# Patient Record
Sex: MALE | Race: WHITE | NOT HISPANIC OR LATINO | Employment: OTHER | URBAN - METROPOLITAN AREA
[De-identification: names, ages, dates, MRNs, and addresses within clinical notes are randomized per-mention and may not be internally consistent; named-entity substitution may affect disease eponyms.]

---

## 2017-01-02 DIAGNOSIS — Z79.01 LONG TERM CURRENT USE OF ANTICOAGULANT THERAPY: Primary | ICD-10-CM

## 2017-01-02 RX ORDER — WARFARIN SODIUM 4 MG/1
TABLET ORAL
Qty: 34 TABLET | Refills: 5 | Status: SHIPPED | OUTPATIENT
Start: 2017-01-02 | End: 2017-01-19

## 2017-01-02 NOTE — TELEPHONE ENCOUNTER
Last INR: 12/29/16=3.1  Next INR: 02/09/17    Prescription approved per [facility] Refill Protocol.    Madeline Wayne RN

## 2017-01-04 DIAGNOSIS — I70.90 ASVD (ARTERIOSCLEROTIC VASCULAR DISEASE): Primary | ICD-10-CM

## 2017-01-04 RX ORDER — LISINOPRIL 20 MG/1
20 TABLET ORAL 2 TIMES DAILY
Qty: 180 TABLET | Refills: 0 | Status: SHIPPED | OUTPATIENT
Start: 2017-01-04 | End: 2017-02-20

## 2017-01-16 ENCOUNTER — HOSPITAL ENCOUNTER (OUTPATIENT)
Dept: CARDIOLOGY | Facility: CLINIC | Age: 62
Discharge: HOME | End: 2017-01-16
Attending: INTERNAL MEDICINE | Admitting: INTERNAL MEDICINE
Payer: COMMERCIAL

## 2017-01-16 DIAGNOSIS — I42.9 CARDIOMYOPATHY, UNSPECIFIED (H): Primary | ICD-10-CM

## 2017-01-16 DIAGNOSIS — I42.9 IDIOPATHIC CARDIOMYOPATHY (H): ICD-10-CM

## 2017-01-16 PROCEDURE — 93306 TTE W/DOPPLER COMPLETE: CPT | Mod: 26 | Performed by: INTERNAL MEDICINE

## 2017-01-16 PROCEDURE — 93306 TTE W/DOPPLER COMPLETE: CPT

## 2017-01-16 RX ORDER — CARVEDILOL 25 MG/1
25 TABLET ORAL 2 TIMES DAILY WITH MEALS
Qty: 180 TABLET | Refills: 0 | Status: SHIPPED | OUTPATIENT
Start: 2017-01-16 | End: 2017-02-20

## 2017-01-19 DIAGNOSIS — J20.0 ACUTE BRONCHITIS DUE TO MYCOPLASMA PNEUMONIAE: ICD-10-CM

## 2017-01-19 DIAGNOSIS — Z79.01 LONG TERM CURRENT USE OF ANTICOAGULANT THERAPY: Primary | ICD-10-CM

## 2017-01-19 DIAGNOSIS — E78.5 HYPERLIPIDEMIA LDL GOAL <70: Primary | ICD-10-CM

## 2017-01-19 DIAGNOSIS — E03.9 ACQUIRED HYPOTHYROIDISM: ICD-10-CM

## 2017-01-19 DIAGNOSIS — F41.9 ANXIETY: ICD-10-CM

## 2017-01-19 RX ORDER — WARFARIN SODIUM 4 MG/1
TABLET ORAL
Qty: 102 TABLET | Refills: 1 | Status: SHIPPED | OUTPATIENT
Start: 2017-01-19 | End: 2017-06-25

## 2017-01-19 NOTE — TELEPHONE ENCOUNTER
Last INR: 3.1 on 12/29/16  Next INR: 02/09/17    Prescription approved per [facility] Refill Protocol, patient has switched from [facility] to [facility]    Madeline Wayne RN

## 2017-01-19 NOTE — TELEPHONE ENCOUNTER
Simvastatin 20mg     Last Written Prescription Date: 03/21/2016  Last Fill Quantity: 90, # refills: 3  Last Office Visit with [facility] primary care provider:  12/19/2016-Dr Lorenzana   Next 5 appointments (look out 90 days)     Jan 27, 2017  1:30 PM   Return Visit with Lloyd Lewis MD   [facility] [place] ([facility])    [address]  [facility] [id number] [phone]                   Last PHQ-9 score on record=   PHQ-9 SCORE 5/9/2016   Total Score 4           L-Thyroxine 175mcg   New pharmacy-[facility]  Last Written Prescription Date: 11/9/2016  Last Quantity: 30, # refills: 11  Last Office Visit with [facility], [facility] or [facility] prescribing provider: 12/19/2016-Dr Lorenzana   Next 5 appointments (look out 90 days)     Jan 27, 2017  1:30 PM   Return Visit with Lloyd Lewis MD   [facility] [place] ([facility])    [address]  [facility] [id number] [phone]                   TSH   Date Value Ref Range Status   10/06/2016 5.86* 0.40 - 4.00 mU/L Final

## 2017-01-21 RX ORDER — SIMVASTATIN 20 MG
20 TABLET ORAL AT BEDTIME
Qty: 90 TABLET | Refills: 0 | Status: SHIPPED | OUTPATIENT
Start: 2017-01-21 | End: 2017-04-25

## 2017-01-21 RX ORDER — LEVOTHYROXINE SODIUM 175 UG/1
175 TABLET ORAL DAILY
Qty: 30 TABLET | Refills: 11 | Status: SHIPPED | OUTPATIENT
Start: 2017-01-21 | End: 2018-01-11

## 2017-01-21 NOTE — TELEPHONE ENCOUNTER
Routing refill request to provider for review/approval because:  Labs out of range:  TSH

## 2017-01-24 ENCOUNTER — PRE VISIT (OUTPATIENT)
Dept: CARDIOLOGY | Facility: CLINIC | Age: 62
End: 2017-01-24

## 2017-01-27 ENCOUNTER — OFFICE VISIT (OUTPATIENT)
Dept: CARDIOLOGY | Facility: CLINIC | Age: 62
End: 2017-01-27
Payer: COMMERCIAL

## 2017-01-27 VITALS
BODY MASS INDEX: 31.74 KG/M2 | SYSTOLIC BLOOD PRESSURE: 84 MMHG | HEIGHT: 69 IN | WEIGHT: 214.3 LBS | DIASTOLIC BLOOD PRESSURE: 60 MMHG | HEART RATE: 92 BPM

## 2017-01-27 DIAGNOSIS — Z95.2 S/P AORTIC VALVE REPLACEMENT: ICD-10-CM

## 2017-01-27 DIAGNOSIS — I42.9 IDIOPATHIC CARDIOMYOPATHY (H): Primary | ICD-10-CM

## 2017-01-27 DIAGNOSIS — I10 HYPERTENSION GOAL BP (BLOOD PRESSURE) < 140/90: ICD-10-CM

## 2017-01-27 DIAGNOSIS — I35.0 NONRHEUMATIC AORTIC VALVE STENOSIS: ICD-10-CM

## 2017-01-27 DIAGNOSIS — I25.10 CORONARY ARTERY DISEASE INVOLVING NATIVE CORONARY ARTERY OF NATIVE HEART WITHOUT ANGINA PECTORIS: ICD-10-CM

## 2017-01-27 DIAGNOSIS — I95.9 HYPOTENSION, UNSPECIFIED HYPOTENSION TYPE: ICD-10-CM

## 2017-01-27 DIAGNOSIS — I48.21 PERMANENT ATRIAL FIBRILLATION (H): ICD-10-CM

## 2017-01-27 PROCEDURE — 99214 OFFICE O/P EST MOD 30 MIN: CPT | Performed by: INTERNAL MEDICINE

## 2017-01-27 NOTE — PROGRESS NOTES
2017      Rehan Lorenzana MD    [facility]    [address] [place], [place] [id number]       RE: Jovon Mantilla   MRN: [id number]   : 1955      Dear Dr. Lorenzana:      I again had the pleasure of seeing your patient, Jovon Mantilla, at [facility] for evaluation of a dilated cardiomyopathy, coronary artery disease, permanent atrial fibrillation, aortic valve replacement and hypertension.  The patient had a history of permanent atrial fibrillation with controlled ventricular response.  His ejection fraction was generally held around 40%-50%.  He had mild to moderate mitral regurgitation and mild tricuspid insufficiency.  The patient developed worsening aortic stenosis, and his aortic valve was replaced using an ATS 24 mm  supra-annular valve on 2011.  A maze procedure was performed but we were unable to maintain normal sinus rhythm postoperatively.  He returned to the operating room for mediastinal exploration for bleeding and then developed a GI bleed thereafter.  He has remained stable since that time.  He has not had any significant bradyarrhythmias requiring a pacemaker.  Digoxin was added by the electrophysiologist for rate control and his last level 1 year ago on 2016 was 0.9.  The patient denies syncope, presyncope or palpitations.  He is exercising by walking for 1 hour every day.  He denies angina pectoris.  He has previously known 70% LAD stenosis which could not be bypassed because it was intramuscular.  A Cardiolite stress test 2013 did not demonstrate any areas of ischemia or infarction.  He is using SBE prophylaxis appropriately.  He has chronic right greater than left extremity peripheral edema due to previous vein stripping and varicose veins.  He has undergone bariatric surgery in the past and remained stable.  The patient's blood pressure has been elevated in the past.  He did not feel well when we increased his  lisinopril to 20 mg b.i.d.  He is now taking 20 mg by mouth in the morning and 10 mg in the afternoon.      PHYSICAL EXAMINATION:   VITAL SIGNS:  Current blood pressure is 84/60, taken twice on the right arm and once on the left arm.  Pulse is 92 and irregular, weight is 214 pounds, an increase of 4 pounds from last year.   CHEST:  Clear to auscultation.   CARDIAC:  Shows a healed sternotomy scar with an irregularly irregular rhythm.  Normal S1 with crisp prosthetic S2.  I do not hear an S3 gallop.  He has a 2/6 systolic ejection murmur left lower sternal border the right upper sternal border and a 2/6 mid possibly holosystolic murmur of mitral regurgitation left lower sternal border towards the apex.   NECK:  There is no JVD or HJR present.   ABDOMEN:  Soft, nontender without organomegaly.   EXTREMITIES:  Show 1+ right and trace left peripheral edema with venous varicosities.      ASSESSMENT:   1.  Jovon Mantilla is a delightful 61-year-old male with an idiopathic cardiomyopathy out of proportion to the aortic valve disease or coronary artery disease.  An echocardiogram was performed on 01/16 and demonstrated ejection fraction of 45%-50% and mild concentric left ventricular hypertrophy.  There continues to be severe biatrial enlargement, mild to moderate mitral regurgitation and mild tricuspid insufficiency.  The inferior vena cava appeared dilated.  Right ventricular systolic pressure was elevated consistent with mild to moderate pulmonary hypertension.  The mechanical aortic valve was functioning normally.  The rhythm was atrial fibrillation.  One wonders whether his pulmonary hypertension and right heart dysfunction is due to systemic hypertension.  On the other hand, his blood pressure is currently low today despite his being asymptomatic.  We are going to recheck a blood pressure in 1 month.  The patient states he is drinking plenty of fluids.  He has been on Zoloft for 6 months and blood pressure in December  was 140/100.   2.  Permanent atrial fibrillation.  He currently is rate controlled and doing well.   3.  Coronary artery disease.  Periodic stress testing will continue every 5 years if he is angina-free.      It is my pleasure to assist in the care of Mr. Jovon Mantilla.  We will see what his blood pressure is in a month.  He will continue with SBE prophylaxis.  I will see him again in 1 year should he remain stable.  A digoxin level will be performed next year.  All the patient's questions were answered to his satisfaction.      Sincerely,            Dylan Lewis MD, FACC         DYLAN LEWIS MD, FACC             D: 2017 14:25   T: 2017 17:09   MT: RAYSHAWN      Name:     JOVON MANTILLA   MRN:      [id number]        Account:      [id number]   :      1955           Service Date: 2017      Document: [id number]

## 2017-01-27 NOTE — MR AVS SNAPSHOT
After Visit Summary   1/27/2017    Jovon Mantilla    MRN: [id number]           Patient Information     Date Of Birth          1955        Visit Information        Provider Department      1/27/2017 1:30 PM Lloyd Lewis MD [facility]        Today's Diagnoses     Idiopathic cardiomyopathy (H)    -  1     Permanent atrial fibrillation (H)         Nonrheumatic aortic valve stenosis         S/P aortic valve replacement         Hypertension goal BP (blood pressure) < 140/90         Coronary artery disease involving native coronary artery of native heart without angina pectoris         Hypotension, unspecified hypotension type            Follow-ups after your visit        Additional Services     Follow-Up with Cardiologist                 Your next 10 appointments already scheduled     Feb 09, 2017  1:45 PM   Anticoagulation Visit with [facility]   [facility]    [address] [place] [id number] [phone]            Feb 24, 2017  3:10 PM   Return Visit with LILLI Rhodes CNP   [facility] ([facility])    [address] [place] [id number] [phone]              Future tests that were ordered for you today     Open Future Orders        Priority Expected Expires Ordered    Follow-Up with Cardiologist Routine 1/27/2018 6/11/2018 1/27/2017            Who to contact     If you have questions or need follow up information about today's clinic visit or your schedule please contact [facility] directly at [phone].  Normal or non-critical lab and imaging results will be communicated to you by MyChart, letter or phone within 4 business days after the clinic has received the results. If you do not hear from us within 7 days, please contact the  "clinic through [employer] or phone. If you have a critical or abnormal lab result, we will notify you by phone as soon as possible.  Submit refill requests through [employer] or call your pharmacy and they will forward the refill request to us. Please allow 3 business days for your refill to be completed.          Additional Information About Your Visit        [employer] Information     [employer] gives you secure access to your electronic health record. If you see a primary care provider, you can also send messages to your care team and make appointments. If you have questions, please call your primary care clinic.  If you do not have a primary care provider, please call [phone] and they will assist you.        Care EveryWhere ID     This is your Care EveryWhere ID. This could be used by other organizations to access your [facility] medical records  [id number]        Your Vitals Were     Pulse Height BMI (Body Mass Index)             92 1.753 m (5' 9\") 31.63 kg/m2          Blood Pressure from Last 3 Encounters:   01/27/17 84/60   12/19/16 140/100   11/04/16 104/76    Weight from Last 3 Encounters:   01/27/17 97.206 kg (214 lb 4.8 oz)   12/19/16 98.431 kg (217 lb)   11/04/16 98.431 kg (217 lb)                 Today's Medication Changes          These changes are accurate as of: 1/27/17  2:16 PM.  If you have any questions, ask your nurse or doctor.               These medicines have changed or have updated prescriptions.        Dose/Directions    amoxicillin 500 MG capsule   Commonly known as:  AMOXIL   This may have changed:    - when to take this  - reasons to take this   Used for:  Acute bronchitis due to Mycoplasma pneumoniae        Dose:  500 mg   Take 1 capsule (500 mg) by mouth 3 times daily   Quantity:  30 capsule   Refills:  0       lisinopril 20 MG tablet   Commonly known as:  PRINIVIL/ZESTRIL   This may have changed:  additional instructions   Used for:  ASVD (arteriosclerotic vascular disease)        Dose:  20 " mg   Take 1 tablet (20 mg) by mouth 2 times daily   Quantity:  180 tablet   Refills:  0                Primary Care Provider Office Phone # Fax #    Rehan Lorenzana [phone]       [address] [address]        Thank you!     Thank you for choosing [facility] [place]  for your care. Our goal is always to provide you with excellent care. Hearing back from our patients is one way we can continue to improve our services. Please take a few minutes to complete the written survey that you may receive in the mail after your visit with us. Thank you!             Your Updated Medication List - Protect others around you: Learn how to safely use, store and throw away your medicines at www.disposemymeds.org.          This list is accurate as of: 1/27/17  2:16 PM.  Always use your most recent med list.                   Brand Name Dispense Instructions for use    albuterol 108 (90 BASE) MCG/ACT Inhaler    PROAIR HFA/PROVENTIL HFA/VENTOLIN HFA    1 Inhaler    Inhale 2 puffs into the lungs every 6 hours as needed for shortness of breath / dyspnea or wheezing       ALLERGY 24-HR PO      Take by mouth daily       amoxicillin 500 MG capsule    AMOXIL    30 capsule    Take 1 capsule (500 mg) by mouth 3 times daily       aspirin 81 MG tablet      Take 81 mg by mouth every other day       CALCIUM 600+D 600-200 MG-UNIT Tabs   Generic drug:  calcium carbonate-vitamin D      Take 1 tablet by mouth 2 times daily       carvedilol 25 MG tablet    COREG    180 tablet    Take 1 tablet (25 mg) by mouth 2 times daily (with meals)       CENTRUM Chew      Take 1 tablet by mouth At Bedtime       digoxin 250 MCG tablet    LANOXIN    90 tablet    Take 1 tablet (250 mcg) by mouth daily       Fiber 0.52 G Caps      Take 2 tablets by mouth At Bedtime       IRON SUPPLEMENT 325 (65 FE) MG tablet   Generic drug:  ferrous sulfate     100 tablet    Take 1  tablet (325 mg) by mouth 2 times daily       levothyroxine 175 MCG tablet    SYNTHROID    30 tablet    Take 1 tablet (175 mcg) by mouth daily       lisinopril 20 MG tablet    PRINIVIL/ZESTRIL    180 tablet    Take 1 tablet (20 mg) by mouth 2 times daily       sertraline 50 MG tablet    ZOLOFT    90 tablet    Take 1 tablet (50 mg) by mouth daily       simvastatin 20 MG tablet    ZOCOR    90 tablet    Take 1 tablet (20 mg) by mouth At Bedtime       Vitamin B-12 500 MCG Subl      Place 500 mcg under the tongue daily       VITAMIN C PO      Take 500 mg by mouth At Bedtime       vitamin D 2000 UNITS Caps      Take 1 tablet by mouth daily       warfarin 4 MG tablet    COUMADIN    102 tablet    Take 6mg on Tuesday, Thursday, Saturday / Take 4mg on all other days OR As directed by INR Clinic.

## 2017-01-27 NOTE — PROGRESS NOTES
HPI and Plan:   See dictation:[id number]    Orders Placed This Encounter   Procedures     Digoxin level     Follow-Up with Cardiologist     Follow-Up with Cardiac Advanced Practice Provider       Orders Placed This Encounter   Medications     aspirin 81 MG tablet     Sig: Take 81 mg by mouth every other day     Fexofenadine HCl (ALLERGY 24-HR PO)     Sig: Take by mouth daily       Medications Discontinued During This Encounter   Medication Reason     ASPIRIN NOT PRESCRIBED (INTENTIONAL) Medication Reconciliation Clean Up         Encounter Diagnoses   Name Primary?     Idiopathic cardiomyopathy (H) Yes     Permanent atrial fibrillation (H)      Nonrheumatic aortic valve stenosis      S/P aortic valve replacement      Hypertension goal BP (blood pressure) < 140/90      Coronary artery disease involving native coronary artery of native heart without angina pectoris      Hypotension, unspecified hypotension type        CURRENT MEDICATIONS:  Current Outpatient Prescriptions   Medication Sig Dispense Refill     aspirin 81 MG tablet Take 81 mg by mouth every other day       Fexofenadine HCl (ALLERGY 24-HR PO) Take by mouth daily       simvastatin (ZOCOR) 20 MG tablet Take 1 tablet (20 mg) by mouth At Bedtime 90 tablet 0     levothyroxine (SYNTHROID) 175 MCG tablet Take 1 tablet (175 mcg) by mouth daily 30 tablet 11     sertraline (ZOLOFT) 50 MG tablet Take 1 tablet (50 mg) by mouth daily 90 tablet 0     warfarin (COUMADIN) 4 MG tablet Take 6mg on Tuesday, Thursday, Saturday / Take 4mg on all other days OR As directed by INR Clinic. 102 tablet 1     carvedilol (COREG) 25 MG tablet Take 1 tablet (25 mg) by mouth 2 times daily (with meals) 180 tablet 0     lisinopril (PRINIVIL/ZESTRIL) 20 MG tablet Take 1 tablet (20 mg) by mouth 2 times daily (Patient taking differently: Take 20 mg by mouth 2 times daily Pt takes a 20 mg tablet in am and a half of 20 (10mg) mg tablet at pm) 180 tablet 0     albuterol (PROAIR HFA/PROVENTIL  HFA/VENTOLIN HFA) 108 (90 BASE) MCG/ACT Inhaler Inhale 2 puffs into the lungs every 6 hours as needed for shortness of breath / dyspnea or wheezing 1 Inhaler 0     amoxicillin (AMOXIL) 500 MG capsule Take 1 capsule (500 mg) by mouth 3 times daily (Patient taking differently: Take 500 mg by mouth as needed ) 30 capsule 0     digoxin (LANOXIN) 250 MCG tablet Take 1 tablet (250 mcg) by mouth daily 90 tablet 2     ferrous sulfate (IRON SUPPLEMENT) 325 (65 FE) MG tablet Take 1 tablet (325 mg) by mouth 2 times daily 100 tablet      Multiple Vitamins-Minerals (CENTRUM) CHEW Take 1 tablet by mouth At Bedtime       Ascorbic Acid (VITAMIN C PO) Take 500 mg by mouth At Bedtime       Cyanocobalamin (VITAMIN B-12) 500 MCG SUBL Place 500 mcg under the tongue daily        Cholecalciferol (VITAMIN D) 2000 UNIT CAPS Take 1 tablet by mouth daily        Calcium Carbonate-Vitamin D (CALCIUM 600+D) 600-200 MG-UNIT TABS Take 1 tablet by mouth 2 times daily        Psyllium (FIBER) 0.52 GM CAPS Take 2 tablets by mouth At Bedtime          ALLERGIES   No Known Allergies    PAST MEDICAL HISTORY:  Past Medical History   Diagnosis Date     Palpitations      Acute prostatitis      Acute sinusitis, unspecified      Unspecified essential hypertension      Obesity, unspecified      HTN (hypertension)      CAD (coronary artery disease)      known 70% LAD stenosis     Atrial fibrillation (H)      Mitral regurgitation      Aortic valve disease      AVR 2011, MAZE     Cardiomyopathy (H)        PAST SURGICAL HISTORY:  Past Surgical History   Procedure Laterality Date     C nonspecific procedure  ~1985     vein stripping     Laparoscopic bypass gastric  10/8/2011     Procedure:LAPAROSCOPIC BYPASS GASTRIC; Diagnostic laparoscopy, Lysis of Adhesions, Laparoscopic Revision of Jejunojejunostomy; Surgeon:RADHA MURO; Location:[facility] OR     Laparoscopy diagnostic (general)  10/8/2011     Procedure:LAPAROSCOPY DIAGNOSTIC (GENERAL); Surgeon:JINA  RADHA RICHARDSON; Location: OR     Replace valve aortic  1/7/2011     Laparoscopic bypass gastric  6/26/2006     Dr Jin     Cardioversion  2/02, 4/02, 4/11     Angiogram  2/02     Normal coronary,dilated LV,Sev.decr.global LVF,+1 MR     Angiogram  1/08     Mild AS,Mod PHTN,mod prox.LAD disease,Triv.CFX disease     Angiogram  1/09     Mod AS,Mod PHTN,mod prox.LAD disease     Angiogram  10/10     Sev.AS,CM,global hypokinesis,Mild-mod LV dilated,Mild MR,70% prox.LAD lesion,PHTN     Cholecystectomy       Esophagoscopy, gastroscopy, duodenoscopy (egd), combined N/A 1/23/2016     Procedure: COMBINED ESOPHAGOSCOPY, GASTROSCOPY, DUODENOSCOPY (EGD);  Surgeon: Robyn Collins MD;  Location:  GI     Colonoscopy N/A 1/23/2016     Procedure: COLONOSCOPY;  Surgeon: Robyn Collins MD;  Location:  GI       FAMILY HISTORY:  Family History   Problem Relation Age of Onset     Cancer - colorectal Father      Colon Cancer Father      DIABETES Brother        SOCIAL HISTORY:  Social History     Social History     Marital Status:      Spouse Name: Tracey     Number of Children: 0     Years of Education: N/A     Occupational History           [employer]     Social History Main Topics     Smoking status: Never Smoker      Smokeless tobacco: Never Used     Alcohol Use: No     Drug Use: No     Sexual Activity:     Partners: Female     Other Topics Concern     Parent/Sibling W/ Cabg, Mi Or Angioplasty Before 65f 55m? Yes     Blood Transfusions No     Caffeine Concern Yes     1 can daily     Occupational Exposure Yes     Hobby Hazards No     Sleep Concern No     Stress Concern No     Weight Concern No     Special Diet Yes     low sodium, low calories     Back Care No     Exercise Yes     walks daily     Bike Helmet Not Asked     NA     Seat Belt Yes     Self-Exams Yes     Social History Narrative       Review of Systems:  Skin:  Negative       Eyes:  Positive for glasses    ENT:  Negative      Respiratory:   "Negative       Cardiovascular:    Positive for;palpitations;edema occas. palpitations with over exertion ,  edema of right ankle ,   Gastroenterology: Negative      Genitourinary:  Negative      Musculoskeletal:  Negative      Neurologic:  Negative      Psychiatric:  Negative      Heme/Lymph/Imm:  Positive for night sweats;allergies takes an allergy pill daily   Endocrine:  Positive for thyroid disorder      Physical Exam:  Vitals: BP 84/60 mmHg  Pulse 92  Ht 1.753 m (5' 9\")  Wt 97.206 kg (214 lb 4.8 oz)  BMI 31.63 kg/m2    Constitutional:  cooperative, alert and oriented, well developed, well nourished, in no acute distress        Skin:  warm and dry to the touch, no apparent skin lesions or masses noted        Head:  normocephalic, no masses or lesions        Eyes:  pupils equal and round, conjunctivae and lids unremarkable, sclera white, no xanthalasma, EOMS intact, no nystagmus        ENT:  no pallor or cyanosis, dentition good        Neck:  carotid pulses are full and equal bilaterally, JVP normal, no carotid bruit, no thyromegaly        Chest:  normal breath sounds, clear to auscultation, normal A-P diameter, normal symmetry, normal respiratory excursion, no use of accessory muscles;healed median sternotomy scar          Cardiac: normal S1 and S2 irregularly irregular rhythm   crisp prosthetic valve sounds systolic ejection murmur;LLSB;grade 2;radiation to the RUSB systolic murmur;grade 2;LLSB;radiation to the base   blowing    Abdomen:  abdomen soft, non-tender, BS normoactive, no mass, no HSM, no bruits        Vascular: pulses full and equal, no bruits auscultated                                        Extremities and Back:  no deformities, clubbing, cyanosis, erythema observed     1+;pitting;RLE edema LLE edema;trace;pitting right lower extremity venous vericosities    Neurological:  affect appropriate, oriented to time, person and place;no gross motor deficits              CC  No referring provider " defined for this encounter.

## 2017-01-27 NOTE — Clinical Note
2017      Rehan Lorenzana MD    [facility]    [address] [place], [place] [id number]       RE: Jovon Mantilla   MRN: [id number]   : 1955      Dear Dr. Lorenzana:      I again had the pleasure of seeing your patient, Jovon Mantilla, at [facility] for evaluation of a dilated cardiomyopathy, coronary artery disease, permanent atrial fibrillation, aortic valve replacement and hypertension.  The patient had a history of permanent atrial fibrillation with controlled ventricular response.  His ejection fraction was generally held around 40%-50%.  He had mild to moderate mitral regurgitation and mild tricuspid insufficiency.  The patient developed worsening aortic stenosis, and his aortic valve was replaced using an ATS 24 mm  supra-annular valve on 2011.  A maze procedure was performed but we were unable to maintain normal sinus rhythm postoperatively.  He returned to the operating room for mediastinal exploration for bleeding and then developed a GI bleed thereafter.  He has remained stable since that time.  He has not had any significant bradyarrhythmias requiring a pacemaker.  Digoxin was added by the electrophysiologist for rate control and his last level 1 year ago on 2016 was 0.9.  The patient denies syncope, presyncope or palpitations.  He is exercising by walking for 1 hour every day.  He denies angina pectoris.  He has previously known 70% LAD stenosis which could not be bypassed because it was intramuscular.  A Cardiolite stress test 2013 did not demonstrate any areas of ischemia or infarction.  He is using SBE prophylaxis appropriately.  He has chronic right greater than left extremity peripheral edema due to previous vein stripping and varicose veins.  He has undergone bariatric surgery in the past and remained stable.  The patient's blood pressure has been elevated in the past.  He did not feel well when we increased his  lisinopril to 20 mg b.i.d.  He is now taking 20 mg by mouth in the morning and 10 mg in the afternoon.      PHYSICAL EXAMINATION:   VITAL SIGNS:  Current blood pressure is 84/60, taken twice on the right arm and once on the left arm.  Pulse is 92 and irregular, weight is 214 pounds, an increase of 4 pounds from last year.   CHEST:  Clear to auscultation.   CARDIAC:  Shows a healed sternotomy scar with an irregularly irregular rhythm.  Normal S1 with crisp prosthetic S2.  I do not hear an S3 gallop.  He has a 2/6 systolic ejection murmur left lower sternal border the right upper sternal border and a 2/6 mid possibly holosystolic murmur of mitral regurgitation left lower sternal border towards the apex.   NECK:  There is no JVD or HJR present.   ABDOMEN:  Soft, nontender without organomegaly.   EXTREMITIES:  Show 1+ right and trace left peripheral edema with venous varicosities.      ASSESSMENT:   1.  Jovon Mantilla is a delightful 61-year-old male with an idiopathic cardiomyopathy out of proportion to the aortic valve disease or coronary artery disease.  An echocardiogram was performed on 01/16 and demonstrated ejection fraction of 45%-50% and mild concentric left ventricular hypertrophy.  There continues to be severe biatrial enlargement, mild to moderate mitral regurgitation and mild tricuspid insufficiency.  The inferior vena cava appeared dilated.  Right ventricular systolic pressure was elevated consistent with mild to moderate pulmonary hypertension.  The mechanical aortic valve was functioning normally.  The rhythm was atrial fibrillation.  One wonders whether his pulmonary hypertension and right heart dysfunction is due to systemic hypertension.  On the other hand, his blood pressure is currently low today despite his being asymptomatic.  We are going to recheck a blood pressure in 1 month.  The patient states he is drinking plenty of fluids.  He has been on Zoloft for 6 months and blood pressure in December  was 140/100.   2.  Permanent atrial fibrillation.  He currently is rate controlled and doing well.   3.  Coronary artery disease.  Periodic stress testing will continue every 5 years if he is angina-free.      It is my pleasure to assist in the care of Mr. Jovon Mantilla.  We will see what his blood pressure is in a month.  He will continue with SBE prophylaxis.  I will see him again in 1 year should he remain stable.  A digoxin level will be performed next year.  All the patient's questions were answered to his satisfaction.      Sincerely,            Lloyd Lewis MD, FACC

## 2017-02-09 ENCOUNTER — ANTICOAGULATION THERAPY VISIT (OUTPATIENT)
Dept: NURSING | Facility: CLINIC | Age: 62
End: 2017-02-09
Payer: COMMERCIAL

## 2017-02-09 DIAGNOSIS — Z95.2 S/P AVR (AORTIC VALVE REPLACEMENT): ICD-10-CM

## 2017-02-09 DIAGNOSIS — Z79.01 LONG-TERM (CURRENT) USE OF ANTICOAGULANTS: Primary | ICD-10-CM

## 2017-02-09 LAB — INR POINT OF CARE: 4.5 (ref 0.86–1.14)

## 2017-02-09 PROCEDURE — 99207 ZZC NO CHARGE NURSE ONLY: CPT

## 2017-02-09 PROCEDURE — 85610 PROTHROMBIN TIME: CPT | Mod: QW

## 2017-02-09 PROCEDURE — 36416 COLLJ CAPILLARY BLOOD SPEC: CPT

## 2017-02-09 NOTE — PROGRESS NOTES
ANTICOAGULATION FOLLOW-UP CLINIC VISIT    Patient Name:  Jovon Mantilla  Date:  2/9/2017  Contact Type:  Face to Face    SUBJECTIVE:     Patient Findings     Positives Antibiotic use or infection (finished 2nd round of Amoxicillin about 1 week ago, pt stated he had influenza like symptoms but is feeling better now.)           OBJECTIVE    INR PROTIME   Date Value Ref Range Status   02/09/2017 4.5* 0.86 - 1.14 Final     CHROMOGENIC FACTOR 10   Date Value Ref Range Status   01/21/2011 35* 60 - 140 % Final     Comment:     Therapeutic Range: A Chromogenic Factor 10 level of 20-40% inversely   correlates   with an INR of 2-3 for patients receiving Warfarin. Chromogenic Factor 10   levels below 20% indicate an INR greater than 3, and levels above 40%   indicate   an INR lessthan 2.       ASSESSMENT / PLAN  INR assessment SUPRA    Recheck INR In: 3 WEEKS    INR Location Clinic      Anticoagulation Summary as of 2/9/2017     INR goal 2.5-3.5   Selected INR 4.5! (2/9/2017)   Maintenance plan 6 mg (4 mg x 1.5) on Tue, Thu, Sat; 4 mg (4 mg x 1) all other days   Full instructions 2/9: Hold; Otherwise 6 mg on Tue, Thu, Sat; 4 mg all other days   Weekly total 34 mg   Plan last modified Madeline Wayne RN (5/20/2016)   Next INR check 3/3/2017   Target end date     Indications   Long-term (current) use of anticoagulants [Z79.01] [Z79.01]  S/P AVR (aortic valve replacement) [Z95.2]         Anticoagulation Episode Summary     INR check location     Preferred lab     Send INR reminders to [facility]    Comments       Anticoagulation Care Providers     Provider Role Specialty Phone number    Rehan Lorenzana MD [facility] [phone]            See the Encounter Report to view Anticoagulation Flowsheet and Dosing Calendar (Go to Encounters tab in chart review, and find the Anticoagulation Therapy Visit)        Madeline Wayne RN

## 2017-02-20 DIAGNOSIS — I42.9 CARDIOMYOPATHY, UNSPECIFIED (H): ICD-10-CM

## 2017-02-20 DIAGNOSIS — I70.90 ASVD (ARTERIOSCLEROTIC VASCULAR DISEASE): ICD-10-CM

## 2017-02-20 RX ORDER — LISINOPRIL 20 MG/1
TABLET ORAL
Qty: 45 TABLET | Refills: 0 | Status: SHIPPED | OUTPATIENT
Start: 2017-02-20 | End: 2017-02-20

## 2017-02-20 RX ORDER — CARVEDILOL 25 MG/1
25 TABLET ORAL 2 TIMES DAILY WITH MEALS
Qty: 180 TABLET | Refills: 3 | Status: SHIPPED | OUTPATIENT
Start: 2017-02-20 | End: 2018-03-16

## 2017-02-20 RX ORDER — CARVEDILOL 25 MG/1
25 TABLET ORAL 2 TIMES DAILY WITH MEALS
Qty: 60 TABLET | Refills: 0 | Status: SHIPPED | OUTPATIENT
Start: 2017-02-20 | End: 2017-02-20

## 2017-02-20 RX ORDER — LISINOPRIL 20 MG/1
TABLET ORAL
Qty: 135 TABLET | Refills: 3 | Status: SHIPPED | OUTPATIENT
Start: 2017-02-20 | End: 2018-04-03

## 2017-02-23 ENCOUNTER — TELEPHONE (OUTPATIENT)
Dept: CARDIOLOGY | Facility: CLINIC | Age: 62
End: 2017-02-23

## 2017-02-23 ENCOUNTER — OFFICE VISIT (OUTPATIENT)
Dept: CARDIOLOGY | Facility: CLINIC | Age: 62
End: 2017-02-23
Attending: INTERNAL MEDICINE
Payer: COMMERCIAL

## 2017-02-23 VITALS
BODY MASS INDEX: 31.44 KG/M2 | HEIGHT: 69 IN | WEIGHT: 212.3 LBS | SYSTOLIC BLOOD PRESSURE: 104 MMHG | HEART RATE: 74 BPM | DIASTOLIC BLOOD PRESSURE: 72 MMHG

## 2017-02-23 DIAGNOSIS — I10 BENIGN ESSENTIAL HYPERTENSION: Primary | ICD-10-CM

## 2017-02-23 DIAGNOSIS — I48.21 PERMANENT ATRIAL FIBRILLATION (H): ICD-10-CM

## 2017-02-23 DIAGNOSIS — I48.21 PERMANENT ATRIAL FIBRILLATION (H): Primary | ICD-10-CM

## 2017-02-23 DIAGNOSIS — I25.10 CORONARY ARTERY DISEASE INVOLVING NATIVE CORONARY ARTERY OF NATIVE HEART WITHOUT ANGINA PECTORIS: ICD-10-CM

## 2017-02-23 DIAGNOSIS — I42.9 IDIOPATHIC CARDIOMYOPATHY (H): ICD-10-CM

## 2017-02-23 LAB — DIGOXIN SERPL-MCNC: 0.7 UG/L (ref 0.5–2)

## 2017-02-23 PROCEDURE — 80162 ASSAY OF DIGOXIN TOTAL: CPT | Performed by: NURSE PRACTITIONER

## 2017-02-23 PROCEDURE — 99214 OFFICE O/P EST MOD 30 MIN: CPT | Performed by: NURSE PRACTITIONER

## 2017-02-23 PROCEDURE — 36415 COLL VENOUS BLD VENIPUNCTURE: CPT | Performed by: NURSE PRACTITIONER

## 2017-02-23 NOTE — LETTER
2/23/2017    Rehan Lorenzana MD  [facility]   [address] [facility] [address]    RE: Jovon Mantilla       Dear Colleague,    I had the pleasure of seeing Jovon Mantilla in the [facility].    HPI and Plan:   See dictation    Orders Placed This Encounter   Procedures     NM Exercise stress test (nuc card)       No orders of the defined types were placed in this encounter.      There are no discontinued medications.      Encounter Diagnoses   Name Primary?     Idiopathic cardiomyopathy (H)      Coronary artery disease involving native coronary artery of native heart without angina pectoris      Benign essential hypertension Yes     Permanent atrial fibrillation (H)        CURRENT MEDICATIONS:  Current Outpatient Prescriptions   Medication Sig Dispense Refill     carvedilol (COREG) 25 MG tablet Take 1 tablet (25 mg) by mouth 2 times daily (with meals) 180 tablet 3     lisinopril (PRINIVIL/ZESTRIL) 20 MG tablet Take  20 mg (1 tablet in am) and 10 mg  (one half tablet) in  pm 135 tablet 3     aspirin 81 MG tablet Take 81 mg by mouth every other day       Fexofenadine HCl (ALLERGY 24-HR PO) Take by mouth daily       simvastatin (ZOCOR) 20 MG tablet Take 1 tablet (20 mg) by mouth At Bedtime 90 tablet 0     levothyroxine (SYNTHROID) 175 MCG tablet Take 1 tablet (175 mcg) by mouth daily 30 tablet 11     sertraline (ZOLOFT) 50 MG tablet Take 1 tablet (50 mg) by mouth daily 90 tablet 0     warfarin (COUMADIN) 4 MG tablet Take 6mg on Tuesday, Thursday, Saturday / Take 4mg on all other days OR As directed by INR Clinic. 102 tablet 1     albuterol (PROAIR HFA/PROVENTIL HFA/VENTOLIN HFA) 108 (90 BASE) MCG/ACT Inhaler Inhale 2 puffs into the lungs every 6 hours as needed for shortness of breath / dyspnea or wheezing 1 Inhaler 0     digoxin (LANOXIN) 250 MCG tablet Take 1 tablet (250 mcg) by mouth daily 90 tablet 2     ferrous sulfate (IRON SUPPLEMENT) 325 (65 FE) MG  tablet Take 1 tablet (325 mg) by mouth 2 times daily 100 tablet      Multiple Vitamins-Minerals (CENTRUM) CHEW Take 1 tablet by mouth At Bedtime       Ascorbic Acid (VITAMIN C PO) Take 500 mg by mouth At Bedtime       Cyanocobalamin (VITAMIN B-12) 500 MCG SUBL Place 500 mcg under the tongue daily        Cholecalciferol (VITAMIN D) 2000 UNIT CAPS Take 1 tablet by mouth daily        Calcium Carbonate-Vitamin D (CALCIUM 600+D) 600-200 MG-UNIT TABS Take 1 tablet by mouth 2 times daily        Psyllium (FIBER) 0.52 GM CAPS Take 2 tablets by mouth At Bedtime          ALLERGIES   No Known Allergies    PAST MEDICAL HISTORY:  Past Medical History   Diagnosis Date     Acute prostatitis      Acute sinusitis, unspecified      Aortic valve disease      AVR 2011, MAZE     Atrial fibrillation (H)      CAD (coronary artery disease)      known 70% LAD stenosis     Cardiomyopathy (H)      HTN (hypertension)      Mitral regurgitation      Obesity, unspecified      Palpitations      Unspecified essential hypertension        PAST SURGICAL HISTORY:  Past Surgical History   Procedure Laterality Date     C nonspecific procedure  ~1985     vein stripping     Laparoscopic bypass gastric  10/8/2011     Procedure:LAPAROSCOPIC BYPASS GASTRIC; Diagnostic laparoscopy, Lysis of Adhesions, Laparoscopic Revision of Jejunojejunostomy; Surgeon:RADHA MURO; Location: OR     Laparoscopy diagnostic (general)  10/8/2011     Procedure:LAPAROSCOPY DIAGNOSTIC (GENERAL); Surgeon:RADHA MURO; Location: OR     Replace valve aortic  1/7/2011     Laparoscopic bypass gastric  6/26/2006     Dr Jin     Cardioversion  2/02, 4/02, 4/11     Angiogram  2/02     Normal coronary,dilated LV,Sev.decr.global LVF,+1 MR     Angiogram  1/08     Mild AS,Mod PHTN,mod prox.LAD disease,Triv.CFX disease     Angiogram  1/09     Mod AS,Mod PHTN,mod prox.LAD disease     Angiogram  10/10     Sev.AS,CM,global hypokinesis,Mild-mod LV dilated,Mild MR,70% prox.LAD  "lesion,PHTN     Cholecystectomy       Esophagoscopy, gastroscopy, duodenoscopy (egd), combined N/A 1/23/2016     Procedure: COMBINED ESOPHAGOSCOPY, GASTROSCOPY, DUODENOSCOPY (EGD);  Surgeon: Robyn Collins MD;  Location:  GI     Colonoscopy N/A 1/23/2016     Procedure: COLONOSCOPY;  Surgeon: Robyn Collins MD;  Location:  GI       FAMILY HISTORY:  Family History   Problem Relation Age of Onset     Cancer - colorectal Father      Colon Cancer Father      DIABETES Brother        SOCIAL HISTORY:  Social History     Social History     Marital status:      Spouse name: Tracey     Number of children: 0     Years of education: N/A     Occupational History           [employer]     Social History Main Topics     Smoking status: Never Smoker     Smokeless tobacco: Never Used     Alcohol use No     Drug use: No     Sexual activity: Yes     Partners: Female     Other Topics Concern     Parent/Sibling W/ Cabg, Mi Or Angioplasty Before 65f 55m? Yes     Blood Transfusions No     Caffeine Concern Yes     1 can daily     Occupational Exposure Yes     Hobby Hazards No     Sleep Concern No     Stress Concern No     Weight Concern No     Special Diet Yes     low sodium, low calories     Back Care No     Exercise Yes     walks daily     Bike Helmet Not Asked     NA     Seat Belt Yes     Self-Exams Yes     Social History Narrative       Review of Systems:  Skin:  Negative       Eyes:  Positive for glasses    ENT:  Negative      Respiratory:  Negative       Cardiovascular:  Negative      Gastroenterology: Negative      Genitourinary:  not assessed      Musculoskeletal:  Negative      Neurologic:  Negative      Psychiatric:  Negative      Heme/Lymph/Imm:  Negative      Endocrine:    night sweats;hot flashes      Physical Exam:  Vitals: /72  Pulse 74  Ht 1.753 m (5' 9\")  Wt 96.3 kg (212 lb 4.8 oz)  BMI 31.35 kg/m2    Constitutional:  cooperative, alert and oriented, well developed, well nourished, " in no acute distress        Skin:  warm and dry to the touch        Head:  normocephalic        Eyes:  sclera white        ENT:  no pallor or cyanosis        Neck:  carotid pulses are full and equal bilaterally        Chest:  normal breath sounds, clear to auscultation, normal A-P diameter, normal symmetry, normal respiratory excursion, no use of accessory muscles;healed median sternotomy scar          Cardiac:   irregularly irregular rhythm   crisp prosthetic valve sounds systolic ejection murmur;LLSB;grade 2;radiation to the RUSB systolic murmur;grade 2;LLSB;radiation to the base        Abdomen:  abdomen soft        Vascular: pulses full and equal                                        Extremities and Back:  no deformities, clubbing, cyanosis, erythema observed   bilateral LE edema;trace          Neurological:  affect appropriate, oriented to time, person and place;no gross motor deficits              CC  Lloyd Lewis MD   [facility]  [address]  [place] [address]                Cardiology Clinic Progress Note  Jovon Mantilla MRN# [id number]   YOB: 1955 Age: 61 year old     Reason For Visit: Blood pressure follow-up   Primary Cardiologist:   Dr. Lewis          History of Presenting Illness:    Jovon Mantilla is a pleasant 61 year old patient with a past cardiac history significant for idiopathic dilated cardiomyopathy, CAD with known 70% LAD stenosis unable to be bypassed as it was intramuscular, permanent atrial fibrillation, AS status post AVR 2011 with maze procedure which was unsuccessful, chronic peripheral edema status post vein stripping with varicose veins.  His a history of not tolerating lisinopril 20 mg b.i.d. but  is able to tolerate lisinopril 20 mg in the a.m. and 10 mg in the p.m. Previous echocardiogram 1/16/2017 showing LVEF 45-50%, mild concentric LVH, severe biatrial enlargement, mild to moderate MR, mild TR, dilated inferior vena cava, elevated RVSP  consistent with mild to moderate pulmonary hypertension, and normally functioning mechanical aortic valve.    Pt was last seen by Dr. Lewis on 1/27/2017.  At that time, his blood pressure was 84/60 and follow-up to recheck blood pressure was recommended.    Pt presents today for blood pressure follow-up.  His blood pressure today in the office is 104/72.  He does not check home blood pressure readings but states at a recent office visit it was 120s systolically.  He was asymptomatic with his previous hypotension.  He has been feeling well from a cardiac standpoint.  He had his annual digoxin level drawn today and we will call him if these results are abnormal. Patient reports no chest pain, shortness of breath, PND, orthopnea, presyncope, syncope, heart racing, or palpitations.      Current Cardiac Medications   Carvedilol 25 mg b.i.d.  Lisinopril 20 mg in the a.m. 10 mg in the p.m.  Aspirin 81 mg daily  Simvastatin 20 mg daily  Coumadin  Digoxin 250 mcg daily                   Assessment and Plan:     Plan  1.  Digoxin level today (call if abnormal)  2.  Follow-up with Dr. Lewis in one year with digoxin level and stress test prior (previous stress test 2012 with notes to continue stress testing every five years)      1. CAD    known 70% LAD stenosis unable to be bypassed as it was intramuscular    Last stress test 2012 showing no ischemia or infarct    No angina    Continue statin, aspirin, Ace, beta blocker      2. Idiopathic cardiomyopathy    LVEF 45-50%     possibly due to hypertension    No signs of heart failure    Continue BB, ace, digoxin      3. Hypertension    More recently with hypotension 84/60 at last office visit 1/27/2017    104/72 today and does not check home blood pressure readings    Continue carvedilol, lisinopril      4. Permanent atrial fibrillation    Previous unsuccessful Maze procedure 2011    Rate control    Continue carvedilol, Coumadin, digoxin      5. AS    status post AVR 2011          Thank you for allowing me to participate in this delightful patient's care.      This note was completed in part using Dragon voice recognition software. Although reviewed after completion, some word and grammatical errors may occur.    LILLI Keller, CNP           Data:   All laboratory data reviewed:    LAST CHOLESTEROL:  Lab Results   Component Value Date    CHOL 107 02/22/2016     Lab Results   Component Value Date    HDL 46 02/22/2016     Lab Results   Component Value Date    LDL 51 02/22/2016     Lab Results   Component Value Date    TRIG 50 02/22/2016     Lab Results   Component Value Date    CHOLHDLRATIO 2.5 02/16/2015       LAST BMP:  Lab Results   Component Value Date     07/07/2016      Lab Results   Component Value Date    POTASSIUM 4.7 07/07/2016     Lab Results   Component Value Date    CHLORIDE 103 07/07/2016     Lab Results   Component Value Date    REECE 8.8 07/07/2016     Lab Results   Component Value Date    CO2 34 07/07/2016     Lab Results   Component Value Date    BUN 12 07/07/2016    BUN 15.3 03/05/2009     Lab Results   Component Value Date    CR 0.95 07/07/2016     Lab Results   Component Value Date    GLC 76 07/07/2016       LAST CBC:  Lab Results   Component Value Date    WBC 4.3 02/22/2016     Lab Results   Component Value Date    RBC 3.55 02/22/2016     Lab Results   Component Value Date    HGB 10.6 02/22/2016     Lab Results   Component Value Date    HCT 34.7 02/22/2016     Lab Results   Component Value Date    MCV 98 02/22/2016     Lab Results   Component Value Date    MCH 29.9 02/22/2016     Lab Results   Component Value Date    MCHC 30.5 02/22/2016     Lab Results   Component Value Date    RDW 15.2 02/22/2016     Lab Results   Component Value Date     02/22/2016     Thank you for allowing me to participate in the care of your patient.    Sincerely,     LILLI Keller CNP     [facility]

## 2017-02-23 NOTE — MR AVS SNAPSHOT
After Visit Summary   2/23/2017    Jovon Mantilla    MRN: [id number]           Patient Information     Date Of Birth          1955        Visit Information        Provider Department      2/23/2017 2:30 PM Portia Dixon APRN CNP [facility] [place]        Today's Diagnoses     Hyperlipidemia LDL goal <70    -  1    Hypotension, unspecified hypotension type        ASVD (arteriosclerotic vascular disease)        Idiopathic cardiomyopathy (H)        Coronary artery disease involving native coronary artery of native heart without angina pectoris        Dilated cardiomyopathy (H)          Care Instructions    Thanks for coming into [facility] Heart clinic today.    We discussed:   Call if you have low blood pressure or symptoms of lightheadedness or dizziness and we can see you sooner.     Medication changes:   No josue    Follow up:   See Dr. Lewis for cardiology follow up in 1 year with stress test and digoxin level prior.    Please call my nurse Katerina at [phone] with any questions or concerns.    Scheduling phone number: [phone]  Reminder: Please bring in all current medications, over the counter supplements and vitamin bottles to your next appointment.                Follow-ups after your visit        Your next 10 appointments already scheduled     Mar 03, 2017  3:45 PM CST   Anticoagulation Visit with [facility]ATION CLINIC   [facility] ([facility])    [address] [place] [id number] [phone]              Future tests that were ordered for you today     Open Future Orders        Priority Expected Expires Ordered    NM Exercise stress test (nuc card) Routine 2/23/2018 2/23/2019 2/23/2017            Who to contact     If you have questions or need follow up information about today's clinic visit or your schedule please contact [facility] [place] directly at [phone].  Normal or non-critical lab and imaging results will be communicated to you by MyChart, letter or phone within 4 business days after the clinic has received the results. If you do not hear from us within 7 days, please contact the clinic through [employer] or phone. If you have a critical or abnormal lab result, we will notify you by phone as soon as possible.  Submit refill requests through [employer] or call your pharmacy and they will forward the refill request to us. Please allow 3 business days for your refill to be completed.          Additional Information About Your Visit        [employer] Information     [employer] gives you secure access to your electronic health record. If you see a primary care provider, you can also send messages to your care team and make appointments. If you have questions, please call your primary care clinic.  If you do not have a primary care provider, please call [phone] and they will assist you.        Care EveryWhere ID     This is your Care EveryWhere ID. This could be used by other organizations to access your [place] medical records  [id number]        Your Vitals Were     Pulse Height BMI (Body Mass Index)             74 1.753 m (5' 9\") 31.35 kg/m2          Blood Pressure from Last 3 Encounters:   02/23/17 104/72   01/27/17 (!) 84/60   12/19/16 (!) 140/100    Weight from Last 3 Encounters:   02/23/17 96.3 kg (212 lb 4.8 oz)   01/27/17 97.2 kg (214 lb 4.8 oz)   12/19/16 98.4 kg (217 lb)              We Performed the Following     Follow-Up with Cardiac Advanced Practice Provider        Primary Care Provider Office Phone # Fax #    Rehan Lorenzana [phone]       [address] [address]        Thank you!     Thank you for choosing [facility] [place]  for your care. Our goal is always to provide you with excellent care. Hearing back " from our patients is one way we can continue to improve our services. Please take a few minutes to complete the written survey that you may receive in the mail after your visit with us. Thank you!             Your Updated Medication List - Protect others around you: Learn how to safely use, store and throw away your medicines at www.disposemymeds.org.          This list is accurate as of: 2/23/17  3:12 PM.  Always use your most recent med list.                   Brand Name Dispense Instructions for use    albuterol 108 (90 BASE) MCG/ACT Inhaler    PROAIR HFA/PROVENTIL HFA/VENTOLIN HFA    1 Inhaler    Inhale 2 puffs into the lungs every 6 hours as needed for shortness of breath / dyspnea or wheezing       ALLERGY 24-HR PO      Take by mouth daily       aspirin 81 MG tablet      Take 81 mg by mouth every other day       CALCIUM 600+D 600-200 MG-UNIT Tabs   Generic drug:  calcium carbonate-vitamin D      Take 1 tablet by mouth 2 times daily       carvedilol 25 MG tablet    COREG    180 tablet    Take 1 tablet (25 mg) by mouth 2 times daily (with meals)       CENTRUM Chew      Take 1 tablet by mouth At Bedtime       digoxin 250 MCG tablet    LANOXIN    90 tablet    Take 1 tablet (250 mcg) by mouth daily       Fiber 0.52 G Caps      Take 2 tablets by mouth At Bedtime       IRON SUPPLEMENT 325 (65 FE) MG tablet   Generic drug:  ferrous sulfate     100 tablet    Take 1 tablet (325 mg) by mouth 2 times daily       levothyroxine 175 MCG tablet    SYNTHROID    30 tablet    Take 1 tablet (175 mcg) by mouth daily       lisinopril 20 MG tablet    PRINIVIL/ZESTRIL    135 tablet    Take  20 mg (1 tablet in am) and 10 mg  (one half tablet) in  pm       sertraline 50 MG tablet    ZOLOFT    90 tablet    Take 1 tablet (50 mg) by mouth daily       simvastatin 20 MG tablet    ZOCOR    90 tablet    Take 1 tablet (20 mg) by mouth At Bedtime       Vitamin B-12 500 MCG Subl      Place 500 mcg under the tongue daily       VITAMIN C PO       Take 500 mg by mouth At Bedtime       vitamin D 2000 UNITS Caps      Take 1 tablet by mouth daily       warfarin 4 MG tablet    COUMADIN    102 tablet    Take 6mg on Tuesday, Thursday, Saturday / Take 4mg on all other days OR As directed by INR Clinic.

## 2017-02-23 NOTE — TELEPHONE ENCOUNTER
Received a call from scheduling wondering why pt is scheduled for lab today and the order is for 2018 for digoxin. Writer spoke with co worker    Writer spoke with portia to see if she would like pt to get digoxin drawn today. Portia ordered lab for today.    Writer called pt back after cancelling original lab today d/t the order being from 2018. to reschedule digoxin lab draw. Pt states he will arrive around 2:00pm to get levels drawn. Writer scheduled. Pt verbalized understanding and no further questions at this time.

## 2017-02-23 NOTE — PROGRESS NOTES
HPI and Plan:   See dictation    Orders Placed This Encounter   Procedures     NM Exercise stress test (nuc card)       No orders of the defined types were placed in this encounter.      There are no discontinued medications.      Encounter Diagnoses   Name Primary?     Idiopathic cardiomyopathy (H)      Coronary artery disease involving native coronary artery of native heart without angina pectoris      Benign essential hypertension Yes     Permanent atrial fibrillation (H)        CURRENT MEDICATIONS:  Current Outpatient Prescriptions   Medication Sig Dispense Refill     carvedilol (COREG) 25 MG tablet Take 1 tablet (25 mg) by mouth 2 times daily (with meals) 180 tablet 3     lisinopril (PRINIVIL/ZESTRIL) 20 MG tablet Take  20 mg (1 tablet in am) and 10 mg  (one half tablet) in  pm 135 tablet 3     aspirin 81 MG tablet Take 81 mg by mouth every other day       Fexofenadine HCl (ALLERGY 24-HR PO) Take by mouth daily       simvastatin (ZOCOR) 20 MG tablet Take 1 tablet (20 mg) by mouth At Bedtime 90 tablet 0     levothyroxine (SYNTHROID) 175 MCG tablet Take 1 tablet (175 mcg) by mouth daily 30 tablet 11     sertraline (ZOLOFT) 50 MG tablet Take 1 tablet (50 mg) by mouth daily 90 tablet 0     warfarin (COUMADIN) 4 MG tablet Take 6mg on Tuesday, Thursday, Saturday / Take 4mg on all other days OR As directed by INR Clinic. 102 tablet 1     albuterol (PROAIR HFA/PROVENTIL HFA/VENTOLIN HFA) 108 (90 BASE) MCG/ACT Inhaler Inhale 2 puffs into the lungs every 6 hours as needed for shortness of breath / dyspnea or wheezing 1 Inhaler 0     digoxin (LANOXIN) 250 MCG tablet Take 1 tablet (250 mcg) by mouth daily 90 tablet 2     ferrous sulfate (IRON SUPPLEMENT) 325 (65 FE) MG tablet Take 1 tablet (325 mg) by mouth 2 times daily 100 tablet      Multiple Vitamins-Minerals (CENTRUM) CHEW Take 1 tablet by mouth At Bedtime       Ascorbic Acid (VITAMIN C PO) Take 500 mg by mouth At Bedtime       Cyanocobalamin (VITAMIN B-12) 500 MCG  SUBL Place 500 mcg under the tongue daily        Cholecalciferol (VITAMIN D) 2000 UNIT CAPS Take 1 tablet by mouth daily        Calcium Carbonate-Vitamin D (CALCIUM 600+D) 600-200 MG-UNIT TABS Take 1 tablet by mouth 2 times daily        Psyllium (FIBER) 0.52 GM CAPS Take 2 tablets by mouth At Bedtime          ALLERGIES   No Known Allergies    PAST MEDICAL HISTORY:  Past Medical History   Diagnosis Date     Acute prostatitis      Acute sinusitis, unspecified      Aortic valve disease      AVR 2011, MAZE     Atrial fibrillation (H)      CAD (coronary artery disease)      known 70% LAD stenosis     Cardiomyopathy (H)      HTN (hypertension)      Mitral regurgitation      Obesity, unspecified      Palpitations      Unspecified essential hypertension        PAST SURGICAL HISTORY:  Past Surgical History   Procedure Laterality Date     C nonspecific procedure  ~1985     vein stripping     Laparoscopic bypass gastric  10/8/2011     Procedure:LAPAROSCOPIC BYPASS GASTRIC; Diagnostic laparoscopy, Lysis of Adhesions, Laparoscopic Revision of Jejunojejunostomy; Surgeon:RADHA MURO; Location: OR     Laparoscopy diagnostic (general)  10/8/2011     Procedure:LAPAROSCOPY DIAGNOSTIC (GENERAL); Surgeon:RADHA MURO; Location: OR     Replace valve aortic  1/7/2011     Laparoscopic bypass gastric  6/26/2006     Dr Jin     Cardioversion  2/02, 4/02, 4/11     Angiogram  2/02     Normal coronary,dilated LV,Sev.decr.global LVF,+1 MR     Angiogram  1/08     Mild AS,Mod PHTN,mod prox.LAD disease,Triv.CFX disease     Angiogram  1/09     Mod AS,Mod PHTN,mod prox.LAD disease     Angiogram  10/10     Sev.AS,CM,global hypokinesis,Mild-mod LV dilated,Mild MR,70% prox.LAD lesion,PHTN     Cholecystectomy       Esophagoscopy, gastroscopy, duodenoscopy (egd), combined N/A 1/23/2016     Procedure: COMBINED ESOPHAGOSCOPY, GASTROSCOPY, DUODENOSCOPY (EGD);  Surgeon: Robyn Collins MD;  Location:  GI     Colonoscopy N/A  "1/23/2016     Procedure: COLONOSCOPY;  Surgeon: Robyn Collins MD;  Location:  GI       FAMILY HISTORY:  Family History   Problem Relation Age of Onset     Cancer - colorectal Father      Colon Cancer Father      DIABETES Brother        SOCIAL HISTORY:  Social History     Social History     Marital status:      Spouse name: Tracey     Number of children: 0     Years of education: N/A     Occupational History           [employer]     Social History Main Topics     Smoking status: Never Smoker     Smokeless tobacco: Never Used     Alcohol use No     Drug use: No     Sexual activity: Yes     Partners: Female     Other Topics Concern     Parent/Sibling W/ Cabg, Mi Or Angioplasty Before 65f 55m? Yes     Blood Transfusions No     Caffeine Concern Yes     1 can daily     Occupational Exposure Yes     Hobby Hazards No     Sleep Concern No     Stress Concern No     Weight Concern No     Special Diet Yes     low sodium, low calories     Back Care No     Exercise Yes     walks daily     Bike Helmet Not Asked     NA     Seat Belt Yes     Self-Exams Yes     Social History Narrative       Review of Systems:  Skin:  Negative       Eyes:  Positive for glasses    ENT:  Negative      Respiratory:  Negative       Cardiovascular:  Negative      Gastroenterology: Negative      Genitourinary:  not assessed      Musculoskeletal:  Negative      Neurologic:  Negative      Psychiatric:  Negative      Heme/Lymph/Imm:  Negative      Endocrine:    night sweats;hot flashes      Physical Exam:  Vitals: /72  Pulse 74  Ht 1.753 m (5' 9\")  Wt 96.3 kg (212 lb 4.8 oz)  BMI 31.35 kg/m2    Constitutional:  cooperative, alert and oriented, well developed, well nourished, in no acute distress        Skin:  warm and dry to the touch        Head:  normocephalic        Eyes:  sclera white        ENT:  no pallor or cyanosis        Neck:  carotid pulses are full and equal bilaterally        Chest:  normal breath sounds, clear to " auscultation, normal A-P diameter, normal symmetry, normal respiratory excursion, no use of accessory muscles;healed median sternotomy scar          Cardiac:   irregularly irregular rhythm   crisp prosthetic valve sounds systolic ejection murmur;LLSB;grade 2;radiation to the RUSB systolic murmur;grade 2;LLSB;radiation to the base        Abdomen:  abdomen soft        Vascular: pulses full and equal                                        Extremities and Back:  no deformities, clubbing, cyanosis, erythema observed   bilateral LE edema;trace          Neurological:  affect appropriate, oriented to time, person and place;no gross motor deficits              CC  Lloyd Lewis MD   [facility]  [address]  [place] [id number]

## 2017-02-23 NOTE — PATIENT INSTRUCTIONS
Thanks for coming into [facility] Heart clinic today.    We discussed:   Call if you have low blood pressure or symptoms of lightheadedness or dizziness and we can see you sooner.     Medication changes:   Jodi salas    Follow up:   See Dr. Lewis for cardiology follow up in 1 year with stress test and digoxin level prior.    Please call my nurse Katerina at [phone] with any questions or concerns.    Scheduling phone number: [phone]  Reminder: Please bring in all current medications, over the counter supplements and vitamin bottles to your next appointment.

## 2017-02-23 NOTE — PROGRESS NOTES
Cardiology Clinic Progress Note  Jovon Mantilla MRN# [id number]   YOB: 1955 Age: 61 year old     Reason For Visit: Blood pressure follow-up   Primary Cardiologist:   Dr. Lewis          History of Presenting Illness:    Jovon Mantilla is a pleasant 61 year old patient with a past cardiac history significant for idiopathic dilated cardiomyopathy, CAD with known 70% LAD stenosis unable to be bypassed as it was intramuscular, permanent atrial fibrillation, AS status post AVR 2011 with maze procedure which was unsuccessful, chronic peripheral edema status post vein stripping with varicose veins.  His a history of not tolerating lisinopril 20 mg b.i.d. but  is able to tolerate lisinopril 20 mg in the a.m. and 10 mg in the p.m. Previous echocardiogram 1/16/2017 showing LVEF 45-50%, mild concentric LVH, severe biatrial enlargement, mild to moderate MR, mild TR, dilated inferior vena cava, elevated RVSP consistent with mild to moderate pulmonary hypertension, and normally functioning mechanical aortic valve.    Pt was last seen by Dr. Lewis on 1/27/2017.  At that time, his blood pressure was 84/60 and follow-up to recheck blood pressure was recommended.    Pt presents today for blood pressure follow-up.  His blood pressure today in the office is 104/72.  He does not check home blood pressure readings but states at a recent office visit it was 120s systolically.  He was asymptomatic with his previous hypotension.  He has been feeling well from a cardiac standpoint.  He had his annual digoxin level drawn today and we will call him if these results are abnormal. Patient reports no chest pain, shortness of breath, PND, orthopnea, presyncope, syncope, heart racing, or palpitations.      Current Cardiac Medications   Carvedilol 25 mg b.i.d.  Lisinopril 20 mg in the a.m. 10 mg in the p.m.  Aspirin 81 mg daily  Simvastatin 20 mg daily  Coumadin  Digoxin 250 mcg daily                   Assessment and Plan:      Plan  1.  Digoxin level today (call if abnormal)  2.  Follow-up with Dr. Lewis in one year with digoxin level and stress test prior (previous stress test 2012 with notes to continue stress testing every five years)      1. CAD    known 70% LAD stenosis unable to be bypassed as it was intramuscular    Last stress test 2012 showing no ischemia or infarct    No angina    Continue statin, aspirin, Ace, beta blocker      2. Idiopathic cardiomyopathy    LVEF 45-50%     possibly due to hypertension    No signs of heart failure    Continue BB, ace, digoxin      3. Hypertension    More recently with hypotension 84/60 at last office visit 1/27/2017    104/72 today and does not check home blood pressure readings    Continue carvedilol, lisinopril      4. Permanent atrial fibrillation    Previous unsuccessful Maze procedure 2011    Rate control    Continue carvedilol, Coumadin, digoxin      5. AS    status post AVR 2011         Thank you for allowing me to participate in this delightful patient's care.      This note was completed in part using Dragon voice recognition software. Although reviewed after completion, some word and grammatical errors may occur.    Portia Dixon, APRN, CNP           Data:   All laboratory data reviewed:    LAST CHOLESTEROL:  Lab Results   Component Value Date    CHOL 107 02/22/2016     Lab Results   Component Value Date    HDL 46 02/22/2016     Lab Results   Component Value Date    LDL 51 02/22/2016     Lab Results   Component Value Date    TRIG 50 02/22/2016     Lab Results   Component Value Date    CHOLHDLRATIO 2.5 02/16/2015       LAST BMP:  Lab Results   Component Value Date     07/07/2016      Lab Results   Component Value Date    POTASSIUM 4.7 07/07/2016     Lab Results   Component Value Date    CHLORIDE 103 07/07/2016     Lab Results   Component Value Date    REECE 8.8 07/07/2016     Lab Results   Component Value Date    CO2 34 07/07/2016     Lab Results   Component Value  Date    BUN 12 07/07/2016    BUN 15.3 03/05/2009     Lab Results   Component Value Date    CR 0.95 07/07/2016     Lab Results   Component Value Date    GLC 76 07/07/2016       LAST CBC:  Lab Results   Component Value Date    WBC 4.3 02/22/2016     Lab Results   Component Value Date    RBC 3.55 02/22/2016     Lab Results   Component Value Date    HGB 10.6 02/22/2016     Lab Results   Component Value Date    HCT 34.7 02/22/2016     Lab Results   Component Value Date    MCV 98 02/22/2016     Lab Results   Component Value Date    MCH 29.9 02/22/2016     Lab Results   Component Value Date    MCHC 30.5 02/22/2016     Lab Results   Component Value Date    RDW 15.2 02/22/2016     Lab Results   Component Value Date     02/22/2016

## 2017-02-23 NOTE — TELEPHONE ENCOUNTER
Lab questioning the lab order for Digoxin for today. The order in Epic per Dr. Lewis is for one year from now. Dr. Lewis not available.   Due for f/u with VALDO for BP check.   No [facility] notes as to why Digoxin lab draw was ordered. Is on Digoxin and has been.   Will cancel lab draw for now, Review with VALDO during appointment and if wanting Lab, can be done after visit.     Patient called. Stated per my chart he was notified that he needed a digoxin level. Again no order for now found and no viewable my chart messages with this information to view. Patient aware that we will cancel the lab draw for now, and review with [facility] provider. If she wants, it can still be done today. Patient has not taken his digoxin today. Also stated that he was fasting. No lipid orders. Instructed that he can eat, just do not take his digoxin until after the appointment.  Lab updated with the cancellation.

## 2017-03-07 ENCOUNTER — TELEPHONE (OUTPATIENT)
Dept: CARDIOLOGY | Facility: CLINIC | Age: 62
End: 2017-03-07

## 2017-03-07 DIAGNOSIS — I48.91 ATRIAL FIBRILLATION (H): ICD-10-CM

## 2017-03-07 RX ORDER — DIGOXIN 250 MCG
250 TABLET ORAL DAILY
Qty: 90 TABLET | Refills: 3 | Status: SHIPPED | OUTPATIENT
Start: 2017-03-07 | End: 2017-03-13

## 2017-03-13 DIAGNOSIS — I48.91 ATRIAL FIBRILLATION (H): ICD-10-CM

## 2017-03-13 RX ORDER — DIGOXIN 250 MCG
250 TABLET ORAL DAILY
Qty: 90 TABLET | Refills: 3 | Status: SHIPPED | OUTPATIENT
Start: 2017-03-13 | End: 2017-06-12

## 2017-03-13 NOTE — TELEPHONE ENCOUNTER
Patient called to report script for digoxin sent to wrong pharmacy.  He is using mail order with [facility].  Sent script for digoxin to requested pharmacy.

## 2017-03-17 ENCOUNTER — OFFICE VISIT (OUTPATIENT)
Dept: INTERNAL MEDICINE | Facility: CLINIC | Age: 62
End: 2017-03-17
Payer: COMMERCIAL

## 2017-03-17 VITALS
WEIGHT: 220 LBS | BODY MASS INDEX: 32.49 KG/M2 | OXYGEN SATURATION: 99 % | DIASTOLIC BLOOD PRESSURE: 82 MMHG | HEART RATE: 78 BPM | TEMPERATURE: 97.5 F | SYSTOLIC BLOOD PRESSURE: 130 MMHG

## 2017-03-17 DIAGNOSIS — L20.9 ATOPIC DERMATITIS, UNSPECIFIED TYPE: Primary | ICD-10-CM

## 2017-03-17 DIAGNOSIS — R07.0 THROAT PAIN: ICD-10-CM

## 2017-03-17 PROCEDURE — 99214 OFFICE O/P EST MOD 30 MIN: CPT | Performed by: NURSE PRACTITIONER

## 2017-03-17 RX ORDER — TRIAMCINOLONE ACETONIDE 1 MG/G
CREAM TOPICAL
Qty: 30 G | Refills: 0 | Status: SHIPPED | OUTPATIENT
Start: 2017-03-17 | End: 2019-10-18

## 2017-03-17 ASSESSMENT — PAIN SCALES - GENERAL: PAINLEVEL: MILD PAIN (2)

## 2017-03-17 NOTE — PATIENT INSTRUCTIONS
Atopic Dermatitis (Adult)  Atopic dermatitis is a dry, itchy red rash. It s also known as eczema. The rash is chronic, or ongoing. It can come and go over time. The disease is often genetic and passed down in families. It causes a problem with the skin barrier that makes the skin more sensitive to the environment and other factors. The increased skin sensitivity causes an itch, which causes scratching. Scratching can worsen the itching or also break the skin. This can put the skin at risk of infection.  The condition is most common in people with asthma, hay fever, hives, or dry or sensitive skin. The rash may be caused by extreme heat or heavy sweating. Skin irritants can cause the rash to flare up. These can include wool or silk clothing, grease, oils, some medicines, and harsh soaps and detergents. Emotional stress can also be a trigger.  Treatment is done to relieve the itching and inflammation of the skin.  Home care  Follow these tips to care for your condition:    Keep the areas of rash clean by bathing at least every other day. Use lukewarm water to bathe. Don t use hot water, which can dry out the skin.    Don t use soaps with strong detergents. Use mild soaps made for sensitive skin.    Apply a cream or ointment to damp skin right after bathing.    Avoid things that irritate your skin. Wear absorbent, soft fabrics next to the skin rather than rough or scratchy materials.    Use mild laundry soap free of scents and perfumes. Make sure to rinse all the soap out of your clothes.    Treat any skin infection as directed.    Use oral diphenhydramine to help reduce itching. This is an antihistamine you can buy at drug and grocery stores. It can make you sleepy, so use lower doses during the daytime. Or you can use loratadine. This is an antihistamine that will not make you sleepy. Do not use diphenhydramine if you have glaucoma or have trouble urinating due to an enlarged prostate.  Follow-up care  If your  symptoms don t get better or if they get worse in the next 7 days, make an appointment with your healthcare provider.  When to seek medical advice  Call your healthcare provider right away  if any of these occur:    Increasing area of redness or pain in the skin    Yellow crusts or wet drainage from the rash    Fever of 100.4 F (38 C) or higher, or as directed by your health care provider    4679-6107 [employer]. [address] [place] [address], [place] [id number]. All rights reserved. This information is not intended as a substitute for professional medical care. Always follow your healthcare professional's instructions.

## 2017-03-17 NOTE — PROGRESS NOTES
SUBJECTIVE:                                                    Jovon Mantilla is a 61 year old male who presents to clinic today for the following health issues:      Acute Illness   Acute illness concerns: Sore throat  Onset: last night    Fever: no    Chills/Sweats: no    Headache (location?): no    Sinus Pressure:no    Conjunctivitis:  no    Ear Pain: no    Rhinorrhea: a little runny nose    Congestion: no    Sore Throat: YES- real mild now but night night it was very painful     Cough: no    Wheeze: no    Decreased Appetite: no    Nausea: no    Vomiting: no    Diarrhea:  no    Dysuria/Freq.: no    Fatigue/Achiness: no    Sick/Strep Exposure: no     Therapies Tried and outcome:  nothing    Has not tried anything for the pain  No difficulty swallowing  Pain has improved since yesterday  Developed suddenly after eating a [place]. Wonders if he burned his throat      Rash  Duration of complaint: rash's on his ankle since the first of the year, his ankles itch   Some relief in itching with using over the counter hydrocortisone  Denies new topical products, washes, detergent, new shoes, socks    Problem list and histories reviewed & adjusted, as indicated.  Additional history: none    Patient Active Problem List   Diagnosis     Atrial fibrillation (H)     Acquired hypothyroidism     Neck pain     Chronic rhinitis     Aortic stenosis     Hyperlipidemia LDL goal <70     Hypertension goal BP (blood pressure) < 140/90     Dilated cardiomyopathy (H)     CAD (coronary artery disease)     Mitral regurgitation     ASVD (arteriosclerotic vascular disease)     S/P AVR (aortic valve replacement)     GI bleeding     Gastrointestinal hemorrhage associated with anorectal source     Long-term (current) use of anticoagulants [Z79.01]     Adjustment disorder with mixed anxiety and depressed mood     Anxiety     Idiopathic cardiomyopathy (H)     S/P aortic valve replacement     Coronary artery disease involving native coronary  artery of native heart without angina pectoris     Hypotension, unspecified hypotension type     Past Surgical History   Procedure Laterality Date     C nonspecific procedure  ~1985     vein stripping     Laparoscopic bypass gastric  10/8/2011     Procedure:LAPAROSCOPIC BYPASS GASTRIC; Diagnostic laparoscopy, Lysis of Adhesions, Laparoscopic Revision of Jejunojejunostomy; Surgeon:RADHA MURO; Location: OR     Laparoscopy diagnostic (general)  10/8/2011     Procedure:LAPAROSCOPY DIAGNOSTIC (GENERAL); Surgeon:RADHA MURO; Location: OR     Replace valve aortic  1/7/2011     Laparoscopic bypass gastric  6/26/2006     Dr Jin     Cardioversion  2/02, 4/02, 4/11     Angiogram  2/02     Normal coronary,dilated LV,Sev.decr.global LVF,+1 MR     Angiogram  1/08     Mild AS,Mod PHTN,mod prox.LAD disease,Triv.CFX disease     Angiogram  1/09     Mod AS,Mod PHTN,mod prox.LAD disease     Angiogram  10/10     Sev.AS,CM,global hypokinesis,Mild-mod LV dilated,Mild MR,70% prox.LAD lesion,PHTN     Cholecystectomy       Esophagoscopy, gastroscopy, duodenoscopy (egd), combined N/A 1/23/2016     Procedure: COMBINED ESOPHAGOSCOPY, GASTROSCOPY, DUODENOSCOPY (EGD);  Surgeon: Robyn Collins MD;  Location:  GI     Colonoscopy N/A 1/23/2016     Procedure: COLONOSCOPY;  Surgeon: Robyn Collins MD;  Location:  GI       Social History   Substance Use Topics     Smoking status: Never Smoker     Smokeless tobacco: Never Used     Alcohol use No     Family History   Problem Relation Age of Onset     Cancer - colorectal Father      Colon Cancer Father      DIABETES Brother          Current Outpatient Prescriptions   Medication Sig Dispense Refill     triamcinolone (KENALOG) 0.1 % cream Apply sparingly to affected area three times daily for 14 days. 30 g 0     digoxin (LANOXIN) 250 MCG tablet Take 1 tablet (250 mcg) by mouth daily 90 tablet 3     carvedilol (COREG) 25 MG tablet Take 1 tablet (25 mg) by  mouth 2 times daily (with meals) 180 tablet 3     lisinopril (PRINIVIL/ZESTRIL) 20 MG tablet Take  20 mg (1 tablet in am) and 10 mg  (one half tablet) in  pm 135 tablet 3     aspirin 81 MG tablet Take 81 mg by mouth every other day       Fexofenadine HCl (ALLERGY 24-HR PO) Take by mouth daily       levothyroxine (SYNTHROID) 175 MCG tablet Take 1 tablet (175 mcg) by mouth daily 30 tablet 11     sertraline (ZOLOFT) 50 MG tablet Take 1 tablet (50 mg) by mouth daily 90 tablet 0     warfarin (COUMADIN) 4 MG tablet Take 6mg on Tuesday, Thursday, Saturday / Take 4mg on all other days OR As directed by INR Clinic. 102 tablet 1     albuterol (PROAIR HFA/PROVENTIL HFA/VENTOLIN HFA) 108 (90 BASE) MCG/ACT Inhaler Inhale 2 puffs into the lungs every 6 hours as needed for shortness of breath / dyspnea or wheezing 1 Inhaler 0     ferrous sulfate (IRON SUPPLEMENT) 325 (65 FE) MG tablet Take 1 tablet (325 mg) by mouth 2 times daily 100 tablet      Multiple Vitamins-Minerals (CENTRUM) CHEW Take 1 tablet by mouth At Bedtime       Cyanocobalamin (VITAMIN B-12) 500 MCG SUBL Place 500 mcg under the tongue daily        Cholecalciferol (VITAMIN D) 2000 UNIT CAPS Take 1 tablet by mouth daily        Calcium Carbonate-Vitamin D (CALCIUM 600+D) 600-200 MG-UNIT TABS Take 1 tablet by mouth 2 times daily        Psyllium (FIBER) 0.52 GM CAPS Take 2 tablets by mouth At Bedtime        simvastatin (ZOCOR) 20 MG tablet Take 1 tablet (20 mg) by mouth At Bedtime 90 tablet 0     Ascorbic Acid (VITAMIN C PO) Take 500 mg by mouth At Bedtime         Reviewed and updated as needed this visit by clinical staff  Tobacco  Allergies  Meds  Med Hx  Surg Hx  Fam Hx  Soc Hx      Reviewed and updated as needed this visit by Provider         ROS:  Constitutional, HEENT, cardiovascular, pulmonary, gi and gu systems are negative, except as otherwise noted.    OBJECTIVE:                                                    /82  Pulse 78  Temp 97.5  F (36.4  C)  (Oral)  Wt 220 lb (99.8 kg)  SpO2 99%  BMI 32.49 kg/m2  Body mass index is 32.49 kg/(m^2).  GENERAL: healthy, alert and no distress  EYES: Eyes grossly normal to inspection, PERRL and conjunctivae and sclerae normal  HENT: ear canals and TM's normal, nose and mouth without ulcers or lesions  NECK: no adenopathy  RESP: lungs clear to auscultation - no rales, rhonchi or wheezes  CV: irregularly irregular, systolic murmur, slight non pitting edema  SKIN: Erythematous macules coalescing into patch on bilateral posteriolateral ankles, posterior to lateral malleolus. Scaling present.     Diagnostic Test Results:  none      ASSESSMENT/PLAN:                                                        ICD-10-CM    1. Atopic dermatitis, unspecified type L20.9 triamcinolone (KENALOG) 0.1 % cream   2. Throat pain R07.0        Throat pain is mild and rated 1/10. Did not think strep testing indicating. Exam unremarkable. Can't rule out pharyngeal burn from eating hot french [place]. Can take tylenol as needed for pain. Recommend warm salt water gargle  Can try stronger steroid cream  Recommend wearing different shoes that don't create friction over the rash    LILLI Mei CNP  [facility]

## 2017-03-17 NOTE — MR AVS SNAPSHOT
After Visit Summary   3/17/2017    Jovon Mantilla    MRN: [id number]           Patient Information     Date Of Birth          1955        Visit Information        Provider Department      3/17/2017 1:00 PM Dahiana Brown APRN [facility]        Today's Diagnoses     Atopic dermatitis, unspecified type    -  1    Throat pain          Care Instructions      Atopic Dermatitis (Adult)  Atopic dermatitis is a dry, itchy red rash. It s also known as eczema. The rash is chronic, or ongoing. It can come and go over time. The disease is often genetic and passed down in families. It causes a problem with the skin barrier that makes the skin more sensitive to the environment and other factors. The increased skin sensitivity causes an itch, which causes scratching. Scratching can worsen the itching or also break the skin. This can put the skin at risk of infection.  The condition is most common in people with asthma, hay fever, hives, or dry or sensitive skin. The rash may be caused by extreme heat or heavy sweating. Skin irritants can cause the rash to flare up. These can include wool or silk clothing, grease, oils, some medicines, and harsh soaps and detergents. Emotional stress can also be a trigger.  Treatment is done to relieve the itching and inflammation of the skin.  Home care  Follow these tips to care for your condition:    Keep the areas of rash clean by bathing at least every other day. Use lukewarm water to bathe. Don t use hot water, which can dry out the skin.    Don t use soaps with strong detergents. Use mild soaps made for sensitive skin.    Apply a cream or ointment to damp skin right after bathing.    Avoid things that irritate your skin. Wear absorbent, soft fabrics next to the skin rather than rough or scratchy materials.    Use mild laundry soap free of scents and perfumes. Make sure to rinse all the soap out of your clothes.    Treat any skin infection  as directed.    Use oral diphenhydramine to help reduce itching. This is an antihistamine you can buy at drug and grocery stores. It can make you sleepy, so use lower doses during the daytime. Or you can use loratadine. This is an antihistamine that will not make you sleepy. Do not use diphenhydramine if you have glaucoma or have trouble urinating due to an enlarged prostate.  Follow-up care  If your symptoms don t get better or if they get worse in the next 7 days, make an appointment with your healthcare provider.  When to seek medical advice  Call your healthcare provider right away  if any of these occur:    Increasing area of redness or pain in the skin    Yellow crusts or wet drainage from the rash    Fever of 100.4 F (38 C) or higher, or as directed by your health care provider    [id number] [employer]. [address] [place] [address]. All rights reserved. This information is not intended as a substitute for professional medical care. Always follow your healthcare professional's instructions.              Follow-ups after your visit        Your next 10 appointments already scheduled     Mar 23, 2017  5:30 PM CDT   [facility]    [address] [place] [id number] [phone]              Who to contact     If you have questions or need follow up information about today's clinic visit or your schedule please contact Critical access hospital directly at [phone].  Normal or non-critical lab and imaging results will be communicated to you by MyChart, letter or phone within 4 business days after the clinic has received the results. If you do not hear from us within 7 days, please contact the clinic through MyChart or phone. If you have a critical or abnormal lab result, we will notify you by phone as soon as possible.  Submit refill requests through  [email or web address] or call your pharmacy and they will forward the refill request to us. Please allow 3 business days for your refill to be completed.          Additional Information About Your Visit        [facility] Inchart Information     [email or web address] gives you secure access to your electronic health record. If you see a primary care provider, you can also send messages to your care team and make appointments. If you have questions, please call your primary care clinic.  If you do not have a primary care provider, please call [phone] and they will assist you.        Care EveryWhere ID     This is your Care EveryWhere ID. This could be used by other organizations to access your [facility] medical records  [id number]        Your Vitals Were     Pulse Temperature Pulse Oximetry BMI (Body Mass Index)          78 97.5  F (36.4  C) (Oral) 99% 32.49 kg/m2         Blood Pressure from Last 3 Encounters:   03/17/17 130/82   02/23/17 104/72   01/27/17 (!) 84/60    Weight from Last 3 Encounters:   03/17/17 220 lb (99.8 kg)   02/23/17 212 lb 4.8 oz (96.3 kg)   01/27/17 214 lb 4.8 oz (97.2 kg)              Today, you had the following     No orders found for display         Today's Medication Changes          These changes are accurate as of: 3/17/17  1:35 PM.  If you have any questions, ask your nurse or doctor.               Start taking these medicines.        Dose/Directions    triamcinolone 0.1 % cream   Commonly known as:  KENALOG   Used for:  Atopic dermatitis, unspecified type   Started by:  Dahiana Brown APRN CNP        Apply sparingly to affected area three times daily for 14 days.   Quantity:  30 g   Refills:  0            Where to get your medicines      These medications were sent to [facility] - [place], [place] - [address]  [address], [facility] [address]     Phone:  [phone]     triamcinolone 0.1 % cream                Primary Care Provider Office Phone # Fax #     Rehan Lorenzana [phone]       [facility] [address] [address]        Thank you!     Thank you for choosing [facility]  for your care. Our goal is always to provide you with excellent care. Hearing back from our patients is one way we can continue to improve our services. Please take a few minutes to complete the written survey that you may receive in the mail after your visit with us. Thank you!             Your Updated Medication List - Protect others around you: Learn how to safely use, store and throw away your medicines at www.disposemymeds.org.          This list is accurate as of: 3/17/17  1:35 PM.  Always use your most recent med list.                   Brand Name Dispense Instructions for use    albuterol 108 (90 BASE) MCG/ACT Inhaler    PROAIR HFA/PROVENTIL HFA/VENTOLIN HFA    1 Inhaler    Inhale 2 puffs into the lungs every 6 hours as needed for shortness of breath / dyspnea or wheezing       ALLERGY 24-HR PO      Take by mouth daily       aspirin 81 MG tablet      Take 81 mg by mouth every other day       CALCIUM 600+D 600-200 MG-UNIT Tabs   Generic drug:  calcium carbonate-vitamin D      Take 1 tablet by mouth 2 times daily       carvedilol 25 MG tablet    COREG    180 tablet    Take 1 tablet (25 mg) by mouth 2 times daily (with meals)       CENTRUM Chew      Take 1 tablet by mouth At Bedtime       digoxin 250 MCG tablet    LANOXIN    90 tablet    Take 1 tablet (250 mcg) by mouth daily       Fiber 0.52 G Caps      Take 2 tablets by mouth At Bedtime       IRON SUPPLEMENT 325 (65 FE) MG tablet   Generic drug:  ferrous sulfate     100 tablet    Take 1 tablet (325 mg) by mouth 2 times daily       levothyroxine 175 MCG tablet    SYNTHROID    30 tablet    Take 1 tablet (175 mcg) by mouth daily       lisinopril 20 MG tablet    PRINIVIL/ZESTRIL    135 tablet    Take  20 mg (1 tablet in am) and 10 mg  (one half tablet) in  pm        sertraline 50 MG tablet    ZOLOFT    90 tablet    Take 1 tablet (50 mg) by mouth daily       simvastatin 20 MG tablet    ZOCOR    90 tablet    Take 1 tablet (20 mg) by mouth At Bedtime       triamcinolone 0.1 % cream    KENALOG    30 g    Apply sparingly to affected area three times daily for 14 days.       Vitamin B-12 500 MCG Subl      Place 500 mcg under the tongue daily       VITAMIN C PO      Take 500 mg by mouth At Bedtime       vitamin D 2000 UNITS Caps      Take 1 tablet by mouth daily       warfarin 4 MG tablet    COUMADIN    102 tablet    Take 6mg on Tuesday, Thursday, Saturday / Take 4mg on all other days OR As directed by INR Clinic.

## 2017-03-17 NOTE — NURSING NOTE
"Chief Complaint   Patient presents with     Pharyngitis       Initial BP (!) 131/91 (BP Location: Right arm, Patient Position: Chair, Cuff Size: Adult Regular)  Pulse 78  Temp 97.5  F (36.4  C) (Oral)  Wt 220 lb (99.8 kg)  SpO2 99%  BMI 32.49 kg/m2 Estimated body mass index is 32.49 kg/(m^2) as calculated from the following:    Height as of 2/23/17: 5' 9\" (1.753 m).    Weight as of this encounter: 220 lb (99.8 kg).  Medication Reconciliation: complete  PRETTY Vargas MA    "

## 2017-03-24 ENCOUNTER — ANTICOAGULATION THERAPY VISIT (OUTPATIENT)
Dept: NURSING | Facility: CLINIC | Age: 62
End: 2017-03-24
Payer: COMMERCIAL

## 2017-03-24 DIAGNOSIS — Z95.2 S/P AVR (AORTIC VALVE REPLACEMENT): ICD-10-CM

## 2017-03-24 DIAGNOSIS — Z79.01 LONG-TERM (CURRENT) USE OF ANTICOAGULANTS: ICD-10-CM

## 2017-03-24 LAB — INR POINT OF CARE: 2.4 (ref 0.86–1.14)

## 2017-03-24 PROCEDURE — 36416 COLLJ CAPILLARY BLOOD SPEC: CPT

## 2017-03-24 PROCEDURE — 99207 ZZC NO CHARGE NURSE ONLY: CPT

## 2017-03-24 PROCEDURE — 85610 PROTHROMBIN TIME: CPT | Mod: QW

## 2017-03-24 NOTE — PROGRESS NOTES
ANTICOAGULATION FOLLOW-UP CLINIC VISIT    Patient Name:  Jovon Mantilla  Date:  3/24/2017  Contact Type:  Face to Face    SUBJECTIVE:     Patient Findings     Positives No Problem Findings           OBJECTIVE    INR Protime   Date Value Ref Range Status   03/24/2017 2.4 (A) 0.86 - 1.14 Final     Chromogenic Factor 10   Date Value Ref Range Status   01/21/2011 35 (L) 60 - 140 % Final     Comment:     Therapeutic Range: A Chromogenic Factor 10 level of 20-40% inversely   correlates   with an INR of 2-3 for patients receiving Warfarin. Chromogenic Factor 10   levels below 20% indicate an INR greater than 3, and levels above 40%   indicate   an INR lessthan 2.       ASSESSMENT / PLAN  INR assessment THER    Recheck INR In: 3 WEEKS    INR Location Clinic      Anticoagulation Summary as of 3/24/2017     INR goal 2.5-3.5   Today's INR 2.4!   Maintenance plan 6 mg (4 mg x 1.5) on Tue, Thu, Sat; 4 mg (4 mg x 1) all other days   Full instructions 6 mg on Tue, Thu, Sat; 4 mg all other days   Weekly total 34 mg   No change documented Graciela Hood RN   Plan last modified Madeline Wayne RN (5/20/2016)   Next INR check 4/14/2017   Target end date     Indications   Long-term (current) use of anticoagulants [Z79.01] [Z79.01]  S/P AVR (aortic valve replacement) [Z95.2]         Anticoagulation Episode Summary     INR check location     Preferred lab     Send INR reminders to [facility]    Comments       Anticoagulation Care Providers     Provider Role Specialty Phone number    Rehan Lorenzana MD [facility] [phone]            See the Encounter Report to view Anticoagulation Flowsheet and Dosing Calendar (Go to Encounters tab in chart review, and find the Anticoagulation Therapy Visit)    Dosage adjustment made based on physician directed care plan.    Graciela Hood RN

## 2017-03-24 NOTE — MR AVS SNAPSHOT
Jovon Mantilla   3/24/2017 11:00 AM   Anticoagulation Therapy Visit    Description:  61 year old male   Provider:  JIMMY ANTI COAG   Department:  [facility] Nurse           INR as of 3/24/2017     Today's INR 2.4!      Anticoagulation Summary as of 3/24/2017     INR goal 2.5-3.5   Today's INR 2.4!   Full instructions 6 mg on Tue, Thu, Sat; 4 mg all other days   Next INR check 4/14/2017    Indications   Long-term (current) use of anticoagulants [Z79.01] [Z79.01]  S/P AVR (aortic valve replacement) [Z95.2]         Your next Anticoagulation Clinic appointment(s)     Apr 14, 2017 11:20 AM CDT   Anticoagulation Visit with JIMMY ANTI COAG   [facility]    [address] [place] [address] [phone]              Contact Numbers     [facility]  Please call [phone] or [phone]  to cancel and/or reschedule your appointment.   Please call [phone] with any problems or questions regarding your therapy          March 2017 Details    Sun Mon Tue Wed Thu Fri Sat        1               2               3               4                 5               6               7               8               9               10               11                 12               13               14               15               16               17               18                 19               20               21               22               23               24      4 mg   See details      25      6 mg           26      4 mg         27      4 mg         28      6 mg         29      4 mg         30      6 mg         31      4 mg           Date Details   03/24 This INR check               How to take your warfarin dose     To take:  4 mg Take 1 of the 4 mg tablets.    To take:  6 mg Take 1.5 of the 4 mg tablets.           April 2017 Details    Sun Mon Tue Wed Thu Fri Sat           1      6 mg           2      4 mg         3      4 mg          4      6 mg         5      4 mg         6      6 mg         7      4 mg         8      6 mg           9      4 mg         10      4 mg         11      6 mg         12      4 mg         13      6 mg         14            15                 16               17               18               19               20               21               22                 23               24               25               26               27               28               29                 30                      Date Details   No additional details    Date of next INR:  4/14/2017         How to take your warfarin dose     To take:  4 mg Take 1 of the 4 mg tablets.    To take:  6 mg Take 1.5 of the 4 mg tablets.

## 2017-04-10 DIAGNOSIS — F41.9 ANXIETY: ICD-10-CM

## 2017-04-11 NOTE — TELEPHONE ENCOUNTER
Sertraline 50 mg      Last Written Prescription Date: 01/21/17  Last Fill Quantity: 90, # refills: 0  Last Office Visit with [facility] primary care provider:  12/19/16 Dr. Lorenzana        Last PHQ-9 score on record=   PHQ-9 SCORE 5/9/2016   Total Score 4

## 2017-04-11 NOTE — TELEPHONE ENCOUNTER
"Call made to pt to schedule appt  Medication discussed at Nov appt, states he isn't taking medication for depression or anxiety just \"something else\"  Will make appt to be seen by May    Prescription approved per [facility] Refill Protocol  Barbara Clemons RN BS    "

## 2017-04-20 ENCOUNTER — TELEPHONE (OUTPATIENT)
Dept: NURSING | Facility: CLINIC | Age: 62
End: 2017-04-20

## 2017-04-20 NOTE — LETTER
[address] [id number] [phone]      April 20, 2017      Jovon Mantilla  [address] [facility] [address]        Dear Jovon,  This letter is being sent to you because you missed your INR Blood test appointment that was arranged at the [facility].    Please contact either the INR Clinic directly to reschedule at [phone] or the main scheduling department at your earliest convenience [phone].  Thank you.        Sincerely,    Monica GODINEZ  [facility]

## 2017-04-20 NOTE — TELEPHONE ENCOUNTER
Patient a new transfer from [facility] to [facility] and was a No Show for INR blood test appointment.    Letter sent.    Monica Medrano RN

## 2017-04-25 DIAGNOSIS — E78.5 HYPERLIPIDEMIA LDL GOAL <70: ICD-10-CM

## 2017-04-25 NOTE — LETTER
[facility]  [id number] [place] [id number] [phone]          April 27, 2017    Jovon Mantilla                                                                                                                     [address]  [facility] [address]            Dear Jovon,    We recently received a call from your pharmacy requesting a refill of your medication (simvastatin).    A review of your chart indicates that an appointment is required.  Please contact our office at [phone] to schedule your FASTING doctor's appointment. You are overdue for your annual cholesterol.     We have authorized one refill of your medication to allow time for you to schedule your appointment.    Taking care of your health is important to us and ongoing visits with your provider are vital to your care.  We look forward to seeing you in the near future.    Sincerely,    Rehan Lorenzana MD/Tennille SPARROW

## 2017-04-26 NOTE — TELEPHONE ENCOUNTER
simvastatin (ZOCOR) 20 MG tablet  Last Written Prescription Date: 01/26/2017  Last Fill Quantity: 90,01/21/2017 # refills: 0    Last Office Visit with [facility] prescribing provider:  12/19/2016-Dr Lorenzana   Future Office Visit:      Cholesterol   Date Value Ref Range Status   02/22/2016 107 <200 mg/dL Final     HDL Cholesterol   Date Value Ref Range Status   02/22/2016 46 >39 mg/dL Final     LDL Cholesterol Calculated   Date Value Ref Range Status   02/22/2016 51 <100 mg/dL Final     Comment:     Desirable:       <100 mg/dl     Triglycerides   Date Value Ref Range Status   02/22/2016 50 <150 mg/dL Final     Comment:     Fasting specimen     Cholesterol/HDL Ratio   Date Value Ref Range Status   02/16/2015 2.5 0.0 - 5.0 Final     ALT   Date Value Ref Range Status   07/07/2016 23 0 - 70 U/L Final

## 2017-04-27 RX ORDER — SIMVASTATIN 20 MG
TABLET ORAL
Qty: 30 TABLET | Refills: 0 | Status: SHIPPED | OUTPATIENT
Start: 2017-04-27 | End: 2017-06-02

## 2017-04-27 NOTE — TELEPHONE ENCOUNTER
Appointment letter sent, overdue for annual fasting labs  Prescription approved per [facility] Refill [facility].  Tennille Mares RN, BSN

## 2017-05-04 ENCOUNTER — ANTICOAGULATION THERAPY VISIT (OUTPATIENT)
Dept: NURSING | Facility: CLINIC | Age: 62
End: 2017-05-04
Payer: COMMERCIAL

## 2017-05-04 DIAGNOSIS — Z95.2 S/P AVR (AORTIC VALVE REPLACEMENT): ICD-10-CM

## 2017-05-04 DIAGNOSIS — Z79.01 LONG-TERM (CURRENT) USE OF ANTICOAGULANTS: ICD-10-CM

## 2017-05-04 LAB — INR POINT OF CARE: 2.1 (ref 0.86–1.14)

## 2017-05-04 PROCEDURE — 36416 COLLJ CAPILLARY BLOOD SPEC: CPT

## 2017-05-04 PROCEDURE — 99207 ZZC NO CHARGE NURSE ONLY: CPT

## 2017-05-04 PROCEDURE — 85610 PROTHROMBIN TIME: CPT | Mod: QW

## 2017-05-04 NOTE — MR AVS SNAPSHOT
Jovon Mantilla   5/4/2017 9:40 AM   Anticoagulation Therapy Visit    Description:  61 year old male   Provider:  JIMMY ANTI COAG   Department:  [facility]           INR as of 5/4/2017     Today's INR 2.1!      Anticoagulation Summary as of 5/4/2017     INR goal 2.5-3.5   Today's INR 2.1!   Full instructions 4 mg on Mon, Wed, Fri; 6 mg all other days   Next INR check 6/15/2017    Indications   Long-term (current) use of anticoagulants [Z79.01] [Z79.01]  S/P AVR (aortic valve replacement) [Z95.2]         Your next Anticoagulation Clinic appointment(s)     Willy 15, 2017  5:00 PM CDT   Anticoagulation Visit with JIMMY ANTI ALEJAG   [facility]    [address] [place] [address] [phone]              Contact Numbers     [facility]  Please call [phone] or [phone]  to cancel and/or reschedule your appointment.   Please call [phone] with any problems or questions regarding your therapy          May 2017 Details    Sun Mon Tue Wed Thu Fri Sat      1               2               3               4      6 mg   See details      5      4 mg         6      6 mg           7      6 mg         8      4 mg         9      6 mg         10      4 mg         11      6 mg         12      4 mg         13      6 mg           14      6 mg         15      4 mg         16      6 mg         17      4 mg         18      6 mg         19      4 mg         20      6 mg           21      6 mg         22      4 mg         23      6 mg         24      4 mg         25      6 mg         26      4 mg         27      6 mg           28      6 mg         29      4 mg         30      6 mg         31      4 mg             Date Details   05/04 This INR check               How to take your warfarin dose     To take:  4 mg Take 1 of the 4 mg tablets.    To take:  6 mg Take 1.5 of the 4 mg tablets.           June 2017 Details    Sun Mon Tue Wed Thu  Fri Sat         1      6 mg         2      4 mg         3      6 mg           4      6 mg         5      4 mg         6      6 mg         7      4 mg         8      6 mg         9      4 mg         10      6 mg           11      6 mg         12      4 mg         13      6 mg         14      4 mg         15            16               17                 18               19               20               21               22               23               24                 25               26               27               28               29               30                 Date Details   No additional details    Date of next INR:  6/15/2017         How to take your warfarin dose     To take:  4 mg Take 1 of the 4 mg tablets.    To take:  6 mg Take 1.5 of the 4 mg tablets.

## 2017-05-04 NOTE — PROGRESS NOTES
ANTICOAGULATION FOLLOW-UP CLINIC VISIT    Patient Name:  Jovon Mantilla  Date:  5/4/2017  Contact Type:  Face to Face    SUBJECTIVE: patient here for 1st time visit.  He is [place] [facility].     Patient Findings     Positives Change in diet/appetite (trying to lose weight.  is down 8 pounds.  wants to go down 20 more pounds)           OBJECTIVE    INR Protime   Date Value Ref Range Status   05/04/2017 2.1 (A) 0.86 - 1.14 Final     Chromogenic Factor 10   Date Value Ref Range Status   01/21/2011 35 (L) 60 - 140 % Final     Comment:     Therapeutic Range: A Chromogenic Factor 10 level of 20-40% inversely   correlates   with an INR of 2-3 for patients receiving Warfarin. Chromogenic Factor 10   levels below 20% indicate an INR greater than 3, and levels above 40%   indicate   an INR lessthan 2.       ASSESSMENT / PLAN  INR assessment SUB    Recheck INR In: 6 WEEKS    INR Location Clinic      Anticoagulation Summary as of 5/4/2017     INR goal 2.5-3.5   Today's INR 2.1!   Maintenance plan 4 mg (4 mg x 1) on Mon, Wed, Fri; 6 mg (4 mg x 1.5) all other days   Full instructions 4 mg on Mon, Wed, Fri; 6 mg all other days   Weekly total 36 mg   Plan last modified Monica Leslie, RN (5/4/2017)   Next INR check 6/15/2017   Target end date     Indications   Long-term (current) use of anticoagulants [Z79.01] [Z79.01]  S/P AVR (aortic valve replacement) [Z95.2]         Anticoagulation Episode Summary     INR check location     Preferred lab     Send INR reminders to [facility]    Comments       Anticoagulation Care Providers     Provider Role Specialty Phone number    Rehan Lorenzana MD [facility] [phone]            See the Encounter Report to view Anticoagulation Flowsheet and Dosing Calendar (Go to Encounters tab in chart review, and find the Anticoagulation Therapy Visit)    Dosage adjustment made based on physician directed care plan.    Monica Leslie, RN

## 2017-06-02 ENCOUNTER — OFFICE VISIT (OUTPATIENT)
Dept: FAMILY MEDICINE | Facility: CLINIC | Age: 62
End: 2017-06-02
Payer: COMMERCIAL

## 2017-06-02 VITALS
WEIGHT: 217.7 LBS | SYSTOLIC BLOOD PRESSURE: 120 MMHG | BODY MASS INDEX: 32.24 KG/M2 | TEMPERATURE: 98.1 F | DIASTOLIC BLOOD PRESSURE: 63 MMHG | RESPIRATION RATE: 16 BRPM | HEART RATE: 75 BPM | HEIGHT: 69 IN

## 2017-06-02 DIAGNOSIS — E78.5 HYPERLIPIDEMIA LDL GOAL <70: ICD-10-CM

## 2017-06-02 DIAGNOSIS — Z00.00 ENCOUNTER FOR PREVENTIVE HEALTH EXAMINATION: Primary | ICD-10-CM

## 2017-06-02 DIAGNOSIS — I42.9 IDIOPATHIC CARDIOMYOPATHY (H): ICD-10-CM

## 2017-06-02 DIAGNOSIS — E03.9 ACQUIRED HYPOTHYROIDISM: ICD-10-CM

## 2017-06-02 DIAGNOSIS — I10 HYPERTENSION GOAL BP (BLOOD PRESSURE) < 140/90: ICD-10-CM

## 2017-06-02 DIAGNOSIS — I48.21 PERMANENT ATRIAL FIBRILLATION (H): ICD-10-CM

## 2017-06-02 DIAGNOSIS — Z91.89 PNEUMOCOCCAL VACCINATION INDICATED: ICD-10-CM

## 2017-06-02 DIAGNOSIS — Z11.59 NEED FOR HEPATITIS C SCREENING TEST: ICD-10-CM

## 2017-06-02 LAB
ALBUMIN SERPL-MCNC: 3.7 G/DL (ref 3.4–5)
ALP SERPL-CCNC: 100 U/L (ref 40–150)
ALT SERPL W P-5'-P-CCNC: 23 U/L (ref 0–70)
ANION GAP SERPL CALCULATED.3IONS-SCNC: 6 MMOL/L (ref 3–14)
AST SERPL W P-5'-P-CCNC: 29 U/L (ref 0–45)
BASOPHILS # BLD AUTO: 0 10E9/L (ref 0–0.2)
BASOPHILS NFR BLD AUTO: 0.6 %
BILIRUB SERPL-MCNC: 0.8 MG/DL (ref 0.2–1.3)
BUN SERPL-MCNC: 11 MG/DL (ref 7–30)
CALCIUM SERPL-MCNC: 8.9 MG/DL (ref 8.5–10.1)
CHLORIDE SERPL-SCNC: 104 MMOL/L (ref 94–109)
CHOLEST SERPL-MCNC: 123 MG/DL
CO2 SERPL-SCNC: 31 MMOL/L (ref 20–32)
CREAT SERPL-MCNC: 0.91 MG/DL (ref 0.66–1.25)
DIFFERENTIAL METHOD BLD: NORMAL
DIGOXIN SERPL-MCNC: 2.4 UG/L (ref 0.5–2)
EOSINOPHIL # BLD AUTO: 0.2 10E9/L (ref 0–0.7)
EOSINOPHIL NFR BLD AUTO: 3.2 %
ERYTHROCYTE [DISTWIDTH] IN BLOOD BY AUTOMATED COUNT: 13.5 % (ref 10–15)
GFR SERPL CREATININE-BSD FRML MDRD: 85 ML/MIN/1.7M2
GLUCOSE SERPL-MCNC: 86 MG/DL (ref 70–99)
HCT VFR BLD AUTO: 43.7 % (ref 40–53)
HDLC SERPL-MCNC: 47 MG/DL
HGB BLD-MCNC: 14.1 G/DL (ref 13.3–17.7)
LDLC SERPL CALC-MCNC: 63 MG/DL
LYMPHOCYTES # BLD AUTO: 0.9 10E9/L (ref 0.8–5.3)
LYMPHOCYTES NFR BLD AUTO: 17.5 %
MCH RBC QN AUTO: 31.8 PG (ref 26.5–33)
MCHC RBC AUTO-ENTMCNC: 32.3 G/DL (ref 31.5–36.5)
MCV RBC AUTO: 99 FL (ref 78–100)
MONOCYTES # BLD AUTO: 0.6 10E9/L (ref 0–1.3)
MONOCYTES NFR BLD AUTO: 11.9 %
NEUTROPHILS # BLD AUTO: 3.6 10E9/L (ref 1.6–8.3)
NEUTROPHILS NFR BLD AUTO: 66.8 %
NONHDLC SERPL-MCNC: 76 MG/DL
PLATELET # BLD AUTO: 187 10E9/L (ref 150–450)
POTASSIUM SERPL-SCNC: 4.9 MMOL/L (ref 3.4–5.3)
PROT SERPL-MCNC: 6.9 G/DL (ref 6.8–8.8)
RBC # BLD AUTO: 4.43 10E12/L (ref 4.4–5.9)
SODIUM SERPL-SCNC: 141 MMOL/L (ref 133–144)
TRIGL SERPL-MCNC: 66 MG/DL
TSH SERPL DL<=0.005 MIU/L-ACNC: 3.03 MU/L (ref 0.4–4)
WBC # BLD AUTO: 5.4 10E9/L (ref 4–11)

## 2017-06-02 PROCEDURE — 80162 ASSAY OF DIGOXIN TOTAL: CPT | Performed by: FAMILY MEDICINE

## 2017-06-02 PROCEDURE — 36415 COLL VENOUS BLD VENIPUNCTURE: CPT | Performed by: FAMILY MEDICINE

## 2017-06-02 PROCEDURE — 99396 PREV VISIT EST AGE 40-64: CPT | Mod: 25 | Performed by: FAMILY MEDICINE

## 2017-06-02 PROCEDURE — 80061 LIPID PANEL: CPT | Performed by: FAMILY MEDICINE

## 2017-06-02 PROCEDURE — 90732 PPSV23 VACC 2 YRS+ SUBQ/IM: CPT | Performed by: FAMILY MEDICINE

## 2017-06-02 PROCEDURE — 90471 IMMUNIZATION ADMIN: CPT | Performed by: FAMILY MEDICINE

## 2017-06-02 PROCEDURE — 86803 HEPATITIS C AB TEST: CPT | Performed by: FAMILY MEDICINE

## 2017-06-02 PROCEDURE — 80050 GENERAL HEALTH PANEL: CPT | Performed by: FAMILY MEDICINE

## 2017-06-02 RX ORDER — SIMVASTATIN 20 MG
20 TABLET ORAL AT BEDTIME
Qty: 90 TABLET | Refills: 3 | Status: SHIPPED | OUTPATIENT
Start: 2017-06-02 | End: 2018-05-30

## 2017-06-02 NOTE — PROGRESS NOTES
SUBJECTIVE:                                                    Jovon Mantilla is a 61 year old male who presents to clinic today for the following health issues:      Medication Followup of  Simvastain    Taking Medication as prescribed: yes    Side Effects:  None    Medication Helping Symptoms:  yes     Encounter for preventive health examination  (primary encounter diagnosis)    Acquired hypothyroidism - On levothyroxine.  TSH   Date Value Ref Range Status   10/06/2016 5.86 (H) 0.40 - 4.00 mU/L Final   ]    Permanent atrial fibrillation (H) - Followed by cardiology. Anticoagulated.    Idiopathic cardiomyopathy (H) - Followed by cardiology.    Hypertension goal BP (blood pressure) < 140/90  BP Readings from Last 6 Encounters:   06/02/17 123/80   03/17/17 130/82   02/23/17 104/72   01/27/17 (!) 84/60   12/19/16 (!) 140/100   11/04/16 104/76       Pneumococcal vaccination indicated- discussed, patient agreed to receive    Need for hepatitis C screening test-Discussed, patient agreed to receive    Hyperlipidemia LDL goal <70- On statin, fasting today        Problem list and histories reviewed & adjusted, as indicated.  Additional history: as documented    Reviewed and updated as needed this visit by clinical staff  Tobacco  Allergies  Meds  Med Hx  Surg Hx  Fam Hx  Soc Hx      Reviewed and updated as needed this visit by Provider        Past Medical History:   Diagnosis Date     Acute prostatitis      Acute sinusitis, unspecified      Aortic valve disease     AVR 2011, MAZE     Atrial fibrillation (H)      CAD (coronary artery disease)     known 70% LAD stenosis     Cardiomyopathy (H)      HTN (hypertension)      Mitral regurgitation      Obesity, unspecified      Palpitations      Permanent atrial fibrillation (H) 6/2/2017     Unspecified essential hypertension        Past Surgical History:   Procedure Laterality Date     ANGIOGRAM  2/02    Normal coronary,dilated LV,Sev.decr.global LVF,+1 MR     ANGIOGRAM   1/08    Mild AS,Mod PHTN,mod prox.LAD disease,Triv.CFX disease     ANGIOGRAM  1/09    Mod AS,Mod PHTN,mod prox.LAD disease     ANGIOGRAM  10/10    Sev.AS,CM,global hypokinesis,Mild-mod LV dilated,Mild MR,70% prox.LAD lesion,PHTN     C NONSPECIFIC PROCEDURE  ~1985    vein stripping     CARDIOVERSION  2/02, 4/02, 4/11     CHOLECYSTECTOMY       COLONOSCOPY N/A 1/23/2016    Procedure: COLONOSCOPY;  Surgeon: Robyn Collins MD;  Location:  GI     ESOPHAGOSCOPY, GASTROSCOPY, DUODENOSCOPY (EGD), COMBINED N/A 1/23/2016    Procedure: COMBINED ESOPHAGOSCOPY, GASTROSCOPY, DUODENOSCOPY (EGD);  Surgeon: Robyn Collins MD;  Location:  GI     LAPAROSCOPIC BYPASS GASTRIC  10/8/2011    Procedure:LAPAROSCOPIC BYPASS GASTRIC; Diagnostic laparoscopy, Lysis of Adhesions, Laparoscopic Revision of Jejunojejunostomy; Surgeon:RADHA MURO; Location: OR     LAPAROSCOPIC BYPASS GASTRIC  6/26/2006    Dr Jin     LAPAROSCOPY DIAGNOSTIC (GENERAL)  10/8/2011    Procedure:LAPAROSCOPY DIAGNOSTIC (GENERAL); Surgeon:RADHA MURO; Location: OR     REPLACE VALVE AORTIC  1/7/2011       Family History   Problem Relation Age of Onset     Cancer - colorectal Father      Colon Cancer Father      DIABETES Brother        Social History   Substance Use Topics     Smoking status: Never Smoker     Smokeless tobacco: Never Used     Alcohol use No         ROS: Venous stasis of bilat leg. NO chest pain, palpitations, nocturia, or changes in bowels.A complete review of systems is otherwise negative        This document serves as a record of the services and decisions personally performed and made by Mu Douglas MD. It was created on his behalf by Betzy Donald, a trained medical scribe. The creation of this document is based the provider's statements to the medical scribe. 12:12 PM June 2, 2017      OBJECTIVE:                                                    /80 (BP Location: Right arm, Patient Position: Chair,  "Cuff Size: Adult Regular)  Pulse 76  Temp 98.1  F (36.7  C) (Oral)  Resp 16  Ht 5' 9\" (1.753 m)  Wt 217 lb 11.2 oz (98.7 kg)  BMI 32.15 kg/m2  Body mass index is 32.15 kg/(m^2).    EXAM:  GEN: healthy, alert, and no distress  ENT: ears without cerumen, TMs clear  EYES: pupils are symmetric   NECK: supple without nodes or thyromegaly   HEART: Irregular rhythm with valve click  CHEST: Clear to auscultation, respirations unlabored    ABDOMEN without organomegaly nor tenderness to palpation  MS: Trace symmetric LE edema w/brawny changes  COR irreg, valve click         ASSESSMENT/PLAN:                                                      (Z00.00) Encounter for preventive health examination  (primary encounter diagnosis)  Comment: Prostate cancer screening discussed with patient - declined.  Plan: Hepatitis C Screen Reflex to HCV RNA Quant and         Genotype    (E03.9) Acquired hypothyroidism  Comment: subacute at last measure  Plan: check yearly    (I48.2) Permanent atrial fibrillation (H)  Comment: Followed by cardiology  Plan: INR CLINIC REFERRAL    (I42.8) Idiopathic cardiomyopathy (H)  Comment: Followed by cardiology  Plan: Lipid panel reflex to direct LDL, HEART FAILURE        ACTION PLAN, CBC with platelets and         differential, INR CLINIC REFERRAL          (I10) Hypertension goal BP (blood pressure) < 140/90  Comment: Controlled  Plan: Comprehensive metabolic panel          (Z91.89) Pneumococcal vaccination indicated  Comment: Discussed, will complete.  Plan: pneumococcal vaccine (PNEUMOVAX 23-MARTA) 25         MCG/0.5ML injection    (Z11.59) Need for hepatitis C screening test  Comment: Discussed, will complete.  Plan: Hepatitis C Screen Reflex to HCV RNA Quant and         Genotype    (E78.5) Hyperlipidemia LDL goal <70  Comment: On statin. Fasting today.  Plan: Lipid panel reflex to direct LDL, simvastatin         (ZOCOR) 20 MG tablet    Digoxin released at pt request  RTC yearly PCP, continue to " follow up with cardiology     The information in this document, created by a scribe for me, accurately reflects the services I personally performed and the decisions made by me. I have reviewed and approved this document for accuracy.  12:22 PM June 2, 2017      Mu Douglas MD  [facility]

## 2017-06-02 NOTE — LETTER
My Heart Failure Action Plan   Name: Jovon Mantilla    YOB: 1955   Date: 6/2/2017    My doctor: Rehan Lorenzana     [address] [address] [phone]  My Diagnosis: Systolic Heart Failure   My Ejection Fraction: 45% - 49%    My Exercise Goal: 30 minutes daily  .     My Weight Goal: as per cardiology  Wt Readings from Last 2 Encounters:   03/17/17 220 lb (99.8 kg)   02/23/17 212 lb 4.8 oz (96.3 kg)     Weigh yourself daily using the same scale. If you gain more than 2 pounds in 24 hours or 5 pounds in a week call the clinic    My Diet Goal: as per cardiology    Emergency Room Visits:    Our goal is to improve your quality of life and help you avoid a visit to the emergency room or hospital.  If we work together, we can achieve this goal. But, if you feel you need to call 911 or go to the emergency room, please do so.  If you go to the emergency room, please bring your list of medicines and your daily weight chart with you.       GREEN ZONE     Doing well today    Weight gained is no more than 2 pounds a day or 5 pounds a week.    No swelling in feet, ankles, legs or stomach.    No more swelling than usual.    No more trouble breathing than usual.    No change in my sleep.    No other problems. Actions:    I am doing fine.  I will take my medicine, follow my diet, see my doctor, exercise, and watch for symptoms.           YELLOW ZONE         Having a bad day or flare up    Weight gain of more than 2 pounds in one day or 5 pounds in one week.    New swelling in ankle, leg, knee or thigh.    Bloating in belly, pants feel tighter.    Swelling in hands or face.    Coughing or trouble breathing while walking or talking.    Harder to breathe last night.    Have trouble sleeping, wake up short of breath.    Much more tired than usual.    Not eating.    Pain in my chest or bad leg cramps.    Feel weak or dizzy. Actions:    I need to take  action and call my doctor or nurse today.                 RED ZONE         Need medical care now    Weight gain of 5 pounds overnight.    Chest pain or pressure that does not go away.    Feel less alert.    Wheezing or have trouble breathing when at rest.    Cannot sleep lying down.    Cannot take my water pill.    Pass out or faint. Actions:    I need to call my doctor or nurse now!    Call 911 if I have chest pain or cannot breathe.        Electronically signed by: Mu Douglas, June 2, 2017

## 2017-06-02 NOTE — MR AVS SNAPSHOT
After Visit Summary   6/2/2017    Jovon Mantilla    MRN: [id number]           Patient Information     Date Of Birth          1955        Visit Information        Provider Department      6/2/2017 11:30 AM Mu Douglas MD [facility]        Today's Diagnoses     Encounter for preventive health examination    -  1    Acquired hypothyroidism        Permanent atrial fibrillation (H)        Idiopathic cardiomyopathy (H)        Hypertension goal BP (blood pressure) < 140/90        Pneumococcal vaccination indicated        Need for hepatitis C screening test        Hyperlipidemia LDL goal <70           Follow-ups after your visit        Additional Services     INR CLINIC REFERRAL       Your provider has referred you to INR Services.    Please be aware that coverage of these services is subject to the terms and limitations of your health insurance plan.  Call member services at your health plan with any benefit or coverage questions.    Indication for Anticoagulation: Atrial Fibrillation  Cardiomyopathy   2.5-3.5  Expected Duration of Therapy: Lifetime                  Your next 10 appointments already scheduled     Willy 15, 2017  5:00 PM CDT   Anticoagulation Visit with CP ANTI COAG   [facility] ([facility])    [address] [place] [address] [phone]              Who to contact     If you have questions or need follow up information about today's clinic visit or your schedule please contact [facility] directly at [phone].  Normal or non-critical lab and imaging results will be communicated to you by MyChart, letter or phone within 4 business days after the clinic has received the results. If you do not hear from us within 7 days, please contact the clinic through MyChart or phone. If you have a critical or abnormal lab result, we will notify you by phone as soon as  "possible.  Submit refill requests through [employer] or call your pharmacy and they will forward the refill request to us. Please allow 3 business days for your refill to be completed.          Additional Information About Your Visit        [employer] Information     [employer] gives you secure access to your electronic health record. If you see a primary care provider, you can also send messages to your care team and make appointments. If you have questions, please call your primary care clinic.  If you do not have a primary care provider, please call [phone] and they will assist you.        Care EveryWhere ID     This is your Care EveryWhere ID. This could be used by other organizations to access your [place] medical records  [id number]        Your Vitals Were     Pulse Temperature Respirations Height BMI (Body Mass Index)       76 98.1  F (36.7  C) (Oral) 16 5' 9\" (1.753 m) 32.15 kg/m2        Blood Pressure from Last 3 Encounters:   06/02/17 123/80   03/17/17 130/82   02/23/17 104/72    Weight from Last 3 Encounters:   06/02/17 217 lb 11.2 oz (98.7 kg)   03/17/17 220 lb (99.8 kg)   02/23/17 212 lb 4.8 oz (96.3 kg)              We Performed the Following     CBC with platelets and differential     Comprehensive metabolic panel     Digoxin level     HEART FAILURE ACTION PLAN     Hepatitis C Screen Reflex to HCV RNA Quant and Genotype     INR CLINIC REFERRAL     Lipid panel reflex to direct LDL     PNEUMOCOCCAL VACCINE,ADULT,SQ OR IM     TSH with free T4 reflex          Today's Medication Changes          These changes are accurate as of: 6/2/17  1:20 PM.  If you have any questions, ask your nurse or doctor.               Start taking these medicines.        Dose/Directions    pneumococcal vaccine 25 MCG/0.5ML injection   Commonly known as:  PNEUMOVAX 23-dada   Used for:  Pneumococcal vaccination indicated   Started by:  Mu Douglas MD        Dose:  0.5 mL   Inject 0.5 mLs into the muscle once for 1 dose "   Quantity:  0.5 mL   Refills:  0         These medicines have changed or have updated prescriptions.        Dose/Directions    simvastatin 20 MG tablet   Commonly known as:  ZOCOR   This may have changed:  See the new instructions.   Used for:  Hyperlipidemia LDL goal <70   Changed by:  Mu Dogulas MD        Dose:  20 mg   Take 1 tablet (20 mg) by mouth At Bedtime   Quantity:  90 tablet   Refills:  3            Where to get your medicines      These medications were sent to [facility] - [address] [address]     Phone:  [phone]     pneumococcal vaccine 25 MCG/0.5ML injection    simvastatin 20 MG tablet                Primary Care Provider Office Phone # Fax #    Rehan Lorenzana [phone]       [address] [address]        Thank you!     Thank you for choosing [facility]  for your care. Our goal is always to provide you with excellent care. Hearing back from our patients is one way we can continue to improve our services. Please take a few minutes to complete the written survey that you may receive in the mail after your visit with us. Thank you!             Your Updated Medication List - Protect others around you: Learn how to safely use, store and throw away your medicines at www.disposemymeds.org.          This list is accurate as of: 6/2/17  1:20 PM.  Always use your most recent med list.                   Brand Name Dispense Instructions for use    albuterol 108 (90 BASE) MCG/ACT Inhaler    PROAIR HFA/PROVENTIL HFA/VENTOLIN HFA    1 Inhaler    Inhale 2 puffs into the lungs every 6 hours as needed for shortness of breath / dyspnea or wheezing       ALLERGY 24-HR PO      Take by mouth daily       aspirin 81 MG tablet      Take 81 mg by mouth every other day       CALCIUM 600+D 600-200 MG-UNIT Tabs   Generic drug:  calcium  carbonate-vitamin D      Take 1 tablet by mouth 2 times daily       carvedilol 25 MG tablet    COREG    180 tablet    Take 1 tablet (25 mg) by mouth 2 times daily (with meals)       CENTRUM Chew      Take 1 tablet by mouth At Bedtime       digoxin 250 MCG tablet    LANOXIN    90 tablet    Take 1 tablet (250 mcg) by mouth daily       Fiber 0.52 G Caps      Take 2 tablets by mouth At Bedtime       IRON SUPPLEMENT 325 (65 FE) MG tablet   Generic drug:  ferrous sulfate     100 tablet    Take 1 tablet (325 mg) by mouth 2 times daily       levothyroxine 175 MCG tablet    SYNTHROID    30 tablet    Take 1 tablet (175 mcg) by mouth daily       lisinopril 20 MG tablet    PRINIVIL/ZESTRIL    135 tablet    Take  20 mg (1 tablet in am) and 10 mg  (one half tablet) in  pm       pneumococcal vaccine 25 MCG/0.5ML injection    PNEUMOVAX 23-dada    0.5 mL    Inject 0.5 mLs into the muscle once for 1 dose       sertraline 50 MG tablet    ZOLOFT    90 tablet    Take 1 tablet (50 mg) by mouth daily       simvastatin 20 MG tablet    ZOCOR    90 tablet    Take 1 tablet (20 mg) by mouth At Bedtime       triamcinolone 0.1 % cream    KENALOG    30 g    Apply sparingly to affected area three times daily for 14 days.       Vitamin B-12 500 MCG Subl      Place 500 mcg under the tongue daily       VITAMIN C PO      Take 500 mg by mouth At Bedtime       vitamin D 2000 UNITS Caps      Take 1 tablet by mouth daily       warfarin 4 MG tablet    COUMADIN    102 tablet    Take 6mg on Tuesday, Thursday, Saturday / Take 4mg on all other days OR As directed by INR Clinic.

## 2017-06-05 ENCOUNTER — TELEPHONE (OUTPATIENT)
Dept: CARDIOLOGY | Facility: CLINIC | Age: 62
End: 2017-06-05

## 2017-06-05 DIAGNOSIS — I48.91 ATRIAL FIBRILLATION (H): ICD-10-CM

## 2017-06-05 LAB — HCV AB SERPL QL IA: NORMAL

## 2017-06-05 NOTE — TELEPHONE ENCOUNTER
"Called patient to inform him Dr. Lewis has reviewed the digoxin lab.  Also, to see if patient had taken the digoxin the morning of the lab draw.     \"Was the digoxin level performed before the daily dose of digoxin was taken.  If so, we will reduce the digoxin to 0.125 mg daily.  Thanks.  Lloyd Lewis MD, [facility]   June 4, 2017 10:23 PM\"    Patient did not answer. Left a message to call team 4 with the direct number.   "

## 2017-06-12 RX ORDER — DIGOXIN 125 MCG
125 TABLET ORAL DAILY
Qty: 90 TABLET | Refills: 3 | Status: SHIPPED | OUTPATIENT
Start: 2017-06-12 | End: 2018-05-07

## 2017-06-12 NOTE — TELEPHONE ENCOUNTER
Spoke to patient about result below. Pt stated that he did not take his daily dose of digoxin before the lab was drawn. Pt currently on digoxin 250 mcg. Writer informed patient that Dr. Lewis would like to reduce the digoxin to 125 mcg daily. New prescription sent to pharmacy of choice. Pt verbalized understanding. Pt wondering when Dr. Lewis would like a recheck of the digoxin level.     Will route to Dr. Lewis to see when he would like a recheck.

## 2017-06-13 NOTE — TELEPHONE ENCOUNTER
Spoke to patient. Order placed and he will call to schedule today following work. No further questions.

## 2017-06-15 ENCOUNTER — ANTICOAGULATION THERAPY VISIT (OUTPATIENT)
Dept: NURSING | Facility: CLINIC | Age: 62
End: 2017-06-15
Payer: COMMERCIAL

## 2017-06-15 DIAGNOSIS — Z79.01 LONG-TERM (CURRENT) USE OF ANTICOAGULANTS: ICD-10-CM

## 2017-06-15 DIAGNOSIS — Z95.2 S/P AVR (AORTIC VALVE REPLACEMENT): ICD-10-CM

## 2017-06-15 LAB — INR POINT OF CARE: 3.4 (ref 0.86–1.14)

## 2017-06-15 PROCEDURE — 85610 PROTHROMBIN TIME: CPT | Mod: QW

## 2017-06-15 PROCEDURE — 36416 COLLJ CAPILLARY BLOOD SPEC: CPT

## 2017-06-15 PROCEDURE — 99207 ZZC NO CHARGE NURSE ONLY: CPT

## 2017-06-15 NOTE — MR AVS SNAPSHOT
Jovon Mantilla   6/15/2017 5:00 PM   Anticoagulation Therapy Visit    Description:  61 year old male   Provider:  JIMMY ANTI COAG   Department:  Cp Nurse           INR as of 6/15/2017     Today's INR 3.4      Anticoagulation Summary as of 6/15/2017     INR goal 2.5-3.5   Today's INR 3.4   Full instructions 4 mg on Mon, Wed, Fri; 6 mg all other days   Next INR check 7/27/2017    Indications   Long-term (current) use of anticoagulants [Z79.01] [Z79.01]  S/P AVR (aortic valve replacement) [Z95.2]         Your next Anticoagulation Clinic appointment(s)     Jul 27, 2017  4:40 PM CDT   Anticoagulation Visit with JIMMY ANTI COAG   [facility] ([facility]    [address] [place] [id number] [phone]              Contact Numbers     [facility]  Please call [phone] or [phone]  to cancel and/or reschedule your appointment.   Please call [phone] with any problems or questions regarding your therapy          June 2017 Details    Sun Mon Tue Wed Thu Fri Sat         1               2               3                 4               5               6               7               8               9               10                 11               12               13               14               15      6 mg   See details      16      4 mg         17      6 mg           18      6 mg         19      4 mg         20      6 mg         21      4 mg         22      6 mg         23      4 mg         24      6 mg           25      6 mg         26      4 mg         27      6 mg         28      4 mg         29      6 mg         30      4 mg           Date Details   06/15 This INR check               How to take your warfarin dose     To take:  4 mg Take 1 of the 4 mg tablets.    To take:  6 mg Take 1.5 of the 4 mg tablets.           July 2017 Details    Sun Mon Tue Wed Thu Fri Sat           1      6 mg           2      6 mg         3       4 mg         4      6 mg         5      4 mg         6      6 mg         7      4 mg         8      6 mg           9      6 mg         10      4 mg         11      6 mg         12      4 mg         13      6 mg         14      4 mg         15      6 mg           16      6 mg         17      4 mg         18      6 mg         19      4 mg         20      6 mg         21      4 mg         22      6 mg           23      6 mg         24      4 mg         25      6 mg         26      4 mg         27            28               29                 30               31                     Date Details   No additional details    Date of next INR:  7/27/2017         How to take your warfarin dose     To take:  4 mg Take 1 of the 4 mg tablets.    To take:  6 mg Take 1.5 of the 4 mg tablets.

## 2017-06-15 NOTE — PROGRESS NOTES
ANTICOAGULATION FOLLOW-UP CLINIC VISIT    Patient Name:  Jovon Mantilla  Date:  6/15/2017  Contact Type:  Face to Face    SUBJECTIVE: last visit we raised warfarin dose slightly to keep patient in the 3.0-3.5 range per MD request.     Patient Findings     Positives No Problem Findings           OBJECTIVE    INR Protime   Date Value Ref Range Status   06/15/2017 3.4 (A) 0.86 - 1.14 Final     Chromogenic Factor 10   Date Value Ref Range Status   01/21/2011 35 (L) 60 - 140 % Final     Comment:     Therapeutic Range: A Chromogenic Factor 10 level of 20-40% inversely   correlates   with an INR of 2-3 for patients receiving Warfarin. Chromogenic Factor 10   levels below 20% indicate an INR greater than 3, and levels above 40%   indicate   an INR lessthan 2.       ASSESSMENT / PLAN  INR assessment THER    Recheck INR In: 6 WEEKS    INR Location Clinic      Anticoagulation Summary as of 6/15/2017     INR goal 2.5-3.5   Today's INR 3.4   Maintenance plan 4 mg (4 mg x 1) on Mon, Wed, Fri; 6 mg (4 mg x 1.5) all other days   Full instructions 4 mg on Mon, Wed, Fri; 6 mg all other days   Weekly total 36 mg   No change documented Monica Leslie RN   Plan last modified Monica Leslie, RN (5/4/2017)   Next INR check 7/27/2017   Target end date     Indications   Long-term (current) use of anticoagulants [Z79.01] [Z79.01]  S/P AVR (aortic valve replacement) [Z95.2]         Anticoagulation Episode Summary     INR check location     Preferred lab     Send INR reminders to [facility] CLINIC    Comments       Anticoagulation Care Providers     Provider Role Specialty Phone number    Rehan Lorenzana MD [facility] [phone]            See the Encounter Report to view Anticoagulation Flowsheet and Dosing Calendar (Go to Encounters tab in chart review, and find the Anticoagulation Therapy Visit)    Dosage adjustment made based on physician directed care plan.    Monica Leslie, GRETCHEN

## 2017-06-25 DIAGNOSIS — Z79.01 LONG TERM CURRENT USE OF ANTICOAGULANT THERAPY: ICD-10-CM

## 2017-06-25 NOTE — TELEPHONE ENCOUNTER
Pending Prescriptions:                       Disp   Refills    warfarin (COUMADIN) 4 MG tablet [Pharmacy*102 ta*0            Sig: TAKE 6 MG ON TUESDAY, THURSDAY, SATURDAY. TAKE 4           MG ON ALL OTHER DAYS OR AS DIRECTED BY INR           CLINIC.            Last Written Prescription Date: 1/19/2017  Last Fill Qty: 102, # refills: 1  Last Office Visit with G, P or [facility] prescribing provider: 6/2/2017, Misael       Date and Result of Last PT/INR:   Lab Results   Component Value Date    INR 3.4 06/15/2017    INR 2.1 05/04/2017    INR 2.58 01/28/2016    INR 1.88 01/27/2016

## 2017-06-27 RX ORDER — WARFARIN SODIUM 4 MG/1
TABLET ORAL
Qty: 130 TABLET | Refills: 1 | Status: SHIPPED | OUTPATIENT
Start: 2017-06-27 | End: 2017-12-24

## 2017-06-27 NOTE — TELEPHONE ENCOUNTER
Anticoagulation Monitoring INR Value INR Value INR Goal Range   Latest Ref Rng & Units 0.86 - 1.14 0.86 - 1.14    6/15/2017 3.4 (A)  2.5-3.5     Anticoagulation Monitoring Therapeutic? Last Week Total Next Week Total   Latest Ref Rng & Units      6/15/2017 THER 36 mg 36 mg     Anticoagulation Monitoring Weekly Dose Return Date Recheck   Latest Ref Rng & Units      6/15/2017  7/27/2017 6 WEEKS     Anticoagulation Monitoring Instructions Instructions   Latest Ref Rng & Units     6/15/2017 4 mg on Mon, Wed, Fri; 6 mg all other days      Anticoagulation Monitoring Reason out of range  INR location Visit Report   Latest Ref Rng & Units       6/15/2017   Clinic    This patient has transferred to [facility].  I have seen him for INR, he is compliant with blood tests.  Chart review shows he is taking warfarin (COUMADIN) 4 MG tablet per above schedule.    Monica MedranoRN

## 2017-06-27 NOTE — TELEPHONE ENCOUNTER
Patient has been going to another [facility] in [facility] since 03/2017, routing to them.  I routed to [facility] as unsure what their anticoag pool is.      Madeline Wayne RN

## 2017-07-09 DIAGNOSIS — F41.9 ANXIETY: ICD-10-CM

## 2017-07-10 NOTE — TELEPHONE ENCOUNTER
sertraline (ZOLOFT) 50 MG tablet     Last Written Prescription Date: 4/11/17  Last Fill Quantity: 90, # refills: 0  Last Office Visit with G primary care provider: 6/2/2017       Last PHQ-9 score on record=   PHQ-9 SCORE 5/9/2016   Total Score 4         JENN Holliday  July 10, 2017  12:02 PM

## 2017-07-11 NOTE — TELEPHONE ENCOUNTER
Prescription approved per [facility]  Barbara Clemons RN BS

## 2017-07-28 ENCOUNTER — TELEPHONE (OUTPATIENT)
Dept: NURSING | Facility: CLINIC | Age: 62
End: 2017-07-28

## 2017-08-03 ENCOUNTER — ANTICOAGULATION THERAPY VISIT (OUTPATIENT)
Dept: NURSING | Facility: CLINIC | Age: 62
End: 2017-08-03
Payer: COMMERCIAL

## 2017-08-03 DIAGNOSIS — I48.91 ATRIAL FIBRILLATION (H): ICD-10-CM

## 2017-08-03 DIAGNOSIS — Z79.01 LONG-TERM (CURRENT) USE OF ANTICOAGULANTS: ICD-10-CM

## 2017-08-03 DIAGNOSIS — Z95.2 S/P AVR (AORTIC VALVE REPLACEMENT): ICD-10-CM

## 2017-08-03 LAB — INR POINT OF CARE: 3 (ref 0.86–1.14)

## 2017-08-03 PROCEDURE — 36416 COLLJ CAPILLARY BLOOD SPEC: CPT

## 2017-08-03 PROCEDURE — 85610 PROTHROMBIN TIME: CPT | Mod: QW

## 2017-08-03 PROCEDURE — 80162 ASSAY OF DIGOXIN TOTAL: CPT | Performed by: FAMILY MEDICINE

## 2017-08-03 PROCEDURE — 99207 ZZC NO CHARGE NURSE ONLY: CPT

## 2017-08-03 NOTE — MR AVS SNAPSHOT
Jovon Mantilla   8/3/2017 4:00 PM   Anticoagulation Therapy Visit    Description:  61 year old male   Provider:  [facility]   Department:  [facility] Nurse           INR as of 8/3/2017     Today's INR 3.0      Anticoagulation Summary as of 8/3/2017     INR goal 2.5-3.5   Today's INR 3.0   Full instructions 4 mg on Mon, Wed, Fri; 6 mg all other days   Next INR check 9/14/2017    Indications   Long-term (current) use of anticoagulants [Z79.01] [Z79.01]  S/P AVR (aortic valve replacement) [Z95.2]         Your next Anticoagulation Clinic appointment(s)     Sep 14, 2017  5:00 PM CDT   Anticoagulation Visit with JIMMY ANTI COAG   [facility] ([facility])    [address] [place] [address] [phone]              Contact Numbers     Clinic Number:         August 2017 Details    Sun Mon Tue Wed Thu Fri Sat       1               2               3      6 mg   See details      4      4 mg         5      6 mg           6      6 mg         7      4 mg         8      6 mg         9      4 mg         10      6 mg         11      4 mg         12      6 mg           13      6 mg         14      4 mg         15      6 mg         16      4 mg         17      6 mg         18      4 mg         19      6 mg           20      6 mg         21      4 mg         22      6 mg         23      4 mg         24      6 mg         25      4 mg         26      6 mg           27      6 mg         28      4 mg         29      6 mg         30      4 mg         31      6 mg            Date Details   08/03 This INR check               How to take your warfarin dose     To take:  4 mg Take 1 of the 4 mg tablets.    To take:  6 mg Take 1.5 of the 4 mg tablets.           September 2017 Details    Sun Mon Tue Wed Thu Fri Sat          1      4 mg         2      6 mg           3      6 mg         4      4 mg         5      6 mg         6      4 mg         7      6 mg          8      4 mg         9      6 mg           10      6 mg         11      4 mg         12      6 mg         13      4 mg         14            15               16                 17               18               19               20               21               22               23                 24               25               26               27               28               29               30                Date Details   No additional details    Date of next INR:  9/14/2017         How to take your warfarin dose     To take:  4 mg Take 1 of the 4 mg tablets.    To take:  6 mg Take 1.5 of the 4 mg tablets.

## 2017-08-03 NOTE — PROGRESS NOTES
ANTICOAGULATION FOLLOW-UP CLINIC VISIT    Patient Name:  Jovon Mantilla  Date:  8/3/2017  Contact Type:  Face to Face    SUBJECTIVE:     Patient Findings     Positives No Problem Findings           OBJECTIVE    INR Protime   Date Value Ref Range Status   08/03/2017 3.0 (A) 0.86 - 1.14 Final     Chromogenic Factor 10   Date Value Ref Range Status   01/21/2011 35 (L) 60 - 140 % Final     Comment:     Therapeutic Range: A Chromogenic Factor 10 level of 20-40% inversely   correlates   with an INR of 2-3 for patients receiving Warfarin. Chromogenic Factor 10   levels below 20% indicate an INR greater than 3, and levels above 40%   indicate   an INR lessthan 2.       ASSESSMENT / PLAN  INR assessment THER    Recheck INR In: 6 WEEKS    INR Location Clinic      Anticoagulation Summary as of 8/3/2017     INR goal 2.5-3.5   Today's INR 3.0   Maintenance plan 4 mg (4 mg x 1) on Mon, Wed, Fri; 6 mg (4 mg x 1.5) all other days   Full instructions 4 mg on Mon, Wed, Fri; 6 mg all other days   Weekly total 36 mg   No change documented Madeline Wayne RN   Plan last modified Monica Leslie RN (5/4/2017)   Next INR check 9/14/2017   Target end date     Indications   Long-term (current) use of anticoagulants [Z79.01] [Z79.01]  S/P AVR (aortic valve replacement) [Z95.2]         Anticoagulation Episode Summary     INR check location     Preferred lab     Send INR reminders to [facility] CLINIC    Comments       Anticoagulation Care Providers     Provider Role Specialty Phone number    Rehan Lorenzana MD [facility] [phone]            See the Encounter Report to view Anticoagulation Flowsheet and Dosing Calendar (Go to Encounters tab in chart review, and find the Anticoagulation Therapy Visit)        Madeline Wayne, GRETCHEN

## 2017-08-04 LAB — DIGOXIN SERPL-MCNC: 0.5 UG/L (ref 0.5–2)

## 2017-08-07 DIAGNOSIS — I48.91 ATRIAL FIBRILLATION (H): Primary | ICD-10-CM

## 2017-08-07 NOTE — NURSING NOTE
Notes Recorded by Lloyd Lewis MD on 8/6/2017 at 11:06 PM  Currently adequate atrial fibrillation rate control with low dose digoxin.  Will continue with his current low dose.  Recheck a digoxin level in one year.  Lloyd Lewis MD, [facility]  August 6, 2017 11:06 PM    Digoxin ordered for 1 year.

## 2017-09-14 ENCOUNTER — ANTICOAGULATION THERAPY VISIT (OUTPATIENT)
Dept: NURSING | Facility: CLINIC | Age: 62
End: 2017-09-14
Payer: COMMERCIAL

## 2017-09-14 DIAGNOSIS — Z95.2 S/P AVR (AORTIC VALVE REPLACEMENT): ICD-10-CM

## 2017-09-14 DIAGNOSIS — Z79.01 LONG-TERM (CURRENT) USE OF ANTICOAGULANTS: ICD-10-CM

## 2017-09-14 LAB — INR POINT OF CARE: 3.1 (ref 0.86–1.14)

## 2017-09-14 PROCEDURE — 36416 COLLJ CAPILLARY BLOOD SPEC: CPT

## 2017-09-14 PROCEDURE — 99207 ZZC NO CHARGE NURSE ONLY: CPT

## 2017-09-14 PROCEDURE — 85610 PROTHROMBIN TIME: CPT | Mod: QW

## 2017-09-14 NOTE — MR AVS SNAPSHOT
Jovon Mantilla   9/14/2017 5:00 PM   Anticoagulation Therapy Visit    Description:  62 year old male   Provider:  CP ANTI COAG   Department:  Cp Nurse           INR as of 9/14/2017     Today's INR 3.1      Anticoagulation Summary as of 9/14/2017     INR goal 2.5-3.5   Today's INR 3.1   Full instructions 4 mg on Mon, Wed, Fri; 6 mg all other days   Next INR check 10/26/2017    Indications   Long-term (current) use of anticoagulants [Z79.01] [Z79.01]  S/P AVR (aortic valve replacement) [Z95.2]         Your next Anticoagulation Clinic appointment(s)     Sep 14, 2017  5:00 PM CDT   Anticoagulation Visit with CP ANTI COAG   [facility] ([facility]    [address] [place] [id number] [phone]            Oct 26, 2017  5:00 PM CDT   Anticoagulation Visit with CP ANTI COAG   [facility] ([facility])    [address] [place] [id number] [phone]              Contact Numbers     [facility]  Please call [phone] or [phone]  to cancel and/or reschedule your appointment.   Please call [phone] with any problems or questions regarding your therapy          September 2017 Details    Sun Mon Tue Wed Thu Fri Sat          1               2                 3               4               5               6               7               8               9                 10               11               12               13               14      6 mg   See details      15      4 mg         16      6 mg           17      6 mg         18      4 mg         19      6 mg         20      4 mg         21      6 mg         22      4 mg         23      6 mg           24      6 mg         25      4 mg         26      6 mg         27      4 mg         28      6 mg         29      4 mg         30      6 mg          Date Details   09/14 This INR check                How to take your warfarin dose     To take:  4 mg Take 1 of the 4 mg tablets.    To take:  6 mg Take 1.5 of the 4 mg tablets.           October 2017 Details    Sun Mon Tue Wed Thu Fri Sat     1      6 mg         2      4 mg         3      6 mg         4      4 mg         5      6 mg         6      4 mg         7      6 mg           8      6 mg         9      4 mg         10      6 mg         11      4 mg         12      6 mg         13      4 mg         14      6 mg           15      6 mg         16      4 mg         17      6 mg         18      4 mg         19      6 mg         20      4 mg         21      6 mg           22      6 mg         23      4 mg         24      6 mg         25      4 mg         26            27               28                 29               30               31                    Date Details   No additional details    Date of next INR:  10/26/2017         How to take your warfarin dose     To take:  4 mg Take 1 of the 4 mg tablets.    To take:  6 mg Take 1.5 of the 4 mg tablets.

## 2017-09-14 NOTE — PROGRESS NOTES
ANTICOAGULATION FOLLOW-UP CLINIC VISIT    Patient Name:  Jovon Mantilla  Date:  9/14/2017  Contact Type:  Face to Face    SUBJECTIVE: patient denies changes in medicine or diet.  No symptoms of bleeding or clotting.  No other concerns today.     Patient Findings     Positives No Problem Findings           OBJECTIVE    INR Protime   Date Value Ref Range Status   09/14/2017 3.1 (A) 0.86 - 1.14 Final     Chromogenic Factor 10   Date Value Ref Range Status   01/21/2011 35 (L) 60 - 140 % Final     Comment:     Therapeutic Range: A Chromogenic Factor 10 level of 20-40% inversely   correlates   with an INR of 2-3 for patients receiving Warfarin. Chromogenic Factor 10   levels below 20% indicate an INR greater than 3, and levels above 40%   indicate   an INR lessthan 2.       ASSESSMENT / PLAN  INR assessment THER    Recheck INR In: 6 WEEKS    INR Location Clinic      Anticoagulation Summary as of 9/14/2017     INR goal 2.5-3.5   Today's INR 3.1   Maintenance plan 4 mg (4 mg x 1) on Mon, Wed, Fri; 6 mg (4 mg x 1.5) all other days   Full instructions 4 mg on Mon, Wed, Fri; 6 mg all other days   Weekly total 36 mg   No change documented Monica Leslie, RN   Plan last modified Monica Leslie, RN (5/4/2017)   Next INR check 10/26/2017   Target end date Indefinite    Indications   Long-term (current) use of anticoagulants [Z79.01] [Z79.01]  S/P AVR (aortic valve replacement) [Z95.2]         Anticoagulation Episode Summary     INR check location     Preferred lab     Send INR reminders to [facility] CLINIC    Comments       Anticoagulation Care Providers     Provider Role Specialty Phone number    Rehan Lorenzana MD [facility] [phone]            See the Encounter Report to view Anticoagulation Flowsheet and Dosing Calendar (Go to Encounters tab in chart review, and find the Anticoagulation Therapy Visit)    Dosage adjustment made based on physician directed care plan.    Monica Leslie, GRETCHEN

## 2017-10-27 ENCOUNTER — TELEPHONE (OUTPATIENT)
Dept: NURSING | Facility: CLINIC | Age: 62
End: 2017-10-27

## 2017-10-27 NOTE — LETTER
[address] [id number] [phone]          October 27, 2017    Jovon Mantilla                                                                                                                     [id number] [facility] [address]            Dear Jovon,    This letter is being sent to you because you missed your INR blood test appointment yesterday, 10-26-17.    Please contact either the INR Clinic directly to reschedule at [phone] or the main clinic scheduling department at your earliest convenience at [phone].  Thank you.      Sincerely,       Monica GODINEZ / INR CLINIC      Rehan Lorenzana MD

## 2017-11-13 ENCOUNTER — ANTICOAGULATION THERAPY VISIT (OUTPATIENT)
Dept: NURSING | Facility: CLINIC | Age: 62
End: 2017-11-13
Payer: COMMERCIAL

## 2017-11-13 DIAGNOSIS — Z95.2 S/P AVR (AORTIC VALVE REPLACEMENT): ICD-10-CM

## 2017-11-13 DIAGNOSIS — Z79.01 LONG-TERM (CURRENT) USE OF ANTICOAGULANTS: ICD-10-CM

## 2017-11-13 LAB — INR POINT OF CARE: 4.5 (ref 0.86–1.14)

## 2017-11-13 PROCEDURE — 85610 PROTHROMBIN TIME: CPT | Mod: QW

## 2017-11-13 PROCEDURE — 36416 COLLJ CAPILLARY BLOOD SPEC: CPT

## 2017-11-13 PROCEDURE — 99207 ZZC NO CHARGE NURSE ONLY: CPT

## 2017-11-13 NOTE — MR AVS SNAPSHOT
Jovon Mantilla   11/13/2017 2:40 PM   Anticoagulation Therapy Visit    Description:  62 year old male   Provider:  JIMMY ANTI COACE   Department:  Cp Nurse           INR as of 11/13/2017     Today's INR 4.5!      Anticoagulation Summary as of 11/13/2017     INR goal 2.5-3.5   Today's INR 4.5!   Full instructions 11/13: Hold; Otherwise 4 mg on Mon, Wed, Fri; 6 mg all other days   Next INR check 11/30/2017    Indications   Long-term (current) use of anticoagulants [Z79.01] [Z79.01]  S/P AVR (aortic valve replacement) [Z95.2]         Your next Anticoagulation Clinic appointment(s)     Nov 30, 2017  5:00 PM CST   Anticoagulation Visit with CP ANTI COAG   [facility] ([facility])    [address] [place] [id number] [phone]              Contact Numbers     [facility]  Please call [phone] or [phone]  to cancel and/or reschedule your appointment.   Please call [phone] with any problems or questions regarding your therapy          November 2017 Details    Sun Mon Tue Wed Thu Fri Sat        1               2               3               4                 5               6               7               8               9               10               11                 12               13      Hold   See details      14      6 mg         15      4 mg         16      6 mg         17      4 mg         18      6 mg           19      6 mg         20      4 mg         21      6 mg         22      4 mg         23      6 mg         24      4 mg         25      6 mg           26      6 mg         27      4 mg         28      6 mg         29      4 mg         30               Date Details   11/13 This INR check       Date of next INR:  11/30/2017         How to take your warfarin dose     To take:  4 mg Take 1 of the 4 mg tablets.    To take:  6 mg Take 1.5 of the 4 mg tablets.    Hold Do not take your warfarin dose. See the  Details table to the right for additional instructions.

## 2017-11-13 NOTE — PROGRESS NOTES
ANTICOAGULATION FOLLOW-UP CLINIC VISIT    Patient Name:  Jovon Mantilla  Date:  11/13/2017  Contact Type:  Face to Face    SUBJECTIVE:    Patient 1 hour late to appointment today.  Denies bruising, bleeding, clotting abnormalities.  Denies med regimen changes.  States he has been abroad so eaten less greens.  Will have him hold today.  He asks to be seen at 445pm or 5pm as he cannot get off work before then.  Soonest appointment is 11/30 - advised patient to call us if any s/sx of bleeding.  Declines flu shot.     Patient Findings     Positives Change in diet/appetite    Comments Has been abroad for business and eaten not very many greens           OBJECTIVE    INR Protime   Date Value Ref Range Status   11/13/2017 4.5 (A) 0.86 - 1.14 Final     Chromogenic Factor 10   Date Value Ref Range Status   01/21/2011 35 (L) 60 - 140 % Final     Comment:     Therapeutic Range: A Chromogenic Factor 10 level of 20-40% inversely   correlates   with an INR of 2-3 for patients receiving Warfarin. Chromogenic Factor 10   levels below 20% indicate an INR greater than 3, and levels above 40%   indicate   an INR lessthan 2.       ASSESSMENT / PLAN  INR assessment SUPRA    Recheck INR In: 3 WEEKS    INR Location Clinic      Anticoagulation Summary as of 11/13/2017     INR goal 2.5-3.5   Today's INR 4.5!   Maintenance plan 4 mg (4 mg x 1) on Mon, Wed, Fri; 6 mg (4 mg x 1.5) all other days   Full instructions 11/13: Hold; Otherwise 4 mg on Mon, Wed, Fri; 6 mg all other days   Weekly total 36 mg   Plan last modified Monica Leslie, RN (5/4/2017)   Next INR check 11/30/2017   Target end date Indefinite    Indications   Long-term (current) use of anticoagulants [Z79.01] [Z79.01]  S/P AVR (aortic valve replacement) [Z95.2]         Anticoagulation Episode Summary     INR check location     Preferred lab     Send INR reminders to [facility] CLINIC    Comments       Anticoagulation Care Providers     Provider Role Specialty Phone number     Rehan Lorenzana MD [facility] [phone]            See the Encounter Report to view Anticoagulation Flowsheet and Dosing Calendar (Go to Encounters tab in chart review, and find the Anticoagulation Therapy Visit)    Dosage adjustment made based on physician directed care plan.    Graciela Hood RN

## 2017-12-24 DIAGNOSIS — Z79.01 LONG TERM CURRENT USE OF ANTICOAGULANT THERAPY: ICD-10-CM

## 2017-12-26 ENCOUNTER — ANTICOAGULATION THERAPY VISIT (OUTPATIENT)
Dept: NURSING | Facility: CLINIC | Age: 62
End: 2017-12-26
Payer: COMMERCIAL

## 2017-12-26 ENCOUNTER — OFFICE VISIT (OUTPATIENT)
Dept: FAMILY MEDICINE | Facility: CLINIC | Age: 62
End: 2017-12-26
Payer: COMMERCIAL

## 2017-12-26 VITALS
WEIGHT: 208 LBS | HEART RATE: 111 BPM | DIASTOLIC BLOOD PRESSURE: 89 MMHG | TEMPERATURE: 97.4 F | SYSTOLIC BLOOD PRESSURE: 132 MMHG | OXYGEN SATURATION: 97 % | BODY MASS INDEX: 30.72 KG/M2

## 2017-12-26 DIAGNOSIS — L03.113 CELLULITIS OF RIGHT UPPER EXTREMITY: Primary | ICD-10-CM

## 2017-12-26 DIAGNOSIS — Z79.01 LONG-TERM (CURRENT) USE OF ANTICOAGULANTS: ICD-10-CM

## 2017-12-26 DIAGNOSIS — Z95.2 S/P AVR (AORTIC VALVE REPLACEMENT): ICD-10-CM

## 2017-12-26 LAB — INR POINT OF CARE: 3.3 (ref 0.86–1.14)

## 2017-12-26 PROCEDURE — 85610 PROTHROMBIN TIME: CPT | Mod: QW

## 2017-12-26 PROCEDURE — 36416 COLLJ CAPILLARY BLOOD SPEC: CPT

## 2017-12-26 PROCEDURE — 99213 OFFICE O/P EST LOW 20 MIN: CPT | Performed by: FAMILY MEDICINE

## 2017-12-26 PROCEDURE — 99207 ZZC NO CHARGE NURSE ONLY: CPT

## 2017-12-26 RX ORDER — CEPHALEXIN 500 MG/1
500 CAPSULE ORAL 4 TIMES DAILY
Qty: 40 CAPSULE | Refills: 0 | Status: SHIPPED | OUTPATIENT
Start: 2017-12-26 | End: 2018-01-03

## 2017-12-26 ASSESSMENT — PAIN SCALES - GENERAL: PAINLEVEL: MILD PAIN (3)

## 2017-12-26 NOTE — NURSING NOTE
"Chief Complaint   Patient presents with     Musculoskeletal Problem     right arm pain     Health Maintenance     Microalbumin, BMP       Initial /89 (BP Location: Right arm, Patient Position: Chair, Cuff Size: Adult Regular)  Pulse 111  Temp 97.4  F (36.3  C) (Oral)  Wt 208 lb (94.3 kg)  SpO2 97%  BMI 30.72 kg/m2 Estimated body mass index is 30.72 kg/(m^2) as calculated from the following:    Height as of 6/2/17: 5' 9\" (1.753 m).    Weight as of this encounter: 208 lb (94.3 kg).  Medication Reconciliation: complete   Shabnam See MOLLY Simons      "

## 2017-12-26 NOTE — MR AVS SNAPSHOT
Jovon Mantilla   12/26/2017 3:00 PM   Anticoagulation Therapy Visit    Description:  62 year old male   Provider:  CP ANTI COAG   Department:  C[facility] Nurse           INR as of 12/26/2017     Today's INR 3.3      Anticoagulation Summary as of 12/26/2017     INR goal 2.5-3.5   Today's INR 3.3   Full instructions 4 mg on Mon, Wed, Fri; 6 mg all other days   Next INR check 2/8/2018    Indications   Long-term (current) use of anticoagulants [Z79.01] [Z79.01]  S/P AVR (aortic valve replacement) [Z95.2]         Your next Anticoagulation Clinic appointment(s)     Dec 26, 2017  3:00 PM CST   [facility]   [facility]    [address] [place] [id number] [phone]            Feb 08, 2018  5:00 PM CST   Anticoagulation Visit with CP ANTI COAG   [facility]    [address] [place] [id number] [phone]              Contact Numbers     [facility]  Please call [phone] or [phone]  to cancel and/or reschedule your appointment.   Please call [phone] with any problems or questions regarding your therapy          December 2017 Details    Sun Mon Tue Wed Thu Fri Sat          1               2                 3               4               5               6               7               8               9                 10               11               12               13               14               15               16                 17               18               19               20               21               22               23                 24               25               26      6 mg   See details      27      4 mg         28      6 mg         29      4 mg         30      6 mg           31      6 mg                Date Details   12/26 This INR check               How to take your  warfarin dose     To take:  4 mg Take 1 of the 4 mg tablets.    To take:  6 mg Take 1.5 of the 4 mg tablets.           January 2018 Details    Sun Mon Tue Wed Thu Fri Sat      1      4 mg         2      6 mg         3      4 mg         4      6 mg         5      4 mg         6      6 mg           7      6 mg         8      4 mg         9      6 mg         10      4 mg         11      6 mg         12      4 mg         13      6 mg           14      6 mg         15      4 mg         16      6 mg         17      4 mg         18      6 mg         19      4 mg         20      6 mg           21      6 mg         22      4 mg         23      6 mg         24      4 mg         25      6 mg         26      4 mg         27      6 mg           28      6 mg         29      4 mg         30      6 mg         31      4 mg             Date Details   No additional details            How to take your warfarin dose     To take:  4 mg Take 1 of the 4 mg tablets.    To take:  6 mg Take 1.5 of the 4 mg tablets.           February 2018 Details    Sun Mon Tue Wed Thu Fri Sat         1      6 mg         2      4 mg         3      6 mg           4      6 mg         5      4 mg         6      6 mg         7      4 mg         8            9               10                 11               12               13               14               15               16               17                 18               19               20               21               22               23               24                 25               26               27               28                   Date Details   No additional details    Date of next INR:  2/8/2018         How to take your warfarin dose     To take:  4 mg Take 1 of the 4 mg tablets.    To take:  6 mg Take 1.5 of the 4 mg tablets.

## 2017-12-26 NOTE — MR AVS SNAPSHOT
After Visit Summary   12/26/2017    Jovon Mantilla    MRN: [id number]           Patient Information     Date Of Birth          1955        Visit Information        Provider Department      12/26/2017 3:20 PM Pancho Roberto MD [facility]        Today's Diagnoses     Cellulitis of right upper extremity    -  1       Follow-ups after your visit        Your next 10 appointments already scheduled     Feb 08, 2018  5:00 PM CST   Anticoagulation Visit with CP ANTI COAG   [facility] ([facility])    [address] [facility] [id number] [phone]              Who to contact     If you have questions or need follow up information about today's clinic visit or your schedule please contact [facility] directly at [phone].  Normal or non-critical lab and imaging results will be communicated to you by MyChart, letter or phone within 4 business days after the clinic has received the results. If you do not hear from us within 7 days, please contact the clinic through MyChart or phone. If you have a critical or abnormal lab result, we will notify you by phone as soon as possible.  Submit refill requests through [employer] or call your pharmacy and they will forward the refill request to us. Please allow 3 business days for your refill to be completed.          Additional Information About Your Visit        MyChart Information     [employer] gives you secure access to your electronic health record. If you see a primary care provider, you can also send messages to your care team and make appointments. If you have questions, please call your primary care clinic.  If you do not have a primary care provider, please call [phone] and they will assist you.        Care EveryWhere ID     This is your Care EveryWhere ID. This could be used by other organizations to access your [facility]  records  [id number]        Your Vitals Were     Pulse Temperature Pulse Oximetry BMI (Body Mass Index)          111 97.4  F (36.3  C) (Oral) 97% 30.72 kg/m2         Blood Pressure from Last 3 Encounters:   12/26/17 132/89   06/02/17 123/80   03/17/17 130/82    Weight from Last 3 Encounters:   12/26/17 208 lb (94.3 kg)   06/02/17 217 lb 11.2 oz (98.7 kg)   03/17/17 220 lb (99.8 kg)              Today, you had the following     No orders found for display         Today's Medication Changes          These changes are accurate as of: 12/26/17  4:12 PM.  If you have any questions, ask your nurse or doctor.               Start taking these medicines.        Dose/Directions    cephALEXin 500 MG capsule   Commonly known as:  KEFLEX   Used for:  Cellulitis of right upper extremity   Started by:  Pancho Roberto MD        Dose:  500 mg   Take 1 capsule (500 mg) by mouth 4 times daily   Quantity:  40 capsule   Refills:  0            Where to get your medicines      These medications were sent to [facility] #[id number] [address] [address]     Phone:  [phone]     cephALEXin 500 MG capsule                Primary Care Provider Office Phone # Fax #    Rehan Lorenzana [phone] [address] [facility] [address]        Equal Access to Services     [facility]: Hadii kirstie mezao Sooctavio, waaxda luqadaha, qaybta kaalmada adeegyada, waxluisa radha marshall ademeliton reina. So [facility] [phone].    ATENCIÓN: Si habla español, tiene a pfeiffer disposición servicios gratuitos de asistencia lingüística. Shun al [phone].    We comply with applicable federal civil rights laws and [place] laws. We do not discriminate on the basis of race, color, national origin, age, disability, sex, sexual orientation, or gender identity.            Thank you!     Thank you for choosing [facility]  for  your care. Our goal is always to provide you with excellent care. Hearing back from our patients is one way we can continue to improve our services. Please take a few minutes to complete the written survey that you may receive in the mail after your visit with us. Thank you!             Your Updated Medication List - Protect others around you: Learn how to safely use, store and throw away your medicines at www.disposemymeds.org.          This list is accurate as of: 12/26/17  4:12 PM.  Always use your most recent med list.                   Brand Name Dispense Instructions for use Diagnosis    albuterol 108 (90 BASE) MCG/ACT Inhaler    PROAIR HFA/PROVENTIL HFA/VENTOLIN HFA    1 Inhaler    Inhale 2 puffs into the lungs every 6 hours as needed for shortness of breath / dyspnea or wheezing    Acute bronchitis due to Mycoplasma pneumoniae       ALLERGY 24-HR PO      Take by mouth daily        aspirin 81 MG tablet      Take 81 mg by mouth every other day        CALCIUM 600+D 600-200 MG-UNIT Tabs   Generic drug:  calcium carbonate-vitamin D      Take 1 tablet by mouth 2 times daily        carvedilol 25 MG tablet    COREG    180 tablet    Take 1 tablet (25 mg) by mouth 2 times daily (with meals)    Cardiomyopathy, unspecified       CENTRUM Chew      Take 1 tablet by mouth At Bedtime        cephALEXin 500 MG capsule    KEFLEX    40 capsule    Take 1 capsule (500 mg) by mouth 4 times daily    Cellulitis of right upper extremity       digoxin 125 MCG tablet    LANOXIN    90 tablet    Take 1 tablet (125 mcg) by mouth daily    Atrial fibrillation (H)       Fiber 0.52 G Caps      Take 2 tablets by mouth At Bedtime        IRON SUPPLEMENT 325 (65 FE) MG tablet   Generic drug:  ferrous sulfate     100 tablet    Take 1 tablet (325 mg) by mouth 2 times daily        levothyroxine 175 MCG tablet    SYNTHROID    30 tablet    Take 1 tablet (175 mcg) by mouth daily    Acquired hypothyroidism       lisinopril 20 MG tablet     PRINIVIL/ZESTRIL    135 tablet    Take  20 mg (1 tablet in am) and 10 mg  (one half tablet) in  pm    ASVD (arteriosclerotic vascular disease)       sertraline 50 MG tablet    ZOLOFT    90 tablet    Take 1 tablet (50 mg) by mouth daily    Anxiety       simvastatin 20 MG tablet    ZOCOR    90 tablet    Take 1 tablet (20 mg) by mouth At Bedtime    Hyperlipidemia LDL goal <70       triamcinolone 0.1 % cream    KENALOG    30 g    Apply sparingly to affected area three times daily for 14 days.    Atopic dermatitis, unspecified type       Vitamin B-12 500 MCG Subl      Place 500 mcg under the tongue daily        VITAMIN C PO      Take 500 mg by mouth At Bedtime        vitamin D 2000 UNITS Caps      Take 1 tablet by mouth daily        warfarin 4 MG tablet    COUMADIN    130 tablet    Take 1 tablet (4mg) by mouth MWF & 1 1/2 tablets (6mg) all other days    Long term current use of anticoagulant therapy

## 2017-12-26 NOTE — PROGRESS NOTES
ANTICOAGULATION FOLLOW-UP CLINIC VISIT    Patient Name:  Jovon Mantilla  Date:  12/26/2017  Contact Type:  Face to Face    SUBJECTIVE: Patient states he has been taking some tylenol PRN for below problem.  Otherwise denies symptoms of bleeding or clotting.  Plan to see doctor today and if put on abx he might need to be checked sooner.  Today we will continue his usual warfarin dose and recheck with patient to contact INR if need to be seen earlier than usual.     Patient Findings     Positives Other complaints (to see doctor today after this visit for his arm pain thought to be bite from hotel bug), No Problem Findings           OBJECTIVE    INR Protime   Date Value Ref Range Status   12/26/2017 3.3 (A) 0.86 - 1.14 Final     Chromogenic Factor 10   Date Value Ref Range Status   01/21/2011 35 (L) 60 - 140 % Final     Comment:     Therapeutic Range: A Chromogenic Factor 10 level of 20-40% inversely   correlates   with an INR of 2-3 for patients receiving Warfarin. Chromogenic Factor 10   levels below 20% indicate an INR greater than 3, and levels above 40%   indicate   an INR lessthan 2.       ASSESSMENT / PLAN  INR assessment THER    Recheck INR In: 6 WEEKS    INR Location Clinic      Anticoagulation Summary as of 12/26/2017     INR goal 2.5-3.5   Today's INR 3.3   Maintenance plan 4 mg (4 mg x 1) on Mon, Wed, Fri; 6 mg (4 mg x 1.5) all other days   Full instructions 4 mg on Mon, Wed, Fri; 6 mg all other days   Weekly total 36 mg   No change documented Monica Leslie, RN   Plan last modified Monica Leslie RN (5/4/2017)   Next INR check 2/8/2018   Target end date Indefinite    Indications   Long-term (current) use of anticoagulants [Z79.01] [Z79.01]  S/P AVR (aortic valve replacement) [Z95.2]         Anticoagulation Episode Summary     INR check location     Preferred lab     Send INR reminders to [facility]    Comments       Anticoagulation Care Providers     Provider Role Specialty Phone number     Rehan Lorenzana MD [facility] [phone]            See the Encounter Report to view Anticoagulation Flowsheet and Dosing Calendar (Go to Encounters tab in chart review, and find the Anticoagulation Therapy Visit)    Dosage adjustment made based on physician directed care plan.    Monica Leslie RN

## 2017-12-26 NOTE — PROGRESS NOTES
SUBJECTIVE:   Jovon Mantilla is a 62 year old male who presents to clinic today for the following health issues:      Patient is here for bite on his right elbow. He noticed it Thursday morning when he woke up. Feeling pain up and down right arm especially the wrist.  Benadryl was out on it to keep it from itching.    The rash is limited to the right elbow   It is in a cluster with vesicles   He does not have any pain distal or proximal to this location    Past Medical History:   Diagnosis Date     Acute prostatitis      Acute sinusitis, unspecified      Aortic valve disease     AVR 2011, MAZE     Atrial fibrillation (H)      CAD (coronary artery disease)     known 70% LAD stenosis     Cardiomyopathy (H)      HTN (hypertension)      Mitral regurgitation      Obesity, unspecified      Palpitations      Permanent atrial fibrillation (H) 6/2/2017     Unspecified essential hypertension        Past Surgical History:   Procedure Laterality Date     ANGIOGRAM  2/02    Normal coronary,dilated LV,Sev.decr.global LVF,+1 MR     ANGIOGRAM  1/08    Mild AS,Mod PHTN,mod prox.LAD disease,Triv.CFX disease     ANGIOGRAM  1/09    Mod AS,Mod PHTN,mod prox.LAD disease     ANGIOGRAM  10/10    Sev.AS,CM,global hypokinesis,Mild-mod LV dilated,Mild MR,70% prox.LAD lesion,PHTN     C NONSPECIFIC PROCEDURE  ~1985    vein stripping     CARDIOVERSION  2/02, 4/02, 4/11     CHOLECYSTECTOMY       COLONOSCOPY N/A 1/23/2016    Procedure: COLONOSCOPY;  Surgeon: Robyn Collins MD;  Location:  GI     ESOPHAGOSCOPY, GASTROSCOPY, DUODENOSCOPY (EGD), COMBINED N/A 1/23/2016    Procedure: COMBINED ESOPHAGOSCOPY, GASTROSCOPY, DUODENOSCOPY (EGD);  Surgeon: Robyn Collins MD;  Location:  GI     LAPAROSCOPIC BYPASS GASTRIC  10/8/2011    Procedure:LAPAROSCOPIC BYPASS GASTRIC; Diagnostic laparoscopy, Lysis of Adhesions, Laparoscopic Revision of Jejunojejunostomy; Surgeon:RADHA MURO; Location: OR     LAPAROSCOPIC BYPASS  GASTRIC  6/26/2006    Dr Jin     LAPAROSCOPY DIAGNOSTIC (GENERAL)  10/8/2011    Procedure:LAPAROSCOPY DIAGNOSTIC (GENERAL); Surgeon:RADHA MURO; Location:[facility] OR     REPLACE VALVE AORTIC  1/7/2011       Family History   Problem Relation Age of Onset     Cancer - colorectal Father      Colon Cancer Father      DIABETES Brother        Social History   Substance Use Topics     Smoking status: Never Smoker     Smokeless tobacco: Never Used     Alcohol use No       Current Outpatient Prescriptions   Medication Sig Dispense Refill     cephALEXin (KEFLEX) 500 MG capsule Take 1 capsule (500 mg) by mouth 4 times daily 40 capsule 0     sertraline (ZOLOFT) 50 MG tablet Take 1 tablet (50 mg) by mouth daily 90 tablet 1     warfarin (COUMADIN) 4 MG tablet Take 1 tablet (4mg) by mouth MWF & 1 1/2 tablets (6mg) all other days 130 tablet 1     digoxin (LANOXIN) 125 MCG tablet Take 1 tablet (125 mcg) by mouth daily 90 tablet 3     simvastatin (ZOCOR) 20 MG tablet Take 1 tablet (20 mg) by mouth At Bedtime 90 tablet 3     triamcinolone (KENALOG) 0.1 % cream Apply sparingly to affected area three times daily for 14 days. 30 g 0     carvedilol (COREG) 25 MG tablet Take 1 tablet (25 mg) by mouth 2 times daily (with meals) 180 tablet 3     lisinopril (PRINIVIL/ZESTRIL) 20 MG tablet Take  20 mg (1 tablet in am) and 10 mg  (one half tablet) in  pm 135 tablet 3     aspirin 81 MG tablet Take 81 mg by mouth every other day       Fexofenadine HCl (ALLERGY 24-HR PO) Take by mouth daily       levothyroxine (SYNTHROID) 175 MCG tablet Take 1 tablet (175 mcg) by mouth daily 30 tablet 11     albuterol (PROAIR HFA/PROVENTIL HFA/VENTOLIN HFA) 108 (90 BASE) MCG/ACT Inhaler Inhale 2 puffs into the lungs every 6 hours as needed for shortness of breath / dyspnea or wheezing 1 Inhaler 0     ferrous sulfate (IRON SUPPLEMENT) 325 (65 FE) MG tablet Take 1 tablet (325 mg) by mouth 2 times daily 100 tablet      Multiple Vitamins-Minerals (CENTRUM) CHEW  Take 1 tablet by mouth At Bedtime       Ascorbic Acid (VITAMIN C PO) Take 500 mg by mouth At Bedtime       Cyanocobalamin (VITAMIN B-12) 500 MCG SUBL Place 500 mcg under the tongue daily        Cholecalciferol (VITAMIN D) 2000 UNIT CAPS Take 1 tablet by mouth daily        Calcium Carbonate-Vitamin D (CALCIUM 600+D) 600-200 MG-UNIT TABS Take 1 tablet by mouth 2 times daily        Psyllium (FIBER) 0.52 GM CAPS Take 2 tablets by mouth At Bedtime          Ros: no chest pain, chest tightness   No sob   No leg edema   No abdominal pain     Denies fever or chills   Weight stable     ROS: 10 point ROS neg other than the symptoms noted above in the HPI.      O: /89 (BP Location: Right arm, Patient Position: Chair, Cuff Size: Adult Regular)  Pulse 111  Temp 97.4  F (36.3  C) (Oral)  Wt 208 lb (94.3 kg)  SpO2 97%  BMI 30.72 kg/m2    2 cm cluster of papules   No drainage   Reddened   I examined him with his shirt off and there are no other areas of concern along the dermatoma       ICD-10-CM    1. Cellulitis of right upper extremity L03.113 cephALEXin (KEFLEX) 500 MG capsule     If additional spots pop up he should contact us   Warned about the possibility this could be shingles     Told to talk to INR nurse about antibiotic   This is not listed as reacting with warfarin

## 2017-12-27 NOTE — TELEPHONE ENCOUNTER
Requested Prescriptions   Pending Prescriptions Disp Refills     warfarin (COUMADIN) 4 MG tablet [Pharmacy Med Name: WARFARIN TABS 4MG] 130 tablet 1    Last Written Prescription Date:  6/27/17  Last Fill Quantity: 130,  # refills: 1   Last Office Visit with [facility], [facility]I or [facility] prescribing provider:  6/2/2017   Future Office Visit:    Sig: TAKE 1 TABLET MONDAY, WEDNESDAY, AND FRIDAY AND ONE AND ONE-HALF TABLETS (6 MG) ALL OTHER DAYS    Vitamin K Antagonists Failed    12/24/2017 11:27 PM       Failed - INR is within goal in the past 6 weeks    Confirm INR is within goal in the past 6 weeks.     Recent Labs   Lab Test 12/26/17   INR  3.3*     Date and Result of Last PT/INR:   Lab Results   Component Value Date    INR 3.3 12/26/2017    INR 4.5 11/13/2017    INR 2.58 01/28/2016    INR 1.88 01/27/2016           Passed - Recent or future visit with authorizing provider's specialty    Patient had office visit in the last year or has a visit in the next 30 days with authorizing provider.  See chart review.          Passed - Patient is 18 years of age or older

## 2017-12-28 RX ORDER — WARFARIN SODIUM 4 MG/1
TABLET ORAL
Qty: 130 TABLET | Refills: 1 | Status: SHIPPED | OUTPATIENT
Start: 2017-12-28 | End: 2018-06-26

## 2017-12-28 NOTE — TELEPHONE ENCOUNTER
Maintenance plan 4 mg (4 mg x 1) on Mon, Wed, Fri; 6 mg (4 mg x 1.5) all other days     warfarin (COUMADIN) 4 MG tablet

## 2018-01-02 ENCOUNTER — ANTICOAGULATION THERAPY VISIT (OUTPATIENT)
Dept: NURSING | Facility: CLINIC | Age: 63
End: 2018-01-02
Payer: COMMERCIAL

## 2018-01-02 DIAGNOSIS — Z95.2 S/P AVR (AORTIC VALVE REPLACEMENT): ICD-10-CM

## 2018-01-02 DIAGNOSIS — Z79.01 LONG-TERM (CURRENT) USE OF ANTICOAGULANTS: ICD-10-CM

## 2018-01-02 LAB — INR POINT OF CARE: 1.6 (ref 0.86–1.14)

## 2018-01-02 PROCEDURE — 85610 PROTHROMBIN TIME: CPT | Mod: QW

## 2018-01-02 PROCEDURE — 99207 ZZC NO CHARGE NURSE ONLY: CPT

## 2018-01-02 PROCEDURE — 36416 COLLJ CAPILLARY BLOOD SPEC: CPT

## 2018-01-02 NOTE — MR AVS SNAPSHOT
Jovon Mantilla   1/2/2018 2:20 PM   Anticoagulation Therapy Visit    Description:  62 year old male   Provider:  CP ANTI COAG   Department:  Cp Nurse           INR as of 1/2/2018     Today's INR 1.6!      Anticoagulation Summary as of 1/2/2018     INR goal 2.5-3.5   Today's INR 1.6!   Full instructions 1/2: 8 mg; 1/3: 8 mg; Otherwise 4 mg on Mon, Wed, Fri; 6 mg all other days   Next INR check 1/8/2018    Indications   Long-term (current) use of anticoagulants [Z79.01] [Z79.01]  S/P AVR (aortic valve replacement) [Z95.2]         Your next Anticoagulation Clinic appointment(s)     Jan 08, 2018  4:20 PM CST   Anticoagulation Visit with CP ANTI COAG   [facility]    [address] [facility] [id number] [phone]            Feb 08, 2018  5:00 PM CST   Anticoagulation Visit with CP ANTI COAG   [facility]    [address] [facility] [id number] [phone]              Contact Numbers     [facility]  Please call [phone] or [phone]  to cancel and/or reschedule your appointment.   Please call [phone] with any problems or questions regarding your therapy          January 2018 Details    Sun Mon Tue Wed Thu Fri Sat      1               2      8 mg   See details      3      8 mg         4      6 mg         5      4 mg         6      6 mg           7      6 mg         8            9               10               11               12               13                 14               15               16               17               18               19               20                 21               22               23               24               25               26               27                 28               29               30               31                   Date Details   01/02 This INR check       Date of  next INR:  1/8/2018         How to take your warfarin dose     To take:  4 mg Take 1 of the 4 mg tablets.    To take:  6 mg Take 1.5 of the 4 mg tablets.    To take:  8 mg Take 2 of the 4 mg tablets.

## 2018-01-02 NOTE — PROGRESS NOTES
ANTICOAGULATION FOLLOW-UP CLINIC VISIT    Patient Name:  Jovon Mantilla  Date:  1/2/2018  Contact Type:  Face to Face    SUBJECTIVE: Patient called INR RN this a.m. To be seen for INR today.  He reports below regarding missed warfarin doses and discontinuation of keflex due to side effects of passing out while watching Football game which he attributed to keflex.  Patient will see his usual primary doctor tomorrow.  Plan today to do extra warfarin Tues and Wed and then resume his usual warfarin dose with a recheck Monday of next week before he goes out of town. Patient reports his arm cellulitis looks better.  Denies fever or chills or other concerning symptoms such as clotting / stroke symptoms.  No bleeding symptoms.     Patient Findings     Positives Antibiotic use or infection (keflex for arm cellulitis), Intentional hold of therapy (Pt said Dr HESS told him to do 1/2 warfarin dose while on abx, but to check with INR.  However, INR was gone for the day Friday; therefore patient took 1/2 dose Fri and Sat and then skipped Sun Mon because he was worried INR too high with abx.), Missed doses (Missed Sunday and Monday Warfarin doses (12-31 and 1-1))           OBJECTIVE    INR Protime   Date Value Ref Range Status   01/02/2018 1.6 (A) 0.86 - 1.14 Final     Chromogenic Factor 10   Date Value Ref Range Status   01/21/2011 35 (L) 60 - 140 % Final     Comment:     Therapeutic Range: A Chromogenic Factor 10 level of 20-40% inversely   correlates   with an INR of 2-3 for patients receiving Warfarin. Chromogenic Factor 10   levels below 20% indicate an INR greater than 3, and levels above 40%   indicate   an INR lessthan 2.       ASSESSMENT / PLAN  INR assessment SUB    Recheck INR In: 6 DAYS    INR Location Clinic      Anticoagulation Summary as of 1/2/2018     INR goal 2.5-3.5   Today's INR 1.6!   Maintenance plan 4 mg (4 mg x 1) on Mon, Wed, Fri; 6 mg (4 mg x 1.5) all other days   Full instructions 1/2: 8 mg; 1/3: 8 mg;  Otherwise 4 mg on Mon, Wed, Fri; 6 mg all other days   Weekly total 36 mg   Plan last modified Monica Leslie RN (5/4/2017)   Next INR check 1/8/2018   Target end date Indefinite    Indications   Long-term (current) use of anticoagulants [Z79.01] [Z79.01]  S/P AVR (aortic valve replacement) [Z95.2]         Anticoagulation Episode Summary     INR check location     Preferred lab     Send INR reminders to  ANTICOAG CLINIC    Comments       Anticoagulation Care Providers     Provider Role Specialty Phone number    Rehan Lorenzana MD [facility] [phone]            See the Encounter Report to view Anticoagulation Flowsheet and Dosing Calendar (Go to Encounters tab in chart review, and find the Anticoagulation Therapy Visit)    Dosage adjustment made based on physician directed care plan.    Monica Leslie RN

## 2018-01-03 ENCOUNTER — OFFICE VISIT (OUTPATIENT)
Dept: FAMILY MEDICINE | Facility: CLINIC | Age: 63
End: 2018-01-03
Payer: COMMERCIAL

## 2018-01-03 VITALS
HEIGHT: 71 IN | OXYGEN SATURATION: 96 % | BODY MASS INDEX: 28.94 KG/M2 | RESPIRATION RATE: 12 BRPM | HEART RATE: 72 BPM | DIASTOLIC BLOOD PRESSURE: 71 MMHG | SYSTOLIC BLOOD PRESSURE: 110 MMHG | TEMPERATURE: 97.8 F | WEIGHT: 206.7 LBS

## 2018-01-03 DIAGNOSIS — H90.5 SENSORINEURAL HEARING LOSS, UNILATERAL: ICD-10-CM

## 2018-01-03 DIAGNOSIS — I25.10 CORONARY ARTERY DISEASE INVOLVING NATIVE CORONARY ARTERY OF NATIVE HEART WITHOUT ANGINA PECTORIS: ICD-10-CM

## 2018-01-03 DIAGNOSIS — I10 HYPERTENSION GOAL BP (BLOOD PRESSURE) < 140/90: ICD-10-CM

## 2018-01-03 DIAGNOSIS — H93.11 TINNITUS, RIGHT: Primary | ICD-10-CM

## 2018-01-03 DIAGNOSIS — E03.9 ACQUIRED HYPOTHYROIDISM: ICD-10-CM

## 2018-01-03 LAB
ERYTHROCYTE [DISTWIDTH] IN BLOOD BY AUTOMATED COUNT: 15.4 % (ref 10–15)
HCT VFR BLD AUTO: 43.7 % (ref 40–53)
HGB BLD-MCNC: 14 G/DL (ref 13.3–17.7)
MCH RBC QN AUTO: 30.6 PG (ref 26.5–33)
MCHC RBC AUTO-ENTMCNC: 32 G/DL (ref 31.5–36.5)
MCV RBC AUTO: 95 FL (ref 78–100)
PLATELET # BLD AUTO: 201 10E9/L (ref 150–450)
RBC # BLD AUTO: 4.58 10E12/L (ref 4.4–5.9)
WBC # BLD AUTO: 6 10E9/L (ref 4–11)

## 2018-01-03 PROCEDURE — 80048 BASIC METABOLIC PNL TOTAL CA: CPT | Performed by: FAMILY MEDICINE

## 2018-01-03 PROCEDURE — 99213 OFFICE O/P EST LOW 20 MIN: CPT | Performed by: FAMILY MEDICINE

## 2018-01-03 PROCEDURE — 36415 COLL VENOUS BLD VENIPUNCTURE: CPT | Performed by: FAMILY MEDICINE

## 2018-01-03 PROCEDURE — 84439 ASSAY OF FREE THYROXINE: CPT | Performed by: FAMILY MEDICINE

## 2018-01-03 PROCEDURE — 85027 COMPLETE CBC AUTOMATED: CPT | Performed by: FAMILY MEDICINE

## 2018-01-03 PROCEDURE — 84443 ASSAY THYROID STIM HORMONE: CPT | Performed by: FAMILY MEDICINE

## 2018-01-03 NOTE — MR AVS SNAPSHOT
After Visit Summary   1/3/2018    Jovon Mantilla    MRN: [id number]           Patient Information     Date Of Birth          1955        Visit Information        Provider Department      1/3/2018 2:30 PM Rehan Lorenzana MD [facility]        Today's Diagnoses     Tinnitus, right    -  1    Sensorineural hearing loss, unilateral        Hypertension goal BP (blood pressure) < 140/90        Coronary artery disease involving native coronary artery of native heart without angina pectoris        Acquired hypothyroidism           Follow-ups after your visit        Additional Services     OTOLARYNGOLOGY REFERRAL       Your provider has referred you to: [facility]: [facility] [phone]   [email or web address]    Please be aware that coverage of these services is subject to the terms and limitations of your health insurance plan.  Call member services at your health plan with any benefit or coverage questions.      Please bring the following with you to your appointment:    (1) Any X-Rays, CTs or MRIs which have been performed.  Contact the facility where they were done to arrange for  prior to your scheduled appointment.   (2) List of current medications  (3) This referral request   (4) Any documents/labs given to you for this referral                  Your next 10 appointments already scheduled     Jan 08, 2018  4:20 PM CST   [facility]    [address] [address]   [phone]            Feb 08, 2018  5:00 PM CST   [facility]    [address] [address]   [phone]              Who to contact     If you have questions or need follow up  "information about today's clinic visit or your schedule please contact [facility] directly at [phone].  Normal or non-critical lab and imaging results will be communicated to you by [employer], letter or phone within 4 business days after the clinic has received the results. If you do not hear from us within 7 days, please contact the clinic through Assembly[employer] or phone. If you have a critical or abnormal lab result, we will notify you by phone as soon as possible.  Submit refill requests through iTOK or call your pharmacy and they will forward the refill request to us. Please allow 3 business days for your refill to be completed.          Additional Information About Your Visit        [employer] Information     iTOK gives you secure access to your electronic health record. If you see a primary care provider, you can also send messages to your care team and make appointments. If you have questions, please call your primary care clinic.  If you do not have a primary care provider, please call [phone] and they will assist you.        Care EveryWhere ID     This is your Care EveryWhere ID. This could be used by other organizations to access your [place] medical records  [id number]        Your Vitals Were     Pulse Temperature Respirations Height Pulse Oximetry BMI (Body Mass Index)    72 97.8  F (36.6  C) (Oral) 12 5' 10.75\" (1.797 m) 96% 29.03 kg/m2       Blood Pressure from Last 3 Encounters:   01/03/18 110/71   12/26/17 132/89   06/02/17 123/80    Weight from Last 3 Encounters:   01/03/18 206 lb 11.2 oz (93.8 kg)   12/26/17 208 lb (94.3 kg)   06/02/17 217 lb 11.2 oz (98.7 kg)              We Performed the Following     Basic metabolic panel  (Ca, Cl, CO2, Creat, Gluc, K, Na, BUN)     OTOLARYNGOLOGY REFERRAL     T4, free     TSH          Today's Medication Changes          These changes are accurate as of: 1/3/18  3:25 PM.  If you have any questions, ask your nurse or doctor.             "   Stop taking these medicines if you haven't already. Please contact your care team if you have questions.     cephALEXin 500 MG capsule   Commonly known as:  KEFLEX   Stopped by:  Rehan Lorenzana MD                    Primary Care Provider Office Phone # Fax #    Rehan Lorenzana [phone] [address]  [facility] [address]        Equal Access to Services     [facility]: Hadii aad ku hadasho Soomaali, waaxda luqadaha, qaybta kaalmada adeegyada, waxay idiin hayaan adeeg kharash la'aan . So [facility] [phone].    ATENCIÓN: Si habla español, tiene a pfeiffer disposición servicios gratuitos de asistencia lingüística. Shun al [phone].    We comply with applicable federal civil rights laws and [place] laws. We do not discriminate on the basis of race, color, national origin, age, disability, sex, sexual orientation, or gender identity.            Thank you!     Thank you for choosing [facility]  for your care. Our goal is always to provide you with excellent care. Hearing back from our patients is one way we can continue to improve our services. Please take a few minutes to complete the written survey that you may receive in the mail after your visit with us. Thank you!             Your Updated Medication List - Protect others around you: Learn how to safely use, store and throw away your medicines at www.disposemymeds.org.          This list is accurate as of: 1/3/18  3:25 PM.  Always use your most recent med list.                   Brand Name Dispense Instructions for use Diagnosis    albuterol 108 (90 BASE) MCG/ACT Inhaler    PROAIR HFA/PROVENTIL HFA/VENTOLIN HFA    1 Inhaler    Inhale 2 puffs into the lungs every 6 hours as needed for shortness of breath / dyspnea or wheezing    Acute bronchitis due to Mycoplasma pneumoniae       ALLERGY 24-HR PO      Take by mouth daily        aspirin 81 MG tablet      Take 81 mg by mouth every other day         CALCIUM 600+D 600-200 MG-UNIT Tabs   Generic drug:  calcium carbonate-vitamin D      Take 1 tablet by mouth 2 times daily        carvedilol 25 MG tablet    COREG    180 tablet    Take 1 tablet (25 mg) by mouth 2 times daily (with meals)    Cardiomyopathy, unspecified       CENTRUM Chew      Take 1 tablet by mouth At Bedtime        digoxin 125 MCG tablet    LANOXIN    90 tablet    Take 1 tablet (125 mcg) by mouth daily    Atrial fibrillation (H)       Fiber 0.52 G Caps      Take 2 tablets by mouth At Bedtime        IRON SUPPLEMENT 325 (65 FE) MG tablet   Generic drug:  ferrous sulfate     100 tablet    Take 1 tablet (325 mg) by mouth 2 times daily        levothyroxine 175 MCG tablet    SYNTHROID    30 tablet    Take 1 tablet (175 mcg) by mouth daily    Acquired hypothyroidism       lisinopril 20 MG tablet    PRINIVIL/ZESTRIL    135 tablet    Take  20 mg (1 tablet in am) and 10 mg  (one half tablet) in  pm    ASVD (arteriosclerotic vascular disease)       sertraline 50 MG tablet    ZOLOFT    90 tablet    Take 1 tablet (50 mg) by mouth daily    Anxiety       simvastatin 20 MG tablet    ZOCOR    90 tablet    Take 1 tablet (20 mg) by mouth At Bedtime    Hyperlipidemia LDL goal <70       triamcinolone 0.1 % cream    KENALOG    30 g    Apply sparingly to affected area three times daily for 14 days.    Atopic dermatitis, unspecified type       Vitamin B-12 500 MCG Subl      Place 500 mcg under the tongue daily        VITAMIN C PO      Take 500 mg by mouth At Bedtime        vitamin D 2000 UNITS Caps      Take 1 tablet by mouth daily        warfarin 4 MG tablet    COUMADIN    130 tablet    TAKE 1 TABLET MONDAY, WEDNESDAY, AND FRIDAY AND ONE AND ONE-HALF TABLETS (6 MG) ALL OTHER DAYS    Long term current use of anticoagulant therapy

## 2018-01-03 NOTE — PROGRESS NOTES
SUBJECTIVE:   Jovon Mantilla is a 62 year old male who presents to clinic today for the following health issues:      Ear problem      Duration: 3 weeks    Description (location/character/radiation): buzzing and humming sound in background in the right ear.  No pain    Intensity:  none    Accompanying signs and symptoms: none    History (similar episodes/previous evaluation): None    Precipitating or alleviating factors: None    Therapies tried and outcome: None     Past Medical History:   Diagnosis Date     Acute prostatitis      Acute sinusitis, unspecified      Aortic valve disease     AVR 2011, MAZE     Atrial fibrillation (H)      CAD (coronary artery disease)     known 70% LAD stenosis     Cardiomyopathy (H)      HTN (hypertension)      Mitral regurgitation      Obesity, unspecified      Palpitations      Permanent atrial fibrillation (H) 6/2/2017     Unspecified essential hypertension        Past Surgical History:   Procedure Laterality Date     ANGIOGRAM  2/02    Normal coronary,dilated LV,Sev.decr.global LVF,+1 MR     ANGIOGRAM  1/08    Mild AS,Mod PHTN,mod prox.LAD disease,Triv.CFX disease     ANGIOGRAM  1/09    Mod AS,Mod PHTN,mod prox.LAD disease     ANGIOGRAM  10/10    Sev.AS,CM,global hypokinesis,Mild-mod LV dilated,Mild MR,70% prox.LAD lesion,PHTN     C NONSPECIFIC PROCEDURE  ~1985    vein stripping     CARDIOVERSION  2/02, 4/02, 4/11     CHOLECYSTECTOMY       COLONOSCOPY N/A 1/23/2016    Procedure: COLONOSCOPY;  Surgeon: Robyn Collins MD;  Location:  GI     ESOPHAGOSCOPY, GASTROSCOPY, DUODENOSCOPY (EGD), COMBINED N/A 1/23/2016    Procedure: COMBINED ESOPHAGOSCOPY, GASTROSCOPY, DUODENOSCOPY (EGD);  Surgeon: Robyn Collins MD;  Location:  GI     LAPAROSCOPIC BYPASS GASTRIC  10/8/2011    Procedure:LAPAROSCOPIC BYPASS GASTRIC; Diagnostic laparoscopy, Lysis of Adhesions, Laparoscopic Revision of Jejunojejunostomy; Surgeon:RADHA MURO; Location: OR     LAPAROSCOPIC  BYPASS GASTRIC  6/26/2006    Dr Jin     LAPAROSCOPY DIAGNOSTIC (GENERAL)  10/8/2011    Procedure:LAPAROSCOPY DIAGNOSTIC (GENERAL); Surgeon:RADHA MURO; Location:[facility] OR     REPLACE VALVE AORTIC  1/7/2011       Family History   Problem Relation Age of Onset     Cancer - colorectal Father      Colon Cancer Father      DIABETES Brother        Social History   Substance Use Topics     Smoking status: Never Smoker     Smokeless tobacco: Never Used     Alcohol use No     Follow up his INR treatment, med and Cardiac Review Atrial Fib    Patient also complains of a possible bug bite on his right elbow area which occurred about a week ago.  He states that his entire right arm hurt at one point.  [facility] and was put on Cephalexin.  Looks like a zoster rash past time for therapy, he's convinced it's a a critter: follow healing     (H93.11) Tinnitus, right  (primary encounter diagnosis)  Comment:   Plan: OTOLARYNGOLOGY REFERRAL        Reduced hearing     (H90.5) Sensorineural hearing loss, unilateral  Comment:   Plan: OTOLARYNGOLOGY REFERRAL            (I10) Hypertension goal BP (blood pressure) < 140/90  Comment:   Plan: at goal    (I25.10) Coronary artery disease involving native coronary artery of native heart without angina pectoris  Comment:   Plan: Basic metabolic panel  (Ca, Cl, CO2, Creat,         Gluc, K, Na, BUN), CBC with platelets        No chest pain, soboe with his atrial fib    (E03.9) Acquired hypothyroidism  Comment:   Plan: TSH, T4, free

## 2018-01-03 NOTE — LETTER
January 8, 2018      Jovon Mantilla  [address]  [facility] [address]        Dear ,    We are writing to inform you of your test results.    Your thyroid measures sluggish. If you didn't miss some pills then your dose is too low. Let me know what you think.      Resulted Orders   TSH   Result Value Ref Range    TSH 22.56 (H) 0.40 - 4.00 mU/L   T4, free   Result Value Ref Range    T4 Free 0.85 0.76 - 1.46 ng/dL   Basic metabolic panel  (Ca, Cl, CO2, Creat, Gluc, K, Na, BUN)   Result Value Ref Range    Sodium 140 133 - 144 mmol/L    Potassium 4.9 3.4 - 5.3 mmol/L    Chloride 103 94 - 109 mmol/L    Carbon Dioxide 33 (H) 20 - 32 mmol/L    Anion Gap 4 3 - 14 mmol/L    Glucose 63 (L) 70 - 99 mg/dL      Comment:      Non Fasting    Urea Nitrogen 16 7 - 30 mg/dL    Creatinine 0.84 0.66 - 1.25 mg/dL    GFR Estimate >90 >60 mL/min/1.7m2      Comment:      Non  GFR Calc    GFR Estimate If Black >90 >60 mL/min/1.7m2      Comment:       GFR Calc    Calcium 9.3 8.5 - 10.1 mg/dL   CBC with platelets   Result Value Ref Range    WBC 6.0 4.0 - 11.0 10e9/L    RBC Count 4.58 4.4 - 5.9 10e12/L    Hemoglobin 14.0 13.3 - 17.7 g/dL    Hematocrit 43.7 40.0 - 53.0 %    MCV 95 78 - 100 fl    MCH 30.6 26.5 - 33.0 pg    MCHC 32.0 31.5 - 36.5 g/dL    RDW 15.4 (H) 10.0 - 15.0 %    Platelet Count 201 150 - 450 10e9/L       If you have any questions or concerns, please call the clinic at the number listed above.       Sincerely,        Rehan Lorenzana MD

## 2018-01-04 LAB
ANION GAP SERPL CALCULATED.3IONS-SCNC: 4 MMOL/L (ref 3–14)
BUN SERPL-MCNC: 16 MG/DL (ref 7–30)
CALCIUM SERPL-MCNC: 9.3 MG/DL (ref 8.5–10.1)
CHLORIDE SERPL-SCNC: 103 MMOL/L (ref 94–109)
CO2 SERPL-SCNC: 33 MMOL/L (ref 20–32)
CREAT SERPL-MCNC: 0.84 MG/DL (ref 0.66–1.25)
GFR SERPL CREATININE-BSD FRML MDRD: >90 ML/MIN/1.7M2
GLUCOSE SERPL-MCNC: 63 MG/DL (ref 70–99)
POTASSIUM SERPL-SCNC: 4.9 MMOL/L (ref 3.4–5.3)
SODIUM SERPL-SCNC: 140 MMOL/L (ref 133–144)
T4 FREE SERPL-MCNC: 0.85 NG/DL (ref 0.76–1.46)
TSH SERPL DL<=0.005 MIU/L-ACNC: 22.56 MU/L (ref 0.4–4)

## 2018-01-08 ENCOUNTER — MYC MEDICAL ADVICE (OUTPATIENT)
Dept: FAMILY MEDICINE | Facility: CLINIC | Age: 63
End: 2018-01-08

## 2018-01-08 ENCOUNTER — ANTICOAGULATION THERAPY VISIT (OUTPATIENT)
Dept: NURSING | Facility: CLINIC | Age: 63
End: 2018-01-08
Payer: COMMERCIAL

## 2018-01-08 DIAGNOSIS — Z79.01 LONG-TERM (CURRENT) USE OF ANTICOAGULANTS: ICD-10-CM

## 2018-01-08 DIAGNOSIS — Z95.2 S/P AVR (AORTIC VALVE REPLACEMENT): ICD-10-CM

## 2018-01-08 DIAGNOSIS — F41.9 ANXIETY: ICD-10-CM

## 2018-01-08 DIAGNOSIS — E03.9 ACQUIRED HYPOTHYROIDISM: Primary | ICD-10-CM

## 2018-01-08 LAB — INR POINT OF CARE: 3 (ref 0.86–1.14)

## 2018-01-08 PROCEDURE — 99207 ZZC NO CHARGE NURSE ONLY: CPT

## 2018-01-08 PROCEDURE — 36416 COLLJ CAPILLARY BLOOD SPEC: CPT

## 2018-01-08 PROCEDURE — 85610 PROTHROMBIN TIME: CPT | Mod: QW

## 2018-01-08 NOTE — TELEPHONE ENCOUNTER
Routed to ZION HOLLOWAY MD, see [place] response, has not missed any thyroid pills, INFORM pt of plan via [employer] and if /u TSH lab needed    PT USES MAIL ORDER, ? SEND RX LOCAL, INFORM PT OF PLAN    Your thyroid measures sluggish. If you didn't miss some pills then your dose is too low. Let me know what you think.    TSH   Date Value Ref Range Status   01/03/2018 22.56 (H) 0.40 - 4.00 mU/L Final   ]  Tennille Mares RN, BSN  Message handled by Nurse Triage.

## 2018-01-08 NOTE — PROGRESS NOTES
ANTICOAGULATION FOLLOW-UP CLINIC VISIT    Patient Name:  Jovon Mantilla  Date:  1/8/2018  Contact Type:  Face to Face    SUBJECTIVE: Patient reports medicine and diet are the same.  His TSH is out of range and he is waiting to hear from primary doctor if his thyroid medicine dose is going to be changed.  He has not experienced bleeding or clotting symptoms.  He reports his arm cellulitis is improved.  He has no other concerns today.  Plan same dose and recheck.     Patient Findings     Positives No Problem Findings           OBJECTIVE    INR Protime   Date Value Ref Range Status   01/08/2018 3.0 (A) 0.86 - 1.14 Final     Chromogenic Factor 10   Date Value Ref Range Status   01/21/2011 35 (L) 60 - 140 % Final     Comment:     Therapeutic Range: A Chromogenic Factor 10 level of 20-40% inversely   correlates   with an INR of 2-3 for patients receiving Warfarin. Chromogenic Factor 10   levels below 20% indicate an INR greater than 3, and levels above 40%   indicate   an INR lessthan 2.       ASSESSMENT / PLAN  INR assessment THER    Recheck INR In: 6 WEEKS    INR Location Clinic      Anticoagulation Summary as of 1/8/2018     INR goal 2.5-3.5   Today's INR 3.0   Maintenance plan 4 mg (4 mg x 1) on Mon, Wed, Fri; 6 mg (4 mg x 1.5) all other days   Full instructions 4 mg on Mon, Wed, Fri; 6 mg all other days   Weekly total 36 mg   No change documented Monica Leslie, RN   Plan last modified Monica Leslie, RN (5/4/2017)   Next INR check 2/19/2018   Target end date Indefinite    Indications   Long-term (current) use of anticoagulants [Z79.01] [Z79.01]  S/P AVR (aortic valve replacement) [Z95.2]         Anticoagulation Episode Summary     INR check location     Preferred lab     Send INR reminders to [facility]    Comments       Anticoagulation Care Providers     Provider Role Specialty Phone number    Rehan Lorenzana MD [facility] [phone]            See the Encounter Report to view  Anticoagulation Flowsheet and Dosing Calendar (Go to Encounters tab in chart review, and find the Anticoagulation Therapy Visit)    Dosage adjustment made based on physician directed care plan.    Monica Leslie RN

## 2018-01-08 NOTE — MR AVS SNAPSHOT
Jovon Mantilla   1/8/2018 4:20 PM   Anticoagulation Therapy Visit    Description:  62 year old male   Provider:  JIMMY ANTI COAG   Department:  [facility] Nurse           INR as of 1/8/2018     Today's INR 3.0      Anticoagulation Summary as of 1/8/2018     INR goal 2.5-3.5   Today's INR 3.0   Full instructions 4 mg on Mon, Wed, Fri; 6 mg all other days   Next INR check 2/19/2018    Indications   Long-term (current) use of anticoagulants [Z79.01] [Z79.01]  S/P AVR (aortic valve replacement) [Z95.2]         Your next Anticoagulation Clinic appointment(s)     Feb 19, 2018  4:20 PM CST   [facility]    [address] [place] [address] [phone]              Contact Numbers     [facility]  Please call [phone] or [phone]  to cancel and/or reschedule your appointment.   Please call [phone] with any problems or questions regarding your therapy          January 2018 Details    Sun Mon Tue Wed Thu Fri Sat      1               2               3               4               5               6                 7               8      4 mg   See details      9      6 mg         10      4 mg         11      6 mg         12      4 mg         13      6 mg           14      6 mg         15      4 mg         16      6 mg         17      4 mg         18      6 mg         19      4 mg         20      6 mg           21      6 mg         22      4 mg         23      6 mg         24      4 mg         25      6 mg         26      4 mg         27      6 mg           28      6 mg         29      4 mg         30      6 mg         31      4 mg             Date Details   01/08 This INR check               How to take your warfarin dose     To take:  4 mg Take 1 of the 4 mg tablets.    To take:  6 mg Take 1.5 of the 4 mg tablets.           February 2018 Details    Sun Mon Tue Wed Thu Fri Sat          1      6 mg         2      4 mg         3      6 mg           4      6 mg         5      4 mg         6      6 mg         7      4 mg         8      6 mg         9      4 mg         10      6 mg           11      6 mg         12      4 mg         13      6 mg         14      4 mg         15      6 mg         16      4 mg         17      6 mg           18      6 mg         19            20               21               22               23               24                 25               26               27               28                   Date Details   No additional details    Date of next INR:  2/19/2018         How to take your warfarin dose     To take:  4 mg Take 1 of the 4 mg tablets.    To take:  6 mg Take 1.5 of the 4 mg tablets.

## 2018-01-10 DIAGNOSIS — F41.9 ANXIETY: ICD-10-CM

## 2018-01-10 DIAGNOSIS — E03.9 ACQUIRED HYPOTHYROIDISM: ICD-10-CM

## 2018-01-10 RX ORDER — LEVOTHYROXINE SODIUM 175 UG/1
TABLET ORAL
Qty: 30 TABLET | Refills: 11 | OUTPATIENT
Start: 2018-01-10

## 2018-01-10 NOTE — TELEPHONE ENCOUNTER
"Requested Prescriptions   Pending Prescriptions Disp Refills     levothyroxine (SYNTHROID/LEVOTHROID) 175 MCG tablet [Pharmacy Med Name: L-THYROXINE (SYNTHROID) TABS 175MCG] 30 tablet 11    Last Written Prescription Date:  1/21/17  Last Fill Quantity: 30,  # refills: 11   Last Office Visit with [facility] or [facility] prescribing provider:  1/3/2018   Future Office Visit:    Next 5 appointments (look out 90 days)     Jan 11, 2018  7:45 AM CST   [facility] Visit with Hector Lau   [facility]    [address] [place] [address]   [phone]            Feb 13, 2018  4:15 PM CST   Return Visit with Lloyd Lewis MD   [facility] ([facility]    [address] [place] [address]   [phone]               Sig: TAKE 1 TABLET DAILY    Thyroid Protocol Failed    1/10/2018 12:11 AM       Failed - Normal TSH on file in past 12 months    Recent Labs   Lab Test  01/03/18   1528   TSH  22.56*          Passed - Patient is 12 years or older       Passed - Recent or future visit with authorizing provider's specialty    Patient had office visit in the last year or has a visit in the next 30 days with authorizing provider.  See \"Patient Info\" tab in inbasket, or \"Choose Columns\" in Meds & Orders section of the refill encounter.    ________________________________________________________________________________       sertraline (ZOLOFT) 50 MG tablet [Pharmacy Med Name: SERTRALINE HCL TABS 50MG] 90 tablet 1    Last Written Prescription Date:  7/11/17  Last Fill Quantity: 90,  # refills: 1   Last Office Visit with [facility] or  Health prescribing provider:  1/3/2018   Future Office Visit:    Next 5 appointments (look out 90 days)     Jan 11, 2018  7:45 AM CST   Return Visit with Hector Lau   [facility]    [address] [place] [address]  Mary TAMAYO " "[address]   [phone]            Feb 13, 2018  4:15 PM CST   Return Visit with Lloyd Lewis MD   [facility] ([facility])    [address] [place] [address]   [phone]                Sig: TAKE 1 TABLET DAILY    SSRIs Protocol Passed    PHQ-9 SCORE 5/9/2016   Total Score 4     DARNELL-7 SCORE 5/9/2016   Total Score 8          Passed - Recent or future visit with authorizing provider    Patient had office visit in the last year or has a visit in the next 30 days with authorizing provider.  See \"Patient Info\" tab in inbasket, or \"Choose Columns\" in Meds & Orders section of the refill encounter.          Passed - Patient is age 18 or older          "

## 2018-01-11 ENCOUNTER — OFFICE VISIT (OUTPATIENT)
Dept: OTOLARYNGOLOGY | Facility: CLINIC | Age: 63
End: 2018-01-11
Payer: COMMERCIAL

## 2018-01-11 ENCOUNTER — OFFICE VISIT (OUTPATIENT)
Dept: AUDIOLOGY | Facility: CLINIC | Age: 63
End: 2018-01-11
Payer: COMMERCIAL

## 2018-01-11 VITALS — BODY MASS INDEX: 29.31 KG/M2 | WEIGHT: 209.4 LBS | HEIGHT: 71 IN | TEMPERATURE: 97 F

## 2018-01-11 DIAGNOSIS — H04.203 BILATERAL EPIPHORA: ICD-10-CM

## 2018-01-11 DIAGNOSIS — H93.13 TINNITUS, BILATERAL: Primary | ICD-10-CM

## 2018-01-11 DIAGNOSIS — H93.11 RIGHT-SIDED TINNITUS: ICD-10-CM

## 2018-01-11 DIAGNOSIS — H90.3 SENSORINEURAL HEARING LOSS, BILATERAL: Primary | ICD-10-CM

## 2018-01-11 PROCEDURE — 99207 ZZC NO CHARGE LOS: CPT | Performed by: AUDIOLOGIST

## 2018-01-11 PROCEDURE — 92567 TYMPANOMETRY: CPT | Performed by: AUDIOLOGIST

## 2018-01-11 PROCEDURE — 99203 OFFICE O/P NEW LOW 30 MIN: CPT | Performed by: OTOLARYNGOLOGY

## 2018-01-11 PROCEDURE — 92557 COMPREHENSIVE HEARING TEST: CPT | Performed by: AUDIOLOGIST

## 2018-01-11 RX ORDER — LEVOTHYROXINE SODIUM 175 UG/1
175 TABLET ORAL DAILY
Qty: 30 TABLET | Refills: 11 | Status: SHIPPED | OUTPATIENT
Start: 2018-01-11 | End: 2018-02-19 | Stop reason: DRUGHIGH

## 2018-01-11 NOTE — PROGRESS NOTES
Chief Complaint - hearing loss    History of Present Illness - Jovon Mantilla is a 62 year old male who presents to me today with hearing loss. Also notes right-sided ringing in the ear.  Comes and goes. Hasn't had upper respiratory infection. It has been present and noticeable for approximately a couple months.  The ringing was sudden in onset. Wife has noted he has hearing loss. There is no history of chronic ear disease or ear surgery. He did have tubes when he was a child. With regards to recreational, , and work-related noise exposure he has none. No family history of hearing loss at a young age. No regular use of aspirin or NSAIDS. He is a . He notes a recent insect bite on elbow. He took keflex. Patient takes warfarin for an artificial heart valve. Has had dizziness, and lightheadedness when taking an antibiotic. He is feeling better now though.     Past Medical History -   Patient Active Problem List   Diagnosis     Acquired hypothyroidism     Neck pain     Chronic rhinitis     Aortic stenosis     Hyperlipidemia LDL goal <70     Hypertension goal BP (blood pressure) < 140/90     Dilated cardiomyopathy (H)     CAD (coronary artery disease)     Mitral regurgitation     ASVD (arteriosclerotic vascular disease)     S/P AVR (aortic valve replacement)     GI bleeding     Gastrointestinal hemorrhage associated with anorectal source     Long-term (current) use of anticoagulants [Z79.01]     Adjustment disorder with mixed anxiety and depressed mood     Anxiety     Idiopathic cardiomyopathy (H)     S/P aortic valve replacement     Coronary artery disease involving native coronary artery of native heart without angina pectoris     Hypotension, unspecified hypotension type     Permanent atrial fibrillation (H)       Current Medications -   Current Outpatient Prescriptions:      warfarin (COUMADIN) 4 MG tablet, TAKE 1 TABLET MONDAY, WEDNESDAY, AND FRIDAY AND ONE AND ONE-HALF TABLETS (6 MG) ALL OTHER DAYS,  Disp: 130 tablet, Rfl: 1     sertraline (ZOLOFT) 50 MG tablet, Take 1 tablet (50 mg) by mouth daily, Disp: 90 tablet, Rfl: 1     digoxin (LANOXIN) 125 MCG tablet, Take 1 tablet (125 mcg) by mouth daily, Disp: 90 tablet, Rfl: 3     simvastatin (ZOCOR) 20 MG tablet, Take 1 tablet (20 mg) by mouth At Bedtime, Disp: 90 tablet, Rfl: 3     triamcinolone (KENALOG) 0.1 % cream, Apply sparingly to affected area three times daily for 14 days., Disp: 30 g, Rfl: 0     carvedilol (COREG) 25 MG tablet, Take 1 tablet (25 mg) by mouth 2 times daily (with meals), Disp: 180 tablet, Rfl: 3     lisinopril (PRINIVIL/ZESTRIL) 20 MG tablet, Take  20 mg (1 tablet in am) and 10 mg  (one half tablet) in  pm, Disp: 135 tablet, Rfl: 3     aspirin 81 MG tablet, Take 81 mg by mouth every other day, Disp: , Rfl:      Fexofenadine HCl (ALLERGY 24-HR PO), Take by mouth daily, Disp: , Rfl:      levothyroxine (SYNTHROID) 175 MCG tablet, Take 1 tablet (175 mcg) by mouth daily, Disp: 30 tablet, Rfl: 11     albuterol (PROAIR HFA/PROVENTIL HFA/VENTOLIN HFA) 108 (90 BASE) MCG/ACT Inhaler, Inhale 2 puffs into the lungs every 6 hours as needed for shortness of breath / dyspnea or wheezing, Disp: 1 Inhaler, Rfl: 0     ferrous sulfate (IRON SUPPLEMENT) 325 (65 FE) MG tablet, Take 1 tablet (325 mg) by mouth 2 times daily, Disp: 100 tablet, Rfl:      Multiple Vitamins-Minerals (CENTRUM) CHEW, Take 1 tablet by mouth At Bedtime, Disp: , Rfl:      Ascorbic Acid (VITAMIN C PO), Take 500 mg by mouth At Bedtime, Disp: , Rfl:      Cyanocobalamin (VITAMIN B-12) 500 MCG SUBL, Place 500 mcg under the tongue daily , Disp: , Rfl:      Cholecalciferol (VITAMIN D) 2000 UNIT CAPS, Take 1 tablet by mouth daily , Disp: , Rfl:      Calcium Carbonate-Vitamin D (CALCIUM 600+D) 600-200 MG-UNIT TABS, Take 1 tablet by mouth 2 times daily , Disp: , Rfl:      Psyllium (FIBER) 0.52 GM CAPS, Take 2 tablets by mouth At Bedtime , Disp: , Rfl:     Allergies - No Known Allergies    Social  "History -   Social History     Social History     Marital status:      Spouse name: Tracey     Number of children: 0     Years of education: N/A     Occupational History           [employer]     Social History Main Topics     Smoking status: Never Smoker     Smokeless tobacco: Never Used     Alcohol use No     Drug use: No     Sexual activity: Yes     Partners: Female     Other Topics Concern     Parent/Sibling W/ Cabg, Mi Or Angioplasty Before 65f 55m? Yes     Blood Transfusions No     Caffeine Concern Yes     1 can daily     Occupational Exposure Yes     Hobby Hazards No     Sleep Concern No     Stress Concern No     Weight Concern No     Special Diet Yes     low sodium, low calories     Back Care No     Exercise Yes     walks daily     Bike Helmet Not Asked     NA     Seat Belt Yes     Self-Exams Yes     Social History Narrative       Family History -   Family History   Problem Relation Age of Onset     Cancer - colorectal Father      Colon Cancer Father      DIABETES Brother        Review of Systems - As per HPI and PMHx, otherwise 7 system review of the head and neck negative.    Physical Exam  Temp 97  F (36.1  C) (Oral)  Ht 1.797 m (5' 10.75\")  Wt 95 kg (209 lb 6.4 oz)  BMI 29.41 kg/m2  General - The patient is in no distress. Alert and oriented to person and place, answers questions and cooperates with examination appropriately.   Neurologic - CN II-XII are grossly intact. No focal neurologic deficits.   Voice and Breathing - The patient was breathing comfortably without the use of accessory muscles. There was no wheezing, stridor, or stertor.  The patients voice was clear and strong.  Ears - The tympanic membranes are normal in appearance, bony landmarks are intact.  No retraction, perforation, or masses. No fluid or purulence was seen in the external canal or the middle ear. No evidence of infection of the middle ear or external canal, cerumen was normal in appearance.  Eyes - Extraocular " movements intact.  Sclera were not icteric or injected, conjunctiva were pink and moist.  Mouth - Examination of the oral cavity showed pink, healthy oral mucosa. No lesions or ulcerations noted.  The tongue was mobile and midline.  Throat - The walls of the oropharynx were smooth, symmetric, and had no lesions or ulcerations.  The uvula was midline on elevation.    Neck - Palpation of the occipital, submental, submandibular, internal jugular chain, and supraclavicular nodes did not demonstrate any abnormal lymph nodes or masses. No parotid masses. Palpation of the thyroid was soft and smooth, with no nodules or goiter appreciated.  The trachea was mobile and midline.      Audiologic Studies - An audiogram and tympanogram were performed today as part of the evaluation and personally reviewed. The tympanogram shows a normal Type C curve, with normal canal volume and middle ear pressure.  There is no sign of eustachian tube dysfunction or middle ear effusion.  The audiogram showed a significant symmetric high frequency sensorineural hearing loss. Has mild right asymmetry.       Assessment and Plan - Jovon Mantilla is a 62 year old male who presents to me today with subjective tinnitus, likely due to hearing loss. He has some asymmetry and we discussed MRI, but he elected for audiogram in 6 months. We spent the remainder of today's visit on education. Discussed were steps that can be taken to mask the noise, such as a low volume de-tuned radio, a fan in the background, and/or hearing aids.  Correlation with stress, anxiety, and depression were also discussed.     The patient will follow up as necessary for worsening symptoms or changes in symptoms. I have also recommended consideration of a hearing aid evaluation.    For his epiphora I will have him see ophthalmology.      Sedrick Fox MD  Otolaryngology  [facility]

## 2018-01-11 NOTE — PATIENT INSTRUCTIONS
General Scheduling Information  To schedule your CT/MRI scan, please contact [place] [facility] at [phone]   [address] [place] [address]    To schedule your Surgery, please contact our Specialty Schedulers at [phone]    ENT Clinic Locations Clinic Hours Telephone Number     [facility]  [address] [place] [address]   Tuesday:       8:00am -- 4:00pm    Wednesday:  8:00am - 4:00pm   To schedule an appointment with   Dr. Fox,   please contact our   Specialty Scheduling Department at:     [phone]       [facility]  [address] [place] [address]   Friday:          8:00am - 4:00pm         Urgent Care Locations Clinic Hours Telephone Numbers     [facility] Villegas  [address] [place] [address]     Monday-Friday:     11:00pm - 9:00pm    Saturday-Sunday:  9:00am - 5:00pm   [phone]     [facility] Layton  [address] [place] [address]     Monday-Friday:      5:00pm - 9:00pm     Saturday-Sunday:  9:00am - 5:00pm   [phone]

## 2018-01-11 NOTE — TELEPHONE ENCOUNTER
Noted levothyroxine refill not addressed yet  States has been taking the 175 mcg    Routing refill request to provider for review/approval because:  Labs out of range:  TSH

## 2018-01-11 NOTE — PROGRESS NOTES
AUDIOLOGY REPORT:    Patient was referred to Audiology from ENT by Dr. Fox for a hearing examination. Patient reports right ear tinnitus for the past month.     Testing:    Otoscopy:   Otoscopic exam indicates ears are clear of cerumen bilaterally     Tympanograms:    RIGHT: negative pressure      LEFT:   negative pressure     Thresholds:   Pure Tone Thresholds assessed using conventional audiometry with good  reliability from 250-8000 Hz bilaterally using insert earphones     RIGHT:  mild and moderate sensorineural hearing loss    LEFT:    mild and moderate sensorineural hearing loss    Speech Reception Threshold:    RIGHT: 35 dB HL    LEFT:   30 dB HL    Word Recognition Score:     RIGHT: 100% at 70 dB HL using NU-6 recorded word list.    LEFT:   100% at 70 dB HL using NU-6 recorded word list.    Discussed results with the patient.     Patient was returned to ENT for follow up.     Nasim Fine CCC-A  Licensed Audiologist  1/11/2018

## 2018-01-11 NOTE — LETTER
1/11/2018         RE: Jovon Mantilla  [address]  [facility] [address]        Dear Colleague,    Thank you for referring your patient, Jovon Mantilla, to the [facility]. Please see a copy of my visit note below.    Chief Complaint - hearing loss    History of Present Illness - Jovon Mantilla is a 62 year old male who presents to me today with hearing loss. Also notes right-sided ringing in the ear.  Comes and goes. Hasn't had upper respiratory infection. It has been present and noticeable for approximately a couple months.  The ringing was sudden in onset. Wife has noted he has hearing loss. There is no history of chronic ear disease or ear surgery. He did have tubes when he was a child. With regards to recreational, , and work-related noise exposure he has none. No family history of hearing loss at a young age. No regular use of aspirin or NSAIDS. He is a . He notes a recent insect bite on elbow. He took keflex. Patient takes warfarin for an artificial heart valve. Has had dizziness, and lightheadedness when taking an antibiotic. He is feeling better now though.     Past Medical History -   Patient Active Problem List   Diagnosis     Acquired hypothyroidism     Neck pain     Chronic rhinitis     Aortic stenosis     Hyperlipidemia LDL goal <70     Hypertension goal BP (blood pressure) < 140/90     Dilated cardiomyopathy (H)     CAD (coronary artery disease)     Mitral regurgitation     ASVD (arteriosclerotic vascular disease)     S/P AVR (aortic valve replacement)     GI bleeding     Gastrointestinal hemorrhage associated with anorectal source     Long-term (current) use of anticoagulants [Z79.01]     Adjustment disorder with mixed anxiety and depressed mood     Anxiety     Idiopathic cardiomyopathy (H)     S/P aortic valve replacement     Coronary artery disease involving native coronary artery of native heart without angina pectoris     Hypotension, unspecified  hypotension type     Permanent atrial fibrillation (H)       Current Medications -   Current Outpatient Prescriptions:      warfarin (COUMADIN) 4 MG tablet, TAKE 1 TABLET MONDAY, WEDNESDAY, AND FRIDAY AND ONE AND ONE-HALF TABLETS (6 MG) ALL OTHER DAYS, Disp: 130 tablet, Rfl: 1     sertraline (ZOLOFT) 50 MG tablet, Take 1 tablet (50 mg) by mouth daily, Disp: 90 tablet, Rfl: 1     digoxin (LANOXIN) 125 MCG tablet, Take 1 tablet (125 mcg) by mouth daily, Disp: 90 tablet, Rfl: 3     simvastatin (ZOCOR) 20 MG tablet, Take 1 tablet (20 mg) by mouth At Bedtime, Disp: 90 tablet, Rfl: 3     triamcinolone (KENALOG) 0.1 % cream, Apply sparingly to affected area three times daily for 14 days., Disp: 30 g, Rfl: 0     carvedilol (COREG) 25 MG tablet, Take 1 tablet (25 mg) by mouth 2 times daily (with meals), Disp: 180 tablet, Rfl: 3     lisinopril (PRINIVIL/ZESTRIL) 20 MG tablet, Take  20 mg (1 tablet in am) and 10 mg  (one half tablet) in  pm, Disp: 135 tablet, Rfl: 3     aspirin 81 MG tablet, Take 81 mg by mouth every other day, Disp: , Rfl:      Fexofenadine HCl (ALLERGY 24-HR PO), Take by mouth daily, Disp: , Rfl:      levothyroxine (SYNTHROID) 175 MCG tablet, Take 1 tablet (175 mcg) by mouth daily, Disp: 30 tablet, Rfl: 11     albuterol (PROAIR HFA/PROVENTIL HFA/VENTOLIN HFA) 108 (90 BASE) MCG/ACT Inhaler, Inhale 2 puffs into the lungs every 6 hours as needed for shortness of breath / dyspnea or wheezing, Disp: 1 Inhaler, Rfl: 0     ferrous sulfate (IRON SUPPLEMENT) 325 (65 FE) MG tablet, Take 1 tablet (325 mg) by mouth 2 times daily, Disp: 100 tablet, Rfl:      Multiple Vitamins-Minerals (CENTRUM) CHEW, Take 1 tablet by mouth At Bedtime, Disp: , Rfl:      Ascorbic Acid (VITAMIN C PO), Take 500 mg by mouth At Bedtime, Disp: , Rfl:      Cyanocobalamin (VITAMIN B-12) 500 MCG SUBL, Place 500 mcg under the tongue daily , Disp: , Rfl:      Cholecalciferol (VITAMIN D) 2000 UNIT CAPS, Take 1 tablet by mouth daily , Disp: , Rfl:      " Calcium Carbonate-Vitamin D (CALCIUM 600+D) 600-200 MG-UNIT TABS, Take 1 tablet by mouth 2 times daily , Disp: , Rfl:      Psyllium (FIBER) 0.52 GM CAPS, Take 2 tablets by mouth At Bedtime , Disp: , Rfl:     Allergies - No Known Allergies    Social History -   Social History     Social History     Marital status:      Spouse name: Tracey     Number of children: 0     Years of education: N/A     Occupational History           [employer]     Social History Main Topics     Smoking status: Never Smoker     Smokeless tobacco: Never Used     Alcohol use No     Drug use: No     Sexual activity: Yes     Partners: Female     Other Topics Concern     Parent/Sibling W/ Cabg, Mi Or Angioplasty Before 65f 55m? Yes     Blood Transfusions No     Caffeine Concern Yes     1 can daily     Occupational Exposure Yes     Hobby Hazards No     Sleep Concern No     Stress Concern No     Weight Concern No     Special Diet Yes     low sodium, low calories     Back Care No     Exercise Yes     walks daily     Bike Helmet Not Asked     NA     Seat Belt Yes     Self-Exams Yes     Social History Narrative       Family History -   Family History   Problem Relation Age of Onset     Cancer - colorectal Father      Colon Cancer Father      DIABETES Brother        Review of Systems - As per HPI and PMHx, otherwise 7 system review of the head and neck negative.    Physical Exam  Temp 97  F (36.1  C) (Oral)  Ht 1.797 m (5' 10.75\")  Wt 95 kg (209 lb 6.4 oz)  BMI 29.41 kg/m2  General - The patient is in no distress. Alert and oriented to person and place, answers questions and cooperates with examination appropriately.   Neurologic - CN II-XII are grossly intact. No focal neurologic deficits.   Voice and Breathing - The patient was breathing comfortably without the use of accessory muscles. There was no wheezing, stridor, or stertor.  The patients voice was clear and strong.  Ears - The tympanic membranes are normal in appearance, bony " landmarks are intact.  No retraction, perforation, or masses. No fluid or purulence was seen in the external canal or the middle ear. No evidence of infection of the middle ear or external canal, cerumen was normal in appearance.  Eyes - Extraocular movements intact.  Sclera were not icteric or injected, conjunctiva were pink and moist.  Mouth - Examination of the oral cavity showed pink, healthy oral mucosa. No lesions or ulcerations noted.  The tongue was mobile and midline.  Throat - The walls of the oropharynx were smooth, symmetric, and had no lesions or ulcerations.  The uvula was midline on elevation.    Neck - Palpation of the occipital, submental, submandibular, internal jugular chain, and supraclavicular nodes did not demonstrate any abnormal lymph nodes or masses. No parotid masses. Palpation of the thyroid was soft and smooth, with no nodules or goiter appreciated.  The trachea was mobile and midline.      Audiologic Studies - An audiogram and tympanogram were performed today as part of the evaluation and personally reviewed. The tympanogram shows a normal Type C curve, with normal canal volume and middle ear pressure.  There is no sign of eustachian tube dysfunction or middle ear effusion.  The audiogram showed a significant symmetric high frequency sensorineural hearing loss. Has mild right asymmetry.       Assessment and Plan - Jovon Mantilla is a 62 year old male who presents to me today with subjective tinnitus, likely due to hearing loss. He has some asymmetry and we discussed MRI, but he elected for audiogram in 6 months. We spent the remainder of today's visit on education. Discussed were steps that can be taken to mask the noise, such as a low volume de-tuned radio, a fan in the background, and/or hearing aids.  Correlation with stress, anxiety, and depression were also discussed.     The patient will follow up as necessary for worsening symptoms or changes in symptoms. I have also recommended  consideration of a hearing aid evaluation.    For his epiphora I will have him see ophthalmology.      Sedrick Fox MD  Otolaryngology  [facility]        Again, thank you for allowing me to participate in the care of your patient.        Sincerely,        Sedrick Fox MD

## 2018-01-11 NOTE — MR AVS SNAPSHOT
After Visit Summary   1/11/2018    Jovon Mantilla    MRN: [id number]           Patient Information     Date Of Birth          1955        Visit Information        Provider Department      1/11/2018 7:45 AM Nasim Feldman AuD [facility]        Today's Diagnoses     Sensorineural hearing loss, bilateral    -  1    Right-sided tinnitus           Follow-ups after your visit        Your next 10 appointments already scheduled     Feb 06, 2018  8:00 AM CST   NM SH CV MPI MULT RST ST 1 DAY with SCINM1   [facility] CV Nuclear Medicine (Cardiovascular Imaging at [facility])    [address]  [address]  [facility] [id number] [phone]           For a ONE day exam: Allow 3-4 hours for test. For a TWO day exam: Allow 2 hours PER day for test.  You may need to stop some medicines before the test. Follow your doctor s orders. - If you take a beta blocker: Follow your doctor s specific instructions on taking it prior to and on the day of your exam. - If you take Aggrenox or dipyridamole (Persantine, Permole), stop taking it 48 hours before your test. - If you take Viagra, Cialis or Levitra, stop taking it 48 hours before your test. - If you take theophylline or aminophylline, stop taking it 12 hours before your test.  For patients with diabetes: - If you take insulin, call your diabetes care team. Ask if you should take a 1/2 dose the morning of your test. - If you take diabetes medicine by mouth, don t take it on the morning of your test. Bring it with you to take after the test. (If you have questions, call your diabetes care team.)  Do not take nitrates on the day of your test. Do not wear your Nitro-Patch.  Stop all caffeine 12 hours before the test. This includes coffee, tea, soda pop, chocolate and certain medicines (such as Anacin, Excedrin and NoDoz). Also avoid decaf coffee and tea, as these contain small amounts of caffeine.  No alcohol, smoking or  other tobacco for 12 hours before the test.  Stop eating 3 hours before the test. You may drink water.  Please wear a loose two-piece outfit. If you will have an exercise test, bring rubber-soled walking shoes.  When you arrive, please tell us if you: - Have diabetes - Are breastfeeding - May be pregnant - Have a pacemaker of ICD (implantable defibrillator).  Please call your Imaging Department at your exam site with any questions.            Feb 13, 2018  4:15 PM CST   Return Visit with Lloyd Lewis MD   [facility] ([facility])    [address]  [facility] [id number] [phone]            Feb 19, 2018  4:20 PM CST   Anticoagulation Visit with JIMMY ANTI COAG   [facility] ([facility]    [address] [facility] [address] [address] [phone]              Who to contact     If you have questions or need follow up information about today's clinic visit or your schedule please contact [facility] directly at [phone].  Normal or non-critical lab and imaging results will be communicated to you by Hole 19hart, letter or phone within 4 business days after the clinic has received the results. If you do not hear from us within 7 days, please contact the clinic through FORVMt or phone. If you have a critical or abnormal lab result, we will notify you by phone as soon as possible.  Submit refill requests through Availink or call your pharmacy and they will forward the refill request to us. Please allow 3 business days for your refill to be completed.          Additional Information About Your Visit        Hole 19harOphis Vape Information     Availink gives you secure access to your electronic health record. If you see a primary care provider, you can also send messages to your care team and make appointments. If you have questions, please call your primary care clinic.  If you do not have a  primary care provider, please call [phone] and they will assist you.        Care EveryWhere ID     This is your Care EveryWhere ID. This could be used by other organizations to access your [employer] medical records  [id number]         Blood Pressure from Last 3 Encounters:   01/03/18 110/71   12/26/17 132/89   06/02/17 123/80    Weight from Last 3 Encounters:   01/11/18 209 lb 6.4 oz (95 kg)   01/03/18 206 lb 11.2 oz (93.8 kg)   12/26/17 208 lb (94.3 kg)              We Performed the Following     AUDIOGRAM/TYMPANOGRAM - INTERFACE     COMPREHENSIVE HEARING TEST     TYMPANOMETRY        Primary Care Provider Office Phone # Fax #    Rehan Lorenzana [phone] [address] [facility] [facility] [address]        Equal Access to Services     GISELL [facility] CenterSANDEEP : Hadii aad ku hadasho Soomaali, waaxda luqadaha, qaybta kaalmada adeegyada, waxluisa givensin hayaan roseline kaufman . So [facility] [phone].    ATENCIÓN: Si habla español, tiene a pfeiffer disposición servicios gratuitos de asistencia lingüística. Llame al [phone].    We comply with applicable federal civil rights laws and [place] laws. We do not discriminate on the basis of race, color, national origin, age, disability, sex, sexual orientation, or gender identity.            Thank you!     Thank you for choosing [facility]  for your care. Our goal is always to provide you with excellent care. Hearing back from our patients is one way we can continue to improve our services. Please take a few minutes to complete the written survey that you may receive in the mail after your visit with us. Thank you!             Your Updated Medication List - Protect others around you: Learn how to safely use, store and throw away your medicines at www.disposemymeds.org.          This list is accurate as of: 1/11/18  8:36 AM.  Always use your most recent med list.                   Brand Name Dispense Instructions for use Diagnosis     albuterol 108 (90 BASE) MCG/ACT Inhaler    PROAIR HFA/PROVENTIL HFA/VENTOLIN HFA    1 Inhaler    Inhale 2 puffs into the lungs every 6 hours as needed for shortness of breath / dyspnea or wheezing    Acute bronchitis due to Mycoplasma pneumoniae       ALLERGY 24-HR PO      Take by mouth daily        aspirin 81 MG tablet      Take 81 mg by mouth every other day        CALCIUM 600+D 600-200 MG-UNIT Tabs   Generic drug:  calcium carbonate-vitamin D      Take 1 tablet by mouth 2 times daily        carvedilol 25 MG tablet    COREG    180 tablet    Take 1 tablet (25 mg) by mouth 2 times daily (with meals)    Cardiomyopathy, unspecified       CENTRUM Chew      Take 1 tablet by mouth At Bedtime        digoxin 125 MCG tablet    LANOXIN    90 tablet    Take 1 tablet (125 mcg) by mouth daily    Atrial fibrillation (H)       Fiber 0.52 G Caps      Take 2 tablets by mouth At Bedtime        IRON SUPPLEMENT 325 (65 FE) MG tablet   Generic drug:  ferrous sulfate     100 tablet    Take 1 tablet (325 mg) by mouth 2 times daily        levothyroxine 175 MCG tablet    SYNTHROID    30 tablet    Take 1 tablet (175 mcg) by mouth daily    Acquired hypothyroidism       lisinopril 20 MG tablet    PRINIVIL/ZESTRIL    135 tablet    Take  20 mg (1 tablet in am) and 10 mg  (one half tablet) in  pm    ASVD (arteriosclerotic vascular disease)       sertraline 50 MG tablet    ZOLOFT    90 tablet    Take 1 tablet (50 mg) by mouth daily    Anxiety       simvastatin 20 MG tablet    ZOCOR    90 tablet    Take 1 tablet (20 mg) by mouth At Bedtime    Hyperlipidemia LDL goal <70       triamcinolone 0.1 % cream    KENALOG    30 g    Apply sparingly to affected area three times daily for 14 days.    Atopic dermatitis, unspecified type       Vitamin B-12 500 MCG Subl      Place 500 mcg under the tongue daily        VITAMIN C PO      Take 500 mg by mouth At Bedtime        vitamin D 2000 UNITS Caps      Take 1 tablet by mouth daily        warfarin 4 MG tablet     COUMADIN    130 tablet    TAKE 1 TABLET MONDAY, WEDNESDAY, AND FRIDAY AND ONE AND ONE-HALF TABLETS (6 MG) ALL OTHER DAYS    Long term current use of anticoagulant therapy

## 2018-01-11 NOTE — MR AVS SNAPSHOT
After Visit Summary   1/11/2018    Jovon Mantilla    MRN: [id number]           Patient Information     Date Of Birth          1955        Visit Information        Provider Department      1/11/2018 8:15 AM Sedrick Fox MD [facility]        Today's Diagnoses     Tinnitus, bilateral    -  1    Bilateral epiphora          Care Instructions    General Scheduling Information  To schedule your CT/MRI scan, please contact [place] at [phone] [address] [place] [address]    To schedule your Surgery, please contact our Specialty Schedulers at [phone]    ENT Clinic Locations Clinic Hours Telephone Number     [facility]  [address]  [place] [address]   Tuesday:       8:00am -- 4:00pm    Wednesday:  8:00am - 4:00pm   To schedule an appointment with   Dr. Fox,   please contact our   Specialty Scheduling Department at:     [phone]       [facility]  [address] [place] [address]   Friday:          8:00am - 4:00pm         Urgent Care Locations Clinic Hours Telephone Numbers     [facility]  [address]  [place] [address]     Monday-Friday:     11:00pm - 9:00pm    Saturday-Sunday:  9:00am - 5:00pm   [phone]     [facility]  [address] [place] [address]     Monday-Friday:      5:00pm - 9:00pm     Saturday-Sunday:  9:00am - 5:00pm   [phone]                 Follow-ups after your visit        Additional Services     AUDIOLOGY ADULT REFERRAL       Your provider has referred you to: [facility]: [facility] [phone]   [email or web address]    Treatment:  Evaluation & Treatment  Specialty Testing:  Audiogram w/Tymps and Reflexes    Please be aware that coverage of these services is subject to the terms and limitations of your health insurance plan.  Call member services at your health plan with any benefit or coverage questions.      Please  bring the following to your appointment:  >>   Any x-rays, CTs or MRIs which have been performed.  Contact the facility where they were done to arrange for  prior to your scheduled appointment.   >>   List of current medications  >>   This referral request   >>   Any documents/labs given to you for this referral            OPHTHALMOLOGY ADULT REFERRAL       Your provider has referred you to: [facility]: [facility] [phone]   [email or web address]    Please be aware that coverage of these services is subject to the terms and limitations of your health insurance plan.  Call member services at your health plan with any benefit or coverage questions.      Please bring the following with you to your appointment:    (1) Any X-Rays, CTs or MRIs which have been performed.  Contact the facility where they were done to arrange for  prior to your scheduled appointment.    (2) List of current medications  (3) This referral request   (4) Any documents/labs given to you for this referral                  Your next 10 appointments already scheduled     Feb 06, 2018  8:00 AM CST   NM SH CV MPI MULT RST ST 1 DAY with SCINM1   [facility] CV Nuclear Medicine (Cardiovascular Imaging at [facility])    [address] [place] [address]  [address] [address] [phone]           For a ONE day exam: Allow 3-4 hours for test. For a TWO day exam: Allow 2 hours PER day for test.  You may need to stop some medicines before the test. Follow your doctor s orders. - If you take a beta blocker: Follow your doctor s specific instructions on taking it prior to and on the day of your exam. - If you take Aggrenox or dipyridamole (Persantine, Permole), stop taking it 48 hours before your test. - If you take Viagra, Cialis or Levitra, stop taking it 48 hours before your test. - If you take theophylline or aminophylline, stop taking it 12 hours before your test.  For  patients with diabetes: - If you take insulin, call your diabetes care team. Ask if you should take a 1/2 dose the morning of your test. - If you take diabetes medicine by mouth, don t take it on the morning of your test. Bring it with you to take after the test. (If you have questions, call your diabetes care team.)  Do not take nitrates on the day of your test. Do not wear your Nitro-Patch.  Stop all caffeine 12 hours before the test. This includes coffee, tea, soda pop, chocolate and certain medicines (such as Anacin, Excedrin and NoDoz). Also avoid decaf coffee and tea, as these contain small amounts of caffeine.  No alcohol, smoking or other tobacco for 12 hours before the test.  Stop eating 3 hours before the test. You may drink water.  Please wear a loose two-piece outfit. If you will have an exercise test, bring rubber-soled walking shoes.  When you arrive, please tell us if you: - Have diabetes - Are breastfeeding - May be pregnant - Have a pacemaker of ICD (implantable defibrillator).  Please call your Imaging Department at your exam site with any questions.            Feb 13, 2018  4:15 PM CST   Return Visit with Lloyd Lewis MD   [facility] ([facility])    [address] [place] [address] [phone]            Feb 19, 2018  4:20 PM CST   Anticoagulation Visit with JIMMY ANTI COAG   [facility]    [address] [facility] [address] [phone]              Who to contact     If you have questions or need follow up information about today's clinic visit or your schedule please contact [facility] directly at [phone].  Normal or non-critical lab and imaging results will be communicated to you by MyChart, letter or phone within 4 business days after the clinic has received the results. If you do not hear from us within 7 days,  "please contact the clinic through Takes or phone. If you have a critical or abnormal lab result, we will notify you by phone as soon as possible.  Submit refill requests through Takes or call your pharmacy and they will forward the refill request to us. Please allow 3 business days for your refill to be completed.          Additional Information About Your Visit        Fiixhart Information     Takes gives you secure access to your electronic health record. If you see a primary care provider, you can also send messages to your care team and make appointments. If you have questions, please call your primary care clinic.  If you do not have a primary care provider, please call [phone] and they will assist you.        Care EveryWhere ID     This is your Care EveryWhere ID. This could be used by other organizations to access your [place] medical records  [id number]        Your Vitals Were     Temperature Height BMI (Body Mass Index)             97  F (36.1  C) (Oral) 1.797 m (5' 10.75\") 29.41 kg/m2          Blood Pressure from Last 3 Encounters:   01/03/18 110/71   12/26/17 132/89   06/02/17 123/80    Weight from Last 3 Encounters:   01/11/18 95 kg (209 lb 6.4 oz)   01/03/18 93.8 kg (206 lb 11.2 oz)   12/26/17 94.3 kg (208 lb)              We Performed the Following     AUDIOLOGY ADULT REFERRAL     OPHTHALMOLOGY ADULT REFERRAL        Primary Care Provider Office Phone # Fax #    Rehan Matheus Lorenzana [phone] [address] [facility] [address]        Equal Access to Services     FRANCISCO QUINONES : Hadii aad ku hadasho Soomaali, waaxda luqadaha, qaybta kaalmada roselineyaricky, richar reina. So [facility] [phone].    ATENCIÓN: Si habla español, tiene a pfeiffer disposición servicios gratuitos de asistencia lingüística. Llame al [phone].    We comply with applicable federal civil rights laws and [place] laws. We do not discriminate on the basis of race, color, " national origin, age, disability, sex, sexual orientation, or gender identity.            Thank you!     Thank you for choosing [facility]  for your care. Our goal is always to provide you with excellent care. Hearing back from our patients is one way we can continue to improve our services. Please take a few minutes to complete the written survey that you may receive in the mail after your visit with us. Thank you!             Your Updated Medication List - Protect others around you: Learn how to safely use, store and throw away your medicines at www.disposemymeds.org.          This list is accurate as of: 1/11/18  8:45 AM.  Always use your most recent med list.                   Brand Name Dispense Instructions for use Diagnosis    albuterol 108 (90 BASE) MCG/ACT Inhaler    PROAIR HFA/PROVENTIL HFA/VENTOLIN HFA    1 Inhaler    Inhale 2 puffs into the lungs every 6 hours as needed for shortness of breath / dyspnea or wheezing    Acute bronchitis due to Mycoplasma pneumoniae       ALLERGY 24-HR PO      Take by mouth daily        aspirin 81 MG tablet      Take 81 mg by mouth every other day        CALCIUM 600+D 600-200 MG-UNIT Tabs   Generic drug:  calcium carbonate-vitamin D      Take 1 tablet by mouth 2 times daily        carvedilol 25 MG tablet    COREG    180 tablet    Take 1 tablet (25 mg) by mouth 2 times daily (with meals)    Cardiomyopathy, unspecified       CENTRUM Chew      Take 1 tablet by mouth At Bedtime        digoxin 125 MCG tablet    LANOXIN    90 tablet    Take 1 tablet (125 mcg) by mouth daily    Atrial fibrillation (H)       Fiber 0.52 G Caps      Take 2 tablets by mouth At Bedtime        IRON SUPPLEMENT 325 (65 FE) MG tablet   Generic drug:  ferrous sulfate     100 tablet    Take 1 tablet (325 mg) by mouth 2 times daily        levothyroxine 175 MCG tablet    SYNTHROID    30 tablet    Take 1 tablet (175 mcg) by mouth daily    Acquired hypothyroidism       lisinopril 20 MG tablet     PRINIVIL/ZESTRIL    135 tablet    Take  20 mg (1 tablet in am) and 10 mg  (one half tablet) in  pm    ASVD (arteriosclerotic vascular disease)       sertraline 50 MG tablet    ZOLOFT    90 tablet    Take 1 tablet (50 mg) by mouth daily    Anxiety       simvastatin 20 MG tablet    ZOCOR    90 tablet    Take 1 tablet (20 mg) by mouth At Bedtime    Hyperlipidemia LDL goal <70       triamcinolone 0.1 % cream    KENALOG    30 g    Apply sparingly to affected area three times daily for 14 days.    Atopic dermatitis, unspecified type       Vitamin B-12 500 MCG Subl      Place 500 mcg under the tongue daily        VITAMIN C PO      Take 500 mg by mouth At Bedtime        vitamin D 2000 UNITS Caps      Take 1 tablet by mouth daily        warfarin 4 MG tablet    COUMADIN    130 tablet    TAKE 1 TABLET MONDAY, WEDNESDAY, AND FRIDAY AND ONE AND ONE-HALF TABLETS (6 MG) ALL OTHER DAYS    Long term current use of anticoagulant therapy

## 2018-02-06 ENCOUNTER — HOSPITAL ENCOUNTER (OUTPATIENT)
Dept: CARDIOLOGY | Facility: CLINIC | Age: 63
Discharge: HOME | End: 2018-02-06
Attending: NURSE PRACTITIONER | Admitting: NURSE PRACTITIONER
Payer: COMMERCIAL

## 2018-02-06 DIAGNOSIS — I25.10 CORONARY ARTERY DISEASE INVOLVING NATIVE CORONARY ARTERY OF NATIVE HEART WITHOUT ANGINA PECTORIS: ICD-10-CM

## 2018-02-06 PROCEDURE — A9502 TC99M TETROFOSMIN: HCPCS | Performed by: INTERNAL MEDICINE

## 2018-02-06 PROCEDURE — 93017 CV STRESS TEST TRACING ONLY: CPT

## 2018-02-06 PROCEDURE — 34300033 ZZH RX 343: Performed by: INTERNAL MEDICINE

## 2018-02-06 PROCEDURE — 93018 CV STRESS TEST I&R ONLY: CPT | Performed by: INTERNAL MEDICINE

## 2018-02-06 PROCEDURE — 93016 CV STRESS TEST SUPVJ ONLY: CPT | Performed by: INTERNAL MEDICINE

## 2018-02-06 PROCEDURE — 78452 HT MUSCLE IMAGE SPECT MULT: CPT | Mod: 26 | Performed by: INTERNAL MEDICINE

## 2018-02-06 RX ADMIN — TETROFOSMIN 13.1 MCI.: 1.38 INJECTION, POWDER, LYOPHILIZED, FOR SOLUTION INTRAVENOUS at 09:28

## 2018-02-06 RX ADMIN — TETROFOSMIN 3.8 MCI.: 1.38 INJECTION, POWDER, LYOPHILIZED, FOR SOLUTION INTRAVENOUS at 08:32

## 2018-02-07 ENCOUNTER — TELEPHONE (OUTPATIENT)
Dept: CARDIOLOGY | Facility: CLINIC | Age: 63
End: 2018-02-07

## 2018-02-07 NOTE — TELEPHONE ENCOUNTER
Notes Recorded by Lloyd Lewis MD on 2/6/2018 at 11:21 PM  The nuclear stress test shows a possible inferior and inferolateral/inferoseptal infarct with mild ischemia.  This could also represent artifact.  Given the normal circumflex and RCA in 10/01/2010 and no ischemic symptoms, I would continue to treat medically.  There is not enough area of ischemia to consider coronary angiography.  If he develops angina, I would consider coronary angiography.  Lloyd Lewis MD, [facility]  February 6, 2018 11:20 PM    Called patient with the results. He did not answer. Left a message to call team 4 with the direct number.

## 2018-02-07 NOTE — TELEPHONE ENCOUNTER
Patient called back. Results given. Patient stated his understanding and will call if he has any shortness of breath or angina symptoms. He will follow-up in April.

## 2018-02-08 ENCOUNTER — ANTICOAGULATION THERAPY VISIT (OUTPATIENT)
Dept: FAMILY MEDICINE | Facility: CLINIC | Age: 63
End: 2018-02-08

## 2018-02-08 DIAGNOSIS — Z95.2 S/P AVR (AORTIC VALVE REPLACEMENT): ICD-10-CM

## 2018-02-08 DIAGNOSIS — I48.91 ATRIAL FIBRILLATION (H): ICD-10-CM

## 2018-02-08 DIAGNOSIS — E03.9 ACQUIRED HYPOTHYROIDISM: ICD-10-CM

## 2018-02-08 DIAGNOSIS — Z79.01 LONG-TERM (CURRENT) USE OF ANTICOAGULANTS: ICD-10-CM

## 2018-02-08 LAB
DIGOXIN SERPL-MCNC: 1 UG/L (ref 0.5–2)
INR BLD: 3 (ref 0.86–1.14)
T4 FREE SERPL-MCNC: 0.88 NG/DL (ref 0.76–1.46)
TSH SERPL DL<=0.005 MIU/L-ACNC: 15.84 MU/L (ref 0.4–4)

## 2018-02-08 PROCEDURE — 84439 ASSAY OF FREE THYROXINE: CPT | Performed by: INTERNAL MEDICINE

## 2018-02-08 PROCEDURE — 80162 ASSAY OF DIGOXIN TOTAL: CPT | Performed by: INTERNAL MEDICINE

## 2018-02-08 PROCEDURE — 36415 COLL VENOUS BLD VENIPUNCTURE: CPT | Performed by: INTERNAL MEDICINE

## 2018-02-08 PROCEDURE — 85610 PROTHROMBIN TIME: CPT | Mod: QW | Performed by: INTERNAL MEDICINE

## 2018-02-08 PROCEDURE — 84443 ASSAY THYROID STIM HORMONE: CPT | Performed by: INTERNAL MEDICINE

## 2018-02-08 NOTE — MR AVS SNAPSHOT
Jovon Mantilla   2/8/2018   Anticoagulation Therapy Visit    Description:  62 year old male   Provider:  Rehan Lorenzana MD   Department:  [facility]Peds           INR as of 2/8/2018     Today's INR 3.0      Anticoagulation Summary as of 2/8/2018     INR goal 2.5-3.5   Today's INR 3.0   Full instructions 4 mg on Mon, Wed, Fri; 6 mg all other days   Next INR check 3/29/2018    Indications   Long-term (current) use of anticoagulants [Z79.01] [Z79.01]  S/P AVR (aortic valve replacement) [Z95.2]         Your next Anticoagulation Clinic appointment(s)     Mar 29, 2018  4:20 PM CDT   [facility] with CP ANTI COAG   [facility]    [address] [place] [address] [phone]              Contact Numbers     [facility]  Please call [phone] or [phone]  to cancel and/or reschedule your appointment.   Please call [phone] with any problems or questions regarding your therapy          February 2018 Details    Sun Mon Tue Wed Thu Fri Sat         1               2               3                 4               5               6               7               8      6 mg   See details      9      4 mg         10      6 mg           11      6 mg         12      4 mg         13      6 mg         14      4 mg         15      6 mg         16      4 mg         17      6 mg           18      6 mg         19      4 mg         20      6 mg         21      4 mg         22      6 mg         23      4 mg         24      6 mg           25      6 mg         26      4 mg         27      6 mg         28      4 mg             Date Details   02/08 This INR check               How to take your warfarin dose     To take:  4 mg Take 1 of the 4 mg tablets.    To take:  6 mg Take 1.5 of the 4 mg tablets.           March 2018 Details    Sun Mon Tue Wed Thu Fri Sat         1      6 mg         2      4 mg         3       6 mg           4      6 mg         5      4 mg         6      6 mg         7      4 mg         8      6 mg         9      4 mg         10      6 mg           11      6 mg         12      4 mg         13      6 mg         14      4 mg         15      6 mg         16      4 mg         17      6 mg           18      6 mg         19      4 mg         20      6 mg         21      4 mg         22      6 mg         23      4 mg         24      6 mg           25      6 mg         26      4 mg         27      6 mg         28      4 mg         29            30               31                Date Details   No additional details    Date of next INR:  3/29/2018         How to take your warfarin dose     To take:  4 mg Take 1 of the 4 mg tablets.    To take:  6 mg Take 1.5 of the 4 mg tablets.

## 2018-02-08 NOTE — PROGRESS NOTES
ANTICOAGULATION FOLLOW-UP CLINIC VISIT    Patient Name:  Jovon Mantilla  Date:  2/8/2018  Contact Type:  Telephone/ called patient in follow up to INR done at outside lab today.  Routine assessment questions negative for problem findings or changes.  He is going on vacation and asks his recheck be done after he returns.    SUBJECTIVE:  See note.     Patient Findings     Positives No Problem Findings           OBJECTIVE    INR Point of Care   Date Value Ref Range Status   02/08/2018 3.0 (H) 0.86 - 1.14 Final     Comment:     This test is intended for monitoring Coumadin therapy.  Results are not   accurate in patients with prolonged INR due to factor deficiency.  This test is intended for monitoring Coumadin therapy.  Results are not   accurate in patients with prolonged INR due to factor deficiency.       Chromogenic Factor 10   Date Value Ref Range Status   01/21/2011 35 (L) 60 - 140 % Final     Comment:     Therapeutic Range: A Chromogenic Factor 10 level of 20-40% inversely   correlates   with an INR of 2-3 for patients receiving Warfarin. Chromogenic Factor 10   levels below 20% indicate an INR greater than 3, and levels above 40%   indicate   an INR lessthan 2.       ASSESSMENT / PLAN  INR assessment THER    Recheck INR In: 6 WEEKS    INR Location Clinic      Anticoagulation Summary as of 2/8/2018     INR goal 2.5-3.5   Today's INR 3.0   Maintenance plan 4 mg (4 mg x 1) on Mon, Wed, Fri; 6 mg (4 mg x 1.5) all other days   Full instructions 4 mg on Mon, Wed, Fri; 6 mg all other days   Weekly total 36 mg   No change documented Monica Leslie, RN   Plan last modified Monica Leslie, RN (5/4/2017)   Next INR check 3/29/2018   Target end date Indefinite    Indications   Long-term (current) use of anticoagulants [Z79.01] [Z79.01]  S/P AVR (aortic valve replacement) [Z95.2]         Anticoagulation Episode Summary     INR check location     Preferred lab     Send INR reminders to [facility]    Comments        Anticoagulation Care Providers     Provider Role Specialty Phone number    Rehan Lorenzana MD [facility] [phone]            See the Encounter Report to view Anticoagulation Flowsheet and Dosing Calendar (Go to Encounters tab in chart review, and find the Anticoagulation Therapy Visit)    Dosage adjustment made based on physician directed care plan.    Monica Leslie RN

## 2018-02-18 ENCOUNTER — MYC MEDICAL ADVICE (OUTPATIENT)
Dept: FAMILY MEDICINE | Facility: CLINIC | Age: 63
End: 2018-02-18

## 2018-02-19 ENCOUNTER — MYC MEDICAL ADVICE (OUTPATIENT)
Dept: FAMILY MEDICINE | Facility: CLINIC | Age: 63
End: 2018-02-19

## 2018-02-19 DIAGNOSIS — E03.9 ACQUIRED HYPOTHYROIDISM: Primary | ICD-10-CM

## 2018-02-19 RX ORDER — LEVOTHYROXINE SODIUM 200 UG/1
200 TABLET ORAL DAILY
Qty: 30 TABLET | Refills: 1 | Status: SHIPPED | OUTPATIENT
Start: 2018-02-19 | End: 2018-04-22

## 2018-02-19 NOTE — TELEPHONE ENCOUNTER
I would go back to the 200 mcg thyroid pills, if you don't have some, then I will order them. I don't think that the sertraline is influencing it but you could go to half pill dose if you think it is. I'd change the thyroid dose first

## 2018-02-19 NOTE — TELEPHONE ENCOUNTER
Med sent for #30 and 1 refill.  To RTC in 6 weeks.  Arabella Gonzales RN      February 19, 2018   Tennille Mares, RN   to Jovon Mantilla           4:47 PM   Esteban Sigala,     Here is a response from ZION LORENZANA MD:     I would go back to the 200 mcg thyroid pills, if you don't have some, then I will order them. I don't think that the sertraline is influencing it but you could go to half pill dose if you think it is. I'd change the thyroid dose first.     Let us know about this. We will wait for your response. Also, confirm which pharmacy you want to use.     Thanks,   Zion Lorenzana MD/Tennille RN          Entered by Zion Lorenzana MD at 2/11/2018  9:12 PM   Read by Jovon Mantilla at 2/18/2018  7:46 PM   tsh is still higher than your usual , How have you been feeling and how are your palpitations.?     See me in the next six weeks to check on this and your  Progress.

## 2018-02-19 NOTE — TELEPHONE ENCOUNTER
ZION HOLLOWAY MD, see [employer] response, assume f/u appointment before pt leaves on 3/8/18, confirm plan with pt via Mobile [employer]    Last OV: 1/3/18  Reason for visit: thyroid    See me in the next six weeks to check on this and your  Progress.    TSH   Date Value Ref Range Status   02/08/2018 15.84 (H) 0.40 - 4.00 mU/L Final   ]  Tennille Mares RN, BSN  Message handled by Nurse Triage.

## 2018-02-20 NOTE — TELEPHONE ENCOUNTER
Pt reviewed ZION HOLLOWAY MD note, will close and wait for pt to contact us  Tennille Mares RN, BSN  Message handled by Nurse Triage.

## 2018-03-16 DIAGNOSIS — I10 HTN (HYPERTENSION): Primary | ICD-10-CM

## 2018-03-16 RX ORDER — CARVEDILOL 25 MG/1
25 TABLET ORAL 2 TIMES DAILY WITH MEALS
Qty: 180 TABLET | Refills: 3 | Status: SHIPPED | OUTPATIENT
Start: 2018-03-16 | End: 2018-05-07

## 2018-03-27 ENCOUNTER — OFFICE VISIT (OUTPATIENT)
Dept: FAMILY MEDICINE | Facility: CLINIC | Age: 63
End: 2018-03-27
Payer: COMMERCIAL

## 2018-03-27 VITALS
RESPIRATION RATE: 14 BRPM | BODY MASS INDEX: 29.95 KG/M2 | SYSTOLIC BLOOD PRESSURE: 137 MMHG | WEIGHT: 213.2 LBS | TEMPERATURE: 97.6 F | HEART RATE: 76 BPM | DIASTOLIC BLOOD PRESSURE: 85 MMHG | OXYGEN SATURATION: 96 %

## 2018-03-27 DIAGNOSIS — E03.9 ACQUIRED HYPOTHYROIDISM: Primary | ICD-10-CM

## 2018-03-27 LAB
T4 FREE SERPL-MCNC: 1.12 NG/DL (ref 0.76–1.46)
TSH SERPL DL<=0.005 MIU/L-ACNC: 2.76 MU/L (ref 0.4–4)

## 2018-03-27 PROCEDURE — 84443 ASSAY THYROID STIM HORMONE: CPT | Performed by: FAMILY MEDICINE

## 2018-03-27 PROCEDURE — 99213 OFFICE O/P EST LOW 20 MIN: CPT | Performed by: FAMILY MEDICINE

## 2018-03-27 PROCEDURE — 84439 ASSAY OF FREE THYROXINE: CPT | Performed by: FAMILY MEDICINE

## 2018-03-27 PROCEDURE — 36415 COLL VENOUS BLD VENIPUNCTURE: CPT | Performed by: FAMILY MEDICINE

## 2018-03-27 NOTE — MR AVS SNAPSHOT
After Visit Summary   3/27/2018    Jovon Mantilla    MRN: [id number]           Patient Information     Date Of Birth          1955        Visit Information        Provider Department      3/27/2018 2:00 PM Rehan Lorenzana MD [facility]        Today's Diagnoses     Acquired hypothyroidism    -  1       Follow-ups after your visit        Your next 10 appointments already scheduled     Mar 29, 2018  4:20 PM CDT   Anticoagulation Visit with CP ANTI COAG   [facility]    [address] [id number] [phone]            Apr 24, 2018  4:15 PM CDT   Return Visit with Lloyd Lewis MD   [facility] ([facility])    [address]  [facility] [id number] [phone] OPT 2              Who to contact     If you have questions or need follow up information about today's clinic visit or your schedule please contact [facility] directly at [phone].  Normal or non-critical lab and imaging results will be communicated to you by [employer], letter or phone within 4 business days after the clinic has received the results. If you do not hear from us within 7 days, please contact the clinic through [employer] or phone. If you have a critical or abnormal lab result, we will notify you by phone as soon as possible.  Submit refill requests through [employer] or call your pharmacy and they will forward the refill request to us. Please allow 3 business days for your refill to be completed.          Additional Information About Your Visit        [employer] Information     [employer] gives you secure access to your electronic health record. If you see a primary care provider, you can also send messages to your care team and make appointments. If you have questions, please call your primary care clinic.  If you do not have a  primary care provider, please call [phone] and they will assist you.        Care EveryWhere ID     This is your Care EveryWhere ID. This could be used by other organizations to access your [facility] medical records  [id number]        Your Vitals Were     Pulse Temperature Respirations Pulse Oximetry BMI (Body Mass Index)       76 97.6  F (36.4  C) (Oral) 14 96% 29.95 kg/m2        Blood Pressure from Last 3 Encounters:   03/27/18 137/85   01/03/18 110/71   12/26/17 132/89    Weight from Last 3 Encounters:   03/27/18 213 lb 3.2 oz (96.7 kg)   01/11/18 209 lb 6.4 oz (95 kg)   01/03/18 206 lb 11.2 oz (93.8 kg)              We Performed the Following     T4, free     TSH        Primary Care Provider Office Phone # Fax #    Rehan Matheus Lorenzana [phone] [address] [address] [address]        Equal Access to Services     [facility]: Hadii aad ku hadasho Soomaali, waaxda luqadaha, qaybta kaalmada adeegyada, waxay idiin haykenyn roseline kaufman . So [facility] [phone].    ATENCIÓN: Si habla español, tiene a pfeiffer disposición servicios gratuitos de asistencia lingüística. Ll[facility] [phone].    We comply with applicable federal civil rights laws and [place] laws. We do not discriminate on the basis of race, color, national origin, age, disability, sex, sexual orientation, or gender identity.            Thank you!     Thank you for choosing [facility]  for your care. Our goal is always to provide you with excellent care. Hearing back from our patients is one way we can continue to improve our services. Please take a few minutes to complete the written survey that you may receive in the mail after your visit with us. Thank you!             Your Updated Medication List - Protect others around you: Learn how to safely use, store and throw away your medicines at www.disposemymeds.org.          This list is accurate as of 3/27/18  2:48 PM.  Always use your most  recent med list.                   Brand Name Dispense Instructions for use Diagnosis    albuterol 108 (90 BASE) MCG/ACT Inhaler    PROAIR HFA/PROVENTIL HFA/VENTOLIN HFA    1 Inhaler    Inhale 2 puffs into the lungs every 6 hours as needed for shortness of breath / dyspnea or wheezing    Acute bronchitis due to Mycoplasma pneumoniae       ALLERGY 24-HR PO      Take by mouth daily        aspirin 81 MG tablet      Take 81 mg by mouth every other day        CALCIUM 600+D 600-200 MG-UNIT Tabs   Generic drug:  calcium carbonate-vitamin D      Take 1 tablet by mouth 2 times daily        carvedilol 25 MG tablet    COREG    180 tablet    Take 1 tablet (25 mg) by mouth 2 times daily (with meals)    HTN (hypertension)       CENTRUM Chew      Take 1 tablet by mouth At Bedtime        digoxin 125 MCG tablet    LANOXIN    90 tablet    Take 1 tablet (125 mcg) by mouth daily    Atrial fibrillation (H)       Fiber 0.52 G Caps      Take 2 tablets by mouth At Bedtime        IRON SUPPLEMENT 325 (65 FE) MG tablet   Generic drug:  ferrous sulfate     100 tablet    Take 1 tablet (325 mg) by mouth 2 times daily        levothyroxine 200 MCG tablet    SYNTHROID/LEVOTHROID    30 tablet    Take 1 tablet (200 mcg) by mouth daily    Acquired hypothyroidism       lisinopril 20 MG tablet    PRINIVIL/ZESTRIL    135 tablet    Take  20 mg (1 tablet in am) and 10 mg  (one half tablet) in  pm    ASVD (arteriosclerotic vascular disease)       sertraline 50 MG tablet    ZOLOFT    90 tablet    Take 1 tablet (50 mg) by mouth daily    Anxiety       simvastatin 20 MG tablet    ZOCOR    90 tablet    Take 1 tablet (20 mg) by mouth At Bedtime    Hyperlipidemia LDL goal <70       triamcinolone 0.1 % cream    KENALOG    30 g    Apply sparingly to affected area three times daily for 14 days.    Atopic dermatitis, unspecified type       Vitamin B-12 500 MCG Subl      Place 500 mcg under the tongue daily        VITAMIN C PO      Take 500 mg by mouth At Bedtime         vitamin D 2000 UNITS Caps      Take 1 tablet by mouth daily        warfarin 4 MG tablet    COUMADIN    130 tablet    TAKE 1 TABLET MONDAY, WEDNESDAY, AND FRIDAY AND ONE AND ONE-HALF TABLETS (6 MG) ALL OTHER DAYS    Long term current use of anticoagulant therapy

## 2018-03-30 ENCOUNTER — ANTICOAGULATION THERAPY VISIT (OUTPATIENT)
Dept: NURSING | Facility: CLINIC | Age: 63
End: 2018-03-30
Payer: COMMERCIAL

## 2018-03-30 DIAGNOSIS — Z79.01 LONG-TERM (CURRENT) USE OF ANTICOAGULANTS: ICD-10-CM

## 2018-03-30 DIAGNOSIS — Z95.2 S/P AVR (AORTIC VALVE REPLACEMENT): ICD-10-CM

## 2018-03-30 LAB — INR POINT OF CARE: 2.9 (ref 0.86–1.14)

## 2018-03-30 PROCEDURE — 36416 COLLJ CAPILLARY BLOOD SPEC: CPT

## 2018-03-30 PROCEDURE — 85610 PROTHROMBIN TIME: CPT | Mod: QW

## 2018-03-30 NOTE — PROGRESS NOTES
ANTICOAGULATION FOLLOW-UP CLINIC VISIT    Patient Name:  Jovon aMntilla  Date:  3/30/2018  Contact Type:  Face to Face    SUBJECTIVE: Patient here for routine INR.  [place] was on [place] and assessment questions negative for changes or symptoms of concern.  He is feeling well, everything is the same.  Continue same dose of warfarin and recheck.  Patient agreeable to plan of care.     Patient Findings     Positives No Problem Findings           OBJECTIVE    INR Protime   Date Value Ref Range Status   03/30/2018 2.9 (A) 0.86 - 1.14 Final     Chromogenic Factor 10   Date Value Ref Range Status   01/21/2011 35 (L) 60 - 140 % Final     Comment:     Therapeutic Range: A Chromogenic Factor 10 level of 20-40% inversely   correlates   with an INR of 2-3 for patients receiving Warfarin. Chromogenic Factor 10   levels below 20% indicate an INR greater than 3, and levels above 40%   indicate   an INR lessthan 2.       ASSESSMENT / PLAN  INR assessment THER    Recheck INR In: 6 WEEKS    INR Location Clinic      Anticoagulation Summary as of 3/30/2018     INR goal 2.5-3.5   Today's INR 2.9   Maintenance plan 4 mg (4 mg x 1) on Mon, Wed, Fri; 6 mg (4 mg x 1.5) all other days   Full instructions 4 mg on Mon, Wed, Fri; 6 mg all other days   Weekly total 36 mg   No change documented Monica Leslie, RN   Plan last modified Monica Leslie, RN (5/4/2017)   Next INR check 5/17/2018   Target end date Indefinite    Indications   Long-term (current) use of anticoagulants [Z79.01] [Z79.01]  S/P AVR (aortic valve replacement) [Z95.2]         Anticoagulation Episode Summary     INR check location     Preferred lab     Send INR reminders to [facility] CL[facility]    Comments       Anticoagulation Care Providers     Provider Role Specialty Phone number    Rehan Lorenzana MD [facility] [phone]            See the Encounter Report to view Anticoagulation Flowsheet and Dosing Calendar (Go to Encounters tab in  chart review, and find the Anticoagulation Therapy Visit)    Dosage adjustment made based on physician directed care plan.    Monica Leslie RN

## 2018-03-30 NOTE — MR AVS SNAPSHOT
Jovon Mantilla   3/30/2018 1:20 PM   Anticoagulation Therapy Visit    Description:  62 year old male   Provider:  JIMMY ANTI COAG   Department:  Cp Nurse           INR as of 3/30/2018     Today's INR 2.9      Anticoagulation Summary as of 3/30/2018     INR goal 2.5-3.5   Today's INR 2.9   Full instructions 4 mg on Mon, Wed, Fri; 6 mg all other days   Next INR check 5/17/2018    Indications   Long-term (current) use of anticoagulants [Z79.01] [Z79.01]  S/P AVR (aortic valve replacement) [Z95.2]         Your next Anticoagulation Clinic appointment(s)     May 17, 2018  4:20 PM CDT   Anticoagulation Visit with JIMMY ANTI ALEJAG   [facility]    [address] [place] [id number] [phone]              Contact Numbers     [facility]  Please call [phone] or [phone]  to cancel and/or reschedule your appointment.   Please call [phone] with any problems or questions regarding your therapy          March 2018 Details    Sun Mon Tue Wed Thu Fri Sat         1               2               3                 4               5               6               7               8               9               10                 11               12               13               14               15               16               17                 18               19               20               21               22               23               24                 25               26               27               28               29               30      4 mg   See details      31      6 mg          Date Details   03/30 This INR check               How to take your warfarin dose     To take:  4 mg Take 1 of the 4 mg tablets.    To take:  6 mg Take 1.5 of the 4 mg tablets.           April 2018 Details    Sun Mon Tue Wed Thu Fri Sat     1      6 mg         2      4 mg         3      6 mg         4      4 mg         5      6  mg         6      4 mg         7      6 mg           8      6 mg         9      4 mg         10      6 mg         11      4 mg         12      6 mg         13      4 mg         14      6 mg           15      6 mg         16      4 mg         17      6 mg         18      4 mg         19      6 mg         20      4 mg         21      6 mg           22      6 mg         23      4 mg         24      6 mg         25      4 mg         26      6 mg         27      4 mg         28      6 mg           29      6 mg         30      4 mg               Date Details   No additional details            How to take your warfarin dose     To take:  4 mg Take 1 of the 4 mg tablets.    To take:  6 mg Take 1.5 of the 4 mg tablets.           May 2018 Details    Sun Mon Tue Wed Thu Fri Sat       1      6 mg         2      4 mg         3      6 mg         4      4 mg         5      6 mg           6      6 mg         7      4 mg         8      6 mg         9      4 mg         10      6 mg         11      4 mg         12      6 mg           13      6 mg         14      4 mg         15      6 mg         16      4 mg         17            18               19                 20               21               22               23               24               25               26                 27               28               29               30               31                  Date Details   No additional details    Date of next INR:  5/17/2018         How to take your warfarin dose     To take:  4 mg Take 1 of the 4 mg tablets.    To take:  6 mg Take 1.5 of the 4 mg tablets.

## 2018-04-03 DIAGNOSIS — I70.90 ASVD (ARTERIOSCLEROTIC VASCULAR DISEASE): ICD-10-CM

## 2018-04-03 RX ORDER — LISINOPRIL 20 MG/1
TABLET ORAL
Qty: 45 TABLET | Refills: 0 | Status: SHIPPED | OUTPATIENT
Start: 2018-04-03 | End: 2018-05-07

## 2018-04-22 DIAGNOSIS — E03.9 ACQUIRED HYPOTHYROIDISM: ICD-10-CM

## 2018-04-23 RX ORDER — LEVOTHYROXINE SODIUM 200 UG/1
TABLET ORAL
Qty: 30 TABLET | Refills: 0 | Status: SHIPPED | OUTPATIENT
Start: 2018-04-23 | End: 2018-04-23

## 2018-04-23 RX ORDER — LEVOTHYROXINE SODIUM 200 UG/1
200 TABLET ORAL DAILY
Qty: 90 TABLET | Refills: 1 | Status: SHIPPED | OUTPATIENT
Start: 2018-04-23 | End: 2018-10-18

## 2018-04-23 NOTE — TELEPHONE ENCOUNTER
Pt calls, out of thyroid pills before mail order Rx arrives.    3/27/18 thyroid lab results note per PCP: Your thyroid is perfect on paper. That vacation did you a world of good with the right pills. Come see me in four months and we'll recheck it  #30 to local [facility]  Pt requests refill to mail order. 90 + 1. Pt agrees to see PCP in 4 mos for recheck. One extra refill because we know mail pharmacy will request as soon as 90 days sent.  Prescription approved per [facility] Refill Protocol.  Casey Lieberman, RN

## 2018-04-24 ENCOUNTER — OFFICE VISIT (OUTPATIENT)
Dept: CARDIOLOGY | Facility: CLINIC | Age: 63
End: 2018-04-24
Attending: INTERNAL MEDICINE
Payer: COMMERCIAL

## 2018-04-24 VITALS
BODY MASS INDEX: 31.01 KG/M2 | HEART RATE: 94 BPM | HEIGHT: 71 IN | WEIGHT: 221.5 LBS | DIASTOLIC BLOOD PRESSURE: 86 MMHG | SYSTOLIC BLOOD PRESSURE: 126 MMHG

## 2018-04-24 DIAGNOSIS — I10 HYPERTENSION GOAL BP (BLOOD PRESSURE) < 140/90: ICD-10-CM

## 2018-04-24 DIAGNOSIS — I48.21 PERMANENT ATRIAL FIBRILLATION (H): ICD-10-CM

## 2018-04-24 DIAGNOSIS — I35.0 NONRHEUMATIC AORTIC VALVE STENOSIS: ICD-10-CM

## 2018-04-24 DIAGNOSIS — I25.10 CORONARY ARTERY DISEASE INVOLVING NATIVE CORONARY ARTERY OF NATIVE HEART WITHOUT ANGINA PECTORIS: ICD-10-CM

## 2018-04-24 DIAGNOSIS — I42.9 IDIOPATHIC CARDIOMYOPATHY (H): ICD-10-CM

## 2018-04-24 DIAGNOSIS — Z95.2 S/P AORTIC VALVE REPLACEMENT: ICD-10-CM

## 2018-04-24 PROCEDURE — 99214 OFFICE O/P EST MOD 30 MIN: CPT | Performed by: INTERNAL MEDICINE

## 2018-04-24 NOTE — LETTER
4/24/2018      Rehan Lorenzana MD  [address] [address] [address]      RE: Jovon Mantilla       Dear Colleague,    I had the pleasure of seeing Jovon Mantilla in the [facility].    Service Date: 04/24/2018      PRIMARY CARE PHYSICIAN:  Dr. Rehan Lorenzana.      HISTORY OF PRESENT ILLNESS:  I again had the pleasure of seeing your patient, Jovon Mantilla, at [facility] for evaluation of a dilated cardiomyopathy, coronary artery disease, permanent atrial fibrillation, aortic valve replacement and hypertension.  The patient had a history of permanent atrial fibrillation with controlled ventricular response.  His ejection fraction generally held around 40%-50%.  He has had mild to moderate mitral regurgitation and mild tricuspid insufficiency.  He developed worsening aortic stenosis and his aortic valve was replaced 01/07/2011 with an ATS 24 mm  supra-annular valve.  A maze procedure was performed but we were unable to maintain normal sinus rhythm postoperatively.  He returned to the operating room for mediastinal exploration for bleeding and then developed a GI bleed thereafter.  He has remained stable since that time.  He has not had any significant bradyarrhythmias requiring a pacemaker.  Digoxin was added by the electrophysiologist for rate control and his last level performed in February was 1.0.  The patient denies syncope, presyncope or palpitations.  He continues to walk at night for exercise free of chest pain or shortness of breath.  He has a previously known 70% LAD stenosis which could not be bypassed because it was intramuscular.  This was noted in 2010.  A Lexiscan nuclear stress test performed in 02/2018 demonstrated a fixed inferior, inferolateral, inferoseptal basal defect possibly consistent with prior infarct versus diaphragmatic attenuation artifact.  There was no real wall motion abnormalities noted on echocardiogram in  01/2017.  There was no evidence of anterior wall ischemia on this test.  It was decided to continue treating him medically.  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  He has chronic right greater than left extremity peripheral edema due to previous vein stripping and varicose veins.  He has undergone bariatric surgery in the past and has remained reasonably stable.  He did not feel well when we increased his lisinopril to 20 mg b.i.d.  He is now taking 20 mg by mouth in the morning and 10 mg in the evening.      PHYSICAL EXAMINATION:  As listed below.      ASSESSMENT:   1.  Jovon Mantilla is a delightful 62-year-old male with an idiopathic cardiomyopathy out of proportion to the aortic valve disease or coronary artery disease.  Echocardiography from 01/2017 demonstrated ejection fraction of 45%-50% which is stable and mild concentric left ventricular hypertrophy.  There continues to be severe biatrial enlargement, mild to moderate mitral regurgitation, mild tricuspid insufficiency.  The inferior vena cava appeared dilated which may explain some of his peripheral edema as well.  Right ventricular systolic pressure was elevated consistent with mild to moderate pulmonary hypertension.  The mechanical aortic valve was functioning normally.  His rhythm remains atrial fibrillation with controlled ventricular response.  His blood pressure is currently well controlled and he shows no signs of left or right heart failure.  We will continue to monitor on his current medications.   2.  Permanent atrial fibrillation.  He is currently rate controlled and doing well.  I have made no medication changes.   3.  Coronary artery disease.  His nuclear stress test was probably spurious and I doubt he has had a previous inferior wall MI.  We will continue to monitor.  At the time of his previous aortic valve surgery his right and circumflex coronary arteries were normal.      It is my pleasure to assist in the  care of Mr. Chas Mantilla.  He will continue with SBE prophylaxis.  I will see him again in 1 year should he remain stable.  A digoxin level can be performed annually through his primary care's office.  All the patient's questions were answered to his satisfaction.      Dylan Márquez MD       cc:   [place] [address]         DYLAN MÁRQUEZ MD, [facility]             D: 2018   T: 2018   MT: RAYSHAWN      Name:     CHAS MANTILLA   MRN:      [id number]        Account:      [id number]   :      1955           Service Date: 2018      Document: [id number]         Outpatient Encounter Prescriptions as of 2018   Medication Sig Dispense Refill     albuterol (PROAIR HFA/PROVENTIL HFA/VENTOLIN HFA) 108 (90 BASE) MCG/ACT Inhaler Inhale 2 puffs into the lungs every 6 hours as needed for shortness of breath / dyspnea or wheezing 1 Inhaler 0     Ascorbic Acid (VITAMIN C PO) Take 500 mg by mouth At Bedtime       aspirin 81 MG tablet Take 81 mg by mouth every other day       Calcium Carbonate-Vitamin D (CALCIUM 600+D) 600-200 MG-UNIT TABS Take 1 tablet by mouth 2 times daily        carvedilol (COREG) 25 MG tablet Take 1 tablet (25 mg) by mouth 2 times daily (with meals) 180 tablet 3     Cholecalciferol (VITAMIN D) 2000 UNIT CAPS Take 1 tablet by mouth daily        Cyanocobalamin (VITAMIN B-12) 500 MCG SUBL Place 500 mcg under the tongue daily        digoxin (LANOXIN) 125 MCG tablet Take 1 tablet (125 mcg) by mouth daily 90 tablet 3     ferrous sulfate (IRON SUPPLEMENT) 325 (65 FE) MG tablet Take 1 tablet (325 mg) by mouth 2 times daily 100 tablet      Fexofenadine HCl (ALLERGY 24-HR PO) Take by mouth daily       levothyroxine (SYNTHROID/LEVOTHROID) 200 MCG tablet Take 1 tablet (200 mcg) by mouth daily 90 tablet 1     lisinopril (PRINIVIL/ZESTRIL) 20 MG tablet Take  20 mg (1 tablet in am) and 10 mg  (one half tablet) in  pm 45  tablet 0     Multiple Vitamins-Minerals (CENTRUM) CHEW Take 1 tablet by mouth At Bedtime       Psyllium (FIBER) 0.52 GM CAPS Take 2 tablets by mouth At Bedtime        sertraline (ZOLOFT) 50 MG tablet Take 1 tablet (50 mg) by mouth daily 90 tablet 1     simvastatin (ZOCOR) 20 MG tablet Take 1 tablet (20 mg) by mouth At Bedtime 90 tablet 3     triamcinolone (KENALOG) 0.1 % cream Apply sparingly to affected area three times daily for 14 days. 30 g 0     warfarin (COUMADIN) 4 MG tablet TAKE 1 TABLET MONDAY, WEDNESDAY, AND FRIDAY AND ONE AND ONE-HALF TABLETS (6 MG) ALL OTHER DAYS 130 tablet 1     No facility-administered encounter medications on file as of 4/24/2018.        Again, thank you for allowing me to participate in the care of your patient.      Sincerely,    Lloyd Lewis MD     [facility]

## 2018-04-24 NOTE — LETTER
4/24/2018    Rehan Lorenzana MD  [address] [address] [facility] [address]    RE: Jovon Mantilla       Dear Colleague,    I had the pleasure of seeing Jovon Mantilla in the [facility].    HPI and Plan:   See dictation:[id number]    Orders Placed This Encounter   Procedures     Follow-Up with Cardiologist       No orders of the defined types were placed in this encounter.      There are no discontinued medications.      Encounter Diagnoses   Name Primary?     Idiopathic cardiomyopathy (H)      Permanent atrial fibrillation (H)      Nonrheumatic aortic valve stenosis      S/P aortic valve replacement      Hypertension goal BP (blood pressure) < 140/90      Coronary artery disease involving native coronary artery of native heart without angina pectoris        CURRENT MEDICATIONS:  Current Outpatient Prescriptions   Medication Sig Dispense Refill     albuterol (PROAIR HFA/PROVENTIL HFA/VENTOLIN HFA) 108 (90 BASE) MCG/ACT Inhaler Inhale 2 puffs into the lungs every 6 hours as needed for shortness of breath / dyspnea or wheezing 1 Inhaler 0     Ascorbic Acid (VITAMIN C PO) Take 500 mg by mouth At Bedtime       aspirin 81 MG tablet Take 81 mg by mouth every other day       Calcium Carbonate-Vitamin D (CALCIUM 600+D) 600-200 MG-UNIT TABS Take 1 tablet by mouth 2 times daily        carvedilol (COREG) 25 MG tablet Take 1 tablet (25 mg) by mouth 2 times daily (with meals) 180 tablet 3     Cholecalciferol (VITAMIN D) 2000 UNIT CAPS Take 1 tablet by mouth daily        Cyanocobalamin (VITAMIN B-12) 500 MCG SUBL Place 500 mcg under the tongue daily        digoxin (LANOXIN) 125 MCG tablet Take 1 tablet (125 mcg) by mouth daily 90 tablet 3     ferrous sulfate (IRON SUPPLEMENT) 325 (65 FE) MG tablet Take 1 tablet (325 mg) by mouth 2 times daily 100 tablet      Fexofenadine HCl (ALLERGY 24-HR PO) Take by mouth daily       levothyroxine (SYNTHROID/LEVOTHROID) 200 MCG tablet Take 1 tablet (200 mcg) by  mouth daily 90 tablet 1     lisinopril (PRINIVIL/ZESTRIL) 20 MG tablet Take  20 mg (1 tablet in am) and 10 mg  (one half tablet) in  pm 45 tablet 0     Multiple Vitamins-Minerals (CENTRUM) CHEW Take 1 tablet by mouth At Bedtime       Psyllium (FIBER) 0.52 GM CAPS Take 2 tablets by mouth At Bedtime        sertraline (ZOLOFT) 50 MG tablet Take 1 tablet (50 mg) by mouth daily 90 tablet 1     simvastatin (ZOCOR) 20 MG tablet Take 1 tablet (20 mg) by mouth At Bedtime 90 tablet 3     triamcinolone (KENALOG) 0.1 % cream Apply sparingly to affected area three times daily for 14 days. 30 g 0     warfarin (COUMADIN) 4 MG tablet TAKE 1 TABLET MONDAY, WEDNESDAY, AND FRIDAY AND ONE AND ONE-HALF TABLETS (6 MG) ALL OTHER DAYS 130 tablet 1       ALLERGIES   No Known Allergies    PAST MEDICAL HISTORY:  Past Medical History:   Diagnosis Date     Acute prostatitis      Acute sinusitis, unspecified      Aortic valve disease     AVR 2011, MAZE     Atrial fibrillation (H)      CAD (coronary artery disease)     known 70% LAD stenosis     Cardiomyopathy (H)      HTN (hypertension)      Mitral regurgitation      Obesity, unspecified      Palpitations      Permanent atrial fibrillation (H) 6/2/2017     Unspecified essential hypertension        PAST SURGICAL HISTORY:  Past Surgical History:   Procedure Laterality Date     ANGIOGRAM  2/02    Normal coronary,dilated LV,Sev.decr.global LVF,+1 MR     ANGIOGRAM  1/08    Mild AS,Mod PHTN,mod prox.LAD disease,Triv.CFX disease     ANGIOGRAM  1/09    Mod AS,Mod PHTN,mod prox.LAD disease     ANGIOGRAM  10/10    Sev.AS,CM,global hypokinesis,Mild-mod LV dilated,Mild MR,70% prox.LAD lesion,PHTN     C NONSPECIFIC PROCEDURE  ~1985    vein stripping     CARDIOVERSION  2/02, 4/02, 4/11     CHOLECYSTECTOMY       COLONOSCOPY N/A 1/23/2016    Procedure: COLONOSCOPY;  Surgeon: Robyn Collins MD;  Location:  GI     ESOPHAGOSCOPY, GASTROSCOPY, DUODENOSCOPY (EGD), COMBINED N/A 1/23/2016    Procedure:  COMBINED ESOPHAGOSCOPY, GASTROSCOPY, DUODENOSCOPY (EGD);  Surgeon: Robyn Collins MD;  Location:  GI     LAPAROSCOPIC BYPASS GASTRIC  10/8/2011    Procedure:LAPAROSCOPIC BYPASS GASTRIC; Diagnostic laparoscopy, Lysis of Adhesions, Laparoscopic Revision of Jejunojejunostomy; Surgeon:RADHA MURO; Location: OR     LAPAROSCOPIC BYPASS GASTRIC  6/26/2006    Dr Jin     LAPAROSCOPY DIAGNOSTIC (GENERAL)  10/8/2011    Procedure:LAPAROSCOPY DIAGNOSTIC (GENERAL); Surgeon:RADHA MURO; Location: OR     REPLACE VALVE AORTIC  1/7/2011       FAMILY HISTORY:  Family History   Problem Relation Age of Onset     Cancer - colorectal Father      Colon Cancer Father      DIABETES Brother        SOCIAL HISTORY:  Social History     Social History     Marital status:      Spouse name: Tracey     Number of children: 0     Years of education: N/A     Occupational History           [employer]     Social History Main Topics     Smoking status: Never Smoker     Smokeless tobacco: Never Used     Alcohol use No     Drug use: No     Sexual activity: Yes     Partners: Female     Other Topics Concern     Parent/Sibling W/ Cabg, Mi Or Angioplasty Before 65f 55m? Yes     Blood Transfusions No     Caffeine Concern Yes     1 can daily     Occupational Exposure Yes     Hobby Hazards No     Sleep Concern No     Stress Concern No     Weight Concern No     Special Diet Yes     low sodium, low calories     Back Care No     Exercise Yes     walks daily     Bike Helmet Not Asked     NA     Seat Belt Yes     Self-Exams Yes     Social History Narrative       Review of Systems:  Skin:  Negative       Eyes:  Positive for glasses    ENT:  Negative      Respiratory:  Negative       Cardiovascular:  Negative      Gastroenterology: Negative      Genitourinary:  Negative      Musculoskeletal:  Negative      Neurologic:  Negative      Psychiatric:  Negative      Heme/Lymph/Imm:  Negative      Endocrine:  Positive for  "thyroid disorder      Physical Exam:  Vitals: /86  Pulse 94  Ht 1.797 m (5' 10.75\")  Wt 100.5 kg (221 lb 8 oz)  BMI 31.11 kg/m2    Constitutional:  cooperative, alert and oriented, well developed, well nourished, in no acute distress        Skin:  warm and dry to the touch, no apparent skin lesions or masses noted          Head:  normocephalic, no masses or lesions        Eyes:  sclera white;pupils equal and round;conjunctivae and lids unremarkable;no xanthalasma;EOMS intact        Lymph:      ENT:  no pallor or cyanosis, dentition good        Neck:  carotid pulses are full and equal bilaterally, JVP normal, no carotid bruit        Respiratory:  normal breath sounds, clear to auscultation, normal A-P diameter, normal symmetry, normal respiratory excursion, no use of accessory muscles;healed median sternotomy scar         Cardiac: normal S1 and S2 irregularly irregular rhythm   crisp prosthetic valve sounds systolic ejection murmur;LLSB;grade 2;radiation to the RUSB systolic murmur;grade 2;LLSB;radiation to the base   blowing  pulses full and equal                                        GI:  abdomen soft, non-tender, BS normoactive, no mass, no HSM, no bruits        Extremities and Muscular Skeletal:  no deformities, clubbing, cyanosis, erythema observed     1+;pitting;RLE edema LLE edema;trace;pitting right lower extremity venous vericosities    Neurological:  no gross motor deficits;affect appropriate        Psych:  Alert and Oriented x 3        CC  Lloyd Lewis MD  [address]  [place] [id number]                Thank you for allowing me to participate in the care of your patient.      Sincerely,     Lloyd Lewis MD     [facility]    cc:   Lloyd Lewis MD  [address]  [place] [id number]        "

## 2018-04-24 NOTE — PROGRESS NOTES
HPI and Plan:   See dictation:[id number]    Orders Placed This Encounter   Procedures     Follow-Up with Cardiologist       No orders of the defined types were placed in this encounter.      There are no discontinued medications.      Encounter Diagnoses   Name Primary?     Idiopathic cardiomyopathy (H)      Permanent atrial fibrillation (H)      Nonrheumatic aortic valve stenosis      S/P aortic valve replacement      Hypertension goal BP (blood pressure) < 140/90      Coronary artery disease involving native coronary artery of native heart without angina pectoris        CURRENT MEDICATIONS:  Current Outpatient Prescriptions   Medication Sig Dispense Refill     albuterol (PROAIR HFA/PROVENTIL HFA/VENTOLIN HFA) 108 (90 BASE) MCG/ACT Inhaler Inhale 2 puffs into the lungs every 6 hours as needed for shortness of breath / dyspnea or wheezing 1 Inhaler 0     Ascorbic Acid (VITAMIN C PO) Take 500 mg by mouth At Bedtime       aspirin 81 MG tablet Take 81 mg by mouth every other day       Calcium Carbonate-Vitamin D (CALCIUM 600+D) 600-200 MG-UNIT TABS Take 1 tablet by mouth 2 times daily        carvedilol (COREG) 25 MG tablet Take 1 tablet (25 mg) by mouth 2 times daily (with meals) 180 tablet 3     Cholecalciferol (VITAMIN D) 2000 UNIT CAPS Take 1 tablet by mouth daily        Cyanocobalamin (VITAMIN B-12) 500 MCG SUBL Place 500 mcg under the tongue daily        digoxin (LANOXIN) 125 MCG tablet Take 1 tablet (125 mcg) by mouth daily 90 tablet 3     ferrous sulfate (IRON SUPPLEMENT) 325 (65 FE) MG tablet Take 1 tablet (325 mg) by mouth 2 times daily 100 tablet      Fexofenadine HCl (ALLERGY 24-HR PO) Take by mouth daily       levothyroxine (SYNTHROID/LEVOTHROID) 200 MCG tablet Take 1 tablet (200 mcg) by mouth daily 90 tablet 1     lisinopril (PRINIVIL/ZESTRIL) 20 MG tablet Take  20 mg (1 tablet in am) and 10 mg  (one half tablet) in  pm 45 tablet 0     Multiple Vitamins-Minerals (CENTRUM) CHEW Take 1 tablet by mouth At Bedtime        Psyllium (FIBER) 0.52 GM CAPS Take 2 tablets by mouth At Bedtime        sertraline (ZOLOFT) 50 MG tablet Take 1 tablet (50 mg) by mouth daily 90 tablet 1     simvastatin (ZOCOR) 20 MG tablet Take 1 tablet (20 mg) by mouth At Bedtime 90 tablet 3     triamcinolone (KENALOG) 0.1 % cream Apply sparingly to affected area three times daily for 14 days. 30 g 0     warfarin (COUMADIN) 4 MG tablet TAKE 1 TABLET MONDAY, WEDNESDAY, AND FRIDAY AND ONE AND ONE-HALF TABLETS (6 MG) ALL OTHER DAYS 130 tablet 1       ALLERGIES   No Known Allergies    PAST MEDICAL HISTORY:  Past Medical History:   Diagnosis Date     Acute prostatitis      Acute sinusitis, unspecified      Aortic valve disease     AVR 2011, MAZE     Atrial fibrillation (H)      CAD (coronary artery disease)     known 70% LAD stenosis     Cardiomyopathy (H)      HTN (hypertension)      Mitral regurgitation      Obesity, unspecified      Palpitations      Permanent atrial fibrillation (H) 6/2/2017     Unspecified essential hypertension        PAST SURGICAL HISTORY:  Past Surgical History:   Procedure Laterality Date     ANGIOGRAM  2/02    Normal coronary,dilated LV,Sev.decr.global LVF,+1 MR     ANGIOGRAM  1/08    Mild AS,Mod PHTN,mod prox.LAD disease,Triv.CFX disease     ANGIOGRAM  1/09    Mod AS,Mod PHTN,mod prox.LAD disease     ANGIOGRAM  10/10    Sev.AS,CM,global hypokinesis,Mild-mod LV dilated,Mild MR,70% prox.LAD lesion,PHTN     C NONSPECIFIC PROCEDURE  ~1985    vein stripping     CARDIOVERSION  2/02, 4/02, 4/11     CHOLECYSTECTOMY       COLONOSCOPY N/A 1/23/2016    Procedure: COLONOSCOPY;  Surgeon: Robyn Collins MD;  Location:  GI     ESOPHAGOSCOPY, GASTROSCOPY, DUODENOSCOPY (EGD), COMBINED N/A 1/23/2016    Procedure: COMBINED ESOPHAGOSCOPY, GASTROSCOPY, DUODENOSCOPY (EGD);  Surgeon: Robyn Collins MD;  Location:  GI     LAPAROSCOPIC BYPASS GASTRIC  10/8/2011    Procedure:LAPAROSCOPIC BYPASS GASTRIC; Diagnostic laparoscopy, Lysis of  "Adhesions, Laparoscopic Revision of Jejunojejunostomy; Surgeon:RADHA MURO; Location:[facility] OR     LAPAROSCOPIC BYPASS GASTRIC  6/26/2006    Dr Jin     LAPAROSCOPY DIAGNOSTIC (GENERAL)  10/8/2011    Procedure:LAPAROSCOPY DIAGNOSTIC (GENERAL); Surgeon:RADHA MURO; Location:[facility] OR     REPLACE VALVE AORTIC  1/7/2011       FAMILY HISTORY:  Family History   Problem Relation Age of Onset     Cancer - colorectal Father      Colon Cancer Father      DIABETES Brother        SOCIAL HISTORY:  Social History     Social History     Marital status:      Spouse name: Tracey     Number of children: 0     Years of education: N/A     Occupational History           [employer]     Social History Main Topics     Smoking status: Never Smoker     Smokeless tobacco: Never Used     Alcohol use No     Drug use: No     Sexual activity: Yes     Partners: Female     Other Topics Concern     Parent/Sibling W/ Cabg, Mi Or Angioplasty Before 65f 55m? Yes     Blood Transfusions No     Caffeine Concern Yes     1 can daily     Occupational Exposure Yes     Hobby Hazards No     Sleep Concern No     Stress Concern No     Weight Concern No     Special Diet Yes     low sodium, low calories     Back Care No     Exercise Yes     walks daily     Bike Helmet Not Asked     NA     Seat Belt Yes     Self-Exams Yes     Social History Narrative       Review of Systems:  Skin:  Negative       Eyes:  Positive for glasses    ENT:  Negative      Respiratory:  Negative       Cardiovascular:  Negative      Gastroenterology: Negative      Genitourinary:  Negative      Musculoskeletal:  Negative      Neurologic:  Negative      Psychiatric:  Negative      Heme/Lymph/Imm:  Negative      Endocrine:  Positive for thyroid disorder      Physical Exam:  Vitals: /86  Pulse 94  Ht 1.797 m (5' 10.75\")  Wt 100.5 kg (221 lb 8 oz)  BMI 31.11 kg/m2    Constitutional:  cooperative, alert and oriented, well developed, well nourished, in no acute " distress        Skin:  warm and dry to the touch, no apparent skin lesions or masses noted          Head:  normocephalic, no masses or lesions        Eyes:  sclera white;pupils equal and round;conjunctivae and lids unremarkable;no xanthalasma;EOMS intact        Lymph:      ENT:  no pallor or cyanosis, dentition good        Neck:  carotid pulses are full and equal bilaterally, JVP normal, no carotid bruit        Respiratory:  normal breath sounds, clear to auscultation, normal A-P diameter, normal symmetry, normal respiratory excursion, no use of accessory muscles;healed median sternotomy scar         Cardiac: normal S1 and S2 irregularly irregular rhythm   crisp prosthetic valve sounds systolic ejection murmur;LLSB;grade 2;radiation to the RUSB systolic murmur;grade 2;LLSB;radiation to the base   blowing  pulses full and equal                                        GI:  abdomen soft, non-tender, BS normoactive, no mass, no HSM, no bruits        Extremities and Muscular Skeletal:  no deformities, clubbing, cyanosis, erythema observed     1+;pitting;RLE edema LLE edema;trace;pitting right lower extremity venous vericosities    Neurological:  no gross motor deficits;affect appropriate        Psych:  Alert and Oriented x 3        CC  Lloyd Lewis MD  [address]  [place] [id number]

## 2018-04-24 NOTE — MR AVS SNAPSHOT
After Visit Summary   4/24/2018    Jovon Mantilla    MRN: [id number]           Patient Information     Date Of Birth          1955        Visit Information        Provider Department      4/24/2018 4:15 PM Lloyd Lewis MD [facility]        Today's Diagnoses     Idiopathic cardiomyopathy (H)        Permanent atrial fibrillation (H)        Nonrheumatic aortic valve stenosis        S/P aortic valve replacement        Hypertension goal BP (blood pressure) < 140/90        Coronary artery disease involving native coronary artery of native heart without angina pectoris           Follow-ups after your visit        Additional Services     Follow-Up with Cardiologist                 Your next 10 appointments already scheduled     May 17, 2018  4:20 PM CDT   Anticoagulation Visit with CP ANTI COAG   [facility]    [address] [place] [id number] [phone]              Future tests that were ordered for you today     Open Future Orders        Priority Expected Expires Ordered    Follow-Up with Cardiologist Routine 4/24/2019 4/25/2019 4/24/2018            Who to contact     If you have questions or need follow up information about today's clinic visit or your schedule please contact [facility] directly at [phone].  Normal or non-critical lab and imaging results will be communicated to you by MyChart, letter or phone within 4 business days after the clinic has received the results. If you do not hear from us within 7 days, please contact the clinic through MyChart or phone. If you have a critical or abnormal lab result, we will notify you by phone as soon as possible.  Submit refill requests through [employer] or call your pharmacy and they will forward the refill request to us. Please allow 3 business days for your refill to be  "completed.          Additional Information About Your Visit        [employer] Information     [employer] gives you secure access to your electronic health record. If you see a primary care provider, you can also send messages to your care team and make appointments. If you have questions, please call your primary care clinic.  If you do not have a primary care provider, please call [phone] and they will assist you.        Care EveryWhere ID     This is your Care EveryWhere ID. This could be used by other organizations to access your [place] medical records  [id number]        Your Vitals Were     Pulse Height BMI (Body Mass Index)             94 1.797 m (5' 10.75\") 31.11 kg/m2          Blood Pressure from Last 3 Encounters:   04/24/18 126/86   03/27/18 137/85   01/03/18 110/71    Weight from Last 3 Encounters:   04/24/18 100.5 kg (221 lb 8 oz)   03/27/18 96.7 kg (213 lb 3.2 oz)   01/11/18 95 kg (209 lb 6.4 oz)              We Performed the Following     Follow-Up with Cardiologist        Primary Care Provider Office Phone # Fax #    Rehan Lorenzana [phone] [address] [address]        Equal Access to Services     FRANCISCO QUINONES : Hadii aad ku hadasho Soomaali, waaxda luqadaha, qaybta kaalmada adeegyada, waxay idiin haykenyn roseline khdolores la'rebecca . So [facility] [phone].    ATENCIÓN: Si habla español, tiene a pfeiffer disposición servicios gratuitos de asistencia lingüística. Lljemma al [phone].    We comply with applicable federal civil rights laws and [place] laws. We do not discriminate on the basis of race, color, national origin, age, disability, sex, sexual orientation, or gender identity.            Thank you!     Thank you for choosing [facility]  for your care. Our goal is always to provide you with excellent care. Hearing back from our patients is one way we can continue to improve our services. Please take a few minutes to " complete the written survey that you may receive in the mail after your visit with us. Thank you!             Your Updated Medication List - Protect others around you: Learn how to safely use, store and throw away your medicines at www.disposemymeds.org.          This list is accurate as of 4/24/18  4:52 PM.  Always use your most recent med list.                   Brand Name Dispense Instructions for use Diagnosis    albuterol 108 (90 Base) MCG/ACT Inhaler    PROAIR HFA/PROVENTIL HFA/VENTOLIN HFA    1 Inhaler    Inhale 2 puffs into the lungs every 6 hours as needed for shortness of breath / dyspnea or wheezing    Acute bronchitis due to Mycoplasma pneumoniae       ALLERGY 24-HR PO      Take by mouth daily        aspirin 81 MG tablet      Take 81 mg by mouth every other day        CALCIUM 600+D 600-200 MG-UNIT Tabs   Generic drug:  calcium carbonate-vitamin D      Take 1 tablet by mouth 2 times daily        carvedilol 25 MG tablet    COREG    180 tablet    Take 1 tablet (25 mg) by mouth 2 times daily (with meals)    HTN (hypertension)       CENTRUM Chew      Take 1 tablet by mouth At Bedtime        digoxin 125 MCG tablet    LANOXIN    90 tablet    Take 1 tablet (125 mcg) by mouth daily    Atrial fibrillation (H)       Fiber 0.52 g Caps      Take 2 tablets by mouth At Bedtime        IRON SUPPLEMENT 325 (65 Fe) MG tablet   Generic drug:  ferrous sulfate     100 tablet    Take 1 tablet (325 mg) by mouth 2 times daily        levothyroxine 200 MCG tablet    SYNTHROID/LEVOTHROID    90 tablet    Take 1 tablet (200 mcg) by mouth daily    Acquired hypothyroidism       lisinopril 20 MG tablet    PRINIVIL/ZESTRIL    45 tablet    Take  20 mg (1 tablet in am) and 10 mg  (one half tablet) in  pm    ASVD (arteriosclerotic vascular disease)       sertraline 50 MG tablet    ZOLOFT    90 tablet    Take 1 tablet (50 mg) by mouth daily    Anxiety       simvastatin 20 MG tablet    ZOCOR    90 tablet    Take 1 tablet (20 mg) by mouth At  Bedtime    Hyperlipidemia LDL goal <70       triamcinolone 0.1 % cream    KENALOG    30 g    Apply sparingly to affected area three times daily for 14 days.    Atopic dermatitis, unspecified type       Vitamin B-12 500 MCG Subl      Place 500 mcg under the tongue daily        VITAMIN C PO      Take 500 mg by mouth At Bedtime        vitamin D 2000 units Caps      Take 1 tablet by mouth daily        warfarin 4 MG tablet    COUMADIN    130 tablet    TAKE 1 TABLET MONDAY, WEDNESDAY, AND FRIDAY AND ONE AND ONE-HALF TABLETS (6 MG) ALL OTHER DAYS    Long term current use of anticoagulant therapy

## 2018-04-25 NOTE — PROGRESS NOTES
Service Date: 04/24/2018      PRIMARY CARE PHYSICIAN:  Dr. Rehan Lorenzana.      HISTORY OF PRESENT ILLNESS:  I again had the pleasure of seeing your patient, Jovon Mantilla, at [facility] for evaluation of a dilated cardiomyopathy, coronary artery disease, permanent atrial fibrillation, aortic valve replacement and hypertension.  The patient had a history of permanent atrial fibrillation with controlled ventricular response.  His ejection fraction generally held around 40%-50%.  He has had mild to moderate mitral regurgitation and mild tricuspid insufficiency.  He developed worsening aortic stenosis and his aortic valve was replaced 01/07/2011 with an ATS 24 mm  supra-annular valve.  A maze procedure was performed but we were unable to maintain normal sinus rhythm postoperatively.  He returned to the operating room for mediastinal exploration for bleeding and then developed a GI bleed thereafter.  He has remained stable since that time.  He has not had any significant bradyarrhythmias requiring a pacemaker.  Digoxin was added by the electrophysiologist for rate control and his last level performed in February was 1.0.  The patient denies syncope, presyncope or palpitations.  He continues to walk at night for exercise free of chest pain or shortness of breath.  He has a previously known 70% LAD stenosis which could not be bypassed because it was intramuscular.  This was noted in 2010.  A Lexiscan nuclear stress test performed in 02/2018 demonstrated a fixed inferior, inferolateral, inferoseptal basal defect possibly consistent with prior infarct versus diaphragmatic attenuation artifact.  There was no real wall motion abnormalities noted on echocardiogram in 01/2017.  There was no evidence of anterior wall ischemia on this test.  It was decided to continue treating him medically.  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  He has chronic right greater than  left extremity peripheral edema due to previous vein stripping and varicose veins.  He has undergone bariatric surgery in the past and has remained reasonably stable.  He did not feel well when we increased his lisinopril to 20 mg b.i.d.  He is now taking 20 mg by mouth in the morning and 10 mg in the evening.      PHYSICAL EXAMINATION:  As listed below.      ASSESSMENT:   1.  Jovon Mantilla is a delightful 62-year-old male with an idiopathic cardiomyopathy out of proportion to the aortic valve disease or coronary artery disease.  Echocardiography from 01/2017 demonstrated ejection fraction of 45%-50% which is stable and mild concentric left ventricular hypertrophy.  There continues to be severe biatrial enlargement, mild to moderate mitral regurgitation, mild tricuspid insufficiency.  The inferior vena cava appeared dilated which may explain some of his peripheral edema as well.  Right ventricular systolic pressure was elevated consistent with mild to moderate pulmonary hypertension.  The mechanical aortic valve was functioning normally.  His rhythm remains atrial fibrillation with controlled ventricular response.  His blood pressure is currently well controlled and he shows no signs of left or right heart failure.  We will continue to monitor on his current medications.   2.  Permanent atrial fibrillation.  He is currently rate controlled and doing well.  I have made no medication changes.   3.  Coronary artery disease.  His nuclear stress test was probably spurious and I doubt he has had a previous inferior wall MI.  We will continue to monitor.  At the time of his previous aortic valve surgery his right and circumflex coronary arteries were normal.      It is my pleasure to assist in the care of Mr. Jovon Mantilla.  He will continue with SBE prophylaxis.  I will see him again in 1 year should he remain stable.  A digoxin level can be performed annually through his primary care's office.  All the patient's questions  were answered to his satisfaction.      Dylan Lewis MD       cc:   [place] [address]         DYLAN LEWIS MD, [facility]             D: 2018   T: 2018   MT: RAYSHAWN      Name:     CHAS STAPLES   MRN:      [id number]        Account:      [id number]   :      1955           Service Date: 2018      Document: [id number]

## 2018-05-01 ENCOUNTER — TELEPHONE (OUTPATIENT)
Dept: NURSING | Facility: CLINIC | Age: 63
End: 2018-05-01

## 2018-05-01 ENCOUNTER — OFFICE VISIT (OUTPATIENT)
Dept: FAMILY MEDICINE | Facility: CLINIC | Age: 63
End: 2018-05-01
Payer: COMMERCIAL

## 2018-05-01 VITALS
HEART RATE: 85 BPM | TEMPERATURE: 97.8 F | OXYGEN SATURATION: 97 % | WEIGHT: 217 LBS | SYSTOLIC BLOOD PRESSURE: 123 MMHG | DIASTOLIC BLOOD PRESSURE: 81 MMHG | BODY MASS INDEX: 30.48 KG/M2

## 2018-05-01 DIAGNOSIS — Z79.01 LONG-TERM (CURRENT) USE OF ANTICOAGULANTS: Primary | ICD-10-CM

## 2018-05-01 DIAGNOSIS — R79.1 SUPRATHERAPEUTIC INR: ICD-10-CM

## 2018-05-01 DIAGNOSIS — M79.89 SOFT TISSUE MASS: ICD-10-CM

## 2018-05-01 LAB — INR PPP: 4.6 (ref 0.86–1.14)

## 2018-05-01 PROCEDURE — 99213 OFFICE O/P EST LOW 20 MIN: CPT | Performed by: NURSE PRACTITIONER

## 2018-05-01 PROCEDURE — 36415 COLL VENOUS BLD VENIPUNCTURE: CPT | Performed by: NURSE PRACTITIONER

## 2018-05-01 PROCEDURE — 85610 PROTHROMBIN TIME: CPT | Performed by: NURSE PRACTITIONER

## 2018-05-01 ASSESSMENT — PAIN SCALES - GENERAL: PAINLEVEL: NO PAIN (0)

## 2018-05-01 NOTE — TELEPHONE ENCOUNTER
I called patient in follow up to INR done today, no answer.  I left a detailed voicemail: to hold warfarin x 1 and then resume usual dose.  He should be seen for INR recheck within 1 week and was asked to call INR nurse on direct line to arrange this.    Await return call.    Monica Medrano RN

## 2018-05-01 NOTE — PROGRESS NOTES
SUBJECTIVE:   Jovon Mantilla is a 62 year old male who presents to clinic today for the following health issues:      Patient has a right rib area is bruised and a bulging spot, no pain and noticed it last Sunday night. It has not gotten worse. He also noticed another bruised spot on the back of right thigh, and no pain there either.     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 Noticed bruising Sunday night. Denies pain. Denies fever. Denies SOB. Denies pain redness warmth in calves. No palpitations. Denies cough, SOB  Right lateral/upper quadrant and right lateral upper thigh. No none trauma. Denies falling. Bruising is not increasing.  Problem list and histories reviewed & adjusted, as indicated.  Additional history:    Patient Active Problem List   Diagnosis     Acquired hypothyroidism     Neck pain     Chronic rhinitis     Aortic stenosis     Hyperlipidemia LDL goal <70     Hypertension goal BP (blood pressure) < 140/90     Dilated cardiomyopathy (H)     CAD (coronary artery disease)     Mitral regurgitation     ASVD (arteriosclerotic vascular disease)     S/P AVR (aortic valve replacement)     GI bleeding     Gastrointestinal hemorrhage associated with anorectal source     Long-term (current) use of anticoagulants [Z79.01]     Adjustment disorder with mixed anxiety and depressed mood     Anxiety     Idiopathic cardiomyopathy (H)     S/P aortic valve replacement     Coronary artery disease involving native coronary artery of native heart without angina pectoris     Hypotension, unspecified hypotension type     Permanent atrial fibrillation (H)     Past Surgical History:   Procedure Laterality Date     ANGIOGRAM  2/02    Normal coronary,dilated LV,Sev.decr.global LVF,+1 MR     ANGIOGRAM  1/08    Mild AS,Mod PHTN,mod prox.LAD disease,Triv.CFX disease     ANGIOGRAM  1/09    Mod AS,Mod PHTN,mod prox.LAD disease     ANGIOGRAM  10/10    Sev.AS,CM,global  hypokinesis,Mild-mod LV dilated,Mild MR,70% prox.LAD lesion,PHTN     C NONSPECIFIC PROCEDURE  ~    vein stripping     CARDIOVERSION  , ,      CHOLECYSTECTOMY       COLONOSCOPY N/A 2016    Procedure: COLONOSCOPY;  Surgeon: Robyn Collins MD;  Location:  GI     ESOPHAGOSCOPY, GASTROSCOPY, DUODENOSCOPY (EGD), COMBINED N/A 2016    Procedure: COMBINED ESOPHAGOSCOPY, GASTROSCOPY, DUODENOSCOPY (EGD);  Surgeon: Robyn Collins MD;  Location:  GI     LAPAROSCOPIC BYPASS GASTRIC  10/8/2011    Procedure:LAPAROSCOPIC BYPASS GASTRIC; Diagnostic laparoscopy, Lysis of Adhesions, Laparoscopic Revision of Jejunojejunostomy; Surgeon:RADHA MURO; Location: OR     LAPAROSCOPIC BYPASS GASTRIC  2006    Dr Jin     LAPAROSCOPY DIAGNOSTIC (GENERAL)  10/8/2011    Procedure:LAPAROSCOPY DIAGNOSTIC (GENERAL); Surgeon:RADHA MURO; Location: OR     REPLACE VALVE AORTIC  2011       Social History   Substance Use Topics     Smoking status: Never Smoker     Smokeless tobacco: Never Used     Alcohol use No     Family History   Problem Relation Age of Onset     Cancer - colorectal Father      Colon Cancer Father      DIABETES Brother            Reviewed and updated as needed this visit by clinical staff  Tobacco  Meds  Med Hx  Surg Hx  Fam Hx  Soc Hx      Reviewed and updated as needed this visit by Provider         ROS:      OBJECTIVE:     /81 (BP Location: Right arm, Patient Position: Chair, Cuff Size: Adult Regular)  Pulse 85  Temp 97.8  F (36.6  C) (Oral)  Wt 217 lb (98.4 kg)  SpO2 97%  BMI 30.48 kg/m2  Body mass index is 30.48 kg/(m^2).  CV: Bilateral lower extremity edema Venous stasis ulcer strangulated varicose veins. Negative Marifer's sign. Irregularly irregular.  RESP: Clear to ascultation.  INTEcm soft non tender mobile mass in right lateral/upper quadrant of abdomen with proximal area of ecchymosis  Also small area of ecchymosis right  posterior thigh    INR/Prothrombin Time 4.6    ASSESSMENT/PLAN:         1. Long-term (current) use of anticoagulants [Z79.01]    - INR 4.6  Patient advised to not take warfarin on 5/1/18. INR nurse from [facility] will contact regarding future dosing.  2. Soft tissue mass  - Not convinced mass is related to ecchymosis, but given proximity to ecchymosis, will get US  - US Abdomen Limited; Future    Patient referral for abdominal ultrasound of mass.    3. Supratherapeutic INR  Patient advised to not take warfarin on 5/1/18. INR nurse from [facility] will contact regarding future dosing.     Patient advised to contact primary care provider with increased bruising or bleeding.    The student, Patricia Alicea, [employer]-student, acted as a scribe and the encounter documented above was completely performed by myself and the documentation reflects the work I have performed today.       LILLI Mei [facility]

## 2018-05-01 NOTE — MR AVS SNAPSHOT
After Visit Summary   5/1/2018    Jovon Mantilla    MRN: [id number]           Patient Information     Date Of Birth          1955        Visit Information        Provider Department      5/1/2018 10:40 AM Dahiana Brown APRN CNP [facility]        Today's Diagnoses     Long-term (current) use of anticoagulants [Z79.01]    -  1    Soft tissue mass        Supratherapeutic INR          Care Instructions    Do NOT take your warfarin today  Monica will call you with further instructions on warfarin dosing and when to see her again in clinic          Follow-ups after your visit        Your next 10 appointments already scheduled     May 02, 2018  9:40 AM CDT   US ABDOMEN LIMITED with FKUS[facility]   [facility]    [address] [place] [address] [phone]           Please bring a list of your medicines (including vitamins, minerals and over-the-counter drugs). Also, tell your doctor about any allergies you may have. Wear comfortable clothes and leave your valuables at home.  Adults: No eating or drinking for 8 hours before the exam. You may take medicine with a small sip of water.  Children: - Children 6+ years: No food or drink for 6 hours before exam. - Children 1-5 years: No food or drink for 4 hours before exam. - Infants, breast-fed: may have breast milk up to 2 hours before exam. - Infants, formula: may have bottle until 4 hours before exam.  Please call the Imaging Department at your exam site with any questions.            May 17, 2018  4:20 PM CDT   Anticoagulation Visit with CP ANTI COAG   [facility]    [address] [facility] [address] [phone]              Future tests that were ordered for you today     Open Future Orders        Priority Expected Expires Ordered    US Abdomen Limited Routine  5/1/2019 5/1/2018             Who to contact     If you have questions or need follow up information about today's clinic visit or your schedule please contact [facility] directly at [phone].  Normal or non-critical lab and imaging results will be communicated to you by MyChart, letter or phone within 4 business days after the clinic has received the results. If you do not hear from us within 7 days, please contact the clinic through MyChart or phone. If you have a critical or abnormal lab result, we will notify you by phone as soon as possible.  Submit refill requests through [employer] or call your pharmacy and they will forward the refill request to us. Please allow 3 business days for your refill to be completed.          Additional Information About Your Visit        [employer] Information     [employer] gives you secure access to your electronic health record. If you see a primary care provider, you can also send messages to your care team and make appointments. If you have questions, please call your primary care clinic.  If you do not have a primary care provider, please call [phone] and they will assist you.        Care EveryWhere ID     This is your Care EveryWhere ID. This could be used by other organizations to access your [facility] medical records  [id number]        Your Vitals Were     Pulse Temperature Pulse Oximetry BMI (Body Mass Index)          85 97.8  F (36.6  C) (Oral) 97% 30.48 kg/m2         Blood Pressure from Last 3 Encounters:   05/01/18 123/81   04/24/18 126/86   03/27/18 137/85    Weight from Last 3 Encounters:   05/01/18 217 lb (98.4 kg)   04/24/18 221 lb 8 oz (100.5 kg)   03/27/18 213 lb 3.2 oz (96.7 kg)              We Performed the Following     INR        Primary Care Provider Office Phone # Fax #    Rehan Lorenzana [phone] [address] [facility] [address]        Equal Access to Services     FRANCISCO QUINONES AH: reyes Gonzales  evelia jaevijaygrover rachealrichar lainez. So [facility] [phone].    ATENCIÓN: Si madan valladares, tiene a pfeiffer disposición servicios gratuitos de asistencia lingüística. Shun al [phone].    We comply with applicable federal civil rights laws and [place] laws. We do not discriminate on the basis of race, color, national origin, age, disability, sex, sexual orientation, or gender identity.            Thank you!     Thank you for choosing [facility]  for your care. Our goal is always to provide you with excellent care. Hearing back from our patients is one way we can continue to improve our services. Please take a few minutes to complete the written survey that you may receive in the mail after your visit with us. Thank you!             Your Updated Medication List - Protect others around you: Learn how to safely use, store and throw away your medicines at www.disposemymeds.org.          This list is accurate as of 5/1/18 11:50 AM.  Always use your most recent med list.                   Brand Name Dispense Instructions for use Diagnosis    albuterol 108 (90 Base) MCG/ACT Inhaler    PROAIR HFA/PROVENTIL HFA/VENTOLIN HFA    1 Inhaler    Inhale 2 puffs into the lungs every 6 hours as needed for shortness of breath / dyspnea or wheezing    Acute bronchitis due to Mycoplasma pneumoniae       ALLERGY 24-HR PO      Take by mouth daily        aspirin 81 MG tablet      Take 81 mg by mouth every other day        CALCIUM 600+D 600-200 MG-UNIT Tabs   Generic drug:  calcium carbonate-vitamin D      Take 1 tablet by mouth 2 times daily        carvedilol 25 MG tablet    COREG    180 tablet    Take 1 tablet (25 mg) by mouth 2 times daily (with meals)    HTN (hypertension)       CENTRUM Chew      Take 1 tablet by mouth At Bedtime        digoxin 125 MCG tablet    LANOXIN    90 tablet    Take 1 tablet (125 mcg) by mouth daily    Atrial fibrillation (H)       Fiber 0.52 g  Caps      Take 2 tablets by mouth At Bedtime        IRON SUPPLEMENT 325 (65 Fe) MG tablet   Generic drug:  ferrous sulfate     100 tablet    Take 1 tablet (325 mg) by mouth 2 times daily        levothyroxine 200 MCG tablet    SYNTHROID/LEVOTHROID    90 tablet    Take 1 tablet (200 mcg) by mouth daily    Acquired hypothyroidism       lisinopril 20 MG tablet    PRINIVIL/ZESTRIL    45 tablet    Take  20 mg (1 tablet in am) and 10 mg  (one half tablet) in  pm    ASVD (arteriosclerotic vascular disease)       sertraline 50 MG tablet    ZOLOFT    90 tablet    Take 1 tablet (50 mg) by mouth daily    Anxiety       simvastatin 20 MG tablet    ZOCOR    90 tablet    Take 1 tablet (20 mg) by mouth At Bedtime    Hyperlipidemia LDL goal <70       triamcinolone 0.1 % cream    KENALOG    30 g    Apply sparingly to affected area three times daily for 14 days.    Atopic dermatitis, unspecified type       Vitamin B-12 500 MCG Subl      Place 500 mcg under the tongue daily        VITAMIN C PO      Take 500 mg by mouth At Bedtime        vitamin D 2000 units Caps      Take 1 tablet by mouth daily        warfarin 4 MG tablet    COUMADIN    130 tablet    TAKE 1 TABLET MONDAY, WEDNESDAY, AND FRIDAY AND ONE AND ONE-HALF TABLETS (6 MG) ALL OTHER DAYS    Long term current use of anticoagulant therapy

## 2018-05-01 NOTE — PATIENT INSTRUCTIONS
Do NOT take your warfarin today  Monica will call you with further instructions on warfarin dosing and when to see her again in clinic

## 2018-05-02 ENCOUNTER — RADIANT APPOINTMENT (OUTPATIENT)
Dept: ULTRASOUND IMAGING | Facility: CLINIC | Age: 63
End: 2018-05-02
Attending: NURSE PRACTITIONER
Payer: COMMERCIAL

## 2018-05-02 DIAGNOSIS — M79.89 SOFT TISSUE MASS: ICD-10-CM

## 2018-05-02 PROCEDURE — 76705 ECHO EXAM OF ABDOMEN: CPT

## 2018-05-02 NOTE — PROGRESS NOTES
Jovon Mantilla,    Your ultra sound showed a cystic lesion in the fatty tissue which could be a hematoma related to your bruise. In which case, it should gradually improve over the next few weeks as your bruise fades.    Please call the clinic if you have any concerns [phone].    LILLI Mei [facility]

## 2018-05-04 ENCOUNTER — ANTICOAGULATION THERAPY VISIT (OUTPATIENT)
Dept: NURSING | Facility: CLINIC | Age: 63
End: 2018-05-04
Payer: COMMERCIAL

## 2018-05-04 DIAGNOSIS — Z95.2 S/P AVR (AORTIC VALVE REPLACEMENT): ICD-10-CM

## 2018-05-04 DIAGNOSIS — Z79.01 LONG-TERM (CURRENT) USE OF ANTICOAGULANTS: ICD-10-CM

## 2018-05-04 LAB — INR POINT OF CARE: 2.1 (ref 0.86–1.14)

## 2018-05-04 PROCEDURE — 85610 PROTHROMBIN TIME: CPT | Mod: QW

## 2018-05-04 PROCEDURE — 36416 COLLJ CAPILLARY BLOOD SPEC: CPT

## 2018-05-04 PROCEDURE — 99207 ZZC NO CHARGE NURSE ONLY: CPT

## 2018-05-04 NOTE — PROGRESS NOTES
ANTICOAGULATION FOLLOW-UP CLINIC VISIT    Patient Name:  Jovon Mantilla  Date:  5/4/2018  Contact Type:  Face to Face    SUBJECTIVE: Patient is here for INR reading following doctor visit earlier this week, see notes.  He held full warfarin dose x 1 and he is slightly under range today (over corrected).  He says he attributes his high read from being sick with a cold and using OTC med below, he has since stopped taking it.  Today he will do 6 MG warfarin and then resume his usual dose and schedule.  Patient agreeable to plan of care.     Patient Findings     Positives Change in medications (nyquil and dayquil for cold), Bruising (abdomen bruise, saw doctor earlier this week)           OBJECTIVE    INR Protime   Date Value Ref Range Status   05/04/2018 2.1 (A) 0.86 - 1.14 Final     Chromogenic Factor 10   Date Value Ref Range Status   01/21/2011 35 (L) 60 - 140 % Final     Comment:     Therapeutic Range: A Chromogenic Factor 10 level of 20-40% inversely   correlates   with an INR of 2-3 for patients receiving Warfarin. Chromogenic Factor 10   levels below 20% indicate an INR greater than 3, and levels above 40%   indicate   an INR lessthan 2.       ASSESSMENT / PLAN  INR assessment SUB    Recheck INR In: 6 WEEKS    INR Location Clinic      Anticoagulation Summary as of 5/4/2018     INR goal 2.5-3.5   Today's INR 2.1!   Maintenance plan 4 mg (4 mg x 1) on Mon, Wed, Fri; 6 mg (4 mg x 1.5) all other days   Full instructions 5/4: 6 mg; Otherwise 4 mg on Mon, Wed, Fri; 6 mg all other days   Weekly total 36 mg   Plan last modified Monica Leslie, RN (5/4/2017)   Next INR check 6/14/2018   Target end date Indefinite    Indications   Long-term (current) use of anticoagulants [Z79.01] [Z79.01]  S/P AVR (aortic valve replacement) [Z95.2]         Anticoagulation Episode Summary     INR check location     Preferred lab     Send INR reminders to [facility]    Comments       Anticoagulation Care Providers     Provider  Role Specialty Phone number    Rehan Lorenzana MD Responsible Family Practice [phone]            See the Encounter Report to view Anticoagulation Flowsheet and Dosing Calendar (Go to Encounters tab in chart review, and find the Anticoagulation Therapy Visit)    Dosage adjustment made based on physician directed care plan.    Monica Leslie RN

## 2018-05-04 NOTE — MR AVS SNAPSHOT
Jovon Mantilla   5/4/2018 1:20 PM   Anticoagulation Therapy Visit    Description:  62 year old male   Provider:  JIMMY ANTI COAG   Department:  Cp Nurse           INR as of 5/4/2018     Today's INR 2.1!      Anticoagulation Summary as of 5/4/2018     INR goal 2.5-3.5   Today's INR 2.1!   Full instructions 5/4: 6 mg; Otherwise 4 mg on Mon, Wed, Fri; 6 mg all other days   Next INR check 6/14/2018    Indications   Long-term (current) use of anticoagulants [Z79.01] [Z79.01]  S/P AVR (aortic valve replacement) [Z95.2]         Your next Anticoagulation Clinic appointment(s)     Jun 14, 2018  4:20 PM CDT   Anticoagulation Visit with CP ANTI COAG   [facility]    [address] [facility] [address] [phone]              Contact Numbers     [facility]  Please call [phone] or [phone]  to cancel and/or reschedule your appointment.   Please call [phone] with any problems or questions regarding your therapy          May 2018 Details    Sun Mon Tue Wed Thu Fri Sat       1               2               3               4      6 mg   See details      5      6 mg           6      6 mg         7      4 mg         8      6 mg         9      4 mg         10      6 mg         11      4 mg         12      6 mg           13      6 mg         14      4 mg         15      6 mg         16      4 mg         17      6 mg         18      4 mg         19      6 mg           20      6 mg         21      4 mg         22      6 mg         23      4 mg         24      6 mg         25      4 mg         26      6 mg           27      6 mg         28      4 mg         29      6 mg         30      4 mg         31      6 mg            Date Details   05/04 This INR check               How to take your warfarin dose     To take:  4 mg Take 1 of the 4 mg tablets.    To take:  6 mg Take 1.5 of the 4 mg tablets.           June 2018 Details     Sun Mon Tue Wed Thu Fri Sat          1      4 mg         2      6 mg           3      6 mg         4      4 mg         5      6 mg         6      4 mg         7      6 mg         8      4 mg         9      6 mg           10      6 mg         11      4 mg         12      6 mg         13      4 mg         14            15               16                 17               18               19               20               21               22               23                 24               25               26               27               28               29               30                Date Details   No additional details    Date of next INR:  6/14/2018         How to take your warfarin dose     To take:  4 mg Take 1 of the 4 mg tablets.    To take:  6 mg Take 1.5 of the 4 mg tablets.

## 2018-05-07 DIAGNOSIS — I48.20 CHRONIC ATRIAL FIBRILLATION (H): ICD-10-CM

## 2018-05-07 DIAGNOSIS — I70.90 ASVD (ARTERIOSCLEROTIC VASCULAR DISEASE): ICD-10-CM

## 2018-05-07 RX ORDER — CARVEDILOL 25 MG/1
25 TABLET ORAL 2 TIMES DAILY WITH MEALS
Qty: 180 TABLET | Refills: 3 | Status: SHIPPED | OUTPATIENT
Start: 2018-05-07 | End: 2019-07-03

## 2018-05-07 RX ORDER — DIGOXIN 125 MCG
125 TABLET ORAL DAILY
Qty: 90 TABLET | Refills: 3 | Status: SHIPPED | OUTPATIENT
Start: 2018-05-07 | End: 2019-07-03

## 2018-05-07 RX ORDER — LISINOPRIL 20 MG/1
TABLET ORAL
Qty: 135 TABLET | Refills: 3 | Status: SHIPPED | OUTPATIENT
Start: 2018-05-07 | End: 2019-06-07

## 2018-05-07 RX ORDER — LISINOPRIL 20 MG/1
TABLET ORAL
Qty: 45 TABLET | Refills: 1 | Status: SHIPPED | OUTPATIENT
Start: 2018-05-07 | End: 2018-05-07

## 2018-05-13 ENCOUNTER — OFFICE VISIT (OUTPATIENT)
Dept: URGENT CARE | Facility: URGENT CARE | Age: 63
End: 2018-05-13
Payer: COMMERCIAL

## 2018-05-13 ENCOUNTER — HOSPITAL ENCOUNTER (EMERGENCY)
Facility: CLINIC | Age: 63
Discharge: HOME | End: 2018-05-13
Attending: NURSE PRACTITIONER | Admitting: NURSE PRACTITIONER
Payer: COMMERCIAL

## 2018-05-13 VITALS
HEIGHT: 71 IN | TEMPERATURE: 97.3 F | DIASTOLIC BLOOD PRESSURE: 79 MMHG | WEIGHT: 206.25 LBS | HEART RATE: 77 BPM | OXYGEN SATURATION: 97 % | SYSTOLIC BLOOD PRESSURE: 122 MMHG | BODY MASS INDEX: 28.87 KG/M2

## 2018-05-13 VITALS
HEIGHT: 73 IN | WEIGHT: 207 LBS | TEMPERATURE: 98.6 F | HEART RATE: 111 BPM | OXYGEN SATURATION: 99 % | RESPIRATION RATE: 18 BRPM | DIASTOLIC BLOOD PRESSURE: 82 MMHG | SYSTOLIC BLOOD PRESSURE: 138 MMHG | BODY MASS INDEX: 27.43 KG/M2

## 2018-05-13 DIAGNOSIS — B02.9 HERPES ZOSTER WITHOUT COMPLICATION: ICD-10-CM

## 2018-05-13 DIAGNOSIS — B02.30 HERPES ZOSTER WITH OPHTHALMIC COMPLICATION, UNSPECIFIED HERPES ZOSTER EYE DISEASE: Primary | ICD-10-CM

## 2018-05-13 PROCEDURE — 99214 OFFICE O/P EST MOD 30 MIN: CPT | Performed by: PHYSICIAN ASSISTANT

## 2018-05-13 PROCEDURE — 99282 EMERGENCY DEPT VISIT SF MDM: CPT

## 2018-05-13 RX ORDER — VALACYCLOVIR HYDROCHLORIDE 1 G/1
1000 TABLET, FILM COATED ORAL 3 TIMES DAILY
Qty: 21 TABLET | Refills: 0 | Status: SHIPPED | OUTPATIENT
Start: 2018-05-13 | End: 2019-10-18

## 2018-05-13 NOTE — ED PROVIDER NOTES
Emergency Department Attending Supervision Note  5/13/2018  3:29 PM      I evaluated this patient in conjunction with  Humera CARTER      Briefly, the patient presented on advice from urgent care to rule out eye involvement from shingles.  He noted a rash on his face this morning.  He does not have discomfort with this.  He has not had fevers.  He has no visual changes.  He has used no new personal products.  He has no other concerns or complaints at this time.      On my exam:  General: Alert, No obvious discomfort, well kept   HENT:  Normal voice, No lymphadenopathy  Eyes:  The pupils are equal, round, and reactive to light, Conjunctiva normal, No scleral icterus, slit-lamp exam shows no dendrites or uptake.  There is no ulceration or foreign body.  Neck:  Normal range of motion  CV:  Normal Pulses  Resp:  Non-labored, No cough  MS:  Normal muscular tone, moves all extremities  Skin:  Appears to be early vesicular rash along dermatomal pattern right face just below eye.  Neuro: Speech is normal and fluent  Psych:  Awake. Alert.  Normal affect.  Appropriate interactions. Good eye contact      Exam consistent with shingles type rash.  There does not appear to be ocular involvement.  Doubt infectious source at this point.  Patient is started on antivirals.      Diagnosis    ICD-10-CM    1. Herpes zoster without complication B02.9          Casimiro Duncan 5/13/2018 3:29 PM     Casimiro Hutchinson, APRN CNP  05/13/18 1539

## 2018-05-13 NOTE — MR AVS SNAPSHOT
After Visit Summary   5/13/2018    Jovon Mantilla    MRN: [id number]           Patient Information     Date Of Birth          1955        Visit Information        Provider Department      5/13/2018 1:45 PM [place] PA-C [facility]        Today's Diagnoses     Herpes zoster with ophthalmic complication, unspecified herpes zoster eye disease    -  1       Follow-ups after your visit        Your next 10 appointments already scheduled     Jun 14, 2018  4:20 PM CDT   Anticoagulation Visit with CP ANTI COAG   [facility]    [address] [facility] [address] [phone]              Who to contact     If you have questions or need follow up information about today's clinic visit or your schedule please contact [facility] directly at [phone].  Normal or non-critical lab and imaging results will be communicated to you by MyChart, letter or phone within 4 business days after the clinic has received the results. If you do not hear from us within 7 days, please contact the clinic through [employer] or phone. If you have a critical or abnormal lab result, we will notify you by phone as soon as possible.  Submit refill requests through zoidu or call your pharmacy and they will forward the refill request to us. Please allow 3 business days for your refill to be completed.          Additional Information About Your Visit        MyChart Information     zoidu gives you secure access to your electronic health record. If you see a primary care provider, you can also send messages to your care team and make appointments. If you have questions, please call your primary care clinic.  If you do not have a primary care provider, please call [phone] and they will assist you.        Care EveryWhere ID     This is your Care EveryWhere ID. This could be used by  "other organizations to access your [place] medical records  [id number]        Your Vitals Were     Pulse Temperature Height Pulse Oximetry BMI (Body Mass Index)       77 97.3  F (36.3  C) (Oral) 5' 10.75\" (1.797 m) 97% 28.97 kg/m2        Blood Pressure from Last 3 Encounters:   05/13/18 122/79   05/01/18 123/81   04/24/18 126/86    Weight from Last 3 Encounters:   05/13/18 206 lb 4 oz (93.6 kg)   05/01/18 217 lb (98.4 kg)   04/24/18 221 lb 8 oz (100.5 kg)              Today, you had the following     No orders found for display       Primary Care Provider Office Phone # Fax #    Rehan Lorenzana [phone] [address] [facility] [address]        Equal Access to Services     [facility]: Hadii aad ku hadasho Soomaali, waaxda luqadaha, qaybta kaalmada adeegyada, waxay radha hayaaelisabeth kaufman . So [facility] [phone].    ATENCIÓN: Si habla español, tiene a pfeiffer disposición servicios gratuitos de asistencia lingüística. Llame al [phone].    We comply with applicable federal civil rights laws and [place] laws. We do not discriminate on the basis of race, color, national origin, age, disability, sex, sexual orientation, or gender identity.            Thank you!     Thank you for choosing [facility]  for your care. Our goal is always to provide you with excellent care. Hearing back from our patients is one way we can continue to improve our services. Please take a few minutes to complete the written survey that you may receive in the mail after your visit with us. Thank you!             Your Updated Medication List - Protect others around you: Learn how to safely use, store and throw away your medicines at www.disposemymeds.org.          This list is accurate as of 5/13/18  2:07 PM.  Always use your most recent med list.                   Brand Name Dispense Instructions for use Diagnosis    albuterol 108 (90 Base) MCG/ACT Inhaler    PROAIR " HFA/PROVENTIL HFA/VENTOLIN HFA    1 Inhaler    Inhale 2 puffs into the lungs every 6 hours as needed for shortness of breath / dyspnea or wheezing    Acute bronchitis due to Mycoplasma pneumoniae       ALLERGY 24-HR PO      Take by mouth daily        aspirin 81 MG tablet      Take 81 mg by mouth every other day        CALCIUM 600+D 600-200 MG-UNIT Tabs   Generic drug:  calcium carbonate-vitamin D      Take 1 tablet by mouth 2 times daily        carvedilol 25 MG tablet    COREG    180 tablet    Take 1 tablet (25 mg) by mouth 2 times daily (with meals)    ASVD (arteriosclerotic vascular disease)       CENTRUM Chew      Take 1 tablet by mouth At Bedtime        digoxin 125 MCG tablet    LANOXIN    90 tablet    Take 1 tablet (125 mcg) by mouth daily    Chronic atrial fibrillation (H)       Fiber 0.52 g Caps      Take 2 tablets by mouth At Bedtime        IRON SUPPLEMENT 325 (65 Fe) MG tablet   Generic drug:  ferrous sulfate     100 tablet    Take 1 tablet (325 mg) by mouth 2 times daily        levothyroxine 200 MCG tablet    SYNTHROID/LEVOTHROID    90 tablet    Take 1 tablet (200 mcg) by mouth daily    Acquired hypothyroidism       lisinopril 20 MG tablet    PRINIVIL/ZESTRIL    135 tablet    Take  20 mg (1 tablet in am) and 10 mg  (one half tablet) in  pm    ASVD (arteriosclerotic vascular disease)       sertraline 50 MG tablet    ZOLOFT    90 tablet    Take 1 tablet (50 mg) by mouth daily    Anxiety       simvastatin 20 MG tablet    ZOCOR    90 tablet    Take 1 tablet (20 mg) by mouth At Bedtime    Hyperlipidemia LDL goal <70       triamcinolone 0.1 % cream    KENALOG    30 g    Apply sparingly to affected area three times daily for 14 days.    Atopic dermatitis, unspecified type       Vitamin B-12 500 MCG Subl      Place 500 mcg under the tongue daily        VITAMIN C PO      Take 500 mg by mouth At Bedtime        vitamin D 2000 units Caps      Take 1 tablet by mouth daily        warfarin 4 MG tablet    COUMADIN    130  tablet    TAKE 1 TABLET MONDAY, WEDNESDAY, AND FRIDAY AND ONE AND ONE-HALF TABLETS (6 MG) ALL OTHER DAYS    Long term current use of anticoagulant therapy

## 2018-05-13 NOTE — ED AVS SNAPSHOT
Emergency Department    [address] [facility] [address]    Phone:  [phone]    Fax:  [phone]                                       Jovon Mantilla   MRN: [id number]    Department:   Emergency Department   Date of Visit:  5/13/2018           Patient Information     Date Of Birth          1955        Your diagnoses for this visit were:     Herpes zoster without complication        You were seen by Casimiro Hutchinson APRN CNP.      Follow-up Information     Follow up with Rehan Lorenzana MD In 2 days.    Specialty:  Family Practice    Contact information:    [address] [facility] [address]  [phone]          Follow up with  Emergency Department.    Specialty:  EMERGENCY MEDICINE    Why:  As needed, If symptoms worsen    Contact information:    [address] [facility] [address] [phone]        Discharge Instructions         Shingles (Herpes Zoster)     Talk to your healthcare provider about the shingles vaccine.     Shingles is also called herpes zoster. It is a painful skin rash caused by the herpes zoster virus. This is the same virus that causes chickenpox. After a person has chickenpox, the virus remains inactive in the nerve cells. Years later, the virus can become active again and travel to the skin. Most people have shingles only once, but it is possible to have it more than once.  What are the risk factors for shingles?  Anyone who has ever had chickenpox can develop shingles. But your risk is greater if you:    Are 50 years of age or older    Have an illness that weakens your immune system, such as HIV/AIDS    Have cancer, especially Hodgkin disease or lymphoma    Take medicines that weaken your immune system  What are the symptoms of shingles?    The first sign of shingles is usually pain, burning, tingling, or itching on one part of your face or body. You may also feel as if you have the flu, with fever and chills.    A red rash  with small blisters appears within a few days. The rash may appear as follows:   ? The blisters can occur anywhere, but they re most common on the back, chest, or abdomen.  ? They usually appear on only one side of the body, spreading along the nerve pathway where the virus was inactive.   ? The rash can also form around an eye, along one side of the face or neck, or in the mouth.  ? In a few people, usually those with weakened immune systems, shingles appear on more than one part of the body at once.    After a few days, the blisters become dry and form a crust. The crust falls off in days to weeks. The blisters generally do not leave scars.  How is shingles treated?  For most people, shingles heals on its own in a few weeks. But treatment is recommended to help relieve pain, speed healing, and reduce the risk of complications. Antiviral medicines are prescribed within the first 72 hours of the appearance of the rash. To lessen symptoms:    Apply ice packs (wrapped in a thin towel) or cool compresses, or soak in a cool bath.    Use calamine lotion to calm itchy skin.    Ask your healthcare provider about over-the-counter pain relievers. If your pain is severe, your healthcare provider may prescribe stronger pain medicines.  What are the complications of shingles?  Shingles often goes away with no lasting effects. But some people have serious problems long after the blisters have healed:    Postherpetic neuralgia. This is the most common complication. It is severe nerve pain at the place where the rash used to be. It can last for months, or even years after you have had shingles. Medicines can be prescribed to help relieve the pain and improve quality of life.    Bacterial infection. Shingles blisters may become infected with bacteria. Antibiotic medicine is used to treat the infection.    Eye problems. A person with shingles on the face should see his or her healthcare provider right away. Shingles can cause  serious problems with vision, and even blindness.  Very rarely shingles can also lead to pneumonia, hearing problems, brain inflammation, or even death.   When to seek medical care  Contact your healthcare provider if you experience any of the following:    Symptoms that don t go away with treatment    A rash or blisters near your eye    Increased drainage, fever, or rash after treatment, or severe pain that doesn t go away   How can shingles be prevented?  You can only get shingles if you have had chickenpox in the past. Those who have never had chickenpox can get the virus from you. Although instead of developing shingles, the person may get chickenpox. Until your blisters form scabs, avoid contact with others, especially the following:    Pregnant women who have never had chickenpox or the vaccine    Infants who were born early (prematurely) or who had low weight at birth    People with weak immune system (for example, people receiving chemotherapy for cancer, people who have had organ transplants, or people with HIV infections)     The shingles vaccine  Shingles vaccines are available to help prevent shingles or make it less painful. Vaccination is recommended for adults 50 and older, even if you've had shingles in the past. Talk with your healthcare provider about the most appropriate time for you to get vaccinated, and which vaccine is best for you.   Date Last Reviewed: 10/1/2016    5394-1285 [employer]. [address] [place] [address], [place] [address]. All rights reserved. This information is not intended as a substitute for professional medical care. Always follow your healthcare professional's instructions.          Your next 10 appointments already scheduled     Jun 14, 2018  4:20 PM CDT   Anticoagulation Visit with CP ANTI COAG   [facility] ([facility])    [address] [place] [id number] [phone]              24  Hour Appointment Hotline       To make an appointment at any [facility], call [phone] ([phone]). If you don't have a family doctor or clinic, we will help you find one. [place] clinics are conveniently located to serve the needs of you and your family.             Review of your medicines      START taking        Dose / Directions Last dose taken    valACYclovir 1000 mg tablet   Commonly known as:  VALTREX   Dose:  1000 mg   Quantity:  21 tablet        Take 1 tablet (1,000 mg) by mouth 3 times daily   Refills:  0          Our records show that you are taking the medicines listed below. If these are incorrect, please call your family doctor or clinic.        Dose / Directions Last dose taken    albuterol 108 (90 Base) MCG/ACT Inhaler   Commonly known as:  PROAIR HFA/PROVENTIL HFA/VENTOLIN HFA   Dose:  2 puff   Quantity:  1 Inhaler        Inhale 2 puffs into the lungs every 6 hours as needed for shortness of breath / dyspnea or wheezing   Refills:  0        ALLERGY 24-HR PO        Take by mouth daily   Refills:  0        aspirin 81 MG tablet   Dose:  81 mg        Take 81 mg by mouth every other day   Refills:  0        CALCIUM 600+D 600-200 MG-UNIT Tabs   Dose:  1 tablet   Generic drug:  calcium carbonate-vitamin D        Take 1 tablet by mouth 2 times daily   Refills:  0        carvedilol 25 MG tablet   Commonly known as:  COREG   Dose:  25 mg   Quantity:  180 tablet        Take 1 tablet (25 mg) by mouth 2 times daily (with meals)   Refills:  3        CENTRUM Chew   Dose:  1 tablet        Take 1 tablet by mouth At Bedtime   Refills:  0        digoxin 125 MCG tablet   Commonly known as:  LANOXIN   Dose:  125 mcg   Quantity:  90 tablet        Take 1 tablet (125 mcg) by mouth daily   Refills:  3        Fiber 0.52 g Caps   Dose:  2 tablet        Take 2 tablets by mouth At Bedtime   Refills:  0        IRON SUPPLEMENT 325 (65 Fe) MG tablet   Dose:  325 mg   Quantity:  100 tablet   Generic drug:  ferrous  sulfate        Take 1 tablet (325 mg) by mouth 2 times daily   Refills:  0        levothyroxine 200 MCG tablet   Commonly known as:  SYNTHROID/LEVOTHROID   Dose:  200 mcg   Quantity:  90 tablet        Take 1 tablet (200 mcg) by mouth daily   Refills:  1        lisinopril 20 MG tablet   Commonly known as:  PRINIVIL/ZESTRIL   Quantity:  135 tablet        Take  20 mg (1 tablet in am) and 10 mg  (one half tablet) in  pm   Refills:  3        sertraline 50 MG tablet   Commonly known as:  ZOLOFT   Dose:  50 mg   Quantity:  90 tablet        Take 1 tablet (50 mg) by mouth daily   Refills:  1        simvastatin 20 MG tablet   Commonly known as:  ZOCOR   Dose:  20 mg   Quantity:  90 tablet        Take 1 tablet (20 mg) by mouth At Bedtime   Refills:  3        triamcinolone 0.1 % cream   Commonly known as:  KENALOG   Quantity:  30 g        Apply sparingly to affected area three times daily for 14 days.   Refills:  0        Vitamin B-12 500 MCG Subl   Dose:  500 mcg        Place 500 mcg under the tongue daily   Refills:  0        VITAMIN C PO   Dose:  500 mg        Take 500 mg by mouth At Bedtime   Refills:  0        vitamin D 2000 units Caps   Dose:  1 tablet        Take 1 tablet by mouth daily   Refills:  0        warfarin 4 MG tablet   Commonly known as:  COUMADIN   Quantity:  130 tablet        TAKE 1 TABLET MONDAY, WEDNESDAY, AND FRIDAY AND ONE AND ONE-HALF TABLETS (6 MG) ALL OTHER DAYS   Refills:  1                Prescriptions were sent or printed at these locations (1 Prescription)                   [id number] [place] [facility] [address] [address]    Telephone:  [phone]   Fax:  [phone]   Hours:                  E-Prescribed (1 of 1)         valACYclovir (VALTREX) 1000 mg tablet                Orders Needing Specimen Collection     None      Pending Results     No orders found from 5/11/2018 to 5/14/2018.            Pending Culture Results     No  orders found from 5/11/2018 to 5/14/2018.            Pending Results Instructions     If you had any lab results that were not finalized at the time of your Discharge, you can call the ED Lab Result RN at [phone]. You will be contacted by this team for any positive Lab results or changes in treatment. The nurses are available 7 days a week from 10A to 6:30P.  You can leave a message 24 hours per day and they will return your call.        Test Results From Your Hospital Stay               Clinical Quality Measure: Blood Pressure Screening     Your blood pressure was checked while you were in the emergency department today. The last reading we obtained was  BP: 138/82 . Please read the guidelines below about what these numbers mean and what you should do about them.  If your systolic blood pressure (the top number) is less than 120 and your diastolic blood pressure (the bottom number) is less than 80, then your blood pressure is normal. There is nothing more that you need to do about it.  If your systolic blood pressure (the top number) is 120-139 or your diastolic blood pressure (the bottom number) is 80-89, your blood pressure may be higher than it should be. You should have your blood pressure rechecked within a year by a primary care provider.  If your systolic blood pressure (the top number) is 140 or greater or your diastolic blood pressure (the bottom number) is 90 or greater, you may have high blood pressure. High blood pressure is treatable, but if left untreated over time it can put you at risk for heart attack, stroke, or kidney failure. You should have your blood pressure rechecked by a primary care provider within the next 4 weeks.  If your provider in the emergency department today gave you specific instructions to follow-up with your doctor or provider even sooner than that, you should follow that instruction and not wait for up to 4 weeks for your follow-up visit.        Thank you for choosing  [place]       Thank you for choosing [place] for your care. Our goal is always to provide you with excellent care. Hearing back from our patients is one way we can continue to improve our services. Please take a few minutes to complete the written survey that you may receive in the mail after you visit with us. Thank you!        [employer] Information     [employer] gives you secure access to your electronic health record. If you see a primary care provider, you can also send messages to your care team and make appointments. If you have questions, please call your primary care clinic.  If you do not have a primary care provider, please call [phone] and they will assist you.        Care EveryWhere ID     This is your Care EveryWhere ID. This could be used by other organizations to access your [place] medical records  [id number]        Equal Access to Services     FRANCISCO QUINONES : Pedro Reyes, reyes altamirano, phillip cunningham, richar reina. So [facility] [phone].    ATENCIÓN: Si habla español, tiene a pfeiffer disposición servicios gratuitos de asistencia lingüística. Llame al [phone].    We comply with applicable federal civil rights laws and [place] laws. We do not discriminate on the basis of race, color, national origin, age, disability, sex, sexual orientation, or gender identity.            After Visit Summary       This is your record. Keep this with you and show to your community pharmacist(s) and doctor(s) at your next visit.

## 2018-05-13 NOTE — PROGRESS NOTES
SUBJECTIVE:   Jovon Mantilla is a 62 year old male presenting with a chief complaint of a pruritic painful rash on the right face going to the eyelid in the right side of the nose..  Onset of symptoms was 1 day(s) ago.  Course of illness is worsening.    Severity moderate  Current and Associated symptoms: No other associated symptoms include fever chills or night sweats.  He did mention a slight prodrome of irritation or burning prior to onset of the rash yesterday.  Treatment measures tried include None tried.  Predisposing factors include None.    Past Medical History:   Diagnosis Date     Acute prostatitis      Acute sinusitis, unspecified      Aortic valve disease     AVR 2011, MAZE     Atrial fibrillation (H)      CAD (coronary artery disease)     known 70% LAD stenosis     Cardiomyopathy (H)      HTN (hypertension)      Mitral regurgitation      Obesity, unspecified      Palpitations      Permanent atrial fibrillation (H) 6/2/2017     Unspecified essential hypertension      Current Outpatient Prescriptions   Medication Sig Dispense Refill     Ascorbic Acid (VITAMIN C PO) Take 500 mg by mouth At Bedtime       aspirin 81 MG tablet Take 81 mg by mouth every other day       Calcium Carbonate-Vitamin D (CALCIUM 600+D) 600-200 MG-UNIT TABS Take 1 tablet by mouth 2 times daily        carvedilol (COREG) 25 MG tablet Take 1 tablet (25 mg) by mouth 2 times daily (with meals) 180 tablet 3     Cholecalciferol (VITAMIN D) 2000 UNIT CAPS Take 1 tablet by mouth daily        Cyanocobalamin (VITAMIN B-12) 500 MCG SUBL Place 500 mcg under the tongue daily        digoxin (LANOXIN) 125 MCG tablet Take 1 tablet (125 mcg) by mouth daily 90 tablet 3     ferrous sulfate (IRON SUPPLEMENT) 325 (65 FE) MG tablet Take 1 tablet (325 mg) by mouth 2 times daily 100 tablet      levothyroxine (SYNTHROID/LEVOTHROID) 200 MCG tablet Take 1 tablet (200 mcg) by mouth daily 90 tablet 1     lisinopril (PRINIVIL/ZESTRIL) 20 MG tablet Take  20 mg (1  "tablet in am) and 10 mg  (one half tablet) in  pm 135 tablet 3     Multiple Vitamins-Minerals (CENTRUM) CHEW Take 1 tablet by mouth At Bedtime       Psyllium (FIBER) 0.52 GM CAPS Take 2 tablets by mouth At Bedtime        sertraline (ZOLOFT) 50 MG tablet Take 1 tablet (50 mg) by mouth daily 90 tablet 1     simvastatin (ZOCOR) 20 MG tablet Take 1 tablet (20 mg) by mouth At Bedtime 90 tablet 3     triamcinolone (KENALOG) 0.1 % cream Apply sparingly to affected area three times daily for 14 days. 30 g 0     warfarin (COUMADIN) 4 MG tablet TAKE 1 TABLET MONDAY, WEDNESDAY, AND FRIDAY AND ONE AND ONE-HALF TABLETS (6 MG) ALL OTHER DAYS 130 tablet 1     albuterol (PROAIR HFA/PROVENTIL HFA/VENTOLIN HFA) 108 (90 BASE) MCG/ACT Inhaler Inhale 2 puffs into the lungs every 6 hours as needed for shortness of breath / dyspnea or wheezing (Patient not taking: Reported on 5/13/2018) 1 Inhaler 0     Fexofenadine HCl (ALLERGY 24-HR PO) Take by mouth daily       Social History   Substance Use Topics     Smoking status: Never Smoker     Smokeless tobacco: Never Used     Alcohol use No       ROS:  Review of systems negative except as stated above.  No other involvement on the face or body at this time.  Patient is denying any recent fatigue injuries cancer or chemo treatment or other immunocompromise status.    OBJECTIVE:  /79  Pulse 77  Temp 97.3  F (36.3  C) (Oral)  Ht 5' 10.75\" (1.797 m)  Wt 206 lb 4 oz (93.6 kg)  SpO2 97%  BMI 28.97 kg/m2  GENERAL APPEARANCE: healthy, alert and no distress  EYES: EOMI,  PERRL, conjunctiva clear  On the right side.  The right eyelid does have a erythema and some lesions.  The sclerae also injected there is clear watery discharge.  Fluorescein stain was not obtained of the cornea.  The rashes contained to the right side of the face it does involve the tip of the nose the eyelid and the cheek does go back to the ear.  I do not did not appreciate any lesions into the ear canal.  HENT: ear " canals and TM's normal.  Nose and mouth without ulcers, erythema or lesions  NECK: supple, nontender, no lymphadenopathy  RESP: lungs clear to auscultation - no rales, rhonchi or wheezes  CV: regular rates and rhythm, normal S1 S2, no murmur noted  NEURO: Normal strength and tone, sensory exam grossly normal,  normal speech and mentation  SKIN: no suspicious lesions or rashes    ASSESSMENT:  Suspect herpes zoster on the face with possible herpes ophthalmicus involvement.    PLAN:  I am concerned for herpes zoster on the face with involvement with herpes ophthalmicus.  Therefore I sent him to the [facility] ER for definitive evaluation treatment of possible consult with ophthalmology.  Since he has involvement of the eyelid and around the face and the tip of the nose I assumed it was involving the eye.  Patient voiced understanding of the concern and will present to the emergency room for definitive evaluation and treatment.  I called and gave report to Rachel the staff nurse.

## 2018-05-13 NOTE — ED PROVIDER NOTES
"  History     Chief Complaint:  Rash    HPI   Jovon Mantilla is a 62 year old male with a medical history of CAD, hypertension, atrial fibrillation, hypertension, and hypothyroidism who presents with a rash. The patient reports he took a shower this morning and when he got out he noticed a rash on the right side of his face underneath the eye, with some on the nose. Patient notes he is producing tears from the eye, but is not experiencing any pain. Patient was seen at Urgent Care today, with concerns for shingles, referred the patient to the emergency department for further evaluation. No burning to the rash, vision changes. Patient does not have a history of shingles. No new lotions, face creams, soaps, or other products.     Allergies:  No known drug allergies     Medications:    Albuterol  Vitamin C  Aspirin  Calcium 600+D  Coreg  Vitamin D  Vitamin B12  Lanoxin  Iron supplement  Synthroid/Levothroid  Prinivil/Zestril  Centrum  Fiber  Zoloft  Zocor  Coumadin    Past Medical History:    Acute prostatitis  Acute sinusitis  Aortic valve   Atrial fibrillation  CAD  Cardiomyopathy  Hypertension  Mitral regurgitation  Obesity  Permanent atrial fibrillation  Hypertension  Hypothyroidism    Past Surgical History:    Vein stripping  Cardioversion  Cholecystectomy  Laparoscopic bypass gastric x2  Replace valve aortic    Family History:    Cancer  Diabetes    Social History:  Smoking status: Never smoker  Alcohol use: No   Marital Status:   [2]     Review of Systems   Eyes: Negative for visual disturbance.   Skin: Positive for rash.   All other systems reviewed and are negative.    Physical Exam   Patient Vitals for the past 24 hrs:   BP Temp Temp src Pulse Resp SpO2 Height Weight   05/13/18 1426 138/82 98.6  F (37  C) Oral 111 16 99 % 1.854 m (6' 1\") 93.9 kg (207 lb)      Physical Exam  General: Alert, interactive. GCS 15  Head:  Scalp is atraumatic. See skin exam.   Eyes:  EOM intact. The pupils are equal, round, " and reactive to light. No scleral icterus. conjunctiva normal. No foreign body. Fluorescein eye stain without dendritic lesion or area of uptake.   ENT:                                      Ears:  The external ears are normal. TM's normal bilaterally.   Nose:  The external nose is normal. No dacosta's sign.   Throat:  The oropharynx is normal. Mucus membranes are moist.                 Neck:  Normal range of motion. There is no rigidity.   CV:  Irregular rhythm. No murmur. 2+ radial pulses  Resp:  Breath sounds are clear bilaterally. Non-labored, no retractions or accessory muscle use.  GI:  Abdomen is soft, no distension, no tenderness.   MS:  Normal range of motion.   Skin:  Warm and dry. Vesicular lesions over the right cheek appears to follow the V2 distribution.   Neuro:  Strength and sensation grossly intact. CN 2-12 grossly intact.   Psych:  Awake. Alert.  Appropriate interactions.     Emergency Department Course   Emergency Department Course:  Past medical records, nursing notes, and vitals reviewed.  1505: I performed an exam of the patient and obtained history, as documented above.      1519: I performed an eye exam, findings as noted above.    Patient discharged home with instructions regarding supportive care, medications, and reasons to return. The importance of close follow-up was reviewed.      Impression & Plan    Medical Decision Making:  Jovon Mantilla is a 62 year old male who presents for evaluation of painful rash to the right cheek.  This is consistent clinically with herpes zoster to the V2 dermatome.  Will start valacyclovir for this. He has no pain at this time and does not require pain medication.  No signs at this point of disseminated zoster nor V1/eye involvement. Slit lamp exam negative for dendritic lesion. See primary care doctor in follow up for recheck. Advised if vision changes or eye pain occur he should be seen by his eye doctor emergently or return to the emergency  department. He voiced understanding all questions/cocerns addressed prior to discharge home.       Diagnosis:    ICD-10-CM   1. Herpes zoster without complication B02.9     Disposition:  discharged to home    Discharge Medications:  New Prescriptions    VALACYCLOVIR (VALTREX) 1000 MG TABLET    Take 1 tablet (1,000 mg) by mouth 3 times daily     Gina   5/13/2018    EMERGENCY DEPARTMENT  I, Gina Pacheco, am serving as a scribe at 3:05 PM on 5/13/2018 to document services personally performed by Humera Blackwell PA-C based on my observations and the provider's statements to me.       Humera Blackwell PA-C  05/13/18 1829

## 2018-05-13 NOTE — DISCHARGE INSTRUCTIONS
Shingles (Herpes Zoster)     Talk to your healthcare provider about the shingles vaccine.     Shingles is also called herpes zoster. It is a painful skin rash caused by the herpes zoster virus. This is the same virus that causes chickenpox. After a person has chickenpox, the virus remains inactive in the nerve cells. Years later, the virus can become active again and travel to the skin. Most people have shingles only once, but it is possible to have it more than once.  What are the risk factors for shingles?  Anyone who has ever had chickenpox can develop shingles. But your risk is greater if you:    Are 50 years of age or older    Have an illness that weakens your immune system, such as HIV/AIDS    Have cancer, especially Hodgkin disease or lymphoma    Take medicines that weaken your immune system  What are the symptoms of shingles?    The first sign of shingles is usually pain, burning, tingling, or itching on one part of your face or body. You may also feel as if you have the flu, with fever and chills.    A red rash with small blisters appears within a few days. The rash may appear as follows:   ? The blisters can occur anywhere, but they re most common on the back, chest, or abdomen.  ? They usually appear on only one side of the body, spreading along the nerve pathway where the virus was inactive.   ? The rash can also form around an eye, along one side of the face or neck, or in the mouth.  ? In a few people, usually those with weakened immune systems, shingles appear on more than one part of the body at once.    After a few days, the blisters become dry and form a crust. The crust falls off in days to weeks. The blisters generally do not leave scars.  How is shingles treated?  For most people, shingles heals on its own in a few weeks. But treatment is recommended to help relieve pain, speed healing, and reduce the risk of complications. Antiviral medicines are prescribed within the first 72 hours of the  appearance of the rash. To lessen symptoms:    Apply ice packs (wrapped in a thin towel) or cool compresses, or soak in a cool bath.    Use calamine lotion to calm itchy skin.    Ask your healthcare provider about over-the-counter pain relievers. If your pain is severe, your healthcare provider may prescribe stronger pain medicines.  What are the complications of shingles?  Shingles often goes away with no lasting effects. But some people have serious problems long after the blisters have healed:    Postherpetic neuralgia. This is the most common complication. It is severe nerve pain at the place where the rash used to be. It can last for months, or even years after you have had shingles. Medicines can be prescribed to help relieve the pain and improve quality of life.    Bacterial infection. Shingles blisters may become infected with bacteria. Antibiotic medicine is used to treat the infection.    Eye problems. A person with shingles on the face should see his or her healthcare provider right away. Shingles can cause serious problems with vision, and even blindness.  Very rarely shingles can also lead to pneumonia, hearing problems, brain inflammation, or even death.   When to seek medical care  Contact your healthcare provider if you experience any of the following:    Symptoms that don t go away with treatment    A rash or blisters near your eye    Increased drainage, fever, or rash after treatment, or severe pain that doesn t go away   How can shingles be prevented?  You can only get shingles if you have had chickenpox in the past. Those who have never had chickenpox can get the virus from you. Although instead of developing shingles, the person may get chickenpox. Until your blisters form scabs, avoid contact with others, especially the following:    Pregnant women who have never had chickenpox or the vaccine    Infants who were born early (prematurely) or who had low weight at birth    People with weak immune  system (for example, people receiving chemotherapy for cancer, people who have had organ transplants, or people with HIV infections)     The shingles vaccine  Shingles vaccines are available to help prevent shingles or make it less painful. Vaccination is recommended for adults 50 and older, even if you've had shingles in the past. Talk with your healthcare provider about the most appropriate time for you to get vaccinated, and which vaccine is best for you.   Date Last Reviewed: 10/1/2016    1567-0268 The "[employer]. [address] [place] [address], [place] [address]. All rights reserved. This information is not intended as a substitute for professional medical care. Always follow your healthcare professional's instructions.

## 2018-05-13 NOTE — ED AVS SNAPSHOT
Emergency Department    [address] [place] [address]    Phone:  [phone]    Fax:  [phone]                                       Jovon Mantilla   MRN: [id number]    Department:   Emergency Department   Date of Visit:  5/13/2018           After Visit Summary Signature Page     I have received my discharge instructions, and my questions have been answered. I have discussed any challenges I see with this plan with the nurse or doctor.    ..........................................................................................................................................  Patient/Patient Representative Signature      ..........................................................................................................................................  Patient Representative Print Name and Relationship to Patient    ..................................................               ................................................  Date                                            Time    ..........................................................................................................................................  Reviewed by Signature/Title    ...................................................              ..............................................  Date                                                            Time

## 2018-05-18 ENCOUNTER — OFFICE VISIT (OUTPATIENT)
Dept: FAMILY MEDICINE | Facility: CLINIC | Age: 63
End: 2018-05-18
Payer: COMMERCIAL

## 2018-05-18 VITALS
BODY MASS INDEX: 27.28 KG/M2 | DIASTOLIC BLOOD PRESSURE: 61 MMHG | RESPIRATION RATE: 14 BRPM | SYSTOLIC BLOOD PRESSURE: 95 MMHG | WEIGHT: 206.8 LBS | HEART RATE: 94 BPM | TEMPERATURE: 98.4 F | OXYGEN SATURATION: 96 %

## 2018-05-18 DIAGNOSIS — E03.9 ACQUIRED HYPOTHYROIDISM: ICD-10-CM

## 2018-05-18 DIAGNOSIS — Z95.2 S/P AVR (AORTIC VALVE REPLACEMENT): ICD-10-CM

## 2018-05-18 DIAGNOSIS — I35.0 NONRHEUMATIC AORTIC VALVE STENOSIS: ICD-10-CM

## 2018-05-18 DIAGNOSIS — B02.9 HERPES ZOSTER WITHOUT COMPLICATION: Primary | ICD-10-CM

## 2018-05-18 DIAGNOSIS — I25.10 CORONARY ARTERY DISEASE INVOLVING NATIVE CORONARY ARTERY OF NATIVE HEART WITHOUT ANGINA PECTORIS: ICD-10-CM

## 2018-05-18 PROCEDURE — 99214 OFFICE O/P EST MOD 30 MIN: CPT | Performed by: FAMILY MEDICINE

## 2018-05-18 NOTE — MR AVS SNAPSHOT
After Visit Summary   5/18/2018    Jovon Mantilla    MRN: [id number]           Patient Information     Date Of Birth          1955        Visit Information        Provider Department      5/18/2018 3:00 PM Rehan Lorenzana MD [facility]         Follow-ups after your visit        Your next 10 appointments already scheduled     Jun 14, 2018  4:20 PM CDT   Anticoagulation Visit with CP ANTI COAG   [facility] ([facility]    [address] [address] [phone]              Who to contact     If you have questions or need follow up information about today's clinic visit or your schedule please contact [facility] directly at [phone].  Normal or non-critical lab and imaging results will be communicated to you by MyChart, letter or phone within 4 business days after the clinic has received the results. If you do not hear from us within 7 days, please contact the clinic through MyChart or phone. If you have a critical or abnormal lab result, we will notify you by phone as soon as possible.  Submit refill requests through Mobile Card or call your pharmacy and they will forward the refill request to us. Please allow 3 business days for your refill to be completed.          Additional Information About Your Visit        MyChart Information     Mobile Card gives you secure access to your electronic health record. If you see a primary care provider, you can also send messages to your care team and make appointments. If you have questions, please call your primary care clinic.  If you do not have a primary care provider, please call [phone] and they will assist you.        Care EveryWhere ID     This is your Care EveryWhere ID. This could be used by other organizations to access your [facility] medical records  [id number]        Your Vitals Were     Pulse Temperature Respirations  Pulse Oximetry BMI (Body Mass Index)       94 98.4  F (36.9  C) (Oral) 14 96% 27.28 kg/m2        Blood Pressure from Last 3 Encounters:   05/18/18 95/61   05/13/18 138/82   05/13/18 122/79    Weight from Last 3 Encounters:   05/18/18 206 lb 12.8 oz (93.8 kg)   05/13/18 207 lb (93.9 kg)   05/13/18 206 lb 4 oz (93.6 kg)              Today, you had the following     No orders found for display       Primary Care Provider Office Phone # Fax #    Rehan Matheus Lorenzana [phone] [address] [facility] [address]        Equal Access to Services     FRANCISCO QUINONES : Hadii aad ku hadasho Sooctavio, waaxda luqadaha, qaybta kaalmada adeegyada, waxluisa reina. So [facility] [phone].    ATENCIÓN: Si habla español, tiene a pfeiffer disposición servicios gratuitos de asistencia lingüística. [facility] [phone].    We comply with applicable federal civil rights laws and [place] laws. We do not discriminate on the basis of race, color, national origin, age, disability, sex, sexual orientation, or gender identity.            Thank you!     Thank you for choosing [facility]  for your care. Our goal is always to provide you with excellent care. Hearing back from our patients is one way we can continue to improve our services. Please take a few minutes to complete the written survey that you may receive in the mail after your visit with us. Thank you!             Your Updated Medication List - Protect others around you: Learn how to safely use, store and throw away your medicines at www.disposemymeds.org.          This list is accurate as of 5/18/18  3:40 PM.  Always use your most recent med list.                   Brand Name Dispense Instructions for use Diagnosis    albuterol 108 (90 Base) MCG/ACT Inhaler    PROAIR HFA/PROVENTIL HFA/VENTOLIN HFA    1 Inhaler    Inhale 2 puffs into the lungs every 6 hours as needed for shortness of breath / dyspnea or wheezing     Acute bronchitis due to Mycoplasma pneumoniae       ALLERGY 24-HR PO      Take by mouth daily        aspirin 81 MG tablet      Take 81 mg by mouth every other day        CALCIUM 600+D 600-200 MG-UNIT Tabs   Generic drug:  calcium carbonate-vitamin D      Take 1 tablet by mouth 2 times daily        carvedilol 25 MG tablet    COREG    180 tablet    Take 1 tablet (25 mg) by mouth 2 times daily (with meals)    ASVD (arteriosclerotic vascular disease)       CENTRUM Chew      Take 1 tablet by mouth At Bedtime        digoxin 125 MCG tablet    LANOXIN    90 tablet    Take 1 tablet (125 mcg) by mouth daily    Chronic atrial fibrillation (H)       Fiber 0.52 g Caps      Take 2 tablets by mouth At Bedtime        IRON SUPPLEMENT 325 (65 Fe) MG tablet   Generic drug:  ferrous sulfate     100 tablet    Take 1 tablet (325 mg) by mouth 2 times daily        levothyroxine 200 MCG tablet    SYNTHROID/LEVOTHROID    90 tablet    Take 1 tablet (200 mcg) by mouth daily    Acquired hypothyroidism       lisinopril 20 MG tablet    PRINIVIL/ZESTRIL    135 tablet    Take  20 mg (1 tablet in am) and 10 mg  (one half tablet) in  pm    ASVD (arteriosclerotic vascular disease)       sertraline 50 MG tablet    ZOLOFT    90 tablet    Take 1 tablet (50 mg) by mouth daily    Anxiety       simvastatin 20 MG tablet    ZOCOR    90 tablet    Take 1 tablet (20 mg) by mouth At Bedtime    Hyperlipidemia LDL goal <70       triamcinolone 0.1 % cream    KENALOG    30 g    Apply sparingly to affected area three times daily for 14 days.    Atopic dermatitis, unspecified type       valACYclovir 1000 mg tablet    VALTREX    21 tablet    Take 1 tablet (1,000 mg) by mouth 3 times daily        Vitamin B-12 500 MCG Subl      Place 500 mcg under the tongue daily        VITAMIN C PO      Take 500 mg by mouth At Bedtime        vitamin D 2000 units Caps      Take 1 tablet by mouth daily        warfarin 4 MG tablet    COUMADIN    130 tablet    TAKE 1 TABLET MONDAY,  WEDNESDAY, AND FRIDAY AND ONE AND ONE-HALF TABLETS (6 MG) ALL OTHER DAYS    Long term current use of anticoagulant therapy

## 2018-05-18 NOTE — PROGRESS NOTES
SUBJECTIVE:   Jovon Mantilla is a 62 year old male who presents to clinic today for the following health issues:      ED/UC Followup:    Facility:   Emergency Department  Date of visit: 5/13/18  Reason for visit: rash  Current Status: doing fine     Past Medical History:   Diagnosis Date     Acute prostatitis      Acute sinusitis, unspecified      Aortic valve disease     AVR 2011, MAZE     Atrial fibrillation (H)      CAD (coronary artery disease)     known 70% LAD stenosis     Cardiomyopathy (H)      HTN (hypertension)      Mitral regurgitation      Obesity, unspecified      Palpitations      Permanent atrial fibrillation (H) 6/2/2017     Unspecified essential hypertension        Past Surgical History:   Procedure Laterality Date     ANGIOGRAM  2/02    Normal coronary,dilated LV,Sev.decr.global LVF,+1 MR     ANGIOGRAM  1/08    Mild AS,Mod PHTN,mod prox.LAD disease,Triv.CFX disease     ANGIOGRAM  1/09    Mod AS,Mod PHTN,mod prox.LAD disease     ANGIOGRAM  10/10    Sev.AS,CM,global hypokinesis,Mild-mod LV dilated,Mild MR,70% prox.LAD lesion,PHTN     C NONSPECIFIC PROCEDURE  ~1985    vein stripping     CARDIOVERSION  2/02, 4/02, 4/11     CHOLECYSTECTOMY       COLONOSCOPY N/A 1/23/2016    Procedure: COLONOSCOPY;  Surgeon: Robyn Collins MD;  Location:  GI     ESOPHAGOSCOPY, GASTROSCOPY, DUODENOSCOPY (EGD), COMBINED N/A 1/23/2016    Procedure: COMBINED ESOPHAGOSCOPY, GASTROSCOPY, DUODENOSCOPY (EGD);  Surgeon: Robyn Collins MD;  Location:  GI     LAPAROSCOPIC BYPASS GASTRIC  10/8/2011    Procedure:LAPAROSCOPIC BYPASS GASTRIC; Diagnostic laparoscopy, Lysis of Adhesions, Laparoscopic Revision of Jejunojejunostomy; Surgeon:RADHA MURO; Location: OR     LAPAROSCOPIC BYPASS GASTRIC  6/26/2006    Dr Jin     LAPAROSCOPY DIAGNOSTIC (GENERAL)  10/8/2011    Procedure:LAPAROSCOPY DIAGNOSTIC (GENERAL); Surgeon:RADHA MURO; Location: OR     REPLACE VALVE AORTIC  1/7/2011        Family History   Problem Relation Age of Onset     Cancer - colorectal Father      Colon Cancer Father      DIABETES Brother        Social History   Substance Use Topics     Smoking status: Never Smoker     Smokeless tobacco: Never Used     Alcohol use No   Rash has redness, morphology, location right eye  and duration five days   It is not painful  pruritic or infected   Associated symptoms include   History of past varicella  exposure or contact    No chest pain, no extra palpitations, no soboe or increased edema   On exam the vital signs are stable  Weight is Body mass index is 27.28 kg/(m^2).   Eyes show wilmer  No neck masses or thyromegaly.Ear nose and throat shows normal   No bruits, murmers, rubs or extrasounds. No cardiomegaly or chest wall tenderness. Lungs clear, no abdominal masses or organomegaly. No CVA tenderness.  Skin eval rash right face dermatomal  No hernias, good range of motion neck, back and extremities. No abnormal skin lesions. Normal genitalia. Good peripheral pulses. No adenopathy.  Normal gait and stance. Neck is supple.  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

## 2018-05-30 DIAGNOSIS — E78.5 HYPERLIPIDEMIA LDL GOAL <70: ICD-10-CM

## 2018-05-31 NOTE — TELEPHONE ENCOUNTER
"Requested Prescriptions   Pending Prescriptions Disp Refills     simvastatin (ZOCOR) 20 MG tablet [Pharmacy Med Name: SIMVASTATIN TABS 20MG]    Last Written Prescription Date:  6/2/17  Last Fill Quantity: 90 tablets,  # refills: 3   Last office visit: 5/18/2018 with prescribing provider:  Harriett   Future Office Visit:     90 tablet 3     Sig: TAKE 1 TABLET AT BEDTIME    Statins Protocol Passed    5/30/2018 11:34 PM       Passed - LDL on file in past 12 months    Recent Labs   Lab Test  06/02/17   1215   LDL  63            Passed - No abnormal creatine kinase in past 12 months    No lab results found.            Passed - Recent (12 mo) or future (30 days) visit within the authorizing provider's specialty    Patient had office visit in the last 12 months or has a visit in the next 30 days with authorizing provider or within the authorizing provider's specialty.  See \"Patient Info\" tab in inbasket, or \"Choose Columns\" in Meds & Orders section of the refill encounter.           Passed - Patient is age 18 or older          "

## 2018-06-04 RX ORDER — SIMVASTATIN 20 MG
TABLET ORAL
Qty: 90 TABLET | Refills: 0 | Status: SHIPPED | OUTPATIENT
Start: 2018-06-04 | End: 2018-09-02

## 2018-06-04 NOTE — TELEPHONE ENCOUNTER
Medication is being filled for 1 time refill only due to:  due for annual physical     Prescription approved per [facility] Refill Protocol.    Corinne MOLINA RN, BSN, PHN  [employer] RN

## 2018-06-26 DIAGNOSIS — Z79.01 LONG TERM CURRENT USE OF ANTICOAGULANT THERAPY: ICD-10-CM

## 2018-06-26 NOTE — TELEPHONE ENCOUNTER
"Last Written Prescription Date:  12/28/17  Last Fill Quantity: 130 tablet,  # refills: 1   Last office visit: 5/18/2018 with prescribing provider:  Harriett   Future Office Visit:      Requested Prescriptions   Pending Prescriptions Disp Refills     warfarin (COUMADIN) 4 MG tablet [Pharmacy Med Name: WARFARIN TABS 4MG] 130 tablet 1     Sig: TAKE 1 TABLET ON MONDAY,WEDNESDAY,AND FRIDAY AND TAKE ONE AND ONE-HALF TABLETS (6 MG) ALL OTHER DAYS    Vitamin K Antagonists Failed    6/26/2018 12:14 AM       Failed - INR is within goal in the past 6 weeks    Confirm INR is within goal in the past 6 weeks.     Recent Labs   Lab Test 05/04/18   INR  2.1*                      Passed - Recent (12 mo) or future (30 days) visit within the authorizing provider's specialty    Patient had office visit in the last 12 months or has a visit in the next 30 days with authorizing provider or within the authorizing provider's specialty.  See \"Patient Info\" tab in inbasket, or \"Choose Columns\" in Meds & Orders section of the refill encounter.           Passed - Patient is 18 years of age or older          "

## 2018-06-29 NOTE — PROGRESS NOTES
SUBJECTIVE:   Jovon Mantilla is a 62 year old male who presents to clinic today for the following health issues:      Hypothyroidism Follow-up      Since last visit, patient describes the following symptoms: Weight stable, no hair loss, no skin changes, no constipation, no loose stools  With associated cardiomyopathy and atrial fibrillation with steel aortic valve    Amount of exercise or physical activity: walking     Problems taking medications regularly: No    Medication side effects: none    Diet: regular (no restrictions)    Past Medical History:   Diagnosis Date     Acute prostatitis      Acute sinusitis, unspecified      Aortic valve disease     AVR 2011, MAZE     Atrial fibrillation (H)      CAD (coronary artery disease)     known 70% LAD stenosis     Cardiomyopathy (H)      HTN (hypertension)      Mitral regurgitation      Obesity, unspecified      Palpitations      Permanent atrial fibrillation (H) 6/2/2017     Unspecified essential hypertension        Past Surgical History:   Procedure Laterality Date     ANGIOGRAM  2/02    Normal coronary,dilated LV,Sev.decr.global LVF,+1 MR     ANGIOGRAM  1/08    Mild AS,Mod PHTN,mod prox.LAD disease,Triv.CFX disease     ANGIOGRAM  1/09    Mod AS,Mod PHTN,mod prox.LAD disease     ANGIOGRAM  10/10    Sev.AS,CM,global hypokinesis,Mild-mod LV dilated,Mild MR,70% prox.LAD lesion,PHTN     C NONSPECIFIC PROCEDURE  ~1985    vein stripping     CARDIOVERSION  2/02, 4/02, 4/11     CHOLECYSTECTOMY       COLONOSCOPY N/A 1/23/2016    Procedure: COLONOSCOPY;  Surgeon: Robyn Collins MD;  Location:  GI     ESOPHAGOSCOPY, GASTROSCOPY, DUODENOSCOPY (EGD), COMBINED N/A 1/23/2016    Procedure: COMBINED ESOPHAGOSCOPY, GASTROSCOPY, DUODENOSCOPY (EGD);  Surgeon: Robyn Collins MD;  Location: [facility]     LAPAROSCOPIC BYPASS GASTRIC  10/8/2011    Procedure:LAPAROSCOPIC BYPASS GASTRIC; Diagnostic laparoscopy, Lysis of Adhesions, Laparoscopic Revision of Jejunojejunostomy;  Surgeon:RADHA MURO; Location: OR     LAPAROSCOPIC BYPASS GASTRIC  6/26/2006    Dr Jin     LAPAROSCOPY DIAGNOSTIC (GENERAL)  10/8/2011    Procedure:LAPAROSCOPY DIAGNOSTIC (GENERAL); Surgeon:RADHA MURO; Location: OR     REPLACE VALVE AORTIC  1/7/2011       Family History   Problem Relation Age of Onset     Cancer - colorectal Father      Colon Cancer Father      DIABETES Brother        Social History   Substance Use Topics     Smoking status: Never Smoker     Smokeless tobacco: Never Used     Alcohol use No        REVIEW OF SYSTEMS    Generally has been  feeling well until this episode. No problems with vision, hearing, dental or neck pain.Has hph airborne or ingestion allergy  No chest pain, palpitations, dyspnea, change in bowel habits, blood  in stool or dyspepsia.  No rashes, changing moles, weakness, lassitude or back problems.  No chronic issues . No dysuria  Patient not  a smoker. No problems with significant headaches.  On exam the vital signs are stable  Weight is bmi   Eyes show wilmer  No neck masses or thyromegaly.Ear nose and throat shows normal   No bruits, murmers, rubs or extrasounds. No cardiomegaly or chest wall tenderness. Lungs clear, no abdominal masses or organomegaly. No CVA tenderness.  Skin eval no rash   Good peripheral pulses. No adenopathy.  Normal gait and stance. Neck is supple.  Back exam shows limted lateral flexion    (E03.9) Acquired hypothyroidism  (primary encounter diagnosis)  Comment:   Plan: TSH, T4, free        Check for control, cardiac exam is improved after his three week vacation and new thyroid dose  Four month follow up              C/w Ativan 0.5 mg IV q 4 ATC and 1 mg q 1 PRN - Did not need PRNs in the last 24 hours.  C/w Zyprexa 5 mg q 12 PRN agitation due to delirium

## 2018-07-02 RX ORDER — WARFARIN SODIUM 4 MG/1
TABLET ORAL
Qty: 130 TABLET | Refills: 0 | Status: SHIPPED | OUTPATIENT
Start: 2018-07-02 | End: 2018-09-12

## 2018-07-02 NOTE — TELEPHONE ENCOUNTER
Maintenance plan 4 mg (4 mg x 1) on Mon, Wed, Fri; 6 mg (4 mg x 1.5) all other days     warfarin (COUMADIN) 4 MG tablet    Medication is being filled for 1 time refill only due to:  Patient needs labs INR.

## 2018-07-10 DIAGNOSIS — F41.9 ANXIETY: ICD-10-CM

## 2018-07-10 NOTE — TELEPHONE ENCOUNTER
"Last Written Prescription Date:  1/11/18  Last Fill Quantity: 90 tablet,  # refills: 1   Last office visit: 5/18/2018 with prescribing provider:  Harriett   Future Office Visit:      Requested Prescriptions   Pending Prescriptions Disp Refills     sertraline (ZOLOFT) 50 MG tablet [Pharmacy Med Name: SERTRALINE HCL TABS 50MG] 90 tablet 1     Sig: TAKE 1 TABLET DAILY    SSRIs Protocol Passed    7/10/2018 12:58 AM       Passed - Recent (12 mo) or future (30 days) visit within the authorizing provider's specialty    Patient had office visit in the last 12 months or has a visit in the next 30 days with authorizing provider or within the authorizing provider's specialty.  See \"Patient Info\" tab in inbasket, or \"Choose Columns\" in Meds & Orders section of the refill encounter.           Passed - Patient is age 18 or older          PHQ-9 SCORE 5/9/2016   Total Score 4     DARNELL-7 SCORE 5/9/2016   Total Score 8         "

## 2018-07-11 NOTE — TELEPHONE ENCOUNTER
Prescription approved per [facility] Refill Protocol.  Tennille Mares RN, BSN

## 2018-08-20 ENCOUNTER — TELEPHONE (OUTPATIENT)
Dept: NURSING | Facility: CLINIC | Age: 63
End: 2018-08-20

## 2018-08-20 NOTE — LETTER
[address] [id number] [phone]          August 20, 2018    Jovon Mantilla                                                                                                                     [address] [facility] [address]            Dear Jovon,  This letter is being sent to you because you are overdue for an INR blood test.  Our records indicate your last INR was May 4, 2018.  It is recommended to have your INR tested at least once a month while on warfarin / coumadin therapy.     Please contact either the INR office directly at [phone] or the main scheduling department at your earliest convenience at [phone] to arrange an appointment.     Thank you.      Sincerely,       Monica GODINEZ / INR OFFICE for /Rehan Lorenzana MD

## 2018-08-28 ENCOUNTER — ANTICOAGULATION THERAPY VISIT (OUTPATIENT)
Dept: NURSING | Facility: CLINIC | Age: 63
End: 2018-08-28
Payer: COMMERCIAL

## 2018-08-28 ENCOUNTER — TELEPHONE (OUTPATIENT)
Dept: NURSING | Facility: CLINIC | Age: 63
End: 2018-08-28

## 2018-08-28 DIAGNOSIS — Z95.2 S/P AVR (AORTIC VALVE REPLACEMENT): Primary | ICD-10-CM

## 2018-08-28 DIAGNOSIS — Z95.2 S/P AVR (AORTIC VALVE REPLACEMENT): ICD-10-CM

## 2018-08-28 DIAGNOSIS — Z79.01 LONG-TERM (CURRENT) USE OF ANTICOAGULANTS: ICD-10-CM

## 2018-08-28 DIAGNOSIS — I42.9 CARDIOMYOPATHY, UNSPECIFIED TYPE (H): ICD-10-CM

## 2018-08-28 LAB — INR POINT OF CARE: 3.6 (ref 0.86–1.14)

## 2018-08-28 PROCEDURE — 99207 ZZC NO CHARGE NURSE ONLY: CPT

## 2018-08-28 PROCEDURE — 36416 COLLJ CAPILLARY BLOOD SPEC: CPT

## 2018-08-28 PROCEDURE — 85610 PROTHROMBIN TIME: CPT | Mod: QW

## 2018-08-28 NOTE — PROGRESS NOTES
ANTICOAGULATION FOLLOW-UP CLINIC VISIT    Patient Name:  Jovon Mantilla  Date:  8/28/2018  Contact Type:  Face to Face    SUBJECTIVE: Patient here 3 months overdue for INR.     Patient Findings     Positives No Problem Findings           OBJECTIVE    INR Protime   Date Value Ref Range Status   08/28/2018 3.6 (A) 0.86 - 1.14 Final     Chromogenic Factor 10   Date Value Ref Range Status   01/21/2011 35 (L) 60 - 140 % Final     Comment:     Therapeutic Range: A Chromogenic Factor 10 level of 20-40% inversely   correlates   with an INR of 2-3 for patients receiving Warfarin. Chromogenic Factor 10   levels below 20% indicate an INR greater than 3, and levels above 40%   indicate   an INR lessthan 2.       ASSESSMENT / PLAN  INR assessment THER    Recheck INR In: 6 WEEKS    INR Location Clinic      Anticoagulation Summary as of 8/28/2018     INR goal 2.5-3.5   Today's INR 3.6!   Warfarin maintenance plan 4 mg (4 mg x 1) on Mon, Wed, Fri; 6 mg (4 mg x 1.5) all other days   Full warfarin instructions 4 mg on Mon, Wed, Fri; 6 mg all other days   Weekly warfarin total 36 mg   No change documented Monica Leslie, RN   Plan last modified Monica Leslie, RN (5/4/2017)   Next INR check 10/12/2018   Target end date Indefinite    Indications   Long-term (current) use of anticoagulants [Z79.01] [Z79.01]  S/P AVR (aortic valve replacement) [Z95.2]         Anticoagulation Episode Summary     INR check location     Preferred lab     Send INR reminders to [facility]    Comments       Anticoagulation Care Providers     Provider Role Specialty Phone number    Rehan Lorenzana MD [facility] [phone]            See the Encounter Report to view Anticoagulation Flowsheet and Dosing Calendar (Go to Encounters tab in chart review, and find the Anticoagulation Therapy Visit)    Dosage adjustment made based on physician directed care plan.    Monica Leslie, RN

## 2018-08-28 NOTE — MR AVS SNAPSHOT
Jovon Mantilla   8/28/2018 9:20 AM   Anticoagulation Therapy Visit    Description:  63 year old male   Provider:  JIMMY ANTI COAG   Department:  [facility] Nurse           INR as of 8/28/2018     Today's INR 3.6!      Anticoagulation Summary as of 8/28/2018     INR goal 2.5-3.5   Today's INR 3.6!   Full warfarin instructions 4 mg on Mon, Wed, Fri; 6 mg all other days   Next INR check 10/12/2018    Indications   Long-term (current) use of anticoagulants [Z79.01] [Z79.01]  S/P AVR (aortic valve replacement) [Z95.2]         Your next Anticoagulation Clinic appointment(s)     Oct 12, 2018  9:20 AM CDT   Anticoagulation Visit with JIMMY ANTI PATRICIA   [facility] ([facility])    [address] [place] [id number] [phone]              Contact Numbers     [facility]  Please call [phone] or [phone]  to cancel and/or reschedule your appointment.   Please call [phone] with any problems or questions regarding your therapy          August 2018 Details    Sun Mon Tue Wed Thu Fri Sat        1               2               3               4                 5               6               7               8               9               10               11                 12               13               14               15               16               17               18                 19               20               21               22               23               24               25                 26               27               28      6 mg   See details      29      4 mg         30      6 mg         31      4 mg           Date Details   08/28 This INR check               How to take your warfarin dose     To take:  4 mg Take 1 of the 4 mg tablets.    To take:  6 mg Take 1.5 of the 4 mg tablets.           September 2018 Details    Sun Mon Tue Wed Thu Fri Sat           1      6 mg           2      6 mg         3      4 mg          4      6 mg         5      4 mg         6      6 mg         7      4 mg         8      6 mg           9      6 mg         10      4 mg         11      6 mg         12      4 mg         13      6 mg         14      4 mg         15      6 mg           16      6 mg         17      4 mg         18      6 mg         19      4 mg         20      6 mg         21      4 mg         22      6 mg           23      6 mg         24      4 mg         25      6 mg         26      4 mg         27      6 mg         28      4 mg         29      6 mg           30      6 mg                Date Details   No additional details            How to take your warfarin dose     To take:  4 mg Take 1 of the 4 mg tablets.    To take:  6 mg Take 1.5 of the 4 mg tablets.           October 2018 Details    Sun Mon Tue Wed Thu Fri Sat      1      4 mg         2      6 mg         3      4 mg         4      6 mg         5      4 mg         6      6 mg           7      6 mg         8      4 mg         9      6 mg         10      4 mg         11      6 mg         12            13                 14               15               16               17               18               19               20                 21               22               23               24               25               26               27                 28               29               30               31                   Date Details   No additional details    Date of next INR:  10/12/2018         How to take your warfarin dose     To take:  4 mg Take 1 of the 4 mg tablets.    To take:  6 mg Take 1.5 of the 4 mg tablets.

## 2018-08-28 NOTE — TELEPHONE ENCOUNTER
Please review the new screening questions that I have completed for your review below for INR Clinic Referral Orders and Renewal Order process. If correct, please sign / pended orders and close encounter. Thanks    Has the patient previously taken warfarin? yes  If yes, for what indication? AVR, AFIB, Cardiomyopathy    Does the patient have any of the following indications for a higher range of 2.5-3.5:    Mitral position mechanical valve? See copy below    Brannon-Shiley, Ball and Cage or Monoleaflet valve (regardless of position) see copy below    Other (if yes, please explain) yes - see copy below    aortic valve was replaced 01/07/2011 with an ATS 24 mm  supra-annular valve    Route to PCP    Monica Medrano RN

## 2018-09-02 DIAGNOSIS — E78.5 HYPERLIPIDEMIA LDL GOAL <70: ICD-10-CM

## 2018-09-04 NOTE — TELEPHONE ENCOUNTER
"Requested Prescriptions   Pending Prescriptions Disp Refills     simvastatin (ZOCOR) 20 MG tablet [Pharmacy Med Name: SIMVASTATIN TABS 20MG] 90 tablet 0    Last Written Prescription Date:  6/4/18  Last Fill Quantity: 90,  # refills: 0   Last Office Visit: 5/18/2018   Future Office Visit:      Sig: TAKE 1 TABLET AT BEDTIME (DUE FOR ANNUAL PHYSICAL WITH FASTING LABS IN JUNE FOR FURTHER REFILLS)    Statins Protocol Failed    9/2/2018  4:44 AM       Failed - LDL on file in past 12 months    Recent Labs   Lab Test  06/02/17   1215   LDL  63            Passed - No abnormal creatine kinase in past 12 months    No lab results found.            Passed - Recent (12 mo) or future (30 days) visit within the authorizing provider's specialty    Patient had office visit in the last 12 months or has a visit in the next 30 days with authorizing provider or within the authorizing provider's specialty.  See \"Patient Info\" tab in inbasket, or \"Choose Columns\" in Meds & Orders section of the refill encounter.           Passed - Patient is age 18 or older          "

## 2018-09-05 RX ORDER — SIMVASTATIN 20 MG
TABLET ORAL
Status: SHIPPED
Start: 2018-09-05 | End: 2018-10-22

## 2018-09-05 NOTE — TELEPHONE ENCOUNTER
Due for yearly and labs.  Called patient.  Scheduled him for PE and fasting labs 9/14/18 11:15 am.  Does not need refill at this time.  Arabella Gonzales RN

## 2018-09-12 DIAGNOSIS — Z79.01 LONG TERM CURRENT USE OF ANTICOAGULANT THERAPY: ICD-10-CM

## 2018-09-12 NOTE — TELEPHONE ENCOUNTER
"Requested Prescriptions   Pending Prescriptions Disp Refills     warfarin (COUMADIN) 4 MG tablet [Pharmacy Med Name: WARFARIN TABS 4MG] 117 tablet 0    Last Written Prescription Date:  7/2/18  Last Fill Quantity: 130,  # refills: 0   Last Office Visit: 5/18/2018   Future Office Visit:    Next 5 appointments (look out 90 days)     Sep 14, 2018 11:15 AM CDT   PHYSICAL with Rehan Lorenzana MD   [facility]    [address] [place] [id number] [phone]                  Sig: TAKE ONE TABLET ON MONDAY, WEDNESDAY, AND FRIDAY AND ONE AND ONE-HALF (6 MG) TABLETS ALL OTHER DAYS. OVERDUE FOR INR. NEED INR FOR REFILLS    Vitamin K Antagonists Failed    9/12/2018 12:02 AM       Failed - INR is within goal in the past 6 weeks    Confirm INR is within goal in the past 6 weeks.     Recent Labs   Lab Test 08/28/18   INR  3.6*   Date and Result of Last PT/INR:   Lab Results   Component Value Date    INR 3.6 08/28/2018    INR 2.1 05/04/2018    INR 4.60 05/01/2018    INR 2.58 01/28/2016             Passed - Recent (12 mo) or future (30 days) visit within the authorizing provider's specialty    Patient had office visit in the last 12 months or has a visit in the next 30 days with authorizing provider or within the authorizing provider's specialty.  See \"Patient Info\" tab in inbasket, or \"Choose Columns\" in Meds & Orders section of the refill encounter.           Passed - Patient is 18 years of age or older          "

## 2018-09-14 ENCOUNTER — OFFICE VISIT (OUTPATIENT)
Dept: FAMILY MEDICINE | Facility: CLINIC | Age: 63
End: 2018-09-14
Payer: COMMERCIAL

## 2018-09-14 VITALS
HEART RATE: 125 BPM | HEIGHT: 71 IN | RESPIRATION RATE: 12 BRPM | DIASTOLIC BLOOD PRESSURE: 88 MMHG | OXYGEN SATURATION: 97 % | WEIGHT: 206.7 LBS | TEMPERATURE: 98.6 F | SYSTOLIC BLOOD PRESSURE: 138 MMHG | BODY MASS INDEX: 28.94 KG/M2

## 2018-09-14 DIAGNOSIS — I42.0 DILATED CARDIOMYOPATHY (H): ICD-10-CM

## 2018-09-14 DIAGNOSIS — Z79.01 LONG TERM CURRENT USE OF ANTICOAGULANT THERAPY: ICD-10-CM

## 2018-09-14 DIAGNOSIS — E03.9 ACQUIRED HYPOTHYROIDISM: Primary | ICD-10-CM

## 2018-09-14 DIAGNOSIS — Z95.2 S/P AVR (AORTIC VALVE REPLACEMENT): ICD-10-CM

## 2018-09-14 DIAGNOSIS — Z23 NEED FOR PROPHYLACTIC VACCINATION AND INOCULATION AGAINST INFLUENZA: ICD-10-CM

## 2018-09-14 DIAGNOSIS — E78.5 HYPERLIPIDEMIA LDL GOAL <70: ICD-10-CM

## 2018-09-14 DIAGNOSIS — I25.10 CORONARY ARTERY DISEASE INVOLVING NATIVE CORONARY ARTERY OF NATIVE HEART WITHOUT ANGINA PECTORIS: ICD-10-CM

## 2018-09-14 DIAGNOSIS — Z00.00 ROUTINE GENERAL MEDICAL EXAMINATION AT A HEALTH CARE FACILITY: ICD-10-CM

## 2018-09-14 LAB
DIGOXIN SERPL-MCNC: 0.5 UG/L (ref 0.5–2)
ERYTHROCYTE [DISTWIDTH] IN BLOOD BY AUTOMATED COUNT: 15 % (ref 10–15)
HCT VFR BLD AUTO: 40.1 % (ref 40–53)
HGB BLD-MCNC: 12.6 G/DL (ref 13.3–17.7)
MCH RBC QN AUTO: 30.2 PG (ref 26.5–33)
MCHC RBC AUTO-ENTMCNC: 31.4 G/DL (ref 31.5–36.5)
MCV RBC AUTO: 96 FL (ref 78–100)
PLATELET # BLD AUTO: 180 10E9/L (ref 150–450)
RBC # BLD AUTO: 4.17 10E12/L (ref 4.4–5.9)
WBC # BLD AUTO: 5.3 10E9/L (ref 4–11)

## 2018-09-14 PROCEDURE — 99396 PREV VISIT EST AGE 40-64: CPT | Mod: 25 | Performed by: FAMILY MEDICINE

## 2018-09-14 PROCEDURE — 82043 UR ALBUMIN QUANTITATIVE: CPT | Performed by: FAMILY MEDICINE

## 2018-09-14 PROCEDURE — 85027 COMPLETE CBC AUTOMATED: CPT | Performed by: FAMILY MEDICINE

## 2018-09-14 PROCEDURE — 84443 ASSAY THYROID STIM HORMONE: CPT | Performed by: FAMILY MEDICINE

## 2018-09-14 PROCEDURE — 80162 ASSAY OF DIGOXIN TOTAL: CPT | Performed by: FAMILY MEDICINE

## 2018-09-14 PROCEDURE — 36415 COLL VENOUS BLD VENIPUNCTURE: CPT | Performed by: FAMILY MEDICINE

## 2018-09-14 PROCEDURE — 84439 ASSAY OF FREE THYROXINE: CPT | Performed by: FAMILY MEDICINE

## 2018-09-14 PROCEDURE — 90471 IMMUNIZATION ADMIN: CPT | Performed by: FAMILY MEDICINE

## 2018-09-14 PROCEDURE — 90682 RIV4 VACC RECOMBINANT DNA IM: CPT | Performed by: FAMILY MEDICINE

## 2018-09-14 PROCEDURE — 80061 LIPID PANEL: CPT | Performed by: FAMILY MEDICINE

## 2018-09-14 PROCEDURE — 80053 COMPREHEN METABOLIC PANEL: CPT | Performed by: FAMILY MEDICINE

## 2018-09-14 RX ORDER — WARFARIN SODIUM 4 MG/1
TABLET ORAL
Qty: 117 TABLET | Refills: 3 | Status: SHIPPED | OUTPATIENT
Start: 2018-09-14 | End: 2019-01-22

## 2018-09-14 NOTE — PROGRESS NOTES

## 2018-09-14 NOTE — PROGRESS NOTES
"SUBJECTIVE:   CC: Jovon Mantilla is an 63 year old male who presents for preventative health visit.     Physical   Annual:     Getting at least 3 servings of Calcium per day:  Yes    Bi-annual eye exam:  Yes    Dental care twice a year:  Yes    Sleep apnea or symptoms of sleep apnea:  None    Diet:  Low fat/cholesterol and Other    Frequency of exercise:  4-5 days/week    Duration of exercise:  15-30 minutes    Taking medications regularly:  Yes    Medication side effects:  None    Additional concerns today:  No            Today's PHQ-2 Score:   PHQ-2 ( 1999 [employer]) 9/14/2018   Q1: Little interest or pleasure in doing things 0   Q2: Feeling down, depressed or hopeless 0   PHQ-2 Score 0   Q1: Little interest or pleasure in doing things Not at all   Q2: Feeling down, depressed or hopeless Not at all   PHQ-2 Score 0       Abuse: Current or Past(Physical, Sexual or Emotional)- No  Do you feel safe in your environment - Yes    Social History   Substance Use Topics     Smoking status: Never Smoker     Smokeless tobacco: Never Used     Alcohol use No     Alcohol Use 9/14/2018   If you drink alcohol do you typically have greater than 3 drinks per day OR greater than 7 drinks per week? Not Applicable       Last PSA:   PSA   Date Value Ref Range Status   02/16/2015 1.44 0 - 4 ug/L Final       /88 (BP Location: Left arm, Patient Position: Chair, Cuff Size: Adult Large)  Pulse 125  Temp 98.6  F (37  C) (Oral)  Resp 12  Ht 5' 10.75\" (1.797 m)  Wt 206 lb 11.2 oz (93.8 kg)  SpO2 97%  BMI 29.03 kg/m2         Reviewed and updated as needed this visit by clinical staff         Reviewed and updated as needed this visit by Provider            Review of Systems  CONSTITUTIONAL: NEGATIVE for fever, chills, change in weight  INTEGUMENTARY/SKIN: NEGATIVE for worrisome rashes, moles or lesions  EYES: NEGATIVE for vision changes or irritation  ENT: NEGATIVE for ear, mouth and throat problems  RESP: NEGATIVE for significant cough " or SOB  CV: NEGATIVE for chest pain, palpitations or peripheral edema  GI: NEGATIVE for nausea, abdominal pain, heartburn, or change in bowel habits   male: negative for dysuria, hematuria, decreased urinary stream, erectile dysfunction, urethral discharge  MUSCULOSKELETAL: NEGATIVE for significant arthralgias or myalgia  NEURO: NEGATIVE for weakness, dizziness or paresthesias  ENDOCRINE: NEGATIVE for temperature intolerance, skin/hair changes  PSYCHIATRIC: NEGATIVE for changes in mood or affect    OBJECTIVE:   There were no vitals taken for this visit.    Physical Exam  GENERAL: healthy, alert and no distress  EYES: Eyes grossly normal to inspection, PERRL and conjunctivae and sclerae normal  HENT: ear canals and TM's normal, nose and mouth without ulcers or lesions  NECK: no adenopathy, no asymmetry, masses, or scars and thyroid normal to palpation  RESP: lungs clear to auscultation - no rales, rhonchi or wheezes  CV: regular rate and rhythm, normal S1 S2, no S3 or S4, no murmur, click or rub, no peripheral edema and peripheral pulses strong  ABDOMEN: soft, nontender, no hepatosplenomegaly, no masses and bowel sounds normal   (male): normal male genitalia without lesions or urethral discharge, no hernia  MS: no gross musculoskeletal defects noted, no edema  SKIN: no suspicious lesions or rashes  NEURO: Normal strength and tone, mentation intact and speech normal  PSYCH: mentation appears normal, affect normal/bright    Diagnostic Test Results:  none     ASSESSMENT/PLAN:   (E03.9) Acquired hypothyroidism  (primary encounter diagnosis)  Comment: check level  Plan: order med when determined     (Z00.00) Routine general medical examination at a health care facility  Comment:   Plan: discuss weight loss     (E78.5) Hyperlipidemia LDL goal <70  Comment:   Plan: Lipid panel reflex to direct LDL Fasting,         Comprehensive metabolic panel            (I25.10) Coronary artery disease involving native coronary artery  "of native heart without angina pectoris  Comment:   Plan: Albumin Random Urine Quantitative with Creat         Ratio        No chest pain, has metal valve and INR clinic     (I42.0) Dilated cardiomyopathy (H)  Comment:   Plan: Digoxin level, TSH with free T4 reflex, CBC         with platelets        Check level , hasn't taken today     (Z95.2) S/P AVR (aortic valve replacement)  Comment:   Plan:     (Z79.01) Long term current use of anticoagulant therapy  Comment:   Plan:     (Z23) Need for prophylactic vaccination and inoculation against influenza  Comment:   Plan: FLU VACCINE, (RIV4) RECOMBINANT HA  , IM         (FluBlok, egg free) [85625]- >18 YRS ([facility] [place]         recommended  50-64 YRS)                COUNSELING:   Reviewed preventive health counseling, as reflected in patient instructions       Regular exercise       Healthy diet/nutrition       Vision screening       Hearing screening    BP Readings from Last 1 Encounters:   05/18/18 95/61     Estimated body mass index is 27.28 kg/(m^2) as calculated from the following:    Height as of 5/13/18: 6' 1\" (1.854 m).    Weight as of 5/18/18: 206 lb 12.8 oz (93.8 kg).      Weight management plan: Discussed healthy diet and exercise guidelines and patient will follow up in 6 months in clinic to re-evaluate.     reports that he has never smoked. He has never used smokeless tobacco.      Counseling Resources:  ATP IV Guidelines  Pooled Cohorts Equation Calculator  FRAX Risk Assessment  ICSI Preventive Guidelines  Dietary Guidelines for Americans, 2010  USDA's MyPlate  ASA Prophylaxis  Lung CA Screening    Rehan Lorenzana MD  [facility]  Answers for HPI/ROS submitted by the patient on 9/14/2018   PHQ-2 Score: 0    "

## 2018-09-14 NOTE — MR AVS SNAPSHOT
After Visit Summary   9/14/2018    Jovon Mantilla    MRN: [id number]           Patient Information     Date Of Birth          1955        Visit Information        Provider Department      9/14/2018 11:15 AM Rehan Lorenzana MD [facility]        Today's Diagnoses     Acquired hypothyroidism    -  1    Routine general medical examination at a health care facility        Hyperlipidemia LDL goal <70        Coronary artery disease involving native coronary artery of native heart without angina pectoris        Dilated cardiomyopathy (H)        S/P AVR (aortic valve replacement)        Long term current use of anticoagulant therapy          Care Instructions      Preventive Health Recommendations  Male Ages 50 - 64    Yearly exam:             See your health care provider every year in order to  o   Review health changes.   o   Discuss preventive care.    o   Review your medicines if your doctor has prescribed any.     Have a cholesterol test every 5 years, or more frequently if you are at risk for high cholesterol/heart disease.     Have a diabetes test (fasting glucose) every three years. If you are at risk for diabetes, you should have this test more often.     Have a colonoscopy at age 50, or have a yearly FIT test (stool test). These exams will check for colon cancer.      Talk with your health care provider about whether or not a prostate cancer screening test (PSA) is right for you.    You should be tested each year for STDs (sexually transmitted diseases), if you re at risk.     Shots: Get a flu shot each year. Get a tetanus shot every 10 years.     Nutrition:    Eat at least 5 servings of fruits and vegetables daily.     Eat whole-grain bread, whole-wheat pasta and brown rice instead of white grains and rice.     Get adequate Calcium and Vitamin D.     Lifestyle    Exercise for at least 150 minutes a week (30 minutes a day, 5 days a week). This will help you control your  weight and prevent disease.     Limit alcohol to one drink per day.     No smoking.     Wear sunscreen to prevent skin cancer.     See your dentist every six months for an exam and cleaning.     See your eye doctor every 1 to 2 years.            Follow-ups after your visit        Follow-up notes from your care team     Return in about 6 months (around 3/14/2019) for BP Recheck, Lab Work, Routine Visit.      Your next 10 appointments already scheduled     Oct 12, 2018  9:20 AM CDT   [facility]    [address] [place] [id number] [phone]              Who to contact     If you have questions or need follow up information about today's clinic visit or your schedule please contact [facility] directly at [phone].  Normal or non-critical lab and imaging results will be communicated to you by MyChart, letter or phone within 4 business days after the clinic has received the results. If you do not hear from us within 7 days, please contact the clinic through [employer] or phone. If you have a critical or abnormal lab result, we will notify you by phone as soon as possible.  Submit refill requests through [employer] or call your pharmacy and they will forward the refill request to us. Please allow 3 business days for your refill to be completed.          Additional Information About Your Visit        [employer]tim[employer] Information     [employer] gives you secure access to your electronic health record. If you see a primary care provider, you can also send messages to your care team and make appointments. If you have questions, please call your primary care clinic.  If you do not have a primary care provider, please call [phone] and they will assist you.        Care EveryWhere ID     This is your Care EveryWhere ID. This could be used by other organizations to access your [place]  "medical records  [id number]        Your Vitals Were     Pulse Temperature Respirations Height Pulse Oximetry BMI (Body Mass Index)    125 98.6  F (37  C) (Oral) 12 5' 10.75\" (1.797 m) 97% 29.03 kg/m2       Blood Pressure from Last 3 Encounters:   09/14/18 138/88   05/18/18 95/61   05/13/18 138/82    Weight from Last 3 Encounters:   09/14/18 206 lb 11.2 oz (93.8 kg)   05/18/18 206 lb 12.8 oz (93.8 kg)   05/13/18 207 lb (93.9 kg)              We Performed the Following     CBC with platelets     Comprehensive metabolic panel     Digoxin level     Lipid panel reflex to direct LDL Fasting     TSH with free T4 reflex          Today's Medication Changes          These changes are accurate as of 9/14/18 11:26 AM.  If you have any questions, ask your nurse or doctor.               Stop taking these medicines if you haven't already. Please contact your care team if you have questions.     albuterol 108 (90 Base) MCG/ACT inhaler   Commonly known as:  PROAIR HFA/PROVENTIL HFA/VENTOLIN HFA   Stopped by:  Rehan Lorenzana MD                    Primary Care Provider Office Phone # Fax #    Rehan Lorenzana [phone] [address] [facility] [address]        Equal Access to Services     [facility] STACIE : Hadii kirstie ku hadasho Soweiali, waaxda luqadaha, qaybta kaalmada adeegyada, richar reina. So [facility] [phone].    ATENCIÓN: Si habla español, tiene a pfeiffer disposición servicios gratuitos de asistencia lingüística. Shun al [phone].    We comply with applicable federal civil rights laws and [place] laws. We do not discriminate on the basis of race, color, national origin, age, disability, sex, sexual orientation, or gender identity.            Thank you!     Thank you for choosing [facility]  for your care. Our goal is always to provide you with excellent care. Hearing back from our patients is one way we can continue to improve our " services. Please take a few minutes to complete the written survey that you may receive in the mail after your visit with us. Thank you!             Your Updated Medication List - Protect others around you: Learn how to safely use, store and throw away your medicines at www.disposemymeds.org.          This list is accurate as of 9/14/18 11:26 AM.  Always use your most recent med list.                   Brand Name Dispense Instructions for use Diagnosis    ALLERGY 24-HR PO      Take by mouth daily        aspirin 81 MG tablet      Take 81 mg by mouth every other day        CALCIUM 600+D 600-200 MG-UNIT Tabs   Generic drug:  calcium carbonate-vitamin D      Take 1 tablet by mouth 2 times daily        carvedilol 25 MG tablet    COREG    180 tablet    Take 1 tablet (25 mg) by mouth 2 times daily (with meals)    ASVD (arteriosclerotic vascular disease)       CENTRUM Chew      Take 1 tablet by mouth At Bedtime        digoxin 125 MCG tablet    LANOXIN    90 tablet    Take 1 tablet (125 mcg) by mouth daily    Chronic atrial fibrillation (H)       Fiber 0.52 g Caps      Take 2 tablets by mouth At Bedtime        IRON SUPPLEMENT 325 (65 Fe) MG tablet   Generic drug:  ferrous sulfate     100 tablet    Take 1 tablet (325 mg) by mouth 2 times daily        levothyroxine 200 MCG tablet    SYNTHROID/LEVOTHROID    90 tablet    Take 1 tablet (200 mcg) by mouth daily    Acquired hypothyroidism       lisinopril 20 MG tablet    PRINIVIL/ZESTRIL    135 tablet    Take  20 mg (1 tablet in am) and 10 mg  (one half tablet) in  pm    ASVD (arteriosclerotic vascular disease)       sertraline 50 MG tablet    ZOLOFT    90 tablet    TAKE 1 TABLET DAILY    Anxiety       simvastatin 20 MG tablet    ZOCOR     TAKE 1 TABLET AT BEDTIME (DUE FOR ANNUAL PHYSICAL WITH FASTING LABS IN JUNE FOR FURTHER REFILLS)    Hyperlipidemia LDL goal <70       triamcinolone 0.1 % cream    KENALOG    30 g    Apply sparingly to affected area three times daily for 14 days.     Atopic dermatitis, unspecified type       valACYclovir 1000 mg tablet    VALTREX    21 tablet    Take 1 tablet (1,000 mg) by mouth 3 times daily        Vitamin B-12 500 MCG Subl      Place 500 mcg under the tongue daily        VITAMIN C PO      Take 500 mg by mouth At Bedtime        vitamin D 2000 units Caps      Take 1 tablet by mouth daily        warfarin 4 MG tablet    COUMADIN    130 tablet    TAKE 1 TABLET ON MONDAY,WEDNESDAY,AND FRIDAY AND TAKE ONE AND ONE-HALF TABLETS (6 MG) ALL OTHER DAYS    Long term current use of anticoagulant therapy

## 2018-09-15 LAB
ALBUMIN SERPL-MCNC: 3.7 G/DL (ref 3.4–5)
ALP SERPL-CCNC: 98 U/L (ref 40–150)
ALT SERPL W P-5'-P-CCNC: 21 U/L (ref 0–70)
ANION GAP SERPL CALCULATED.3IONS-SCNC: 8 MMOL/L (ref 3–14)
AST SERPL W P-5'-P-CCNC: 30 U/L (ref 0–45)
BILIRUB SERPL-MCNC: 1.2 MG/DL (ref 0.2–1.3)
BUN SERPL-MCNC: 17 MG/DL (ref 7–30)
CALCIUM SERPL-MCNC: 8.9 MG/DL (ref 8.5–10.1)
CHLORIDE SERPL-SCNC: 103 MMOL/L (ref 94–109)
CHOLEST SERPL-MCNC: 99 MG/DL
CO2 SERPL-SCNC: 29 MMOL/L (ref 20–32)
CREAT SERPL-MCNC: 0.87 MG/DL (ref 0.66–1.25)
CREAT UR-MCNC: 77 MG/DL
GFR SERPL CREATININE-BSD FRML MDRD: 89 ML/MIN/1.7M2
GLUCOSE SERPL-MCNC: 79 MG/DL (ref 70–99)
HDLC SERPL-MCNC: 48 MG/DL
LDLC SERPL CALC-MCNC: 42 MG/DL
MICROALBUMIN UR-MCNC: 14 MG/L
MICROALBUMIN/CREAT UR: 18.47 MG/G CR (ref 0–17)
NONHDLC SERPL-MCNC: 51 MG/DL
POTASSIUM SERPL-SCNC: 4.6 MMOL/L (ref 3.4–5.3)
PROT SERPL-MCNC: 7.2 G/DL (ref 6.8–8.8)
SODIUM SERPL-SCNC: 140 MMOL/L (ref 133–144)
T4 FREE SERPL-MCNC: 1.1 NG/DL (ref 0.76–1.46)
TRIGL SERPL-MCNC: 47 MG/DL
TSH SERPL DL<=0.005 MIU/L-ACNC: 4.74 MU/L (ref 0.4–4)

## 2018-10-12 ENCOUNTER — ANTICOAGULATION THERAPY VISIT (OUTPATIENT)
Dept: NURSING | Facility: CLINIC | Age: 63
End: 2018-10-12
Payer: COMMERCIAL

## 2018-10-12 DIAGNOSIS — Z95.2 S/P AVR (AORTIC VALVE REPLACEMENT): ICD-10-CM

## 2018-10-12 LAB — INR POINT OF CARE: 2.7 (ref 0.86–1.14)

## 2018-10-12 PROCEDURE — 99207 ZZC NO CHARGE NURSE ONLY: CPT

## 2018-10-12 PROCEDURE — 85610 PROTHROMBIN TIME: CPT | Mod: QW

## 2018-10-12 PROCEDURE — 36416 COLLJ CAPILLARY BLOOD SPEC: CPT

## 2018-10-12 NOTE — MR AVS SNAPSHOT
Jovon Mantilla   10/12/2018 9:20 AM   Anticoagulation Therapy Visit    Description:  63 year old male   Provider:  JIMMY ANTI COAG   Department:  [facility]           INR as of 10/12/2018     Today's INR 2.7      Anticoagulation Summary as of 10/12/2018     INR goal 2.5-3.5   Today's INR 2.7   Full warfarin instructions 4 mg on Mon, Wed, Fri; 6 mg all other days   Next INR check 11/26/2018    Indications   Long-term (current) use of anticoagulants [Z79.01] [Z79.01]  S/P AVR (aortic valve replacement) [Z95.2]         Your next Anticoagulation Clinic appointment(s)     Nov 26, 2018  4:20 PM CST   Anticoagulation Visit with JIMMY ANTI PATRICIA   [facility] ([facility])    [address] [place] [id number] [phone]              Contact Numbers     [facility]  Please call [phone] or [phone]  to cancel and/or reschedule your appointment.   Please call [phone] with any problems or questions regarding your therapy          October 2018 Details    Sun Mon Tue Wed Thu Fri Sat      1               2               3               4               5               6                 7               8               9               10               11               12      4 mg   See details      13      6 mg           14      6 mg         15      4 mg         16      6 mg         17      4 mg         18      6 mg         19      4 mg         20      6 mg           21      6 mg         22      4 mg         23      6 mg         24      4 mg         25      6 mg         26      4 mg         27      6 mg           28      6 mg         29      4 mg         30      6 mg         31      4 mg             Date Details   10/12 This INR check               How to take your warfarin dose     To take:  4 mg Take 1 of the 4 mg tablets.    To take:  6 mg Take 1.5 of the 4 mg tablets.           November 2018 Details    Sun Mon Tue Wed Thu Fri Sat          1      6 mg         2      4 mg         3      6 mg           4      6 mg         5      4 mg         6      6 mg         7      4 mg         8      6 mg         9      4 mg         10      6 mg           11      6 mg         12      4 mg         13      6 mg         14      4 mg         15      6 mg         16      4 mg         17      6 mg           18      6 mg         19      4 mg         20      6 mg         21      4 mg         22      6 mg         23      4 mg         24      6 mg           25      6 mg         26            27               28               29               30                 Date Details   No additional details    Date of next INR:  11/26/2018         How to take your warfarin dose     To take:  4 mg Take 1 of the 4 mg tablets.    To take:  6 mg Take 1.5 of the 4 mg tablets.            HEALTH ISSUES - PROBLEM Dx:  Neuroblastoma with low risk: Neuroblastoma with low risk  Nutrition, metabolism, and development symptoms: Nutrition, metabolism, and development symptoms  Febrile neutropenia: Febrile neutropenia    Protocol: [id number], cycle 2    Interval History: Afebrile overnight. Tolerating PO. No acute events. S/p platelet transfusion of 15cc/kg on 5/7.     Change from previous past medical, family or social history:	[X] No	[] Yes:    REVIEW OF SYSTEMS  All review of systems negative, except for those marked:  General:		[] Abnormal:  Pulmonary:		[] Abnormal:  Cardiac:		[] Abnormal:  Gastrointestinal:  	[] Abnormal:  ENT:			[] Abnormal:  Renal/Urologic:		[] Abnormal:  Musculoskeletal		[] Abnormal:  Endocrine:		[] Abnormal:  Hematologic:		[] Abnormal:  Neurologic:		[X] Abnormal: neutropenic, thrombocytopenic   Skin:			[] Abnormal:  Allergy/Immune		[] Abnormal:  Psychiatric:		[] Abnormal:    Allergies: NKDA     Hematologic/Oncologic Medications: filgrastim-sndz  SubCutaneous Injection - Peds 60MICROGram(s) SubCutaneous daily    OTHER MEDICATIONS  (STANDING):  cefepime  IV Intermittent - Peds 610milliGRAM(s) IV Intermittent every 8 hours  ranitidine  Oral Liquid - Peds 15milliGRAM(s) Oral two times a day  trimethoprim  /sulfamethoxazole Oral Liquid - Peds 32milliGRAM(s) Oral <User Schedule>  nystatin Oral Liquid - Peds 252790Iqso(s) Oral two times a day  dextrose 5% + sodium chloride 0.9% with potassium chloride 20 mEq/L. - Pediatric 1000milliLiter(s) IV Continuous <Continuous>    MEDICATIONS  (PRN):  polyethylene glycol 3350 Oral Powder - Peds 8.5Gram(s) Oral daily PRN Constipation    DIET: regular, pediatric     Vital Signs Last 24 Hrs  T(C): 36.6, Max: 37 (05-07 @ 21:38)  T(F): 97.8, Max: 98.6 (05-07 @ 21:38)  HR: 99 (97 - 127)  BP: 86/44 (86/44 - 115/58)  BP(mean): 52 (52 - 65)  RR: 24 (24 - 32)  SpO2: 100% (97% - 100%)  I&O's Summary    I & Os for current day (as of 08 May 2017 09:49)  =============================================  IN: 1521.5 ml / OUT: 1476 ml / NET: 45.5 ml    Pain Score (0-10):		Lansky/Karnofsky Score:     PATIENT CARE ACCESS  [X] Mediport, Date Placed: right chest, 4/3/2017		  [X] Necessity of urinary, arterial, and venous catheters discussed    PHYSICAL EXAM  All physical exam findings normal, except those marked:  Constitutional:	Normal: well appearing, in no apparent distress  .		[] Abnormal:  Eyes		Normal: no conjunctival injection, symmetric gaze  .		[] Abnormal:  ENT:		Normal: mucus membranes moist, no mouth sores or mucosal bleeding, normal  .		dentition, symmetric facies.  .		[] Abnormal:  Neck		Normal: no thyromegaly or masses appreciated  .		[] Abnormal:  Cardiovascular	Normal: regular rate, normal S1, S2, no murmurs, rubs or gallops  .		[] Abnormal:  Respiratory	Normal: clear to auscultation bilaterally, no wheezing  .		[] Abnormal:  Abdominal	Normal: normoactive bowel sounds, soft, NT, no hepatosplenomegaly, no   .		masses  .		[] Abnormal:  		Normal normal genitalia, testes descended  .		[] Abnormal:  Lymphatic	Normal: no adenopathy appreciated  .		[] Abnormal:  Extremities	Normal: FROM x4, no cyanosis or edema, symmetric pulses  .		[] Abnormal:  Skin		Normal: normal appearance, no rash, nodules, vesicles, ulcers or erythema, CVL  .		site well healed with no erythema or pain  .		[] Abnormal:  Neurologic	Normal: no focal deficits, gait normal and normal motor exam.  .		[] Abnormal:  Psychiatric	Normal: affect appropriate  		[] Abnormal:  Musculoskeletal		Normal: full range of motion and no deformities appreciated, no masses   .			and normal strength in all extremities.  .			[] Abnormal:    Lab Results:                                            9.3                   Neurophils% (auto):   1.6    (05-08 @ 01:30):    0.61 )-----------(72           Lymphocytes% (auto):  95.1                                          27.8                   Eosinphils% (auto):   0.0      Manual%: Neutrophils x    ; Lymphocytes x    ; Eosinophils x    ; Bands%: x    ; Blasts x         Differential:	[] Automated		[] Manual    05-08    138  |  104  |  5<L>  ----------------------------<  89  4.9   |  23  |  0.21    Ca    9.7      08 May 2017 04:00  Phos  4.7     05-08  Mg     1.9     05-08    TPro  6.2  /  Alb  3.7  /  TBili  < 0.2<L>  /  DBili  x   /  AST  19  /  ALT  14  /  AlkPhos  112<L>  05-08    LIVER FUNCTIONS - ( 08 May 2017 04:00 )  Alb: 3.7 g/dL / Pro: 6.2 g/dL / ALK PHOS: 112 u/L / ALT: 14 u/L / AST: 19 u/L / GGT: x             MICROBIOLOGY/CULTURES:    RADIOLOGY RESULTS:    Toxicities (with grade)  1.  2.  3.  4.      [] Counseling/discharge planning start time:		End time:		Total Time:  [] Total critical care time spent by the attending physician: __ minutes, excluding procedure time. HEALTH ISSUES - PROBLEM Dx:  Neuroblastoma with low risk: Neuroblastoma with low risk  Nutrition, metabolism, and development symptoms: Nutrition, metabolism, and development symptoms  Febrile neutropenia: Febrile neutropenia    Protocol: [id number], cycle 2    Interval History: Afebrile overnight. Tolerating PO. No acute events. S/p platelet transfusion of 15cc/kg on 5/7.     Change from previous past medical, family or social history:	[X] No	[] Yes:    REVIEW OF SYSTEMS  All review of systems negative, except for those marked:  General:		[] Abnormal:  Pulmonary:		[] Abnormal:  Cardiac:		[] Abnormal:  Gastrointestinal:  	[] Abnormal:  ENT:			[] Abnormal:  Renal/Urologic:		[] Abnormal:  Musculoskeletal		[] Abnormal:  Endocrine:		[] Abnormal:  Hematologic:		[] Abnormal:  Neurologic:		[X] Abnormal: neutropenic, thrombocytopenic   Skin:			[] Abnormal:  Allergy/Immune		[] Abnormal:  Psychiatric:		[] Abnormal:    Allergies: NKDA     Hematologic/Oncologic Medications: filgrastim-sndz  SubCutaneous Injection - Peds 60MICROGram(s) SubCutaneous daily    OTHER MEDICATIONS  (STANDING):  cefepime  IV Intermittent - Peds 610milliGRAM(s) IV Intermittent every 8 hours  ranitidine  Oral Liquid - Peds 15milliGRAM(s) Oral two times a day  trimethoprim  /sulfamethoxazole Oral Liquid - Peds 32milliGRAM(s) Oral <User Schedule>  nystatin Oral Liquid - Peds 136127Stks(s) Oral two times a day  dextrose 5% + sodium chloride 0.9% with potassium chloride 20 mEq/L. - Pediatric 1000milliLiter(s) IV Continuous <Continuous>    MEDICATIONS  (PRN):  polyethylene glycol 3350 Oral Powder - Peds 8.5Gram(s) Oral daily PRN Constipation    DIET: regular, pediatric     Vital Signs Last 24 Hrs  T(C): 36.6, Max: 37 (05-07 @ 21:38)  T(F): 97.8, Max: 98.6 (05-07 @ 21:38)  HR: 99 (97 - 127)  BP: 86/44 (86/44 - 115/58)  BP(mean): 52 (52 - 65)  RR: 24 (24 - 32)  SpO2: 100% (97% - 100%)  I&O's Summary    I & Os for current day (as of 08 May 2017 09:49)  =============================================  IN: 1521.5 ml / OUT: 1476 ml / NET: 45.5 ml    Pain Score (0-10):		Lansky/Karnofsky Score:     PATIENT CARE ACCESS  [X] Mediport, Date Placed: right chest, 4/3/2017		  [X] Necessity of urinary, arterial, and venous catheters discussed    PHYSICAL EXAM  All physical exam findings normal, except those marked:  Constitutional:	Normal: well appearing, in no apparent distress  .		[] Abnormal:  Eyes		Normal: no conjunctival injection, symmetric gaze  .		[] Abnormal:  ENT:		Normal: mucus membranes moist, no mouth sores or mucosal bleeding, normal  .		dentition, symmetric facies.  .		[] Abnormal:  Neck		Normal: no thyromegaly or masses appreciated  .		[] Abnormal:  Cardiovascular	Normal: regular rate, normal S1, S2, no murmurs, rubs or gallops  .		[] Abnormal:  Respiratory	Normal: clear to auscultation bilaterally, no wheezing  .		[] Abnormal:  Abdominal	Normal: normoactive bowel sounds, soft, NT, no hepatosplenomegaly, no   .		masses  .		[] Abnormal:  		Normal normal genitalia, testes descended  .		[] Abnormal:  Lymphatic	Normal: no adenopathy appreciated  .		[] Abnormal:  Extremities	Normal: FROM x4, no cyanosis or edema, symmetric pulses  .		[] Abnormal:  Skin		Normal: normal appearance, no rash, nodules, vesicles, ulcers or erythema, CVL  .		site well healed with no erythema or pain  .		[] Abnormal:  Neurologic	Normal: no focal deficits, gait normal and normal motor exam.  .		[] Abnormal:  Psychiatric	Normal: affect appropriate  		[] Abnormal:  Musculoskeletal		Normal: full range of motion and no deformities appreciated, no masses   .			and normal strength in all extremities.  .			[] Abnormal:    Lab Results:                                            9.3                   Neurophils% (auto):   1.6    (05-08 @ 01:30):    0.61 )-----------(72           Lymphocytes% (auto):  95.1                                          27.8                   Eosinphils% (auto):   0.0      Manual%: Neutrophils x    ; Lymphocytes x    ; Eosinophils x    ; Bands%: x    ; Blasts x         Differential:	[] Automated		[] Manual    05-08    138  |  104  |  5<L>  ----------------------------<  89  4.9   |  23  |  0.21    Ca    9.7      08 May 2017 04:00  Phos  4.7     05-08  Mg     1.9     05-08    TPro  6.2  /  Alb  3.7  /  TBili  < 0.2<L>  /  DBili  x   /  AST  19  /  ALT  14  /  AlkPhos  112<L>  05-08    LIVER FUNCTIONS - ( 08 May 2017 04:00 )  Alb: 3.7 g/dL / Pro: 6.2 g/dL / ALK PHOS: 112 u/L / ALT: 14 u/L / AST: 19 u/L / GGT: x             MICROBIOLOGY/CULTURES:  Culture - Blood (05.07.17 @ 05:50)    Culture - Blood:   NO ORGANISMS ISOLATED  NO ORGANISMS ISOLATED AT 24 HOURS    Specimen Source: PORT DEVICE    Culture - Blood (05.06.17 @ 07:44)    Culture - Blood:   NO ORGANISMS ISOLATED  NO ORGANISMS ISOLATED AT 48 HRS.    Specimen Source: BLOOD    Culture - Blood (05.05.17 @ 11:57)    Culture - Blood:   NO ORGANISMS ISOLATED  NO ORGANISMS ISOLATED AT 48 HRS.    Specimen Source: BLOOD    RADIOLOGY RESULTS: No new imaging    [] Counseling/discharge planning start time:		End time:		Total Time:  [] Total critical care time spent by the attending physician: __ minutes, excluding procedure time.

## 2018-10-12 NOTE — PROGRESS NOTES
ANTICOAGULATION FOLLOW-UP CLINIC VISIT    Patient Name:  Jovon Mantilla  Date:  10/12/2018  Contact Type:  Face to Face    SUBJECTIVE: Continue same plan of care.     Patient Findings     Positives No Problem Findings           OBJECTIVE    INR Protime   Date Value Ref Range Status   10/12/2018 2.7 (A) 0.86 - 1.14 Final     Chromogenic Factor 10   Date Value Ref Range Status   01/21/2011 35 (L) 60 - 140 % Final     Comment:     Therapeutic Range: A Chromogenic Factor 10 level of 20-40% inversely   correlates   with an INR of 2-3 for patients receiving Warfarin. Chromogenic Factor 10   levels below 20% indicate an INR greater than 3, and levels above 40%   indicate   an INR lessthan 2.       ASSESSMENT / PLAN  INR assessment THER    Recheck INR In: 6 WEEKS    INR Location Clinic      Anticoagulation Summary as of 10/12/2018     INR goal 2.5-3.5   Today's INR 2.7   Warfarin maintenance plan 4 mg (4 mg x 1) on Mon, Wed, Fri; 6 mg (4 mg x 1.5) all other days   Full warfarin instructions 4 mg on Mon, Wed, Fri; 6 mg all other days   Weekly warfarin total 36 mg   No change documented Monica Leslie RN   Plan last modified Monica Leslie, RN (5/4/2017)   Next INR check 11/26/2018   Target end date Indefinite    Indications   Long-term (current) use of anticoagulants [Z79.01] [Z79.01]  S/P AVR (aortic valve replacement) [Z95.2]         Anticoagulation Episode Summary     INR check location     Preferred lab     Send INR reminders to [facility]    Comments       Anticoagulation Care Providers     Provider Role Specialty Phone number    Rehan Lorenzana MD [facility] [phone]            See the Encounter Report to view Anticoagulation Flowsheet and Dosing Calendar (Go to Encounters tab in chart review, and find the Anticoagulation Therapy Visit)    Dosage adjustment made based on physician directed care plan.    Monica Leslie RN

## 2018-10-18 ENCOUNTER — TELEPHONE (OUTPATIENT)
Dept: FAMILY MEDICINE | Facility: CLINIC | Age: 63
End: 2018-10-18

## 2018-10-18 DIAGNOSIS — E03.9 ACQUIRED HYPOTHYROIDISM: ICD-10-CM

## 2018-10-18 RX ORDER — LEVOTHYROXINE SODIUM 200 UG/1
200 TABLET ORAL DAILY
Qty: 90 TABLET | Refills: 2 | Status: SHIPPED | OUTPATIENT
Start: 2018-10-18 | End: 2019-07-07

## 2018-10-18 RX ORDER — LEVOTHYROXINE SODIUM 200 UG/1
200 TABLET ORAL DAILY
Qty: 30 TABLET | Refills: 0 | Status: SHIPPED | OUTPATIENT
Start: 2018-10-18 | End: 2019-07-07

## 2018-10-18 NOTE — TELEPHONE ENCOUNTER
Pt calls out of levothyroxine.  Nearly out of pills.  Mail Pharmacy and 30 days to local pharmacy.   TSH   Date Value Ref Range Status   09/14/2018 4.74 (H) 0.40 - 4.00 mU/L Final     Entered by Rehan Lorenzana MD at 9/18/2018  2:54 PM   Blood test piper good, thyroid looks where you want it      Informed prescriptions approved per [facility] Refill Protocol.  Casey Lieberman RN

## 2018-10-22 ENCOUNTER — TELEPHONE (OUTPATIENT)
Dept: FAMILY MEDICINE | Facility: CLINIC | Age: 63
End: 2018-10-22

## 2018-10-22 DIAGNOSIS — E78.5 HYPERLIPIDEMIA LDL GOAL <70: ICD-10-CM

## 2018-10-22 RX ORDER — SIMVASTATIN 20 MG
20 TABLET ORAL AT BEDTIME
Qty: 90 TABLET | Refills: 3 | Status: SHIPPED | OUTPATIENT
Start: 2018-10-22 | End: 2019-10-23

## 2018-10-22 RX ORDER — SIMVASTATIN 20 MG
20 TABLET ORAL AT BEDTIME
Qty: 30 TABLET | Refills: 0 | Status: SHIPPED | OUTPATIENT
Start: 2018-10-22 | End: 2018-11-19

## 2018-10-22 NOTE — TELEPHONE ENCOUNTER
Pt calls needs new Rx for simvastatin.   30 days [facility]  90 day to [facility]  Prescription approved per [facility] Refill Protocol.  Casey Lieberman RN

## 2018-11-19 DIAGNOSIS — E78.5 HYPERLIPIDEMIA LDL GOAL <70: ICD-10-CM

## 2018-11-20 RX ORDER — SIMVASTATIN 20 MG
TABLET ORAL
Status: SHIPPED
Start: 2018-11-20 | End: 2019-10-18

## 2018-11-20 NOTE — TELEPHONE ENCOUNTER
Not needed, was short term while waiting for mail order, remove from auto refill  Sent pharmacy message  Tennille Mares RN, BSN  Message handled by Nurse Triage.

## 2019-01-07 ENCOUNTER — TELEPHONE (OUTPATIENT)
Dept: FAMILY MEDICINE | Facility: CLINIC | Age: 64
End: 2019-01-07

## 2019-01-07 DIAGNOSIS — F41.9 ANXIETY: ICD-10-CM

## 2019-01-07 NOTE — LETTER
OVERDUE REMINDER LETTER                                       January 7, 2019         Jovon Mantilla  [address]  [place] [address]           Dear Jovon,      This is a reminder letter that you are due to have your INR checked.     Our records indicate that your last INR was done on October 12, 2018.       Please call us at [phone] or [phone] to schedule an appointment.      Thank You very much.        Sincerely,    ADRIANA THOMPSON RN / INR OFFICE / DR ZION NEWMAN

## 2019-01-07 NOTE — TELEPHONE ENCOUNTER
"Requested Prescriptions   Pending Prescriptions Disp Refills     sertraline (ZOLOFT) 50 MG tablet [Pharmacy Med Name: SERTRALINE HCL TABS 50MG]  Last Written Prescription Date:  7/11/2018  Last Fill Quantity: 90 tablet,  # refills: 1   Last office visit: 9/14/2018 with prescribing provider:  Harriett   Future Office Visit:     90 tablet 1     Sig: TAKE 1 TABLET DAILY    SSRIs Protocol Passed - 1/7/2019 12:04 AM       Passed - Recent (12 mo) or future (30 days) visit within the authorizing provider's specialty    Patient had office visit in the last 12 months or has a visit in the next 30 days with authorizing provider or within the authorizing provider's specialty.  See \"Patient Info\" tab in inbasket, or \"Choose Columns\" in Meds & Orders section of the refill encounter.             Passed - Medication is active on med list       Passed - Patient is age 18 or older          "

## 2019-01-07 NOTE — TELEPHONE ENCOUNTER
Prescription approved per [facility] Refill Protocol.  Tennille Mares RN, BSN

## 2019-01-22 ENCOUNTER — TELEPHONE (OUTPATIENT)
Dept: FAMILY MEDICINE | Facility: CLINIC | Age: 64
End: 2019-01-22

## 2019-01-22 DIAGNOSIS — Z79.01 LONG TERM CURRENT USE OF ANTICOAGULANT THERAPY: ICD-10-CM

## 2019-01-22 RX ORDER — WARFARIN SODIUM 4 MG/1
TABLET ORAL
Qty: 120 TABLET | Refills: 0 | Status: SHIPPED | OUTPATIENT
Start: 2019-01-22 | End: 2020-06-25

## 2019-01-22 NOTE — TELEPHONE ENCOUNTER
Medication is being filled for 1 time refill only due to:  Patient needs to be seen because per below.     Overdue for INR    Patient not responding to overdue reminder letter    Monica Medrano RN

## 2019-01-29 ENCOUNTER — ANTICOAGULATION THERAPY VISIT (OUTPATIENT)
Dept: NURSING | Facility: CLINIC | Age: 64
End: 2019-01-29
Payer: COMMERCIAL

## 2019-01-29 DIAGNOSIS — Z95.2 S/P AVR (AORTIC VALVE REPLACEMENT): ICD-10-CM

## 2019-01-29 LAB — INR POINT OF CARE: 4.6 (ref 0.86–1.14)

## 2019-01-29 PROCEDURE — 36416 COLLJ CAPILLARY BLOOD SPEC: CPT

## 2019-01-29 PROCEDURE — 85610 PROTHROMBIN TIME: CPT | Mod: QW

## 2019-01-29 PROCEDURE — 99207 ZZC NO CHARGE NURSE ONLY: CPT

## 2019-01-29 NOTE — PROGRESS NOTES
ANTICOAGULATION FOLLOW-UP CLINIC VISIT    Patient Name:  Jovon Mantilla  Date:  2019  Contact Type:  Face to Face    SUBJECTIVE: non-compliance - overdue for testing today - see below     Patient Findings     Positives:   Other complaints (last INR October.  States his Mother  in September and he is dealing with 2 estates, [place], [place]), Inflammation (c/o cracked nails / split fingertips)           OBJECTIVE    INR Protime   Date Value Ref Range Status   2019 4.6 (A) 0.86 - 1.14 Final     Chromogenic Factor 10   Date Value Ref Range Status   2011 35 (L) 60 - 140 % Final     Comment:     Therapeutic Range: A Chromogenic Factor 10 level of 20-40% inversely   correlates   with an INR of 2-3 for patients receiving Warfarin. Chromogenic Factor 10   levels below 20% indicate an INR greater than 3, and levels above 40%   indicate   an INR lessthan 2.       ASSESSMENT / PLAN  INR assessment SUPRA    Recheck INR In: 2 WEEKS    INR Location Clinic      Anticoagulation Summary  As of 2019    INR goal:   2.5-3.5   TTR:   65.0 % (2.8 y)   INR used for dosin.6! (2019)   Warfarin maintenance plan:   4 mg (4 mg x 1) every Mon, Wed, Fri; 6 mg (4 mg x 1.5) all other days   Full warfarin instructions:   : Hold; Otherwise 4 mg every Mon, Wed, Fri; 6 mg all other days   Weekly warfarin total:   36 mg   Plan last modified:   Monica Leslie RN (2017)   Next INR check:   2019   Target end date:   Indefinite    Indications    S/P AVR (aortic valve replacement) [Z95.2]             Anticoagulation Episode Summary     INR check location:       Preferred lab:       Send INR reminders to:   [facility]    Comments:         Anticoagulation Care Providers     Provider Role Specialty Phone number    Rehan Lorenzana MD [facility] [phone]            See the Encounter Report to view Anticoagulation Flowsheet and Dosing Calendar (Go to Encounters tab in chart review, and  find the Anticoagulation Therapy Visit)    Dosage adjustment made based on physician directed care plan.    Monica Leslie RN

## 2019-01-31 ENCOUNTER — TELEPHONE (OUTPATIENT)
Dept: FAMILY MEDICINE | Facility: CLINIC | Age: 64
End: 2019-01-31

## 2019-01-31 NOTE — TELEPHONE ENCOUNTER
Pt calls, several concerns, finger nail issue, both thumbs split nail, loss weight, wants to make appointment, agree, Rehan Lorenzana MD can review concerns and evalutate  Tennille Mares RN, BSN  Message handled by Nurse Triage.

## 2019-02-04 ENCOUNTER — OFFICE VISIT (OUTPATIENT)
Dept: FAMILY MEDICINE | Facility: CLINIC | Age: 64
End: 2019-02-04
Payer: COMMERCIAL

## 2019-02-04 ENCOUNTER — ANCILLARY PROCEDURE (OUTPATIENT)
Dept: GENERAL RADIOLOGY | Facility: CLINIC | Age: 64
End: 2019-02-04
Payer: COMMERCIAL

## 2019-02-04 VITALS
DIASTOLIC BLOOD PRESSURE: 81 MMHG | SYSTOLIC BLOOD PRESSURE: 120 MMHG | HEART RATE: 86 BPM | BODY MASS INDEX: 28.44 KG/M2 | TEMPERATURE: 97.4 F | OXYGEN SATURATION: 98 % | WEIGHT: 202.5 LBS

## 2019-02-04 DIAGNOSIS — F41.9 ANXIETY: ICD-10-CM

## 2019-02-04 DIAGNOSIS — J01.01 ACUTE RECURRENT MAXILLARY SINUSITIS: ICD-10-CM

## 2019-02-04 DIAGNOSIS — L57.0 ACTINIC KERATOSIS: ICD-10-CM

## 2019-02-04 DIAGNOSIS — R91.8 PULMONARY NODULES: ICD-10-CM

## 2019-02-04 DIAGNOSIS — R63.4 LOSS OF WEIGHT: ICD-10-CM

## 2019-02-04 DIAGNOSIS — E89.0 POSTABLATIVE HYPOTHYROIDISM: Primary | ICD-10-CM

## 2019-02-04 LAB
ERYTHROCYTE [DISTWIDTH] IN BLOOD BY AUTOMATED COUNT: 15.9 % (ref 10–15)
HBA1C MFR BLD: 5.8 % (ref 0–5.6)
HCT VFR BLD AUTO: 42 % (ref 40–53)
HGB BLD-MCNC: 13.3 G/DL (ref 13.3–17.7)
MCH RBC QN AUTO: 29.9 PG (ref 26.5–33)
MCHC RBC AUTO-ENTMCNC: 31.7 G/DL (ref 31.5–36.5)
MCV RBC AUTO: 94 FL (ref 78–100)
PLATELET # BLD AUTO: 232 10E9/L (ref 150–450)
RBC # BLD AUTO: 4.45 10E12/L (ref 4.4–5.9)
WBC # BLD AUTO: 6.2 10E9/L (ref 4–11)

## 2019-02-04 PROCEDURE — 84439 ASSAY OF FREE THYROXINE: CPT | Performed by: FAMILY MEDICINE

## 2019-02-04 PROCEDURE — 71046 X-RAY EXAM CHEST 2 VIEWS: CPT

## 2019-02-04 PROCEDURE — 85027 COMPLETE CBC AUTOMATED: CPT | Performed by: FAMILY MEDICINE

## 2019-02-04 PROCEDURE — 84443 ASSAY THYROID STIM HORMONE: CPT | Performed by: FAMILY MEDICINE

## 2019-02-04 PROCEDURE — 36415 COLL VENOUS BLD VENIPUNCTURE: CPT | Performed by: FAMILY MEDICINE

## 2019-02-04 PROCEDURE — 99214 OFFICE O/P EST MOD 30 MIN: CPT | Performed by: FAMILY MEDICINE

## 2019-02-04 PROCEDURE — 80053 COMPREHEN METABOLIC PANEL: CPT | Performed by: FAMILY MEDICINE

## 2019-02-04 PROCEDURE — 83036 HEMOGLOBIN GLYCOSYLATED A1C: CPT | Performed by: FAMILY MEDICINE

## 2019-02-04 RX ORDER — CEFDINIR 300 MG/1
300 CAPSULE ORAL 2 TIMES DAILY
Qty: 20 CAPSULE | Refills: 0 | Status: SHIPPED | OUTPATIENT
Start: 2019-02-04 | End: 2019-09-13

## 2019-02-04 NOTE — PROGRESS NOTES
SUBJECTIVE:   Jovon Mantilla is a 63 year old male who presents to clinic today for the following health issues:      Patient is here for split fingernails., sweats at night, known thyroid disorder, INR has been running high     Weight loss and difficulty healing sores, grieving his mother who  and he found her in that state   Past Medical History:   Diagnosis Date     Acute prostatitis      Acute sinusitis, unspecified      Aortic valve disease     AVR , MAZE     Atrial fibrillation (H)      CAD (coronary artery disease)     known 70% LAD stenosis     Cardiomyopathy (H)      HTN (hypertension)      Mitral regurgitation      Obesity, unspecified      Palpitations      Permanent atrial fibrillation (H) 2017     Unspecified essential hypertension        Past Surgical History:   Procedure Laterality Date     ANGIOGRAM      Normal coronary,dilated LV,Sev.decr.global LVF,+1 MR     ANGIOGRAM      Mild AS,Mod PHTN,mod prox.LAD disease,Triv.CFX disease     ANGIOGRAM      Mod AS,Mod PHTN,mod prox.LAD disease     ANGIOGRAM  10/10    Sev.AS,CM,global hypokinesis,Mild-mod LV dilated,Mild MR,70% prox.LAD lesion,PHTN     C NONSPECIFIC PROCEDURE  ~    vein stripping     CARDIOVERSION  , ,      CHOLECYSTECTOMY       COLONOSCOPY N/A 2016    Procedure: COLONOSCOPY;  Surgeon: Robyn Collins MD;  Location:  GI     ESOPHAGOSCOPY, GASTROSCOPY, DUODENOSCOPY (EGD), COMBINED N/A 2016    Procedure: COMBINED ESOPHAGOSCOPY, GASTROSCOPY, DUODENOSCOPY (EGD);  Surgeon: Robyn Collins MD;  Location:  GI     LAPAROSCOPIC BYPASS GASTRIC  10/8/2011    Procedure:LAPAROSCOPIC BYPASS GASTRIC; Diagnostic laparoscopy, Lysis of Adhesions, Laparoscopic Revision of Jejunojejunostomy; Surgeon:RADHA MURO; Location: OR     LAPAROSCOPIC BYPASS GASTRIC  2006    Dr Jin     LAPAROSCOPY DIAGNOSTIC (GENERAL)  10/8/2011    Procedure:LAPAROSCOPY DIAGNOSTIC (GENERAL);  Surgeon:RADHA MURO; Location:[facility] OR     REPLACE VALVE AORTIC  1/7/2011       Family History   Problem Relation Age of Onset     Cancer - colorectal Father      Colon Cancer Father      Diabetes Brother        Social History     Tobacco Use     Smoking status: Never Smoker     Smokeless tobacco: Never Used   Substance Use Topics     Alcohol use: No     Alcohol/week: 0.0 oz     /81 (BP Location: Right arm, Patient Position: Chair, Cuff Size: Adult Regular)   Pulse 86   Temp 97.4  F (36.3  C) (Oral)   Wt 91.9 kg (202 lb 8 oz)   SpO2 98%   BMI 28.44 kg/m      Weight is Body mass index is 28.44 kg/m .   Eyes show wilmer  No neck masses or thyromegaly.Ear nose and throat shows normal   No bruits, murmers, rubs or extrasounds. No cardiomegaly or chest wall tenderness. Lungs clear, no abdominal masses or organomegaly. No CVA tenderness.  Skin eval no rash  No hernias, good range of motion neck, back and extremities. No abnormal skin lesions. Normal genitalia. Good peripheral pulses. No adenopathy.  Normal gait and stance. Neck is supple.  Back exam shows good rom     He's had prior benign pulmonary nodule and went all the way to PET scan. 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  He's three pounds down   Plan: Hemoglobin A1c, Comprehensive metabolic panel,         CBC with platelets, XR Chest 2 Views            (R91.8) Pulmonary nodules  Comment:   Plan: same one as before, may be larger will need CT    (L57.0) Actinic keratosis  Comment:   Plan: DERMATOLOGY REFERRAL        On his face    (J01.01) Acute recurrent maxillary sinusitis  Comment:   Plan: cefdinir (OMNICEF) 300 MG capsule        Trial of antibiotic treatment     (F41.9) Anxiety  Comment:    Plan: sertraline (ZOLOFT) 50 MG tablet        Med renew ,         .

## 2019-02-05 ENCOUNTER — TELEPHONE (OUTPATIENT)
Dept: FAMILY MEDICINE | Facility: CLINIC | Age: 64
End: 2019-02-05

## 2019-02-05 LAB
ALBUMIN SERPL-MCNC: 4.1 G/DL (ref 3.4–5)
ALP SERPL-CCNC: 93 U/L (ref 40–150)
ALT SERPL W P-5'-P-CCNC: 25 U/L (ref 0–70)
ANION GAP SERPL CALCULATED.3IONS-SCNC: 4 MMOL/L (ref 3–14)
AST SERPL W P-5'-P-CCNC: 34 U/L (ref 0–45)
BILIRUB SERPL-MCNC: 0.7 MG/DL (ref 0.2–1.3)
BUN SERPL-MCNC: 24 MG/DL (ref 7–30)
CALCIUM SERPL-MCNC: 9.4 MG/DL (ref 8.5–10.1)
CHLORIDE SERPL-SCNC: 105 MMOL/L (ref 94–109)
CO2 SERPL-SCNC: 30 MMOL/L (ref 20–32)
CREAT SERPL-MCNC: 0.92 MG/DL (ref 0.66–1.25)
GFR SERPL CREATININE-BSD FRML MDRD: 90 ML/MIN/{1.73_M2}
GLUCOSE SERPL-MCNC: 84 MG/DL (ref 70–99)
POTASSIUM SERPL-SCNC: 4.8 MMOL/L (ref 3.4–5.3)
PROT SERPL-MCNC: 7.4 G/DL (ref 6.8–8.8)
RADIOLOGIST FLAGS: ABNORMAL
SODIUM SERPL-SCNC: 139 MMOL/L (ref 133–144)
T4 FREE SERPL-MCNC: 0.92 NG/DL (ref 0.76–1.46)
TSH SERPL DL<=0.005 MIU/L-ACNC: 10.17 MU/L (ref 0.4–4)

## 2019-02-05 NOTE — TELEPHONE ENCOUNTER
Discussed and scheduled with Jovon  
Telma from  [facility] for for urgent radiology result.  See below.  Printed and walked down for Dr. Lorenzana to review and advise.  Arabella Gonzales, RN    IMPRESSION:   1. Rounded nodule in the posterolateral aspect of the right lower  lobe, 1.8 x 1.7 x 2.0 cm diameter. CT scan is recommended for further  evaluation.  2. Cardiomegaly with aortic valve implant and sternal wires,  unchanged.  3. Interval resolution of left lower lobe infiltrate and left pleural  effusion.     [[facility]: Pulmonary nodule]     This report will be copied to the [facility] to ensure a  provider acknowledges the finding. [facility] is available Monday  through Friday 8am-3:30 pm.    Component   Radiologist flags (Rad-Urgent Abnormal)   Pulmonary nodule (Urgent)        
prescription called to pharmacy

## 2019-02-12 ENCOUNTER — ANTICOAGULATION THERAPY VISIT (OUTPATIENT)
Dept: NURSING | Facility: CLINIC | Age: 64
End: 2019-02-12
Payer: COMMERCIAL

## 2019-02-12 DIAGNOSIS — Z95.2 S/P AVR (AORTIC VALVE REPLACEMENT): ICD-10-CM

## 2019-02-12 LAB — INR POINT OF CARE: 3.1 (ref 0.86–1.14)

## 2019-02-12 PROCEDURE — 85610 PROTHROMBIN TIME: CPT | Mod: QW

## 2019-02-12 PROCEDURE — 99207 ZZC NO CHARGE NURSE ONLY: CPT

## 2019-02-12 PROCEDURE — 36416 COLLJ CAPILLARY BLOOD SPEC: CPT

## 2019-02-12 NOTE — PROGRESS NOTES
ANTICOAGULATION FOLLOW-UP CLINIC VISIT    Patient Name:  Jovon Mantilla  Date:  2/12/2019  Contact Type:  Face to Face    SUBJECTIVE: continue same plan of care.     Patient Findings     Positives:   No Problem Findings           OBJECTIVE    INR Protime   Date Value Ref Range Status   02/12/2019 3.1 (A) 0.86 - 1.14 Final     Chromogenic Factor 10   Date Value Ref Range Status   01/21/2011 35 (L) 60 - 140 % Final     Comment:     Therapeutic Range: A Chromogenic Factor 10 level of 20-40% inversely   correlates   with an INR of 2-3 for patients receiving Warfarin. Chromogenic Factor 10   levels below 20% indicate an INR greater than 3, and levels above 40%   indicate   an INR lessthan 2.       ASSESSMENT / PLAN  INR assessment THER    Recheck INR In: 6 WEEKS    INR Location Clinic      Anticoagulation Summary  As of 2/12/2019    INR goal:   2.5-3.5   TTR:   64.5 % (2.9 y)   INR used for dosing:   3.1 (2/12/2019)   Warfarin maintenance plan:   4 mg (4 mg x 1) every Mon, Wed, Fri; 6 mg (4 mg x 1.5) all other days   Full warfarin instructions:   4 mg every Mon, Wed, Fri; 6 mg all other days   Weekly warfarin total:   36 mg   No change documented:   Monica Leslie RN   Plan last modified:   Monica Leslie RN (5/4/2017)   Next INR check:   3/26/2019   Target end date:   Indefinite    Indications    S/P AVR (aortic valve replacement) [Z95.2]             Anticoagulation Episode Summary     INR check location:       Preferred lab:       Send INR reminders to:    [facility]    Comments:         Anticoagulation Care Providers     Provider Role Specialty Phone number    Rehan Lorenzana MD [facility] [phone]            See the Encounter Report to view Anticoagulation Flowsheet and Dosing Calendar (Go to Encounters tab in chart review, and find the Anticoagulation Therapy Visit)    Dosage adjustment made based on physician directed care plan.    Monica Leslie RN

## 2019-02-15 ENCOUNTER — HOSPITAL ENCOUNTER (OUTPATIENT)
Dept: CT IMAGING | Facility: CLINIC | Age: 64
Discharge: HOME | End: 2019-02-15
Attending: FAMILY MEDICINE | Admitting: FAMILY MEDICINE
Payer: COMMERCIAL

## 2019-02-15 DIAGNOSIS — R91.8 PULMONARY NODULES: ICD-10-CM

## 2019-02-15 PROCEDURE — 71250 CT THORAX DX C-: CPT

## 2019-02-22 ENCOUNTER — MYC MEDICAL ADVICE (OUTPATIENT)
Dept: FAMILY MEDICINE | Facility: CLINIC | Age: 64
End: 2019-02-22

## 2019-02-22 DIAGNOSIS — R91.8 PULMONARY NODULES: Primary | ICD-10-CM

## 2019-03-26 ENCOUNTER — ANTICOAGULATION THERAPY VISIT (OUTPATIENT)
Dept: NURSING | Facility: CLINIC | Age: 64
End: 2019-03-26
Payer: COMMERCIAL

## 2019-03-26 DIAGNOSIS — Z95.2 S/P AVR (AORTIC VALVE REPLACEMENT): ICD-10-CM

## 2019-03-26 LAB — INR POINT OF CARE: 2.4 (ref 0.86–1.14)

## 2019-03-26 PROCEDURE — 85610 PROTHROMBIN TIME: CPT | Mod: QW

## 2019-03-26 PROCEDURE — 99207 ZZC NO CHARGE NURSE ONLY: CPT

## 2019-03-26 PROCEDURE — 36416 COLLJ CAPILLARY BLOOD SPEC: CPT

## 2019-03-26 NOTE — PROGRESS NOTES
ANTICOAGULATION FOLLOW-UP CLINIC VISIT    Patient Name:  Jovon Mantilla  Date:  3/26/2019  Contact Type:  Face to Face    SUBJECTIVE: same plan of care.     Patient Findings            OBJECTIVE    INR Protime   Date Value Ref Range Status   2019 2.4 (A) 0.86 - 1.14 Final     Chromogenic Factor 10   Date Value Ref Range Status   2011 35 (L) 60 - 140 % Final     Comment:     Therapeutic Range: A Chromogenic Factor 10 level of 20-40% inversely   correlates   with an INR of 2-3 for patients receiving Warfarin. Chromogenic Factor 10   levels below 20% indicate an INR greater than 3, and levels above 40%   indicate   an INR lessthan 2.       ASSESSMENT / PLAN  INR assessment THER    Recheck INR In: 6 WEEKS    INR Location Clinic      Anticoagulation Summary  As of 3/26/2019    INR goal:   2.5-3.5   TTR:   65.3 % (3 y)   INR used for dosin.4! (3/26/2019)   Warfarin maintenance plan:   4 mg (4 mg x 1) every Mon, Wed, Fri; 6 mg (4 mg x 1.5) all other days   Full warfarin instructions:   4 mg every Mon, Wed, Fri; 6 mg all other days   Weekly warfarin total:   36 mg   No change documented:   Monica Leslie RN   Plan last modified:   Monica Leslie RN (2017)   Next INR check:   2019   Target end date:   Indefinite    Indications    S/P AVR (aortic valve replacement) [Z95.2]             Anticoagulation Episode Summary     INR check location:       Preferred lab:       Send INR reminders to:    [facility]    Comments:         Anticoagulation Care Providers     Provider Role Specialty Phone number    Rehan Lorenzana MD [facility] [phone]            See the Encounter Report to view Anticoagulation Flowsheet and Dosing Calendar (Go to Encounters tab in chart review, and find the Anticoagulation Therapy Visit)    Dosage adjustment made based on physician directed care plan.    Monica Leslie RN

## 2019-04-02 ENCOUNTER — ANCILLARY PROCEDURE (OUTPATIENT)
Dept: GENERAL RADIOLOGY | Facility: CLINIC | Age: 64
End: 2019-04-02
Attending: PHYSICIAN ASSISTANT
Payer: COMMERCIAL

## 2019-04-02 ENCOUNTER — OFFICE VISIT (OUTPATIENT)
Dept: FAMILY MEDICINE | Facility: CLINIC | Age: 64
End: 2019-04-02
Payer: COMMERCIAL

## 2019-04-02 VITALS
WEIGHT: 213 LBS | DIASTOLIC BLOOD PRESSURE: 80 MMHG | HEART RATE: 91 BPM | BODY MASS INDEX: 29.92 KG/M2 | OXYGEN SATURATION: 97 % | TEMPERATURE: 98.2 F | SYSTOLIC BLOOD PRESSURE: 128 MMHG

## 2019-04-02 DIAGNOSIS — M25.561 ACUTE PAIN OF RIGHT KNEE: ICD-10-CM

## 2019-04-02 DIAGNOSIS — M25.561 ACUTE PAIN OF RIGHT KNEE: Primary | ICD-10-CM

## 2019-04-02 PROCEDURE — 73560 X-RAY EXAM OF KNEE 1 OR 2: CPT | Mod: RT

## 2019-04-02 PROCEDURE — 73565 X-RAY EXAM OF KNEES: CPT

## 2019-04-02 PROCEDURE — 99213 OFFICE O/P EST LOW 20 MIN: CPT | Performed by: PHYSICIAN ASSISTANT

## 2019-04-02 NOTE — PATIENT INSTRUCTIONS
Likely pain due to arthritic changes.     Apply ice 3 times a day for 20 minutes at a time.    Continue to take Tylenol 2-3 times a day for pain.     Rest the knee and avoid kneeling on it or squatting.    If pain persists more than 2-3 week, follow-up with primary care provider.        Patient Education     Knee Pain  Knee pain is very common. It s especially common in active people who put a lot of pressure on their knees, like runners. It affects women more often than men.  Your kneecap (patella) is a thick, round bone. It covers and protects the front portion of your knee joint. It moves along a groove in your thighbone (femur) as part of the patellofemoral joint. A layer of cartilage surrounds the underside of your kneecap. This layer protects it from grinding against your femur.  When this cartilage softens and breaks down, it can cause knee pain. This is partly because of repetitive stress. The stress irritates the lining of the joint. This causes pain in the underlying bone.  What causes knee pain?  Many things can cause knee pain. You may have more than one cause. Some of these include:    Overuse of the knee joint    The kneecap doesn t line up with the tissue around it    Damage to small nerves in the area    Damage to the ligament-like structure that holds the kneecap in place (retinaculum)    Breakdown of the bone under the cartilage    Swelling in the soft tissues around the kneecap    Injury  You might be more likely to have knee pain if you:    Exercise a lot    Recently increased the intensity of your workouts    Have a body mass index (BMI) greater than 25    Have poor alignment of your kneecap    Walk with your feet turned overly outward or inward    Have weakness in surrounding muscle groups (inner quad or hip adductor muscles)    Have too much tightness in surrounding muscle groups (hamstrings or iliotibial band)    Have a recent history of injury to the area    Are female  Symptoms of knee  pain  This type of knee pain is a dull, aching pain in the front of the knee in the area under and around the kneecap. This pain may start quickly or slowly. Your pain might be worse when you squat, run, or sit for a long time. You might also sometimes feel like your knee is giving out. You may have symptoms in one or both of your knees.  Diagnosing knee pain  Your healthcare provider will ask about your medical history and your symptoms. Be sure to describe any activities that make your knee pain worse. He or she will look at your knee. This will include tests of your range of motion, strength, and areas of pain of your knee. Your knee alignment will be checked.  Your healthcare provider will need to rule out other causes of your knee pain, such as arthritis. You may need an imaging test, such as an X-ray or MRI.  Treatment for knee pain  Treatments that can help ease your symptoms may include:    Avoiding activities for a while that make your pain worse, returning to activity over time    Icing the outside of your knee when it causes you pain    Taking over-the-counter pain medicine    Wearing a knee brace or taping your knee to support it    Wearing special shoe inserts to help keep your feet in the proper alignment    Doing special exercises to stretch and strengthen the muscles around your hip and your knee  These steps help most people manage knee pain. But some cases of knee pain need to be treated with surgery. You may need surgery right away. Or you may need it later if other treatments don t work. Your healthcare provider may refer you to an orthopedic surgeon. He or she will talk with you about your choices.  Preventing knee pain  Losing weight and correcting excess muscle tightness or muscle weakness may help lower your risk.  In some cases, you can prevent knee pain. To help prevent a flare-up of knee pain, you do these things:    Regularly do all the exercises your doctor or physical therapist  advises    Support your knee as advised by your doctor or physical therapist    Increase training gradually, and ease up on training when needed    Have an expert check your gait for running or other sporting activities    Stretch properly before and after exercise    Replace your running shoes regularly    Lose excess weight     When to call your healthcare provider  Call your healthcare provider right away if:    Your symptoms don t get better after a few weeks of treatment    You have any new symptoms   Date Last Reviewed: 4/1/2017 2000-2018 [employer]. [address] [place] [id number]. All rights reserved. This information is not intended as a substitute for professional medical care. Always follow your healthcare professional's instructions.

## 2019-04-02 NOTE — PROGRESS NOTES
SUBJECTIVE:   Jovon Mantilla is a 63 year old male who presents to clinic today for the following health issues:      Joint Pain    Onset 5 days     Description:   Location: right knee   Character: Sharp and bone rubbing     Intensity: mild    Progression of Symptoms: better    Accompanying Signs & Symptoms:  Other symptoms: none    History:   Previous similar pain: no       Precipitating factors:   Trauma or overuse: YES- over use- kneeling and squatting a lot     Alleviating factors:  Improved by: icy hot and Tylenol    Therapies Tried and outcome: icy hot, and Tylenol which have helped        Problem list and histories reviewed & adjusted, as indicated.  Additional history: as documented    Patient Active Problem List   Diagnosis     Acquired hypothyroidism     Neck pain     Chronic rhinitis     Aortic stenosis     Hyperlipidemia LDL goal <70     Hypertension goal BP (blood pressure) < 140/90     Dilated cardiomyopathy (H)     CAD (coronary artery disease)     Mitral regurgitation     ASVD (arteriosclerotic vascular disease)     S/P AVR (aortic valve replacement)     GI bleeding     Gastrointestinal hemorrhage associated with anorectal source     Long-term (current) use of anticoagulants [Z79.01]     Adjustment disorder with mixed anxiety and depressed mood     Anxiety     Idiopathic cardiomyopathy (H)     S/P aortic valve replacement     Coronary artery disease involving native coronary artery of native heart without angina pectoris     Hypotension, unspecified hypotension type     Permanent atrial fibrillation (H)     Past Surgical History:   Procedure Laterality Date     ANGIOGRAM  2/02    Normal coronary,dilated LV,Sev.decr.global LVF,+1 MR     ANGIOGRAM  1/08    Mild AS,Mod PHTN,mod prox.LAD disease,Triv.CFX disease     ANGIOGRAM  1/09    Mod AS,Mod PHTN,mod prox.LAD disease     ANGIOGRAM  10/10    Sev.AS,CM,global hypokinesis,Mild-mod LV dilated,Mild MR,70% prox.LAD lesion,PHTN     C NONSPECIFIC PROCEDURE   ~1985    vein stripping     CARDIOVERSION  2/02, 4/02, 4/11     CHOLECYSTECTOMY       COLONOSCOPY N/A 1/23/2016    Procedure: COLONOSCOPY;  Surgeon: Robyn Collins MD;  Location:  GI     ESOPHAGOSCOPY, GASTROSCOPY, DUODENOSCOPY (EGD), COMBINED N/A 1/23/2016    Procedure: COMBINED ESOPHAGOSCOPY, GASTROSCOPY, DUODENOSCOPY (EGD);  Surgeon: Robyn Collins MD;  Location:  GI     LAPAROSCOPIC BYPASS GASTRIC  10/8/2011    Procedure:LAPAROSCOPIC BYPASS GASTRIC; Diagnostic laparoscopy, Lysis of Adhesions, Laparoscopic Revision of Jejunojejunostomy; Surgeon:RADHA MURO; Location: OR     LAPAROSCOPIC BYPASS GASTRIC  6/26/2006    Dr Jin     LAPAROSCOPY DIAGNOSTIC (GENERAL)  10/8/2011    Procedure:LAPAROSCOPY DIAGNOSTIC (GENERAL); Surgeon:RADHA MURO; Location: OR     REPLACE VALVE AORTIC  1/7/2011       Social History     Tobacco Use     Smoking status: Never Smoker     Smokeless tobacco: Never Used   Substance Use Topics     Alcohol use: No     Alcohol/week: 0.0 oz     Family History   Problem Relation Age of Onset     Cancer - colorectal Father      Colon Cancer Father      Diabetes Brother          Current Outpatient Medications   Medication Sig Dispense Refill     Ascorbic Acid (VITAMIN C PO) Take 500 mg by mouth At Bedtime       aspirin 81 MG tablet Take 81 mg by mouth every other day       Calcium Carbonate-Vitamin D (CALCIUM 600+D) 600-200 MG-UNIT TABS Take 1 tablet by mouth 2 times daily        carvedilol (COREG) 25 MG tablet Take 1 tablet (25 mg) by mouth 2 times daily (with meals) 180 tablet 3     Cholecalciferol (VITAMIN D) 2000 UNIT CAPS Take 1 tablet by mouth daily        Cyanocobalamin (VITAMIN B-12) 500 MCG SUBL Place 500 mcg under the tongue daily        digoxin (LANOXIN) 125 MCG tablet Take 1 tablet (125 mcg) by mouth daily 90 tablet 3     ferrous sulfate (IRON SUPPLEMENT) 325 (65 FE) MG tablet Take 1 tablet (325 mg) by mouth 2 times daily 100 tablet       Fexofenadine HCl (ALLERGY 24-HR PO) Take by mouth daily       levothyroxine (SYNTHROID/LEVOTHROID) 200 MCG tablet Take 1 tablet (200 mcg) by mouth daily 90 tablet 2     levothyroxine (SYNTHROID/LEVOTHROID) 200 MCG tablet Take 1 tablet (200 mcg) by mouth daily 30 tablet 0     lisinopril (PRINIVIL/ZESTRIL) 20 MG tablet Take  20 mg (1 tablet in am) and 10 mg  (one half tablet) in  pm 135 tablet 3     Multiple Vitamins-Minerals (CENTRUM) CHEW Take 1 tablet by mouth At Bedtime       Psyllium (FIBER) 0.52 GM CAPS Take 2 tablets by mouth At Bedtime        sertraline (ZOLOFT) 50 MG tablet Take 1 tablet (50 mg) by mouth daily 90 tablet 3     simvastatin (ZOCOR) 20 MG tablet TAKE 1 TABLET (20 MG) BY MOUTH AT BEDTIME       simvastatin (ZOCOR) 20 MG tablet Take 1 tablet (20 mg) by mouth At Bedtime 90 tablet 3     triamcinolone (KENALOG) 0.1 % cream Apply sparingly to affected area three times daily for 14 days. 30 g 0     valACYclovir (VALTREX) 1000 mg tablet Take 1 tablet (1,000 mg) by mouth 3 times daily 21 tablet 0     warfarin (COUMADIN) 4 MG tablet Take 1 tablet (4MG) by mouth Mon,Wed,Fri & 1 1/2 tablets (6MG) all other days of the week 120 tablet 0     No Known Allergies    Reviewed and updated as needed this visit by clinical staff       Reviewed and updated as needed this visit by Provider         ROS:  Constitutional, HEENT, cardiovascular, pulmonary, gi and gu systems are negative, except as otherwise noted.    OBJECTIVE:     /80 (BP Location: Right arm, Patient Position: Chair, Cuff Size: Adult Regular)   Pulse 91   Temp 98.2  F (36.8  C) (Oral)   Wt 96.6 kg (213 lb)   SpO2 97%   BMI 29.92 kg/m    Body mass index is 29.92 kg/m .  GENERAL: healthy, alert and no distress  EYES: Eyes grossly normal to inspection, PERRL and conjunctivae and sclerae normal  MS: no gross musculoskeletal defects noted, no edema  SKIN: no suspicious lesions or rashes  NEURO: Normal strength and tone, mentation intact and speech  normal  PSYCH: mentation appears normal, affect normal/bright  Right knee: There is swelling but no erythema or ecchymosis. Tender to palpation along medial joint line and mildly over patella. Extension is intact. Flexion limited some due to swelling. Anterior and posterior drawer tests are negative. Varus and valgus stress test are negative. Modified [place] is negative.    Diagnostic Test Results:  Xray - Arthritic changes noted in patello-femoral joint space and medial joint.       ASSESSMENT/PLAN:       (M25.485) Acute pain of right knee  (primary encounter diagnosis)    Comment: Likely due to osteoarthritis. Conservative treatment reviewed. Follow-up if not improving as expected to consider cortisone injection.    Plan: XR Knee Right 1/2 Views, XR Knee AP Standing         Bilateral              Patient Instructions     Likely pain due to arthritic changes.     Apply ice 3 times a day for 20 minutes at a time.    Continue to take Tylenol 2-3 times a day for pain.     Rest the knee and avoid kneeling on it or squatting.    If pain persists more than 2-3 week, follow-up with primary care provider.        Patient Education     Knee Pain  Knee pain is very common. It s especially common in active people who put a lot of pressure on their knees, like runners. It affects women more often than men.  Your kneecap (patella) is a thick, round bone. It covers and protects the front portion of your knee joint. It moves along a groove in your thighbone (femur) as part of the patellofemoral joint. A layer of cartilage surrounds the underside of your kneecap. This layer protects it from grinding against your femur.  When this cartilage softens and breaks down, it can cause knee pain. This is partly because of repetitive stress. The stress irritates the lining of the joint. This causes pain in the underlying bone.  What causes knee pain?  Many things can cause knee pain. You may have more than one cause. Some of these  include:    Overuse of the knee joint    The kneecap doesn t line up with the tissue around it    Damage to small nerves in the area    Damage to the ligament-like structure that holds the kneecap in place (retinaculum)    Breakdown of the bone under the cartilage    Swelling in the soft tissues around the kneecap    Injury  You might be more likely to have knee pain if you:    Exercise a lot    Recently increased the intensity of your workouts    Have a body mass index (BMI) greater than 25    Have poor alignment of your kneecap    Walk with your feet turned overly outward or inward    Have weakness in surrounding muscle groups (inner quad or hip adductor muscles)    Have too much tightness in surrounding muscle groups (hamstrings or iliotibial band)    Have a recent history of injury to the area    Are female  Symptoms of knee pain  This type of knee pain is a dull, aching pain in the front of the knee in the area under and around the kneecap. This pain may start quickly or slowly. Your pain might be worse when you squat, run, or sit for a long time. You might also sometimes feel like your knee is giving out. You may have symptoms in one or both of your knees.  Diagnosing knee pain  Your healthcare provider will ask about your medical history and your symptoms. Be sure to describe any activities that make your knee pain worse. He or she will look at your knee. This will include tests of your range of motion, strength, and areas of pain of your knee. Your knee alignment will be checked.  Your healthcare provider will need to rule out other causes of your knee pain, such as arthritis. You may need an imaging test, such as an X-ray or MRI.  Treatment for knee pain  Treatments that can help ease your symptoms may include:    Avoiding activities for a while that make your pain worse, returning to activity over time    Icing the outside of your knee when it causes you pain    Taking over-the-counter pain  medicine    Wearing a knee brace or taping your knee to support it    Wearing special shoe inserts to help keep your feet in the proper alignment    Doing special exercises to stretch and strengthen the muscles around your hip and your knee  These steps help most people manage knee pain. But some cases of knee pain need to be treated with surgery. You may need surgery right away. Or you may need it later if other treatments don t work. Your healthcare provider may refer you to an orthopedic surgeon. He or she will talk with you about your choices.  Preventing knee pain  Losing weight and correcting excess muscle tightness or muscle weakness may help lower your risk.  In some cases, you can prevent knee pain. To help prevent a flare-up of knee pain, you do these things:    Regularly do all the exercises your doctor or physical therapist advises    Support your knee as advised by your doctor or physical therapist    Increase training gradually, and ease up on training when needed    Have an expert check your gait for running or other sporting activities    Stretch properly before and after exercise    Replace your running shoes regularly    Lose excess weight     When to call your healthcare provider  Call your healthcare provider right away if:    Your symptoms don t get better after a few weeks of treatment    You have any new symptoms   Date Last Reviewed: 4/1/2017 2000-2018 [employer]. [address] [place] [address], [place] [address]. All rights reserved. This information is not intended as a substitute for professional medical care. Always follow your healthcare professional's instructions.               Noe Ramirez PA-C  [facility]

## 2019-04-07 DIAGNOSIS — F41.9 ANXIETY: ICD-10-CM

## 2019-04-08 NOTE — TELEPHONE ENCOUNTER
"Requested Prescriptions   Pending Prescriptions Disp Refills     sertraline (ZOLOFT) 50 MG tablet [Pharmacy Med Name: SERTRALINE HCL TABS 50MG] 90 tablet 0     Sig: TAKE 1 TABLET DAILY   Last Written Prescription Date:  2/4/19  Last Fill Quantity: 90,  # refills: 3   Last Office Visit: 4/2/2019 [place]      Return in about 2 weeks (around 4/16/2019) for If not improving or sooner if worsening.    Future Office Visit:         SSRIs Protocol Passed - 4/7/2019  4:47 AM   PHQ-9 SCORE 5/9/2016   PHQ-9 Total Score 4     DARNELL-7 SCORE 5/9/2016   Total Score 8          Passed - Recent (12 mo) or future (30 days) visit within the authorizing provider's specialty     Patient had office visit in the last 12 months or has a visit in the next 30 days with authorizing provider or within the authorizing provider's specialty.  See \"Patient Info\" tab in inbasket, or \"Choose Columns\" in Meds & Orders section of the refill encounter.              Passed - Medication is active on med list        Passed - Patient is age 18 or older        "

## 2019-04-09 NOTE — TELEPHONE ENCOUNTER
Per medication list, PCP issued years supply on 2/4/19 (profile)  Will send in refill.     Ling Greenberg RN -- [facility]

## 2019-04-22 ENCOUNTER — OFFICE VISIT (OUTPATIENT)
Dept: FAMILY MEDICINE | Facility: CLINIC | Age: 64
End: 2019-04-22
Payer: COMMERCIAL

## 2019-04-22 VITALS
BODY MASS INDEX: 29.92 KG/M2 | SYSTOLIC BLOOD PRESSURE: 122 MMHG | DIASTOLIC BLOOD PRESSURE: 78 MMHG | WEIGHT: 209 LBS | HEART RATE: 65 BPM | HEIGHT: 70 IN | TEMPERATURE: 98.7 F | OXYGEN SATURATION: 96 %

## 2019-04-22 DIAGNOSIS — L71.9 ACNE ROSACEA: Primary | ICD-10-CM

## 2019-04-22 PROCEDURE — 99213 OFFICE O/P EST LOW 20 MIN: CPT | Performed by: FAMILY MEDICINE

## 2019-04-22 RX ORDER — METRONIDAZOLE 7.5 MG/G
GEL TOPICAL 2 TIMES DAILY
Qty: 45 G | Refills: 0 | Status: SHIPPED | OUTPATIENT
Start: 2019-04-22 | End: 2019-10-18

## 2019-04-22 ASSESSMENT — MIFFLIN-ST. JEOR: SCORE: 1766.76

## 2019-04-22 ASSESSMENT — PAIN SCALES - GENERAL: PAINLEVEL: NO PAIN (0)

## 2019-04-22 NOTE — PROGRESS NOTES
SUBJECTIVE:   Jovon Mantilla is a 63 year old male who presents to clinic today for the following   health issues:  Patient comes in today with a rash started on his face area after he spent the day in the sun.  He reports started yesterday.  Woke up with it.  He reports it does itch sometimes but no no pain no burning sensation.  No drainage no discharges.  He does not feel warm.  The rash mostly on his upper face area at the tip of his nose is a little    Rash  Onset: Yesterday    Description:   Location: right side of face  Character: round, red  Itching (Pruritis): no     Progression of Symptoms:  improving    Accompanying Signs & Symptoms:  Fever: no   Body aches or joint pain: no   Sore throat symptoms: no   Recent cold symptoms: no     History:   Previous similar rash: YES    Precipitating factors:   Exposure to similar rash: YES  New exposures: None   Recent travel: no     Alleviating factors:  Unsure    Therapies Tried and outcome: None    Additional history: as documented    Reviewed  and updated as needed this visit by clinical staff  Tobacco  Allergies  Meds  Med Hx  Surg Hx  Fam Hx  Soc Hx        Reviewed and updated as needed this visit by Provider         Current Outpatient Medications   Medication Sig Dispense Refill     Ascorbic Acid (VITAMIN C PO) Take 500 mg by mouth At Bedtime       aspirin 81 MG tablet Take 81 mg by mouth every other day       Calcium Carbonate-Vitamin D (CALCIUM 600+D) 600-200 MG-UNIT TABS Take 1 tablet by mouth 2 times daily        carvedilol (COREG) 25 MG tablet Take 1 tablet (25 mg) by mouth 2 times daily (with meals) 180 tablet 3     Cholecalciferol (VITAMIN D) 2000 UNIT CAPS Take 1 tablet by mouth daily        Cyanocobalamin (VITAMIN B-12) 500 MCG SUBL Place 500 mcg under the tongue daily        ferrous sulfate (IRON SUPPLEMENT) 325 (65 FE) MG tablet Take 1 tablet (325 mg) by mouth 2 times daily 100 tablet      Fexofenadine HCl (ALLERGY 24-HR PO) Take by mouth  daily       levothyroxine (SYNTHROID/LEVOTHROID) 200 MCG tablet Take 1 tablet (200 mcg) by mouth daily 90 tablet 2     lisinopril (PRINIVIL/ZESTRIL) 20 MG tablet Take  20 mg (1 tablet in am) and 10 mg  (one half tablet) in  pm 135 tablet 3     metroNIDAZOLE (METROGEL) 0.75 % external gel Apply topically 2 times daily Apply to area bid for 2 wks, then bid as needed 45 g 0     Multiple Vitamins-Minerals (CENTRUM) CHEW Take 1 tablet by mouth At Bedtime       Psyllium (FIBER) 0.52 GM CAPS Take 2 tablets by mouth At Bedtime        sertraline (ZOLOFT) 50 MG tablet TAKE 1 TABLET DAILY 90 tablet 3     simvastatin (ZOCOR) 20 MG tablet Take 1 tablet (20 mg) by mouth At Bedtime 90 tablet 3     valACYclovir (VALTREX) 1000 mg tablet Take 1 tablet (1,000 mg) by mouth 3 times daily 21 tablet 0     warfarin (COUMADIN) 4 MG tablet Take 1 tablet (4MG) by mouth Mon,Wed,Fri & 1 1/2 tablets (6MG) all other days of the week 120 tablet 0     digoxin (LANOXIN) 125 MCG tablet Take 1 tablet (125 mcg) by mouth daily 90 tablet 3     levothyroxine (SYNTHROID/LEVOTHROID) 200 MCG tablet Take 1 tablet (200 mcg) by mouth daily (Patient not taking: Reported on 4/22/2019) 30 tablet 0     simvastatin (ZOCOR) 20 MG tablet TAKE 1 TABLET (20 MG) BY MOUTH AT BEDTIME (Patient not taking: Reported on 4/22/2019)       triamcinolone (KENALOG) 0.1 % cream Apply sparingly to affected area three times daily for 14 days. 30 g 0     No Known Allergies  Recent Labs   Lab Test 02/04/19  1707 09/14/18  1137  06/02/17  1215  02/22/16  1019   A1C 5.8*  --   --   --   --   --    LDL  --  42  --  63  --  51   HDL  --  48  --  47  --  46   TRIG  --  47  --  66  --  50   ALT 25 21  --  23   < >  --    CR 0.92 0.87   < > 0.91   < >  --    GFRESTIMATED 90 89   < > 85   < >  --    GFRESTBLACK >90 >90   < > >90  African American GFR Calc     < >  --    POTASSIUM 4.8 4.6   < > 4.9   < >  --    TSH 10.17* 4.74*   < > 3.03   < >  --     < > = values in this interval not  "displayed.        ROS:  Constitutional, HEENT, cardiovascular, pulmonary, gi and gu systems are negative, except as otherwise noted.    OBJECTIVE:     /78 (BP Location: Left arm, Patient Position: Chair, Cuff Size: Adult Regular)   Pulse 65   Temp 98.7  F (37.1  C) (Oral)   Ht 1.79 m (5' 10.47\")   Wt 94.8 kg (209 lb)   SpO2 96%   BMI 29.59 kg/m    Body mass index is 29.59 kg/m .  GENERAL APPEARANCE: healthy, alert and no distress  SKIN: hyperpigmentation - bilateral on face, nose, red, flat diffuse and erythema - red, flat and diffuse    Diagnostic Test Results:  none     ASSESSMENT/PLAN:       ICD-10-CM    1. Acne rosacea L71.9 metroNIDAZOLE (METROGEL) 0.75 % external gel       Patient Instructions     Patient Education     Rosacea  Rosacea is a long-lasting (chronic) skin condition affecting the face. In the early stages it causes easy flushing or blushing. Redness may become long-term (permanent) as the small blood vessels of the face widen (dilate). There may be small, red, pus-filled bumps (pustules). It looks like a bad case of acne, and has been called adult acne. But it is not caused by the same things that cause acne.  Rosacea is a chronic illness. You will have flare-ups that come and go. This may happen every few weeks or every few months. Experts don't know what causes rosacea. If not treated, it tends to get worse over time.  Some men with a more severe form of rosacea develop a condition called rhinophyma. The oil glands on the skin of the nose become blocked and the nose gets bigger. The cheeks also become puffy. Alcohol may increase the flushing. But this condition is not caused by alcohol use.  Rosacea can be treated with topical gels and creams. Oral antibiotics are used for more severe cases. You should see improvement in the first 4 weeks. Dilated blood vessels can be treated with a small electric needle or laser surgery. Rhinophyma can be treated with surgery. Or it may be treated by " surgically scraping the skin (dermabrasion).  Home care    Avoid things that make your face red or flushed. These include hot drinks, spicy foods, caffeine, and alcohol.    Exercise indoors or in a cool area to avoid getting overheated.    Avoid excess sun exposure. Use a sunscreen with at least SPF 15 or higher.    Avoid extreme hot or extreme cold weather.    Don't scrub your face. That will irritate the skin and increase redness.    Avoid harsh soaps or moisturizers with irritating ingredients. You may need to use hypoallergenic cosmetics.    Avoid over-the-counter treatments unless instructed by your healthcare provider. Some of these treatments may make rosacea worse.    Try to figure out what triggers your flares (such as stress, sun exposure, or certain foods).  Follow-up care  Follow up with your healthcare provider, or as advised. Contact your provider if your condition is not responding to the medicines you were given. Getting treatment early can stop it from getting worse.  When to seek medical advice  Call your healthcare provider right away if you have redness, burning, or a gritty feeling in your eyes.  Date Last Reviewed: 8/1/2016 2000-2018 [employer]. [address] [place] [address], [place] [address]. All rights reserved. This information is not intended as a substitute for professional medical care. Always follow your healthcare professional's instructions.               Nathan Hastings MD  [facility]

## 2019-04-22 NOTE — PATIENT INSTRUCTIONS
Patient Education     Rosacea  Rosacea is a long-lasting (chronic) skin condition affecting the face. In the early stages it causes easy flushing or blushing. Redness may become long-term (permanent) as the small blood vessels of the face widen (dilate). There may be small, red, pus-filled bumps (pustules). It looks like a bad case of acne, and has been called adult acne. But it is not caused by the same things that cause acne.  Rosacea is a chronic illness. You will have flare-ups that come and go. This may happen every few weeks or every few months. Experts don't know what causes rosacea. If not treated, it tends to get worse over time.  Some men with a more severe form of rosacea develop a condition called rhinophyma. The oil glands on the skin of the nose become blocked and the nose gets bigger. The cheeks also become puffy. Alcohol may increase the flushing. But this condition is not caused by alcohol use.  Rosacea can be treated with topical gels and creams. Oral antibiotics are used for more severe cases. You should see improvement in the first 4 weeks. Dilated blood vessels can be treated with a small electric needle or laser surgery. Rhinophyma can be treated with surgery. Or it may be treated by surgically scraping the skin (dermabrasion).  Home care    Avoid things that make your face red or flushed. These include hot drinks, spicy foods, caffeine, and alcohol.    Exercise indoors or in a cool area to avoid getting overheated.    Avoid excess sun exposure. Use a sunscreen with at least SPF 15 or higher.    Avoid extreme hot or extreme cold weather.    Don't scrub your face. That will irritate the skin and increase redness.    Avoid harsh soaps or moisturizers with irritating ingredients. You may need to use hypoallergenic cosmetics.    Avoid over-the-counter treatments unless instructed by your healthcare provider. Some of these treatments may make rosacea worse.    Try to figure out what triggers your  flares (such as stress, sun exposure, or certain foods).  Follow-up care  Follow up with your healthcare provider, or as advised. Contact your provider if your condition is not responding to the medicines you were given. Getting treatment early can stop it from getting worse.  When to seek medical advice  Call your healthcare provider right away if you have redness, burning, or a gritty feeling in your eyes.  Date Last Reviewed: 8/1/2016 2000-2018 [employer]. [address] [place] [address]. All rights reserved. This information is not intended as a substitute for professional medical care. Always follow your healthcare professional's instructions.

## 2019-06-03 ENCOUNTER — TELEPHONE (OUTPATIENT)
Dept: FAMILY MEDICINE | Facility: CLINIC | Age: 64
End: 2019-06-03

## 2019-06-03 NOTE — LETTER
OVERDUE REMINDER LETTER                                       Annette 3, 2019         Jovon Mantilla  [address] [place] [facility] [address]         Dear Jovon,      This is a reminder letter that you are due to have your INR checked.  It is recommended to have INR testing monthly while on warfarin therapy.  Please call us at [phone] to schedule an appointment. Thank You very much.      Sincerely,       Monica Medrano RN / Dr Rehan Lorenzana / INR Office  [address]  [address]

## 2019-06-07 DIAGNOSIS — I70.90 ASVD (ARTERIOSCLEROTIC VASCULAR DISEASE): ICD-10-CM

## 2019-06-07 RX ORDER — LISINOPRIL 20 MG/1
TABLET ORAL
Qty: 135 TABLET | Refills: 0 | Status: SHIPPED | OUTPATIENT
Start: 2019-06-07 | End: 2019-09-04

## 2019-07-03 DIAGNOSIS — I70.90 ASVD (ARTERIOSCLEROTIC VASCULAR DISEASE): ICD-10-CM

## 2019-07-03 DIAGNOSIS — I48.20 CHRONIC ATRIAL FIBRILLATION (H): ICD-10-CM

## 2019-07-03 RX ORDER — DIGOXIN 125 MCG
125 TABLET ORAL DAILY
Qty: 90 TABLET | Refills: 0 | Status: SHIPPED | OUTPATIENT
Start: 2019-07-03 | End: 2019-10-18

## 2019-07-03 RX ORDER — CARVEDILOL 25 MG/1
25 TABLET ORAL 2 TIMES DAILY WITH MEALS
Qty: 180 TABLET | Refills: 0 | Status: SHIPPED | OUTPATIENT
Start: 2019-07-03 | End: 2019-09-11

## 2019-07-07 DIAGNOSIS — E03.9 ACQUIRED HYPOTHYROIDISM: ICD-10-CM

## 2019-07-07 RX ORDER — LEVOTHYROXINE SODIUM 200 UG/1
200 TABLET ORAL DAILY
Qty: 90 TABLET | Refills: 0 | Status: SHIPPED | OUTPATIENT
Start: 2019-07-07 | End: 2019-12-10

## 2019-07-07 RX ORDER — LEVOTHYROXINE SODIUM 200 UG/1
TABLET ORAL
Qty: 90 TABLET | Refills: 2 | OUTPATIENT
Start: 2019-07-07

## 2019-07-07 NOTE — TELEPHONE ENCOUNTER
"Routing refill request to provider for review/approval because:  Labs out of range:  TSH  Result note from provider-  Viewed by Jovon Mantilla on 2/9/2019  3:00 PM   Written by Rehan Lorenzana MD on 2/8/2019  5:18 PM   I'll see you after you get checked by the specialist for your thyroid. Call with any problems             Requested Prescriptions   Pending Prescriptions Disp Refills     levothyroxine (SYNTHROID/LEVOTHROID) 200 MCG tablet [Pharmacy Med Name: L-THYROXINE (SYNTHROID) TABS 200MCG] 90 tablet 2     Sig: TAKE 1 TABLET DAILY       Thyroid Protocol Failed - 7/7/2019  4:38 AM        Failed - Normal TSH on file in past 12 months     Recent Labs   Lab Test 02/04/19  1707   TSH 10.17*              Passed - Patient is 12 years or older        Passed - Recent (12 mo) or future (30 days) visit within the authorizing provider's specialty     Patient had office visit in the last 12 months or has a visit in the next 30 days with authorizing provider or within the authorizing provider's specialty.  See \"Patient Info\" tab in inbasket, or \"Choose Columns\" in Meds & Orders section of the refill encounter.              Passed - Medication is active on med list          "

## 2019-07-09 ENCOUNTER — TELEPHONE (OUTPATIENT)
Dept: FAMILY MEDICINE | Facility: CLINIC | Age: 64
End: 2019-07-09

## 2019-07-09 NOTE — LETTER
OVERDUE REMINDER LETTER                                       July 9, 2019         Jovon Mantilla  [address] [place] [facility] [address]         Dear Jovon,    This is a reminder letter that you are over-due to have your INR checked  Our records indicate that you last had your INR done March 26, 2019.      It is recommended to have your INR tested monthly while on Warfarin therapy.      Please call us at [phone] or [phone] to schedule an appointment. Thank You very much.      Sincerely,       Monica Medrano RN / INR Office / Dr Rehan Lorenzana  [facility]  [address] [place]  [address]

## 2019-07-12 ENCOUNTER — ANTICOAGULATION THERAPY VISIT (OUTPATIENT)
Dept: NURSING | Facility: CLINIC | Age: 64
End: 2019-07-12
Payer: COMMERCIAL

## 2019-07-12 DIAGNOSIS — Z95.2 S/P AVR (AORTIC VALVE REPLACEMENT): ICD-10-CM

## 2019-07-12 LAB — INR POINT OF CARE: 2.9 (ref 0.86–1.14)

## 2019-07-12 PROCEDURE — 36416 COLLJ CAPILLARY BLOOD SPEC: CPT

## 2019-07-12 PROCEDURE — 85610 PROTHROMBIN TIME: CPT | Mod: QW

## 2019-07-12 NOTE — PROGRESS NOTES
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  He denies changes or concerns.  Patient Findings         Clinical Outcomes     Negatives:   Major bleeding event, Thromboembolic event, Anticoagulation-related hospital admission, Anticoagulation-related ED visit, Anticoagulation-related fatality           OBJECTIVE    INR Protime   Date Value Ref Range Status   2019 2.9 (A) 0.86 - 1.14 Final     Chromogenic Factor 10   Date Value Ref Range Status   2011 35 (L) 60 - 140 % Final     Comment:     Therapeutic Range: A Chromogenic Factor 10 level of 20-40% inversely   correlates   with an INR of 2-3 for patients receiving Warfarin. Chromogenic Factor 10   levels below 20% indicate an INR greater than 3, and levels above 40%   indicate   an INR lessthan 2.       ASSESSMENT / PLAN  INR assessment THER    Recheck INR In: 6 WEEKS    INR Location Clinic      Anticoagulation Summary  As of 2019    INR goal:   2.5-3.5   TTR:   66.7 % (3.3 y)   INR used for dosin.9 (2019)   Warfarin maintenance plan:   4 mg (4 mg x 1) every Mon, Wed, Fri; 6 mg (4 mg x 1.5) all other days   Full warfarin instructions:   4 mg every Mon, Wed, Fri; 6 mg all other days   Weekly warfarin total:   36 mg   No change documented:   Monica Leslie, RN   Plan last modified:   Monica Leslie, RN (2017)   Next INR check:   2019   Target end date:   Indefinite    Indications    S/P AVR (aortic valve replacement) [Z95.2]             Anticoagulation Episode Summary     INR check location:       Preferred lab:       Send INR reminders to:   [facility]    Comments:         Anticoagulation Care Providers     Provider Role Specialty Phone number    Rehan Lorenzana MD [facility] [phone]            See the  Encounter Report to view Anticoagulation Flowsheet and Dosing Calendar (Go to Encounters tab in chart review, and find the Anticoagulation Therapy Visit)    Dosage adjustment made based on physician directed care plan.    Monica Leslie RN

## 2019-07-19 ENCOUNTER — TELEPHONE (OUTPATIENT)
Dept: FAMILY MEDICINE | Facility: CLINIC | Age: 64
End: 2019-07-19

## 2019-07-19 NOTE — TELEPHONE ENCOUNTER
Patient states genitals are fine it is right of genital, as big as half of fist, definitely bigger than a golf ball, since father's day has been getting bigger, not painful. Is hard, not painful, no redness. No fever, no drainage. [place] in shape, no issues  Peeing or BM.   Advised warm pack, if becomes painful or develops a fever will need to be seen in UC or ER this weekend.    Stephanie Valenzuela RN  [facility]

## 2019-07-22 ENCOUNTER — OFFICE VISIT (OUTPATIENT)
Dept: FAMILY MEDICINE | Facility: CLINIC | Age: 64
End: 2019-07-22
Payer: COMMERCIAL

## 2019-07-22 VITALS
SYSTOLIC BLOOD PRESSURE: 118 MMHG | WEIGHT: 210 LBS | OXYGEN SATURATION: 97 % | HEART RATE: 71 BPM | DIASTOLIC BLOOD PRESSURE: 69 MMHG | TEMPERATURE: 97.1 F | BODY MASS INDEX: 29.73 KG/M2

## 2019-07-22 DIAGNOSIS — K40.90 RIGHT INGUINAL HERNIA: Primary | ICD-10-CM

## 2019-07-22 PROCEDURE — 99213 OFFICE O/P EST LOW 20 MIN: CPT | Performed by: FAMILY MEDICINE

## 2019-07-22 ASSESSMENT — PAIN SCALES - GENERAL: PAINLEVEL: NO PAIN (0)

## 2019-07-22 NOTE — PROGRESS NOTES
Subjective     Jovon Mantilla is a 63 year old male who presents to clinic today for the following health issues:    HPI   Patient is here for right sided swelling of his groin. He stated that he noticed it after Father's day in June.  Concern of swelling in the right groin area for over a week now.  He denies  pain has no pain with urination.  Has no abdominal pain.  Denies constipation, diarrhea or vomiting.  Has no fever no chills.    Patient reports he has been working out he does a lot of painting however he is not doing a lot of heavy lifting.  Denies any previous history of    Reviewed and updated as needed this visit by Provider         Review of Systems   ROS COMP: Constitutional, HEENT, cardiovascular, pulmonary, gi and gu systems are negative, except as otherwise noted.      Objective    There were no vitals taken for this visit.  There is no height or weight on file to calculate BMI.  Physical Exam   GENERAL: healthy, alert and no distress  ABDOMEN: soft, nontender, no hepatosplenomegaly, no masses and bowel sounds normal   (male): testicles normal without atrophy or masses   and Positive for hernia , Right groin hernia, not tender.  MS: no gross musculoskeletal defects noted, no edema    Diagnostic Test Results:  Labs reviewed in Epic  none         Assessment & Plan       ICD-10-CM    1. Right inguinal hernia K40.90    Discussed with patient supportive care.  Avoid heavy lifting, avoid any activity may increase to intra-abdominal pressure.    Discussed with the patient red flag symptoms discussed with him if he started having more pain then he would prefer to see his surgeon.     See Patient Instructions    No follow-ups on file.    Nathan Hastings MD  [facility]

## 2019-07-22 NOTE — PATIENT INSTRUCTIONS
Patient Education     Anatomy of the Abdomen and Groin  The abdomen is the largest space (cavity) in the body. It lies between the chest and the pelvis, holding many of the body s organs. These include the liver, stomach, and intestines. The groin is the area in the body where the upper thighs meet the lowest part of the abdomen. Normally, the abdomen and groin are kept separate by a wall of muscle and tissue. The only openings in the wall are small tunnels called the inguinal and femoral canals. These allow nerves, blood vessels, and other structures to pass between these two areas.              The abdomen and groin    Muscles. These are soft tissues. They enclose the intestines and other organs in the abdomen.    Connective tissue. These help bind the muscles together.    Inguinal canal. This is a tunnel in the groin. It is formed by layers of muscle and other tissues in the wall of the abdomen.    Femoral canal. This is a tunnel in the wall of the abdomen. It allows blood vessels and nerves to pass through the groin into the leg.    Spermatic cord. This passes through the inguinal canal and connects to the testicle.  Date Last Reviewed: 10/1/2016    4196-5679 [employer]. [address] [place], [place] [id number]. All rights reserved. This information is not intended as a substitute for professional medical care. Always follow your healthcare professional's instructions.           Patient Education     Hernia Repair Surgery  A hernia (or  rupture ) is a weakness or defect in the wall of the abdomen. A hernia will not heal on its own. Surgery is needed to repair the defect in the abdominal wall. If not treated, a hernia can get larger. It can also lead to serious health complications. Fortunately hernia surgery can be done quickly and safely. Below is an overview of hernia repair surgery.  Preparing for surgery  Your healthcare provider will talk with you about getting ready for surgery. Follow  all the instructions you re given and be sure to:    Tell your healthcare provider about any medicines, supplements, or herbs you take. This includes both prescription and over-the-counter items. Ask if you should stop taking any of them.    Stop taking aspirin, ibuprofen, naproxen, and other NSAIDs 7 to 14 days before surgery.    Arrange for an adult family member or friend to give you a ride home after surgery.    Stop smoking. Smoking affects blood flow and can slow healing.    Gently wash the surgical area the night before surgery.    Follow any directions you are given for not eating or drinking before surgery.  The day of surgery  Arrive at the hospital or surgical center at your scheduled time. You ll be asked to change into a patient gown. You ll then be given an IV to provide fluids and medicine. Shortly before surgery, an anesthesiologist or nurse anesthetist will talk with you. He or she will explain the types of anesthesia used to prevent pain during surgery. You will have one or more of the following:    Monitored sedation to make you relaxed and sleepy.    Local anesthesia to numb the surgical site.    Regional anesthesia to numb specific areas of your body.    General anesthesia to let you sleep during surgery.  Fixing the weakness  Surgery treats a hernia by repairing the weakness in the abdominal wall. Most hernias are treated using  tension-free  repairs. This is surgery that uses special mesh materials to repair the weak area. The mesh covers the weak area like a patch. The mesh is made of strong, flexible plastic that stays in the body. Over time, nearby tissues grow into the mesh to strengthen the repair.  After surgery  When the procedure is over, you ll be taken to the recovery area to rest. Your blood pressure and heart rate will be monitored. You ll also have a bandage over the surgical site. To help reduce discomfort, you ll be given pain medicines. You may also be given breathing exercises  to keep your lungs clear. Later you ll be asked to get up and walk. This helps prevent blood clots in the legs. You can go home when your healthcare provider says you re ready.     Risks and possible complications of hernia surgery    Bleeding    Infection    Numbness or pain in the groin or leg    Risk the hernia will recur    Damage to the testicles or testicular function   Anesthesia risks    Mesh complications    Inability to urinate    Bowel or bladder injury       Date Last Reviewed: 10/1/2016    7844-1267 [employer]. [address] [place] [address]. All rights reserved. This information is not intended as a substitute for professional medical care. Always follow your healthcare professional's instructions.           Patient Education     Hernia (Adult)    A hernia can happen when there is a weakness or defect in the wall of the abdomen or groin. Intestines or nearby tissues may move from their usual location and push through the weakness in the wall. This can cause a hernia (bulge) you may see or feel.  Causes and risk factors   A hernia may be present at birth. Or it may be caused by the wear and tear of daily living. Certain factors can make a hernia more likely. These can include:    Heavy lifting    Straining, whether from lifting, movement, or constipation    Chronic cough    Injury to the abdominal wall    Excess weight    Pregnancy    Prior surgery    Older age    Family history of hernia  Symptoms  Symptoms of a hernia may come on suddenly. Or they may appear slowly over time. Some common symptoms include:    Bulge in the groin area, around the navel, or in the scrotum (the bulge may get bigger when you stand and go away when you lie down)    Pain or pressure around the bulge    Pain during activities such as lifting, coughing, or sneezing    A feeling of weakness or pressure in the groin    Pain or swelling in the scrotum  Types of hernias  There are different types of  hernia. The type you have depends on its location:    Inguinal. This type is in the groin or scrotum. It is more common in men. But, women can get this hernia, too.    Femoral. This type is in the groin, upper thigh (where the leg bends), or labia. It is more common in women.    Ventral. This type is in the abdominal wall.    Umbilical. This type occurs around the navel (belly button).    Incisional. This type occurs at the site of a previous surgery.  The condition of the hernia can help determine how urgently it needs to be treated.    Reducible. It goes back in by itself, or it can be pushed back in.    Irreducible. It can t be pushed back in.    Incarcerated/strangulated. The intestine is trapped (incarcerated). If this happens, you won t be able to push the bulge back in. If the incarcerated hernia isn t treated, it may become strangulated. This means the area loses blood supply and the tissue may die. This requires emergency surgery. You need treatment right away.  In most cases, a hernia will not heal on its own.You may need surgery to repair the defect in the abdominal wall or groin. You ll be told more about surgery, if needed.  If your symptoms are not severe, treatment may sometimes be delayed. In such cases, you will need regular follow-up visits with the provider. You ll be asked to keep track of your symptoms and to watch for signs of more serious problems. You may also be given guidelines similar to the home care instructions below.  Home care  To help keep a hernia from getting worse, you may be advised to:    Avoid heavy lifting and straining as directed.    Take steps to prevent constipation, such as eating more fiber and drinking more water. This may help reduce straining that can occur when having a bowel movement. Reducing straining may help keep your symptoms from getting worse.    Maintain a healthy weight or lose excess weight. This can help reduce strain on abdominal muscles and  tissues.    Stop smoking. This can help prevent coughing that may also strain abdominal muscles and tissues.  Follow-up care  Follow up with your healthcare provider, or as directed. If imaging tests were done, they will be reviewed a doctor. You will be told the results and any new findings that may affect your care.  When to seek medical advice  Call your healthcare provider right away if any of these occur:    Hernia hardens, swells, or grows larger    Hernia can no longer be pushed back in    Pain moves to the lower right abdomen (just below the waistline), or spreads to the back  Call 911  Call 911 if any of these occur:    Severe pain, redness, or tenderness in the area near the hernia    Pain worsens quickly and doesn t get better    Inability to have a bowel movement or pass gas    Fever of 100.4 F (38 C) or higher, or as directed by your healthcare provider  Date Last Reviewed: 3/1/2018    4164-9564 [employer]. [address] [place] [address]. All rights reserved. This information is not intended as a substitute for professional medical care. Always follow your healthcare professional's instructions.

## 2019-08-23 ENCOUNTER — ANTICOAGULATION THERAPY VISIT (OUTPATIENT)
Dept: NURSING | Facility: CLINIC | Age: 64
End: 2019-08-23
Payer: COMMERCIAL

## 2019-08-23 DIAGNOSIS — Z95.2 S/P AVR (AORTIC VALVE REPLACEMENT): ICD-10-CM

## 2019-08-23 LAB — INR POINT OF CARE: 2 (ref 0.86–1.14)

## 2019-08-23 PROCEDURE — 36416 COLLJ CAPILLARY BLOOD SPEC: CPT

## 2019-08-23 PROCEDURE — 99207 ZZC NO CHARGE NURSE ONLY: CPT

## 2019-08-23 PROCEDURE — 85610 PROTHROMBIN TIME: CPT | Mod: QW

## 2019-08-23 NOTE — PROGRESS NOTES
ANTICOAGULATION FOLLOW-UP CLINIC VISIT    Patient Name:  Jovon Mantilla  Date:  2019  Contact Type:  Face to Face    SUBJECTIVE: 2 wk aminta  Patient Findings     Comments:   Assessment negative for symptoms of bleeding or clotting this visit.        Clinical Outcomes     Negatives:   Major bleeding event, Thromboembolic event, Anticoagulation-related hospital admission, Anticoagulation-related ED visit, Anticoagulation-related fatality    Comments:   Assessment negative for symptoms of bleeding or clotting this visit.           OBJECTIVE    INR Protime   Date Value Ref Range Status   2019 2.0 (A) 0.86 - 1.14 Final     Chromogenic Factor 10   Date Value Ref Range Status   2011 35 (L) 60 - 140 % Final     Comment:     Therapeutic Range: A Chromogenic Factor 10 level of 20-40% inversely   correlates   with an INR of 2-3 for patients receiving Warfarin. Chromogenic Factor 10   levels below 20% indicate an INR greater than 3, and levels above 40%   indicate   an INR lessthan 2.       ASSESSMENT / PLAN  INR assessment SUB    Recheck INR In: 2 WEEKS    INR Location Clinic      Anticoagulation Summary  As of 2019    INR goal:   2.5-3.5   TTR:   65.9 % (3.4 y)   INR used for dosin.0! (2019)   Warfarin maintenance plan:   4 mg (4 mg x 1) every Mon, Wed, Fri; 6 mg (4 mg x 1.5) all other days   Full warfarin instructions:   : 6 mg; Otherwise 4 mg every Mon, Wed, Fri; 6 mg all other days   Weekly warfarin total:   36 mg   Plan last modified:   Monica Leslie RN (2017)   Next INR check:   2019   Target end date:   Indefinite    Indications    S/P AVR (aortic valve replacement) [Z95.2]             Anticoagulation Episode Summary     INR check location:       Preferred lab:       Send INR reminders to:   [facility]    Comments:         Anticoagulation Care Providers     Provider Role Specialty Phone number    Rehan Lorenzana MD [facility]  [phone]            See the Encounter Report to view Anticoagulation Flowsheet and Dosing Calendar (Go to Encounters tab in chart review, and find the Anticoagulation Therapy Visit)    Dosage adjustment made based on physician directed care plan.    Monica Leslie RN

## 2019-09-04 DIAGNOSIS — I70.90 ASVD (ARTERIOSCLEROTIC VASCULAR DISEASE): ICD-10-CM

## 2019-09-04 RX ORDER — LISINOPRIL 20 MG/1
TABLET ORAL
Qty: 135 TABLET | Refills: 0 | Status: SHIPPED | OUTPATIENT
Start: 2019-09-04 | End: 2019-10-18

## 2019-09-06 ENCOUNTER — TELEPHONE (OUTPATIENT)
Dept: FAMILY MEDICINE | Facility: CLINIC | Age: 64
End: 2019-09-06

## 2019-09-06 ENCOUNTER — ANTICOAGULATION THERAPY VISIT (OUTPATIENT)
Dept: NURSING | Facility: CLINIC | Age: 64
End: 2019-09-06
Payer: COMMERCIAL

## 2019-09-06 DIAGNOSIS — I48.21 PERMANENT ATRIAL FIBRILLATION (H): ICD-10-CM

## 2019-09-06 DIAGNOSIS — I48.20 CHRONIC ATRIAL FIBRILLATION (H): Primary | ICD-10-CM

## 2019-09-06 DIAGNOSIS — Z95.2 S/P AVR (AORTIC VALVE REPLACEMENT): ICD-10-CM

## 2019-09-06 LAB — INR POINT OF CARE: 2.8 (ref 0.86–1.14)

## 2019-09-06 PROCEDURE — 36416 COLLJ CAPILLARY BLOOD SPEC: CPT

## 2019-09-06 PROCEDURE — 85610 PROTHROMBIN TIME: CPT | Mod: QW

## 2019-09-06 PROCEDURE — 99207 ZZC NO CHARGE NURSE ONLY: CPT

## 2019-09-06 NOTE — TELEPHONE ENCOUNTER
Please review and sign / pended order for  INR Clinic Referral and then close encounter.  Thanks    Route to PCP    Monica Medrano RN

## 2019-09-06 NOTE — PROGRESS NOTES
ANTICOAGULATION FOLLOW-UP CLINIC VISIT    Patient Name:  Jovon Mantilla  Date:  2019  Contact Type:  Face to Face    SUBJECTIVE: same plan of care.  Patient Findings     Comments:   Assessment negative for symptoms of bleeding or clotting this visit        Clinical Outcomes     Negatives:   Major bleeding event, Thromboembolic event, Anticoagulation-related hospital admission, Anticoagulation-related ED visit, Anticoagulation-related fatality    Comments:   Assessment negative for symptoms of bleeding or clotting this visit           OBJECTIVE    INR Protime   Date Value Ref Range Status   2019 2.8 (A) 0.86 - 1.14 Final     Chromogenic Factor 10   Date Value Ref Range Status   2011 35 (L) 60 - 140 % Final     Comment:     Therapeutic Range: A Chromogenic Factor 10 level of 20-40% inversely   correlates   with an INR of 2-3 for patients receiving Warfarin. Chromogenic Factor 10   levels below 20% indicate an INR greater than 3, and levels above 40%   indicate   an INR lessthan 2.       ASSESSMENT / PLAN  INR assessment THER    Recheck INR In: 6 WEEKS    INR Location Clinic      Anticoagulation Summary  As of 2019    INR goal:   2.5-3.5   TTR:   65.6 % (3.4 y)   INR used for dosin.8 (2019)   Warfarin maintenance plan:   4 mg (4 mg x 1) every Mon, Wed, Fri; 6 mg (4 mg x 1.5) all other days   Full warfarin instructions:   4 mg every Mon, Wed, Fri; 6 mg all other days   Weekly warfarin total:   36 mg   No change documented:   Monica Lesile, RN   Plan last modified:   Monica Leslie RN (2017)   Next INR check:   10/18/2019   Target end date:   Indefinite    Indications    S/P AVR (aortic valve replacement) [Z95.2]  Permanent atrial fibrillation (H) [I48.2]             Anticoagulation Episode Summary     INR check location:       Preferred lab:       Send INR reminders to:   [facility]    Comments:         Anticoagulation Care Providers     Provider Role Specialty Phone  number    Harriett, Rehan Jarvis MD [facility] [phone]            See the Encounter Report to view Anticoagulation Flowsheet and Dosing Calendar (Go to Encounters tab in chart review, and find the Anticoagulation Therapy Visit)    Dosage adjustment made based on physician directed care plan.    Monica Leslie RN

## 2019-09-11 DIAGNOSIS — I70.90 ASVD (ARTERIOSCLEROTIC VASCULAR DISEASE): ICD-10-CM

## 2019-09-11 RX ORDER — CARVEDILOL 25 MG/1
25 TABLET ORAL 2 TIMES DAILY WITH MEALS
Qty: 180 TABLET | Refills: 0 | Status: SHIPPED | OUTPATIENT
Start: 2019-09-11 | End: 2019-09-13

## 2019-09-13 ENCOUNTER — OFFICE VISIT (OUTPATIENT)
Dept: FAMILY MEDICINE | Facility: CLINIC | Age: 64
End: 2019-09-13
Payer: COMMERCIAL

## 2019-09-13 VITALS
OXYGEN SATURATION: 95 % | DIASTOLIC BLOOD PRESSURE: 87 MMHG | BODY MASS INDEX: 29.87 KG/M2 | WEIGHT: 211 LBS | SYSTOLIC BLOOD PRESSURE: 136 MMHG | TEMPERATURE: 97 F | HEART RATE: 107 BPM

## 2019-09-13 DIAGNOSIS — J01.01 ACUTE RECURRENT MAXILLARY SINUSITIS: Primary | ICD-10-CM

## 2019-09-13 DIAGNOSIS — R06.2 WHEEZING: ICD-10-CM

## 2019-09-13 DIAGNOSIS — I70.90 ASVD (ARTERIOSCLEROTIC VASCULAR DISEASE): ICD-10-CM

## 2019-09-13 DIAGNOSIS — R05.9 COUGH: ICD-10-CM

## 2019-09-13 DIAGNOSIS — Z51.81 MEDICATION MONITORING ENCOUNTER: ICD-10-CM

## 2019-09-13 PROCEDURE — 99213 OFFICE O/P EST LOW 20 MIN: CPT | Performed by: FAMILY MEDICINE

## 2019-09-13 RX ORDER — CARVEDILOL 25 MG/1
25 TABLET ORAL 2 TIMES DAILY WITH MEALS
Qty: 180 TABLET | Refills: 0 | Status: SHIPPED | OUTPATIENT
Start: 2019-09-13 | End: 2019-10-18

## 2019-09-13 RX ORDER — CEFDINIR 300 MG/1
300 CAPSULE ORAL 2 TIMES DAILY
Qty: 20 CAPSULE | Refills: 0 | Status: SHIPPED | OUTPATIENT
Start: 2019-09-13 | End: 2019-10-18

## 2019-09-13 RX ORDER — PREDNISONE 20 MG/1
TABLET ORAL
Qty: 10 TABLET | Refills: 0 | Status: SHIPPED | OUTPATIENT
Start: 2019-09-13 | End: 2019-10-18

## 2019-09-13 RX ORDER — GUAIFENESIN AND DEXTROMETHORPHAN HYDROBROMIDE 600; 30 MG/1; MG/1
1 TABLET, EXTENDED RELEASE ORAL EVERY 12 HOURS
Qty: 20 TABLET | Refills: 0 | Status: SHIPPED | OUTPATIENT
Start: 2019-09-13 | End: 2019-09-23

## 2019-09-13 ASSESSMENT — PAIN SCALES - GENERAL: PAINLEVEL: NO PAIN (0)

## 2019-09-13 NOTE — PROGRESS NOTES
Subjective     Jovon Mantilla is a 64 year old male who presents to clinic today for the following health issues:    HPI   SUBJECTIVE: Jovon Mantilla is a 64 year old male patient complaining of cough, sinuses congestion, nasal drainage, wheezing  for 10 day(s). [place]  No fever, or chills, dry cough.  OBJECTIVE: The patient appears healthy, alert and no distress.   EARS: External ears normal. Canals clear. TM's normal.  NOSE/SINUS: Nares normal. Septum midline. Mucosa normal. No drainage or sinus tenderness.  Sinus palpation: Maxillary sinus tender to palpation   THROAT: normal   NECK:Neck supple. No adenopathy. Thyroid symmetric, normal size,   CHEST: wheezing and crackle, thru out lung field.    ASSESSMENT: Acute Sinusitis    ICD-10-CM    1. Acute recurrent maxillary sinusitis J01.01 cefdinir (OMNICEF) 300 MG capsule   2. Medication monitoring encounter Z51.81    3. Cough R05 cefdinir (OMNICEF) 300 MG capsule     dextromethorphan-guaiFENesin (MUCINEX DM)  MG 12 hr tablet     predniSONE (DELTASONE) 20 MG tablet   4. Wheezing R06.2 cefdinir (OMNICEF) 300 MG capsule     dextromethorphan-guaiFENesin (MUCINEX DM)  MG 12 hr tablet     predniSONE (DELTASONE) 20 MG tablet     PLAN: See orders.   In addition, I have suggested that the patient   Patient Instructions   INR, Coumadin check next week, 09/19/2019      Acute Illness   Acute illness concerns: Cough  Onset: Almost two weeks ago    Fever: no    Chills/Sweats: no    Headache (location?): no    Sinus Pressure:no    Conjunctivitis:  no    Ear Pain: no    Rhinorrhea: YES    Congestion: YES    Sore Throat: no     Cough: YES-productive of yellow sputum, barking, worsening over time    Wheeze: YES    Decreased Appetite: no     Nausea: no    Vomiting: no    Diarrhea:  yes    Dysuria/Freq.: no    Fatigue/Achiness: no    Sick/Strep Exposure: YES- cruise     Therapies Tried and outcome: Nyquil; helps at night  Nathan Hastings,  MD.

## 2019-09-30 ENCOUNTER — HEALTH MAINTENANCE LETTER (OUTPATIENT)
Age: 64
End: 2019-09-30

## 2019-10-18 ENCOUNTER — ANTICOAGULATION THERAPY VISIT (OUTPATIENT)
Dept: NURSING | Facility: CLINIC | Age: 64
End: 2019-10-18
Payer: COMMERCIAL

## 2019-10-18 ENCOUNTER — OFFICE VISIT (OUTPATIENT)
Dept: CARDIOLOGY | Facility: CLINIC | Age: 64
End: 2019-10-18
Payer: COMMERCIAL

## 2019-10-18 VITALS
HEIGHT: 70 IN | SYSTOLIC BLOOD PRESSURE: 114 MMHG | BODY MASS INDEX: 30.64 KG/M2 | HEART RATE: 76 BPM | DIASTOLIC BLOOD PRESSURE: 70 MMHG | WEIGHT: 214 LBS

## 2019-10-18 DIAGNOSIS — I48.21 PERMANENT ATRIAL FIBRILLATION (H): ICD-10-CM

## 2019-10-18 DIAGNOSIS — Z95.2 S/P AVR (AORTIC VALVE REPLACEMENT): ICD-10-CM

## 2019-10-18 DIAGNOSIS — I48.20 CHRONIC ATRIAL FIBRILLATION (H): ICD-10-CM

## 2019-10-18 DIAGNOSIS — Z95.2 S/P AORTIC VALVE REPLACEMENT: ICD-10-CM

## 2019-10-18 DIAGNOSIS — I10 HYPERTENSION GOAL BP (BLOOD PRESSURE) < 140/90: ICD-10-CM

## 2019-10-18 DIAGNOSIS — I25.10 CORONARY ARTERY DISEASE INVOLVING NATIVE CORONARY ARTERY OF NATIVE HEART WITHOUT ANGINA PECTORIS: ICD-10-CM

## 2019-10-18 DIAGNOSIS — I35.0 NONRHEUMATIC AORTIC VALVE STENOSIS: Primary | ICD-10-CM

## 2019-10-18 DIAGNOSIS — I42.9 IDIOPATHIC CARDIOMYOPATHY (H): ICD-10-CM

## 2019-10-18 DIAGNOSIS — E78.5 HYPERLIPIDEMIA LDL GOAL <70: ICD-10-CM

## 2019-10-18 DIAGNOSIS — I70.90 ASVD (ARTERIOSCLEROTIC VASCULAR DISEASE): ICD-10-CM

## 2019-10-18 LAB — INR POINT OF CARE: 3 (ref 0.86–1.14)

## 2019-10-18 PROCEDURE — 36416 COLLJ CAPILLARY BLOOD SPEC: CPT

## 2019-10-18 PROCEDURE — 99214 OFFICE O/P EST MOD 30 MIN: CPT | Performed by: INTERNAL MEDICINE

## 2019-10-18 PROCEDURE — 99207 ZZC NO CHARGE NURSE ONLY: CPT

## 2019-10-18 PROCEDURE — 85610 PROTHROMBIN TIME: CPT | Mod: QW

## 2019-10-18 RX ORDER — CARVEDILOL 25 MG/1
25 TABLET ORAL 2 TIMES DAILY WITH MEALS
Qty: 60 TABLET | Refills: 3 | Status: SHIPPED | OUTPATIENT
Start: 2019-10-18 | End: 2020-02-24

## 2019-10-18 RX ORDER — CARVEDILOL 25 MG/1
25 TABLET ORAL 2 TIMES DAILY WITH MEALS
Qty: 180 TABLET | Refills: 3 | Status: SHIPPED | OUTPATIENT
Start: 2019-10-18 | End: 2019-10-18

## 2019-10-18 RX ORDER — DIGOXIN 125 MCG
125 TABLET ORAL DAILY
Qty: 90 TABLET | Refills: 3 | Status: SHIPPED | OUTPATIENT
Start: 2019-10-18 | End: 2020-10-12

## 2019-10-18 RX ORDER — LISINOPRIL 20 MG/1
TABLET ORAL
Qty: 135 TABLET | Refills: 0 | Status: SHIPPED | OUTPATIENT
Start: 2019-10-18 | End: 2020-02-03

## 2019-10-18 ASSESSMENT — MIFFLIN-ST. JEOR: SCORE: 1776.95

## 2019-10-18 NOTE — PROGRESS NOTES
ANTICOAGULATION FOLLOW-UP CLINIC VISIT    Patient Name:  Jovon Mantilla  Date:  10/18/2019  Contact Type:  Face to Face    SUBJECTIVE: same plan of care  Patient Findings     Comments:   Assessment negative for symptoms of bleeding or clotting this visit        Clinical Outcomes     Negatives:   Major bleeding event, Thromboembolic event, Anticoagulation-related hospital admission, Anticoagulation-related ED visit, Anticoagulation-related fatality    Comments:   Assessment negative for symptoms of bleeding or clotting this visit           OBJECTIVE    INR Protime   Date Value Ref Range Status   10/18/2019 3.0 (A) 0.86 - 1.14 Final     Chromogenic Factor 10   Date Value Ref Range Status   01/21/2011 35 (L) 60 - 140 % Final     Comment:     Therapeutic Range: A Chromogenic Factor 10 level of 20-40% inversely   correlates   with an INR of 2-3 for patients receiving Warfarin. Chromogenic Factor 10   levels below 20% indicate an INR greater than 3, and levels above 40%   indicate   an INR lessthan 2.       ASSESSMENT / PLAN  INR assessment THER    Recheck INR In: 6 WEEKS    INR Location Clinic      Anticoagulation Summary  As of 10/18/2019    INR goal:   2.5-3.5   TTR:   66.7 % (3.6 y)   INR used for dosing:   3.0 (10/18/2019)   Warfarin maintenance plan:   4 mg (4 mg x 1) every Mon, Wed, Fri; 6 mg (4 mg x 1.5) all other days   Full warfarin instructions:   4 mg every Mon, Wed, Fri; 6 mg all other days   Weekly warfarin total:   36 mg   No change documented:   Monica Leslie, RN   Plan last modified:   Monica Leslie RN (5/4/2017)   Next INR check:   11/29/2019   Target end date:   Indefinite    Indications    S/P AVR (aortic valve replacement) [Z95.2]  Permanent atrial fibrillation [I48.21]             Anticoagulation Episode Summary     INR check location:       Preferred lab:       Send INR reminders to:   [facility]    Comments:         Anticoagulation Care Providers     Provider Role Specialty Phone  number    Harriett, Rehan Jarvis MD [facility] [phone]            See the Encounter Report to view Anticoagulation Flowsheet and Dosing Calendar (Go to Encounters tab in chart review, and find the Anticoagulation Therapy Visit)    Dosage adjustment made based on physician directed care plan.    Monica Leslie RN

## 2019-10-18 NOTE — PROGRESS NOTES
Service Date: 10/18/2019      PRIMARY CARE PHYSICIAN:  Dr. Rehan Lorenzana.      HISTORY OF PRESENT ILLNESS:  I again had the pleasure of seeing your patient, Jovon Mantilla, at [facility] in [place] for evaluation of a dilated cardiomyopathy, coronary artery disease, permanent atrial fibrillation, aortic valve replacement and hypertension.  The patient had a history of permanent atrial fibrillation with controlled ventricular response.  His ejection fraction was generally around 40%-50%.  He has had mild to moderate mitral regurgitation and mild tricuspid insufficiency.  He developed worsening aortic stenosis and his aortic valve was replaced on 01/07/2011 with an ATS 24 mm  supra-annular valve.  A maze procedure was performed but we were unable to maintain normal sinus rhythm postoperatively.  He returned to the operating room for mediastinal exploration for bleeding and then developed a GI hemorrhage thereafter.  He has remained stable since that time.  He is using SBE prophylaxis appropriately.  He denies significant bradyarrhythmias requiring a pacemaker.  Digoxin was added by the electrophysiologist for rate control with his last level 0.5 on 09/14/2018.  This is due to be retested by Dr. Lorenzana.  He denies syncope, presyncope or palpitations.  He continues to walk at night for 1 hour free of chest pain or shortness of breath.  He had a previously known 70% LAD stenosis which could not be bypassed because it was intramuscular.  A Lexiscan nuclear stress test performed in 02/2018 demonstrated a fixed inferior, inferolateral, inferoseptal basal defect possibly consistent with a prior infarct versus diaphragmatic attenuation artifacts.  There was no real wall motion abnormalities on echocardiogram in 01/2017.  There was no evidence of anterior wall ischemia on the stress test.  We decided to continue to treat him medically.  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  He has chronic right greater than left extremity peripheral edema due to previous vein stripping and varicose veins.  He has undergone bariatric surgery in the past and has remained reasonably stable.  He did not feel well when we increased his lisinopril to 20 mg b.i.d.  He continues to take 20 mg in the morning and 10 mg at night.      PHYSICAL EXAMINATION:  As listed below.      ASSESSMENT:   1.  Jovon Mantilla is a delightful 64-year-old male with an idiopathic cardiomyopathy out of proportion to the aortic valve disease or coronary artery disease.  Echocardiography from 01/2017 demonstrated an ejection fraction of 45%-50% which was stable with mild concentric left ventricular hypertrophy.  There continued to be severe biatrial enlargement, mild to moderate mitral regurgitation, mild tricuspid insufficiency.  The inferior vena cava appeared dilated which may explain some of his peripheral edema as well.  Right ventricular systolic pressure was elevated consistent with mild to moderate pulmonary hypertension.  It is not clear to me that this patient has been tested for sleep apnea.  The mechanical aortic valve has been functioning normally.  We will retest an echocardiogram in 1 year or earlier on a p.r.n. basis.  His blood pressure at this time is well controlled.  He shows no signs of left or right heart failure.   2.  Permanent atrial fibrillation.  He is currently rate controlled and doing well.  His digoxin level will be rechecked through Dr. Lorenzana's office.   3.  Coronary artery disease.  Nuclear stress test was probably spurious and the inferior wall MI was probably artifact.  We continue to monitor.  At the time of his previous aortic valve surgery his right and circumflex coronary arteries were normal.   4.  Venous insufficiency and mild edema.      It is my pleasure to assist in the care of Mr. Jovon Mantilla.  He will continue to use SBE prophylaxis and this was discussed.  I will see  him again in 1 year.  A digoxin level should be performed annually through his primary care office.  All the patient's questions were answered to his satisfaction.  The patient informed me that he bought a new red Corvette as well as an older model muscle car.  He was very excited about these cars.      Dylan Lewis MD        cc:   [place] [address]         DYLAN LEWIS MD, [facility]             D: 10/18/2019   T: 10/18/2019   MT: [email or web address]      Name:     CHAS STAPLES   MRN:      [id number]        Account:      [id number]   :      1955           Service Date: 10/18/2019      Document: [id number]

## 2019-10-18 NOTE — LETTER
10/18/2019    Rehan Lorenzana MD  [address] [facility] [address]    RE: Jovon Mantilla       Dear Colleague,    I had the pleasure of seeing Jovon Mantilla in the [facility].    HPI and Plan:   See dictation:[id number]    Orders Placed This Encounter   Procedures     Follow-Up with Cardiologist     Echocardiogram Complete       Orders Placed This Encounter   Medications     lisinopril (PRINIVIL/ZESTRIL) 20 MG tablet     Sig: Take  20 mg (1 tablet in am) and 10 mg  (one half tablet) in  pm     Dispense:  135 tablet     Refill:  0     digoxin (LANOXIN) 125 MCG tablet     Sig: Take 1 tablet (125 mcg) by mouth daily     Dispense:  90 tablet     Refill:  3     DISCONTD: carvedilol (COREG) 25 MG tablet     Sig: Take 1 tablet (25 mg) by mouth 2 times daily (with meals)     Dispense:  180 tablet     Refill:  3     carvedilol (COREG) 25 MG tablet     Sig: Take 1 tablet (25 mg) by mouth 2 times daily (with meals)     Dispense:  60 tablet     Refill:  3       Medications Discontinued During This Encounter   Medication Reason     simvastatin (ZOCOR) 20 MG tablet Medication Reconciliation Clean Up     metroNIDAZOLE (METROGEL) 0.75 % external gel      predniSONE (DELTASONE) 20 MG tablet      triamcinolone (KENALOG) 0.1 % cream      valACYclovir (VALTREX) 1000 mg tablet      cefdinir (OMNICEF) 300 MG capsule      lisinopril (PRINIVIL/ZESTRIL) 20 MG tablet Reorder     digoxin (LANOXIN) 125 MCG tablet Reorder     carvedilol (COREG) 25 MG tablet Reorder     carvedilol (COREG) 25 MG tablet Reorder         Encounter Diagnoses   Name Primary?     Nonrheumatic aortic valve stenosis Yes     S/P aortic valve replacement      Idiopathic cardiomyopathy (H)      Coronary artery disease involving native coronary artery of native heart without angina pectoris      Permanent atrial fibrillation      Hypertension goal BP (blood pressure) < 140/90      Hyperlipidemia LDL goal <70      ASVD  (arteriosclerotic vascular disease)      Chronic atrial fibrillation        CURRENT MEDICATIONS:  Current Outpatient Medications   Medication Sig Dispense Refill     Ascorbic Acid (VITAMIN C PO) Take 500 mg by mouth At Bedtime       aspirin 81 MG tablet Take 81 mg by mouth every other day       Calcium Carbonate-Vitamin D (CALCIUM 600+D) 600-200 MG-UNIT TABS Take 1 tablet by mouth 2 times daily        carvedilol (COREG) 25 MG tablet Take 1 tablet (25 mg) by mouth 2 times daily (with meals) 60 tablet 3     Cholecalciferol (VITAMIN D) 2000 UNIT CAPS Take 1 tablet by mouth daily        Cyanocobalamin (VITAMIN B-12) 500 MCG SUBL Place 500 mcg under the tongue daily        digoxin (LANOXIN) 125 MCG tablet Take 1 tablet (125 mcg) by mouth daily 90 tablet 3     ferrous sulfate (IRON SUPPLEMENT) 325 (65 FE) MG tablet Take 1 tablet (325 mg) by mouth 2 times daily 100 tablet      Fexofenadine HCl (ALLERGY 24-HR PO) Take by mouth daily       levothyroxine (SYNTHROID/LEVOTHROID) 200 MCG tablet Take 1 tablet (200 mcg) by mouth daily 90 tablet 0     lisinopril (PRINIVIL/ZESTRIL) 20 MG tablet Take  20 mg (1 tablet in am) and 10 mg  (one half tablet) in  pm 135 tablet 0     Multiple Vitamins-Minerals (CENTRUM) CHEW Take 1 tablet by mouth At Bedtime       Psyllium (FIBER) 0.52 GM CAPS Take 2 tablets by mouth At Bedtime        sertraline (ZOLOFT) 50 MG tablet TAKE 1 TABLET DAILY 90 tablet 3     simvastatin (ZOCOR) 20 MG tablet Take 1 tablet (20 mg) by mouth At Bedtime 90 tablet 3     warfarin (COUMADIN) 4 MG tablet Take 1 tablet (4MG) by mouth Mon,Wed,Fri & 1 1/2 tablets (6MG) all other days of the week 120 tablet 0       ALLERGIES   No Known Allergies    PAST MEDICAL HISTORY:  Past Medical History:   Diagnosis Date     Acne rosacea 4/22/2019     Acquired hypothyroidism 5/23/2003     Problem list name updated by automated process. Provider to review     Acute prostatitis      Acute sinusitis, unspecified      Aortic valve disease       Atrial fibrillation (H)      Biatrial enlargement      CAD (coronary artery disease)     known 70% LAD stenosis     GI bleeding 1/22/2016     HTN (hypertension)      Hyperlipidemia      Idiopathic cardiomyopathy (H)      Mitral regurgitation      Obesity      Obesity, unspecified      Palpitations      Permanent atrial fibrillation 6/2/2017     Right inguinal hernia 7/22/2019     S/P AVR 01/07/2011    with Maze procedure     Tricuspid regurgitation      Unspecified essential hypertension        PAST SURGICAL HISTORY:  Past Surgical History:   Procedure Laterality Date     ANGIOGRAM  2/02    Normal coronary,dilated LV,Sev.decr.global LVF,+1 MR     ANGIOGRAM  1/08    Mild AS,Mod PHTN,mod prox.LAD disease,Triv.CFX disease     ANGIOGRAM  1/09    Mod AS,Mod PHTN,mod prox.LAD disease     ANGIOGRAM  10/10    Sev.AS,CM,global hypokinesis,Mild-mod LV dilated,Mild MR,70% prox.LAD lesion,PHTN     C NONSPECIFIC PROCEDURE  ~1985    vein stripping     CARDIOVERSION  2/02, 4/02, 4/11     CHOLECYSTECTOMY       COLONOSCOPY N/A 1/23/2016    Procedure: COLONOSCOPY;  Surgeon: Robyn Collins MD;  Location:  GI     ESOPHAGOSCOPY, GASTROSCOPY, DUODENOSCOPY (EGD), COMBINED N/A 1/23/2016    Procedure: COMBINED ESOPHAGOSCOPY, GASTROSCOPY, DUODENOSCOPY (EGD);  Surgeon: Robyn Collins MD;  Location:  GI     LAPAROSCOPIC BYPASS GASTRIC  10/8/2011    Procedure:LAPAROSCOPIC BYPASS GASTRIC; Diagnostic laparoscopy, Lysis of Adhesions, Laparoscopic Revision of Jejunojejunostomy; Surgeon:RADHA MURO; Location: OR     LAPAROSCOPIC BYPASS GASTRIC  6/26/2006    Dr Jin     LAPAROSCOPY DIAGNOSTIC (GENERAL)  10/8/2011    Procedure:LAPAROSCOPY DIAGNOSTIC (GENERAL); Surgeon:RADHA MURO; Location: OR     REPLACE VALVE AORTIC  1/7/2011       FAMILY HISTORY:  Family History   Problem Relation Age of Onset     Cancer - colorectal Father      Colon Cancer Father      Cancer Father      Diabetes Brother         SOCIAL HISTORY:  Social History     Socioeconomic History     Marital status:      Spouse name: Tracey     Number of children: 0     Years of education: None     Highest education level: None   Occupational History     Occupation:      Comment: [employer]   Social Needs     Financial resource strain: None     Food insecurity:     Worry: None     Inability: None     Transportation needs:     Medical: None     Non-medical: None   Tobacco Use     Smoking status: Never Smoker     Smokeless tobacco: Never Used   Substance and Sexual Activity     Alcohol use: No     Alcohol/week: 0.0 standard drinks     Drug use: No     Sexual activity: Yes     Partners: Female   Lifestyle     Physical activity:     Days per week: None     Minutes per session: None     Stress: None   Relationships     Social connections:     Talks on phone: None     Gets together: None     Attends Christianity service: None     Active member of club or organization: None     Attends meetings of clubs or organizations: None     Relationship status: None     Intimate partner violence:     Fear of current or ex partner: None     Emotionally abused: None     Physically abused: None     Forced sexual activity: None   Other Topics Concern     Parent/sibling w/ CABG, MI or angioplasty before 65F 55M? Yes      Service Not Asked     Blood Transfusions No     Caffeine Concern Yes     Comment: 1 can daily     Occupational Exposure Yes     Hobby Hazards No     Sleep Concern No     Stress Concern No     Weight Concern No     Special Diet Yes     Comment: low sodium, low calories     Back Care No     Exercise Yes     Comment: walks daily     Bike Helmet Not Asked     Comment: NA     Seat Belt Yes     Self-Exams Yes   Social History Narrative     None       Review of Systems:  Skin:  Negative       Eyes:  Positive for glasses    ENT:  Negative      Respiratory:  Negative       Cardiovascular:    Positive for;palpitations;edema   "  Gastroenterology: Negative      Genitourinary:  not assessed      Musculoskeletal:  Negative      Neurologic:  Negative      Psychiatric:  Negative      Heme/Lymph/Imm:  Positive for allergies    Endocrine:  Positive for thyroid disorder      Physical Exam:  Vitals: /70   Pulse 76   Ht 1.778 m (5' 10\")   Wt 97.1 kg (214 lb)   BMI 30.71 kg/m       Constitutional:  cooperative, alert and oriented, well developed, well nourished, in no acute distress        Skin:  warm and dry to the touch, no apparent skin lesions or masses noted          Head:  normocephalic, no masses or lesions        Eyes:  sclera white;pupils equal and round;conjunctivae and lids unremarkable;no xanthalasma;EOMS intact        Lymph:      ENT:  no pallor or cyanosis, dentition good        Neck:  carotid pulses are full and equal bilaterally, JVP normal, no carotid bruit        Respiratory:  normal breath sounds, clear to auscultation, normal A-P diameter, normal symmetry, normal respiratory excursion, no use of accessory muscles;healed median sternotomy scar         Cardiac: normal S1 and S2 irregularly irregular rhythm   crisp prosthetic valve sounds systolic ejection murmur;LLSB;grade 2;radiation to the RUSB systolic murmur;grade 2;LLSB;radiation to the base   blowing  pulses full and equal                                        GI:  abdomen soft, non-tender, BS normoactive, no mass, no HSM, no bruits        Extremities and Muscular Skeletal:  no deformities, clubbing, cyanosis, erythema observed     1+;pitting;RLE edema LLE edema;trace;pitting right lower extremity venous vericosities    Neurological:  no gross motor deficits;affect appropriate        Psych:  Alert and Oriented x 3        CC  Rehan Lorenzana MD  [address] [place] [address]                Thank you for allowing me to participate in the care of your patient.      Sincerely,     Lloyd Lewis MD     [facility] " [facility]    cc:   Rehan Lorenzana MD  [address] [place] [address]

## 2019-10-18 NOTE — LETTER
10/18/2019      Rehan Lorenzana MD  [address] [facility] [address]      RE: Jovon Mantilla       Dear Colleague,    I had the pleasure of seeing Jovon Mantilla in the [facility].    Service Date: 10/18/2019      PRIMARY CARE PHYSICIAN:  Dr. Rehan Lorenzana.      HISTORY OF PRESENT ILLNESS:  I again had the pleasure of seeing your patient, Jovon Mantilla, at [facility] in [place] for evaluation of a dilated cardiomyopathy, coronary artery disease, permanent atrial fibrillation, aortic valve replacement and hypertension.  The patient had a history of permanent atrial fibrillation with controlled ventricular response.  His ejection fraction was generally around 40%-50%.  He has had mild to moderate mitral regurgitation and mild tricuspid insufficiency.  He developed worsening aortic stenosis and his aortic valve was replaced on 01/07/2011 with an ATS 24 mm  supra-annular valve.  A maze procedure was performed but we were unable to maintain normal sinus rhythm postoperatively.  He returned to the operating room for mediastinal exploration for bleeding and then developed a GI hemorrhage thereafter.  He has remained stable since that time.  He is using SBE prophylaxis appropriately.  He denies significant bradyarrhythmias requiring a pacemaker.  Digoxin was added by the electrophysiologist for rate control with his last level 0.5 on 09/14/2018.  This is due to be retested by Dr. Lorenzana.  He denies syncope, presyncope or palpitations.  He continues to walk at night for 1 hour free of chest pain or shortness of breath.  He had a previously known 70% LAD stenosis which could not be bypassed because it was intramuscular.  A Lexiscan nuclear stress test performed in 02/2018 demonstrated a fixed inferior, inferolateral, inferoseptal basal defect possibly consistent with a prior infarct versus diaphragmatic attenuation artifacts.  There was no real wall motion  abnormalities on echocardiogram in 01/2017.  There was no evidence of anterior wall ischemia on the stress test.  We decided to continue to treat him medically.  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  He has chronic right greater than left extremity peripheral edema due to previous vein stripping and varicose veins.  He has undergone bariatric surgery in the past and has remained reasonably stable.  He did not feel well when we increased his lisinopril to 20 mg b.i.d.  He continues to take 20 mg in the morning and 10 mg at night.      PHYSICAL EXAMINATION:  As listed below.      ASSESSMENT:   1.  Jovon Mantilla is a delightful 64-year-old male with an idiopathic cardiomyopathy out of proportion to the aortic valve disease or coronary artery disease.  Echocardiography from 01/2017 demonstrated an ejection fraction of 45%-50% which was stable with mild concentric left ventricular hypertrophy.  There continued to be severe biatrial enlargement, mild to moderate mitral regurgitation, mild tricuspid insufficiency.  The inferior vena cava appeared dilated which may explain some of his peripheral edema as well.  Right ventricular systolic pressure was elevated consistent with mild to moderate pulmonary hypertension.  It is not clear to me that this patient has been tested for sleep apnea.  The mechanical aortic valve has been functioning normally.  We will retest an echocardiogram in 1 year or earlier on a p.r.n. basis.  His blood pressure at this time is well controlled.  He shows no signs of left or right heart failure.   2.  Permanent atrial fibrillation.  He is currently rate controlled and doing well.  His digoxin level will be rechecked through Dr. Lorenzana's office.   3.  Coronary artery disease.  Nuclear stress test was probably spurious and the inferior wall MI was probably artifact.  We continue to monitor.  At the time of his previous aortic valve surgery his right and  circumflex coronary arteries were normal.   4.  Venous insufficiency and mild edema.      It is my pleasure to assist in the care of Mr. Chas Mantilla.  He will continue to use SBE prophylaxis and this was discussed.  I will see him again in 1 year.  A digoxin level should be performed annually through his primary care office.  All the patient's questions were answered to his satisfaction.  The patient informed me that he bought a new red Corvette as well as an older model muscle car.  He was very excited about these cars.      Dylan Márquez MD        cc:   [place] [address]         DYLAN MÁRQUEZ MD, [facility]             D: 10/18/2019   T: 10/18/2019   MT: RAYSHAWN      Name:     CHAS MANTILLA   MRN:      [id number]        Account:      [id number]   :      1955           Service Date: 10/18/2019      Document: [id number]         Outpatient Encounter Medications as of 10/18/2019   Medication Sig Dispense Refill     Ascorbic Acid (VITAMIN C PO) Take 500 mg by mouth At Bedtime       aspirin 81 MG tablet Take 81 mg by mouth every other day       Calcium Carbonate-Vitamin D (CALCIUM 600+D) 600-200 MG-UNIT TABS Take 1 tablet by mouth 2 times daily        carvedilol (COREG) 25 MG tablet Take 1 tablet (25 mg) by mouth 2 times daily (with meals) 60 tablet 3     Cholecalciferol (VITAMIN D) 2000 UNIT CAPS Take 1 tablet by mouth daily        Cyanocobalamin (VITAMIN B-12) 500 MCG SUBL Place 500 mcg under the tongue daily        digoxin (LANOXIN) 125 MCG tablet Take 1 tablet (125 mcg) by mouth daily 90 tablet 3     ferrous sulfate (IRON SUPPLEMENT) 325 (65 FE) MG tablet Take 1 tablet (325 mg) by mouth 2 times daily 100 tablet      Fexofenadine HCl (ALLERGY 24-HR PO) Take by mouth daily       levothyroxine (SYNTHROID/LEVOTHROID) 200 MCG tablet Take 1 tablet (200 mcg) by mouth daily 90 tablet 0     lisinopril (PRINIVIL/ZESTRIL) 20 MG tablet  Take  20 mg (1 tablet in am) and 10 mg  (one half tablet) in  pm 135 tablet 0     Multiple Vitamins-Minerals (CENTRUM) CHEW Take 1 tablet by mouth At Bedtime       Psyllium (FIBER) 0.52 GM CAPS Take 2 tablets by mouth At Bedtime        sertraline (ZOLOFT) 50 MG tablet TAKE 1 TABLET DAILY 90 tablet 3     simvastatin (ZOCOR) 20 MG tablet Take 1 tablet (20 mg) by mouth At Bedtime 90 tablet 3     warfarin (COUMADIN) 4 MG tablet Take 1 tablet (4MG) by mouth Mon,Wed,Fri & 1 1/2 tablets (6MG) all other days of the week 120 tablet 0     [] dextromethorphan-guaiFENesin (MUCINEX DM)  MG 12 hr tablet Take 1 tablet by mouth every 12 hours for 10 days 20 tablet 0     [DISCONTINUED] carvedilol (COREG) 25 MG tablet Take 1 tablet (25 mg) by mouth 2 times daily (with meals) 180 tablet 3     [DISCONTINUED] carvedilol (COREG) 25 MG tablet Take 1 tablet (25 mg) by mouth 2 times daily (with meals) 180 tablet 0     [DISCONTINUED] cefdinir (OMNICEF) 300 MG capsule Take 1 capsule (300 mg) by mouth 2 times daily 20 capsule 0     [DISCONTINUED] digoxin (LANOXIN) 125 MCG tablet Take 1 tablet (125 mcg) by mouth daily 90 tablet 0     [DISCONTINUED] lisinopril (PRINIVIL/ZESTRIL) 20 MG tablet Take  20 mg (1 tablet in am) and 10 mg  (one half tablet) in  pm 135 tablet 0     [DISCONTINUED] metroNIDAZOLE (METROGEL) 0.75 % external gel Apply topically 2 times daily Apply to area bid for 2 wks, then bid as needed (Patient not taking: Reported on 10/18/2019) 45 g 0     [DISCONTINUED] predniSONE (DELTASONE) 20 MG tablet 2 tab daily for 5 days (Patient not taking: Reported on 10/18/2019) 10 tablet 0     [DISCONTINUED] simvastatin (ZOCOR) 20 MG tablet TAKE 1 TABLET (20 MG) BY MOUTH AT BEDTIME       [DISCONTINUED] triamcinolone (KENALOG) 0.1 % cream Apply sparingly to affected area three times daily for 14 days. (Patient not taking: Reported on 10/18/2019) 30 g 0     [DISCONTINUED] valACYclovir (VALTREX) 1000 mg tablet Take 1 tablet (1,000 mg)  by mouth 3 times daily (Patient not taking: Reported on 10/18/2019) 21 tablet 0     No facility-administered encounter medications on file as of 10/18/2019.        Again, thank you for allowing me to participate in the care of your patient.      Sincerely,    Lloyd Lewis MD     [facility]

## 2019-10-18 NOTE — PROGRESS NOTES
HPI and Plan:   See dictation:[id number]    Orders Placed This Encounter   Procedures     Follow-Up with Cardiologist     Echocardiogram Complete       Orders Placed This Encounter   Medications     lisinopril (PRINIVIL/ZESTRIL) 20 MG tablet     Sig: Take  20 mg (1 tablet in am) and 10 mg  (one half tablet) in  pm     Dispense:  135 tablet     Refill:  0     digoxin (LANOXIN) 125 MCG tablet     Sig: Take 1 tablet (125 mcg) by mouth daily     Dispense:  90 tablet     Refill:  3     DISCONTD: carvedilol (COREG) 25 MG tablet     Sig: Take 1 tablet (25 mg) by mouth 2 times daily (with meals)     Dispense:  180 tablet     Refill:  3     carvedilol (COREG) 25 MG tablet     Sig: Take 1 tablet (25 mg) by mouth 2 times daily (with meals)     Dispense:  60 tablet     Refill:  3       Medications Discontinued During This Encounter   Medication Reason     simvastatin (ZOCOR) 20 MG tablet Medication Reconciliation Clean Up     metroNIDAZOLE (METROGEL) 0.75 % external gel      predniSONE (DELTASONE) 20 MG tablet      triamcinolone (KENALOG) 0.1 % cream      valACYclovir (VALTREX) 1000 mg tablet      cefdinir (OMNICEF) 300 MG capsule      lisinopril (PRINIVIL/ZESTRIL) 20 MG tablet Reorder     digoxin (LANOXIN) 125 MCG tablet Reorder     carvedilol (COREG) 25 MG tablet Reorder     carvedilol (COREG) 25 MG tablet Reorder         Encounter Diagnoses   Name Primary?     Nonrheumatic aortic valve stenosis Yes     S/P aortic valve replacement      Idiopathic cardiomyopathy (H)      Coronary artery disease involving native coronary artery of native heart without angina pectoris      Permanent atrial fibrillation      Hypertension goal BP (blood pressure) < 140/90      Hyperlipidemia LDL goal <70      ASVD (arteriosclerotic vascular disease)      Chronic atrial fibrillation        CURRENT MEDICATIONS:  Current Outpatient Medications   Medication Sig Dispense Refill     Ascorbic Acid (VITAMIN C PO) Take 500 mg by mouth At Bedtime       aspirin  81 MG tablet Take 81 mg by mouth every other day       Calcium Carbonate-Vitamin D (CALCIUM 600+D) 600-200 MG-UNIT TABS Take 1 tablet by mouth 2 times daily        carvedilol (COREG) 25 MG tablet Take 1 tablet (25 mg) by mouth 2 times daily (with meals) 60 tablet 3     Cholecalciferol (VITAMIN D) 2000 UNIT CAPS Take 1 tablet by mouth daily        Cyanocobalamin (VITAMIN B-12) 500 MCG SUBL Place 500 mcg under the tongue daily        digoxin (LANOXIN) 125 MCG tablet Take 1 tablet (125 mcg) by mouth daily 90 tablet 3     ferrous sulfate (IRON SUPPLEMENT) 325 (65 FE) MG tablet Take 1 tablet (325 mg) by mouth 2 times daily 100 tablet      Fexofenadine HCl (ALLERGY 24-HR PO) Take by mouth daily       levothyroxine (SYNTHROID/LEVOTHROID) 200 MCG tablet Take 1 tablet (200 mcg) by mouth daily 90 tablet 0     lisinopril (PRINIVIL/ZESTRIL) 20 MG tablet Take  20 mg (1 tablet in am) and 10 mg  (one half tablet) in  pm 135 tablet 0     Multiple Vitamins-Minerals (CENTRUM) CHEW Take 1 tablet by mouth At Bedtime       Psyllium (FIBER) 0.52 GM CAPS Take 2 tablets by mouth At Bedtime        sertraline (ZOLOFT) 50 MG tablet TAKE 1 TABLET DAILY 90 tablet 3     simvastatin (ZOCOR) 20 MG tablet Take 1 tablet (20 mg) by mouth At Bedtime 90 tablet 3     warfarin (COUMADIN) 4 MG tablet Take 1 tablet (4MG) by mouth Mon,Wed,Fri & 1 1/2 tablets (6MG) all other days of the week 120 tablet 0       ALLERGIES   No Known Allergies    PAST MEDICAL HISTORY:  Past Medical History:   Diagnosis Date     Acne rosacea 4/22/2019     Acquired hypothyroidism 5/23/2003     Problem list name updated by automated process. Provider to review     Acute prostatitis      Acute sinusitis, unspecified      Aortic valve disease      Atrial fibrillation (H)      Biatrial enlargement      CAD (coronary artery disease)     known 70% LAD stenosis     GI bleeding 1/22/2016     HTN (hypertension)      Hyperlipidemia      Idiopathic cardiomyopathy (H)      Mitral  regurgitation      Obesity      Obesity, unspecified      Palpitations      Permanent atrial fibrillation 6/2/2017     Right inguinal hernia 7/22/2019     S/P AVR 01/07/2011    with Maze procedure     Tricuspid regurgitation      Unspecified essential hypertension        PAST SURGICAL HISTORY:  Past Surgical History:   Procedure Laterality Date     ANGIOGRAM  2/02    Normal coronary,dilated LV,Sev.decr.global LVF,+1 MR     ANGIOGRAM  1/08    Mild AS,Mod PHTN,mod prox.LAD disease,Triv.CFX disease     ANGIOGRAM  1/09    Mod AS,Mod PHTN,mod prox.LAD disease     ANGIOGRAM  10/10    Sev.AS,CM,global hypokinesis,Mild-mod LV dilated,Mild MR,70% prox.LAD lesion,PHTN     C NONSPECIFIC PROCEDURE  ~1985    vein stripping     CARDIOVERSION  2/02, 4/02, 4/11     CHOLECYSTECTOMY       COLONOSCOPY N/A 1/23/2016    Procedure: COLONOSCOPY;  Surgeon: Robyn Collins MD;  Location:  GI     ESOPHAGOSCOPY, GASTROSCOPY, DUODENOSCOPY (EGD), COMBINED N/A 1/23/2016    Procedure: COMBINED ESOPHAGOSCOPY, GASTROSCOPY, DUODENOSCOPY (EGD);  Surgeon: Robyn Collins MD;  Location:  GI     LAPAROSCOPIC BYPASS GASTRIC  10/8/2011    Procedure:LAPAROSCOPIC BYPASS GASTRIC; Diagnostic laparoscopy, Lysis of Adhesions, Laparoscopic Revision of Jejunojejunostomy; Surgeon:RADHA MURO; Location: OR     LAPAROSCOPIC BYPASS GASTRIC  6/26/2006    Dr Jin     LAPAROSCOPY DIAGNOSTIC (GENERAL)  10/8/2011    Procedure:LAPAROSCOPY DIAGNOSTIC (GENERAL); Surgeon:RADHA MURO; Location: OR     REPLACE VALVE AORTIC  1/7/2011       FAMILY HISTORY:  Family History   Problem Relation Age of Onset     Cancer - colorectal Father      Colon Cancer Father      Cancer Father      Diabetes Brother        SOCIAL HISTORY:  Social History     Socioeconomic History     Marital status:      Spouse name: Tracey     Number of children: 0     Years of education: None     Highest education level: None   Occupational History      Occupation:      Comment: [employer]   Social Needs     Financial resource strain: None     Food insecurity:     Worry: None     Inability: None     Transportation needs:     Medical: None     Non-medical: None   Tobacco Use     Smoking status: Never Smoker     Smokeless tobacco: Never Used   Substance and Sexual Activity     Alcohol use: No     Alcohol/week: 0.0 standard drinks     Drug use: No     Sexual activity: Yes     Partners: Female   Lifestyle     Physical activity:     Days per week: None     Minutes per session: None     Stress: None   Relationships     Social connections:     Talks on phone: None     Gets together: None     Attends L[employer] service: None     Active member of club or organization: None     Attends meetings of clubs or organizations: None     Relationship status: None     Intimate partner violence:     Fear of current or ex partner: None     Emotionally abused: None     Physically abused: None     Forced sexual activity: None   Other Topics Concern     Parent/sibling w/ CABG, MI or angioplasty before 65F 55M? Yes      Service Not Asked     Blood Transfusions No     Caffeine Concern Yes     Comment: 1 can daily     Occupational Exposure Yes     Hobby Hazards No     Sleep Concern No     Stress Concern No     Weight Concern No     Special Diet Yes     Comment: low sodium, low calories     Back Care No     Exercise Yes     Comment: walks daily     Bike Helmet Not Asked     Comment: NA     Seat Belt Yes     Self-Exams Yes   Social History Narrative     None       Review of Systems:  Skin:  Negative       Eyes:  Positive for glasses    ENT:  Negative      Respiratory:  Negative       Cardiovascular:    Positive for;palpitations;edema    Gastroenterology: Negative      Genitourinary:  not assessed      Musculoskeletal:  Negative      Neurologic:  Negative      Psychiatric:  Negative      Heme/Lymph/Imm:  Positive for allergies    Endocrine:  Positive for thyroid disorder   "    Physical Exam:  Vitals: /70   Pulse 76   Ht 1.778 m (5' 10\")   Wt 97.1 kg (214 lb)   BMI 30.71 kg/m      Constitutional:  cooperative, alert and oriented, well developed, well nourished, in no acute distress        Skin:  warm and dry to the touch, no apparent skin lesions or masses noted          Head:  normocephalic, no masses or lesions        Eyes:  sclera white;pupils equal and round;conjunctivae and lids unremarkable;no xanthalasma;EOMS intact        Lymph:      ENT:  no pallor or cyanosis, dentition good        Neck:  carotid pulses are full and equal bilaterally, JVP normal, no carotid bruit        Respiratory:  normal breath sounds, clear to auscultation, normal A-P diameter, normal symmetry, normal respiratory excursion, no use of accessory muscles;healed median sternotomy scar         Cardiac: normal S1 and S2 irregularly irregular rhythm   crisp prosthetic valve sounds systolic ejection murmur;LLSB;grade 2;radiation to the RUSB systolic murmur;grade 2;LLSB;radiation to the base   blowing  pulses full and equal                                        GI:  abdomen soft, non-tender, BS normoactive, no mass, no HSM, no bruits        Extremities and Muscular Skeletal:  no deformities, clubbing, cyanosis, erythema observed     1+;pitting;RLE edema LLE edema;trace;pitting right lower extremity venous vericosities    Neurological:  no gross motor deficits;affect appropriate        Psych:  Alert and Oriented x 3        CC  Rehan Lorenzana MD  [address] [place] [address]              "

## 2019-10-23 DIAGNOSIS — E78.5 HYPERLIPIDEMIA LDL GOAL <70: ICD-10-CM

## 2019-10-24 RX ORDER — SIMVASTATIN 20 MG
TABLET ORAL
Qty: 30 TABLET | Refills: 0 | Status: SHIPPED | OUTPATIENT
Start: 2019-10-24 | End: 2019-12-18

## 2019-10-24 NOTE — TELEPHONE ENCOUNTER
"Last Written Prescription Date:  10/22/18  Last Fill Quantity: 90 tablet,  # refills: 3   Last office visit: 2/4/2019 with prescribing provider:  EMILIA Lorenzana   Future Office Visit:      Requested Prescriptions   Pending Prescriptions Disp Refills     simvastatin (ZOCOR) 20 MG tablet [Pharmacy Med Name: SIMVASTATIN TABS 20MG] 90 tablet 4     Sig: TAKE 1 TABLET AT BEDTIME       Statins Protocol Failed - 10/23/2019 11:41 PM        Failed - LDL on file in past 12 months     Recent Labs   Lab Test 09/14/18  1137   LDL 42             Passed - No abnormal creatine kinase in past 12 months     No lab results found.             Passed - Recent (12 mo) or future (30 days) visit within the authorizing provider's specialty     Patient has had an office visit with the authorizing provider or a provider within the authorizing providers department within the previous 12 mos or has a future within next 30 days. See \"Patient Info\" tab in inbasket, or \"Choose Columns\" in Meds & Orders section of the refill encounter.              Passed - Medication is active on med list        Passed - Patient is age 18 or older          "

## 2019-10-29 ENCOUNTER — HEALTH MAINTENANCE LETTER (OUTPATIENT)
Age: 64
End: 2019-10-29

## 2019-11-15 DIAGNOSIS — Z79.01 LONG TERM CURRENT USE OF ANTICOAGULANT THERAPY: ICD-10-CM

## 2019-11-16 NOTE — TELEPHONE ENCOUNTER
"Requested Prescriptions   Pending Prescriptions Disp Refills     warfarin ANTICOAGULANT (COUMADIN) 4 MG tablet [Pharmacy Med Name: WARFARIN TABS 4MG] 117 tablet 4     Sig: TAKE 1 TABLET MONDAY, WEDNESDAY, AND FRIDAY AND ONE AND ONE-HALF TABLETS (6 MG) ALL OTHER DAYS OF THE WEEK  Last Written Prescription Date:  1/22/19  Last Fill Quantity: 120 tab,  # refills: 0   Last office visit: 9/13/2019 with prescribing provider:  Darling   Future Office Visit:         Vitamin K Antagonists Failed - 11/15/2019 11:20 PM        Failed - INR is within goal in the past 6 weeks     Confirm INR is within goal in the past 6 weeks.     Recent Labs   Lab Test 10/18/19   INR 3.0*                       Failed - Medication is active on med list        Passed - Recent (12 mo) or future (30 days) visit within the authorizing provider's specialty     Patient has had an office visit with the authorizing provider or a provider within the authorizing providers department within the previous 12 mos or has a future within next 30 days. See \"Patient Info\" tab in inbasket, or \"Choose Columns\" in Meds & Orders section of the refill encounter.              Passed - Patient is 18 years of age or older           "

## 2019-11-19 RX ORDER — WARFARIN SODIUM 4 MG/1
TABLET ORAL
Qty: 117 TABLET | Refills: 1 | Status: SHIPPED | OUTPATIENT
Start: 2019-11-19 | End: 2020-05-18

## 2019-11-29 ENCOUNTER — ANTICOAGULATION THERAPY VISIT (OUTPATIENT)
Dept: NURSING | Facility: CLINIC | Age: 64
End: 2019-11-29
Payer: COMMERCIAL

## 2019-11-29 DIAGNOSIS — I48.21 PERMANENT ATRIAL FIBRILLATION (H): ICD-10-CM

## 2019-11-29 DIAGNOSIS — Z95.2 S/P AVR (AORTIC VALVE REPLACEMENT): ICD-10-CM

## 2019-11-29 LAB — INR POINT OF CARE: 2.9 (ref 0.86–1.14)

## 2019-11-29 PROCEDURE — 99207 ZZC NO CHARGE NURSE ONLY: CPT

## 2019-11-29 PROCEDURE — 36416 COLLJ CAPILLARY BLOOD SPEC: CPT

## 2019-11-29 PROCEDURE — 85610 PROTHROMBIN TIME: CPT | Mod: QW

## 2019-11-29 NOTE — PROGRESS NOTES
ANTICOAGULATION FOLLOW-UP CLINIC VISIT    Patient Name:  Jovon Mantilla  Date:  2019  Contact Type:  Face to Face    SUBJECTIVE: same plan of care  Patient Findings     Comments:   Assessment negative for symptoms of bleeding or clotting this visit        Clinical Outcomes     Negatives:   Major bleeding event, Thromboembolic event, Anticoagulation-related hospital admission, Anticoagulation-related ED visit, Anticoagulation-related fatality    Comments:   Assessment negative for symptoms of bleeding or clotting this visit           OBJECTIVE    INR Protime   Date Value Ref Range Status   2019 2.9 (A) 0.86 - 1.14 Final     Chromogenic Factor 10   Date Value Ref Range Status   2011 35 (L) 60 - 140 % Final     Comment:     Therapeutic Range: A Chromogenic Factor 10 level of 20-40% inversely   correlates   with an INR of 2-3 for patients receiving Warfarin. Chromogenic Factor 10   levels below 20% indicate an INR greater than 3, and levels above 40%   indicate   an INR lessthan 2.       ASSESSMENT / PLAN  INR assessment THER    Recheck INR In: 6 WEEKS    INR Location Clinic      Anticoagulation Summary  As of 2019    INR goal:   2.5-3.5   TTR:   75.3 % (6.5 mo)   INR used for dosin.9 (2019)   Warfarin maintenance plan:   4 mg (4 mg x 1) every Mon, Wed, Fri; 6 mg (4 mg x 1.5) all other days   Full warfarin instructions:   4 mg every Mon, Wed, Fri; 6 mg all other days   Weekly warfarin total:   36 mg   No change documented:   Monica Leslei, RN   Plan last modified:   Monica Leslie RN (2017)   Next INR check:   1/10/2020   Priority:   Maintenance   Target end date:   Indefinite    Indications    S/P AVR (aortic valve replacement) [Z95.2]  Permanent atrial fibrillation [I48.21]             Anticoagulation Episode Summary     INR check location:       Preferred lab:       Send IN[facility] reminders [facility]:   [facility]    Comments:         Anticoagulation Care Providers      Provider Role Specialty Phone number    Rehan Lorenzana MD Responsible Family Practice [phone]            See the Encounter Report to view Anticoagulation Flowsheet and Dosing Calendar (Go to Encounters tab in chart review, and find the Anticoagulation Therapy Visit)    Dosage adjustment made based on physician directed care plan.    Monica Leslie RN

## 2019-12-10 DIAGNOSIS — E03.9 ACQUIRED HYPOTHYROIDISM: ICD-10-CM

## 2019-12-10 RX ORDER — LEVOTHYROXINE SODIUM 200 UG/1
200 TABLET ORAL DAILY
Qty: 90 TABLET | Refills: 0 | Status: SHIPPED | OUTPATIENT
Start: 2019-12-10 | End: 2020-03-11

## 2019-12-10 NOTE — TELEPHONE ENCOUNTER
"2/14/19 lab results note and 2/15/19 CT chest results note reviewed.     We called Jovon.  He does not have an endocrinologist nor following with oncology. States Dr. Lorenzana is managing thyroid.   Please advise.   Requested Prescriptions   Pending Prescriptions Disp Refills     levothyroxine (SYNTHROID/LEVOTHROID) 200 MCG tablet 90 tablet 0     Sig: Take 1 tablet (200 mcg) by mouth daily       Thyroid Protocol Failed - 12/10/2019 10:09 AM        Failed - Normal TSH on file in past 12 months     Recent Labs   Lab Test 02/04/19  1707   TSH 10.17*              Passed - Patient is 12 years or older        Passed - Recent (12 mo) or future (30 days) visit within the authorizing provider's specialty     Patient has had an office visit with the authorizing provider or a provider within the authorizing providers department within the previous 12 mos or has a future within next 30 days. See \"Patient Info\" tab in inbasket, or \"Choose Columns\" in Meds & Orders section of the refill encounter.              Passed - Medication is active on med list      Casey Lieberman RN      "

## 2019-12-18 DIAGNOSIS — E78.5 HYPERLIPIDEMIA LDL GOAL <70: ICD-10-CM

## 2019-12-18 RX ORDER — SIMVASTATIN 20 MG
20 TABLET ORAL AT BEDTIME
Qty: 90 TABLET | Refills: 0 | Status: SHIPPED | OUTPATIENT
Start: 2019-12-18 | End: 2020-03-13

## 2019-12-18 NOTE — TELEPHONE ENCOUNTER
"Requested Prescriptions   Pending Prescriptions Disp Refills     simvastatin (ZOCOR) 20 MG tablet 30 tablet 0     Sig: Take 1 tablet (20 mg) by mouth At Bedtime       Statins Protocol Failed - 12/18/2019 10:02 AM        Failed - LDL on file in past 12 months     Recent Labs   Lab Test 09/14/18  1137   LDL 42             Passed - No abnormal creatine kinase in past 12 months     No lab results found.             Passed - Recent (12 mo) or future (30 days) visit within the authorizing provider's specialty     Patient has had an office visit with the authorizing provider or a provider within the authorizing providers department within the previous 12 mos or has a future within next 30 days. See \"Patient Info\" tab in inbasket, or \"Choose Columns\" in Meds & Orders section of the refill encounter.              Passed - Medication is active on med list        Passed - Patient is age 18 or older        Routing refill request to provider for review/approval because:  Kemi given x1 and patient did not follow up, please advise  Labs not current:  lipids        "

## 2020-01-10 ENCOUNTER — ANTICOAGULATION THERAPY VISIT (OUTPATIENT)
Dept: NURSING | Facility: CLINIC | Age: 65
End: 2020-01-10
Payer: COMMERCIAL

## 2020-01-10 DIAGNOSIS — I48.21 PERMANENT ATRIAL FIBRILLATION (H): ICD-10-CM

## 2020-01-10 DIAGNOSIS — Z95.2 S/P AVR (AORTIC VALVE REPLACEMENT): ICD-10-CM

## 2020-01-10 LAB — INR POINT OF CARE: 3.1 (ref 0.86–1.14)

## 2020-01-10 PROCEDURE — 99207 ZZC NO CHARGE NURSE ONLY: CPT

## 2020-01-10 PROCEDURE — 36416 COLLJ CAPILLARY BLOOD SPEC: CPT

## 2020-01-10 PROCEDURE — 85610 PROTHROMBIN TIME: CPT | Mod: QW

## 2020-01-10 NOTE — PROGRESS NOTES
ANTICOAGULATION FOLLOW-UP CLINIC VISIT    Patient Name:  Jovon Mantilla  Date:  1/10/2020  Contact Type:  Face to Face    SUBJECTIVE: same plan of care  Patient Findings     Comments:   No changes or concerns today        Clinical Outcomes     Negatives:   Major bleeding event, Thromboembolic event, Anticoagulation-related hospital admission, Anticoagulation-related ED visit, Anticoagulation-related fatality    Comments:   No changes or concerns today           OBJECTIVE    INR Protime   Date Value Ref Range Status   01/10/2020 3.1 (A) 0.86 - 1.14 Final     Chromogenic Factor 10   Date Value Ref Range Status   01/21/2011 35 (L) 60 - 140 % Final     Comment:     Therapeutic Range: A Chromogenic Factor 10 level of 20-40% inversely   correlates   with an INR of 2-3 for patients receiving Warfarin. Chromogenic Factor 10   levels below 20% indicate an INR greater than 3, and levels above 40%   indicate   an INR lessthan 2.       ASSESSMENT / PLAN  INR assessment THER    Recheck INR In: 6 WEEKS    INR Location Clinic      Anticoagulation Summary  As of 1/10/2020    INR goal:   2.5-3.5   TTR:   79.7 % (7.9 mo)   INR used for dosing:   3.1 (1/10/2020)   Warfarin maintenance plan:   4 mg (4 mg x 1) every Mon, Wed, Fri; 6 mg (4 mg x 1.5) all other days   Full warfarin instructions:   4 mg every Mon, Wed, Fri; 6 mg all other days   Weekly warfarin total:   36 mg   No change documented:   Monica Leslie, RN   Plan last modified:   Monica Leslie RN (5/4/2017)   Next INR check:   2/21/2020   Priority:   Maintenance   Target end date:   Indefinite    Indications    S/P AVR (aortic valve replacement) [Z95.2]  Permanent atrial fibrillation [I48.21]             Anticoagulation Episode Summary     INR check location:       Preferred lab:       Send INR reminders to:   [facility]    Comments:   Cardiomyopathy / CAD      Anticoagulation Care Providers     Provider Role Specialty Phone number    Rehan Lorenzana,  MD [facility] [phone]            See the Encounter Report to view Anticoagulation Flowsheet and Dosing Calendar (Go to Encounters tab in chart review, and find the Anticoagulation Therapy Visit)    Dosage adjustment made based on physician directed care plan.    Monica Leslie RN

## 2020-01-31 ENCOUNTER — OFFICE VISIT (OUTPATIENT)
Dept: FAMILY MEDICINE | Facility: CLINIC | Age: 65
End: 2020-01-31
Payer: COMMERCIAL

## 2020-01-31 ENCOUNTER — ANCILLARY PROCEDURE (OUTPATIENT)
Dept: GENERAL RADIOLOGY | Facility: CLINIC | Age: 65
End: 2020-01-31
Attending: FAMILY MEDICINE
Payer: COMMERCIAL

## 2020-01-31 VITALS
TEMPERATURE: 98.6 F | HEIGHT: 71 IN | OXYGEN SATURATION: 96 % | BODY MASS INDEX: 29.54 KG/M2 | HEART RATE: 77 BPM | WEIGHT: 211 LBS | SYSTOLIC BLOOD PRESSURE: 144 MMHG | DIASTOLIC BLOOD PRESSURE: 80 MMHG

## 2020-01-31 DIAGNOSIS — Z51.81 MEDICATION MONITORING ENCOUNTER: ICD-10-CM

## 2020-01-31 DIAGNOSIS — R05.9 COUGH: ICD-10-CM

## 2020-01-31 DIAGNOSIS — J18.9 PNEUMONIA OF RIGHT LOWER LOBE DUE TO INFECTIOUS ORGANISM: Primary | ICD-10-CM

## 2020-01-31 DIAGNOSIS — R06.2 WHEEZING: ICD-10-CM

## 2020-01-31 DIAGNOSIS — K40.90 RIGHT INGUINAL HERNIA: ICD-10-CM

## 2020-01-31 DIAGNOSIS — Z95.2 S/P AORTIC VALVE REPLACEMENT: ICD-10-CM

## 2020-01-31 PROCEDURE — 99214 OFFICE O/P EST MOD 30 MIN: CPT | Performed by: FAMILY MEDICINE

## 2020-01-31 PROCEDURE — 71046 X-RAY EXAM CHEST 2 VIEWS: CPT

## 2020-01-31 RX ORDER — PREDNISONE 20 MG/1
TABLET ORAL
Qty: 10 TABLET | Refills: 0 | Status: SHIPPED | OUTPATIENT
Start: 2020-01-31 | End: 2020-02-05

## 2020-01-31 RX ORDER — GUAIFENESIN 600 MG/1
1200 TABLET, EXTENDED RELEASE ORAL 2 TIMES DAILY
Qty: 40 TABLET | Refills: 0 | Status: SHIPPED | OUTPATIENT
Start: 2020-01-31 | End: 2020-02-10

## 2020-01-31 RX ORDER — AZITHROMYCIN 250 MG/1
TABLET, FILM COATED ORAL
Qty: 6 TABLET | Refills: 0 | Status: SHIPPED | OUTPATIENT
Start: 2020-01-31 | End: 2020-02-05

## 2020-01-31 ASSESSMENT — PAIN SCALES - GENERAL: PAINLEVEL: NO PAIN (0)

## 2020-01-31 ASSESSMENT — MIFFLIN-ST. JEOR: SCORE: 1773.96

## 2020-01-31 NOTE — PROGRESS NOTES
Subjective     Jovon Mantilla is a 64 year old male who presents to clinic today for the following health issues:    HPI :  Patient comes with 1 week history of cough, initially he has cough, runny nose.  However, he reports for the past 3 to 4 days has been having chills, night sweats.  Start having wheezing and difficulty breathing.  He is coughing more.  Difficult when he take a deep breath and chest pain when he take a deep breath.    He denies any headache has no history of travel.  Denies diarrhea or vomiting.  Patient does not smoke, does not have history of COPD or asthma.    Patient does have history of aortic valve replacement, history of coronary artery disease, he is on Coumadin.    Denies lower extremity edema denies being lightheaded or dizzy.    Acute Illness   Acute illness concerns: Cough  Onset: Monday    Fever: no    Chills/Sweats: YES- chills    Headache (location?): YES    Sinus Pressure:YES- post-nasal drainage    Conjunctivitis:  no    Ear Pain: no    Rhinorrhea: YES    Congestion: YES    Sore Throat: no     Cough: YES-non-productive    Wheeze: YES    Decreased Appetite: YES    Nausea: no    Vomiting: no    Diarrhea:  no    Dysuria/Freq.: no    Fatigue/Achiness: YES- body aches    Sick/Strep Exposure: YES- went to a concert     Therapies Tried and outcome: Tylenol and cough drops; helped with sleep      Patient Active Problem List   Diagnosis     Acquired hypothyroidism     Neck pain     Chronic rhinitis     Aortic stenosis     Hyperlipidemia LDL goal <70     Hypertension goal BP (blood pressure) < 140/90     Dilated cardiomyopathy (H)     CAD (coronary artery disease)     Mitral regurgitation     ASVD (arteriosclerotic vascular disease)     S/P AVR (aortic valve replacement)     GI bleeding     Gastrointestinal hemorrhage associated with anorectal source     Long-term (current) use of anticoagulants [Z79.01]     Adjustment disorder with mixed anxiety and depressed mood     Anxiety      Idiopathic cardiomyopathy (H)     S/P aortic valve replacement     Coronary artery disease involving native coronary artery of native heart without angina pectoris     Hypotension, unspecified hypotension type     Permanent atrial fibrillation     Acne rosacea     Right inguinal hernia     Past Surgical History:   Procedure Laterality Date     ANGIOGRAM  2/02    Normal coronary,dilated LV,Sev.decr.global LVF,+1 MR     ANGIOGRAM  1/08    Mild AS,Mod PHTN,mod prox.LAD disease,Triv.CFX disease     ANGIOGRAM  1/09    Mod AS,Mod PHTN,mod prox.LAD disease     ANGIOGRAM  10/10    Sev.AS,CM,global hypokinesis,Mild-mod LV dilated,Mild MR,70% prox.LAD lesion,PHTN     C NONSPECIFIC PROCEDURE  ~1985    vein stripping     CARDIOVERSION  2/02, 4/02, 4/11     CHOLECYSTECTOMY       COLONOSCOPY N/A 1/23/2016    Procedure: COLONOSCOPY;  Surgeon: Robyn Collins MD;  Location:  GI     ESOPHAGOSCOPY, GASTROSCOPY, DUODENOSCOPY (EGD), COMBINED N/A 1/23/2016    Procedure: COMBINED ESOPHAGOSCOPY, GASTROSCOPY, DUODENOSCOPY (EGD);  Surgeon: Robyn Collins MD;  Location:  GI     LAPAROSCOPIC BYPASS GASTRIC  10/8/2011    Procedure:LAPAROSCOPIC BYPASS GASTRIC; Diagnostic laparoscopy, Lysis of Adhesions, Laparoscopic Revision of Jejunojejunostomy; Surgeon:RADHA MURO; Location: OR     LAPAROSCOPIC BYPASS GASTRIC  6/26/2006    Dr Jin     LAPAROSCOPY DIAGNOSTIC (GENERAL)  10/8/2011    Procedure:LAPAROSCOPY DIAGNOSTIC (GENERAL); Surgeon:RADHA MURO; Location: OR     REPLACE VALVE AORTIC  1/7/2011       Social History     Tobacco Use     Smoking status: Never Smoker     Smokeless tobacco: Never Used   Substance Use Topics     Alcohol use: No     Alcohol/week: 0.0 standard drinks     Family History   Problem Relation Age of Onset     Cancer - colorectal Father      Colon Cancer Father      Cancer Father      Diabetes Brother          Current Outpatient Medications   Medication Sig Dispense Refill      albuterol (PROAIR RESPICLICK) 108 (90 Base) MCG/ACT inhaler Inhale 2 puffs into the lungs every 6 hours as needed for shortness of breath / dyspnea or wheezing 1 Inhaler 0     Ascorbic Acid (VITAMIN C PO) Take 500 mg by mouth At Bedtime       aspirin 81 MG tablet Take 81 mg by mouth every other day       azithromycin (ZITHROMAX) 250 MG tablet Take 2 tablets (500 mg) by mouth daily for 1 day, THEN 1 tablet (250 mg) daily for 4 days. 6 tablet 0     Calcium Carbonate-Vitamin D (CALCIUM 600+D) 600-200 MG-UNIT TABS Take 1 tablet by mouth 2 times daily        carvedilol (COREG) 25 MG tablet Take 1 tablet (25 mg) by mouth 2 times daily (with meals) 60 tablet 3     Cholecalciferol (VITAMIN D) 2000 UNIT CAPS Take 1 tablet by mouth daily        Cyanocobalamin (VITAMIN B-12) 500 MCG SUBL Place 500 mcg under the tongue daily        digoxin (LANOXIN) 125 MCG tablet Take 1 tablet (125 mcg) by mouth daily 90 tablet 3     ferrous sulfate (IRON SUPPLEMENT) 325 (65 FE) MG tablet Take 1 tablet (325 mg) by mouth 2 times daily 100 tablet      Fexofenadine HCl (ALLERGY 24-HR PO) Take by mouth daily       guaiFENesin (MUCINEX) 600 MG 12 hr tablet Take 2 tablets (1,200 mg) by mouth 2 times daily for 10 days 40 tablet 0     levothyroxine (SYNTHROID/LEVOTHROID) 200 MCG tablet Take 1 tablet (200 mcg) by mouth daily SEE DOCTOR IN MARCH 90 tablet 0     lisinopril (PRINIVIL/ZESTRIL) 20 MG tablet Take  20 mg (1 tablet in am) and 10 mg  (one half tablet) in  pm 135 tablet 0     Multiple Vitamins-Minerals (CENTRUM) CHEW Take 1 tablet by mouth At Bedtime       predniSONE (DELTASONE) 20 MG tablet One tab bid for 5 days 10 tablet 0     Psyllium (FIBER) 0.52 GM CAPS Take 2 tablets by mouth At Bedtime        sertraline (ZOLOFT) 50 MG tablet TAKE 1 TABLET DAILY 90 tablet 3     simvastatin (ZOCOR) 20 MG tablet Take 1 tablet (20 mg) by mouth At Bedtime 90 tablet 0     warfarin (COUMADIN) 4 MG tablet Take 1 tablet (4MG) by mouth Mon,Wed,Fri & 1 1/2 tablets  (6MG) all other days of the week 120 tablet 0     warfarin ANTICOAGULANT (COUMADIN) 4 MG tablet TAKE 1 TABLET MONDAY, WEDNESDAY, AND FRIDAY AND ONE AND ONE-HALF TABLETS (6 MG) ALL OTHER DAYS OF THE WEEK 117 tablet 1     No Known Allergies    Reviewed and updated as needed this visit by Provider         Review of Systems   ROS COMP: Constitutional, HEENT, cardiovascular, pulmonary, gi and gu systems are negative, except as otherwise noted.      Objective    There were no vitals taken for this visit.  There is no height or weight on file to calculate BMI.  Physical Exam   GENERAL: healthy, alert and no distress  HENT: ear canals and TM's normal, nose and mouth without ulcers or lesions  NECK: no adenopathy, no asymmetry, masses, or scars and thyroid normal to palpation  RESP: lungs clear to auscultation - no rales, rhonchi or wheezes, rales R lower posterior, rhonchi R lower anterior, expiratory wheezes throughout and inspiratory wheezes throughout  CV: regular rate and rhythm, normal S1 S2, no S3 or S4, no murmur, click or rub, no peripheral edema and peripheral pulses strong  ABDOMEN: soft, nontender, no hepatosplenomegaly, no masses and bowel sounds normal  MS: no gross musculoskeletal defects noted, no edema  PSYCH: mentation appears normal, affect normal/bright    Diagnostic Test Results:  Labs reviewed in Epic  Recent Results (from the past 744 hour(s))   XR Chest 2 Views    Narrative    CHEST TWO VIEWS   1/31/2020 9:30 AM     HISTORY: Medication monitoring encounter. Cough.    COMPARISON: Chest x-ray 2/4/2019.      Impression    IMPRESSION: Two views of the chest are performed. Patient is status  post median sternotomy for aortic valve replacement. Cardiomegaly is  stable. Left lung is well expanded and clear. Right lung base nodule  is stable, again measuring 1.8 cm. No evidence of right pneumothorax.  No pleural effusions.    JANET ERNST MD             Assessment & Plan       ICD-10-CM    1. Pneumonia of  "right lower lobe due to infectious organism (H) J18.1 azithromycin (ZITHROMAX) 250 MG tablet     guaiFENesin (MUCINEX) 600 MG 12 hr tablet     albuterol (PROAIR RESPICLICK) 108 (90 Base) MCG/ACT inhaler     predniSONE (DELTASONE) 20 MG tablet   2. Cough R05 XR Chest 2 Views     guaiFENesin (MUCINEX) 600 MG 12 hr tablet     albuterol (PROAIR RESPICLICK) 108 (90 Base) MCG/ACT inhaler     predniSONE (DELTASONE) 20 MG tablet   3. Wheezing R06.2 albuterol (PROAIR RESPICLICK) 108 (90 Base) MCG/ACT inhaler     predniSONE (DELTASONE) 20 MG tablet   4. Right inguinal hernia K40.90    5. S/P aortic valve replacement Z95.2      #1 history of wheezing cough and fever and chills.  His x-ray possibly showed right lower lobe infiltrate.  With his medical history of heart disease, and fever and chills.  I did send him prescription for Zithromax, Mucinex, prednisone to help with the wheeze.  In addition , albuterol inhaler to help with the cough and wheeze.    Patient was advised with supportive care, increase fluid intake, continue with Tylenol to help with chills and fever.    History of status post carotid valve replacement on a Coumadin.  He was advised to decrease his Coumadin to 1 tab, 4 mg daily.  Was advised to follow-up, have an INR check on Monday of next week.    Patient Instructions   Decrease Warfarin to one tab daily  INR on Monday, 02/03/2020    BMI:   Estimated body mass index is 29.7 kg/m  as calculated from the following:    Height as of this encounter: 1.795 m (5' 10.67\").    Weight as of this encounter: 95.7 kg (211 lb).   Weight management plan: Discussed healthy diet and exercise guidelines        No follow-ups on file.    Nathan Hastings MD  [facility]    "

## 2020-02-03 ENCOUNTER — ANTICOAGULATION THERAPY VISIT (OUTPATIENT)
Dept: NURSING | Facility: CLINIC | Age: 65
End: 2020-02-03
Payer: COMMERCIAL

## 2020-02-03 DIAGNOSIS — Z95.2 S/P AVR (AORTIC VALVE REPLACEMENT): ICD-10-CM

## 2020-02-03 DIAGNOSIS — I70.90 ASVD (ARTERIOSCLEROTIC VASCULAR DISEASE): ICD-10-CM

## 2020-02-03 DIAGNOSIS — I48.21 PERMANENT ATRIAL FIBRILLATION (H): ICD-10-CM

## 2020-02-03 LAB — INR POINT OF CARE: 6.5 (ref 0.86–1.14)

## 2020-02-03 PROCEDURE — 99207 ZZC NO CHARGE NURSE ONLY: CPT

## 2020-02-03 PROCEDURE — 85610 PROTHROMBIN TIME: CPT | Mod: QW

## 2020-02-03 PROCEDURE — 36416 COLLJ CAPILLARY BLOOD SPEC: CPT

## 2020-02-03 RX ORDER — LISINOPRIL 20 MG/1
TABLET ORAL
Qty: 135 TABLET | Refills: 2 | Status: SHIPPED | OUTPATIENT
Start: 2020-02-03 | End: 2020-12-14

## 2020-02-04 NOTE — PROGRESS NOTES
ANTICOAGULATION FOLLOW-UP CLINIC VISIT    Patient Name:  Jovon Mantilla  Date:  2/3/2020  Contact Type:  Face to Face    SUBJECTIVE: Fingerstick INR done x 2 (6.5/6.6) - MD Notified Per Protocol - No bleeding symptoms.  We reviewed ER / Hospital if bleeding develops, is injured, or hit's his head.  Advised plant based vitamin k if able (broccoli, etc.).  Patient agreeable to plan of care.  Patient Findings     Positives:   Change in health (pneumonia), Change in medications (zpak / prednisone / mucinex)    Comments:   No symptoms of concern today        Clinical Outcomes     Negatives:   Major bleeding event, Thromboembolic event, Anticoagulation-related hospital admission, Anticoagulation-related ED visit, Anticoagulation-related fatality    Comments:   No symptoms of concern today           OBJECTIVE    INR Protime   Date Value Ref Range Status   2020 6.5 (A) 0.86 - 1.14 Final     Chromogenic Factor 10   Date Value Ref Range Status   2011 35 (L) 60 - 140 % Final     Comment:     Therapeutic Range: A Chromogenic Factor 10 level of 20-40% inversely   correlates   with an INR of 2-3 for patients receiving Warfarin. Chromogenic Factor 10   levels below 20% indicate an INR greater than 3, and levels above 40%   indicate   an INR lessthan 2.       ASSESSMENT / PLAN  INR assessment SUPRA    Recheck INR In: 3 DAYS    INR Location Clinic      Anticoagulation Summary  As of 2/3/2020    INR goal:   2.5-3.5   TTR:   74.9 % (8.6 mo)   INR used for dosin.5! (2/3/2020)   Warfarin maintenance plan:   4 mg (4 mg x 1) every Mon, Wed, Fri; 6 mg (4 mg x 1.5) all other days   Full warfarin instructions:   2/3: Hold; 2/4: Hold; Otherwise 4 mg every Mon, Wed, Fri; 6 mg all other days   Weekly warfarin total:   36 mg   Plan last modified:   Monica Leslie RN (2017)   Next INR check:   2020   Priority:   Critical   Target end date:   Indefinite    Indications    S/P AVR (aortic valve replacement)  [Z95.2]  Permanent atrial fibrillation [I48.21]             Anticoagulation Episode Summary     INR check location:       Preferred lab:       Send INR reminders to:   [facility]    Comments:   Cardiomyopathy / CAD      Anticoagulation Care Providers     Provider Role Specialty Phone number    Rehan Lorenzana MD [facility] [phone]            See the Encounter Report to view Anticoagulation Flowsheet and Dosing Calendar (Go to Encounters tab in chart review, and find the Anticoagulation Therapy Visit)    Dosage adjustment made based on physician directed care plan.    Monica Leslie RN

## 2020-02-07 ENCOUNTER — ANTICOAGULATION THERAPY VISIT (OUTPATIENT)
Dept: NURSING | Facility: CLINIC | Age: 65
End: 2020-02-07
Payer: COMMERCIAL

## 2020-02-07 DIAGNOSIS — Z95.2 S/P AVR (AORTIC VALVE REPLACEMENT): ICD-10-CM

## 2020-02-07 DIAGNOSIS — I48.21 PERMANENT ATRIAL FIBRILLATION (H): ICD-10-CM

## 2020-02-07 LAB — INR POINT OF CARE: 1.7 (ref 0.86–1.14)

## 2020-02-07 PROCEDURE — 85610 PROTHROMBIN TIME: CPT | Mod: QW

## 2020-02-07 PROCEDURE — 36416 COLLJ CAPILLARY BLOOD SPEC: CPT

## 2020-02-07 PROCEDURE — 99207 ZZC NO CHARGE NURSE ONLY: CPT

## 2020-02-07 NOTE — PROGRESS NOTES
ANTICOAGULATION FOLLOW-UP CLINIC VISIT    Patient Name:  Jovon Mantilla  Date:  2020  Contact Type:  Face to Face    SUBJECTIVE: aminta 1-2 wks  Patient Findings     Positives:   Missed doses (2 day warfarin hold and 2 days of 4mg doses), Change in medications (finished prednisone and zpak for pneumonia)    Comments:   No symptoms of concern today        Clinical Outcomes     Negatives:   Major bleeding event, Thromboembolic event, Anticoagulation-related hospital admission, Anticoagulation-related ED visit, Anticoagulation-related fatality    Comments:   No symptoms of concern today           OBJECTIVE    INR Protime   Date Value Ref Range Status   2020 1.7 (A) 0.86 - 1.14 Final     Chromogenic Factor 10   Date Value Ref Range Status   2011 35 (L) 60 - 140 % Final     Comment:     Therapeutic Range: A Chromogenic Factor 10 level of 20-40% inversely   correlates   with an INR of 2-3 for patients receiving Warfarin. Chromogenic Factor 10   levels below 20% indicate an INR greater than 3, and levels above 40%   indicate   an INR lessthan 2.       ASSESSMENT / PLAN  INR assessment SUB    Recheck INR In: 2 WEEKS    INR Location Clinic      Anticoagulation Summary  As of 2020    INR goal:   2.5-3.5   TTR:   75.2 % (8.6 mo)   INR used for dosin.7! (2020)   Warfarin maintenance plan:   4 mg (4 mg x 1) every Mon, Wed, Fri; 6 mg (4 mg x 1.5) all other days   Full warfarin instructions:   : 6 mg; Otherwise 4 mg every Mon, Wed, Fri; 6 mg all other days   Weekly warfarin total:   36 mg   Plan last modified:   Monica Leslie RN (2017)   Next INR check:   2020   Priority:   High   Target end date:   Indefinite    Indications    S/P AVR (aortic valve replacement) [Z95.2]  Permanent atrial fibrillation [I48.21]             Anticoagulation Episode Summary     INR check location:       Preferred lab:       Send INR reminders to:   [facility]    Comments:   24mm ATS  Valve  Cardiomyopathy / CAD      Anticoagulation Care Providers     Provider Role Specialty Phone number    Rehan Lorenzana MD Responsible Family Practice [phone]            See the Encounter Report to view Anticoagulation Flowsheet and Dosing Calendar (Go to Encounters tab in chart review, and find the Anticoagulation Therapy Visit)    Dosage adjustment made based on physician directed care plan.    Monica Leslie RN

## 2020-02-21 ENCOUNTER — ANTICOAGULATION THERAPY VISIT (OUTPATIENT)
Dept: NURSING | Facility: CLINIC | Age: 65
End: 2020-02-21
Payer: COMMERCIAL

## 2020-02-21 DIAGNOSIS — Z95.2 S/P AVR (AORTIC VALVE REPLACEMENT): ICD-10-CM

## 2020-02-21 DIAGNOSIS — I48.21 PERMANENT ATRIAL FIBRILLATION (H): ICD-10-CM

## 2020-02-21 LAB — INR POINT OF CARE: 3.7 (ref 0.86–1.14)

## 2020-02-21 PROCEDURE — 36416 COLLJ CAPILLARY BLOOD SPEC: CPT

## 2020-02-21 PROCEDURE — 99207 ZZC NO CHARGE NURSE ONLY: CPT

## 2020-02-21 PROCEDURE — 85610 PROTHROMBIN TIME: CPT | Mod: QW

## 2020-02-21 NOTE — PROGRESS NOTES
ANTICOAGULATION FOLLOW-UP CLINIC VISIT    Patient Name:  Jovon Mantilla  Date:  2/21/2020  Contact Type:  Face to Face    SUBJECTIVE: 2 wk aminta's until stable  Patient Findings     Comments:   No symptoms of concern today        Clinical Outcomes     Negatives:   Major bleeding event, Thromboembolic event, Anticoagulation-related hospital admission, Anticoagulation-related ED visit, Anticoagulation-related fatality    Comments:   No symptoms of concern today           OBJECTIVE    INR Protime   Date Value Ref Range Status   02/21/2020 3.7 (A) 0.86 - 1.14 Final     Chromogenic Factor 10   Date Value Ref Range Status   01/21/2011 35 (L) 60 - 140 % Final     Comment:     Therapeutic Range: A Chromogenic Factor 10 level of 20-40% inversely   correlates   with an INR of 2-3 for patients receiving Warfarin. Chromogenic Factor 10   levels below 20% indicate an INR greater than 3, and levels above 40%   indicate   an INR lessthan 2.       ASSESSMENT / PLAN  INR assessment SUPRA    Recheck INR In: 2 WEEKS    INR Location Clinic      Anticoagulation Summary  As of 2/21/2020    INR goal:   2.5-3.5   TTR:   73.0 % (8.6 mo)   INR used for dosing:   3.7! (2/21/2020)   Warfarin maintenance plan:   4 mg (4 mg x 1) every Mon, Wed, Fri; 6 mg (4 mg x 1.5) all other days   Full warfarin instructions:   2/22: 4 mg; Otherwise 4 mg every Mon, Wed, Fri; 6 mg all other days   Weekly warfarin total:   36 mg   Plan last modified:   Monica Leslie RN (5/4/2017)   Next INR check:   3/6/2020   Priority:   High   Target end date:   Indefinite    Indications    S/P AVR (aortic valve replacement) [Z95.2]  Permanent atrial fibrillation [I48.21]             Anticoagulation Episode Summary     INR check location:       Preferred lab:       Send INR reminders to:   [facility]    Comments:   24mm ATS Valve  Cardiomyopathy / CAD      Anticoagulation Care Providers     Provider Role Specialty Phone number    Rehan Lorenzana MD  [facility] [phone]            See the Encounter Report to view Anticoagulation Flowsheet and Dosing Calendar (Go to Encounters tab in chart review, and find the Anticoagulation Therapy Visit)    Dosage adjustment made based on physician directed care plan.    Monica Leslie RN

## 2020-02-24 DIAGNOSIS — I70.90 ASVD (ARTERIOSCLEROTIC VASCULAR DISEASE): ICD-10-CM

## 2020-02-24 RX ORDER — CARVEDILOL 25 MG/1
25 TABLET ORAL 2 TIMES DAILY WITH MEALS
Qty: 180 TABLET | Refills: 1 | Status: SHIPPED | OUTPATIENT
Start: 2020-02-24 | End: 2020-11-11

## 2020-03-06 ENCOUNTER — ANTICOAGULATION THERAPY VISIT (OUTPATIENT)
Dept: NURSING | Facility: CLINIC | Age: 65
End: 2020-03-06
Payer: COMMERCIAL

## 2020-03-06 DIAGNOSIS — Z95.2 S/P AVR (AORTIC VALVE REPLACEMENT): ICD-10-CM

## 2020-03-06 DIAGNOSIS — I48.21 PERMANENT ATRIAL FIBRILLATION (H): ICD-10-CM

## 2020-03-06 LAB — INR POINT OF CARE: 2.6 (ref 0.86–1.14)

## 2020-03-06 PROCEDURE — 36416 COLLJ CAPILLARY BLOOD SPEC: CPT

## 2020-03-06 PROCEDURE — 99207 ZZC NO CHARGE NURSE ONLY: CPT

## 2020-03-06 PROCEDURE — 85610 PROTHROMBIN TIME: CPT | Mod: QW

## 2020-03-06 NOTE — PROGRESS NOTES
ANTICOAGULATION FOLLOW-UP CLINIC VISIT    Patient Name:  Jovon Mantilla  Date:  3/6/2020  Contact Type:  Face to Face    SUBJECTIVE: same plan of care  Patient Findings     Comments:   No concerns identified today        Clinical Outcomes     Negatives:   Major bleeding event, Thromboembolic event, Anticoagulation-related hospital admission, Anticoagulation-related ED visit, Anticoagulation-related fatality    Comments:   No concerns identified today           OBJECTIVE    INR Protime   Date Value Ref Range Status   2020 2.6 (A) 0.86 - 1.14 Final     Chromogenic Factor 10   Date Value Ref Range Status   2011 35 (L) 60 - 140 % Final     Comment:     Therapeutic Range: A Chromogenic Factor 10 level of 20-40% inversely   correlates   with an INR of 2-3 for patients receiving Warfarin. Chromogenic Factor 10   levels below 20% indicate an INR greater than 3, and levels above 40%   indicate   an INR lessthan 2.       ASSESSMENT / PLAN  INR assessment THER    Recheck INR In: 6 WEEKS    INR Location Clinic      Anticoagulation Summary  As of 3/6/2020    INR goal:   2.5-3.5   TTR:   72.0 % (8.6 mo)   INR used for dosin.6 (3/6/2020)   Warfarin maintenance plan:   4 mg (4 mg x 1) every Mon, Wed, Fri; 6 mg (4 mg x 1.5) all other days   Full warfarin instructions:   4 mg every Mon, Wed, Fri; 6 mg all other days   Weekly warfarin total:   36 mg   No change documented:   Monica Leslie, RN   Plan last modified:   Monica Leslie RN (2017)   Next INR check:   2020   Priority:   Maintenance   Target end date:   Indefinite    Indications    S/P AVR (aortic valve replacement) [Z95.2]  Permanent atrial fibrillation [I48.21]             Anticoagulation Episode Summary     INR check location:       Preferred lab:       Send INR reminders to:   [facility]    Comments:   24mm ATS Valve  Cardiomyopathy / CAD      Anticoagulation Care Providers     Provider Role Specialty Phone number    [place]  Rehan Jarvis MD [facility] [phone]            See the Encounter Report to view Anticoagulation Flowsheet and Dosing Calendar (Go to Encounters tab in chart review, and find the Anticoagulation Therapy Visit)    Dosage adjustment made based on physician directed care plan.    Monica Leslie RN

## 2020-03-11 DIAGNOSIS — E03.9 ACQUIRED HYPOTHYROIDISM: ICD-10-CM

## 2020-03-11 RX ORDER — LEVOTHYROXINE SODIUM 200 UG/1
200 TABLET ORAL DAILY
Qty: 30 TABLET | Refills: 2 | Status: SHIPPED | OUTPATIENT
Start: 2020-03-11 | End: 2020-03-13

## 2020-03-11 NOTE — TELEPHONE ENCOUNTER
"Requested Prescriptions   Pending Prescriptions Disp Refills     levothyroxine (SYNTHROID/LEVOTHROID) 200 MCG tablet [Pharmacy Med Name: LEVOTHYROXINE 200 MCG TABLET] 30 tablet 2     Sig: TAKE 1 TABLET (200 MCG) BY MOUTH DAILY SEE DOCTOR IN MARCH       Thyroid Protocol Failed - 3/11/2020  1:27 PM        Failed - Normal TSH on file in past 12 months     Recent Labs   Lab Test 02/04/19  1707   TSH 10.17*              Passed - Patient is 12 years or older        Passed - Recent (12 mo) or future (30 days) visit within the authorizing provider's specialty     Patient has had an office visit with the authorizing provider or a provider within the authorizing providers department within the previous 12 mos or has a future within next 30 days. See \"Patient Info\" tab in inbasket, or \"Choose Columns\" in Meds & Orders section of the refill encounter.              Passed - Medication is active on med list             "

## 2020-03-13 ENCOUNTER — OFFICE VISIT (OUTPATIENT)
Dept: FAMILY MEDICINE | Facility: CLINIC | Age: 65
End: 2020-03-13
Payer: COMMERCIAL

## 2020-03-13 ENCOUNTER — NURSE TRIAGE (OUTPATIENT)
Dept: FAMILY MEDICINE | Facility: CLINIC | Age: 65
End: 2020-03-13

## 2020-03-13 VITALS
OXYGEN SATURATION: 94 % | DIASTOLIC BLOOD PRESSURE: 84 MMHG | WEIGHT: 221 LBS | RESPIRATION RATE: 14 BRPM | HEART RATE: 85 BPM | SYSTOLIC BLOOD PRESSURE: 138 MMHG | TEMPERATURE: 97.6 F | BODY MASS INDEX: 31.11 KG/M2

## 2020-03-13 DIAGNOSIS — E78.5 HYPERLIPIDEMIA LDL GOAL <70: ICD-10-CM

## 2020-03-13 DIAGNOSIS — E03.9 ACQUIRED HYPOTHYROIDISM: ICD-10-CM

## 2020-03-13 DIAGNOSIS — I48.0 PAROXYSMAL ATRIAL FIBRILLATION (H): ICD-10-CM

## 2020-03-13 DIAGNOSIS — Z79.01 LONG TERM CURRENT USE OF ANTICOAGULANT THERAPY: ICD-10-CM

## 2020-03-13 DIAGNOSIS — R06.02 SOBOE (SHORTNESS OF BREATH ON EXERTION): ICD-10-CM

## 2020-03-13 DIAGNOSIS — I70.90 ASVD (ARTERIOSCLEROTIC VASCULAR DISEASE): ICD-10-CM

## 2020-03-13 DIAGNOSIS — Z95.2 H/O AORTIC VALVE REPLACEMENT: Primary | ICD-10-CM

## 2020-03-13 LAB
ALBUMIN SERPL-MCNC: 3.6 G/DL (ref 3.4–5)
ALP SERPL-CCNC: 86 U/L (ref 40–150)
ALT SERPL W P-5'-P-CCNC: 26 U/L (ref 0–70)
ANION GAP SERPL CALCULATED.3IONS-SCNC: 1 MMOL/L (ref 3–14)
AST SERPL W P-5'-P-CCNC: 27 U/L (ref 0–45)
BASOPHILS # BLD AUTO: 0 10E9/L (ref 0–0.2)
BASOPHILS NFR BLD AUTO: 0.5 %
BILIRUB SERPL-MCNC: 1 MG/DL (ref 0.2–1.3)
BUN SERPL-MCNC: 18 MG/DL (ref 7–30)
CALCIUM SERPL-MCNC: 8.9 MG/DL (ref 8.5–10.1)
CHLORIDE SERPL-SCNC: 105 MMOL/L (ref 94–109)
CHOLEST SERPL-MCNC: 109 MG/DL
CO2 SERPL-SCNC: 32 MMOL/L (ref 20–32)
CREAT SERPL-MCNC: 0.79 MG/DL (ref 0.66–1.25)
DIFFERENTIAL METHOD BLD: ABNORMAL
DIGOXIN SERPL-MCNC: 0.6 UG/L (ref 0.5–2)
EOSINOPHIL # BLD AUTO: 0.2 10E9/L (ref 0–0.7)
EOSINOPHIL NFR BLD AUTO: 4.1 %
ERYTHROCYTE [DISTWIDTH] IN BLOOD BY AUTOMATED COUNT: 15.7 % (ref 10–15)
GFR SERPL CREATININE-BSD FRML MDRD: >90 ML/MIN/{1.73_M2}
GLUCOSE SERPL-MCNC: 86 MG/DL (ref 70–99)
HCT VFR BLD AUTO: 38.2 % (ref 40–53)
HDLC SERPL-MCNC: 51 MG/DL
HGB BLD-MCNC: 11.9 G/DL (ref 13.3–17.7)
LDLC SERPL CALC-MCNC: 49 MG/DL
LYMPHOCYTES # BLD AUTO: 0.7 10E9/L (ref 0.8–5.3)
LYMPHOCYTES NFR BLD AUTO: 18.9 %
MCH RBC QN AUTO: 29.2 PG (ref 26.5–33)
MCHC RBC AUTO-ENTMCNC: 31.2 G/DL (ref 31.5–36.5)
MCV RBC AUTO: 94 FL (ref 78–100)
MONOCYTES # BLD AUTO: 0.6 10E9/L (ref 0–1.3)
MONOCYTES NFR BLD AUTO: 14.6 %
NEUTROPHILS # BLD AUTO: 2.4 10E9/L (ref 1.6–8.3)
NEUTROPHILS NFR BLD AUTO: 61.9 %
NONHDLC SERPL-MCNC: 58 MG/DL
PLATELET # BLD AUTO: 159 10E9/L (ref 150–450)
POTASSIUM SERPL-SCNC: 4.9 MMOL/L (ref 3.4–5.3)
PROT SERPL-MCNC: 7 G/DL (ref 6.8–8.8)
RBC # BLD AUTO: 4.08 10E12/L (ref 4.4–5.9)
SODIUM SERPL-SCNC: 138 MMOL/L (ref 133–144)
TRIGL SERPL-MCNC: 45 MG/DL
TSH SERPL DL<=0.005 MIU/L-ACNC: 2.83 MU/L (ref 0.4–4)
WBC # BLD AUTO: 3.9 10E9/L (ref 4–11)

## 2020-03-13 PROCEDURE — 80061 LIPID PANEL: CPT | Performed by: FAMILY MEDICINE

## 2020-03-13 PROCEDURE — 99214 OFFICE O/P EST MOD 30 MIN: CPT | Performed by: FAMILY MEDICINE

## 2020-03-13 PROCEDURE — 84443 ASSAY THYROID STIM HORMONE: CPT | Performed by: FAMILY MEDICINE

## 2020-03-13 PROCEDURE — 36415 COLL VENOUS BLD VENIPUNCTURE: CPT | Performed by: FAMILY MEDICINE

## 2020-03-13 PROCEDURE — 80050 GENERAL HEALTH PANEL: CPT | Performed by: FAMILY MEDICINE

## 2020-03-13 PROCEDURE — 80162 ASSAY OF DIGOXIN TOTAL: CPT | Performed by: FAMILY MEDICINE

## 2020-03-13 RX ORDER — NITROGLYCERIN 0.3 MG/1
TABLET SUBLINGUAL
Qty: 25 TABLET | Refills: 1 | Status: SHIPPED | OUTPATIENT
Start: 2020-03-13 | End: 2021-08-09

## 2020-03-13 RX ORDER — LEVOTHYROXINE SODIUM 200 UG/1
200 TABLET ORAL DAILY
Qty: 30 TABLET | Refills: 2 | Status: SHIPPED | OUTPATIENT
Start: 2020-03-13 | End: 2020-08-10

## 2020-03-13 RX ORDER — SIMVASTATIN 20 MG
20 TABLET ORAL AT BEDTIME
Qty: 90 TABLET | Refills: 0 | Status: SHIPPED | OUTPATIENT
Start: 2020-03-13 | End: 2020-06-01

## 2020-03-13 NOTE — LETTER
Jovon  Thank you for choosing [facility] today for your health care needs.     [facility] is transforming primary care  At [facility], we re dedicated to constantly improve how we serve the health care needs of our patients and communities. We re currently making changes to the way we deliver care.     Changes you ll notice include:    An emphasis on building a relationship with a primary care provider    Access to a PAL (personal advocate and liaison) to help guide you with your care needs    Appointment lengths tailored to your specific needs and greater access to a care team to help you and your provider improve and maintain your health and well-being    Improved online access to your care team    Benefits of a primary care provider  If you don t have a designated primary care provider, we encourage you to get to know our care team online and find a provider you d like to see. Most of our providers have a short video on their online provider page. Visit [email or web address] to explore our providers and locations.    Benefits of having a primary care provider include:      They get to know you - your health history, family history and goals, making it easier to make a health plan together.     You get to know them - making health-related conversations and decisions easier      Primary care doctors help you when you re sick or hurt - but also focus on keeping you healthy with preventive care and screenings.      A doctor who sees you regularly is more likely to notice changes in your health.     You ll be connected to a broad care team who partners with your provider to support you.    Patient Advocate Liaison (PAL)   To help make sure you get the right care, at the right time, we include PALs, or Patient Advocate Liaisons, as part of your care team. Your PAL will be your first line of contact. They ll advocate for your needs and help you navigate our services, connecting you with care team members and community resources to  ensure your care is well coordinated. Your PAL is GRETCHEN Quiroga. Her phone number is [phone].        Expanded care team access with tailored appointment lengths  Depending on your health care needs, you may have longer or shorter appointments and see additional care team providers - including Medication Therapy Management (MTM) pharmacists, diabetes educators, behavioral health clinicians, or social workers. At times, they may be included in your visit with your provider, or you may see them individually.     Online access to your health care records and care team  [employer] is our online tool that makes it easy to see your health care information and communicate with your care team.     [employer] allows you to:     View your health maintenance plan so you know when you re due for a preventive screening    Send secure messages to your care team    View your health history and visit summaries     Schedule appointments     Complete questionnaires and eCheck-in before appointments      Get care from your provider with an e-visit      View and pay your bill     Sign up at [email or web address]. Once you have an account, you also can download the mobile primitivo.   GRETCHEN Quiroga - Patient Advocate and Liaison - Care Team of Rehan Lorenzana M.D.

## 2020-03-13 NOTE — TELEPHONE ENCOUNTER
"  Additional Information    Patient wants to be seen    Answer Assessment - Initial Assessment Questions  1. RESPIRATORY STATUS: \"Describe your breathing?\" (e.g., wheezing, shortness of breath, unable to speak, severe coughing)       Short of breath with exertion  2. ONSET: \"When did this breathing problem begin?\"       Started a week  3. PATTERN \"Does the difficult breathing come and go, or has it been constant since it started?\"       SOB with exertion  4. SEVERITY: \"How bad is your breathing?\" (e.g., mild, moderate, severe)     - MILD: No SOB at rest, mild SOB with walking, speaks normally in sentences, can lay down, no retractions, pulse < 100.     - MODERATE: SOB at rest, SOB with minimal exertion and prefers to sit, cannot lie down flat, speaks in phrases, mild retractions, audible wheezing, pulse 100-120.     - SEVERE: Very SOB at rest, speaks in single words, struggling to breathe, sitting hunched forward, retractions, pulse > 120       Moderate with exertion  5. RECURRENT SYMPTOM: \"Have you had difficulty breathing before?\" If so, ask: \"When was the last time?\" and \"What happened that time?\"       In 2011 before open heart surgery  6. CARDIAC HISTORY: \"Do you have any history of heart disease?\" (e.g., heart attack, angina, bypass surgery, angioplasty)       Aortic Valve replacement  7. LUNG HISTORY: \"Do you have any history of lung disease?\"  (e.g., pulmonary embolus, asthma, emphysema)      no  8. CAUSE: \"What do you think is causing the breathing problem?\"       unsure  9. OTHER SYMPTOMS: \"Do you have any other symptoms? (e.g., dizziness, runny nose, cough, chest pain, fever)      no  10. PREGNANCY: \"Is there any chance you are pregnant?\" \"When was your last menstrual period?\"        n/a  11. TRAVEL: \"Have you traveled out of the country in the last month?\" (e.g., travel history, exposures)        no    Protocols used: BREATHING DIFFICULTY-A-OH    "

## 2020-03-13 NOTE — PROGRESS NOTES
Subjective     Jovon Mantilla is a 64 year old male who presents to clinic today for the following health issues:    HPI   Concern - Follow up for SOBOE, had pneumonia and successfully treated, no cough or mucous now, initial xray was read as negative for infiltrate     Description:   Pt says he is having Shortness of breath when he do ant activity, walking, going up steps, getting in to car.    Intensity: mild    Progression of Symptoms:  worsening    Accompanying Signs & Symptoms:  none    Previous history of similar problem:   Not since 2011 after open heart surgery    Precipitating factors:   Worsened by: walking    Alleviating factors:  Improved by: none     Therapies Tried and outcome: no      BP Readings from Last 3 Encounters:   03/13/20 138/84   01/31/20 (!) 144/80   10/18/19 114/70    Wt Readings from Last 3 Encounters:   03/13/20 100.2 kg (221 lb)   01/31/20 95.7 kg (211 lb)   10/18/19 97.1 kg (214 lb)                    Reviewed and updated as needed this visit by Provider         Review of Systems   ROS COMP: Constitutional, HEENT, cardiovascular, pulmonary, GI, , musculoskeletal, neuro, skin, endocrine and psych systems are negative, except as otherwise noted.      Objective    /84 (BP Location: Right arm, Patient Position: Chair, Cuff Size: Adult Large)   Pulse 85   Temp 97.6  F (36.4  C) (Oral)   Resp 14   Wt 100.2 kg (221 lb)   SpO2 94%   BMI 31.11 kg/m    Body mass index is 31.11 kg/m .  Physical Exam   GENERAL: healthy, alert and no distress  EYES: Eyes grossly normal to inspection, PERRL and conjunctivae and sclerae normal  HENT: ear canals and TM's normal, nose and mouth without ulcers or lesions  NECK: no adenopathy, no asymmetry, masses, or scars and thyroid normal to palpation  RESP: lungs clear to auscultation - no rales, rhonchi or wheezes  CV: regular rate and rhythm, normal S1 S2, no S3 or S4, no murmur, click or rub, no peripheral edema and peripheral pulses  strong  ABDOMEN: soft, nontender, no hepatosplenomegaly, no masses and bowel sounds normal  MS: no gross musculoskeletal defects noted, no edema  SKIN: no suspicious lesions or rashes  NEURO: Normal strength and tone, mentation intact and speech normal  PSYCH: mentation appears normal, affect normal/bright    Diagnostic Test Results:  Labs reviewed in Epic        Assessment & Plan   Assessment  (Z95.2) H/O aortic valve replacement  (primary encounter diagnosis)  Comment: chronic persistent atrial fibrillation   Plan: Echocardiogram Complete, CBC with platelets and        differential, Comprehensive metabolic panel,         Lipid panel reflex to direct LDL Fasting, TSH         with free T4 reflex, nitroGLYcerin (NITROSTAT)         0.3 MG sublingual tablet, CANCELED: TSH            (R06.02) SOBOE (shortness of breath on exertion)  Comment: check for anemia  Plan: Echocardiogram Complete, CBC with platelets and        differential, Comprehensive metabolic panel,         Lipid panel reflex to direct LDL Fasting, TSH         with free T4 reflex, nitroGLYcerin (NITROSTAT)         0.3 MG sublingual tablet, CANCELED: TSH        Med renew    (I48.0) Paroxysmal atrial fibrillation (H)  Comment:   Plan: Echocardiogram Complete, CBC with platelets and        differential, Digoxin level            (Z79.01) Long term current use of anticoagulant therapy  Comment:   Plan: Echocardiogram Complete, CBC with platelets and        differential, Comprehensive metabolic panel            (E03.9) Acquired hypothyroidism  Comment:   Plan: TSH with free T4 reflex, levothyroxine         (SYNTHROID/LEVOTHROID) 200 MCG tablet,         CANCELED: TSH        tsh check     (E78.5) Hyperlipidemia LDL goal <70  Comment:   Plan: simvastatin (ZOCOR) 20 MG tablet            (I70.90) ASVD (arteriosclerotic vascular disease)  Comment:   Plan: nitroGLYcerin (NITROSTAT) 0.3 MG sublingual         tablet            }    Plan  1. H/O aortic valve  replacement    - Echocardiogram Complete; Future  - CBC with platelets and differential  - Comprehensive metabolic panel  - Lipid panel reflex to direct LDL Fasting  - TSH with free T4 reflex  - nitroGLYcerin (NITROSTAT) 0.3 MG sublingual tablet; For chest pain place 1 tablet under the tongue every 5 minutes for 3 doses. If symptoms persist 5 minutes after 1st dose call 911.  Dispense: 25 tablet; Refill: 1    2. SOBOE (shortness of breath on exertion)    - Echocardiogram Complete; Future  - CBC with platelets and differential  - Comprehensive metabolic panel  - Lipid panel reflex to direct LDL Fasting  - TSH with free T4 reflex  - nitroGLYcerin (NITROSTAT) 0.3 MG sublingual tablet; For chest pain place 1 tablet under the tongue every 5 minutes for 3 doses. If symptoms persist 5 minutes after 1st dose call 911.  Dispense: 25 tablet; Refill: 1    3. Paroxysmal atrial fibrillation (H)    - Echocardiogram Complete; Future  - CBC with platelets and differential  - Digoxin level    4. Long term current use of anticoagulant therapy-check hb    - Echocardiogram Complete; Future  - CBC with platelets and differential  - Comprehensive metabolic panel    5. Acquired hypothyroidism: replaced     - TSH with free T4 reflex  - levothyroxine (SYNTHROID/LEVOTHROID) 200 MCG tablet; Take 1 tablet (200 mcg) by mouth daily SEE DOCTOR IN MARCH  Dispense: 30 tablet; Refill: 2    6. Hyperlipidemia LDL goal <70    - simvastatin (ZOCOR) 20 MG tablet; Take 1 tablet (20 mg) by mouth At Bedtime  Dispense: 90 tablet; Refill: 0    7. ASVD (arteriosclerotic vascular disease)  No chest pain  - nitroGLYcerin (NITROSTAT) 0.3 MG sublingual tablet; For chest pain place 1 tablet under the tongue every 5 minutes for 3 doses. If symptoms persist 5 minutes after 1st dose call 911.  Dispense: 25 tablet; Refill: 1        Return in about 6 weeks (around 4/24/2020) for Lab Work, Physical Exam.    Rehan Lorenzana MD  [facility]

## 2020-04-17 ENCOUNTER — ANTICOAGULATION THERAPY VISIT (OUTPATIENT)
Dept: FAMILY MEDICINE | Facility: CLINIC | Age: 65
End: 2020-04-17
Payer: COMMERCIAL

## 2020-04-17 DIAGNOSIS — Z95.2 S/P AVR (AORTIC VALVE REPLACEMENT): ICD-10-CM

## 2020-04-17 DIAGNOSIS — Z79.01 LONG TERM CURRENT USE OF ANTICOAGULANT THERAPY: ICD-10-CM

## 2020-04-17 DIAGNOSIS — Z95.2 S/P AORTIC VALVE REPLACEMENT: ICD-10-CM

## 2020-04-17 DIAGNOSIS — I48.21 PERMANENT ATRIAL FIBRILLATION (H): ICD-10-CM

## 2020-04-17 DIAGNOSIS — I48.21 PERMANENT ATRIAL FIBRILLATION (H): Primary | ICD-10-CM

## 2020-04-17 LAB
CAPILLARY BLOOD COLLECTION: NORMAL
INR PPP: 2.6 (ref 0.86–1.14)

## 2020-04-17 PROCEDURE — 99207 ZZC NO CHARGE NURSE ONLY: CPT | Performed by: FAMILY MEDICINE

## 2020-04-17 PROCEDURE — 36416 COLLJ CAPILLARY BLOOD SPEC: CPT | Performed by: INTERNAL MEDICINE

## 2020-04-17 PROCEDURE — 85610 PROTHROMBIN TIME: CPT | Performed by: INTERNAL MEDICINE

## 2020-04-17 NOTE — PROGRESS NOTES
ANTICOAGULATION FOLLOW-UP TELEPHONE VISIT    Patient Name:  Jovon Mantilla  Date:  2020  Contact Type:  Telephone/ I called patient in follow up to INR done at lab today.  He states everything is the same, he is well.  Continue same plan of care with 6 wk recheck (coordianted visit around his schedule).  Call with changes or concerns.    SUBJECTIVE:  Same plan of care  Patient Findings     Comments:   No changes or concerns identified today        Clinical Outcomes     Negatives:   Major bleeding event, Thromboembolic event, Anticoagulation-related hospital admission, Anticoagulation-related ED visit, Anticoagulation-related fatality    Comments:   No changes or concerns identified today           OBJECTIVE    INR   Date Value Ref Range Status   2020 2.60 (H) 0.86 - 1.14 Final     Comment:     This test is intended for monitoring Coumadin therapy.  Results are not   accurate in patients with prolonged INR due to factor deficiency.       Chromogenic Factor 10   Date Value Ref Range Status   2011 35 (L) 60 - 140 % Final     Comment:     Therapeutic Range: A Chromogenic Factor 10 level of 20-40% inversely   correlates   with an INR of 2-3 for patients receiving Warfarin. Chromogenic Factor 10   levels below 20% indicate an INR greater than 3, and levels above 40%   indicate   an INR lessthan 2.       ASSESSMENT / PLAN  INR assessment THER    Recheck INR In: 6 WEEKS    INR Location Outside lab      Anticoagulation Summary  As of 2020    INR goal:   2.5-3.5   TTR:   76.5 % (9.3 mo)   INR used for dosin.60 (2020)   Warfarin maintenance plan:   4 mg (4 mg x 1) every Mon, Wed, Fri; 6 mg (4 mg x 1.5) all other days   Full warfarin instructions:   4 mg every Mon, Wed, Fri; 6 mg all other days   Weekly warfarin total:   36 mg   No change documented:   Monica Leslie, RN   Plan last modified:   Monica Lesile, RN (2017)   Next INR check:   2020   Priority:   Maintenance   Target end  date:   Indefinite    Indications    S/P AVR (aortic valve replacement) [Z95.2]  Permanent atrial fibrillation [I48.21]             Anticoagulation Episode Summary     INR check location:       Preferred lab:       Send INR reminders to:   [facility]    Comments:   24mm ATS Valve  Cardiomyopathy / CAD      Anticoagulation Care Providers     Provider Role Specialty Phone number    Rehan Lorenzana MD [facility] [phone]            See the Encounter Report to view Anticoagulation Flowsheet and Dosing Calendar (Go to Encounters tab in chart review, and find the Anticoagulation Therapy Visit)    Dosage adjustment made based on physician directed care plan.    Monica Leslie RN

## 2020-05-10 DIAGNOSIS — R06.02 SOBOE (SHORTNESS OF BREATH ON EXERTION): ICD-10-CM

## 2020-05-10 DIAGNOSIS — I70.90 ASVD (ARTERIOSCLEROTIC VASCULAR DISEASE): ICD-10-CM

## 2020-05-10 DIAGNOSIS — Z95.2 H/O AORTIC VALVE REPLACEMENT: ICD-10-CM

## 2020-05-11 RX ORDER — NITROGLYCERIN 0.3 MG/1
TABLET SUBLINGUAL
Qty: 25 TABLET | Refills: 1 | OUTPATIENT
Start: 2020-05-11

## 2020-05-11 NOTE — TELEPHONE ENCOUNTER
Pt should have refills on file E-Prescribing Status: Receipt confirmed by pharmacy (3/13/2020 10:08 AM CDT)

## 2020-05-17 DIAGNOSIS — Z79.01 LONG TERM CURRENT USE OF ANTICOAGULANT THERAPY: ICD-10-CM

## 2020-05-18 DIAGNOSIS — I70.90 ASVD (ARTERIOSCLEROTIC VASCULAR DISEASE): ICD-10-CM

## 2020-05-18 DIAGNOSIS — Z95.2 H/O AORTIC VALVE REPLACEMENT: ICD-10-CM

## 2020-05-18 DIAGNOSIS — R06.02 SOBOE (SHORTNESS OF BREATH ON EXERTION): ICD-10-CM

## 2020-05-18 RX ORDER — WARFARIN SODIUM 4 MG/1
TABLET ORAL
Qty: 117 TABLET | Refills: 1 | Status: SHIPPED | OUTPATIENT
Start: 2020-05-18 | End: 2020-11-16

## 2020-05-18 NOTE — TELEPHONE ENCOUNTER
Anticoagulation Monitoring Instructions   Latest Ref Rng & Units    4/17/2020 4 mg every Mon, Wed, Fri; 6 mg all other days   Prescription approved per [facility] Refill Protocol.  Nellie Esqueda, RN  Anticoagulation Nurse - [facility]

## 2020-05-19 RX ORDER — NITROGLYCERIN 0.3 MG/1
TABLET SUBLINGUAL
Qty: 25 TABLET | Refills: 1 | OUTPATIENT
Start: 2020-05-19

## 2020-05-27 ENCOUNTER — MYC MEDICAL ADVICE (OUTPATIENT)
Dept: FAMILY MEDICINE | Facility: CLINIC | Age: 65
End: 2020-05-27

## 2020-05-27 NOTE — LETTER
[facility]  [address] [place], [address]  [phone]        May 27, 2020    Jovon Mantilla                                                                                                                                                       [id number]  [facility] [address]            Dear Jovon,  This is to verify that , due to your high healt status for Covid infection, I have suggested that you take a medical leave of absence through August 1,2020          Rehan Lorenzana MD

## 2020-06-03 ENCOUNTER — TELEPHONE (OUTPATIENT)
Dept: CARDIOLOGY | Facility: CLINIC | Age: 65
End: 2020-06-03

## 2020-06-04 ENCOUNTER — HOSPITAL ENCOUNTER (OUTPATIENT)
Dept: CARDIOLOGY | Facility: CLINIC | Age: 65
Discharge: HOME | End: 2020-06-04
Attending: FAMILY MEDICINE | Admitting: FAMILY MEDICINE
Payer: COMMERCIAL

## 2020-06-04 DIAGNOSIS — R06.02 SOBOE (SHORTNESS OF BREATH ON EXERTION): ICD-10-CM

## 2020-06-04 DIAGNOSIS — Z95.2 H/O AORTIC VALVE REPLACEMENT: ICD-10-CM

## 2020-06-04 DIAGNOSIS — Z79.01 LONG TERM CURRENT USE OF ANTICOAGULANT THERAPY: ICD-10-CM

## 2020-06-04 DIAGNOSIS — I48.0 PAROXYSMAL ATRIAL FIBRILLATION (H): ICD-10-CM

## 2020-06-04 PROCEDURE — 93306 TTE W/DOPPLER COMPLETE: CPT | Mod: 26 | Performed by: INTERNAL MEDICINE

## 2020-06-04 PROCEDURE — 93306 TTE W/DOPPLER COMPLETE: CPT

## 2020-06-04 NOTE — TELEPHONE ENCOUNTER
Jovon Harrison is scheduled to see you June 22 (not June 12) face to face. Keep OV or offer video visit?   Casey Lieberman RN

## 2020-06-05 ENCOUNTER — ANTICOAGULATION THERAPY VISIT (OUTPATIENT)
Dept: FAMILY MEDICINE | Facility: CLINIC | Age: 65
End: 2020-06-05
Payer: COMMERCIAL

## 2020-06-05 DIAGNOSIS — Z95.2 S/P AORTIC VALVE REPLACEMENT: ICD-10-CM

## 2020-06-05 DIAGNOSIS — Z95.2 S/P AVR (AORTIC VALVE REPLACEMENT): ICD-10-CM

## 2020-06-05 DIAGNOSIS — Z79.01 LONG TERM CURRENT USE OF ANTICOAGULANT THERAPY: ICD-10-CM

## 2020-06-05 DIAGNOSIS — I48.21 PERMANENT ATRIAL FIBRILLATION (H): ICD-10-CM

## 2020-06-05 LAB
CAPILLARY BLOOD COLLECTION: NORMAL
INR PPP: 3.2 (ref 0.86–1.14)

## 2020-06-05 PROCEDURE — 99207 ZZC NO CHARGE NURSE ONLY: CPT | Performed by: FAMILY MEDICINE

## 2020-06-05 PROCEDURE — 85610 PROTHROMBIN TIME: CPT | Performed by: FAMILY MEDICINE

## 2020-06-05 PROCEDURE — 36416 COLLJ CAPILLARY BLOOD SPEC: CPT | Performed by: FAMILY MEDICINE

## 2020-06-05 NOTE — PROGRESS NOTES
ANTICOAGULATION FOLLOW-UP TELEPHONE VISIT    Patient Name:  Jovon Mantilla  Date:  6/5/2020  Contact Type:  Telephone/ I spoke to Jovon following INR done at lab today.  States everything is the same, report per below.  Continue same plan of care.    SUBJECTIVE: 6 wk aminta  Patient Findings     Comments:   No changes or concerns identified today.  Patient is well.        Clinical Outcomes     Negatives:   Major bleeding event, Thromboembolic event, Anticoagulation-related hospital admission, Anticoagulation-related ED visit, Anticoagulation-related fatality    Comments:   No changes or concerns identified today.  Patient is well.           OBJECTIVE    Recent labs: (last 7 days)     06/05/20  0929   INR 3.20*       ASSESSMENT / PLAN  INR assessment THER    Recheck INR In: 6 WEEKS    INR Location Outside lab      Anticoagulation Summary  As of 6/5/2020    INR goal:   2.5-3.5   TTR:   80.0 % (11 mo)   INR used for dosing:   3.20 (6/5/2020)   Warfarin maintenance plan:   4 mg (4 mg x 1) every Mon, Wed, Fri; 6 mg (4 mg x 1.5) all other days   Full warfarin instructions:   4 mg every Mon, Wed, Fri; 6 mg all other days   Weekly warfarin total:   36 mg   No change documented:   Monica Leslie, RN   Plan last modified:   Monica Leslie, RN (5/4/2017)   Next INR check:   7/17/2020   Priority:   Maintenance   Target end date:   Indefinite    Indications    S/P AVR (aortic valve replacement) [Z95.2]  Permanent atrial fibrillation [I48.21]             Anticoagulation Episode Summary     INR check location:       Preferred lab:       Send INR reminders to:   [facility]    Comments:   24mm ATS Valve  Cardiomyopathy / CAD      Anticoagulation Care Providers     Provider Role Specialty Phone number    Rehan Lorenzana MD [facility] [phone]            See the Encounter Report to view Anticoagulation Flowsheet and Dosing Calendar (Go to Encounters tab in chart review, and find the  Anticoagulation Therapy Visit)    Dosage adjustment made based on physician directed care plan.    Monica Leslie RN

## 2020-06-25 ENCOUNTER — OFFICE VISIT (OUTPATIENT)
Dept: FAMILY MEDICINE | Facility: CLINIC | Age: 65
End: 2020-06-25
Payer: COMMERCIAL

## 2020-06-25 VITALS
OXYGEN SATURATION: 95 % | WEIGHT: 203 LBS | TEMPERATURE: 98.2 F | HEART RATE: 79 BPM | DIASTOLIC BLOOD PRESSURE: 60 MMHG | SYSTOLIC BLOOD PRESSURE: 90 MMHG | BODY MASS INDEX: 28.58 KG/M2

## 2020-06-25 DIAGNOSIS — F41.9 ANXIETY: ICD-10-CM

## 2020-06-25 DIAGNOSIS — Z23 ENCOUNTER FOR IMMUNIZATION: Primary | ICD-10-CM

## 2020-06-25 DIAGNOSIS — Z11.4 SCREENING FOR HIV (HUMAN IMMUNODEFICIENCY VIRUS): ICD-10-CM

## 2020-06-25 DIAGNOSIS — I10 HYPERTENSION GOAL BP (BLOOD PRESSURE) < 140/90: ICD-10-CM

## 2020-06-25 PROCEDURE — 82043 UR ALBUMIN QUANTITATIVE: CPT | Performed by: FAMILY MEDICINE

## 2020-06-25 NOTE — PROGRESS NOTES
Subjective   Follow up cardiac status, afib, artificial valve   Jovon Mantilla is a 64 year old male who presents to clinic today for the following health issues:    HPI   Pt here today to follow up on Echo  EF is well maintained, discussed hazards of COVID  Past Medical History:   Diagnosis Date     Acne rosacea 4/22/2019     Acquired hypothyroidism 5/23/2003     Problem list name updated by automated process. Provider to review     Acute prostatitis      Acute sinusitis, unspecified      Aortic valve disease      Atrial fibrillation (H)      Biatrial enlargement      CAD (coronary artery disease)     known 70% LAD stenosis     GI bleeding 1/22/2016     HTN (hypertension)      Hyperlipidemia      Idiopathic cardiomyopathy (H)      Mitral regurgitation      Obesity      Obesity, unspecified      Palpitations      Permanent atrial fibrillation 6/2/2017     Right inguinal hernia 7/22/2019     S/P AVR 01/07/2011    with Maze procedure     Tricuspid regurgitation      Unspecified essential hypertension        Past Surgical History:   Procedure Laterality Date     ANGIOGRAM  2/02    Normal coronary,dilated LV,Sev.decr.global LVF,+1 MR     ANGIOGRAM  1/08    Mild AS,Mod PHTN,mod prox.LAD disease,Triv.CFX disease     ANGIOGRAM  1/09    Mod AS,Mod PHTN,mod prox.LAD disease     ANGIOGRAM  10/10    Sev.AS,CM,global hypokinesis,Mild-mod LV dilated,Mild MR,70% prox.LAD lesion,PHTN     C NONSPECIFIC PROCEDURE  ~1985    vein stripping     CARDIOVERSION  2/02, 4/02, 4/11     CHOLECYSTECTOMY       COLONOSCOPY N/A 1/23/2016    Procedure: COLONOSCOPY;  Surgeon: Robyn Collins MD;  Location:  GI     ESOPHAGOSCOPY, GASTROSCOPY, DUODENOSCOPY (EGD), COMBINED N/A 1/23/2016    Procedure: COMBINED ESOPHAGOSCOPY, GASTROSCOPY, DUODENOSCOPY (EGD);  Surgeon: Robyn Collins MD;  Location:  GI     LAPAROSCOPIC BYPASS GASTRIC  10/8/2011    Procedure:LAPAROSCOPIC BYPASS GASTRIC; Diagnostic laparoscopy, Lysis of Adhesions,  Laparoscopic Revision of Jejunojejunostomy; Surgeon:RADHA MURO; Location:[facility] OR     LAPAROSCOPIC BYPASS GASTRIC  6/26/2006    Dr Jin     LAPAROSCOPY DIAGNOSTIC (GENERAL)  10/8/2011    Procedure:LAPAROSCOPY DIAGNOSTIC (GENERAL); Surgeon:RADHA MURO; Location:[facility] OR     REPLACE VALVE AORTIC  1/7/2011       Family History   Problem Relation Age of Onset     Cancer - colorectal Father      Colon Cancer Father      Cancer Father      Diabetes Brother        Social History     Tobacco Use     Smoking status: Never Smoker     Smokeless tobacco: Never Used   Substance Use Topics     Alcohol use: No     Alcohol/week: 0.0 standard drinks         BP Readings from Last 3 Encounters:   06/25/20 90/60   03/13/20 138/84   01/31/20 (!) 144/80    Wt Readings from Last 3 Encounters:   06/25/20 92.1 kg (203 lb)   03/13/20 100.2 kg (221 lb)   01/31/20 95.7 kg (211 lb)                    Reviewed and updated as needed this visit by Provider         Review of Systems   Constitutional, HEENT, cardiovascular, pulmonary, GI, , musculoskeletal, neuro, skin, endocrine and psych systems are negative, except as otherwise noted.      Objective    There were no vitals taken for this visit.  There is no height or weight on file to calculate BMI.  Physical Exam   GENERAL: healthy, alert and no distress  EYES: Eyes grossly normal to inspection, PERRL and conjunctivae and sclerae normal  HENT: ear canals and TM's normal, nose and mouth without ulcers or lesions  NECK: no adenopathy, no asymmetry, masses, or scars and thyroid normal to palpation  RESP: lungs clear to auscultation - no rales, rhonchi or wheezes  CV: regular rate and rhythm, normal S1 S2, no S3 or S4, no murmur, click or rub, no peripheral edema and peripheral pulses strong  ABDOMEN: soft, nontender, no hepatosplenomegaly, no masses and bowel sounds normal  MS: no gross musculoskeletal defects noted, no edema  SKIN: no suspicious lesions or rashes  NEURO: Normal  "strength and tone, mentation intact and speech normal  PSYCH: mentation appears normal, affect normal/bright    Diagnostic Test Results:  Labs reviewed in Epic        Assessment & Plan     1. Anxiety  covid concerns     2. Screening for HIV (human immunodeficiency virus)  lab    3. Encounter for immunization  Consider zoster    4. Hypertension goal BP (blood pressure) < 140/90  At goal, cardiology follow up   - Albumin Random Urine Quantitative with Creat Ratio; Future  - Albumin Random Urine Quantitative with Creat Ratio     BMI:   Estimated body mass index is 28.58 kg/m  as calculated from the following:    Height as of 1/31/20: 1.795 m (5' 10.67\").    Weight as of this encounter: 92.1 kg (203 lb).   Weight management plan: Discussed healthy diet and exercise guidelines        FUTURE APPOINTMENTS:       - Follow-up visit in 3 months       Rehan Lorenzana MD  [facility]"

## 2020-06-25 NOTE — PROGRESS NOTES
Pre-Visit Planning     Future Appointments   Date Time Provider Department Center   6/25/2020  1:20 PM Rehan Lorenzana MD [facility]   7/17/2020  9:30 AM CP LAB [facility]     Arrival Time for this Appointment:  1:00 PM   Appointment Notes for this encounter:   [facility] Follow up 6/4/20 Echocardiogram (F2F ok per SAMUEL-diallo)    Questionnaires Reviewed/Assigned  No additional questionnaires are needed        Patient preferred phone number: [phone]    Unable to reach patient and unable to leave voicemail. T'd up pending health maintenance, and medication patient may need refill on (sertraline). Please discuss medication with patient for efficacy and consistency of use. Patient last had orders written 14.5 months ago for a year's worth of dosing.     Kimber Mo, GAGEN, RN, PHN

## 2020-06-26 LAB
CREAT UR-MCNC: 141 MG/DL
MICROALBUMIN UR-MCNC: 97 MG/L
MICROALBUMIN/CREAT UR: 68.87 MG/G CR (ref 0–17)

## 2020-07-17 ENCOUNTER — ANTICOAGULATION THERAPY VISIT (OUTPATIENT)
Dept: FAMILY MEDICINE | Facility: CLINIC | Age: 65
End: 2020-07-17
Payer: COMMERCIAL

## 2020-07-17 DIAGNOSIS — I48.21 PERMANENT ATRIAL FIBRILLATION (H): ICD-10-CM

## 2020-07-17 DIAGNOSIS — Z95.2 S/P AVR (AORTIC VALVE REPLACEMENT): ICD-10-CM

## 2020-07-17 DIAGNOSIS — Z79.01 LONG TERM CURRENT USE OF ANTICOAGULANT THERAPY: ICD-10-CM

## 2020-07-17 DIAGNOSIS — Z95.2 S/P AORTIC VALVE REPLACEMENT: ICD-10-CM

## 2020-07-17 LAB
CAPILLARY BLOOD COLLECTION: NORMAL
INR PPP: 2.5 (ref 0.86–1.14)

## 2020-07-17 PROCEDURE — 36416 COLLJ CAPILLARY BLOOD SPEC: CPT | Performed by: FAMILY MEDICINE

## 2020-07-17 PROCEDURE — 85610 PROTHROMBIN TIME: CPT | Performed by: FAMILY MEDICINE

## 2020-07-17 PROCEDURE — 99207 ZZC NO CHARGE NURSE ONLY: CPT | Performed by: FAMILY MEDICINE

## 2020-07-17 NOTE — PROGRESS NOTES
ANTICOAGULATION FOLLOW-UP TELEPHONE VISIT    Patient Name:  Jovon Mantilla  Date:  2020  Contact Type:  Telephone/ Report per below.    SUBJECTIVE: same plan of care  Patient Findings     Comments:   I spoke to Jovon following INR done at lab today.  He denies any symptoms, changes, or concerns.  He will continue his same warfarin dose and recheck arranged in 6 weeks.        Clinical Outcomes     Negatives:   Major bleeding event, Thromboembolic event, Anticoagulation-related hospital admission, Anticoagulation-related ED visit, Anticoagulation-related fatality    Comments:   I spoke to Jovon following INR done at lab today.  He denies any symptoms, changes, or concerns.  He will continue his same warfarin dose and recheck arranged in 6 weeks.           OBJECTIVE    Recent labs: (last 7 days)     20  0928   INR 2.50*       ASSESSMENT / PLAN  INR assessment THER    Recheck INR In: 6 WEEKS    INR Location Outside lab      Anticoagulation Summary  As of 2020    INR goal:   2.5-3.5   TTR:   81.9 % (1 y)   INR used for dosin.50 (2020)   Warfarin maintenance plan:   4 mg (4 mg x 1) every Mon, Wed, Fri; 6 mg (4 mg x 1.5) all other days   Full warfarin instructions:   4 mg every Mon, Wed, Fri; 6 mg all other days   Weekly warfarin total:   36 mg   No change documented:   Monica Leslie RN   Plan last modified:   Monica Leslie RN (2017)   Next INR check:   2020   Priority:   Maintenance   Target end date:   Indefinite    Indications    S/P AVR (aortic valve replacement) [Z95.2]  Permanent atrial fibrillation (H) [I48.21]             Anticoagulation Episode Summary     INR check location:       Preferred lab:       Send INR reminders to:   [facility]    Comments:   24mm ATS Valve  Cardiomyopathy / CAD      Anticoagulation Care Providers     Provider Role Specialty Phone number    Rehan Lorenzana MD [facility] [phone]            See the  Encounter Report to view Anticoagulation Flowsheet and Dosing Calendar (Go to Encounters tab in chart review, and find the Anticoagulation Therapy Visit)    Dosage adjustment made based on physician directed care plan.    Monica Leslie RN

## 2020-07-30 ENCOUNTER — OFFICE VISIT (OUTPATIENT)
Dept: FAMILY MEDICINE | Facility: CLINIC | Age: 65
End: 2020-07-30
Payer: COMMERCIAL

## 2020-07-30 VITALS
WEIGHT: 205.4 LBS | HEART RATE: 66 BPM | OXYGEN SATURATION: 95 % | DIASTOLIC BLOOD PRESSURE: 82 MMHG | TEMPERATURE: 98 F | BODY MASS INDEX: 29.41 KG/M2 | HEIGHT: 70 IN | SYSTOLIC BLOOD PRESSURE: 120 MMHG

## 2020-07-30 PROCEDURE — 99396 PREV VISIT EST AGE 40-64: CPT | Performed by: FAMILY MEDICINE

## 2020-07-30 ASSESSMENT — ENCOUNTER SYMPTOMS
HEMATURIA: 0
CHILLS: 0
DIZZINESS: 0
CONSTIPATION: 0
DIARRHEA: 0
EYE PAIN: 0
FEVER: 0
NERVOUS/ANXIOUS: 0
COUGH: 0
FREQUENCY: 0
HEMATOCHEZIA: 0

## 2020-07-30 ASSESSMENT — ACTIVITIES OF DAILY LIVING (ADL): CURRENT_FUNCTION: NO ASSISTANCE NEEDED

## 2020-07-30 ASSESSMENT — MIFFLIN-ST. JEOR: SCORE: 1741.91

## 2020-07-30 NOTE — PROGRESS NOTES
SUBJECTIVE:   CC: Jovon Mantilla is an 64 year old male who presents for preventative health visit.     Healthy Habits:     Frequency of exercise:  4-5 days/week    Duration of exercise:  15-30 minutes    Taking medications regularly:  Yes    Medication side effects:  None    PHQ-2 Total Score: 0    Additional concerns today:  No      Past Medical History:   Diagnosis Date     Acne rosacea 4/22/2019     Acquired hypothyroidism 5/23/2003     Problem list name updated by automated process. Provider to review     Acute prostatitis      Acute sinusitis, unspecified      Aortic valve disease      Atrial fibrillation (H)      Biatrial enlargement      CAD (coronary artery disease)     known 70% LAD stenosis     GI bleeding 1/22/2016     HTN (hypertension)      Hyperlipidemia      Idiopathic cardiomyopathy (H)      Mitral regurgitation      Obesity      Obesity, unspecified      Palpitations      Permanent atrial fibrillation (H) 6/2/2017     Right inguinal hernia 7/22/2019     S/P AVR 01/07/2011    with Maze procedure     Tricuspid regurgitation      Unspecified essential hypertension        Past Surgical History:   Procedure Laterality Date     ANGIOGRAM  2/02    Normal coronary,dilated LV,Sev.decr.global LVF,+1 MR     ANGIOGRAM  1/08    Mild AS,Mod PHTN,mod prox.LAD disease,Triv.CFX disease     ANGIOGRAM  1/09    Mod AS,Mod PHTN,mod prox.LAD disease     ANGIOGRAM  10/10    Sev.AS,CM,global hypokinesis,Mild-mod LV dilated,Mild MR,70% prox.LAD lesion,PHTN     C NONSPECIFIC PROCEDURE  ~1985    vein stripping     CARDIOVERSION  2/02, 4/02, 4/11     CHOLECYSTECTOMY       COLONOSCOPY N/A 1/23/2016    Procedure: COLONOSCOPY;  Surgeon: Robyn Collins MD;  Location:  GI     ESOPHAGOSCOPY, GASTROSCOPY, DUODENOSCOPY (EGD), COMBINED N/A 1/23/2016    Procedure: COMBINED ESOPHAGOSCOPY, GASTROSCOPY, DUODENOSCOPY (EGD);  Surgeon: Robyn Collins MD;  Location:  GI     LAPAROSCOPIC BYPASS GASTRIC  10/8/2011     Procedure:LAPAROSCOPIC BYPASS GASTRIC; Diagnostic laparoscopy, Lysis of Adhesions, Laparoscopic Revision of Jejunojejunostomy; Surgeon:RADHA MURO; Location:[facility] OR     LAPAROSCOPIC BYPASS GASTRIC  6/26/2006    Dr Jin     LAPAROSCOPY DIAGNOSTIC (GENERAL)  10/8/2011    Procedure:LAPAROSCOPY DIAGNOSTIC (GENERAL); Surgeon:RADHA MURO; Location:[facility] OR     REPLACE VALVE AORTIC  1/7/2011       Family History   Problem Relation Age of Onset     Cancer - colorectal Father      Colon Cancer Father      Cancer Father      Diabetes Brother        Social History     Tobacco Use     Smoking status: Never Smoker     Smokeless tobacco: Never Used   Substance Use Topics     Alcohol use: No     Alcohol/week: 0.0 standard drinks             Today's PHQ-2 Score:   PHQ-2 ( 1999 [employer]) 7/30/2020   Q1: Little interest or pleasure in doing things 0   Q2: Feeling down, depressed or hopeless 0   PHQ-2 Score 0   Q1: Little interest or pleasure in doing things Not at all   Q2: Feeling down, depressed or hopeless Not at all   PHQ-2 Score 0       Abuse: Current or Past(Physical, Sexual or Emotional)- No  Do you feel safe in your environment? Yes        Social History     Tobacco Use     Smoking status: Never Smoker     Smokeless tobacco: Never Used   Substance Use Topics     Alcohol use: No     Alcohol/week: 0.0 standard drinks         Alcohol Use 7/30/2020   Prescreen: >3 drinks/day or >7 drinks/week? Not Applicable   Prescreen: >3 drinks/day or >7 drinks/week? -       Last PSA:   PSA   Date Value Ref Range Status   02/16/2015 1.44 0 - 4 ug/L Final       Reviewed orders with patient. Reviewed health maintenance and updated orders accordingly - Yes  BP Readings from Last 3 Encounters:   07/30/20 120/82   06/25/20 90/60   03/13/20 138/84    Wt Readings from Last 3 Encounters:   07/30/20 93.2 kg (205 lb 6.4 oz)   06/25/20 92.1 kg (203 lb)   03/13/20 100.2 kg (221 lb)                    Reviewed and updated as needed this  "visit by clinical staff         Reviewed and updated as needed this visit by Provider            Review of Systems   Constitutional: Negative for chills and fever.   HENT: Negative for congestion and ear pain.    Eyes: Negative for pain.   Respiratory: Negative for cough.    Cardiovascular: Negative for chest pain.   Gastrointestinal: Negative for constipation, diarrhea and hematochezia.   Genitourinary: Negative for frequency and hematuria.   Neurological: Negative for dizziness.   Psychiatric/Behavioral: The patient is not nervous/anxious.      CONSTITUTIONAL: NEGATIVE for fever, chills, change in weight  INTEGUMENTARY/SKIN: NEGATIVE for worrisome rashes, moles or lesions  EYES: NEGATIVE for vision changes or irritation  ENT: NEGATIVE for ear, mouth and throat problems  RESP: NEGATIVE for significant cough or SOB  CV: NEGATIVE for chest pain, palpitations or peripheral edema  GI: NEGATIVE for nausea, abdominal pain, heartburn, or change in bowel habits   male: negative for dysuria, hematuria, decreased urinary stream, erectile dysfunction, urethral discharge  MUSCULOSKELETAL: NEGATIVE for significant arthralgias or myalgia  NEURO: NEGATIVE for weakness, dizziness or paresthesias  PSYCHIATRIC: NEGATIVE for changes in mood or affect    OBJECTIVE:   /82 (BP Location: Right arm, Patient Position: Chair, Cuff Size: Adult Regular)   Pulse 66   Temp 98  F (36.7  C) (Oral)   Ht 1.784 m (5' 10.25\")   Wt 93.2 kg (205 lb 6.4 oz)   SpO2 95%   BMI 29.26 kg/m        Physical Exam  GENERAL: healthy, alert and no distress  EYES: Eyes grossly normal to inspection, PERRL and conjunctivae and sclerae normal  HENT: ear canals and TM's normal, nose and mouth without ulcers or lesions  NECK: no adenopathy, no asymmetry, masses, or scars and thyroid normal to palpation  RESP: lungs clear to auscultation - no rales, rhonchi or wheezes  CV: regular rate and rhythm, normal S1 S2, no S3 or S4, no murmur, click or rub, no " "peripheral edema and peripheral pulses strong  ABDOMEN: soft, nontender, no hepatosplenomegaly, no masses and bowel sounds normal, right  Inguinal  Hernia not incarcerted    MS: no gross musculoskeletal defects noted, no edema  SKIN: no suspicious lesions or rashes  NEURO: Normal strength and tone, mentation intact and speech normal  PSYCH: mentation appears normal, affect normal/bright    Diagnostic Test Results:  Labs reviewed in Epic    ASSESSMENT/PLAN:     Well  Adult, cardiac stable, sees  [facility]   In the next  6 months   COUNSELING:   Reviewed preventive health counseling, as reflected in patient instructions       Regular exercise       Healthy diet/nutrition       Vision screening    Estimated body mass index is 28.58 kg/m  as calculated from the following:    Height as of 1/31/20: 1.795 m (5' 10.67\").    Weight as of 6/25/20: 92.1 kg (203 lb).          reports that he has never smoked. He has never used smokeless tobacco.      Counseling Resources:  ATP IV Guidelines  Pooled Cohorts Equation Calculator  FRAX Risk Assessment  ICSI Preventive Guidelines  Dietary Guidelines for Americans, 2010  USDA's MyPlate  ASA Prophylaxis  Lung CA Screening    Rehan Lorenzana MD  [facility]  "

## 2020-08-09 ENCOUNTER — MYC MEDICAL ADVICE (OUTPATIENT)
Dept: FAMILY MEDICINE | Facility: CLINIC | Age: 65
End: 2020-08-09

## 2020-08-10 NOTE — TELEPHONE ENCOUNTER
Patient sends [employer] message requesting new letter to extend recommendation for medical leave of absence which he would like extended through 10/31. Patient was seen on 7/30/20 for a preventive health visit, COVID-19 not discussed per visit notes. Please advise, should patient be seen virtually to address?   Kimber Mo, GAGEN, RN, PHN

## 2020-08-14 ENCOUNTER — MYC MEDICAL ADVICE (OUTPATIENT)
Dept: FAMILY MEDICINE | Facility: CLINIC | Age: 65
End: 2020-08-14

## 2020-08-14 DIAGNOSIS — Z12.5 SPECIAL SCREENING FOR MALIGNANT NEOPLASM OF PROSTATE: ICD-10-CM

## 2020-08-14 DIAGNOSIS — Z12.5 SCREENING PSA (PROSTATE SPECIFIC ANTIGEN): Primary | ICD-10-CM

## 2020-08-17 NOTE — TELEPHONE ENCOUNTER
Routed to ZION HOLLOWAY MD, see [facility] request for PSA work up and advise, forgot to discuss at recent appointment for wellness    Last office visit: 7/30/2020 with prescribing provider:     Future Office Visit:    Tennille Mares RN, BSN  Message handled by CLINIC NURSE.

## 2020-08-28 ENCOUNTER — ANTICOAGULATION THERAPY VISIT (OUTPATIENT)
Dept: NURSING | Facility: CLINIC | Age: 65
End: 2020-08-28

## 2020-08-28 ENCOUNTER — DOCUMENTATION ONLY (OUTPATIENT)
Dept: NURSING | Facility: CLINIC | Age: 65
End: 2020-08-28

## 2020-08-28 DIAGNOSIS — Z79.01 LONG TERM CURRENT USE OF ANTICOAGULANTS WITH INR GOAL OF 2.5-3.5: ICD-10-CM

## 2020-08-28 DIAGNOSIS — I48.21 PERMANENT ATRIAL FIBRILLATION (H): ICD-10-CM

## 2020-08-28 DIAGNOSIS — Z12.5 SCREENING PSA (PROSTATE SPECIFIC ANTIGEN): ICD-10-CM

## 2020-08-28 DIAGNOSIS — Z79.01 LONG TERM CURRENT USE OF ANTICOAGULANT THERAPY: ICD-10-CM

## 2020-08-28 DIAGNOSIS — Z95.2 S/P AVR (AORTIC VALVE REPLACEMENT): ICD-10-CM

## 2020-08-28 DIAGNOSIS — Z95.2 S/P AVR (AORTIC VALVE REPLACEMENT): Primary | ICD-10-CM

## 2020-08-28 DIAGNOSIS — Z12.5 SPECIAL SCREENING FOR MALIGNANT NEOPLASM OF PROSTATE: ICD-10-CM

## 2020-08-28 DIAGNOSIS — Z95.2 S/P AORTIC VALVE REPLACEMENT: ICD-10-CM

## 2020-08-28 LAB
INR PPP: 2.3 (ref 0.86–1.14)
PSA SERPL-ACNC: 1.27 UG/L (ref 0–4)

## 2020-08-28 PROCEDURE — 85610 PROTHROMBIN TIME: CPT | Performed by: FAMILY MEDICINE

## 2020-08-28 PROCEDURE — 99207 ZZC NO CHARGE NURSE ONLY: CPT

## 2020-08-28 PROCEDURE — G0103 PSA SCREENING: HCPCS | Performed by: FAMILY MEDICINE

## 2020-08-28 PROCEDURE — 36415 COLL VENOUS BLD VENIPUNCTURE: CPT | Performed by: FAMILY MEDICINE

## 2020-08-28 NOTE — PROGRESS NOTES
ANTICOAGULATION MANAGEMENT      Jovon Mantilla due for annual renewal of referral to anticoagulation monitoring. Order pended for your review and signature.      ANTICOAGULATION SUMMARY      Warfarin indication(s)     Heart Valve Replacement , Atrial Fibrillation  Heart valve present?  Yes, mechanical AVR       Current goal range   INR: 2.5-3.5     Goal appropriate for indication? Yes, INR 2.5-3.5 appropriate for hx  Mechanical MVR, Mechanical AVR with risk factors or older generation (Brannon-Shiley (Tilting disc), Quispe-Mendez (Caged Ball) or Monoleaflet valve)     Current duration of therapy Indefinite/long term therapy   Time in Therapeutic Range (TTR)  (Goal > 60%) 79 %       Office visit with referring provider's group within last year yes on 07/30/2020       Madeline Wayne RN

## 2020-08-28 NOTE — PROGRESS NOTES
ANTICOAGULATION FOLLOW-UP CLINIC VISIT    Patient Name:  Jovon Mantilla  Date:  2020  Contact Type:  Telephone    SUBJECTIVE:  Patient Findings     Positives:   Change in medications (Pt started a new bottle of Centrum multi-vitamins that do have MORE vitamin K than the last bottle.  Pt was not at home so could not look at amount of VK but is certain it is more than the last bottle.)    Comments:   Increased warfarin maintenance dose ~5% due to sub-therapeutic INR and increased vitamin K in MVI.  Patient traveling through  so next INR will be on 20.        Clinical Outcomes     Negatives:   Major bleeding event, Thromboembolic event, Anticoagulation-related hospital admission, Anticoagulation-related ED visit, Anticoagulation-related fatality    Comments:   Increased warfarin maintenance dose ~5% due to sub-therapeutic INR and increased vitamin K in MVI.  Patient traveling through  so next INR will be on 20.           OBJECTIVE    Recent labs: (last 7 days)     20  0936   INR 2.30*       ASSESSMENT / PLAN  INR assessment SUB    Recheck INR In: 2 WEEKS    INR Location Clinic      Anticoagulation Summary  As of 2020    INR goal:   2.5-3.5   TTR:   78.5 % (1 y)   INR used for dosin.30! (2020)   Warfarin maintenance plan:   4 mg (4 mg x 1) every Mon, Thu; 6 mg (4 mg x 1.5) all other days   Full warfarin instructions:   4 mg every Mon, Thu; 6 mg all other days   Weekly warfarin total:   38 mg   Plan last modified:   Madeline Wayne RN (2020)   Next INR check:   2020   Priority:   High   Target end date:   Indefinite    Indications    S/P AVR (aortic valve replacement) [Z95.2]  Permanent atrial fibrillation (H) [I48.21]             Anticoagulation Episode Summary     INR check location:       Preferred lab:       Send INR reminders to:   [facility]    Comments:   24mm ATS Valve  Cardiomyopathy / CAD      Anticoagulation Care Providers     Provider  Role Specialty Phone number    Rehan Lorenzana MD [facility] [phone]            See the Encounter Report to view Anticoagulation Flowsheet and Dosing Calendar (Go to Encounters tab in chart review, and find the Anticoagulation Therapy Visit)        Madeline Wayne RN

## 2020-09-14 ENCOUNTER — ANTICOAGULATION THERAPY VISIT (OUTPATIENT)
Dept: FAMILY MEDICINE | Facility: CLINIC | Age: 65
End: 2020-09-14

## 2020-09-14 DIAGNOSIS — Z95.2 S/P AVR (AORTIC VALVE REPLACEMENT): ICD-10-CM

## 2020-09-14 DIAGNOSIS — Z95.2 S/P AORTIC VALVE REPLACEMENT: ICD-10-CM

## 2020-09-14 DIAGNOSIS — Z79.01 LONG TERM CURRENT USE OF ANTICOAGULANTS WITH INR GOAL OF 2.5-3.5: ICD-10-CM

## 2020-09-14 DIAGNOSIS — I48.21 PERMANENT ATRIAL FIBRILLATION (H): ICD-10-CM

## 2020-09-14 DIAGNOSIS — Z79.01 LONG TERM CURRENT USE OF ANTICOAGULANT THERAPY: ICD-10-CM

## 2020-09-14 LAB
CAPILLARY BLOOD COLLECTION: NORMAL
INR PPP: 4 (ref 0.86–1.14)

## 2020-09-14 PROCEDURE — 36416 COLLJ CAPILLARY BLOOD SPEC: CPT | Performed by: FAMILY MEDICINE

## 2020-09-14 PROCEDURE — 99207 ZZC NO CHARGE NURSE ONLY: CPT | Performed by: FAMILY MEDICINE

## 2020-09-14 PROCEDURE — 85610 PROTHROMBIN TIME: CPT | Performed by: FAMILY MEDICINE

## 2020-09-14 NOTE — PROGRESS NOTES
ANTICOAGULATION FOLLOW-UP CLINIC VISIT    Patient Name:  Jovon Mantilla  Date:  2020  Contact Type:  Telephone    SUBJECTIVE:  last apt dose adjusted to accommodate a new mvi tablet. inr is high so will try going back to regular dosing and assume that is the reason for higher dose. No other changes to report.       OBJECTIVE    Recent labs: (last 7 days)     20  0936   INR 4.00*       ASSESSMENT / PLAN  INR assessment SUPRA    Recheck INR In: 2 WEEKS    INR Location Clinic      Anticoagulation Summary  As of 2020    INR goal:   2.5-3.5   TTR:   77.3 % (1 y)   INR used for dosin.00! (2020)   Warfarin maintenance plan:   4 mg (4 mg x 1) every Mon, Wed, Fri; 6 mg (4 mg x 1.5) all other days   Full warfarin instructions:   : 2 mg; Otherwise 4 mg every Mon, Wed, Fri; 6 mg all other days   Weekly warfarin total:   36 mg   Plan last modified:   Karina Pierre RN (2020)   Next INR check:   2020   Priority:   High   Target end date:   Indefinite    Indications    S/P AVR (aortic valve replacement) [Z95.2]  Permanent atrial fibrillation (H) [I48.21]  Long term current use of anticoagulants with INR goal of 2.5-3.5 [Z79.01]             Anticoagulation Episode Summary     INR check location:       Preferred lab:       Send INR reminders to:   [facility]    Comments:   24mm ATS Valve  Cardiomyopathy / CAD      Anticoagulation Care Providers     Provider Role Specialty Phone number    Rehan Lorenzana MD Referring [facility] [phone]            See the Encounter Report to view Anticoagulation Flowsheet and Dosing Calendar (Go to Encounters tab in chart review, and find the Anticoagulation Therapy Visit)  Some signs and symptoms of bleeding include: Nose bleed or cut that does not stop bleeding in 10 minutes, bleeding of the gums, vomiting (will look like coffee grounds) or coughing up blood, unusual, easy or large areas of bruising, increased or  unexpected  Vaginal bleeding or increased menstrual flow, red or black stools, red or orange urine, prolonged or severe headache, pale skin, unusual or constant tiredness.  If you have these please call 911 or seek medical care immediately.         Karina Pierre RN

## 2020-09-28 ENCOUNTER — ANTICOAGULATION THERAPY VISIT (OUTPATIENT)
Dept: FAMILY MEDICINE | Facility: CLINIC | Age: 65
End: 2020-09-28

## 2020-09-28 DIAGNOSIS — Z95.2 S/P AORTIC VALVE REPLACEMENT: ICD-10-CM

## 2020-09-28 DIAGNOSIS — Z79.01 LONG TERM CURRENT USE OF ANTICOAGULANT THERAPY: ICD-10-CM

## 2020-09-28 DIAGNOSIS — I48.21 PERMANENT ATRIAL FIBRILLATION (H): ICD-10-CM

## 2020-09-28 DIAGNOSIS — Z95.2 S/P AVR (AORTIC VALVE REPLACEMENT): ICD-10-CM

## 2020-09-28 DIAGNOSIS — Z79.01 LONG TERM CURRENT USE OF ANTICOAGULANTS WITH INR GOAL OF 2.5-3.5: ICD-10-CM

## 2020-09-28 LAB
CAPILLARY BLOOD COLLECTION: NORMAL
INR PPP: 2.6 (ref 0.86–1.14)

## 2020-09-28 PROCEDURE — 36416 COLLJ CAPILLARY BLOOD SPEC: CPT | Performed by: FAMILY MEDICINE

## 2020-09-28 PROCEDURE — 99207 ZZC NO CHARGE NURSE ONLY: CPT | Performed by: FAMILY MEDICINE

## 2020-09-28 PROCEDURE — 85610 PROTHROMBIN TIME: CPT | Performed by: FAMILY MEDICINE

## 2020-09-28 NOTE — PROGRESS NOTES
ANTICOAGULATION FOLLOW-UP CLINIC VISIT    Patient Name:  Jovon Mantilla  Date:  2020  Contact Type:  Telephone    SUBJECTIVE:  Patient Findings     Comments:   No changes in diet, activity level, medications (including over the counter), or health. Patient will be staying on current type of multi vitamin, which was recently changed. No missed doses of warfarin. Patient took dosing as prescribed. No signs of clots or bleeding concerns. Patient will continue maintenance warfarin dosing.          Clinical Outcomes     Negatives:   Major bleeding event, Thromboembolic event, Anticoagulation-related hospital admission, Anticoagulation-related ED visit, Anticoagulation-related fatality    Comments:   No changes in diet, activity level, medications (including over the counter), or health. Patient will be staying on current type of multi vitamin, which was recently changed. No missed doses of warfarin. Patient took dosing as prescribed. No signs of clots or bleeding concerns. Patient will continue maintenance warfarin dosing.             OBJECTIVE    Recent labs: (last 7 days)     20  0934   INR 2.60*       ASSESSMENT / PLAN  INR assessment THER    Recheck INR In: 3 WEEKS    INR Location Clinic      Anticoagulation Summary  As of 2020    INR goal:   2.5-3.5   TTR:   75.9 % (1 y)   INR used for dosin.60 (2020)   Warfarin maintenance plan:   4 mg (4 mg x 1) every Mon, Wed, Fri; 6 mg (4 mg x 1.5) all other days   Full warfarin instructions:   4 mg every Mon, Wed, Fri; 6 mg all other days   Weekly warfarin total:   36 mg   No change documented:   Lia Harmon RN   Plan last modified:   Karina Pierre RN (2020)   Next INR check:   10/19/2020   Priority:   Maintenance   Target end date:   Indefinite    Indications    S/P AVR (aortic valve replacement) [Z95.2]  Permanent atrial fibrillation (H) [I48.21]  Long term current use of anticoagulants with INR goal of 2.5-3.5 [Z79.01]              Anticoagulation Episode Summary     INR check location:       Preferred lab:       Send INR reminders to:   [facility]    Comments:   24mm ATS Valve  Cardiomyopathy / CAD      Anticoagulation Care Providers     Provider Role Specialty Phone number    Rehan Lorenzana MD Referring [facility] [phone]            See the Encounter Report to view Anticoagulation Flowsheet and Dosing Calendar (Go to Encounters tab in chart review, and find the Anticoagulation Therapy Visit)        Lia Harmon RN

## 2020-09-30 ENCOUNTER — TELEPHONE (OUTPATIENT)
Dept: FAMILY MEDICINE | Facility: CLINIC | Age: 65
End: 2020-09-30

## 2020-09-30 NOTE — TELEPHONE ENCOUNTER
"Call from patient, he states he needs refill of nitroglycerin, as his current bottle \"fell and broke into a million pieces.\" Advised patient he likely has refill available at [facility] in [place], as he was written a prescription for 25 tabs with one refill in March 2020. Patient states he will call pharmacy and request refill, and if he is unable to get refill he will call back. Patient states he has no further questions/concerns at this time.   Kimber Mo, GAGEN, RN, PHN    "

## 2020-10-12 DIAGNOSIS — I48.20 CHRONIC ATRIAL FIBRILLATION (H): ICD-10-CM

## 2020-10-12 RX ORDER — DIGOXIN 125 MCG
125 TABLET ORAL DAILY
Qty: 90 TABLET | Refills: 0 | Status: SHIPPED | OUTPATIENT
Start: 2020-10-12 | End: 2021-01-13

## 2020-10-15 ENCOUNTER — OFFICE VISIT (OUTPATIENT)
Dept: CARDIOLOGY | Facility: CLINIC | Age: 65
End: 2020-10-15
Attending: INTERNAL MEDICINE
Payer: COMMERCIAL

## 2020-10-15 VITALS
SYSTOLIC BLOOD PRESSURE: 139 MMHG | DIASTOLIC BLOOD PRESSURE: 88 MMHG | WEIGHT: 198 LBS | BODY MASS INDEX: 29.33 KG/M2 | HEIGHT: 69 IN | HEART RATE: 74 BPM

## 2020-10-15 DIAGNOSIS — I48.21 PERMANENT ATRIAL FIBRILLATION (H): ICD-10-CM

## 2020-10-15 DIAGNOSIS — Z95.2 S/P AORTIC VALVE REPLACEMENT: ICD-10-CM

## 2020-10-15 DIAGNOSIS — I10 HYPERTENSION GOAL BP (BLOOD PRESSURE) < 140/90: ICD-10-CM

## 2020-10-15 DIAGNOSIS — E78.5 HYPERLIPIDEMIA LDL GOAL <70: ICD-10-CM

## 2020-10-15 DIAGNOSIS — I42.9 IDIOPATHIC CARDIOMYOPATHY (H): ICD-10-CM

## 2020-10-15 DIAGNOSIS — I35.0 NONRHEUMATIC AORTIC VALVE STENOSIS: ICD-10-CM

## 2020-10-15 DIAGNOSIS — I25.10 CORONARY ARTERY DISEASE INVOLVING NATIVE CORONARY ARTERY OF NATIVE HEART WITHOUT ANGINA PECTORIS: ICD-10-CM

## 2020-10-15 PROCEDURE — 99214 OFFICE O/P EST MOD 30 MIN: CPT | Performed by: INTERNAL MEDICINE

## 2020-10-15 ASSESSMENT — MIFFLIN-ST. JEOR: SCORE: 1683.5

## 2020-10-15 NOTE — Clinical Note
10/15/2020    Rehan Lorenzana MD  [address] [address] [facility] [address]    RE: Jovon Mantilla       Dear Colleague,    I had the pleasure of seeing Jovon Mantilla in the [facility].    HPI and Plan:   See dictation    No orders of the defined types were placed in this encounter.      No orders of the defined types were placed in this encounter.      There are no discontinued medications.      Encounter Diagnoses   Name Primary?     Nonrheumatic aortic valve stenosis      S/P aortic valve replacement      Idiopathic cardiomyopathy (H)      Coronary artery disease involving native coronary artery of native heart without angina pectoris      Permanent atrial fibrillation (H)      Hypertension goal BP (blood pressure) < 140/90      Hyperlipidemia LDL goal <70        CURRENT MEDICATIONS:  Current Outpatient Medications   Medication Sig Dispense Refill     albuterol (PROAIR RESPICLICK) 108 (90 Base) MCG/ACT inhaler Inhale 2 puffs into the lungs every 6 hours as needed for shortness of breath / dyspnea or wheezing 1 Inhaler 0     Ascorbic Acid (VITAMIN C PO) Take 500 mg by mouth At Bedtime       aspirin 81 MG tablet Take 81 mg by mouth every other day       Calcium Carbonate-Vitamin D (CALCIUM 600+D) 600-200 MG-UNIT TABS Take 1 tablet by mouth 2 times daily        carvedilol (COREG) 25 MG tablet Take 1 tablet (25 mg) by mouth 2 times daily (with meals) 180 tablet 1     Cholecalciferol (VITAMIN D) 2000 UNIT CAPS Take 1 tablet by mouth daily        Cyanocobalamin (VITAMIN B-12) 500 MCG SUBL Place 500 mcg under the tongue daily        digoxin (LANOXIN) 125 MCG tablet Take 1 tablet (125 mcg) by mouth daily 90 tablet 0     ferrous sulfate (IRON SUPPLEMENT) 325 (65 FE) MG tablet Take 1 tablet (325 mg) by mouth 2 times daily 100 tablet      Fexofenadine HCl (ALLERGY 24-HR PO) Take by mouth daily       levothyroxine (SYNTHROID/LEVOTHROID) 200 MCG tablet TAKE 1 TABLET (200 MCG) BY MOUTH DAILY  SEE DOCTOR IN MARCH 30 tablet 5     lisinopril (PRINIVIL/ZESTRIL) 20 MG tablet Take  20 mg (1 tablet in am) and 10 mg  (one half tablet) in  pm 135 tablet 2     Multiple Vitamins-Minerals (CENTRUM) CHEW Take 1 tablet by mouth At Bedtime       nitroGLYcerin (NITROSTAT) 0.3 MG sublingual tablet For chest pain place 1 tablet under the tongue every 5 minutes for 3 doses. If symptoms persist 5 minutes after 1st dose call 911. 25 tablet 1     Psyllium (FIBER) 0.52 GM CAPS Take 2 tablets by mouth At Bedtime        sertraline (ZOLOFT) 50 MG tablet TAKE 1 TABLET DAILY 90 tablet 3     simvastatin (ZOCOR) 20 MG tablet TAKE 1 TABLET BY MOUTH AT BEDTIME 90 tablet 2     warfarin ANTICOAGULANT (COUMADIN) 4 MG tablet TAKE 1 TABLET MONDAY, WEDNESDAY, AND FRIDAY. TAKE ONE AND ONE-HALF TABLETS ALL OTHER DAYS OF THE WEEK 117 tablet 1       ALLERGIES   No Known Allergies    PAST MEDICAL HISTORY:  Past Medical History:   Diagnosis Date     Acne rosacea 4/22/2019     Acquired hypothyroidism 5/23/2003     Problem list name updated by automated process. Provider to review     Acute prostatitis      Acute sinusitis, unspecified      Aortic valve disease      Atrial fibrillation (H)      Biatrial enlargement      CAD (coronary artery disease)     known 70% LAD stenosis     GI bleeding 1/22/2016     HTN (hypertension)      Hyperlipidemia      Idiopathic cardiomyopathy (H)      Mitral regurgitation      Obesity      Obesity, unspecified      Palpitations      Permanent atrial fibrillation (H) 6/2/2017     Right inguinal hernia 7/22/2019     S/P AVR 01/07/2011    with Maze procedure     Tricuspid regurgitation      Unspecified essential hypertension        PAST SURGICAL HISTORY:  Past Surgical History:   Procedure Laterality Date     ANGIOGRAM  2/02    Normal coronary,dilated LV,Sev.decr.global LVF,+1 MR     ANGIOGRAM  1/08    Mild AS,Mod PHTN,mod prox.LAD disease,Triv.CFX disease     ANGIOGRAM  1/09    Mod AS,Mod PHTN,mod prox.LAD disease      ANGIOGRAM  10/10    Sev.AS,CM,global hypokinesis,Mild-mod LV dilated,Mild MR,70% prox.LAD lesion,PHTN     CARDIOVERSION  2/02, 4/02, 4/11     CHOLECYSTECTOMY       COLONOSCOPY N/A 1/23/2016    Procedure: COLONOSCOPY;  Surgeon: Robyn Collins MD;  Location:  GI     ESOPHAGOSCOPY, GASTROSCOPY, DUODENOSCOPY (EGD), COMBINED N/A 1/23/2016    Procedure: COMBINED ESOPHAGOSCOPY, GASTROSCOPY, DUODENOSCOPY (EGD);  Surgeon: Robyn Collins MD;  Location:  GI     LAPAROSCOPIC BYPASS GASTRIC  10/8/2011    Procedure:LAPAROSCOPIC BYPASS GASTRIC; Diagnostic laparoscopy, Lysis of Adhesions, Laparoscopic Revision of Jejunojejunostomy; Surgeon:RADHA MURO; Location: OR     LAPAROSCOPIC BYPASS GASTRIC  6/26/2006    Dr Jin     LAPAROSCOPY DIAGNOSTIC (GENERAL)  10/8/2011    Procedure:LAPAROSCOPY DIAGNOSTIC (GENERAL); Surgeon:RADHA MURO; Location: OR     REPLACE VALVE AORTIC  1/7/2011     ZZC NONSPECIFIC PROCEDURE  ~1985    vein stripping       FAMILY HISTORY:  Family History   Problem Relation Age of Onset     Cancer - colorectal Father      Colon Cancer Father      Cancer Father      Diabetes Brother        SOCIAL HISTORY:  Social History     Socioeconomic History     Marital status:      Spouse name: Tracey     Number of children: 0     Years of education: Not on file     Highest education level: Not on file   Occupational History     Occupation:      Comment: [employer]   Social Needs     Financial resource strain: Not on file     Food insecurity     Worry: Not on file     Inability: Not on file     Transportation needs     Medical: Not on file     Non-medical: Not on file   Tobacco Use     Smoking status: Never Smoker     Smokeless tobacco: Never Used   Substance and Sexual Activity     Alcohol use: No     Alcohol/week: 0.0 standard drinks     Drug use: No     Sexual activity: Not Currently     Partners: Female   Lifestyle     Physical activity     Days per week: Not on  file     Minutes per session: Not on file     Stress: Not on file   Relationships     Social connections     Talks on phone: Not on file     Gets together: Not on file     Attends Caodaism service: Not on file     Active member of club or organization: Not on file     Attends meetings of clubs or organizations: Not on file     Relationship status: Not on file     Intimate partner violence     Fear of current or ex partner: Not on file     Emotionally abused: Not on file     Physically abused: Not on file     Forced sexual activity: Not on file   Other Topics Concern     Parent/sibling w/ CABG, MI or angioplasty before 65F 55M? Yes      Service Not Asked     Blood Transfusions No     Caffeine Concern Yes     Comment: 1 can daily     Occupational Exposure Yes     Hobby Hazards No     Sleep Concern No     Stress Concern No     Weight Concern No     Special Diet Yes     Comment: low sodium, low calories     Back Care No     Exercise Yes     Comment: walks daily     Bike Helmet Not Asked     Comment: NA     Seat Belt Yes     Self-Exams Yes   Social History Narrative     Not on file       Review of Systems:  Skin:          Eyes:         ENT:         Respiratory:          Cardiovascular:         Gastroenterology:        Genitourinary:         Musculoskeletal:         Neurologic:         Psychiatric:         Heme/Lymph/Imm:         Endocrine:           Physical Exam:  Vitals: Wt 89.8 kg (198 lb)   BMI 28.21 kg/m      Constitutional:  cooperative, alert and oriented, well developed, well nourished, in no acute distress        Skin:  warm and dry to the touch, no apparent skin lesions or masses noted          Head:  normocephalic, no masses or lesions        Eyes:           Lymph:      ENT:           Neck:  carotid pulses are full and equal bilaterally        Respiratory:  clear to auscultation         Cardiac: irregular, mechanical S2    GI:  abdomen soft        Extremities and Muscular Skeletal:  no deformities,  clubbing, cyanosis, erythema observed              Neurological:  no gross motor deficits        Psych:  Alert and Oriented x 3        CC  Lloyd Lewis MD  [address]  [place] [id number]                Service Date: 10/15/2020      HISTORY OF PRESENT ILLNESS:  I had the pleasure of seeing Mr. Mantilla in followup at the [facility] today.  He is a very pleasant 65-year-old gentleman who was previously followed by Dr. Lewis, most recently in 10/2018.      Mr. Mantilla has a history of severe aortic stenosis and is status post aortic valve replacement in 01/2011 with a 24 mm ATS supra-annular valve.  He also has a history of atrial fibrillation and underwent a Maze procedure at that time, but has remained in atrial fibrillation subsequently.  He has been on rate control and anticoagulation, which has worked well in that he has been essentially asymptomatic from his atrial fibrillation.  He also has a history of a 70% stenosis involving the LAD and coronary angiography prior to aortic valve replacement.  This, however, was not bypassed at the time of his surgery since it was found to be completely intramyocardial at the time of surgery.      Today, he feels well overall from a cardiovascular standpoint.  He specifically denies any chest discomfort, dyspnea on exertion, PND, orthopnea or lower extremity edema.  Denies any syncope or presyncope.  He has been taking all his medications as prescribed.      He has recently been bothered by right-sided inguinal hernia for which surgical repair is being planned.      PHYSICAL EXAMINATION:  Dictated below.        I went over the results of his recent echocardiogram with him in detail.  This was performed on 06/04/2020 and demonstrated mildly reduced left ventricular systolic function with mild to moderate right ventricular dysfunction and a normally functioning mechanical aortic valve.  There was no significant change compared to the prior  study from 2017.      Labs on 2020 demonstrated total cholesterol 109, HDL 51, LDL 49, triglycerides of 45.      IMPRESSION:   1.  Severe aortic stenosis, status post aortic valve replacement with a mechanical valve as described above.   2.  Persistent atrial fibrillation.   3.  Coronary artery disease as described above.  He is asymptomatic from a cardiovascular standpoint.   4.  Right inguinal hernia, for which surgery is being planned.      The patient is doing well overall from a cardiovascular standpoint.  His echocardiographic findings are stable.  His medications appear appropriate and his lipids are at goal.      Regarding the issue of his upcoming inguinal hernia surgery, there are no contraindications from a cardiovascular standpoint.  I would, however, recommend bridging with Lovenox given that he has chronic persistent atrial fibrillation in addition to the presence of a mechanical aortic valve.      It was a pleasure seeing him in followup.  Assuming his clinical status remains stable, I will see him in the clinic in approximately 1 year.         BARBARA COOLEY MD             D: 10/15/2020   T: 10/15/2020   MT: BILL      Name:     CHAS [facility]APLES   MRN:      [id number]        Account:      [id number]   :      1955           Service Date: 10/15/2020      Document: [id number]        Thank you for allowing me to participate in the care of your patient.      Sincerely,     Barbara Cooley MD     [facility]    cc:   Lloyd Lewis MD  [address]  [place] [address]

## 2020-10-15 NOTE — PROGRESS NOTES
HPI and Plan:   See dictation    No orders of the defined types were placed in this encounter.      No orders of the defined types were placed in this encounter.      There are no discontinued medications.      Encounter Diagnoses   Name Primary?     Nonrheumatic aortic valve stenosis      S/P aortic valve replacement      Idiopathic cardiomyopathy (H)      Coronary artery disease involving native coronary artery of native heart without angina pectoris      Permanent atrial fibrillation (H)      Hypertension goal BP (blood pressure) < 140/90      Hyperlipidemia LDL goal <70        CURRENT MEDICATIONS:  Current Outpatient Medications   Medication Sig Dispense Refill     albuterol (PROAIR RESPICLICK) 108 (90 Base) MCG/ACT inhaler Inhale 2 puffs into the lungs every 6 hours as needed for shortness of breath / dyspnea or wheezing 1 Inhaler 0     Ascorbic Acid (VITAMIN C PO) Take 500 mg by mouth At Bedtime       aspirin 81 MG tablet Take 81 mg by mouth every other day       Calcium Carbonate-Vitamin D (CALCIUM 600+D) 600-200 MG-UNIT TABS Take 1 tablet by mouth 2 times daily        carvedilol (COREG) 25 MG tablet Take 1 tablet (25 mg) by mouth 2 times daily (with meals) 180 tablet 1     Cholecalciferol (VITAMIN D) 2000 UNIT CAPS Take 1 tablet by mouth daily        Cyanocobalamin (VITAMIN B-12) 500 MCG SUBL Place 500 mcg under the tongue daily        digoxin (LANOXIN) 125 MCG tablet Take 1 tablet (125 mcg) by mouth daily 90 tablet 0     ferrous sulfate (IRON SUPPLEMENT) 325 (65 FE) MG tablet Take 1 tablet (325 mg) by mouth 2 times daily 100 tablet      Fexofenadine HCl (ALLERGY 24-HR PO) Take by mouth daily       levothyroxine (SYNTHROID/LEVOTHROID) 200 MCG tablet TAKE 1 TABLET (200 MCG) BY MOUTH DAILY SEE DOCTOR IN MARCH 30 tablet 5     lisinopril (PRINIVIL/ZESTRIL) 20 MG tablet Take  20 mg (1 tablet in am) and 10 mg  (one half tablet) in  pm 135 tablet 2     Multiple Vitamins-Minerals (CENTRUM) CHEW Take 1 tablet by  mouth At Bedtime       nitroGLYcerin (NITROSTAT) 0.3 MG sublingual tablet For chest pain place 1 tablet under the tongue every 5 minutes for 3 doses. If symptoms persist 5 minutes after 1st dose call 911. 25 tablet 1     Psyllium (FIBER) 0.52 GM CAPS Take 2 tablets by mouth At Bedtime        sertraline (ZOLOFT) 50 MG tablet TAKE 1 TABLET DAILY 90 tablet 3     simvastatin (ZOCOR) 20 MG tablet TAKE 1 TABLET BY MOUTH AT BEDTIME 90 tablet 2     warfarin ANTICOAGULANT (COUMADIN) 4 MG tablet TAKE 1 TABLET MONDAY, WEDNESDAY, AND FRIDAY. TAKE ONE AND ONE-HALF TABLETS ALL OTHER DAYS OF THE WEEK 117 tablet 1       ALLERGIES   No Known Allergies    PAST MEDICAL HISTORY:  Past Medical History:   Diagnosis Date     Acne rosacea 4/22/2019     Acquired hypothyroidism 5/23/2003     Problem list name updated by automated process. Provider to review     Acute prostatitis      Acute sinusitis, unspecified      Aortic valve disease      Atrial fibrillation (H)      Biatrial enlargement      CAD (coronary artery disease)     known 70% LAD stenosis     GI bleeding 1/22/2016     HTN (hypertension)      Hyperlipidemia      Idiopathic cardiomyopathy (H)      Mitral regurgitation      Obesity      Obesity, unspecified      Palpitations      Permanent atrial fibrillation (H) 6/2/2017     Right inguinal hernia 7/22/2019     S/P AVR 01/07/2011    with Maze procedure     Tricuspid regurgitation      Unspecified essential hypertension        PAST SURGICAL HISTORY:  Past Surgical History:   Procedure Laterality Date     ANGIOGRAM  2/02    Normal coronary,dilated LV,Sev.decr.global LVF,+1 MR     ANGIOGRAM  1/08    Mild AS,Mod PHTN,mod prox.LAD disease,Triv.CFX disease     ANGIOGRAM  1/09    Mod AS,Mod PHTN,mod prox.LAD disease     ANGIOGRAM  10/10    Sev.AS,CM,global hypokinesis,Mild-mod LV dilated,Mild MR,70% prox.LAD lesion,PHTN     CARDIOVERSION  2/02, 4/02, 4/11     CHOLECYSTECTOMY       COLONOSCOPY N/A 1/23/2016    Procedure: COLONOSCOPY;   Surgeon: Robyn Collins MD;  Location:  GI     ESOPHAGOSCOPY, GASTROSCOPY, DUODENOSCOPY (EGD), COMBINED N/A 1/23/2016    Procedure: COMBINED ESOPHAGOSCOPY, GASTROSCOPY, DUODENOSCOPY (EGD);  Surgeon: Robyn Collins MD;  Location:  GI     LAPAROSCOPIC BYPASS GASTRIC  10/8/2011    Procedure:LAPAROSCOPIC BYPASS GASTRIC; Diagnostic laparoscopy, Lysis of Adhesions, Laparoscopic Revision of Jejunojejunostomy; Surgeon:RADHA MURO; Location: OR     LAPAROSCOPIC BYPASS GASTRIC  6/26/2006    Dr Jin     LAPAROSCOPY DIAGNOSTIC (GENERAL)  10/8/2011    Procedure:LAPAROSCOPY DIAGNOSTIC (GENERAL); Surgeon:RADHA MURO; Location: OR     REPLACE VALVE AORTIC  1/7/2011     ZZC NONSPECIFIC PROCEDURE  ~1985    vein stripping       FAMILY HISTORY:  Family History   Problem Relation Age of Onset     Cancer - colorectal Father      Colon Cancer Father      Cancer Father      Diabetes Brother        SOCIAL HISTORY:  Social History     Socioeconomic History     Marital status:      Spouse name: Tracey     Number of children: 0     Years of education: Not on file     Highest education level: Not on file   Occupational History     Occupation:      Comment: [employer]   Social Needs     Financial resource strain: Not on file     Food insecurity     Worry: Not on file     Inability: Not on file     Transportation needs     Medical: Not on file     Non-medical: Not on file   Tobacco Use     Smoking status: Never Smoker     Smokeless tobacco: Never Used   Substance and Sexual Activity     Alcohol use: No     Alcohol/week: 0.0 standard drinks     Drug use: No     Sexual activity: Not Currently     Partners: Female   Lifestyle     Physical activity     Days per week: Not on file     Minutes per session: Not on file     Stress: Not on file   Relationships     Social connections     Talks on phone: Not on file     Gets together: Not on file     Attends Uatsdin service: Not on file      Active member of club or organization: Not on file     Attends meetings of clubs or organizations: Not on file     Relationship status: Not on file     Intimate partner violence     Fear of current or ex partner: Not on file     Emotionally abused: Not on file     Physically abused: Not on file     Forced sexual activity: Not on file   Other Topics Concern     Parent/sibling w/ CABG, MI or angioplasty before 65F 55M? Yes      Service Not Asked     Blood Transfusions No     Caffeine Concern Yes     Comment: 1 can daily     Occupational Exposure Yes     Hobby Hazards No     Sleep Concern No     Stress Concern No     Weight Concern No     Special Diet Yes     Comment: low sodium, low calories     Back Care No     Exercise Yes     Comment: walks daily     Bike Helmet Not Asked     Comment: NA     Seat Belt Yes     Self-Exams Yes   Social History Narrative     Not on file       Review of Systems:  Skin:          Eyes:         ENT:         Respiratory:          Cardiovascular:         Gastroenterology:        Genitourinary:         Musculoskeletal:         Neurologic:         Psychiatric:         Heme/Lymph/Imm:         Endocrine:           Physical Exam:  Vitals: Wt 89.8 kg (198 lb)   BMI 28.21 kg/m      Constitutional:  cooperative, alert and oriented, well developed, well nourished, in no acute distress        Skin:  warm and dry to the touch, no apparent skin lesions or masses noted          Head:  normocephalic, no masses or lesions        Eyes:           Lymph:      ENT:           Neck:  carotid pulses are full and equal bilaterally        Respiratory:  clear to auscultation         Cardiac: irregular, mechanical S2    GI:  abdomen soft        Extremities and Muscular Skeletal:  no deformities, clubbing, cyanosis, erythema observed              Neurological:  no gross motor deficits        Psych:  Alert and Oriented x 3        CC  Lloyd Lewis MD  [address]  [place]  [id number]

## 2020-10-15 NOTE — PROGRESS NOTES
Service Date: 10/15/2020      HISTORY OF PRESENT ILLNESS:  I had the pleasure of seeing Mr. Mantilla in followup at the [facility] today.  He is a very pleasant 65-year-old gentleman who was previously followed by Dr. Lewis, most recently in 10/2018.      Mr. Mantilla has a history of severe aortic stenosis and is status post aortic valve replacement in 01/2011 with a 24 mm ATS supra-annular valve.  He also has a history of atrial fibrillation and underwent a Maze procedure at that time, but has remained in atrial fibrillation subsequently.  He has been on rate control and anticoagulation, which has worked well in that he has been essentially asymptomatic from his atrial fibrillation.  He also has a history of a 70% stenosis involving the LAD and coronary angiography prior to aortic valve replacement.  This, however, was not bypassed at the time of his surgery since it was found to be completely intramyocardial at the time of surgery.      Today, he feels well overall from a cardiovascular standpoint.  He specifically denies any chest discomfort, dyspnea on exertion, PND, orthopnea or lower extremity edema.  Denies any syncope or presyncope.  He has been taking all his medications as prescribed.      He has recently been bothered by right-sided inguinal hernia for which surgical repair is being planned.      PHYSICAL EXAMINATION:  Dictated below.        I went over the results of his recent echocardiogram with him in detail.  This was performed on 06/04/2020 and demonstrated mildly reduced left ventricular systolic function with mild to moderate right ventricular dysfunction and a normally functioning mechanical aortic valve.  There was no significant change compared to the prior study from 2017.      Labs on 03/13/2020 demonstrated total cholesterol 109, HDL 51, LDL 49, triglycerides of 45.      IMPRESSION:   1.  Severe aortic stenosis, status post aortic valve replacement with a mechanical valve as  described above.   2.  Persistent atrial fibrillation.   3.  Coronary artery disease as described above.  He is asymptomatic from a cardiovascular standpoint.   4.  Right inguinal hernia, for which surgery is being planned.      The patient is doing well overall from a cardiovascular standpoint.  His echocardiographic findings are stable.  His medications appear appropriate and his lipids are at goal.      Regarding the issue of his upcoming inguinal hernia surgery, there are no contraindications from a cardiovascular standpoint.  I would, however, recommend bridging with Lovenox given that he has chronic persistent atrial fibrillation in addition to the presence of a mechanical aortic valve.      It was a pleasure seeing him in followup.  Assuming his clinical status remains stable, I will see him in the clinic in approximately 1 year.         BARBARA APONTE MD             D: 10/15/2020   T: 10/15/2020   MT: BILL      Name:     CHAS STAPLES   MRN:      [id number]        Account:      [id number]   :      1955           Service Date: 10/15/2020      Document: [id number]

## 2020-10-15 NOTE — LETTER
10/15/2020      Rehan Lorenzana MD  [address] [facility] [id number]      RE: Jovon Moraesley       Dear Colleague,    I had the pleasure of seeing Jovon Mantilla in the [facility].    Service Date: 10/15/2020      HISTORY OF PRESENT ILLNESS:  I had the pleasure of seeing Mr. Mantilla in followup at the [facility] today.  He is a very pleasant 65-year-old gentleman who was previously followed by Dr. Lewis, most recently in 10/2018.      Mr. Mantilla has a history of severe aortic stenosis and is status post aortic valve replacement in 01/2011 with a 24 mm ATS supra-annular valve.  He also has a history of atrial fibrillation and underwent a Maze procedure at that time, but has remained in atrial fibrillation subsequently.  He has been on rate control and anticoagulation, which has worked well in that he has been essentially asymptomatic from his atrial fibrillation.  He also has a history of a 70% stenosis involving the LAD and coronary angiography prior to aortic valve replacement.  This, however, was not bypassed at the time of his surgery since it was found to be completely intramyocardial at the time of surgery.      Today, he feels well overall from a cardiovascular standpoint.  He specifically denies any chest discomfort, dyspnea on exertion, PND, orthopnea or lower extremity edema.  Denies any syncope or presyncope.  He has been taking all his medications as prescribed.      He has recently been bothered by right-sided inguinal hernia for which surgical repair is being planned.      PHYSICAL EXAMINATION:  Dictated below.        I went over the results of his recent echocardiogram with him in detail.  This was performed on 06/04/2020 and demonstrated mildly reduced left ventricular systolic function with mild to moderate right ventricular dysfunction and a normally functioning mechanical aortic valve.  There was no significant change compared to the  prior study from 2017.      Labs on 2020 demonstrated total cholesterol 109, HDL 51, LDL 49, triglycerides of 45.      IMPRESSION:   1.  Severe aortic stenosis, status post aortic valve replacement with a mechanical valve as described above.   2.  Persistent atrial fibrillation.   3.  Coronary artery disease as described above.  He is asymptomatic from a cardiovascular standpoint.   4.  Right inguinal hernia, for which surgery is being planned.      The patient is doing well overall from a cardiovascular standpoint.  His echocardiographic findings are stable.  His medications appear appropriate and his lipids are at goal.      Regarding the issue of his upcoming inguinal hernia surgery, there are no contraindications from a cardiovascular standpoint.  I would, however, recommend bridging with Lovenox given that he has chronic persistent atrial fibrillation in addition to the presence of a mechanical aortic valve.      It was a pleasure seeing him in followup.  Assuming his clinical status remains stable, I will see him in the clinic in approximately 1 year.         BARBARA APONTE MD             D: 10/15/2020   T: 10/15/2020   MT: BILL      Name:     [facility]   MRN:      [id number]        Account:      [id number]   :      1955           Service Date: 10/15/2020      Document: [id number]          Outpatient Encounter Medications as of 10/15/2020   Medication Sig Dispense Refill     Ascorbic Acid (VITAMIN C PO) Take 500 mg by mouth At Bedtime       aspirin 81 MG tablet Take 81 mg by mouth every other day       Calcium Carbonate-Vitamin D (CALCIUM 600+D) 600-200 MG-UNIT TABS Take 1 tablet by mouth 2 times daily        carvedilol (COREG) 25 MG tablet Take 1 tablet (25 mg) by mouth 2 times daily (with meals) 180 tablet 1     Cholecalciferol (VITAMIN D) 2000 UNIT CAPS Take 1 tablet by mouth daily        Cyanocobalamin (VITAMIN B-12) 500 MCG SUBL Place 500 mcg under the tongue daily        digoxin  (LANOXIN) 125 MCG tablet Take 1 tablet (125 mcg) by mouth daily 90 tablet 0     ferrous sulfate (IRON SUPPLEMENT) 325 (65 FE) MG tablet Take 1 tablet (325 mg) by mouth 2 times daily 100 tablet      Fexofenadine HCl (ALLERGY 24-HR PO) Take by mouth daily       levothyroxine (SYNTHROID/LEVOTHROID) 200 MCG tablet TAKE 1 TABLET (200 MCG) BY MOUTH DAILY SEE DOCTOR IN MARCH 30 tablet 5     lisinopril (PRINIVIL/ZESTRIL) 20 MG tablet Take  20 mg (1 tablet in am) and 10 mg  (one half tablet) in  pm 135 tablet 2     Multiple Vitamins-Minerals (CENTRUM) CHEW Take 1 tablet by mouth At Bedtime       Psyllium (FIBER) 0.52 GM CAPS Take 2 tablets by mouth At Bedtime        sertraline (ZOLOFT) 50 MG tablet TAKE 1 TABLET DAILY 90 tablet 3     simvastatin (ZOCOR) 20 MG tablet TAKE 1 TABLET BY MOUTH AT BEDTIME 90 tablet 2     warfarin ANTICOAGULANT (COUMADIN) 4 MG tablet TAKE 1 TABLET MONDAY, WEDNESDAY, AND FRIDAY. TAKE ONE AND ONE-HALF TABLETS ALL OTHER DAYS OF THE WEEK 117 tablet 1     albuterol (PROAIR RESPICLICK) 108 (90 Base) MCG/ACT inhaler Inhale 2 puffs into the lungs every 6 hours as needed for shortness of breath / dyspnea or wheezing (Patient not taking: Reported on 10/15/2020) 1 Inhaler 0     nitroGLYcerin (NITROSTAT) 0.3 MG sublingual tablet For chest pain place 1 tablet under the tongue every 5 minutes for 3 doses. If symptoms persist 5 minutes after 1st dose call 911. (Patient not taking: Reported on 10/15/2020) 25 tablet 1     No facility-administered encounter medications on file as of 10/15/2020.        Again, thank you for allowing me to participate in the care of your patient.      Sincerely,    Noy Cooley MD     [facility]

## 2020-10-19 ENCOUNTER — ANTICOAGULATION THERAPY VISIT (OUTPATIENT)
Dept: FAMILY MEDICINE | Facility: CLINIC | Age: 65
End: 2020-10-19

## 2020-10-19 DIAGNOSIS — Z95.2 S/P AORTIC VALVE REPLACEMENT: ICD-10-CM

## 2020-10-19 DIAGNOSIS — I48.21 PERMANENT ATRIAL FIBRILLATION (H): ICD-10-CM

## 2020-10-19 DIAGNOSIS — Z79.01 LONG TERM CURRENT USE OF ANTICOAGULANT THERAPY: ICD-10-CM

## 2020-10-19 DIAGNOSIS — Z95.2 S/P AVR (AORTIC VALVE REPLACEMENT): ICD-10-CM

## 2020-10-19 DIAGNOSIS — Z79.01 LONG TERM CURRENT USE OF ANTICOAGULANTS WITH INR GOAL OF 2.5-3.5: ICD-10-CM

## 2020-10-19 LAB
CAPILLARY BLOOD COLLECTION: NORMAL
INR PPP: 4.9 (ref 0.86–1.14)

## 2020-10-19 PROCEDURE — 99207 PR NO CHARGE NURSE ONLY: CPT | Performed by: FAMILY MEDICINE

## 2020-10-19 PROCEDURE — 85610 PROTHROMBIN TIME: CPT | Performed by: FAMILY MEDICINE

## 2020-10-19 PROCEDURE — 36416 COLLJ CAPILLARY BLOOD SPEC: CPT | Performed by: FAMILY MEDICINE

## 2020-10-19 NOTE — PROGRESS NOTES
ANTICOAGULATION FOLLOW-UP CLINIC VISIT    Patient Name:  Jovon Mantilla  Date:  10/19/2020  Contact Type:  Telephone  Taking ibuprofen for hernia pain. Instructed to change to tylenol and eat more greens. Will hold one dose and he will take tylenol for discomfort. Will recheck in 2 weeks.    SUBJECTIVE:  Patient Findings     Positives:  Change in medications (fighting a hernia pain with flare up 2 ibuprofen.)    Comments:    Assessed for S/S bleeding, clotting, medication, diet, health, activity and alcohol changes          Clinical Outcomes     Negatives:  Major bleeding event, Thromboembolic event, Anticoagulation-related hospital admission, Anticoagulation-related ED visit, Anticoagulation-related fatality    Comments:    Assessed for S/S bleeding, clotting, medication, diet, health, activity and alcohol changes             OBJECTIVE    Recent labs: (last 7 days)     10/19/20  0934   INR 4.90*       ASSESSMENT / PLAN  INR assessment SUPRA    Recheck INR In: 2 WEEKS    INR Location Clinic      Anticoagulation Summary  As of 10/19/2020    INR goal:  2.5-3.5   TTR:  72.4 % (1 y)   INR used for dosin.90 (10/19/2020)   Warfarin maintenance plan:  4 mg (4 mg x 1) every Mon, Wed, Fri; 6 mg (4 mg x 1.5) all other days   Full warfarin instructions:  10/19: Hold; Otherwise 4 mg every Mon, Wed, Fri; 6 mg all other days   Weekly warfarin total:  36 mg   Plan last modified:  Karina Pierre RN (2020)   Next INR check:  2020   Priority:  Maintenance   Target end date:  Indefinite    Indications    S/P AVR (aortic valve replacement) [Z95.2]  Permanent atrial fibrillation (H) [I48.21]  Long term current use of anticoagulants with INR goal of 2.5-3.5 [Z79.01]             Anticoagulation Episode Summary     INR check location:      Preferred lab:      Send INR reminders to:  [facility]    Comments:  24mm ATS Valve  Cardiomyopathy / CAD      Anticoagulation Care Providers     Provider Role  Specialty Phone number    Rehan Lorenzana MD Referring Family Practice [phone]            See the Encounter Report to view Anticoagulation Flowsheet and Dosing Calendar (Go to Encounters tab in chart review, and find the Anticoagulation Therapy Visit)        Karina Pierre RN

## 2020-11-02 ENCOUNTER — ANTICOAGULATION THERAPY VISIT (OUTPATIENT)
Dept: NURSING | Facility: CLINIC | Age: 65
End: 2020-11-02

## 2020-11-02 DIAGNOSIS — Z95.2 S/P AVR (AORTIC VALVE REPLACEMENT): ICD-10-CM

## 2020-11-02 DIAGNOSIS — Z95.2 S/P AORTIC VALVE REPLACEMENT: ICD-10-CM

## 2020-11-02 DIAGNOSIS — I48.21 PERMANENT ATRIAL FIBRILLATION (H): ICD-10-CM

## 2020-11-02 DIAGNOSIS — Z79.01 LONG TERM CURRENT USE OF ANTICOAGULANT THERAPY: ICD-10-CM

## 2020-11-02 DIAGNOSIS — Z79.01 LONG TERM CURRENT USE OF ANTICOAGULANTS WITH INR GOAL OF 2.5-3.5: ICD-10-CM

## 2020-11-02 LAB
CAPILLARY BLOOD COLLECTION: NORMAL
INR PPP: 3.8 (ref 0.86–1.14)

## 2020-11-02 PROCEDURE — 36416 COLLJ CAPILLARY BLOOD SPEC: CPT | Performed by: FAMILY MEDICINE

## 2020-11-02 PROCEDURE — 85610 PROTHROMBIN TIME: CPT | Performed by: FAMILY MEDICINE

## 2020-11-02 NOTE — PROGRESS NOTES
ANTICOAGULATION MANAGEMENT     Patient Name:  Jovon Mantilla  Date:  11/2/2020    ASSESSMENT /SUBJECTIVE:    Today's INR result of 3.8 is supratherapeutic. Goal INR of 2.5-3.5      Warfarin dose taken: Warfarin taken as instructed    Diet: No new diet changes affecting INR    Medication changes/ interactions: No new medications/supplements affecting INR    Previous INR: Supratherapeutic     S/S of bleeding or thromboembolism: No    New injury or illness: No    Upcoming surgery, procedure or cardioversion: No    Additional findings: None      PLAN:    Telephone call with Jovon regarding INR result and instructed:     Warfarin Dosing Instructions: take 2 mg tonight then continue your current warfarin dose of 4 mg Mon/Wed/Fri and 6 mg ROW    Instructed patient to follow up no later than: 2 weeks  Lab visit scheduled    Education provided: Target INR goal and significance of current INR result      Jovon verbalizes understanding and agrees to warfarin dosing plan.    Instructed to call the Anticoagulation Clinic for any changes, questions or concerns. (#[phone])        Samra Blanca RN      OBJECTIVE:  Recent labs: (last 7 days)     11/02/20  1054   INR 3.80*         No question data found.  Anticoagulation Summary  As of 11/2/2020    INR goal:  2.5-3.5   TTR:  68.6 % (1 y)   INR used for dosing:  3.80 (11/2/2020)   Warfarin maintenance plan:  4 mg (4 mg x 1) every Mon, Wed, Fri; 6 mg (4 mg x 1.5) all other days   Full warfarin instructions:  11/2: 2 mg; Otherwise 4 mg every Mon, Wed, Fri; 6 mg all other days   Weekly warfarin total:  36 mg   Plan last modified:  Karina Pierre RN (9/14/2020)   Next INR check:  11/16/2020   Priority:  Maintenance   Target end date:  Indefinite    Indications    S/P AVR (aortic valve replacement) [Z95.2]  Permanent atrial fibrillation (H) [I48.21]  Long term current use of anticoagulants with INR goal of 2.5-3.5 [Z79.01]             Anticoagulation Episode Summary     INR  check location:      Preferred lab:      Send INR reminders to:  [facility]    Comments:  24mm ATS Valve  Cardiomyopathy / CAD      Anticoagulation Care Providers     Provider Role Specialty Phone number    Rehan Lorenzana MD Referring Family Medicine [phone]

## 2020-11-03 ENCOUNTER — OFFICE VISIT (OUTPATIENT)
Dept: FAMILY MEDICINE | Facility: CLINIC | Age: 65
End: 2020-11-03
Payer: COMMERCIAL

## 2020-11-03 VITALS
WEIGHT: 203.4 LBS | BODY MASS INDEX: 30.13 KG/M2 | HEIGHT: 69 IN | SYSTOLIC BLOOD PRESSURE: 108 MMHG | DIASTOLIC BLOOD PRESSURE: 72 MMHG | RESPIRATION RATE: 14 BRPM | OXYGEN SATURATION: 97 % | TEMPERATURE: 98.1 F | HEART RATE: 63 BPM

## 2020-11-03 DIAGNOSIS — Z23 NEED FOR PROPHYLACTIC VACCINATION AND INOCULATION AGAINST INFLUENZA: ICD-10-CM

## 2020-11-03 DIAGNOSIS — K40.90 RIGHT INGUINAL HERNIA: Primary | ICD-10-CM

## 2020-11-03 PROCEDURE — 99213 OFFICE O/P EST LOW 20 MIN: CPT | Mod: 25 | Performed by: FAMILY MEDICINE

## 2020-11-03 PROCEDURE — 90682 RIV4 VACC RECOMBINANT DNA IM: CPT | Performed by: FAMILY MEDICINE

## 2020-11-03 PROCEDURE — 90471 IMMUNIZATION ADMIN: CPT | Performed by: FAMILY MEDICINE

## 2020-11-03 ASSESSMENT — MIFFLIN-ST. JEOR: SCORE: 1708

## 2020-11-03 NOTE — PROGRESS NOTES
"Subjective     Jovon Mantilla is a 65 year old male who presents to clinic today for the following health issues:    History of Present Illness       He eats 0-1 servings of fruits and vegetables daily.He consumes 2 sweetened beverage(s) daily.He exercises with enough effort to increase his heart rate 30 to 60 minutes per day.  He exercises with enough effort to increase his heart rate 6 days per week.   He is taking medications regularly.           Concern - hernia  Onset: 1 yr  Description: right side groin area  Intensity: mild  Progression of Symptoms:  same  Accompanying Signs & Symptoms: none  Previous history of similar problem: none         Improved by: none  Therapies tried and outcome: None    Has a hernia about a year ago.  Bulge varies in size, sometimes small, sometimes larger, but overall is reducible.  He was planning on getting surgery, however Cardiology was needed to clear him for surgery.  He says that he needs another referral to a surgeon now.  States that the hernia is minimally tender in general, sometimes more painful than other times, but bothersome enough to warrant repair.  No other concerns today.      Review of Systems   Constitutional, HEENT, cardiovascular, pulmonary, gi and gu systems are negative, except as otherwise noted.      Objective    /72 (BP Location: Right arm, Patient Position: Chair, Cuff Size: Adult Regular)   Pulse 63   Temp 98.1  F (36.7  C) (Oral)   Resp 14   Ht 1.753 m (5' 9\")   Wt 92.3 kg (203 lb 6.4 oz)   SpO2 97%   BMI 30.04 kg/m    Body mass index is 30.04 kg/m .  Physical Exam   GENERAL: healthy, alert and no distress   (male): normal male genitalia without lesions or urethral discharge, Moderate right inguinal hernia, slightly tender to palpation.          Assessment & Plan     Right inguinal hernia  Order placed for general surgery referral, follow up as needed.  Did discuss red flag symptoms that warrant ED visit.  - GENERAL SURG ADULT REFERRAL; " Future    Need for prophylactic vaccination and inoculation against influenza  - INFLUENZA QUAD, RECOMBINANT, P-FREE (RIV4) (FLUBLOCK) [62037]  - ADMIN INFLUENZA (For MEDICARE Patients ONLY) []        There are no Patient Instructions on file for this visit.    Return in about 9 months (around 7/31/2021) for Preventative Care Visit.    Darby Butler MD  [facility]

## 2020-11-03 NOTE — TELEPHONE ENCOUNTER
REFERRAL INFORMATION:    Referring Provider:  Dr. Darby Butler     Referring Clinic:  [facility]     Reason for Visit/Diagnosis: RIH        FUTURE VISIT INFORMATION:    Appointment Date: 11/5/2020    Appointment Time: 1:30 PM      NOTES RECORD STATUS  DETAILS   OFFICE NOTE from Referring Provider Internal 11/3/2020 Office visit with Dr. Kaiden Butler    OFFICE NOTE from Other Specialists Internal 7/22/19 Office visit with Dr. Nathan Hastings ([facility])    HOSPITAL DISCHARGE SUMMARY/ ED VISITS  N/A    OPERATIVE REPORT N/A    ENDOSCOPY (EGD)  Internal 1/23/16   PERTINENT LABS Internal    PATHOLOGY REPORTS (RELATED) N/A    IMAGING (CT, MRI, US, XR)  N/A

## 2020-11-04 ENCOUNTER — PATIENT OUTREACH (OUTPATIENT)
Dept: SURGERY | Facility: CLINIC | Age: 65
End: 2020-11-04

## 2020-11-05 ENCOUNTER — OFFICE VISIT (OUTPATIENT)
Dept: SURGERY | Facility: CLINIC | Age: 65
End: 2020-11-05
Payer: COMMERCIAL

## 2020-11-05 ENCOUNTER — PATIENT OUTREACH (OUTPATIENT)
Dept: SURGERY | Facility: CLINIC | Age: 65
End: 2020-11-05

## 2020-11-05 ENCOUNTER — PRE VISIT (OUTPATIENT)
Dept: SURGERY | Facility: CLINIC | Age: 65
End: 2020-11-05

## 2020-11-05 VITALS
WEIGHT: 202 LBS | BODY MASS INDEX: 29.92 KG/M2 | HEART RATE: 74 BPM | OXYGEN SATURATION: 96 % | SYSTOLIC BLOOD PRESSURE: 128 MMHG | HEIGHT: 69 IN | DIASTOLIC BLOOD PRESSURE: 78 MMHG

## 2020-11-05 DIAGNOSIS — K40.90 RIGHT INGUINAL HERNIA: ICD-10-CM

## 2020-11-05 PROCEDURE — 99203 OFFICE O/P NEW LOW 30 MIN: CPT | Performed by: SURGERY

## 2020-11-05 RX ORDER — CEFAZOLIN SODIUM 2 G/50ML
2 SOLUTION INTRAVENOUS
Status: CANCELLED | OUTPATIENT
Start: 2020-11-05

## 2020-11-05 RX ORDER — CEFAZOLIN SODIUM 1 G/50ML
1 INJECTION, SOLUTION INTRAVENOUS SEE ADMIN INSTRUCTIONS
Status: CANCELLED | OUTPATIENT
Start: 2020-11-05

## 2020-11-05 ASSESSMENT — PAIN SCALES - GENERAL: PAINLEVEL: NO PAIN (0)

## 2020-11-05 ASSESSMENT — MIFFLIN-ST. JEOR: SCORE: 1701.65

## 2020-11-05 NOTE — PROGRESS NOTES
Jovon Mantilla is a 65 year old male with a 1 year history of a right inguinal mass with the following symptoms of lump.    Onset did occur with lifting.  Obstructive symptoms:  no  Urinary difficulties:  no  Chronic cough: no  Constipation:  no  Current level of activity:  Low, works as [employer]  on weekends.    Past medical and surgical history, medications, allergies, family history, and social history were reviewed with the patient. Cardiac issues, anticoagulated    ROS: 10 point review of systems negative except noted in HPI  PHYSICAL EXAM  General appearance-  alert, and in no distress.  Neck- Neck is supple without obvious adenopathy.  Lungs- Respiratory effort unlabored.  Gait- Normal.  Abdomen - soft non distended, non tender without obvious masses. Redundant skin  [facility]    Impression:  Inguinal hernia, right  Recommendation:  Open mesh repair [facility]    A full discussion regarding the alternatives, risks, goals, and potential complications for this surgery was completed today.  The patient understood I would use non recalled mesh and  that the potential problems included but are not limited to:  Infection, bleeding, hematoma, seroma, recurrence, injury to nerve/muscle or involved testicle(if male), ischemic orchitis(if male), and chronic pain.    The patient verbally expressed understanding, was given the opportunity for questions, and gives full informed consent for the procedure.      Today the patient was instructed on the need for a preoperative H&P, NPO status prior to surgery, and the need to stop anticoagulants prior to surgery.  Additional educational material, soap, and instructions will be mailed out to the patients home.    The total time spent with this patient was 30 minutes.  Of this time, greater than 50% was spent counseling and coordinating care.      Needs PAC

## 2020-11-05 NOTE — LETTER
11/5/2020     RE: Jovon Mantilla  [address]  [place] [address]     Dear Colleague,    Thank you for referring your patient, Jovon Mantilla, to the [facility] [place] [facility]. Please see a copy of my visit note below.    Jovon Mantilla is a 65 year old male with a 1 year history of a right inguinal mass with the following symptoms of lump.    Onset did occur with lifting.  Obstructive symptoms:  no  Urinary difficulties:  no  Chronic cough: no  Constipation:  no  Current level of activity:  Low, works as [employer]  on weekends.    Past medical and surgical history, medications, allergies, family history, and social history were reviewed with the patient. Cardiac issues, anticoagulated    ROS: 10 point review of systems negative except noted in HPI  PHYSICAL EXAM  General appearance-  alert, and in no distress.  Neck- Neck is supple without obvious adenopathy.  Lungs- Respiratory effort unlabored.  Gait- Normal.  Abdomen - soft non distended, non tender without obvious masses. Redundant skin  [facility]    Impression:  Inguinal hernia, right  Recommendation:  Open mesh repair [facility]    A full discussion regarding the alternatives, risks, goals, and potential complications for this surgery was completed today.  The patient understood I would use non recalled mesh and  that the potential problems included but are not limited to:  Infection, bleeding, hematoma, seroma, recurrence, injury to nerve/muscle or involved testicle(if male), ischemic orchitis(if male), and chronic pain.    The patient verbally expressed understanding, was given the opportunity for questions, and gives full informed consent for the procedure.      Today the patient was instructed on the need for a preoperative H&P, NPO status prior to surgery, and the need to stop anticoagulants prior to surgery.  Additional educational material, soap, and instructions will be  mailed out to the patients home.    The total time spent with this patient was 30 minutes.  Of this time, greater than 50% was spent counseling and coordinating care.      Needs PAC    Again, thank you for allowing me to participate in the care of your patient.      Sincerely,    Jaden Alex MD

## 2020-11-05 NOTE — NURSING NOTE
"Chief Complaint   Patient presents with     Consult     New hernia appt.       Vitals:    11/05/20 1333   BP: 128/78   BP Location: Left arm   Patient Position: Sitting   Cuff Size: Adult Regular   Pulse: 74   SpO2: 96%   Weight: 91.6 kg (202 lb)   Height: 1.753 m (5' 9\")       Body mass index is 29.83 kg/m .                            BRENDA RAMÍREZ EMT    "

## 2020-11-05 NOTE — PATIENT INSTRUCTIONS
You met with Dr. Jaden Alex.      Today's visit instructions:    Reminder:  Surgery Requirements  1. Your surgery will be at [facility] [place]- 5th Floor  2. You will need to arrive 1 1/2 hours early based on the location of your surgery.  3. You will need someone to drive you home (over 18 years old) and stay with you for 24 hours after the procedure  4. You will need a preop physical with our PAC (anesthesia clinic) within 30 days of surgery- closer is always better  5. Stop any blood thinners, vitamins, minerals, or herbal supplements 5 days before surgery.  If you are taking a prescribed blood thinner please let us know for specific instructions  6. Fasting- a nurse from [facility] will call you 1-2 days before surgery to confirm your procedure and tell you when to stop eating and drinking  7. Wash with the soap the night before surgery and morning of surgery. See instructions in the Surgery Packet.  8. If you would like a procedure estimate please call Florinda TRAYLOR, Financial Counselor, [phone].    At this time, any patient that does not have COVID-19 testing within 4 days of surgery and results available to the surgeon and anesthesia team before the procedure may have their procedure postponed or canceled. We do this to keep our patients, providers, and employees safe. If you decline to test, then you will need to contact your surgeon to determine when or if your procedure will still take place.    OR    We highly encourage patients to get tested for COVID-19 at one of our designated [facility] testing sites. We process the tests in our lab, which allows us to get the results quickly. If you choose to get tested at a non-[facility] location, you will need to contact your primary care provider to make those arrangements and ensure the results are available to your surgeon before you arrive for your procedure. If we do not receive the results in time, your procedure  may be postponed or canceled. Please make sure your test is collected 3-days prior to your procedure date. The results will need to get faxed to [phone].       After Your Open Right Inguinal Hernia Repair         Incision care     You may take a shower the day after surgery. Carefully wash your incision with soap and water. Do not submerge yourself in water (bath, whirlpool, hot tub, pool, lake) for 14 days after surgery.     Remove the bandage the day after surgery, but leave the medical tape (Steri-Strips) or glue in place. These will loosen and fall off on their own 5 to 7 days after surgery.      Always wash your hands before touching your incisions or removing bandages.     It is not unusual to form a collection of fluid or blood under your incision that may feel firm or squishy- it can take several weeks to months for your body to reabsorb it.  At times, it may even drain.  If that should happen keep the area clean with soap, water,  and cover with a clean gauze dressing. You can change this daily or as needed.     Other medicines     Wait to start aspirin or blood thinners until the day after surgery. You can take any other regular medicines at your normal time the day after surgery.     Your pain medicine may cause constipation (hard, dry stools). To help with this, take the stool softener your doctor gave you or an over-the-counter stool softener or laxative. You can stop taking this when you are no longer taking pain medicine and your bowel movements are back to normal.      For pain or discomfort     Take the narcotic pain medicine your doctor gave you as needed and as instructed on the bottle. If you prefer to use over-the-counter medication, use acetaminophen (Tylenol) or ibuprofen (Advil, Motrin) as instructed on the box. Do not take Tylenol if it is in your narcotic pain medication.      Use an ice pack on your groin for 20 minutes at a time as needed for the first 24 hours. Be sure to protect  your skin by putting a cloth between the ice pack and your skin.     After 24 hours you can switch to heat for 20 minutes as needed. Be sure to protect your skin by putting a cloth between the heat pack and your skin.      It is normal to feel a lump in your groin after surgery. This lump may take up to 8 weeks to go away.      Activities     No driving until you feel it s safe to do so. Don t drive while taking narcotic pain medicine.     Don t lift anything heavier than 20 pounds for 3 weeks after surgery.      Special equipment     If we gave you an athletic supporter, wear this for the first 3 days after surgery. You can wear it longer than this if you wish.      Diet   You can eat your regular meals after surgery.      When to call the doctor   Call your doctor if you have:     A fever above 101 F (38.3 C) (taken under the tongue), or a fever or chills lasting more than a day.     Redness at the incision site.     Any fluid or blood draining from the incision, especially if it smells bad.      Severe pain that doesn t improve with pain medicine.      We will call you 2 to 4 days after surgery to review this handout, answer questions and help arrange after-surgery care. If you have questions or concerns, please call [phone] during regular office hours. If you need to call after business hours, call [phone] and ask to page the surgeon on-call.           If you have questions please contact Noe RN or Kalina RN during regular clinic hours, Monday through Friday 7:30 AM - 4:00 PM, or you can contact us via [employer] at anytime.       If you have urgent needs after-hours, weekends, or holidays please call the hospital at [phone] and ask to speak with our on-call General Surgery Team.    Appointment [phone], option #1   Nurse Advice (Noe or Kalina): [phone]   Surgery Scheduler (Khadijah): [phone]  Fax: [phone]

## 2020-11-05 NOTE — PROGRESS NOTES
Pre and Post op Patient Education/Teaching Flowsheet  Relevant Diagnosis:  Inguinal hernia  Teaching Topic:  Pre and post op teaching  Person(s) Involved in teaching:  Patient     Motivation Level:  Asks Questions:  Yes  Eager to Learn:  Yes  Cooperative:  Yes  Receptive (willing/able to accept information):  Yes  Any cultural factors/Confucianism beliefs that may influence understanding or compliance?  No    Patient/caregiver/family demonstrates understanding of the following:  Reason for the appointment, diagnosis, and treatment plan:  Yes  Patient demonstrates understanding of the following:  Pre-op bowel prep:  No  Post-op pain management recommendations (medications, ice compress, binder/athletic supporter (if applicable), etc.:  Yes  Inguinal hernia patients:  Post-op urinary retention- discussed signs/symptoms and visit to ER for [place] catheter placement and to stay in place for at least 48 hours:  NA  Restrictions:  Yes  Medications to take the day of surgery:  Per PCP  Blood thinner medications discussed and when to stop (if applicable):  Yes. Pt to discuss with PAC about when to stop Coumadin. He has had to bridge in the past.  Wound care:  Yes  Diabetes medication management (if applicable):  Per PCP  Which situations necessitate calling provider and whom to contact:  Discussed how to contact the hospital, nurse, and clinic scheduling staff if necessary      Date and time of surgery:  Yes  Location of surgery: [facility]- 5th Floor  History and Physical and any other testing necessary prior to surgery:  Yes  Required time line for completion of History and Physical and any pre-op testing:  Yes  Discuss need for someone to drive patient home and stay with them for 24 hours:  Yes  Pre-op showering/scrub information with Surgical Scrub:  Yes  NPO Guidelines:  NPO per Anesthesia Guidelines  COVID-19 Testing:  Yes    Infection Prevention: Patient demonstrates understanding of  the following:  Patient instructed on hand hygiene:  Yes  Surgical procedure site care will be taught and will be reviewed at the time of discharge  Signs and symptoms of infection taught:  Yes  Wound care reviewed and will be taught at the time of discharge  Central venous catheter care will be taught at the time of discharge (if applicable)    Post-op follow-up:  Instructional materials used/given/mailed:  [facility] Booklet, post op teaching sheet, Map, Soap, and arrival/location information    Surgical instructions mailed to patient

## 2020-11-06 ENCOUNTER — TELEPHONE (OUTPATIENT)
Dept: SURGERY | Facility: CLINIC | Age: 65
End: 2020-11-06

## 2020-11-06 NOTE — TELEPHONE ENCOUNTER
Spoke with Jovon regarding scheduling surgery with Dr. Alex.     We went over some dates, none of which worked. He works as [employer] throughout this time of year and so he would need a Monday, but not close to Christmas. We have nothing available currently. He would like to have his surgery and still work. He wants it before 2021 d/t insurance reasons.    Message sent to Noe to see what options are.    Will offer him January of 2021 if this winter doesn't work, he is aware.     He is aware that I will call back with some answers today or Monday.

## 2020-11-09 ENCOUNTER — HOSPITAL ENCOUNTER (OUTPATIENT)
Facility: AMBULATORY SURGERY CENTER | Age: 65
End: 2020-11-09
Attending: SURGERY
Payer: COMMERCIAL

## 2020-11-09 ENCOUNTER — TELEPHONE (OUTPATIENT)
Dept: SURGERY | Facility: CLINIC | Age: 65
End: 2020-11-09

## 2020-11-09 NOTE — TELEPHONE ENCOUNTER
Surgery is scheduled with Dr. Alex on 11/30 at the [facility] ([facility]) .  Scheduled per patient request, RN accepting.    H&P: to be completed by PAC.  PAC: 11/16, virtual visit     Additional appointments:   NO ADDITIONAL APPOINTMENTS NEEDED AT THIS TIME    COVID-19 test: 11/27 at the [facility]  Post-op: not needed at this time.    The RN completed the education regarding the surgery.     Patient will receive surgery arrival and start time from PAC.    Patient will complete COVID-19 test that was scheduled by surgical coordinator 2-4 days prior to surgery.     I left a detailed voicemail regarding the above information and asked for a call back.

## 2020-11-10 NOTE — TELEPHONE ENCOUNTER
Jovon left a voicemail and stated that his [employer] duties are much busier and he would like a January date with Dr. Alex.     I am just holding time on 1/18 for this and told Jovon I would reach out when we have Dr. Alex's full January schedule. I am sending myself a reminder for a few weeks from now.    Jovon is aware and ok with the plan.

## 2020-11-10 NOTE — TELEPHONE ENCOUNTER
FUTURE VISIT INFORMATION      SURGERY INFORMATION:    Date: 20    Location: [facility] OR    Surgeon:  Jaden Alex MD    Anesthesia Type:  MAC    Procedure: HERNIORRHAPHY, INGUINAL, OPEN mesh right    Consult: ov     RECORDS REQUESTED FROM:       Primary Care Provider: Rehan Lorenzana MD- [facility]    Pertinent Medical History: Hypertension, CAD, Aortic stenosis, Dilated cardiomyopathy, Mitral regurgitation     Most recent EKG+ Tracin16    Most recent ECHO: 20    Most recent Cardiac Stress Test: 18

## 2020-11-11 DIAGNOSIS — I70.90 ASVD (ARTERIOSCLEROTIC VASCULAR DISEASE): ICD-10-CM

## 2020-11-11 RX ORDER — CARVEDILOL 25 MG/1
25 TABLET ORAL 2 TIMES DAILY WITH MEALS
Qty: 180 TABLET | Refills: 2 | Status: SHIPPED | OUTPATIENT
Start: 2020-11-11 | End: 2021-08-05

## 2020-11-16 ENCOUNTER — PRE VISIT (OUTPATIENT)
Dept: SURGERY | Facility: CLINIC | Age: 65
End: 2020-11-16

## 2020-11-16 ENCOUNTER — ANTICOAGULATION THERAPY VISIT (OUTPATIENT)
Dept: NURSING | Facility: CLINIC | Age: 65
End: 2020-11-16

## 2020-11-16 DIAGNOSIS — I48.21 PERMANENT ATRIAL FIBRILLATION (H): ICD-10-CM

## 2020-11-16 DIAGNOSIS — Z79.01 LONG TERM CURRENT USE OF ANTICOAGULANT THERAPY: ICD-10-CM

## 2020-11-16 DIAGNOSIS — Z95.2 S/P AVR (AORTIC VALVE REPLACEMENT): ICD-10-CM

## 2020-11-16 DIAGNOSIS — Z79.01 LONG TERM CURRENT USE OF ANTICOAGULANTS WITH INR GOAL OF 2.5-3.5: ICD-10-CM

## 2020-11-16 DIAGNOSIS — Z95.2 S/P AORTIC VALVE REPLACEMENT: ICD-10-CM

## 2020-11-16 LAB
CAPILLARY BLOOD COLLECTION: NORMAL
INR PPP: 6 (ref 0.86–1.14)

## 2020-11-16 PROCEDURE — 36416 COLLJ CAPILLARY BLOOD SPEC: CPT | Performed by: FAMILY MEDICINE

## 2020-11-16 PROCEDURE — 85610 PROTHROMBIN TIME: CPT | Performed by: FAMILY MEDICINE

## 2020-11-16 NOTE — PROGRESS NOTES
Patient returned call to [facility], unable to reach [facility] nurse.    Please followup with patient when able.    Thanks,   Mattie Felipe RN

## 2020-11-16 NOTE — PROGRESS NOTES
ANTICOAGULATION MANAGEMENT     Patient Name:  Jovon Mantilla  Date:  2020    ASSESSMENT /SUBJECTIVE:    Today's INR result of 6.00 is supratherapeutic. Goal INR of 2.5-3.5      Warfarin dose taken: Warfarin taken as instructed    Diet: No new diet changes affecting INR    Medication changes/ interactions: Interaction between Zoloft started on 10/20/2020 and warfarin may be affecting INR    Previous INR: Supratherapeutic     S/S of bleeding or thromboembolism: No    New injury or illness: No    Upcoming surgery, procedure or cardioversion: Yes: Hernia repair    Additional findings: None      PLAN:    Telephone call with Jovon regarding INR result and instructed:     Warfarin Dosing Instructions: hold for 2 days then change your warfarin dose to decrease maintenance by about 10%. Decrease has already been adjusted for in the calendar.    Instructed patient to follow up no later than: 4 days  Lab visit scheduled    Education provided: None required      Jovon verbalizes understanding and agrees to warfarin dosing plan.    Instructed to call the Anticoagulation Clinic for any changes, questions or concerns. (#[phone])        Lia Horn RN      OBJECTIVE:  Recent labs: (last 7 days)     20  0936   INR 6.00*         INR assessment SUPRA    Recheck INR In: 4 DAYS    INR Location Clinic      Anticoagulation Summary  As of 2020    INR goal:  2.5-3.5   TTR:  64.8 % (1 y)   INR used for dosin.00 (2020)   Warfarin maintenance plan:  6 mg (4 mg x 1.5) every Sun, Thu; 4 mg (4 mg x 1) all other days   Full warfarin instructions:  : Hold; : Hold; Otherwise 6 mg every Sun, Thu; 4 mg all other days   Weekly warfarin total:  32 mg   Plan last modified:  Lia Horn RN (2020)   Next INR check:  2020   Priority:  Maintenance   Target end date:  Indefinite    Indications    S/P AVR (aortic valve replacement) [Z95.2]  Permanent atrial fibrillation (H)  [I48.21]  Long term current use of anticoagulants with INR goal of 2.5-3.5 [Z79.01]             Anticoagulation Episode Summary     INR check location:      Preferred lab:      Send INR reminders to:  [facility]    Comments:  24mm ATS Valve  Cardiomyopathy / CAD      Anticoagulation Care Providers     Provider Role Specialty Phone number    Rehan Lorenzana MD Referring Family Medicine [phone]         Anticoagulation Management    Unable to reach Jovon today.    Today's INR result of 6.00 is supratherapeutic (goal INR of 2.5-3.5).  Result received from: Clinic Lab    Follow up required to confirm warfarin dose taken and assess for changes    [facility]      Anticoagulation clinic to follow up    Lia Horn, RN  [phone]

## 2020-11-16 NOTE — PROGRESS NOTES
Chart reviewed with [facility] RN.  Agree with plan for hold & dose decrease ~10% due to ongoing elevated INR without identified explanation.  Is having hernia issues, may be due to ongoing inflammation.      Dee Redd, PharmD BCACP  Anticoagulation Clinical Pharmacist

## 2020-11-20 ENCOUNTER — ANTICOAGULATION THERAPY VISIT (OUTPATIENT)
Dept: FAMILY MEDICINE | Facility: CLINIC | Age: 65
End: 2020-11-20

## 2020-11-20 DIAGNOSIS — Z95.2 S/P AORTIC VALVE REPLACEMENT: ICD-10-CM

## 2020-11-20 DIAGNOSIS — I48.21 PERMANENT ATRIAL FIBRILLATION (H): ICD-10-CM

## 2020-11-20 DIAGNOSIS — Z95.2 S/P AVR (AORTIC VALVE REPLACEMENT): ICD-10-CM

## 2020-11-20 DIAGNOSIS — Z79.01 LONG TERM CURRENT USE OF ANTICOAGULANT THERAPY: ICD-10-CM

## 2020-11-20 DIAGNOSIS — Z79.01 LONG TERM CURRENT USE OF ANTICOAGULANTS WITH INR GOAL OF 2.5-3.5: ICD-10-CM

## 2020-11-20 LAB
CAPILLARY BLOOD COLLECTION: NORMAL
INR PPP: 2.7 (ref 0.86–1.14)

## 2020-11-20 PROCEDURE — 85610 PROTHROMBIN TIME: CPT | Performed by: FAMILY MEDICINE

## 2020-11-20 PROCEDURE — 36416 COLLJ CAPILLARY BLOOD SPEC: CPT | Performed by: FAMILY MEDICINE

## 2020-11-20 NOTE — PROGRESS NOTES
ANTICOAGULATION MANAGEMENT     Patient Name:  Jovon Mantilla  Date:  2020    ASSESSMENT /SUBJECTIVE:    Today's INR result of 2.70 is therapeutic. Goal INR of 2.5-3.5      Warfarin dose taken: Warfarin taken as instructed    Diet: No new diet changes affecting INR    Medication changes/ interactions: Continues on Zoloft-started 10/20    Previous INR: Supratherapeutic     S/S of bleeding or thromboembolism: No    New injury or illness: No    Upcoming surgery, procedure or cardioversion: No-postponed until January.    Additional findings: None      PLAN:    Telephone call with Jovon regarding INR result and instructed:     Warfarin Dosing Instructions: Continue your current warfarin dose 6mg every Sun, Thurs; 4mg all other days    Instructed patient to follow up no later than: 1 week  Lab visit scheduled    Education provided: Target INR goal and significance of current INR result and Contact the anticoagulation clinic with any changes, questions or concerns at #[phone]       Jovon verbalizes understanding and agrees to warfarin dosing plan.    Instructed to call the Anticoagulation Clinic for any changes, questions or concerns. (#[phone])        SNEHA GODDARD RN      OBJECTIVE:  Recent labs: (last 7 days)     20  0936 20  1025   INR 6.00* 2.70*         No question data found.  Anticoagulation Summary  As of 2020    INR goal:  2.5-3.5   TTR:  64.0 % (1 y)   INR used for dosin.70 (2020)   Warfarin maintenance plan:  6 mg (4 mg x 1.5) every Sun, Thu; 4 mg (4 mg x 1) all other days   Full warfarin instructions:  6 mg every Sun, Thu; 4 mg all other days   Weekly warfarin total:  32 mg   No change documented:  Sneha Goddard RN   Plan last modified:  Lia Horn RN (2020)   Next INR check:  2020   Priority:  High   Target end date:  Indefinite    Indications    S/P AVR (aortic valve replacement) [Z95.2]  Permanent atrial fibrillation (H) [I48.21]  Long term  current use of anticoagulants with INR goal of 2.5-3.5 [Z79.01]             Anticoagulation Episode Summary     INR check location:      Preferred lab:      Send INR reminders to:  [facility]    Comments:  24mm ATS Valve  Cardiomyopathy / CAD      Anticoagulation Care Providers     Provider Role Specialty Phone number    Rehan Lorenzana MD Referring Family Medicine [phone]

## 2020-11-27 ENCOUNTER — ANTICOAGULATION THERAPY VISIT (OUTPATIENT)
Dept: FAMILY MEDICINE | Facility: CLINIC | Age: 65
End: 2020-11-27

## 2020-11-27 DIAGNOSIS — Z95.2 S/P AVR (AORTIC VALVE REPLACEMENT): ICD-10-CM

## 2020-11-27 DIAGNOSIS — I48.21 PERMANENT ATRIAL FIBRILLATION (H): ICD-10-CM

## 2020-11-27 DIAGNOSIS — Z79.01 LONG TERM CURRENT USE OF ANTICOAGULANTS WITH INR GOAL OF 2.5-3.5: ICD-10-CM

## 2020-11-27 DIAGNOSIS — Z79.01 LONG TERM CURRENT USE OF ANTICOAGULANT THERAPY: ICD-10-CM

## 2020-11-27 DIAGNOSIS — Z95.2 S/P AORTIC VALVE REPLACEMENT: ICD-10-CM

## 2020-11-27 LAB
CAPILLARY BLOOD COLLECTION: NORMAL
INR PPP: 3.9 (ref 0.86–1.14)

## 2020-11-27 PROCEDURE — 85610 PROTHROMBIN TIME: CPT | Performed by: FAMILY MEDICINE

## 2020-11-27 PROCEDURE — 36416 COLLJ CAPILLARY BLOOD SPEC: CPT | Performed by: FAMILY MEDICINE

## 2020-11-27 NOTE — PROGRESS NOTES
ANTICOAGULATION MANAGEMENT     Patient Name:  Jovon Mantilla  Date:  11/27/2020    ASSESSMENT /SUBJECTIVE:    Today's INR result of 3.90 is supratherapeutic. Goal INR of 2.5-3.5      Warfarin dose taken: Warfarin taken as instructed    Diet: No new diet changes affecting INR    Medication changes/ interactions: continues on Zoloft-started 10/20/20    Previous INR: Therapeutic     S/S of bleeding or thromboembolism: No    New injury or illness: No    Upcoming surgery, procedure or cardioversion: Yes: upcoming procedure 11/30 cancelled-procedure potentially to be performed in January.     Additional findings: None      PLAN:    Telephone call with Jovon regarding INR result and instructed:     Warfarin Dosing Instructions: Hold dose today then change your warfarin dose to 4mg every day    Instructed patient to follow up no later than: 10 days  Lab visit scheduled    Education provided: Target INR goal and significance of current INR result, Monitoring for bleeding signs and symptoms and Contact the anticoagulation clinic with any changes, questions or concerns at #[phone]       Jovon verbalizes understanding and agrees to warfarin dosing plan.    Instructed to call the Anticoagulation Clinic for any changes, questions or concerns. (#[phone])        SNEHA GODDARD, GRETCHEN      OBJECTIVE:  Recent labs: (last 7 days)     11/20/20  1025 11/27/20  0817   INR 2.70* 3.90*         No question data found.  Anticoagulation Summary  As of 11/27/2020    INR goal:  2.5-3.5   TTR:  63.3 % (1 y)   INR used for dosing:  3.90 (11/27/2020)   Warfarin maintenance plan:  4 mg (4 mg x 1) every day   Full warfarin instructions:  11/27: Hold; Otherwise 4 mg every day   Weekly warfarin total:  28 mg   Plan last modified:  Sneha Goddard RN (11/27/2020)   Next INR check:  12/7/2020   Priority:  High   Target end date:  Indefinite    Indications    S/P AVR (aortic valve replacement) [Z95.2]  Permanent atrial fibrillation (H) [I48.21]  Long  term current use of anticoagulants with INR goal of 2.5-3.5 [Z79.01]             Anticoagulation Episode Summary     INR check location:      Preferred lab:      Send INR reminders to:  [facility]    Comments:  24mm ATS Valve  Cardiomyopathy / CAD      Anticoagulation Care Providers     Provider Role Specialty Phone number    Rehan Lorenzana MD Referring Family Medicine [phone]

## 2020-11-30 ENCOUNTER — PREP FOR PROCEDURE (OUTPATIENT)
Dept: SURGERY | Facility: CLINIC | Age: 65
End: 2020-11-30

## 2020-11-30 DIAGNOSIS — K40.90 RIGHT INGUINAL HERNIA: Primary | ICD-10-CM

## 2020-11-30 RX ORDER — CEFAZOLIN SODIUM 1 G/50ML
1 INJECTION, SOLUTION INTRAVENOUS SEE ADMIN INSTRUCTIONS
Status: CANCELLED | OUTPATIENT
Start: 2020-11-30

## 2020-11-30 RX ORDER — CEFAZOLIN SODIUM 2 G/50ML
2 SOLUTION INTRAVENOUS
Status: CANCELLED | OUTPATIENT
Start: 2020-11-30

## 2020-12-07 ENCOUNTER — ANTICOAGULATION THERAPY VISIT (OUTPATIENT)
Dept: FAMILY MEDICINE | Facility: CLINIC | Age: 65
End: 2020-12-07

## 2020-12-07 DIAGNOSIS — Z95.2 S/P AVR (AORTIC VALVE REPLACEMENT): ICD-10-CM

## 2020-12-07 DIAGNOSIS — I48.21 PERMANENT ATRIAL FIBRILLATION (H): ICD-10-CM

## 2020-12-07 DIAGNOSIS — Z79.01 LONG TERM CURRENT USE OF ANTICOAGULANT THERAPY: ICD-10-CM

## 2020-12-07 DIAGNOSIS — Z95.2 S/P AORTIC VALVE REPLACEMENT: ICD-10-CM

## 2020-12-07 DIAGNOSIS — Z79.01 LONG TERM CURRENT USE OF ANTICOAGULANTS WITH INR GOAL OF 2.5-3.5: ICD-10-CM

## 2020-12-07 LAB
CAPILLARY BLOOD COLLECTION: NORMAL
INR PPP: 3.3 (ref 0.86–1.14)

## 2020-12-07 PROCEDURE — 99207 PR NO CHARGE NURSE ONLY: CPT | Performed by: FAMILY MEDICINE

## 2020-12-07 PROCEDURE — 36416 COLLJ CAPILLARY BLOOD SPEC: CPT | Performed by: FAMILY MEDICINE

## 2020-12-07 PROCEDURE — 85610 PROTHROMBIN TIME: CPT | Performed by: FAMILY MEDICINE

## 2020-12-07 NOTE — PROGRESS NOTES
ANTICOAGULATION FOLLOW-UP CLINIC VISIT    Patient Name:  Jovon Mantilla  Date:  12/7/2020  Contact Type:  Telephone  Leaving for [place] over the holidays will be back right after.  SUBJECTIVE:  Patient Findings         Clinical Outcomes     Negatives:  Major bleeding event, Thromboembolic event, Anticoagulation-related hospital admission, Anticoagulation-related ED visit, Anticoagulation-related fatality           OBJECTIVE    Recent labs: (last 7 days)     12/07/20  0917   INR 3.30*       ASSESSMENT / PLAN  INR assessment THER    Recheck INR In: 4 WEEKS    INR Location Clinic      Anticoagulation Summary  As of 12/7/2020    INR goal:  2.5-3.5   TTR:  61.5 % (1 y)   INR used for dosing:  3.30 (12/7/2020)   Warfarin maintenance plan:  4 mg (4 mg x 1) every day   Full warfarin instructions:  4 mg every day   Weekly warfarin total:  28 mg   Plan last modified:  Karina Pierre RN (12/7/2020)   Next INR check:  1/11/2021   Priority:  High   Target end date:  Indefinite    Indications    S/P AVR (aortic valve replacement) [Z95.2]  Permanent atrial fibrillation (H) [I48.21]  Long term current use of anticoagulants with INR goal of 2.5-3.5 [Z79.01]             Anticoagulation Episode Summary     INR check location:      Preferred lab:      Send INR reminders to:  [facility]    Comments:  24mm ATS Valve  Cardiomyopathy / CAD      Anticoagulation Care Providers     Provider Role Specialty Phone number    Rehan Lorenzana MD Referring Family Medicine [phone]            See the Encounter Report to view Anticoagulation Flowsheet and Dosing Calendar (Go to Encounters tab in chart review, and find the Anticoagulation Therapy Visit)        Karina Pierre RN

## 2020-12-14 ENCOUNTER — PATIENT OUTREACH (OUTPATIENT)
Dept: SURGERY | Facility: CLINIC | Age: 65
End: 2020-12-14

## 2020-12-14 ENCOUNTER — TELEPHONE (OUTPATIENT)
Dept: SURGERY | Facility: CLINIC | Age: 65
End: 2020-12-14

## 2020-12-14 DIAGNOSIS — I70.90 ASVD (ARTERIOSCLEROTIC VASCULAR DISEASE): ICD-10-CM

## 2020-12-14 DIAGNOSIS — K40.90 RIGHT INGUINAL HERNIA: Primary | ICD-10-CM

## 2020-12-14 RX ORDER — LISINOPRIL 20 MG/1
TABLET ORAL
Qty: 135 TABLET | Refills: 3 | Status: SHIPPED | OUTPATIENT
Start: 2020-12-14 | End: 2021-08-25

## 2020-12-14 NOTE — TELEPHONE ENCOUNTER
Surgery is scheduled with Dr. Alex on 1/18 at the [facility] ([facility] .  Scheduled per patient.    H&P: to be completed by PAC.  PAC: 1/5, in person     Additional appointments:   NO ADDITIONAL APPOINTMENTS NEEDED AT THIS TIME    COVID-19 test: 1/15 at [facility], lab   Post-op: not needed at this time.    The RN completed the education regarding the surgery.     Patient will receive surgery arrival and start time from PAC.    The surgery packet was sent via US mail.    Patient will complete COVID-19 test that was scheduled by surgical coordinator 2-4 days prior to surgery.     I called the patient and was able to confirm the scheduled information above.

## 2020-12-15 NOTE — TELEPHONE ENCOUNTER
FUTURE VISIT INFORMATION      SURGERY INFORMATION:    Date: 21    [facility] or    Surgeon:  Jaden Alex MD    Anesthesia Type:  Combined MAC with Local    Procedure: RIGHT INGUINAL HENRIA WITH OPEN MESH    Consult: ov      RECORDS REQUESTED FROM:         Primary Care Provider: Rehan Lorenzana MD- [facility]     Pertinent Medical History: Hypertension, CAD, Aortic stenosis, Dilated cardiomyopathy, Mitral regurgitation      Most recent EKG+ Tracin16     Most recent ECHO: 20     Most recent Cardiac Stress Test: 18

## 2021-01-04 ENCOUNTER — TELEPHONE (OUTPATIENT)
Dept: NURSING | Facility: CLINIC | Age: 66
End: 2021-01-04

## 2021-01-04 RX ORDER — TROLAMINE SALICYLATE 10 G/100G
CREAM TOPICAL PRN
COMMUNITY
End: 2021-08-09

## 2021-01-04 NOTE — PROGRESS NOTES
Preoperative Assessment Center Medication History Note    Medication history completed via phone call on January 4, 2021 by this writer. See Epic admission navigator for prior to admission medications. Operating room staff will still need to confirm medications and last dose information on day of surgery.     Medication history interview sources:  patient, payor information.     Changes made to PTA medication list (reason)  Added: aspercreme.   Deleted: iron tablet,   Changed: allegra dose,     Additional medication history information (including reliability of information, actions taken by pharmacist):    -- No recent (within 30 days) course of antibiotics  -- No recent (within 30 days) course of systemic steroids  -- Patient declines being on any other prescription or over-the-counter medications    Prior to Admission medications    Medication Sig Last Dose Taking? Auth Provider   albuterol (PROAIR RESPICLICK) 108 (90 Base) MCG/ACT inhaler Inhale 2 puffs into the lungs every 6 hours as needed for shortness of breath / dyspnea or wheezing Taking Yes Nathan Hastings MD   Ascorbic Acid (VITAMIN C PO) Take 500 mg by mouth At Bedtime Taking Yes Unknown, Entered By History   aspirin 81 MG tablet Take 81 mg by mouth every other day Taking Yes Reported, Patient   Calcium Carbonate-Vitamin D (CALCIUM 600+D) 600-200 MG-UNIT TABS Take 1 tablet by mouth 2 times daily  Taking Yes Reported, Patient   carvedilol (COREG) 25 MG tablet Take 1 tablet (25 mg) by mouth 2 times daily (with meals) Taking Yes Noy Cooley MD   Cholecalciferol (VITAMIN D) 2000 UNIT CAPS Take 1 tablet by mouth daily  Taking Yes Reported, Patient   Cyanocobalamin (VITAMIN B-12) 500 MCG SUBL Place 500 mcg under the tongue daily  Taking Yes Reported, Patient   digoxin (LANOXIN) 125 MCG tablet Take 1 tablet (125 mcg) by mouth daily Taking Yes Lloyd Lewis MD   Fexofenadine HCl (ALLERGY 24-HR PO) Take 180 mg by mouth daily  Taking Yes Reported,  Patient   levothyroxine (SYNTHROID/LEVOTHROID) 200 MCG tablet TAKE 1 TABLET (200 MCG) BY MOUTH DAILY SEE DOCTOR IN MARCH Taking Yes Rehan Lorenzana MD   lisinopril (ZESTRIL) 20 MG tablet Take  20 mg (1 tablet in am) and 10 mg  (one half tablet) in  pm Taking Yes Noy Cooley MD   Multiple Vitamins-Minerals (CENTRUM) CHEW Take 1 tablet by mouth daily  Taking Yes Unknown, Entered By History   Psyllium (FIBER) 0.52 GM CAPS Take 2 capsules by mouth At Bedtime  Taking Yes Reported, Patient   sertraline (ZOLOFT) 50 MG tablet Take 1 tablet (50 mg) by mouth daily Taking Yes Rehan Lorenzana MD   simvastatin (ZOCOR) 20 MG tablet TAKE 1 TABLET BY MOUTH AT BEDTIME Taking Yes Rehan Lorenzana MD   trolamine salicylate (ASPERCREME) 10 % external cream Apply topically as needed for moderate pain Taking Yes Unknown, Entered By History   warfarin ANTICOAGULANT (COUMADIN) 4 MG tablet TAKE 1 TABLET MONDAY, WEDNESDAY, AND FRIDAY. TAKE ONE AND ONE-HALF TABLETS ALL OTHER DAYS OF THE WEEK  Patient taking differently: Take 4 mg by mouth every evening  Taking Yes Rehan Lorenzana MD   nitroGLYcerin (NITROSTAT) 0.3 MG sublingual tablet For chest pain place 1 tablet under the tongue every 5 minutes for 3 doses. If symptoms persist 5 minutes after 1st dose call 911.  Patient not taking: Reported on 1/4/2021 Not Taking  Rehan Lorenzana MD          Medication history completed by: Misael Can [facility]

## 2021-01-04 NOTE — TELEPHONE ENCOUNTER
MIKE-PROCEDURAL ANTICOAGULATION  MANAGEMENT    Assessment     Warfarin interruption plan for hernia surgery on 2021.    A. Fib and AVR      Risk stratification for thromboembolism: moderate (2012 Chest guidelines)      [id number] = 5     AVR 2011 24mm ATS supra-annular valve    VTE at time of surgery     Neurology @ time of valve placement recommended range 2.5-3.5 due to visual changes and CT scan    AVR: 2017 AHA/ACC Management of Valvular Heart disease guidelines suggests bridging is reasonable on individual basis with risk of bleeding weighed against risks of clotting for mechanical AVR patients with risk factors for thrombosis (Afib, previous thromboembolism, hypercoagulable condition, LV systolic dysfunction, older generation valves, or > 1 valve)    NVAF: 2017 ACC periprocedure pathway for NVAF advises likely bridge for moderate risk stratification ([id number] score 5-6)  with a hx of stroke, TIA or systemic embolism       Plan       Pre-Procedure:    Hold warfarin until after procedure startin2021     Lovenox 90 mg subq Q 12 hrs (1 mg/kg Q 12 hr for CrCl >30ml/min)     Start Lovenox: 01/15/2021    Last dose of Lovenox prior to procedure: 2021 AM       Post-Procedure:    Resume home warfarin dose if okay with provider doing procedure on night of procedure, 2021 PM: 6mg & 2021 6mg    Resume Lovenox ~ 24-48 hrs post procedure when okay with provider doing procedure. Continue until INR >= 2.5    Recheck INR 5-7 days after resuming warfarin   ?   Dee Redd [facility]    Subjective/Objective:      Jovon Mantilla, a 65 year old male    Reason for Anticoagulation: A. Fib, AVR and Venous Thrombosis    Goal INR Range: 2.5-3.5    Patient bridged in past: Yes:     Pertinent History:HX GI bleed, has used prophylactic dose in past, not indicated for Afib.      Wt Readings from Last 3 Encounters:   20 91.6 kg (202 lb)   20 92.3 kg (203 lb 6.4 oz)   10/15/20  89.8 kg (198 lb)        Ideal body weight: 70.7 kg (155 lb 13.8 oz)  Adjusted ideal body weight: 79.1 kg (174 lb 5.1 oz)     Lab Results   Component Value Date    INR 3.30 (H) 12/07/2020    INR 3.90 (H) 11/27/2020    INR 2.70 (H) 11/20/2020     Lab Results   Component Value Date    HGB 11.9 03/13/2020    HCT 38.2 03/13/2020     03/13/2020     Lab Results   Component Value Date    CR 0.79 03/13/2020    CR 0.92 02/04/2019    CR 0.87 09/14/2018     CrCl cannot be calculated (Patient's most recent lab result is older than the maximum 10 days allowed.).

## 2021-01-05 ENCOUNTER — PRE VISIT (OUTPATIENT)
Dept: SURGERY | Facility: CLINIC | Age: 66
End: 2021-01-05

## 2021-01-05 ENCOUNTER — ANTICOAGULATION THERAPY VISIT (OUTPATIENT)
Dept: FAMILY MEDICINE | Facility: CLINIC | Age: 66
End: 2021-01-05

## 2021-01-05 ENCOUNTER — TELEPHONE (OUTPATIENT)
Dept: SURGERY | Facility: CLINIC | Age: 66
End: 2021-01-05

## 2021-01-05 ENCOUNTER — ANESTHESIA EVENT (OUTPATIENT)
Dept: SURGERY | Facility: CLINIC | Age: 66
End: 2021-01-05
Payer: COMMERCIAL

## 2021-01-05 ENCOUNTER — OFFICE VISIT (OUTPATIENT)
Dept: SURGERY | Facility: CLINIC | Age: 66
End: 2021-01-05
Payer: COMMERCIAL

## 2021-01-05 VITALS
TEMPERATURE: 97.3 F | WEIGHT: 209 LBS | BODY MASS INDEX: 30.96 KG/M2 | OXYGEN SATURATION: 97 % | HEART RATE: 83 BPM | DIASTOLIC BLOOD PRESSURE: 84 MMHG | RESPIRATION RATE: 12 BRPM | SYSTOLIC BLOOD PRESSURE: 122 MMHG | HEIGHT: 69 IN

## 2021-01-05 DIAGNOSIS — Z79.01 LONG TERM CURRENT USE OF ANTICOAGULANTS WITH INR GOAL OF 2.5-3.5: ICD-10-CM

## 2021-01-05 DIAGNOSIS — Z95.2 S/P AORTIC VALVE REPLACEMENT: ICD-10-CM

## 2021-01-05 DIAGNOSIS — Z01.818 PREOP EXAMINATION: Primary | ICD-10-CM

## 2021-01-05 DIAGNOSIS — Z95.2 S/P AVR (AORTIC VALVE REPLACEMENT): ICD-10-CM

## 2021-01-05 DIAGNOSIS — Z01.818 PRE-OP EXAMINATION: Primary | ICD-10-CM

## 2021-01-05 DIAGNOSIS — K40.90 RIGHT INGUINAL HERNIA: ICD-10-CM

## 2021-01-05 DIAGNOSIS — Z79.01 LONG TERM CURRENT USE OF ANTICOAGULANT THERAPY: ICD-10-CM

## 2021-01-05 DIAGNOSIS — I48.21 PERMANENT ATRIAL FIBRILLATION (H): ICD-10-CM

## 2021-01-05 LAB
ANION GAP SERPL CALCULATED.3IONS-SCNC: 2 MMOL/L (ref 3–14)
BUN SERPL-MCNC: 16 MG/DL (ref 7–30)
CALCIUM SERPL-MCNC: 9.4 MG/DL (ref 8.5–10.1)
CHLORIDE SERPL-SCNC: 106 MMOL/L (ref 94–109)
CO2 SERPL-SCNC: 33 MMOL/L (ref 20–32)
CREAT SERPL-MCNC: 0.88 MG/DL (ref 0.66–1.25)
ERYTHROCYTE [DISTWIDTH] IN BLOOD BY AUTOMATED COUNT: 15.7 % (ref 10–15)
GFR SERPL CREATININE-BSD FRML MDRD: >90 ML/MIN/{1.73_M2}
GLUCOSE SERPL-MCNC: 72 MG/DL (ref 70–99)
HCT VFR BLD AUTO: 41.1 % (ref 40–53)
HGB BLD-MCNC: 12.4 G/DL (ref 13.3–17.7)
INR PPP: 3.41 (ref 0.86–1.14)
MCH RBC QN AUTO: 29.5 PG (ref 26.5–33)
MCHC RBC AUTO-ENTMCNC: 30.2 G/DL (ref 31.5–36.5)
MCV RBC AUTO: 98 FL (ref 78–100)
PLATELET # BLD AUTO: 141 10E9/L (ref 150–450)
POTASSIUM SERPL-SCNC: 4.4 MMOL/L (ref 3.4–5.3)
RBC # BLD AUTO: 4.21 10E12/L (ref 4.4–5.9)
SODIUM SERPL-SCNC: 140 MMOL/L (ref 133–144)
WBC # BLD AUTO: 4.5 10E9/L (ref 4–11)

## 2021-01-05 PROCEDURE — 85027 COMPLETE CBC AUTOMATED: CPT | Performed by: PATHOLOGY

## 2021-01-05 PROCEDURE — 99207 PR NO CHARGE NURSE ONLY: CPT | Performed by: FAMILY MEDICINE

## 2021-01-05 PROCEDURE — 80048 BASIC METABOLIC PNL TOTAL CA: CPT | Performed by: PATHOLOGY

## 2021-01-05 PROCEDURE — 99203 OFFICE O/P NEW LOW 30 MIN: CPT | Performed by: PHYSICIAN ASSISTANT

## 2021-01-05 PROCEDURE — 36415 COLL VENOUS BLD VENIPUNCTURE: CPT | Performed by: PATHOLOGY

## 2021-01-05 PROCEDURE — 85610 PROTHROMBIN TIME: CPT | Performed by: PATHOLOGY

## 2021-01-05 ASSESSMENT — PAIN SCALES - GENERAL: PAINLEVEL: NO PAIN (0)

## 2021-01-05 ASSESSMENT — ENCOUNTER SYMPTOMS
DYSRHYTHMIAS: 1
SEIZURES: 0

## 2021-01-05 ASSESSMENT — LIFESTYLE VARIABLES: TOBACCO_USE: 0

## 2021-01-05 ASSESSMENT — MIFFLIN-ST. JEOR: SCORE: 1733.4

## 2021-01-05 NOTE — PATIENT INSTRUCTIONS
Last warfarin dose: 1/12 1/13, NO warfarin  1/14, NO warfarin  1/15, NO warfarin, begin enoxaparin injections into the abdomen every 12 hours (AM  and PM )usually like 8 am and 8 pm  1/16 NO warfarin, enoxaparin injection into the abdomen every 12 hours (AM and PM) 8 and 8  1/17, NO warfarin, enoxaparin injection into the abdomen AM only (no enoxaparin 24 hours prior to surgery) only am dose!!!!  1/18, DAY OF PROCEDURE, NO enoxaparin. Restart warfarin 6mg (1.5 tabs) in the evening, unless instructed otherwise by the physician.  1/19, Restart enoxaparin injections into the abdomen every 12 hours (AM and PM), unless instructed otherwise by the physician, and 6mg (1.5 tabs) warfarin in the evening.   1/20, Enoxaparin injection into the abdomen every 12 hours (AM and PM) and 4mg (1 tabs) warfarin in the evening.  1/21, Enoxaparin injection into the abdomen every 12 hours (AM and PM) and 4mg (1 tabs) warfarin in the evening.*,   1/22Enoxaparin injection into the abdomen in the AM. Recheck INR at the  Anticoagulation Clinic for further dosing instructions.  Make apt for 22 to have INR checked.  f you have any questions please call the Anticoagulation Clinic at [phone].

## 2021-01-05 NOTE — PROGRESS NOTES
ANTICOAGULATION FOLLOW-UP CLINIC VISIT    Patient Name:  Jovon Mantilla  Date:  1/5/2021  Contact Type:  Telephone  Having a hernia repair on the 18th bridging plan discussed earlier today.    SUBJECTIVE:  Patient Findings     Comments:    Assessed for S/S bleeding, clotting, medication, diet, health, activity and alcohol changes          Clinical Outcomes     Negatives:  Major bleeding event, Thromboembolic event, Anticoagulation-related hospital admission, Anticoagulation-related ED visit, Anticoagulation-related fatality    Comments:    Assessed for S/S bleeding, clotting, medication, diet, health, activity and alcohol changes             OBJECTIVE    Recent labs: (last 7 days)     01/05/21  1117   INR 3.41*       ASSESSMENT / PLAN  INR assessment THER    Recheck INR In: 2 WEEKS    INR Location Clinic      Anticoagulation Summary  As of 1/5/2021    INR goal:  2.5-3.5   TTR:  61.5 % (1 y)   INR used for dosing:  3.41 (1/5/2021)   Warfarin maintenance plan:  4 mg (4 mg x 1) every day   Full warfarin instructions:  1/13: Hold; 1/14: Hold; 1/15: Hold; 1/16: Hold; 1/17: Hold; 1/18: 6 mg; 1/19: 6 mg; Otherwise 4 mg every day   Weekly warfarin total:  28 mg   No change documented:  Karina Pierre, RN   Plan last modified:  Karina Pierre RN (12/7/2020)   Next INR check:  1/22/2021   Priority:  High   Target end date:  Indefinite    Indications    S/P AVR (aortic valve replacement) [Z95.2]  Permanent atrial fibrillation (H) [I48.21]  Long term current use of anticoagulants with INR goal of 2.5-3.5 [Z79.01]             Anticoagulation Episode Summary     INR check location:      Preferred lab:      Send INR reminders to:  [facility]    Comments:  24mm ATS Valve  Cardiomyopathy / CAD      Anticoagulation Care Providers     Provider Role Specialty Phone number    Rehan Lorenzana MD Referring Family Medicine [phone]            See the Encounter Report to view Anticoagulation Flowsheet and  Dosing Calendar (Go to Encounters tab in chart review, and find the Anticoagulation Therapy Visit)        Karina Pierre RN

## 2021-01-05 NOTE — TELEPHONE ENCOUNTER
I called the patient after Dr. El reviewed his cardiology tests further and given past history of pulmonary HTN have recommended that the patient be moved to the [place]. The patient was advised that his surgery scheduling team has been notified and that they should be in touch with him about the location change. The patient verbalized understanding with the plan and had no further questions.

## 2021-01-05 NOTE — ANESTHESIA PREPROCEDURE EVALUATION
Anesthesia Pre-Procedure Evaluation    Patient: Jovon Mantilla   MRN:     [id number] Gender:   male   Age:    65 year old :      1955        Preoperative Diagnosis: Right inguinal hernia [K40.90]   Procedure(s):  RIGHT INGUINAL HENRIA WITH OPEN MESH     Results for JOVON MNATILLA (MRN [id number]) as of 2021 12:11   Ref. Range 2021 11:17   Sodium Latest Ref Range: 133 - 144 mmol/L 140   Potassium Latest Ref Range: 3.4 - 5.3 mmol/L 4.4   Chloride Latest Ref Range: 94 - 109 mmol/L 106   Carbon Dioxide Latest Ref Range: 20 - 32 mmol/L 33 (H)   Urea Nitrogen Latest Ref Range: 7 - 30 mg/dL 16   Creatinine Latest Ref Range: 0.66 - 1.25 mg/dL 0.88   GFR Estimate Latest Ref Range: >60 mL/min/1.73_m2 >90   GFR Estimate If Black Latest Ref Range: >60 mL/min/1.73_m2 >90   Calcium Latest Ref Range: 8.5 - 10.1 mg/dL 9.4   Anion Gap Latest Ref Range: 3 - 14 mmol/L 2 (L)   Glucose Latest Ref Range: 70 - 99 mg/dL 72   WBC Latest Ref Range: 4.0 - 11.0 10e9/L 4.5   Hemoglobin Latest Ref Range: 13.3 - 17.7 g/dL 12.4 (L)   Hematocrit Latest Ref Range: 40.0 - 53.0 % 41.1   Platelet Count Latest Ref Range: 150 - 450 10e9/L 141 (L)   RBC Count Latest Ref Range: 4.4 - 5.9 10e12/L 4.21 (L)   MCV Latest Ref Range: 78 - 100 fl 98   MCH Latest Ref Range: 26.5 - 33.0 pg 29.5   MCHC Latest Ref Range: 31.5 - 36.5 g/dL 30.2 (L)   RDW Latest Ref Range: 10.0 - 15.0 % 15.7 (H)   hgb is stable. Platelets are slightly low but acceptable for surgery.   Preop Vitals    BP Readings from Last 3 Encounters:   20 128/78   20 108/72   10/15/20 139/88    Pulse Readings from Last 3 Encounters:   20 74   20 63   10/15/20 74      Resp Readings from Last 3 Encounters:   20 14   20 14   18 12    SpO2 Readings from Last 3 Encounters:   20 96%   20 97%   20 95%      Temp Readings from Last 1 Encounters:   20 98.1  F (36.7  C) (Oral)    Ht Readings from Last 1 Encounters:   20 1.753  "m (5' 9\")      Wt Readings from Last 1 Encounters:   11/05/20 91.6 kg (202 lb)    Estimated body mass index is 29.83 kg/m  as calculated from the following:    Height as of 11/5/20: 1.753 m (5' 9\").    Weight as of 11/5/20: 91.6 kg (202 lb).     LDA:        Past Medical History:   Diagnosis Date     Acne rosacea 04/22/2019     Acquired hypothyroidism 05/23/2003     Problem list name updated by automated process. Provider to review     Acute prostatitis      Acute sinusitis, unspecified      Aortic valve disease      Biatrial enlargement      CAD (coronary artery disease)     known 70% LAD stenosis     GI bleeding 01/22/2016     Hyperlipidemia      Idiopathic cardiomyopathy (H)      Mitral regurgitation      Obesity      Palpitations      Permanent atrial fibrillation (H) 06/02/2017     Right inguinal hernia 07/22/2019     S/P AVR 01/07/2011    with Maze procedure     Tricuspid regurgitation      Unspecified essential hypertension       Past Surgical History:   Procedure Laterality Date     ANGIOGRAM  2/02    Normal coronary,dilated LV,Sev.decr.global LVF,+1 MR     ANGIOGRAM  1/08    Mild AS,Mod PHTN,mod prox.LAD disease,Triv.CFX disease     ANGIOGRAM  1/09    Mod AS,Mod PHTN,mod prox.LAD disease     ANGIOGRAM  10/10    Sev.AS,CM,global hypokinesis,Mild-mod LV dilated,Mild MR,70% prox.LAD lesion,PHTN     CARDIOVERSION  2/02, 4/02, 4/11     CHOLECYSTECTOMY       COLONOSCOPY N/A 1/23/2016    Procedure: COLONOSCOPY;  Surgeon: Robyn Collins MD;  Location:  GI     ESOPHAGOSCOPY, GASTROSCOPY, DUODENOSCOPY (EGD), COMBINED N/A 1/23/2016    Procedure: COMBINED ESOPHAGOSCOPY, GASTROSCOPY, DUODENOSCOPY (EGD);  Surgeon: Robyn Collins MD;  Location:  GI     LAPAROSCOPIC BYPASS GASTRIC  10/8/2011    Procedure:LAPAROSCOPIC BYPASS GASTRIC; Diagnostic laparoscopy, Lysis of Adhesions, Laparoscopic Revision of Jejunojejunostomy; Surgeon:RADHA MURO; Location: OR     LAPAROSCOPIC BYPASS GASTRIC  " 6/26/2006    Dr Jin     LAPAROSCOPY DIAGNOSTIC (GENERAL)  10/8/2011    Procedure:LAPAROSCOPY DIAGNOSTIC (GENERAL); Surgeon:RADHA MURO; Location:[facility] OR     REPLACE VALVE AORTIC  1/7/2011     [facility] NONSPECIFIC PROCEDURE  ~1985    vein stripping      No Known Allergies     Anesthesia Evaluation     . Pt has had prior anesthetic. Type: General    No history of anesthetic complications          ROS/MED HX    ENT/Pulmonary:     (+)allergic rhinitis, , . .   (-) tobacco use   Neurologic:      (-) seizures, CVA, TIA and migraines   Cardiovascular:     (+) Dyslipidemia, hypertension--CAD, --. Taking blood thinners Pt has received instructions: Instructions Given to patient: hold coumadin 5 days with bridging with lovenox and okay to continue ASA 81 mg . CHF etiology: idiopathic  Last EF: 45-50% date: 6/4/20 . . :. dysrhythmias (s/p Maze in 2011 with permanent a fib ) a-fib, valvular problems/murmurs type: AS s/p AVR in 1/2011, 1-2+ MR, 1+ TR:. Previous cardiac testing Echodate:6/4/20results:Interpretation Summary          Left ventricular systolic function is mildly reduced. LVEF is estimated at 45-     50%.     Moderate to severe concentric left ventricular hypertrophy.     Right ventricular systolic function is mild to moderately reduced. TAPSE     1.4cm, S' 7.8 cm/s.     Mild to moderate (1-2+) mitral regurgitation.     There is a mechanical aortic valve. Normal function on Doppler interrogation.     Vmax 2.1 m/s, mean gradient 9 mmHg, DI 0.5.     Mild (1+) tricuspid regurgitation.     IVC diameter and respiratory changes fall into an intermediate range     suggesting an RA pressure of 8 mmHg (mild hypervolemia).     Ascending aorta is borderline dilated, max diameter of the visualized portion     3.8cm.          This study was compared to a TTE from 1/16/2017. There has been no significant     Change. Stress Testdate:2/6/18 results:Impression     1. Myocardial perfusion imaging using single isotope technique      demonstrated a fixed inferior/inferolateral/inferoseptal basal defect     possible currently consistent with prior infarction versus     diaphragmatic attenuation artifact. There is mild reversibility within     this defect consistent potentially with a small area of mild     aung-infarct ischemia. Whether this represents infarct and minimal     ischemia or artifact cannot be determined in the absence of gated     images. Otherwise overall normal perfusion without evidence of     significant ischemia.      Of note, maximum heart rate achieved was 113 bpm representing only 70%     of maximal predicted heart rate. This is therefore a nondiagnostic     study.     2. Gated images demonstrated gating was not done.  The left     ventricular systolic function is was not determined.     3. Compared to the prior study from 11/16/2012, prior study described     a very small fixed basal inferior defect possibly related to     diaphragmatic attenuation artifact .      ECG reviewed date:1/22/16 results:A fib, incomplete RBBB, ST and T wave abnormality, consider inferolateral ischemia  date: results:          METS/Exercise Tolerance:  4 - Raking leaves, gardening   Hematologic:     (+) Anemia, Other Hematologic Disorder-leukopenia      (-) History of Transfusion   Musculoskeletal:  - neg musculoskeletal ROS       GI/Hepatic:     (+) Other GI/Hepatic right inguinal hernia      (-) GERD   Renal/Genitourinary:         Endo:     (+) thyroid problem hypothyroidism, Obesity, .      Psychiatric:     (+) psychiatric history depression      Infectious Disease:  - neg infectious disease ROS       Malignancy:      - no malignancy   Other:    (+) no H/O Chronic Pain,no other significant disability                        PHYSICAL EXAM:   Mental Status/Neuro: A/A/O; Age Appropriate   Airway: Facies: Feasible  Mallampati: I  Mouth/Opening: Full  TM distance: > 6 cm  Neck ROM: Limited   Respiratory: Auscultation: CTAB     Resp. Rate: Normal      Resp. Effort: Normal      CV: Rhythm: Afib  Heart: Murmur  Edema: None  Pulses: Normal  Neck: Normal   Comments:      Dental: Normal Dentition                Assessment:   ASA SCORE: 3    H&P: History and physical reviewed and following examination; no interval change.   Smoking Status:  Non-Smoker/Unknown   NPO Status: NPO Appropriate     Plan:   Anes. Type:  MAC   Pre-Medication: None   Induction:  N/a   Airway: Native Airway   Access/Monitoring: PIV   Maintenance: N/a     Postop Plan:   Postop Pain: None  Postop Sedation/Airway: Not planned  Disposition: Outpatient     PONV Management:   Adult Risk Factors:, Non-Smoker   Prevention:, Propofol     CONSENT: Direct conversation   Plan and risks discussed with: Patient   Blood Products: Consent Deferred (Minimal Blood Loss)                PAC Discussion and Assessment    ASA Classification: 3  Case is suitable for: [place] [facility] [place]  Anesthetic techniques and relevant risks discussed: MAC with GA as backup (local )  Invasive monitoring and risk discussed:   Types:   Possibility and Risk of blood transfusion discussed:   NPO instructions given:   Additional anesthetic preparation and risks discussed:   Needs early admission to pre-op area:   Other:     PAC Resident/NP Anesthesia Assessment:  Jovon is a 65 year old man who is scheduled for RIGHT INGUINAL HENRIA WITH OPEN MESH  on 1/18/21 by Dr. Alex in treatment of Right inguinal hernia .  PAC referral for risk assessment and optimization for anesthesia with comorbid conditions of CAD, permanent A fib, s/p AVR, MR, TR, idiopathic cardiomyopathy, HTN, HLD, allergies, anemia, leukpenia, hypothyroidism, obesity, history of bleeding ulcer, s/p gastric bypass, depression:    Pre-operative considerations:  1.  Cardiac:  Functional status- METS 4, the patient is able to walk for hours while vacationing in [place] and yesterday cleared his driveway using .  Intermediate risk surgery with 11% (RCRI #) risk  of major adverse cardiac event.   ~ CAD - 60% stenosis of LAD which was not address as it was intramural. The patient had a stress test in 2018 and denies any symptoms. The patient was seen by his cardiologist on 10/15/20 and felt that he was okay to proceed and no further testing indicated.   ~ Idiopathic CM - EF 45-50% based on echo in 6/4/20. Continue digoxin.   ~ Permanent A fib - CHADSVASC = 3-5 ( in anticoagulation note states he has a history of VTE in 2011. Discussed with patient and he reports small clots after AVR but no DVT/PE and reviewed cardiology notes and imaging and no DVT/PE mentioned) The patient will hold coumadin for 5 days and bridge with lovenox.   ~ HTN - continue lisinopriol.   ~ HLD - continue zocor and checked with Dr. Alex's team and okay to ASA 81 mg.     2.  Pulm:  Airway feasible.  LINDA risk: Intermediate (male, age, HTN)  ~ Allergies - continue allegra. PRN albuterol.     3. Heme:  Anemia, leukopenia - seen on blood work from 3/2020 at which time the patient had pneumonia. Will recheck labs today.     4. Endo: hypothyroidism - continue levothyroxine.   ~ Obesity - BMI 30 - consideration for safe lifting techniques.     5. GI:  Risk of PONV score = 2.  If > 2, anti-emetic intervention recommended.  ~ History of bleeding ulcer in 2011 - he denies any current issues. S/p gastric bypass. Hold fiber DOS.   ~ Right inguinal hernia - procedure as above.      6. Psych: depression - continue zoloft.     VTE risk: 1.8%-3% (unsure if patient did have VTE in 2011 as stated per anticoagulation clinic)     Patient is optimized and is acceptable candidate for the proposed procedure.  No further diagnostic evaluation is needed.     Patient discussed with Dr El.     For further details of assessment, testing, and physical exam please see H and P completed on same date.    Hansa Power PA-C          Mid-Level Provider/Resident: Hansa Power PA-C  Date: 1/5/21  Time:     Attending  Anesthesiologist Anesthesia Assessment:  I reviewed this patient's medical history with the VALDO and by chart review. He is scheduled for inguinal hernia repair under local/MAC [place] [facility] [place]. Of note, he has known 70% stenosis of LAD which was unable to be bypassed during his AVR surgery because it was completely intramyocardial. He has been medically managed for this. In addition, he has mild to moderate RV dysfunction on his last TTE. His last stress test was nondiagnostic. He has been cleared by his Cardiologist. Given his significant LAD stenosis in addition to RV dysfunction, will request change in surgery location to [place] for availability of invasive hemodynamic monitoring if needed. Final plan per day of surgery anesthesiologist.    Reviewed and Signed by PAC Anesthesiologist  Anesthesiologist: Madeline El MD  Date: 1/5/2021  Time:   Pass/Fail:   Disposition:     PAC Pharmacist Assessment:        Pharmacist:   Date:   Time:    Hansa Power PA-C

## 2021-01-05 NOTE — H&P
Pre-Operative H & P     CC:  Preoperative exam to assess for increased cardiopulmonary risk while undergoing surgery and anesthesia.    Date of Encounter: 1/5/2021  Primary Care Physician:  Rehan Lorenzana     Reason for visit: pre operative examination, Right inguinal hernia     HPI  Jovon Mantilla is a 65 year old male who presents for pre-operative H & P in preparation for RIGHT INGUINAL HENRIA WITH OPEN MESH  with Dr. Alex on 1/18/21 at [facility] but after review with the anesthesiologist have recommended that the patient be done at the [facility].     The patient is a 65 year old man who has a past medical history significant for permanent a fib, history of severe aortic sclerosis s/p AVR in 2011, CAD, ICM, HTN, HLD, allergies, anemia, leukopenia, hypothyroidism, obesity, depression. He met with Dr. Alex on 11/5/20 for issues with a right inguinal mass for a year. He was found to have a right inguinal hernia and discussion of his treatment option was completed. The patient is now scheduled for the procedure as above.      History is obtained from the patient and chart review    Past Medical History  Past Medical History:   Diagnosis Date     Acne rosacea 04/22/2019     Acquired hypothyroidism 05/23/2003     Problem list name updated by automated process. Provider to review     Acute prostatitis      Acute sinusitis, unspecified      Aortic valve disease      Biatrial enlargement      CAD (coronary artery disease)     known 70% LAD stenosis     Depression      GI bleeding 01/22/2016     History of bleeding peptic ulcer      Hyperlipidemia      Idiopathic cardiomyopathy (H)      Mitral regurgitation      Obesity      Palpitations      Permanent atrial fibrillation (H) 06/02/2017     Right inguinal hernia 07/22/2019     S/P AVR 01/07/2011    with Maze procedure     S/P gastric bypass      Tricuspid regurgitation      Unspecified essential hypertension        Past Surgical  History  Past Surgical History:   Procedure Laterality Date     ANGIOGRAM  02/01/2002    Normal coronary,dilated LV,Sev.decr.global LVF,+1 MR     ANGIOGRAM  01/01/2008    Mild AS,Mod PHTN,mod prox.LAD disease,Triv.CFX disease     ANGIOGRAM  01/01/2009    Mod AS,Mod PHTN,mod prox.LAD disease     ANGIOGRAM  10/01/2010    Sev.AS,CM,global hypokinesis,Mild-mod LV dilated,Mild MR,70% prox.LAD lesion,PHTN     CARDIOVERSION  2/02, 4/02, 4/11     CHOLECYSTECTOMY  2006     COLONOSCOPY N/A 01/23/2016    Procedure: COLONOSCOPY;  Surgeon: Robyn Collins MD;  Location:  GI     ESOPHAGOSCOPY, GASTROSCOPY, DUODENOSCOPY (EGD), COMBINED N/A 01/23/2016    Procedure: COMBINED ESOPHAGOSCOPY, GASTROSCOPY, DUODENOSCOPY (EGD);  Surgeon: Robyn Collins MD;  Location:  GI     LAPAROSCOPIC BYPASS GASTRIC  10/08/2011    Procedure:LAPAROSCOPIC BYPASS GASTRIC; Diagnostic laparoscopy, Lysis of Adhesions, Laparoscopic Revision of Jejunojejunostomy; Surgeon:RADHA MURO; Location: OR     LAPAROSCOPIC BYPASS GASTRIC  06/26/2006    Dr Jin     LAPAROSCOPY DIAGNOSTIC (GENERAL)  10/08/2011    Procedure:LAPAROSCOPY DIAGNOSTIC (GENERAL); Surgeon:RADHA MURO; Location: OR     REPLACE VALVE AORTIC  01/07/2011     [facility] NONSPECIFIC PROCEDURE  ~1985    vein stripping       Hx of Blood transfusions/reactions: denies     Hx of abnormal bleeding or anti-platelet use: Coumadin - plan to hold 5 days and bridge with lovenox. Continue ASA 81 mg    Menstrual history: No LMP for male patient.:     Steroid use in the last year: none    Personal or FH with difficulty with Anesthesia:  denies    Prior to Admission Medications  Current Outpatient Medications   Medication Sig Dispense Refill     albuterol (PROAIR RESPICLICK) 108 (90 Base) MCG/ACT inhaler Inhale 2 puffs into the lungs every 6 hours as needed for shortness of breath / dyspnea or wheezing 1 Inhaler 0     Ascorbic Acid (VITAMIN C PO) Take 500 mg by mouth At  Bedtime       aspirin 81 MG tablet Take 81 mg by mouth every other day       Calcium Carbonate-Vitamin D (CALCIUM 600+D) 600-200 MG-UNIT TABS Take 1 tablet by mouth 2 times daily        carvedilol (COREG) 25 MG tablet Take 1 tablet (25 mg) by mouth 2 times daily (with meals) 180 tablet 2     Cholecalciferol (VITAMIN D) 2000 UNIT CAPS Take 1 tablet by mouth daily        Cyanocobalamin (VITAMIN B-12) 500 MCG SUBL Place 500 mcg under the tongue daily        digoxin (LANOXIN) 125 MCG tablet Take 1 tablet (125 mcg) by mouth daily 90 tablet 0     Fexofenadine HCl (ALLERGY 24-HR PO) Take 180 mg by mouth daily        levothyroxine (SYNTHROID/LEVOTHROID) 200 MCG tablet TAKE 1 TABLET (200 MCG) BY MOUTH DAILY SEE DOCTOR IN MARCH 30 tablet 5     lisinopril (ZESTRIL) 20 MG tablet Take  20 mg (1 tablet in am) and 10 mg  (one half tablet) in  pm 135 tablet 3     Multiple Vitamins-Minerals (CENTRUM) CHEW Take 1 tablet by mouth daily        nitroGLYcerin (NITROSTAT) 0.3 MG sublingual tablet For chest pain place 1 tablet under the tongue every 5 minutes for 3 doses. If symptoms persist 5 minutes after 1st dose call 911. (Patient not taking: Reported on 1/4/2021) 25 tablet 1     Psyllium (FIBER) 0.52 GM CAPS Take 2 capsules by mouth At Bedtime        sertraline (ZOLOFT) 50 MG tablet Take 1 tablet (50 mg) by mouth daily 90 tablet 3     simvastatin (ZOCOR) 20 MG tablet TAKE 1 TABLET BY MOUTH AT BEDTIME 90 tablet 2     trolamine salicylate (ASPERCREME) 10 % external cream Apply topically as needed for moderate pain       warfarin ANTICOAGULANT (COUMADIN) 4 MG tablet TAKE 1 TABLET MONDAY, WEDNESDAY, AND FRIDAY. TAKE ONE AND ONE-HALF TABLETS ALL OTHER DAYS OF THE WEEK (Patient taking differently: Take 4 mg by mouth every evening ) 117 tablet 3       Allergies  No Known Allergies    Social History  Social History     Socioeconomic History     Marital status:      Spouse name: Tracey     Number of children: 0     Years of  education: Not on file     Highest education level: Not on file   Occupational History     Occupation:      Comment: [employer]   Social Needs     Financial resource strain: Not on file     Food insecurity     Worry: Not on file     Inability: Not on file     Transportation needs     Medical: Not on file     Non-medical: Not on file   Tobacco Use     Smoking status: Never Smoker     Smokeless tobacco: Never Used   Substance and Sexual Activity     Alcohol use: No     Alcohol/week: 0.0 standard drinks     Drug use: No     Sexual activity: Not Currently     Partners: Female   Lifestyle     Physical activity     Days per week: Not on file     Minutes per session: Not on file     Stress: Not on file   Relationships     Social connections     Talks on phone: Not on file     Gets together: Not on file     Attends Orthodox service: Not on file     Active member of club or organization: Not on file     Attends meetings of clubs or organizations: Not on file     Relationship status: Not on file     Intimate partner violence     Fear of current or ex partner: Not on file     Emotionally abused: Not on file     Physically abused: Not on file     Forced sexual activity: Not on file   Other Topics Concern     Parent/sibling w/ CABG, MI or angioplasty before 65F 55M? Yes      Service Not Asked     Blood Transfusions No     Caffeine Concern Yes     Comment: 1 can daily     Occupational Exposure Yes     Hobby Hazards No     Sleep Concern No     Stress Concern No     Weight Concern No     Special Diet Yes     Comment: low sodium, low calories     Back Care No     Exercise Yes     Comment: walks daily     Bike Helmet Not Asked     Comment: NA     Seat Belt Yes     Self-Exams Yes   Social History Narrative     Not on file       Family History  Family History   Problem Relation Age of Onset     Cancer - colorectal Father      Colon Cancer Father      Cancer Father      Diabetes Brother        Review of Systems    ROS/MED  HX    ENT/Pulmonary:     (+)allergic rhinitis, , . .   (-) tobacco use   Neurologic:      (-) seizures, CVA, TIA and migraines   Cardiovascular:     (+) Dyslipidemia, hypertension--CAD, --. Taking blood thinners : . CHF etiology: idiopathic  Last EF: 45-50% date: 6/4/20 . . :. dysrhythmias (s/p Maze in 2011 with permanent a fib ) a-fib, valvular problems/murmurs type: AS s/p AVR in 1/2011, 1-2+ MR, 1+ TR:. Previous cardiac testing Echodate:6/4/20results:Interpretation Summary          Left ventricular systolic function is mildly reduced. LVEF is estimated at 45-     50%.     Moderate to severe concentric left ventricular hypertrophy.     Right ventricular systolic function is mild to moderately reduced. TAPSE     1.4cm, S' 7.8 cm/s.     Mild to moderate (1-2+) mitral regurgitation.     There is a mechanical aortic valve. Normal function on Doppler interrogation.     Vmax 2.1 m/s, mean gradient 9 mmHg, DI 0.5.     Mild (1+) tricuspid regurgitation.     IVC diameter and respiratory changes fall into an intermediate range     suggesting an RA pressure of 8 mmHg (mild hypervolemia).     Ascending aorta is borderline dilated, max diameter of the visualized portion     3.8cm.          This study was compared to a TTE from 1/16/2017. There has been no significant     Change. Stress Testdate:2/6/18 results:Impression     1. Myocardial perfusion imaging using single isotope technique     demonstrated a fixed inferior/inferolateral/inferoseptal basal defect     possible currently consistent with prior infarction versus     diaphragmatic attenuation artifact. There is mild reversibility within     this defect consistent potentially with a small area of mild     aung-infarct ischemia. Whether this represents infarct and minimal     ischemia or artifact cannot be determined in the absence of gated     images. Otherwise overall normal perfusion without evidence of     significant ischemia.      Of note, maximum heart rate  "achieved was 113 bpm representing only 70%     of maximal predicted heart rate. This is therefore a nondiagnostic     study.     2. Gated images demonstrated gating was not done.  The left     ventricular systolic function is was not determined.     3. Compared to the prior study from 11/16/2012, prior study described     a very small fixed basal inferior defect possibly related to     diaphragmatic attenuation artifact .      ECG reviewed date:1/22/16 results:A fib, incomplete RBBB, ST and T wave abnormality, consider inferolateral ischemia  date: results:          METS/Exercise Tolerance:  4 - Raking leaves, gardening   Hematologic:     (+) Anemia, Other Hematologic Disorder-leukopenia      (-) History of Transfusion   Musculoskeletal:  - neg musculoskeletal ROS       GI/Hepatic:     (+) Other GI/Hepatic right inguinal hernia      (-) GERD   Renal/Genitourinary:         Endo:     (+) thyroid problem hypothyroidism, Obesity, .      Psychiatric:     (+) psychiatric history depression      Infectious Disease:  - neg infectious disease ROS       Malignancy:      - no malignancy   Other:    (+) no H/O Chronic Pain,no other significant disability          The complete review of systems is negative other than noted in the HPI or here.   Temp: 97.3  F (36.3  C) Temp src: Oral BP: 122/84 Pulse: 83   Resp: 12 SpO2: 97 %         209 lbs 0 oz  5' 9\"[pt reported[   Body mass index is 30.86 kg/m .       Physical Exam  Constitutional: Awake, alert, cooperative, no apparent distress, and appears stated age.  Eyes: Pupils equal, round and reactive to light, extra ocular muscles intact, sclera clear, conjunctiva normal.  HENT: Normocephalic, oral pharynx with moist mucus membranes, good dentition. No goiter appreciated.   Respiratory: Clear to auscultation bilaterally, no crackles or wheezing.  Cardiovascular: A fib, normal S1 and S2, and  murmur noted.  Carotids +2, no bruits. No edema. Palpable pulses to radial  DP and PT " arteries.   GI: Normal bowel sounds, soft, non-distended, non-tender, no masses palpated, no hepatosplenomegaly.  Surgical scars: well healed  Lymph/Hematologic: No cervical lymphadenopathy and no supraclavicular lymphadenopathy.  Genitourinary:  defer  Skin: Warm and dry.  No rashes at anticipated surgical site.   Musculoskeletal: Limited ROM of neck. There is no redness, warmth, or swelling of the joints. Gross motor strength is normal.    Neurologic: Awake, alert, oriented to name, place and time. Cranial nerves II-XII are grossly intact. Gait is normal.   Neuropsychiatric: Calm, cooperative. Normal affect.     Labs: (personally reviewed)  Results for CHAS STAPLES (MRN [id number]) as of 2021 12:11   Ref. Range 2021 11:17   Sodium Latest Ref Range: 133 - 144 mmol/L 140   Potassium Latest Ref Range: 3.4 - 5.3 mmol/L 4.4   Chloride Latest Ref Range: 94 - 109 mmol/L 106   Carbon Dioxide Latest Ref Range: 20 - 32 mmol/L 33 (H)   Urea Nitrogen Latest Ref Range: 7 - 30 mg/dL 16   Creatinine Latest Ref Range: 0.66 - 1.25 mg/dL 0.88   GFR Estimate Latest Ref Range: >60 mL/min/1.73_m2 >90   GFR Estimate If Black Latest Ref Range: >60 mL/min/1.73_m2 >90   Calcium Latest Ref Range: 8.5 - 10.1 mg/dL 9.4   Anion Gap Latest Ref Range: 3 - 14 mmol/L 2 (L)   Glucose Latest Ref Range: 70 - 99 mg/dL 72   WBC Latest Ref Range: 4.0 - 11.0 10e9/L 4.5   Hemoglobin Latest Ref Range: 13.3 - 17.7 g/dL 12.4 (L)   Hematocrit Latest Ref Range: 40.0 - 53.0 % 41.1   Platelet Count Latest Ref Range: 150 - 450 10e9/L 141 (L)   RBC Count Latest Ref Range: 4.4 - 5.9 10e12/L 4.21 (L)   MCV Latest Ref Range: 78 - 100 fl 98   MCH Latest Ref Range: 26.5 - 33.0 pg 29.5   MCHC Latest Ref Range: 31.5 - 36.5 g/dL 30.2 (L)   RDW Latest Ref Range: 10.0 - 15.0 % 15.7 (H)   hgb is stable. Platelets are slightly low but acceptable for surgery.     EK16     A fib, incomplete RBBB, ST and T wave abnormality, consider inferolateral ischemia            Echo 6/4/20     Interpretation Summary           Left ventricular systolic function is mildly reduced. LVEF is estimated at 45-     50%.     Moderate to severe concentric left ventricular hypertrophy.     Right ventricular systolic function is mild to moderately reduced. TAPSE     1.4cm, S' 7.8 cm/s.     Mild to moderate (1-2+) mitral regurgitation.     There is a mechanical aortic valve. Normal function on Doppler interrogation.     Vmax 2.1 m/s, mean gradient 9 mmHg, DI 0.5.     Mild (1+) tricuspid regurgitation.     IVC diameter and respiratory changes fall into an intermediate range     suggesting an RA pressure of 8 mmHg (mild hypervolemia).     Ascending aorta is borderline dilated, max diameter of the visualized portion     3.8cm.           This study was compared to a TTE from 1/16/2017. There has been no significant     Change.          NM stress test 2/6/18     Impression     1.  Myocardial perfusion imaging using single isotope technique     demonstrated a fixed inferior/inferolateral/inferoseptal basal defect     possible currently consistent with prior infarction versus     diaphragmatic attenuation artifact. There is mild reversibility within     this defect consistent potentially with a small area of mild     aung-infarct ischemia. Whether this represents infarct and minimal     ischemia or artifact cannot be determined in the absence of gated     images. Otherwise overall normal perfusion without evidence of     significant ischemia.      Of note, maximum heart rate achieved was 113 bpm representing only 70%     of maximal predicted heart rate. This is therefore a nondiagnostic     study.     2. Gated images demonstrated gating was not done.  The left     ventricular systolic function is was not determined.     3. Compared to the prior study from 11/16/2012, prior study described     a very small fixed basal inferior defect possibly related to     diaphragmatic attenuation artifact .           The patient's records and results personally reviewed by this provider.     Outside records reviewed from: care everywhere     ASSESSMENT and PLAN  Jovon is a 65 year old man who is scheduled for RIGHT INGUINAL HENRIA WITH OPEN MESH  on 1/18/21 by Dr. Alex in treatment of Right inguinal hernia .  PAC referral for risk assessment and optimization for anesthesia with comorbid conditions of CAD, permanent A fib, s/p AVR, MR, TR, idiopathic cardiomyopathy, HTN, HLD, allergies, anemia, leukpenia, hypothyroidism, obesity, history of bleeding ulcer, s/p gastric bypass, depression:    Pre-operative considerations:  1.  Cardiac:  Functional status- METS 4, the patient is able to walk for hours while vacationing in [place] and yesterday cleared his driveway using .  Intermediate risk surgery with 11% (RCRI #) risk of major adverse cardiac event.   ~ CAD - 60% stenosis of LAD which was not address as it was intramural. The patient had a stress test in 2018 and denies any symptoms. The patient was seen by his cardiologist on 10/15/20 and felt that he was okay to proceed and no further testing indicated.   ~ Idiopathic CM - EF 45-50% based on echo in 6/4/20. Continue digoxin.   ~ Permanent A fib - CHADSVASC = 3-5 ( in anticoagulation note states he has a history of VTE in 2011. Discussed with patient and he reports small clots after AVR but no DVT/PE and reviewed cardiology notes and imaging and no DVT/PE mentioned) The patient will hold coumadin for 5 days and bridge with lovenox.   ~ HTN - continue lisinopriol.   ~ HLD - continue zocor and checked with Dr. Alex's team and okay to ASA 81 mg.     2.  Pulm:  Airway feasible.  LINDA risk: Intermediate (male, age, HTN)  ~ Allergies - continue allegra. PRN albuterol.     3. Heme:  Anemia, leukopenia - seen on blood work from 3/2020 at which time the patient had pneumonia. Will recheck labs today.     4. Endo: hypothyroidism - continue levothyroxine.   ~ Obesity -  BMI 30 - consideration for safe lifting techniques.     5. GI:  Risk of PONV score = 2.  If > 2, anti-emetic intervention recommended.  ~ History of bleeding ulcer in 2011 - he denies any current issues. S/p gastric bypass. Hold fiber DOS.   ~ Right inguinal hernia - procedure as above.      6. Psych: depression - continue zoloft.     VTE risk: 1.8%-3% (unsure if patient did have VTE in 2011 as stated per anticoagulation clinic)       Patient was discussed with Dr El.    The patient is optimized for their procedure. AVS with information on surgery time/arrival time, meds and NPO status given by nursing staff.        Hansa Power PA-C  Preoperative Assessment Center  [facility] and [facility]  Phone: [phone]  Fax: [phone]

## 2021-01-05 NOTE — PROGRESS NOTES
.plann as outlined my chart instructions to patient. Called and reviewed instructions as outlined below.Rx sent to pharmacy.        Pre-Procedure:  ? Hold warfarin until after procedure startin2021   ? Lovenox 90 mg subq Q 12 hrs (1 mg/kg Q 12 hr for CrCl >30ml/min)     Start Lovenox: 01/15/2021    Last dose of Lovenox prior to procedure: 2021 AM            ? Post-Procedure:    Resume home warfarin dose if okay with provider doing procedure on night of procedure, 2021 PM: 6mg & 2021 6mg    Resume Lovenox ~ 24-48 hrs post procedure when okay with provider doing procedure. Continue until INR >= 2.5    Recheck INR 5-7 days after resuming warfarin   ?   Priscila Patel [facility]

## 2021-01-05 NOTE — PATIENT INSTRUCTIONS
Preparing for Your Surgery      Name:  Jovon Mantilla   MRN:  [id number]   :  1955   Today's Date:  2021         Arriving for surgery:  Surgery date:  21  Arrival time:  Noon    Restrictions due to COVID 19:  No visitors are allowed at this time.         parking is available for anyone with mobility limitations or disabilities. (Monday- Friday 7 am- 5 pm)    Please come to:    [facility]  [address] [place] [id number]    Check in on the 5th floor, Ambulatory Surgery Center.    What can I eat or drink?    -  You may eat and drink normally until 8 hours before surgery. (Until 5:00 am)  -  You may have clear liquids up to 4 hours before surgery. (Until 9:00 am)    Examples of clear liquids:  Water  Clear broth  Juices (apple, white grape, white cranberry  and cider) without pulp  Noncarbonated, powder based beverages  (lemonade and Max-Aid)  Sodas (Sprite, 7-Up, ginger ale and seltzer)  Coffee or tea (without milk or cream)  Gatorade    --No alcohol for at least 24 hours before surgery    Which medicines can I take?     Hold Multivitamins for 7 days before surgery.  Hold Supplements for 7 days before surgery.  Hold Ibuprofen (Advil, Motrin) for 1 day before surgery.  Hold Naproxen (Aleve) for 4 days before surgery.    **Hold Warfarin (Coumadin) 5 days before surgery - take your last dose on 21.**    -  DO NOT take the following medications the day of surgery:  Psyllium (Fiber)      -  PLEASE TAKE the following medications the day of surgery:   Albuterol (Proair) Inhaler  Carvedilol (Coreg)  Digoxin (Lanoxin)  Fexofenadine (Allergy medicine) if needed  Levothyroxine (Synthroid/Levothroid)  Sertraline (Zoloft)  Aspirin      Lisinopril (Zestril)      How do I prepare myself?  - Please take 2 showers before surgery using Scrubcare or Hibiclens soap.    Use this soap only from the neck to your toes.     Leave the soap on your skin for one minute--then rinse  thoroughly.      You may use your own shampoo and conditioner; no other hair products.   - Please remove all jewelry and body piercings.  - No lotions, deodorants or fragrance.   - Bring your ID and insurance card.        - All patients are required to have a Covid-19 test within 4 days of surgery/procedure.      -Patients will be contacted by the [facility] scheduling team within 1 week of surgery to make an appointment.      - Patients may call the Scheduling team at [phone] if they have not been scheduled within 4 days of  surgery.      ALL PATIENTS ARE REQUIRED TO HAVE A RESPONSIBLE ADULT TO DRIVE AND BE IN ATTENDANCE WITH THEM FOR 24 HOURS FOLLOWING SURGERY       Questions or Concerns:    -For questions regarding the day of surgery please contact the Ambulatory Surgery Center at [phone].    -If you have health changes between today and your surgery please contact your surgeon.     For questions after surgery please call your surgeons office.

## 2021-01-06 ENCOUNTER — DOCUMENTATION ONLY (OUTPATIENT)
Dept: FAMILY MEDICINE | Facility: CLINIC | Age: 66
End: 2021-01-06

## 2021-01-06 ENCOUNTER — PATIENT OUTREACH (OUTPATIENT)
Dept: SURGERY | Facility: CLINIC | Age: 66
End: 2021-01-06

## 2021-01-06 ENCOUNTER — TELEPHONE (OUTPATIENT)
Dept: SURGERY | Facility: CLINIC | Age: 66
End: 2021-01-06

## 2021-01-06 DIAGNOSIS — Z79.01 LONG TERM CURRENT USE OF ANTICOAGULANTS WITH INR GOAL OF 2.5-3.5: ICD-10-CM

## 2021-01-06 DIAGNOSIS — I48.21 PERMANENT ATRIAL FIBRILLATION (H): ICD-10-CM

## 2021-01-06 DIAGNOSIS — Z95.2 S/P AVR (AORTIC VALVE REPLACEMENT): ICD-10-CM

## 2021-01-06 PROBLEM — K40.90 RIGHT INGUINAL HERNIA: Status: ACTIVE | Noted: 2019-07-22

## 2021-01-06 NOTE — TELEPHONE ENCOUNTER
Rescheduled surgery with Dr. Alex to [place] on 2/4 per PAC.    Rescheduled COVID test to 2/1.    Contacted Jovon, he is aware of new plan.     He will contact his anticoagulation clinic. I copied Rajni's note from his H&P regarding his other medications and put them in his updated surgery packet.

## 2021-01-06 NOTE — PROGRESS NOTES
Patient will be undergoing an open inguinal hernia repair on 2/4.  He plans to travel on 2/12.  Discussed with the patient that he may be sore and the only restriction would be no lifting in excess of 15-20 pounds for 3 weeks.      All questions answered.

## 2021-01-06 NOTE — TELEPHONE ENCOUNTER
----- Message from Hansa Power PA-C sent at 1/5/2021  4:49 PM CST -----  Hi-    Because his plan is through his anticoagulation clinic what I'll do is update them that the surgery date has to be moved so that they can be in touch with him about the specifics of the coumadin and lovenox. His other medications would otherwise be the same from my H&P.     Rajni   ----- Message -----  From: Yamileth Benjamin  Sent: 1/5/2021   4:37 PM CST  To: Kalina Holloway RN, Khadijah Michelle, #    Thank you! I am bad at math.     I will move this tomorrow when OR scheduling is in.     Rajni - is there a way you can send me the updated instructions for his medications and I can send it to him in an updated surgery packet?  ----- Message -----  From: Hansa Power PA-C  Sent: 1/5/2021   4:29 PM CST  To: Kalina Holloway RN, Khadijah Michelle, #    He should be exactly at 30 days so this H&P should be good still.     Rajni  ----- Message -----  From: Yamileth Benjamin  Sent: 1/5/2021   4:27 PM CST  To: Kalina Holloway RN, Khadijah Michelle, #    The next available would be February 4th.     Would he need a new H&P if we did this?    Yamileth   ----- Message -----  From: Kalina Holloway RN  Sent: 1/5/2021   4:09 PM CST  To: Khadijah Michelle, #    Yamileth,    I discussed this with Dr. Alex and he is unable to add a 4th case on that day.    Kalina Williamson  ----- Message -----  From: Yamileth Benjamin  Sent: 1/5/2021   3:58 PM CST  To: Kalina Holloway RN, Khadijah Michelle, #    Feli, Rajni! I am working remotely so I apologize.    Kalina/Khadijah - 1/18 is a Monday.     Would Dr. Alex be willing to do a 4th case on 1/22? I could follow his robot cases.    Yamileth   ----- Message -----  From: Hansa Power PA-C  Sent: 1/5/2021   3:45 PM CST  To: Yamileth Benjamin    Hi-     I called and left you a voicemail as well. Our staff anesthesiologist reviewed him cardiac testing and given his history of pulmonary HTN along with  his other cardiac history recommend that he be done at the [facility] or [facility] vs the [facility]. I did call the patient about this change and he was fine with the plan. Right now he does have an anticoagulation plan in place for his coumadin for the 1/18/21 date so hopefully the date might be able to be the same. I'm working remote starting tomorrow for the rest of the week so if you need to get a hold of me then messaging back would likely be the easiest.     Thanks,  Rajni

## 2021-01-06 NOTE — PROGRESS NOTES
Updated bridge plan to reflect nurse surgery appointment. Will send information to pt via [employer].       Pre-Procedure:  ? Hold warfarin until after procedure startin21  ? Lovenox 90 mg subq Q 12 hrs (1 mg/kg Q 12 hr for CrCl >30ml/min)     Start Lovenox: 2021    Last dose of Lovenox prior to procedure: 2021 AM            ? Post-Procedure:    Resume home warfarin dose if okay with provider doing procedure on night of procedure, 2021 PM: 6mg & 2021 6mg    Resume Lovenox ~ 24-48 hrs post procedure when okay with provider doing procedure. Continue until INR >= 2.5    Recheck INR 5-7 days after resuming warfarin     Here is your schedule for Bridging with lovenox   Last warfarin dose: , NO warfarin  , NO warfarin  , NO warfarin, begin enoxaparin injections into the abdomen every 12 hours (AM  and PM )usually like 8 am and 8 pm   NO warfarin, enoxaparin injection into the abdomen every 12 hours (AM and PM) 8 and 8  2/3, NO warfarin, enoxaparin injection into the abdomen AM only (no enoxaparin 24 hours prior to surgery) only am dose!!!!  2, DAY OF PROCEDURE, NO enoxaparin. Restart warfarin 6mg (1.5 tabs) in the evening, unless instructed otherwise by the physician.  2/, Restart enoxaparin injections into the abdomen every 12 hours (AM and PM), unless instructed otherwise by the physician, and 6mg (1.5 tabs) warfarin in the evening.   2/6, Enoxaparin injection into the abdomen every 12 hours (AM and PM) and 4mg (1 tabs) warfarin in the evening.  2/7, Enoxaparin injection into the abdomen every 12 hours (AM and PM) and 4mg (1 tabs) warfarin in the evening.*,    Enoxaparin injection into the abdomen in the AM. Recheck INR at the  Anticoagulation Clinic for further dosing instructions.      Make apt for 21 to have INR checked.    If you have any questions please call the Anticoagulation Clinic at [phone].    Neel Mcdermott RN

## 2021-01-07 NOTE — PHARMACY - PREOPERATIVE ASSESSMENT CENTER
Anticoagulation Note - Preoperative Assessment Center (PAC) Pharmacist     Patient was interviewed via phone call on January 4, 2021 prior to PAC clinic appointment. The purpose of this note is to document the perioperative anticoagulation plan outlined by the providers caring for Jovon Mantilla.      Current Regimen  Anticoagulation Regimen as of January 4, 2021: warfarin 4mg daily in the evening.  Indication: afib, mechanical aortic valve.   Prescriber:  Dr. Lorenzana, also follows with Cardiology Dr. Cooley   Expected Duration of therapy: indefinite   Current medications that may interact with this include: aspirin  INR Goal: 2.5-3.5           Creatinine   Date Value Ref Range Status   03/13/2020 0.79 0.66 - 1.25 mg/dL Final         Perioperative plan  Jovon Mantilla is scheduled for RIGHT INGUINAL HENRIA WITH OPEN MESH on  2/4/21 (new OR date) with Dr. Alex and the perioperative anticoagulation plan outlined by Dr. Cooley and [facility] is to hold warafrin x5 days pre op and bridge with lovenox.  Details of the holding plan are in the note from Neel Mcdermott RN from 1/6/21.     Misael Can [facility]  January 4, 2021  4:40 PM      Addendum 1 (January 7, 2021):  Note patient's surgery date was cancelled and rescheduled to new date above.  Please ignore previous note by this user from 1/4/21 as I am unable to delete this note with the wrong surgery dates as the encounter is not accessible.

## 2021-01-13 DIAGNOSIS — I48.20 CHRONIC ATRIAL FIBRILLATION (H): ICD-10-CM

## 2021-01-13 RX ORDER — DIGOXIN 125 MCG
125 TABLET ORAL DAILY
Qty: 90 TABLET | Refills: 3 | Status: SHIPPED | OUTPATIENT
Start: 2021-01-13 | End: 2022-01-10

## 2021-01-19 DIAGNOSIS — Z11.59 ENCOUNTER FOR SCREENING FOR OTHER VIRAL DISEASES: Primary | ICD-10-CM

## 2021-01-26 ENCOUNTER — MYC MEDICAL ADVICE (OUTPATIENT)
Dept: FAMILY MEDICINE | Facility: CLINIC | Age: 66
End: 2021-01-26

## 2021-01-30 DIAGNOSIS — E03.9 ACQUIRED HYPOTHYROIDISM: ICD-10-CM

## 2021-02-01 ENCOUNTER — ANTICOAGULATION THERAPY VISIT (OUTPATIENT)
Dept: FAMILY MEDICINE | Facility: CLINIC | Age: 66
End: 2021-02-01

## 2021-02-01 DIAGNOSIS — Z95.2 S/P AVR (AORTIC VALVE REPLACEMENT): ICD-10-CM

## 2021-02-01 DIAGNOSIS — I48.21 PERMANENT ATRIAL FIBRILLATION (H): ICD-10-CM

## 2021-02-01 DIAGNOSIS — Z11.59 ENCOUNTER FOR SCREENING FOR OTHER VIRAL DISEASES: ICD-10-CM

## 2021-02-01 DIAGNOSIS — Z79.01 LONG TERM CURRENT USE OF ANTICOAGULANTS WITH INR GOAL OF 2.5-3.5: ICD-10-CM

## 2021-02-01 DIAGNOSIS — Z79.01 LONG TERM CURRENT USE OF ANTICOAGULANT THERAPY: ICD-10-CM

## 2021-02-01 DIAGNOSIS — K40.90 RIGHT INGUINAL HERNIA: ICD-10-CM

## 2021-02-01 DIAGNOSIS — Z95.2 S/P AORTIC VALVE REPLACEMENT: ICD-10-CM

## 2021-02-01 LAB
INR PPP: 3.01 (ref 0.86–1.14)
SARS-COV-2 RNA RESP QL NAA+PROBE: NORMAL
SPECIMEN SOURCE: NORMAL

## 2021-02-01 PROCEDURE — 99207 PR NO CHARGE NURSE ONLY: CPT | Performed by: FAMILY MEDICINE

## 2021-02-01 PROCEDURE — 36416 COLLJ CAPILLARY BLOOD SPEC: CPT | Performed by: PATHOLOGY

## 2021-02-01 PROCEDURE — 85610 PROTHROMBIN TIME: CPT | Performed by: PATHOLOGY

## 2021-02-01 PROCEDURE — U0005 INFEC AGEN DETEC AMPLI PROBE: HCPCS | Mod: 90 | Performed by: PATHOLOGY

## 2021-02-01 PROCEDURE — 99000 SPECIMEN HANDLING OFFICE-LAB: CPT | Performed by: PATHOLOGY

## 2021-02-01 PROCEDURE — U0003 INFECTIOUS AGENT DETECTION BY NUCLEIC ACID (DNA OR RNA); SEVERE ACUTE RESPIRATORY SYNDROME CORONAVIRUS 2 (SARS-COV-2) (CORONAVIRUS DISEASE [COVID-19]), AMPLIFIED PROBE TECHNIQUE, MAKING USE OF HIGH THROUGHPUT TECHNOLOGIES AS DESCRIBED BY CMS-2020-01-R: HCPCS | Mod: 90 | Performed by: PATHOLOGY

## 2021-02-01 RX ORDER — LEVOTHYROXINE SODIUM 200 UG/1
200 TABLET ORAL DAILY
Qty: 30 TABLET | Refills: 0 | Status: SHIPPED | OUTPATIENT
Start: 2021-02-01 | End: 2021-03-02

## 2021-02-01 NOTE — TELEPHONE ENCOUNTER
Prescription approved per [facility] Refill Protocol.    Liam Santiago RN

## 2021-02-01 NOTE — PROGRESS NOTES
ANTICOAGULATION FOLLOW-UP CLINIC VISIT    Patient Name:  Jovon Mantilla  Date:  2/1/2021  Contact Type:  Telephone  Having surgery this week has intentional hold set up with lovenox bridging plan outlined he verbalizes understanding of not going off lovenox until inr is in range.  SUBJECTIVE:  Patient Findings     Positives:  Upcoming invasive procedure (has surgery this week)    Comments:    Assessed for S/S bleeding, clotting, medication, diet, health, activity and alcohol changes          Clinical Outcomes     Negatives:  Major bleeding event, Thromboembolic event, Anticoagulation-related hospital admission, Anticoagulation-related ED visit, Anticoagulation-related fatality    Comments:    Assessed for S/S bleeding, clotting, medication, diet, health, activity and alcohol changes             OBJECTIVE    Recent labs: (last 7 days)     02/01/21  1016   INR 3.01*       ASSESSMENT / PLAN  INR assessment SUB intentional hold   Recheck INR In: 1 WEEK    INR Location Clinic      Anticoagulation Summary  As of 2/1/2021    INR goal:  2.5-3.5   TTR:  67.0 % (1 y)   INR used for dosing:  3.01 (2/1/2021)   Warfarin maintenance plan:  4 mg (4 mg x 1) every day   Full warfarin instructions:  2/1: Hold; 2/2: Hold; 2/3: Hold; 2/4: 8 mg; Otherwise 4 mg every day   Weekly warfarin total:  28 mg   Plan last modified:  Karina Pierre RN (12/7/2020)   Next INR check:  2/10/2021   Priority:  High   Target end date:  Indefinite    Indications    S/P AVR (aortic valve replacement) [Z95.2]  Permanent atrial fibrillation (H) [I48.21]  Long term current use of anticoagulants with INR goal of 2.5-3.5 [Z79.01]             Anticoagulation Episode Summary     INR check location:      Preferred lab:      Send INR reminders to:  [facility]    Comments:  24mm ATS Valve  Cardiomyopathy / CAD      Anticoagulation Care Providers     Provider Role Specialty Phone number    Rehan Lorenzana MD Referring Family Medicine  [phone]            See the Encounter Report to view Anticoagulation Flowsheet and Dosing Calendar (Go to Encounters tab in chart review, and find the Anticoagulation Therapy Visit)        Karina Pierre RN

## 2021-02-02 LAB
LABORATORY COMMENT REPORT: NORMAL
SARS-COV-2 RNA RESP QL NAA+PROBE: NEGATIVE
SPECIMEN SOURCE: NORMAL

## 2021-02-04 ENCOUNTER — ANESTHESIA (OUTPATIENT)
Dept: SURGERY | Facility: CLINIC | Age: 66
End: 2021-02-04
Payer: COMMERCIAL

## 2021-02-04 ENCOUNTER — HOSPITAL ENCOUNTER (OUTPATIENT)
Facility: CLINIC | Age: 66
Discharge: HOME | End: 2021-02-04
Attending: SURGERY | Admitting: SURGERY
Payer: COMMERCIAL

## 2021-02-04 ENCOUNTER — DOCUMENTATION ONLY (OUTPATIENT)
Dept: FAMILY MEDICINE | Facility: CLINIC | Age: 66
End: 2021-02-04

## 2021-02-04 VITALS
TEMPERATURE: 98 F | DIASTOLIC BLOOD PRESSURE: 87 MMHG | WEIGHT: 204.81 LBS | HEART RATE: 73 BPM | RESPIRATION RATE: 18 BRPM | OXYGEN SATURATION: 99 % | BODY MASS INDEX: 30.24 KG/M2 | SYSTOLIC BLOOD PRESSURE: 130 MMHG

## 2021-02-04 DIAGNOSIS — K40.90 RIGHT INGUINAL HERNIA: ICD-10-CM

## 2021-02-04 LAB
ABO + RH BLD: NORMAL
ABO + RH BLD: NORMAL
BLD GP AB SCN SERPL QL: NORMAL
BLOOD BANK CMNT PATIENT-IMP: NORMAL
CREAT SERPL-MCNC: 0.78 MG/DL (ref 0.66–1.25)
GFR SERPL CREATININE-BSD FRML MDRD: >90 ML/MIN/{1.73_M2}
GLUCOSE BLDC GLUCOMTR-MCNC: 110 MG/DL (ref 70–99)
GLUCOSE BLDC GLUCOMTR-MCNC: 59 MG/DL (ref 70–99)
GLUCOSE BLDC GLUCOMTR-MCNC: 71 MG/DL (ref 70–99)
INR PPP: 1.64 (ref 0.86–1.14)
INTERPRETATION ECG - MUSE: NORMAL
POTASSIUM SERPL-SCNC: 4.2 MMOL/L (ref 3.4–5.3)
SPECIMEN EXP DATE BLD: NORMAL

## 2021-02-04 PROCEDURE — 85610 PROTHROMBIN TIME: CPT | Performed by: ANESTHESIOLOGY

## 2021-02-04 PROCEDURE — 999N001017 HC STATISTIC GLUCOSE BY METER IP

## 2021-02-04 PROCEDURE — 82565 ASSAY OF CREATININE: CPT | Performed by: ANESTHESIOLOGY

## 2021-02-04 PROCEDURE — 272N000001 HC OR GENERAL SUPPLY STERILE: Performed by: SURGERY

## 2021-02-04 PROCEDURE — 86850 RBC ANTIBODY SCREEN: CPT | Performed by: ANESTHESIOLOGY

## 2021-02-04 PROCEDURE — 250N000011 HC RX IP 250 OP 636: Performed by: SURGERY

## 2021-02-04 PROCEDURE — 370N000017 HC ANESTHESIA TECHNICAL FEE, PER MIN: Performed by: SURGERY

## 2021-02-04 PROCEDURE — 999N000054 HC STATISTIC EKG NON-CHARGEABLE

## 2021-02-04 PROCEDURE — C1781 MESH (IMPLANTABLE): HCPCS | Performed by: SURGERY

## 2021-02-04 PROCEDURE — 250N000013 HC RX MED GY IP 250 OP 250 PS 637: Performed by: ANESTHESIOLOGY

## 2021-02-04 PROCEDURE — 86900 BLOOD TYPING SEROLOGIC ABO: CPT | Performed by: ANESTHESIOLOGY

## 2021-02-04 PROCEDURE — 710N000012 HC RECOVERY PHASE 2, PER MINUTE: Performed by: SURGERY

## 2021-02-04 PROCEDURE — 93010 ELECTROCARDIOGRAM REPORT: CPT | Mod: 59 | Performed by: INTERNAL MEDICINE

## 2021-02-04 PROCEDURE — 250N000009 HC RX 250: Performed by: SURGERY

## 2021-02-04 PROCEDURE — 84132 ASSAY OF SERUM POTASSIUM: CPT | Performed by: ANESTHESIOLOGY

## 2021-02-04 PROCEDURE — 250N000011 HC RX IP 250 OP 636

## 2021-02-04 PROCEDURE — 999N000010 HC STATISTIC ANES STAT CODE-CRNA PER MINUTE: Performed by: SURGERY

## 2021-02-04 PROCEDURE — 86901 BLOOD TYPING SEROLOGIC RH(D): CPT | Performed by: ANESTHESIOLOGY

## 2021-02-04 PROCEDURE — 258N000001 HC RX 258: Performed by: ANESTHESIOLOGY

## 2021-02-04 PROCEDURE — 258N000003 HC RX IP 258 OP 636

## 2021-02-04 PROCEDURE — 999N000141 HC STATISTIC PRE-PROCEDURE NURSING ASSESSMENT: Performed by: SURGERY

## 2021-02-04 PROCEDURE — 360N000075 HC SURGERY LEVEL 2, PER MIN: Performed by: SURGERY

## 2021-02-04 DEVICE — MESH POLYESTER PROGRIP 6X6" (15X15CM) PARIETEX TEM1515G: Type: IMPLANTABLE DEVICE | Site: GROIN | Status: FUNCTIONAL

## 2021-02-04 RX ORDER — AMOXICILLIN 250 MG
1-2 CAPSULE ORAL 2 TIMES DAILY
Qty: 30 TABLET | Refills: 0 | Status: ON HOLD | OUTPATIENT
Start: 2021-02-04 | End: 2021-02-13

## 2021-02-04 RX ORDER — NALOXONE HYDROCHLORIDE 0.4 MG/ML
0.2 INJECTION, SOLUTION INTRAMUSCULAR; INTRAVENOUS; SUBCUTANEOUS
Status: DISCONTINUED | OUTPATIENT
Start: 2021-02-04 | End: 2021-02-04 | Stop reason: HOSPADM

## 2021-02-04 RX ORDER — HYDROCODONE BITARTRATE AND ACETAMINOPHEN 5; 325 MG/1; MG/1
1-2 TABLET ORAL EVERY 4 HOURS PRN
Qty: 15 TABLET | Refills: 0 | Status: SHIPPED | OUTPATIENT
Start: 2021-02-04 | End: 2021-08-05

## 2021-02-04 RX ORDER — ACETAMINOPHEN 500 MG
1000 TABLET ORAL ONCE
Status: COMPLETED | OUTPATIENT
Start: 2021-02-04 | End: 2021-02-04

## 2021-02-04 RX ORDER — CELECOXIB 200 MG/1
200 CAPSULE ORAL ONCE
Status: COMPLETED | OUTPATIENT
Start: 2021-02-04 | End: 2021-02-04

## 2021-02-04 RX ORDER — NALOXONE HYDROCHLORIDE 0.4 MG/ML
0.4 INJECTION, SOLUTION INTRAMUSCULAR; INTRAVENOUS; SUBCUTANEOUS
Status: DISCONTINUED | OUTPATIENT
Start: 2021-02-04 | End: 2021-02-04 | Stop reason: HOSPADM

## 2021-02-04 RX ORDER — BUPIVACAINE HYDROCHLORIDE 5 MG/ML
INJECTION, SOLUTION EPIDURAL; INTRACAUDAL PRN
Status: DISCONTINUED | OUTPATIENT
Start: 2021-02-04 | End: 2021-02-04 | Stop reason: HOSPADM

## 2021-02-04 RX ORDER — ONDANSETRON 2 MG/ML
4 INJECTION INTRAMUSCULAR; INTRAVENOUS EVERY 30 MIN PRN
Status: DISCONTINUED | OUTPATIENT
Start: 2021-02-04 | End: 2021-02-04 | Stop reason: HOSPADM

## 2021-02-04 RX ORDER — FENTANYL CITRATE 50 UG/ML
25-50 INJECTION, SOLUTION INTRAMUSCULAR; INTRAVENOUS
Status: DISCONTINUED | OUTPATIENT
Start: 2021-02-04 | End: 2021-02-04 | Stop reason: HOSPADM

## 2021-02-04 RX ORDER — CEFAZOLIN SODIUM 1 G/3ML
1 INJECTION, POWDER, FOR SOLUTION INTRAMUSCULAR; INTRAVENOUS SEE ADMIN INSTRUCTIONS
Status: DISCONTINUED | OUTPATIENT
Start: 2021-02-04 | End: 2021-02-04 | Stop reason: HOSPADM

## 2021-02-04 RX ORDER — SODIUM CHLORIDE, SODIUM LACTATE, POTASSIUM CHLORIDE, CALCIUM CHLORIDE 600; 310; 30; 20 MG/100ML; MG/100ML; MG/100ML; MG/100ML
INJECTION, SOLUTION INTRAVENOUS CONTINUOUS PRN
Status: DISCONTINUED | OUTPATIENT
Start: 2021-02-04 | End: 2021-02-04

## 2021-02-04 RX ORDER — SODIUM CHLORIDE, SODIUM LACTATE, POTASSIUM CHLORIDE, CALCIUM CHLORIDE 600; 310; 30; 20 MG/100ML; MG/100ML; MG/100ML; MG/100ML
INJECTION, SOLUTION INTRAVENOUS CONTINUOUS
Status: DISCONTINUED | OUTPATIENT
Start: 2021-02-04 | End: 2021-02-04 | Stop reason: HOSPADM

## 2021-02-04 RX ORDER — PROPOFOL 10 MG/ML
INJECTION, EMULSION INTRAVENOUS CONTINUOUS PRN
Status: DISCONTINUED | OUTPATIENT
Start: 2021-02-04 | End: 2021-02-04

## 2021-02-04 RX ORDER — DEXTROSE MONOHYDRATE 25 G/50ML
25 INJECTION, SOLUTION INTRAVENOUS ONCE
Status: COMPLETED | OUTPATIENT
Start: 2021-02-04 | End: 2021-02-04

## 2021-02-04 RX ORDER — ONDANSETRON 4 MG/1
4 TABLET, ORALLY DISINTEGRATING ORAL EVERY 30 MIN PRN
Status: DISCONTINUED | OUTPATIENT
Start: 2021-02-04 | End: 2021-02-04 | Stop reason: HOSPADM

## 2021-02-04 RX ORDER — GABAPENTIN 300 MG/1
300 CAPSULE ORAL ONCE
Status: COMPLETED | OUTPATIENT
Start: 2021-02-04 | End: 2021-02-04

## 2021-02-04 RX ORDER — CEFAZOLIN SODIUM 2 G/100ML
2 INJECTION, SOLUTION INTRAVENOUS
Status: COMPLETED | OUTPATIENT
Start: 2021-02-04 | End: 2021-02-04

## 2021-02-04 RX ORDER — PHENYLEPHRINE HCL IN 0.9% NACL 50MG/250ML
0.5-6 PLASTIC BAG, INJECTION (ML) INTRAVENOUS CONTINUOUS
Status: DISCONTINUED | OUTPATIENT
Start: 2021-02-04 | End: 2021-02-04 | Stop reason: HOSPADM

## 2021-02-04 RX ORDER — HYDROMORPHONE HYDROCHLORIDE 1 MG/ML
.3-.5 INJECTION, SOLUTION INTRAMUSCULAR; INTRAVENOUS; SUBCUTANEOUS EVERY 10 MIN PRN
Status: DISCONTINUED | OUTPATIENT
Start: 2021-02-04 | End: 2021-02-04 | Stop reason: HOSPADM

## 2021-02-04 RX ORDER — LIDOCAINE 40 MG/G
CREAM TOPICAL
Status: DISCONTINUED | OUTPATIENT
Start: 2021-02-04 | End: 2021-02-04 | Stop reason: HOSPADM

## 2021-02-04 RX ADMIN — PROPOFOL 150 MCG/KG/MIN: 10 INJECTION, EMULSION INTRAVENOUS at 07:30

## 2021-02-04 RX ADMIN — CEFAZOLIN 2 G: 10 INJECTION, POWDER, FOR SOLUTION INTRAVENOUS at 07:36

## 2021-02-04 RX ADMIN — SODIUM CHLORIDE, POTASSIUM CHLORIDE, SODIUM LACTATE AND CALCIUM CHLORIDE: 600; 310; 30; 20 INJECTION, SOLUTION INTRAVENOUS at 07:25

## 2021-02-04 RX ADMIN — MIDAZOLAM 2 MG: 1 INJECTION INTRAMUSCULAR; INTRAVENOUS at 07:25

## 2021-02-04 RX ADMIN — ACETAMINOPHEN 1000 MG: 500 TABLET, FILM COATED ORAL at 06:28

## 2021-02-04 RX ADMIN — PHENYLEPHRINE HYDROCHLORIDE 100 MCG: 10 INJECTION INTRAVENOUS at 08:08

## 2021-02-04 RX ADMIN — DEXTROSE MONOHYDRATE 25 ML: 500 INJECTION PARENTERAL at 06:26

## 2021-02-04 RX ADMIN — PHENYLEPHRINE HYDROCHLORIDE 100 MCG: 10 INJECTION INTRAVENOUS at 08:12

## 2021-02-04 RX ADMIN — CELECOXIB 200 MG: 200 CAPSULE ORAL at 06:28

## 2021-02-04 RX ADMIN — PHENYLEPHRINE HYDROCHLORIDE 100 MCG: 10 INJECTION INTRAVENOUS at 08:18

## 2021-02-04 RX ADMIN — Medication: at 09:49

## 2021-02-04 RX ADMIN — GABAPENTIN 300 MG: 300 CAPSULE ORAL at 06:28

## 2021-02-04 NOTE — OR NURSING
Discharge instructions given to maria elena, clyde wife via phone. Questions encouraged and answered, understanding verbalized.

## 2021-02-04 NOTE — DISCHARGE INSTRUCTIONS
[facility]  Same-Day Surgery   Adult Discharge Orders & Instructions     For 24 hours after surgery    1. Get plenty of rest.  A responsible adult must stay with you for at least 24 hours after you leave the hospital.   2. Do not drive or use heavy equipment.  If you have weakness or tingling, don't drive or use heavy equipment until this feeling goes away.  3. Do not drink alcohol.  4. Avoid strenuous or risky activities.  Ask for help when climbing stairs.   5. You may feel lightheaded.  IF so, sit for a few minutes before standing.  Have someone help you get up.   6. If you have nausea (feel sick to your stomach): Drink only clear liquids such as apple juice, ginger ale, broth or 7-Up.  Rest may also help.  Be sure to drink enough fluids.  Move to a regular diet as you feel able.  7. You may have a slight fever. Call the doctor if your fever is over 100 F (37.7 C) (taken under the tongue) or lasts longer than 24 hours.  8. You may have a dry mouth, a sore throat, muscle aches or trouble sleeping.  These should go away after 24 hours.  9. Do not make important or legal decisions.   Call your doctor for any of the followin.  Signs of infection (fever, growing tenderness at the surgery site, a large amount of drainage or bleeding, severe pain, foul-smelling drainage, redness, swelling).    2. It has been over 8 to 10 hours since surgery and you are still not able to urinate (pass water).    3.  Headache for over 24 hours.    To contact a doctor, call Dr. Alex's clinic @ [phone] regular business hours 8:00 am to 4:30 pm or:    '   After hours and weekends call  [phone] and ask for the resident on call for   General Surgery (answered 24 hours a day)  '   Emergency Department:    [facility]: [phone]       (TTY for hearing impaired: [phone])

## 2021-02-04 NOTE — BRIEF OP NOTE
[facility] Brief Operative Note    Pre-operative diagnosis: Right inguinal hernia [K40.90]   Post-operative diagnosis Same as Pre-op Dx   Procedure: Procedure(s):  OPEN RIGHT INGUINAL HENRIA WITH MESH   Surgeon: Jaden Alex MD   Assistants(s):    Estimated blood loss: 1 mL    Specimens: no   Findings: yes

## 2021-02-04 NOTE — OP NOTE
Procedure Date: 02/04/2021      PREOPERATIVE DIAGNOSIS:  Right inguinal hernia.       POSTOPERATIVE DIAGNOSIS:  Right inguinal hernia.      OPERATIVE PROCEDURE:  Open mesh repair of right inguinal hernia.      SURGEON:  Jaden Alex MD      ANESTHESIA:  IV sedation plus local infiltration of 0.5% Marcaine with epinephrine.      INDICATIONS FOR PROCEDURE:  The patient presents with right inguinal hernia.  Informed consent was obtained.      OPERATIVE FINDINGS:  Right inguinal indirect hernia.      DESCRIPTION OF PROCEDURE:  The patient was brought to the operating room, put under IV sedation, right inguinal region was widely prepped and draped in the usual sterile fashion.  I injected with Marcaine throughout for adequate anesthetic.  A skin incision along the inguinal crease.  Dissection carried down to external aponeurosis, which was opened to the external ring.  The cord was easily encircled with a Penrose and dissection carried out anteriorly and longitudinally across cremasterics to spread the muscle fibers and thus gained excellent access to a rather well-developed hernia sac.  The hernia sac was taken off the cord structures without difficulty at the deep ring and a pursestring of 2-0 Vicryl was placed at the deep ring.  The hernia sac was thus partially resected off the anterior cord.  At this point, initiated my routine mesh Ysabel repair by taking a 15 x 10 cm Parietex ProGrip mesh, slitting this out laterally, passed around the cord, crossing tails out laterally and tucking out laterally.  Medially, the mesh extended to the midline at rectus fascia.  Inferiorly, it was secured to the inguinal ligament with 0 Vicryl with the tails crossed, the inferior edges tacked down to the inguinal ligament lateral to the newly created deep ring.  Mesh was tucked between internal and external aponeurosis up superiorly and the external oblique was then closed with running Vicryl stitch, skin with subcuticular,  Steri-Strips were applied.  Estimated blood loss was minimal.  The patient tolerated the procedure well and was taken to the recovery room, where he was without difficulty or apparent complication.         UGO ARNDT MD             D: 2021   T: 2021   MT: JENNIFER      Name:     CHAS STAPLES   MRN:      [id number]        Account:        [id number]   :      1955           Procedure Date: 2021      Document: [id number]

## 2021-02-04 NOTE — OR NURSING
Blood sugar 59 by glucometer. OOD contacted, orders to start IV and give 25 mL of D50 IV per Dr. Ac.

## 2021-02-04 NOTE — ANESTHESIA POSTPROCEDURE EVALUATION
Patient: Jovon Mantilla    Procedure(s):  OPEN RIGHT INGUINAL HENRIA WITH MESH    Diagnosis:Right inguinal hernia [K40.90]  Diagnosis Additional Information: No value filed.    Anesthesia Type:  MAC    Note:  Disposition: Outpatient   Postop Pain Control: Uneventful            Sign Out: Well controlled pain   PONV: No   Neuro/Psych: Uneventful            Sign Out: Acceptable/Baseline neuro status   Airway/Respiratory: Uneventful            Sign Out: Acceptable/Baseline resp. status   CV/Hemodynamics: Uneventful            Sign Out: Acceptable CV status   Other NRE: NONE   DID A NON-ROUTINE EVENT OCCUR? No         Last vitals:  CRNA VITALS  2/4/2021 0748 - 2/4/2021 0848      2/4/2021             NIBP:  112/74    Pulse:  78          Electronically Signed By: Dieudonne Mustafa MD  February 4, 2021  10:29 AM

## 2021-02-04 NOTE — ANESTHESIA CARE TRANSFER NOTE
Patient: Jovon Mantilla    Procedure(s):  OPEN RIGHT INGUINAL HENRIA WITH MESH    Diagnosis: Right inguinal hernia [K40.90]  Diagnosis Additional Information: No value filed.    Anesthesia Type:   MAC     Note:    Oropharynx: oropharynx clear of all foreign objects and spontaneously breathing  Level of Consciousness: drowsy  Oxygen Supplementation: face mask  Level of Supplemental Oxygen (L/min / FiO2): 6  Independent Airway: airway patency satisfactory and stable  Dentition: dentition unchanged  Vital Signs Stable: post-procedure vital signs reviewed and stable  Report to RN Given: handoff report given  Patient transferred to: Phase II  Comments: Patient transferred to phase 2. Report to RN. Vital signs stable. Resting with eyes closed but awakens to voice.   Handoff Report: Identifed the Patient, Identified the Reponsible Provider, Reviewed the pertinent medical history, Discussed the surgical course, Reviewed Intra-OP anesthesia mangement and issues during anesthesia, Set expectations for post-procedure period and Allowed opportunity for questions and acknowledgement of understanding      Vitals: (Last set prior to Anesthesia Care Transfer)  CRNA VITALS  2/4/2021 0748 - 2/4/2021 0822      2/4/2021             Resp Rate (observed):  15        Electronically Signed By: LILLI Landon CRNA  February 4, 2021  8:22 AM

## 2021-02-04 NOTE — PROGRESS NOTES
ANTICOAGULATION  MANAGEMENT: Discharge Review    Jovon Mantilla chart reviewed for anticoagulation continuity of care    Outpatient surgery/procedure on 2/4/21 for right hernial repair.    Discharge disposition: Home    Results:    Recent labs: (last 7 days)     02/01/21  1016 02/04/21  0615   INR 3.01* 1.64*     Anticoagulation inpatient management:     not applicable     Anticoagulation discharge instructions:     Warfarin dosing: home regimen continued   Bridging: bridging with enoxaparin (Lovenox)   INR goal change: No      Medication changes affecting anticoagulation: No    Additional factors affecting anticoagulation: No    Plan     No adjustment to anticoagulation plan needed    Patient not contacted    No adjustment to Anticoagulation Calendar was required    Neel Mcdermott RN

## 2021-02-05 ENCOUNTER — MYC MEDICAL ADVICE (OUTPATIENT)
Dept: FAMILY MEDICINE | Facility: CLINIC | Age: 66
End: 2021-02-05

## 2021-02-05 DIAGNOSIS — M25.561 ACUTE PAIN OF RIGHT KNEE: Primary | ICD-10-CM

## 2021-02-05 DIAGNOSIS — K40.90 RIGHT INGUINAL HERNIA: ICD-10-CM

## 2021-02-05 NOTE — TELEPHONE ENCOUNTER
Will route to triage    Sheila Robertson/[facility]  [place]---[facility]

## 2021-02-05 NOTE — TELEPHONE ENCOUNTER
Confirming with pt on cane order, see [employer] message, will monitor response  Tennille Mares RN, BSN  Message handled by CLINIC NURSE.

## 2021-02-08 ENCOUNTER — TELEPHONE (OUTPATIENT)
Dept: SURGERY | Facility: CLINIC | Age: 66
End: 2021-02-08

## 2021-02-08 ENCOUNTER — PATIENT OUTREACH (OUTPATIENT)
Dept: SURGERY | Facility: CLINIC | Age: 66
End: 2021-02-08

## 2021-02-08 ENCOUNTER — OFFICE VISIT (OUTPATIENT)
Dept: SURGERY | Facility: CLINIC | Age: 66
End: 2021-02-08
Payer: COMMERCIAL

## 2021-02-08 VITALS
OXYGEN SATURATION: 95 % | HEART RATE: 54 BPM | SYSTOLIC BLOOD PRESSURE: 123 MMHG | RESPIRATION RATE: 18 BRPM | WEIGHT: 204 LBS | BODY MASS INDEX: 30.21 KG/M2 | TEMPERATURE: 98.3 F | DIASTOLIC BLOOD PRESSURE: 55 MMHG | HEIGHT: 69 IN

## 2021-02-08 DIAGNOSIS — K40.90 RIGHT INGUINAL HERNIA: Primary | ICD-10-CM

## 2021-02-08 PROCEDURE — 99024 POSTOP FOLLOW-UP VISIT: CPT | Performed by: SURGERY

## 2021-02-08 RX ORDER — HYDROCODONE BITARTRATE AND ACETAMINOPHEN 5; 325 MG/1; MG/1
1 TABLET ORAL EVERY 6 HOURS PRN
Qty: 15 TABLET | Refills: 0 | Status: SHIPPED | OUTPATIENT
Start: 2021-02-08 | End: 2021-08-05

## 2021-02-08 ASSESSMENT — PAIN SCALES - GENERAL: PAINLEVEL: EXTREME PAIN (8)

## 2021-02-08 ASSESSMENT — MIFFLIN-ST. JEOR: SCORE: 1710.72

## 2021-02-08 NOTE — PROGRESS NOTES
Mr. Mantilla returns postop day 4 from a right inguinal hernia repair.  Yesterday developed increased swelling of the right groin.  It was painful and continues to be so.  He comes for evaluation.      Assessment postop hematoma.  At this appears to be tamponaded.  I explained that a number of weeks it may increase in size again as the hematoma evolves and pulls fluid into the area.  At that point it may be able to be aspirated.  At this point it will be coagulum and will be able to be removed.  I reassured him.  I reordered his Norco and gave him 15.  He will return and see Dr. Alex in 2 weeks.    Past Medical History:   Diagnosis Date     Acne rosacea 04/22/2019     Acquired hypothyroidism 05/23/2003     Problem list name updated by automated process. Provider to review     Acute prostatitis      Acute sinusitis, unspecified      Aortic valve disease      Biatrial enlargement      CAD (coronary artery disease)     known 70% LAD stenosis     Depression      GI bleeding 01/22/2016     History of bleeding peptic ulcer      Hyperlipidemia      Idiopathic cardiomyopathy (H)      Mitral regurgitation      Obesity      Palpitations      Permanent atrial fibrillation (H) 06/02/2017     Right inguinal hernia 07/22/2019     S/P AVR 01/07/2011    with Maze procedure     S/P gastric bypass      Tricuspid regurgitation      Unspecified essential hypertension      Past Surgical History:   Procedure Laterality Date     ANGIOGRAM  02/01/2002    Normal coronary,dilated LV,Sev.decr.global LVF,+1 MR     ANGIOGRAM  01/01/2008    Mild AS,Mod PHTN,mod prox.LAD disease,Triv.CFX disease     ANGIOGRAM  01/01/2009    Mod AS,Mod PHTN,mod prox.LAD disease     ANGIOGRAM  10/01/2010    Sev.AS,CM,global hypokinesis,Mild-mod LV dilated,Mild MR,70% prox.LAD lesion,PHTN     CARDIOVERSION  2/02, 4/02, 4/11     CHOLECYSTECTOMY  2006     COLONOSCOPY N/A 01/23/2016    Procedure: COLONOSCOPY;  Surgeon: Robyn Collins MD;  Location:  GI      "ESOPHAGOSCOPY, GASTROSCOPY, DUODENOSCOPY (EGD), COMBINED N/A 01/23/2016    Procedure: COMBINED ESOPHAGOSCOPY, GASTROSCOPY, DUODENOSCOPY (EGD);  Surgeon: Robyn Collins MD;  Location:  GI     HERNIORRHAPHY INGUINAL Right 2/4/2021    Procedure: OPEN RIGHT INGUINAL HENRIA WITH MESH;  Surgeon: Jaden Alex MD;  Location: [facility] OR     LAPAROSCOPIC BYPASS GASTRIC  10/08/2011    Procedure:LAPAROSCOPIC BYPASS GASTRIC; Diagnostic laparoscopy, Lysis of Adhesions, Laparoscopic Revision of Jejunojejunostomy; Surgeon:RADHA MURO; Location: OR     LAPAROSCOPIC BYPASS GASTRIC  06/26/2006    Dr Jin     LAPAROSCOPY DIAGNOSTIC (GENERAL)  10/08/2011    Procedure:LAPAROSCOPY DIAGNOSTIC (GENERAL); Surgeon:RADHA MURO; Location: OR     REPLACE VALVE AORTIC  01/07/2011     ZZC NONSPECIFIC PROCEDURE  ~1985    vein stripping     No Known Allergies    Exam  /55 (BP Location: Left arm, Patient Position: Sitting, Cuff Size: Adult Regular)   Pulse 54   Temp 98.3  F (36.8  C)   Resp 18   Ht 1.753 m (5' 9\")   Wt 92.5 kg (204 lb)   SpO2 95%   BMI 30.13 kg/m    Swollen right groin with fullness under the incision.  Ecchymosis in dependent areas.  "

## 2021-02-08 NOTE — PROGRESS NOTES
RN Post-Op/Post-Discharge Care Coordination Note    Mr. Jovon Mantilla is a 65 year old male who underwent open RIH on 2/4 with  Dr. Jaden Alex.  Spoke with Patient.    Support  Family member assisting with care - spouse     Health Status  Fevers/chills: Patient denies any fever or chills.  Nausea/Vomiting: Patient denies nausea/vomiting.  Eating/drinking: Patient is able to eat and drink without any complaints. Endorses decreased appetite.  Bowel habits: Patient reports having a normal bowel movement yesterday and is taking Senna.  Drains (ALEJANDRO): N/A  Incisions: Patient denies any signs and symptoms of infection..  Wound closure:  Steri-strips. Reports excessive swelling of the incision, no redness, warmth, or bruising.  Did not wear athletic supporter as instructed.   Pain: Excessive incisional pain that started yesterday.  Patient states that Tylenol is ineffective and has taken all of the Norco.  He is not up moving around.  New Medications:  Norco, Senna. Patient is taking Lovenox injections as ordered and started Coumadin. INR scheduled 2/10.    Activity/Restrictions  No lifting in excess of 15-20 pounds for 3-4 weeks    Equipment  Athletic Supporter    Pathology reviewed with patient:  N/A    Forms/Letters  No    Patient was expecting swelling but not as much as he can see now.  Due to excessive pain, swelling, and use of anticoagulants, patient will be seen in the clinic today at 1215 with Dr. Alex's associate, Dr. Millan.  Patient agreeable.      Whom and When to Call  Patient acknowledges understanding of how to manage any medication changes and   when to seek medical care.     Patient advised that if after hour medical concerns arise to please call [phone] and choose option 4 to speak to the physician on call.

## 2021-02-08 NOTE — PATIENT INSTRUCTIONS
You met with Dr. Jaden Alex.      Today's visit instructions:    Appointment arranged to see Dr. Alex in 10-14 days.  See below.    A prescription has been refilled for Norco.    You may apply heat to the area (protecting skin from injury) 3-4 times a day for 20 minutes and as needed.     If you have questions please contact Noe GODINEZ or Kalina RN during regular clinic hours, Monday through Friday 7:30 AM - 4:00 PM, or you can contact us via [employer] at anytime.       If you have urgent needs after-hours, weekends, or holidays please call the hospital at [phone] and ask to speak with our on-call General Surgery Team.    Appointment [phone], option #1   Nurse Advice (Kalina): [phone]   Surgery Scheduler (Khadijah): [phone]  Fax: [phone]              
controlled  prior hx of orthostatic hypotension with falls.

## 2021-02-08 NOTE — NURSING NOTE
"Chief Complaint   Patient presents with     Post-Op - General Surgery     Pt here for post op       Vitals:    02/08/21 1205   BP: 123/55   BP Location: Left arm   Patient Position: Sitting   Cuff Size: Adult Regular   Pulse: 54   Resp: 18   Temp: 98.3  F (36.8  C)   Weight: 92.5 kg (204 lb)   Height: 1.753 m (5' 9\")       Body mass index is 30.13 kg/m .      SUYAPA Van NREMT                      "

## 2021-02-08 NOTE — LETTER
2/8/2021       RE: Jovon Mantilla  [address]  [facility] [address]     Dear Colleague,    Thank you for referring your patient, Jovon Mantilla, to the [facility] GENERAL SUR[facility]ERY [facility] [place] at [facility]. Please see a copy of my visit note below.    Mr. Mantilla returns postop day 4 from a right inguinal hernia repair.  Yesterday developed increased swelling of the right groin.  It was painful and continues to be so.  He comes for evaluation.      Assessment postop hematoma.  At this appears to be tamponaded.  I explained that a number of weeks it may increase in size again as the hematoma evolves and pulls fluid into the area.  At that point it may be able to be aspirated.  At this point it will be coagulum and will be able to be removed.  I reassured him.  I reordered his Norco and gave him 15.  He will return and see Dr. Alex in 2 weeks.    Past Medical History:   Diagnosis Date     Acne rosacea 04/22/2019     Acquired hypothyroidism 05/23/2003     Problem list name updated by automated process. Provider to review     Acute prostatitis      Acute sinusitis, unspecified      Aortic valve disease      Biatrial enlargement      CAD (coronary artery disease)     known 70% LAD stenosis     Depression      GI bleeding 01/22/2016     History of bleeding peptic ulcer      Hyperlipidemia      Idiopathic cardiomyopathy (H)      Mitral regurgitation      Obesity      Palpitations      Permanent atrial fibrillation (H) 06/02/2017     Right inguinal hernia 07/22/2019     S/P AVR 01/07/2011    with Maze procedure     S/P gastric bypass      Tricuspid regurgitation      Unspecified essential hypertension      Past Surgical History:   Procedure Laterality Date     ANGIOGRAM  02/01/2002    Normal coronary,dilated LV,Sev.decr.global LVF,+1 MR     ANGIOGRAM  01/01/2008    Mild AS,Mod PHTN,mod prox.LAD disease,Triv.CFX disease     ANGIOGRAM  01/01/2009    Mod  "AS,Mod PHTN,mod prox.LAD disease     ANGIOGRAM  10/01/2010    Sev.AS,CM,global hypokinesis,Mild-mod LV dilated,Mild MR,70% prox.LAD lesion,PHTN     CARDIOVERSION  2/02, 4/02, 4/11     CHOLECYSTECTOMY  2006     COLONOSCOPY N/A 01/23/2016    Procedure: COLONOSCOPY;  Surgeon: Robyn Collins MD;  Location:  GI     ESOPHAGOSCOPY, GASTROSCOPY, DUODENOSCOPY (EGD), COMBINED N/A 01/23/2016    Procedure: COMBINED ESOPHAGOSCOPY, GASTROSCOPY, DUODENOSCOPY (EGD);  Surgeon: Robyn Collins MD;  Location:  GI     HERNIORRHAPHY INGUINAL Right 2/4/2021    Procedure: OPEN RIGHT INGUINAL HENRIA WITH MESH;  Surgeon: Jaden Alex MD;  Location: [facility] OR     LAPAROSCOPIC BYPASS GASTRIC  10/08/2011    Procedure:LAPAROSCOPIC BYPASS GASTRIC; Diagnostic laparoscopy, Lysis of Adhesions, Laparoscopic Revision of Jejunojejunostomy; Surgeon:RADHA MURO; Location: OR     LAPAROSCOPIC BYPASS GASTRIC  06/26/2006    Dr Jin     LAPAROSCOPY DIAGNOSTIC (GENERAL)  10/08/2011    Procedure:LAPAROSCOPY DIAGNOSTIC (GENERAL); Surgeon:RADHA MURO; Location: OR     REPLACE VALVE AORTIC  01/07/2011     ZZC NONSPECIFIC PROCEDURE  ~1985    vein stripping     No Known Allergies    Exam  /55 (BP Location: Left arm, Patient Position: Sitting, Cuff Size: Adult Regular)   Pulse 54   Temp 98.3  F (36.8  C)   Resp 18   Ht 1.753 m (5' 9\")   Wt 92.5 kg (204 lb)   SpO2 95%   BMI 30.13 kg/m    Swollen right groin with fullness under the incision.  Ecchymosis in dependent areas.     Sincerely,    Rehan Millan MD      "

## 2021-02-09 ENCOUNTER — MYC MEDICAL ADVICE (OUTPATIENT)
Dept: FAMILY MEDICINE | Facility: CLINIC | Age: 66
End: 2021-02-09

## 2021-02-09 NOTE — TELEPHONE ENCOUNTER
Routed FYI to ZION HOLLOWAY MD, see pt Mychart message GAIL, wants you to review hernia surgery notes  Tennille Mares RN, BSN  Message handled by CLINIC NURSE.

## 2021-02-09 NOTE — TELEPHONE ENCOUNTER
RX mailed to patient    Sheila Robertson/[facility]---[facility]

## 2021-02-10 ENCOUNTER — ALLIED HEALTH/NURSE VISIT (OUTPATIENT)
Dept: NURSING | Facility: CLINIC | Age: 66
End: 2021-02-10
Payer: COMMERCIAL

## 2021-02-10 ENCOUNTER — APPOINTMENT (OUTPATIENT)
Dept: GENERAL RADIOLOGY | Facility: CLINIC | Age: 66
End: 2021-02-10
Attending: EMERGENCY MEDICINE
Payer: COMMERCIAL

## 2021-02-10 ENCOUNTER — ANTICOAGULATION THERAPY VISIT (OUTPATIENT)
Dept: FAMILY MEDICINE | Facility: CLINIC | Age: 66
End: 2021-02-10

## 2021-02-10 ENCOUNTER — APPOINTMENT (OUTPATIENT)
Dept: CT IMAGING | Facility: CLINIC | Age: 66
End: 2021-02-10
Attending: EMERGENCY MEDICINE
Payer: COMMERCIAL

## 2021-02-10 ENCOUNTER — ANESTHESIA EVENT (OUTPATIENT)
Dept: SURGERY | Facility: CLINIC | Age: 66
End: 2021-02-10
Payer: COMMERCIAL

## 2021-02-10 ENCOUNTER — TELEPHONE (OUTPATIENT)
Dept: SURGERY | Facility: CLINIC | Age: 66
End: 2021-02-10

## 2021-02-10 ENCOUNTER — HOSPITAL ENCOUNTER (INPATIENT)
Facility: CLINIC | Age: 66
LOS: 3 days | Discharge: HOME | End: 2021-02-13
Attending: EMERGENCY MEDICINE | Admitting: SURGERY
Payer: COMMERCIAL

## 2021-02-10 VITALS
DIASTOLIC BLOOD PRESSURE: 48 MMHG | OXYGEN SATURATION: 96 % | SYSTOLIC BLOOD PRESSURE: 76 MMHG | HEART RATE: 54 BPM | TEMPERATURE: 97.5 F

## 2021-02-10 DIAGNOSIS — I48.21 PERMANENT ATRIAL FIBRILLATION (H): ICD-10-CM

## 2021-02-10 DIAGNOSIS — Z95.2 S/P AVR (AORTIC VALVE REPLACEMENT): ICD-10-CM

## 2021-02-10 DIAGNOSIS — S30.1XXA ABDOMINAL WALL HEMATOMA, INITIAL ENCOUNTER: ICD-10-CM

## 2021-02-10 DIAGNOSIS — R53.83 LETHARGY: Primary | ICD-10-CM

## 2021-02-10 DIAGNOSIS — I95.9 HYPOTENSION, UNSPECIFIED HYPOTENSION TYPE: ICD-10-CM

## 2021-02-10 DIAGNOSIS — Z11.52 ENCOUNTER FOR SCREENING LABORATORY TESTING FOR SEVERE ACUTE RESPIRATORY SYNDROME CORONAVIRUS 2 (SARS-COV-2): ICD-10-CM

## 2021-02-10 DIAGNOSIS — Z79.01 LONG TERM (CURRENT) USE OF ANTICOAGULANTS: ICD-10-CM

## 2021-02-10 DIAGNOSIS — Z79.01 LONG TERM CURRENT USE OF ANTICOAGULANT THERAPY: ICD-10-CM

## 2021-02-10 DIAGNOSIS — Z79.01 LONG TERM CURRENT USE OF ANTICOAGULANTS WITH INR GOAL OF 2.5-3.5: ICD-10-CM

## 2021-02-10 DIAGNOSIS — Z95.2 S/P AORTIC VALVE REPLACEMENT: ICD-10-CM

## 2021-02-10 DIAGNOSIS — G89.18 ACUTE POST-OPERATIVE PAIN: Primary | ICD-10-CM

## 2021-02-10 DIAGNOSIS — K91.871: ICD-10-CM

## 2021-02-10 LAB
ALBUMIN SERPL-MCNC: 3.1 G/DL (ref 3.4–5)
ALP SERPL-CCNC: 86 U/L (ref 40–150)
ALT SERPL W P-5'-P-CCNC: 23 U/L (ref 0–70)
ANION GAP SERPL CALCULATED.3IONS-SCNC: 3 MMOL/L (ref 3–14)
AST SERPL W P-5'-P-CCNC: 28 U/L (ref 0–45)
BASOPHILS # BLD AUTO: 0 10E9/L (ref 0–0.2)
BASOPHILS NFR BLD AUTO: 0.3 %
BILIRUB SERPL-MCNC: 1.6 MG/DL (ref 0.2–1.3)
BUN SERPL-MCNC: 27 MG/DL (ref 7–30)
CALCIUM SERPL-MCNC: 9.2 MG/DL (ref 8.5–10.1)
CAPILLARY BLOOD COLLECTION: NORMAL
CHLORIDE SERPL-SCNC: 105 MMOL/L (ref 94–109)
CO2 SERPL-SCNC: 31 MMOL/L (ref 20–32)
CREAT SERPL-MCNC: 0.95 MG/DL (ref 0.66–1.25)
DIFFERENTIAL METHOD BLD: ABNORMAL
EOSINOPHIL # BLD AUTO: 0.1 10E9/L (ref 0–0.7)
EOSINOPHIL NFR BLD AUTO: 1 %
ERYTHROCYTE [DISTWIDTH] IN BLOOD BY AUTOMATED COUNT: 15.1 % (ref 10–15)
GFR SERPL CREATININE-BSD FRML MDRD: 85 ML/MIN/{1.73_M2}
GLUCOSE SERPL-MCNC: 138 MG/DL (ref 70–99)
HCT VFR BLD AUTO: 33.7 % (ref 40–53)
HGB BLD-MCNC: 10.4 G/DL (ref 13.3–17.7)
IMM GRANULOCYTES # BLD: 0.1 10E9/L (ref 0–0.4)
IMM GRANULOCYTES NFR BLD: 0.5 %
INR PPP: 3.12 (ref 0.86–1.14)
INR PPP: 3.2 (ref 0.86–1.14)
LABORATORY COMMENT REPORT: NORMAL
LACTATE BLD-SCNC: 1.5 MMOL/L (ref 0.7–2)
LYMPHOCYTES # BLD AUTO: 0.7 10E9/L (ref 0.8–5.3)
LYMPHOCYTES NFR BLD AUTO: 6.4 %
MCH RBC QN AUTO: 30.1 PG (ref 26.5–33)
MCHC RBC AUTO-ENTMCNC: 30.9 G/DL (ref 31.5–36.5)
MCV RBC AUTO: 98 FL (ref 78–100)
MONOCYTES # BLD AUTO: 1.3 10E9/L (ref 0–1.3)
MONOCYTES NFR BLD AUTO: 12.5 %
NEUTROPHILS # BLD AUTO: 8.3 10E9/L (ref 1.6–8.3)
NEUTROPHILS NFR BLD AUTO: 79.3 %
NRBC # BLD AUTO: 0 10*3/UL
NRBC BLD AUTO-RTO: 0 /100
PLATELET # BLD AUTO: 191 10E9/L (ref 150–450)
POTASSIUM SERPL-SCNC: 4.5 MMOL/L (ref 3.4–5.3)
PROT SERPL-MCNC: 6.5 G/DL (ref 6.8–8.8)
RBC # BLD AUTO: 3.45 10E12/L (ref 4.4–5.9)
SARS-COV-2 RNA RESP QL NAA+PROBE: NEGATIVE
SODIUM SERPL-SCNC: 139 MMOL/L (ref 133–144)
SPECIMEN SOURCE: NORMAL
TROPONIN I SERPL-MCNC: <0.015 UG/L (ref 0–0.04)
WBC # BLD AUTO: 10.5 10E9/L (ref 4–11)

## 2021-02-10 PROCEDURE — 74174 CTA ABD&PLVS W/CONTRAST: CPT

## 2021-02-10 PROCEDURE — 96366 THER/PROPH/DIAG IV INF ADDON: CPT | Performed by: EMERGENCY MEDICINE

## 2021-02-10 PROCEDURE — U0003 INFECTIOUS AGENT DETECTION BY NUCLEIC ACID (DNA OR RNA); SEVERE ACUTE RESPIRATORY SYNDROME CORONAVIRUS 2 (SARS-COV-2) (CORONAVIRUS DISEASE [COVID-19]), AMPLIFIED PROBE TECHNIQUE, MAKING USE OF HIGH THROUGHPUT TECHNOLOGIES AS DESCRIBED BY CMS-2020-01-R: HCPCS | Performed by: EMERGENCY MEDICINE

## 2021-02-10 PROCEDURE — 30283B1 TRANSFUSION OF NONAUTOLOGOUS 4-FACTOR PROTHROMBIN COMPLEX CONCENTRATE INTO VEIN, PERCUTANEOUS APPROACH: ICD-10-PCS | Performed by: EMERGENCY MEDICINE

## 2021-02-10 PROCEDURE — 96365 THER/PROPH/DIAG IV INF INIT: CPT | Mod: 59 | Performed by: EMERGENCY MEDICINE

## 2021-02-10 PROCEDURE — 99207 PR NO CHARGE NURSE ONLY: CPT

## 2021-02-10 PROCEDURE — 96361 HYDRATE IV INFUSION ADD-ON: CPT | Performed by: EMERGENCY MEDICINE

## 2021-02-10 PROCEDURE — 84484 ASSAY OF TROPONIN QUANT: CPT | Performed by: EMERGENCY MEDICINE

## 2021-02-10 PROCEDURE — 250N000011 HC RX IP 250 OP 636: Performed by: EMERGENCY MEDICINE

## 2021-02-10 PROCEDURE — 99291 CRITICAL CARE FIRST HOUR: CPT | Mod: 25 | Performed by: EMERGENCY MEDICINE

## 2021-02-10 PROCEDURE — 74177 CT ABD & PELVIS W/CONTRAST: CPT | Mod: 26 | Performed by: RADIOLOGY

## 2021-02-10 PROCEDURE — 250N000015 HC RX FACTOR IP MED 636 OP 636: Performed by: EMERGENCY MEDICINE

## 2021-02-10 PROCEDURE — U0005 INFEC AGEN DETEC AMPLI PROBE: HCPCS | Performed by: EMERGENCY MEDICINE

## 2021-02-10 PROCEDURE — 80053 COMPREHEN METABOLIC PANEL: CPT | Performed by: EMERGENCY MEDICINE

## 2021-02-10 PROCEDURE — 36416 COLLJ CAPILLARY BLOOD SPEC: CPT | Performed by: FAMILY MEDICINE

## 2021-02-10 PROCEDURE — 83605 ASSAY OF LACTIC ACID: CPT | Performed by: EMERGENCY MEDICINE

## 2021-02-10 PROCEDURE — 85610 PROTHROMBIN TIME: CPT | Performed by: EMERGENCY MEDICINE

## 2021-02-10 PROCEDURE — 85025 COMPLETE CBC W/AUTO DIFF WBC: CPT | Performed by: EMERGENCY MEDICINE

## 2021-02-10 PROCEDURE — 96367 TX/PROPH/DG ADDL SEQ IV INF: CPT | Performed by: EMERGENCY MEDICINE

## 2021-02-10 PROCEDURE — 93010 ELECTROCARDIOGRAM REPORT: CPT | Performed by: EMERGENCY MEDICINE

## 2021-02-10 PROCEDURE — 99233 SBSQ HOSP IP/OBS HIGH 50: CPT | Mod: 57 | Performed by: SURGERY

## 2021-02-10 PROCEDURE — 250N000011 HC RX IP 250 OP 636: Performed by: STUDENT IN AN ORGANIZED HEALTH CARE EDUCATION/TRAINING PROGRAM

## 2021-02-10 PROCEDURE — C9132 KCENTRA, PER I.U.: HCPCS | Performed by: EMERGENCY MEDICINE

## 2021-02-10 PROCEDURE — 250N000011 HC RX IP 250 OP 636: Performed by: INTERNAL MEDICINE

## 2021-02-10 PROCEDURE — 258N000003 HC RX IP 258 OP 636: Performed by: EMERGENCY MEDICINE

## 2021-02-10 PROCEDURE — 85610 PROTHROMBIN TIME: CPT | Performed by: FAMILY MEDICINE

## 2021-02-10 PROCEDURE — 99285 EMERGENCY DEPT VISIT HI MDM: CPT | Mod: 25 | Performed by: EMERGENCY MEDICINE

## 2021-02-10 PROCEDURE — 96374 THER/PROPH/DIAG INJ IV PUSH: CPT | Performed by: EMERGENCY MEDICINE

## 2021-02-10 PROCEDURE — C9803 HOPD COVID-19 SPEC COLLECT: HCPCS | Performed by: EMERGENCY MEDICINE

## 2021-02-10 PROCEDURE — 74174 CTA ABD&PLVS W/CONTRAST: CPT | Mod: 26 | Performed by: RADIOLOGY

## 2021-02-10 PROCEDURE — 71045 X-RAY EXAM CHEST 1 VIEW: CPT

## 2021-02-10 PROCEDURE — 71045 X-RAY EXAM CHEST 1 VIEW: CPT | Mod: 26 | Performed by: RADIOLOGY

## 2021-02-10 PROCEDURE — 74177 CT ABD & PELVIS W/CONTRAST: CPT

## 2021-02-10 PROCEDURE — 120N000002 HC R&B MED SURG/OB UMMC

## 2021-02-10 RX ORDER — POLYETHYLENE GLYCOL 3350 17 G/17G
17 POWDER, FOR SOLUTION ORAL DAILY PRN
Status: DISCONTINUED | OUTPATIENT
Start: 2021-02-10 | End: 2021-02-12

## 2021-02-10 RX ORDER — HYDROMORPHONE HCL IN WATER/PF 6 MG/30 ML
0.2 PATIENT CONTROLLED ANALGESIA SYRINGE INTRAVENOUS
Status: DISCONTINUED | OUTPATIENT
Start: 2021-02-10 | End: 2021-02-13 | Stop reason: HOSPADM

## 2021-02-10 RX ORDER — ONDANSETRON 4 MG/1
4 TABLET, ORALLY DISINTEGRATING ORAL EVERY 6 HOURS PRN
Status: DISCONTINUED | OUTPATIENT
Start: 2021-02-10 | End: 2021-02-13 | Stop reason: HOSPADM

## 2021-02-10 RX ORDER — OXYCODONE HYDROCHLORIDE 5 MG/1
5 TABLET ORAL EVERY 6 HOURS PRN
Qty: 20 TABLET | Refills: 0 | Status: ON HOLD | OUTPATIENT
Start: 2021-02-10 | End: 2021-02-13

## 2021-02-10 RX ORDER — SODIUM CHLORIDE, SODIUM LACTATE, POTASSIUM CHLORIDE, CALCIUM CHLORIDE 600; 310; 30; 20 MG/100ML; MG/100ML; MG/100ML; MG/100ML
INJECTION, SOLUTION INTRAVENOUS CONTINUOUS
Status: DISCONTINUED | OUTPATIENT
Start: 2021-02-10 | End: 2021-02-13 | Stop reason: HOSPADM

## 2021-02-10 RX ORDER — HYDROMORPHONE HYDROCHLORIDE 1 MG/ML
0.5 INJECTION, SOLUTION INTRAMUSCULAR; INTRAVENOUS; SUBCUTANEOUS ONCE
Status: COMPLETED | OUTPATIENT
Start: 2021-02-10 | End: 2021-02-10

## 2021-02-10 RX ORDER — ONDANSETRON 2 MG/ML
4 INJECTION INTRAMUSCULAR; INTRAVENOUS EVERY 6 HOURS PRN
Status: DISCONTINUED | OUTPATIENT
Start: 2021-02-10 | End: 2021-02-13 | Stop reason: HOSPADM

## 2021-02-10 RX ORDER — IOPAMIDOL 755 MG/ML
123 INJECTION, SOLUTION INTRAVASCULAR ONCE
Status: COMPLETED | OUTPATIENT
Start: 2021-02-10 | End: 2021-02-10

## 2021-02-10 RX ORDER — AMOXICILLIN 250 MG
2 CAPSULE ORAL 2 TIMES DAILY
Status: DISCONTINUED | OUTPATIENT
Start: 2021-02-10 | End: 2021-02-13 | Stop reason: HOSPADM

## 2021-02-10 RX ORDER — AMOXICILLIN 250 MG
1 CAPSULE ORAL 2 TIMES DAILY
Status: DISCONTINUED | OUTPATIENT
Start: 2021-02-10 | End: 2021-02-13 | Stop reason: HOSPADM

## 2021-02-10 RX ORDER — ACETAMINOPHEN 325 MG/1
650 TABLET ORAL EVERY 4 HOURS PRN
Status: DISCONTINUED | OUTPATIENT
Start: 2021-02-10 | End: 2021-02-13 | Stop reason: HOSPADM

## 2021-02-10 RX ORDER — OXYCODONE HYDROCHLORIDE 5 MG/1
5-10 TABLET ORAL
Status: DISCONTINUED | OUTPATIENT
Start: 2021-02-10 | End: 2021-02-13 | Stop reason: HOSPADM

## 2021-02-10 RX ADMIN — PHYTONADIONE 5 MG: 10 INJECTION, EMULSION INTRAMUSCULAR; INTRAVENOUS; SUBCUTANEOUS at 23:10

## 2021-02-10 RX ADMIN — HYDROMORPHONE HYDROCHLORIDE 0.5 MG: 1 INJECTION, SOLUTION INTRAMUSCULAR; INTRAVENOUS; SUBCUTANEOUS at 18:53

## 2021-02-10 RX ADMIN — IOPAMIDOL 123 ML: 755 INJECTION, SOLUTION INTRAVENOUS at 19:30

## 2021-02-10 RX ADMIN — HYDROMORPHONE HYDROCHLORIDE 0.5 MG: 1 INJECTION, SOLUTION INTRAMUSCULAR; INTRAVENOUS; SUBCUTANEOUS at 20:03

## 2021-02-10 RX ADMIN — HYDROMORPHONE HYDROCHLORIDE 0.2 MG: 0.2 INJECTION, SOLUTION INTRAMUSCULAR; INTRAVENOUS; SUBCUTANEOUS at 23:10

## 2021-02-10 RX ADMIN — SODIUM CHLORIDE, POTASSIUM CHLORIDE, SODIUM LACTATE AND CALCIUM CHLORIDE 1000 ML: 600; 310; 30; 20 INJECTION, SOLUTION INTRAVENOUS at 17:23

## 2021-02-10 RX ADMIN — IOPAMIDOL 123 ML: 755 INJECTION, SOLUTION INTRAVENOUS at 21:17

## 2021-02-10 RX ADMIN — PROTHROMBIN, COAGULATION FACTOR VII HUMAN, COAGULATION FACTOR IX HUMAN, COAGULATION FACTOR X HUMAN, PROTEIN C, PROTEIN S HUMAN, AND WATER 2200 UNITS: KIT at 21:45

## 2021-02-10 ASSESSMENT — ENCOUNTER SYMPTOMS
DYSRHYTHMIAS: 1
ABDOMINAL DISTENTION: 1
LIGHT-HEADEDNESS: 1
CONSTIPATION: 1
ABDOMINAL PAIN: 1
APPETITE CHANGE: 1

## 2021-02-10 ASSESSMENT — MIFFLIN-ST. JEOR: SCORE: 1694.84

## 2021-02-10 NOTE — TELEPHONE ENCOUNTER
Returned call to patient.  He reports that his groin discomfort remains unchanged and Norco is not relieving his pain. Jovon is applying local heat to his groin hematoma at least 4 times a day (protecting skin from injury).  He is unable to take Ibuprofen due to taking blood thinners and history of gastric bypass.      Message to Dr. Alex regarding possibly changing Rx to Oxycodone and can schedule Tylenol PO every 6 hours.  Await recommendations.    ADDENDUM  02/10/21  3:07 PM  Chart reviewed with Dr. Alex. Rx for Oxycodone sent to [facility].  Patient was asked to take Tylenol PO every 6 hours per package instructions and not to exceed 4000 mg/24 hours.  He may use Oxycodone for breakthrough pain.  They report that the hematoma has shifted slightly higher but does not appear to be larger in size.  INR today.    Patient was asked to call office in AM with an update regarding pain management and hematoma status.  Will consider clinic visit with Dr. Alex tomorrow.

## 2021-02-10 NOTE — ED NOTES
Bed: ED03  Expected date: 2/10/21  Expected time: 5:00 PM  Means of arrival: Ambulance  Comments:  Alex Montenegro    63 y/o male    Hypotension (SBP 85)  A fib

## 2021-02-10 NOTE — ED TRIAGE NOTES
Pt BIBA from clinic. Pt went to clinic to get lab draw. Pt was trying to have BM and became lethargic. BP's 70/50's at clinic, improved with EMS. Pt in afib with 's. Recent hernia surgery last Thursday. A&O

## 2021-02-10 NOTE — PROGRESS NOTES
Spoke with patients wife. Patient is being transferred from clinic to hospital. Advised to follow up with INR clinic as directed when discharged from hospital.     Samra Blanca RN - [facility]

## 2021-02-10 NOTE — TELEPHONE ENCOUNTER
[facility] Call Center    Phone Message    May a detailed message be left on voicemail: yes     Reason for Call: Other: Per pt would like to speak with dr. Isai Gilman again. Per pt says this due to the pain level he spoke with nurse Gilman the other day before he saw dr. Millan on 02/08 also. Please call pt back asap.      Action Taken: Message routed to:  [facility] ([facility] General Surg    Travel Screening: Not Applicable

## 2021-02-10 NOTE — ED PROVIDER NOTES
[facility] ([facility])  February 10, 2021  ED 3 5:16 PM   History     Chief Complaint   Patient presents with     Hypotension     Atrial Fib     The history is provided by the patient and medical records.     Jovon Mantilla is a 65 year old male with history of A. fib on anticoagulation, aortic sclerosis status post AVR 2011, CAD, ischemic cardiomyopathy, CHF, hypertension, hyperlipidemia, gastric bypass, prior small bowel obstruction with history of right inguinal hernia status post repair on 2/4/2021 with mesh who presents for further evaluation and management of generalized weakness, lethargy, swelling near his surgical site, and increased abdominal pain.  Patient states that he had this surgery and tolerated it well however he did he developed increased abdominal pain and swelling at his hernia repair site.  He was discharged with a prescription for Norco but ran out of this.  He discussed symptoms with nurse triage 2 days ago and described this area of swelling as being a 10 x 10 x 3 inches in dimension.  He was seen in clinic by Dr. Millan who felt that this was a postop hematoma with tamponade and that this may increase in size.  He was instructed that the coagulum may be removed in the future, and refilled his prescription for Norco and instructed him to see Dr. Alex in 2 weeks.  Patient has been taking pain medications but developed worsening pain.  His prescription was changed to oxycodone for breakthrough pain.  He presented to clinic today for routine INR check and went to the bathroom to have a bowel movement when he became lethargic and pale.  Staff found his blood pressure is 76/48 with a heart rate of 54, temperature 97.5  F, O2 sats 96% on room air.  He was sent here for further evaluation via EMS.  Paramedics found him with blood pressure in the 80s systolic, gave him 500 mL of normal saline and his blood pressure improved to 120s systolic.  He also has lower  abdominal pain with this.  He states that he has had problems having bowel movements since his hernia surgery 4 days ago.  Last dose of pain medications was oxycodone at 2 PM.  Patient states that his abdominal pain has been 8-10/10 in spite of having pain meds.  Pain is focal at surgical site.  He states that his abdominal pain worsens with getting up or getting in or out of bed.  He states that in the past 2 days he has had 2 episodes of lightheadedness.  His oral intake is fairly minimal, has mostly been taking in milk and water.  He did have 3 bowel movements over the past 3 days.  He states that he has an appointment with interventional radiology in the future.  He is still on Coumadin.    INR   Date Value Ref Range Status   02/10/2021 3.12 (H) 0.86 - 1.14 Final   02/10/2021 3.20 (H) 0.86 - 1.14 Final     Comment:     This test is intended for monitoring Coumadin therapy.  Results are not   accurate in patients with prolonged INR due to factor deficiency.     02/04/2021 1.64 (H) 0.86 - 1.14 Final   02/01/2021 3.01 (H) 0.86 - 1.14 Final        PAST MEDICAL HISTORY:   Past Medical History:   Diagnosis Date     Acne rosacea 04/22/2019     Acquired hypothyroidism 05/23/2003     Problem list name updated by automated process. Provider to review     Acute prostatitis      Acute sinusitis, unspecified      Aortic valve disease      Biatrial enlargement      CAD (coronary artery disease)     known 70% LAD stenosis     Depression      GI bleeding 01/22/2016     History of bleeding peptic ulcer      Hyperlipidemia      Idiopathic cardiomyopathy (H)      Mitral regurgitation      Obesity      Palpitations      Permanent atrial fibrillation (H) 06/02/2017     Right inguinal hernia 07/22/2019     S/P AVR 01/07/2011    with Maze procedure     S/P gastric bypass      Tricuspid regurgitation      Unspecified essential hypertension        PAST SURGICAL HISTORY:   Past Surgical History:   Procedure Laterality Date     ANGIOGRAM   02/01/2002    Normal coronary,dilated LV,Sev.decr.global LVF,+1 MR     ANGIOGRAM  01/01/2008    Mild AS,Mod PHTN,mod prox.LAD disease,Triv.CFX disease     ANGIOGRAM  01/01/2009    Mod AS,Mod PHTN,mod prox.LAD disease     ANGIOGRAM  10/01/2010    Sev.AS,CM,global hypokinesis,Mild-mod LV dilated,Mild MR,70% prox.LAD lesion,PHTN     CARDIOVERSION  2/02, 4/02, 4/11     CHOLECYSTECTOMY  2006     COLONOSCOPY N/A 01/23/2016    Procedure: COLONOSCOPY;  Surgeon: Robyn Collins MD;  Location:  GI     ESOPHAGOSCOPY, GASTROSCOPY, DUODENOSCOPY (EGD), COMBINED N/A 01/23/2016    Procedure: COMBINED ESOPHAGOSCOPY, GASTROSCOPY, DUODENOSCOPY (EGD);  Surgeon: Robyn Collins MD;  Location:  GI     HERNIORRHAPHY INGUINAL Right 2/4/2021    Procedure: OPEN RIGHT INGUINAL HENRIA WITH MESH;  Surgeon: Jaden Alex MD;  Location: [facility] OR     LAPAROSCOPIC BYPASS GASTRIC  10/08/2011    Procedure:LAPAROSCOPIC BYPASS GASTRIC; Diagnostic laparoscopy, Lysis of Adhesions, Laparoscopic Revision of Jejunojejunostomy; Surgeon:RADHA MURO; Location: OR     LAPAROSCOPIC BYPASS GASTRIC  06/26/2006    Dr Jin     LAPAROSCOPY DIAGNOSTIC (GENERAL)  10/08/2011    Procedure:LAPAROSCOPY DIAGNOSTIC (GENERAL); Surgeon:RADHA MURO; Location: OR     REPLACE VALVE AORTIC  01/07/2011     ZZC NONSPECIFIC PROCEDURE  ~1985    vein stripping       Past medical history, past surgical history, medications, and allergies were reviewed with the patient. Additional pertinent items: None    FAMILY HISTORY:   Family History   Problem Relation Age of Onset     Cancer - colorectal Father      Colon Cancer Father      Cancer Father      Diabetes Brother        SOCIAL HISTORY:   Social History     Tobacco Use     Smoking status: Never Smoker     Smokeless tobacco: Never Used   Substance Use Topics     Alcohol use: No     Alcohol/week: 0.0 standard drinks     Social history was reviewed with the patient. Additional pertinent  items: None      Patient's Medications   New Prescriptions    No medications on file   Previous Medications    ASCORBIC ACID (VITAMIN C PO)    Take 500 mg by mouth At Bedtime    ASPIRIN 81 MG TABLET    Take 81 mg by mouth every other day    CALCIUM CARBONATE-VITAMIN D (CALCIUM 600+D) 600-200 MG-UNIT TABS    Take 1 tablet by mouth 2 times daily     CARVEDILOL (COREG) 25 MG TABLET    Take 1 tablet (25 mg) by mouth 2 times daily (with meals)    CHOLECALCIFEROL (VITAMIN D) 2000 UNIT CAPS    Take 1 tablet by mouth daily     CYANOCOBALAMIN (VITAMIN B-12) 500 MCG SUBL    Place 500 mcg under the tongue daily     DIGOXIN (LANOXIN) 125 MCG TABLET    Take 1 tablet (125 mcg) by mouth daily    ENOXAPARIN ANTICOAGULANT (LOVENOX ANTICOAGULANT) 100 MG/ML SYRINGE    Inject 0.9 mLs (90 mg) Subcutaneous 2 times daily Begin injections on the 15th every 12 hours, The 16th every 12 hours, the 17th am dose only! None on day of surgery. Evening night after surgery take pm dose then twice daily until inr is greater than 2.5    FEXOFENADINE HCL (ALLERGY 24-HR PO)    Take 180 mg by mouth daily     HYDROCODONE-ACETAMINOPHEN (NORCO) 5-325 MG TABLET    Take 1-2 tablets by mouth every 4 hours as needed for moderate to severe pain    HYDROCODONE-ACETAMINOPHEN (NORCO) 5-325 MG TABLET    Take 1 tablet by mouth every 6 hours as needed for severe pain    LEVOTHYROXINE (SYNTHROID/LEVOTHROID) 200 MCG TABLET    TAKE 1 TABLET (200 MCG) BY MOUTH DAILY SEE DOCTOR IN MARCH    LISINOPRIL (ZESTRIL) 20 MG TABLET    Take  20 mg (1 tablet in am) and 10 mg  (one half tablet) in  pm    MULTIPLE VITAMINS-MINERALS (CENTRUM) CHEW    Take 1 tablet by mouth daily     NITROGLYCERIN (NITROSTAT) 0.3 MG SUBLINGUAL TABLET    For chest pain place 1 tablet under the tongue every 5 minutes for 3 doses. If symptoms persist 5 minutes after 1st dose call 911.    OXYCODONE (ROXICODONE) 5 MG TABLET    Take 1 tablet (5 mg) by mouth every 6 hours as needed for severe pain    PSYLLIUM  "(FIBER) 0.52 GM CAPS    Take 2 capsules by mouth At Bedtime     SENNA-DOCUSATE (SENOKOT-S/PERICOLACE) 8.6-50 MG TABLET    Take 1-2 tablets by mouth 2 times daily    SERTRALINE (ZOLOFT) 50 MG TABLET    Take 1 tablet (50 mg) by mouth daily    SIMVASTATIN (ZOCOR) 20 MG TABLET    TAKE 1 TABLET BY MOUTH AT BEDTIME    TROLAMINE SALICYLATE (ASPERCREME) 10 % EXTERNAL CREAM    Apply topically as needed for moderate pain    WARFARIN ANTICOAGULANT (COUMADIN) 4 MG TABLET    TAKE 1 TABLET MONDAY, WEDNESDAY, AND FRIDAY. TAKE ONE AND ONE-HALF TABLETS ALL OTHER DAYS OF THE WEEK   Modified Medications    No medications on file   Discontinued Medications    No medications on file        No Known Allergies     Review of Systems   Constitutional: Positive for appetite change.   Gastrointestinal: Positive for abdominal distention, abdominal pain and constipation.   Neurological: Positive for light-headedness.     A complete review of systems was performed with pertinent positives and negatives noted in the HPI, and all other systems negative.    Physical Exam   BP: 96/89  Pulse: 128  Temp: 97.8  F (36.6  C)  Resp: 18  Height: 175.3 cm (5' 9\")  Weight: 90.9 kg (200 lb 8 oz)  SpO2: 96 %      Physical Exam  Vitals signs and nursing note reviewed.   Constitutional:       General: He is not in acute distress.     Appearance: He is well-developed. He is not ill-appearing, toxic-appearing or diaphoretic.      Comments: Comfortably resting, lying in bed, NAD, nondiaphoretic, lucid, fully conversant, no  respiratory distress, alert and oriented.     HENT:      Head: Normocephalic and atraumatic.      Mouth/Throat:      Mouth: Mucous membranes are moist.   Eyes:      General: No scleral icterus.     Pupils: Pupils are equal, round, and reactive to light.   Neck:      Musculoskeletal: Normal range of motion and neck supple.   Cardiovascular:      Rate and Rhythm: Normal rate.      Heart sounds: Normal heart sounds.   Pulmonary:      Effort: " Pulmonary effort is normal. No respiratory distress.      Breath sounds: Normal breath sounds.   Abdominal:      Palpations: Abdomen is soft.      Tenderness: There is abdominal tenderness.      Comments: Large swelling in the right inguinal area.  It appears to be a large hematoma with some ecchymosis over the skin.  Good pulses distally.   Musculoskeletal:         General: No tenderness.   Skin:     General: Skin is warm and dry.      Findings: No rash.   Neurological:      Mental Status: He is alert and oriented to person, place, and time.         ED Course        Procedures             EKG Interpretation:      Interpreted by Rehan Waggoner MD  Time reviewed:   Symptoms at time of EKG: Lightheaded  Rhythm: atrial fibrillation - controlled  Rate: Normal  Axis: Normal  Ectopy: none  Conduction: normal  ST Segments/ T Waves: No ST-T wave changes  Q Waves: none  Comparison to prior: Unchanged    Clinical Impression: atrial fibrillation (chronic)                             Results for orders placed or performed during the hospital encounter of 02/10/21 (from the past 24 hour(s))   CBC with platelets differential   Result Value Ref Range    WBC 10.5 4.0 - 11.0 10e9/L    RBC Count 3.45 (L) 4.4 - 5.9 10e12/L    Hemoglobin 10.4 (L) 13.3 - 17.7 g/dL    Hematocrit 33.7 (L) 40.0 - 53.0 %    MCV 98 78 - 100 fl    MCH 30.1 26.5 - 33.0 pg    MCHC 30.9 (L) 31.5 - 36.5 g/dL    RDW 15.1 (H) 10.0 - 15.0 %    Platelet Count 191 150 - 450 10e9/L    Diff Method Automated Method     % Neutrophils 79.3 %    % Lymphocytes 6.4 %    % Monocytes 12.5 %    % Eosinophils 1.0 %    % Basophils 0.3 %    % Immature Granulocytes 0.5 %    Nucleated RBCs 0 0 /100    Absolute Neutrophil 8.3 1.6 - 8.3 10e9/L    Absolute Lymphocytes 0.7 (L) 0.8 - 5.3 10e9/L    Absolute Monocytes 1.3 0.0 - 1.3 10e9/L    Absolute Eosinophils 0.1 0.0 - 0.7 10e9/L    Absolute Basophils 0.0 0.0 - 0.2 10e9/L    Abs Immature Granulocytes 0.1 0 - 0.4 10e9/L    Absolute  Nucleated RBC 0.0    INR   Result Value Ref Range    INR 3.12 (H) 0.86 - 1.14   Comprehensive metabolic panel   Result Value Ref Range    Sodium 139 133 - 144 mmol/L    Potassium 4.5 3.4 - 5.3 mmol/L    Chloride 105 94 - 109 mmol/L    Carbon Dioxide 31 20 - 32 mmol/L    Anion Gap 3 3 - 14 mmol/L    Glucose 138 (H) 70 - 99 mg/dL    Urea Nitrogen 27 7 - 30 mg/dL    Creatinine 0.95 0.66 - 1.25 mg/dL    GFR Estimate 85 >60 mL/min/[1.73_m2]    GFR Estimate If Black >90 >60 mL/min/[1.73_m2]    Calcium 9.2 8.5 - 10.1 mg/dL    Bilirubin Total 1.6 (H) 0.2 - 1.3 mg/dL    Albumin 3.1 (L) 3.4 - 5.0 g/dL    Protein Total 6.5 (L) 6.8 - 8.8 g/dL    Alkaline Phosphatase 86 40 - 150 U/L    ALT 23 0 - 70 U/L    AST 28 0 - 45 U/L   Troponin I   Result Value Ref Range    Troponin I ES <0.015 0.000 - 0.045 ug/L   Lactic acid whole blood   Result Value Ref Range    Lactic Acid 1.5 0.7 - 2.0 mmol/L   EKG 12 lead   Result Value Ref Range    Interpretation ECG Click View Image link to view waveform and result    XR Chest Port 1 View    Narrative    EXAM: XR CHEST PORT 1 VW  2/10/2021 5:33 PM     HISTORY:  chest pain       COMPARISON:  Chest x-ray 1/31/2020 CT chest 2/15/2019.    FINDINGS:   Portable semiupright view of the chest. Aortic valve replacement with  intact median sternotomy wires. Trachea is midline. Stable enlarged  cardiac silhouette. The pulmonary vasculature is distinct. Streaky  left basilar airspace opacities. No pleural effusion or pneumothorax.  2.5 x 1.9 cm nodule in the right lung, better seen on CT of the chest  from 2/15/2019.      Impression    IMPRESSION:   1. Cardiomegaly prominent pulmonary vasculature but distinct walls,  suggestive of mild pulmonary venous congestion.  2. Streaky left basilar opacities, likely atelectasis.  3. By today's measurement the nodule in the lateral right lung has  increased. This is likely projectional.     I have personally reviewed the examination and initial interpretation  and I  agree with the findings.    PASCUAL FRIEDMAN MD   CT Abdomen Pelvis w Contrast   Result Value Ref Range    Radiologist flags Postoperative hematoma, multifocal active (AA)     Narrative    EXAMINATION: CT ABDOMEN PELVIS W CONTRAST, 2/10/2021 7:41 PM    TECHNIQUE:  Helical CT images from the lung bases through the  symphysis pubis were obtained with IV contrast.     Contrast dose: iopamidol (ISOVUE-370) solution 123 mL    COMPARISON: 10/7/2011 CT abdomen and pelvis    Additional History from EMR: Right inguinal hernia repair on 2/4/2021,  current INR of 3.1     HISTORY: Abdominal distension    FINDINGS:    Abdomen and pelvis: Deep to the cutaneous surface and overlying the  right lower abdomen/pelvis, there is a well-circumscribed  heterogeneously, enhancing lesion with multiple hematocrit levels and  multifocal active extravasation (series 4, images 258, 273, 340) that  extends into the right inguinal canal. Diffuse soft tissue anasarca  within the anterior abdominal wall adjacent to this lesion.    No focal hepatic lesions. Gallbladder is surgically absent with mild  degree of intrahepatic biliary dilatation with the common bile duct  measuring up to 1.3 cm. Postsurgical change of Lynda-en-Y gastric  bypass. Dilation of the pancreatic duct. The parenchyma appears  homogeneous. The spleen, adrenal glands and renal parenchyma are  normal. Multiple simple appearing, subcentimeter right-sided renal  cysts. Predominately exophytic simple cyst arising off the midpole of  the left kidney. Mildly complex cystic lesion arising off the left  renal inferior pole measuring 7.6 x 6.9 x 5.4 cm with mildly complex  wall thickening and superimposed calcifications along its inferior  most wall. Nonobstructing left calyceal nephrolithiasis. No evidence  of urinary obstruction. The bladder is normal. Small bowel is normal  caliber. Mild gaseous distention of the transverse and proximal  descending colon. No abnormal colonic wall  thickening. The appendix is  normal. No free fluid in the abdomen or dependent pelvis. Prostate  gland is normal.  Numerous venous varicosities overlying the bilateral  superficial femoral veinns. Mass effect from the abdominal wall  hematoma with near complete stenosis of the distal right common iliac  vein as it crosses over the distal iliacus muscle (series 4, image  378). No pelvic or retroperitoneal adenopathy.    Lung bases/lower chest:  Lower lung fields are clear. No airspace  consolidation, pleural effusion or pneumothorax. Cardiomegaly with  prosthetic aortic valve noted. Left atrial enlargement.    Bones and soft tissues: Age dependent, multilevel degenerative changes  within the lower thoracic and lumbar spine including bridging anterior  syndesmophytes, disc height loss, endplate sclerosis and facet  arthropathy. No suspicious osseous lesions.      Impression    IMPRESSION:   1. Collection of findings are most concerning for a right anterior  abdominal wall postoperative hematoma with multifocal areas of active  extravasation and mass effect on the right external iliac vein.   2. Mildly complex cystic lesion arising off the inferior pole of the  left kidney, recommend dedicated renal ultrasound for further  characterization.  3. Intra and extrahepatic biliary dilatation along with dilatation of  the pancreatic duct raises the concern of the sphincter dysfunction or  stone. Mass is not seen. This is increased when compared to  2/15/2019..  4. Cardiomegaly with left atrial enlargement and prosthetic aortic  valve repair.  5. Stable post surgical changes of Lynda-en-Y gastric bypass    [Critical Result: Postoperative hematoma, multifocal active  extravasation]    Finding was identified on 2/10/2021 7:47 PM.     Rehan Waggoner MD was contacted by Dr. Antonio Dolan at 2/10/2021 7:53  PM and verbalized understanding of the critical finding.    CTA Abdomen Pelvis with Contrast    Impression    IMPRESSION:  1. No  significant change in the large right abdominal wall  postoperative hematoma, which demonstrates extravasation on portal  venous phase imaging. No definite feeding vessel identified,  suggesting supply from small subcutaneous venous vessels. No evidence  of arterial extravasation.  2. Mildly complex cystic lesion arising from the inferior pole of the  left kidney. Recommend dedicated renal ultrasound for further  evaluation.  3. Increased size of the right lower lobe pulmonary nodule compared to  CT chest on 2/15/2019. Differential diagnosis includes noncalcified  granuloma and slow-growing neoplasm.  4. Cardiomegaly with left atrial enlargement.   Asymptomatic SARS-CoV-2 COVID-19 Virus (Coronavirus) by PCR    Specimen: Nasopharyngeal   Result Value Ref Range    SARS-CoV-2 Virus Specimen Source Nasopharyngeal     SARS-CoV-2 PCR Result NEGATIVE     SARS-CoV-2 PCR Comment       Testing was performed using the [employer] Xpress SARS-CoV-2 Assay on the Cepheid Gene-Xpert   Instrument Systems. Additional information about this Emergency Use Authorization (EUA)   assay can be found via the Lab Guide.       Medications   lactated ringers infusion (has no administration in time range)   acetaminophen (TYLENOL) tablet 650 mg (has no administration in time range)   oxyCODONE (ROXICODONE) tablet 5-10 mg (has no administration in time range)   HYDROmorphone (DILAUDID) injection 0.2 mg (has no administration in time range)   ondansetron (ZOFRAN-ODT) ODT tab 4 mg (has no administration in time range)     Or   ondansetron (ZOFRAN) injection 4 mg (has no administration in time range)   senna-docusate (SENOKOT-S/PERICOLACE) 8.6-50 MG per tablet 1 tablet (has no administration in time range)     Or   senna-docusate (SENOKOT-S/PERICOLACE) 8.6-50 MG per tablet 2 tablet (has no administration in time range)   polyethylene glycol (MIRALAX) Packet 17 g (has no administration in time range)   phytonadione (AQUA-MEPHYTON) 5 mg in sodium chloride  0.9 % 50 mL intermittent infusion (has no administration in time range)   lactated ringers BOLUS 1,000 mL (0 mLs Intravenous Stopped 2/10/21 1853)   iopamidol (ISOVUE-370) solution 123 mL (123 mLs Intravenous Given 2/10/21 1930)   sodium chloride (PF) 0.9% PF flush 81 mL (91 mLs Intravenous Given 2/10/21 1931)   HYDROmorphone (PF) (DILAUDID) injection 0.5 mg (0.5 mg Intravenous Given 2/10/21 1853)   HYDROmorphone (PF) (DILAUDID) injection 0.5 mg (0.5 mg Intravenous Given 2/10/21 2003)   prothrombin 4 factor complex concentrate (KCENTRA) infusion 2,200 Units (2,200 Units Intravenous Given 2/10/21 2145)   iopamidol (ISOVUE-370) solution 123 mL (123 mLs Intravenous Given 2/10/21 2117)   sodium chloride (PF) 0.9% PF flush 91 mL (91 mLs Intravenous Given 2/10/21 2117)          Critical Care time was 45 minutes for this patient excluding procedures.      Assessments & Plan (with Medical Decision Making)   This is a 65-year-old male coming into the emergency room with a swelling in his right inguinal area after a hernia surgery.  It started off Saturday or Sunday with this large swelling.  He is having increasing discomfort in the area.  He was at the clinic today and felt dizzy, lightheaded and when he was seen at the clinic they found that he was slightly hypotensive and was sent to the emergency room for evaluation.  The patient is in no significant distress.  He is alert and oriented and acting appropriately.  He is mildly hypotensive but appears to be recovering well with sinusoidal fluids.  His EKG shows chronic atrial fibrillation which she is currently on Coumadin.  The rest of his labs are for the most part unremarkable other than a mild elevation in his INR.  He has a slight depression and his hemoglobin.  CT was ordered which is consistent with the anterior abdominal wall hematoma.  The case was discussed with general surgery as well as interventional radiology.  A CTA was completed of the injury which shows  Biopsy Type: H and E more of a venous rather than arterial bleed.  His INR was corrected with Kcentra and vitamin K was added.  This time the patient will be admitted to the surgical service for continued care and management.    I have reviewed the nursing notes.    I have reviewed the findings, diagnosis, plan and need for follow up with the patient.    New Prescriptions    No medications on file       Final diagnoses:   Abdominal wall hematoma, initial encounter     I, Muriel Chan, am serving as a trained medical scribe to document services personally performed by Rehan Waggoner MD based on the provider's statements to me on February 10, 2021.  This document has been checked and approved by the attending provider.    I, Rehan Waggoner MD, was physically present and have reviewed and verified the accuracy of this note documented by Muriel Chan, medical scribe.     2/10/2021   [facility] EMERGENCY DEPARTMENT     Rehan Waggoner MD  02/10/21 [id number]     Anesthesia Volume In Cc: 1.2 Electrodesiccation And Curettage Text: The wound bed was treated with electrodesiccation and curettage after the biopsy was performed. Dressing: bandage Render Post-Care Instructions In Note?: no Hemostasis: Drysol Silver Nitrate Text: The wound bed was treated with silver nitrate after the biopsy was performed. Was A Bandage Applied: Yes Detail Level: Detailed Biopsy Method: Personna blade Billing Type: Third-Party Bill Curettage Text: The wound bed was treated with curettage after the biopsy was performed. Lab: [id number] Post-Care Instructions: I reviewed with the patient in detail post-care instructions. Patient is to keep the biopsy site dry overnight, and then apply vaseline twice daily until healed. Anesthesia Type: 1% lidocaine with epinephrine Cryotherapy Text: The wound bed was treated with cryotherapy after the biopsy was performed. Additional Anesthesia Volume In Cc (Will Not Render If 0): 0 Wound Care: Petrolatum Lab Facility: [id number] Notification Instructions: Patient will be notified of biopsy results. However, patient instructed to call the office if not contacted within 2 weeks. Electrodesiccation Text: The wound bed was treated with electrodesiccation after the biopsy was performed. Depth Of Biopsy: dermis Consent: Verbal consent was obtained and risks were reviewed including but not limited to scarring, infection, bleeding, scabbing, incomplete removal, nerve damage and allergy to anesthesia. Type Of Destruction Used: Curettage

## 2021-02-10 NOTE — PROGRESS NOTES
Pt came today to the clinic to have blood work done for INR, RN/writer was called in to see the patient because he was lethargic.     NURSING ASSESSMENT:  Spoke with patient and his spouse Lesia. Pt was sitting in a wheelchair holding a bag in case he vomits. His appearance was pale, he was responding to questions, but lethargic. He eyes were closed.   BP 76/48-- automatic BP cuff.  Pulse 54  Temp 97.5  O2 96% on RA  Pt does not use a wheelchair at baseline. Spouse states that pt recently had hernia surgery and currently has a hematoma that is larger today. It is causing him pain. Pt reports that he felt like he was going to faint when standing up, his head felt hot, he felt dizzy. Writer went and got provider to assess patient and had Talisha GODINEZ stay in the room with patient. When writer came back with provider, unable to obtain BP with automatic cuff. Pt becoming increasingly lethargic and less responsive.      MEDICATIONS;   Taking medication(s) as prescribed? Yes  Taking over the counter medication(s)? N/A  Any medication side effects? Not Applicable    Any barriers to taking medication(s) as prescribed?  N/A  Medication(s) improving/managing symptoms?  N/A  Medication reconciliation completed: N/A    NURSING PLAN: Huddled with provider, Kimber Prado PA-C. She came and assessed pt. Per provider, pt to go to ER now via ambulance. Pt and his spouse in agreement. NurseTalisha called 911 to have ambulance come to transport patient. A staff member remained with patient at all times. EMS arrived and was given demographics sheet. EMS left with patient to transport to hospital.    RECOMMENDED DISPOSITION:  Call 911 - EMS to transport  Will comply with recommendation: Yes    Татьяна Hall RN

## 2021-02-11 ENCOUNTER — ANESTHESIA (OUTPATIENT)
Dept: SURGERY | Facility: CLINIC | Age: 66
End: 2021-02-11
Payer: COMMERCIAL

## 2021-02-11 LAB
ABO + RH BLD: NORMAL
ABO + RH BLD: NORMAL
ANION GAP SERPL CALCULATED.3IONS-SCNC: 4 MMOL/L (ref 3–14)
BLD GP AB SCN SERPL QL: NORMAL
BLOOD BANK CMNT PATIENT-IMP: NORMAL
BUN SERPL-MCNC: 26 MG/DL (ref 7–30)
CALCIUM SERPL-MCNC: 9 MG/DL (ref 8.5–10.1)
CHLORIDE SERPL-SCNC: 106 MMOL/L (ref 94–109)
CO2 SERPL-SCNC: 31 MMOL/L (ref 20–32)
CREAT SERPL-MCNC: 0.72 MG/DL (ref 0.66–1.25)
ERYTHROCYTE [DISTWIDTH] IN BLOOD BY AUTOMATED COUNT: 15.2 % (ref 10–15)
GFR SERPL CREATININE-BSD FRML MDRD: >90 ML/MIN/{1.73_M2}
GLUCOSE SERPL-MCNC: 89 MG/DL (ref 70–99)
HCT VFR BLD AUTO: 29.4 % (ref 40–53)
HGB BLD-MCNC: 9.2 G/DL (ref 13.3–17.7)
HGB BLD-MCNC: 9.7 G/DL (ref 13.3–17.7)
INR PPP: 1.47 (ref 0.86–1.14)
INTERPRETATION ECG - MUSE: NORMAL
MAGNESIUM SERPL-MCNC: 2.1 MG/DL (ref 1.6–2.3)
MCH RBC QN AUTO: 30.2 PG (ref 26.5–33)
MCHC RBC AUTO-ENTMCNC: 31.3 G/DL (ref 31.5–36.5)
MCV RBC AUTO: 96 FL (ref 78–100)
PHOSPHATE SERPL-MCNC: 3.4 MG/DL (ref 2.5–4.5)
PLATELET # BLD AUTO: 190 10E9/L (ref 150–450)
POTASSIUM SERPL-SCNC: 3.9 MMOL/L (ref 3.4–5.3)
RADIOLOGIST FLAGS: ABNORMAL
RBC # BLD AUTO: 3.05 10E12/L (ref 4.4–5.9)
SODIUM SERPL-SCNC: 140 MMOL/L (ref 133–144)
SPECIMEN EXP DATE BLD: NORMAL
WBC # BLD AUTO: 9.6 10E9/L (ref 4–11)

## 2021-02-11 PROCEDURE — 250N000009 HC RX 250: Performed by: NURSE ANESTHETIST, CERTIFIED REGISTERED

## 2021-02-11 PROCEDURE — 250N000011 HC RX IP 250 OP 636: Performed by: STUDENT IN AN ORGANIZED HEALTH CARE EDUCATION/TRAINING PROGRAM

## 2021-02-11 PROCEDURE — 258N000003 HC RX IP 258 OP 636: Performed by: STUDENT IN AN ORGANIZED HEALTH CARE EDUCATION/TRAINING PROGRAM

## 2021-02-11 PROCEDURE — 99207 PR CONSULT E&M CHANGED TO SUBSEQUENT LEVEL: CPT | Performed by: STUDENT IN AN ORGANIZED HEALTH CARE EDUCATION/TRAINING PROGRAM

## 2021-02-11 PROCEDURE — 120N000002 HC R&B MED SURG/OB UMMC

## 2021-02-11 PROCEDURE — 83735 ASSAY OF MAGNESIUM: CPT | Performed by: STUDENT IN AN ORGANIZED HEALTH CARE EDUCATION/TRAINING PROGRAM

## 2021-02-11 PROCEDURE — 86850 RBC ANTIBODY SCREEN: CPT | Performed by: STUDENT IN AN ORGANIZED HEALTH CARE EDUCATION/TRAINING PROGRAM

## 2021-02-11 PROCEDURE — 85018 HEMOGLOBIN: CPT | Performed by: STUDENT IN AN ORGANIZED HEALTH CARE EDUCATION/TRAINING PROGRAM

## 2021-02-11 PROCEDURE — 86901 BLOOD TYPING SEROLOGIC RH(D): CPT | Performed by: STUDENT IN AN ORGANIZED HEALTH CARE EDUCATION/TRAINING PROGRAM

## 2021-02-11 PROCEDURE — P9041 ALBUMIN (HUMAN),5%, 50ML: HCPCS | Performed by: NURSE ANESTHETIST, CERTIFIED REGISTERED

## 2021-02-11 PROCEDURE — 36415 COLL VENOUS BLD VENIPUNCTURE: CPT | Performed by: STUDENT IN AN ORGANIZED HEALTH CARE EDUCATION/TRAINING PROGRAM

## 2021-02-11 PROCEDURE — 250N000025 HC SEVOFLURANE, PER MIN: Performed by: SURGERY

## 2021-02-11 PROCEDURE — 710N000010 HC RECOVERY PHASE 1, LEVEL 2, PER MIN: Performed by: SURGERY

## 2021-02-11 PROCEDURE — 370N000017 HC ANESTHESIA TECHNICAL FEE, PER MIN: Performed by: SURGERY

## 2021-02-11 PROCEDURE — 250N000013 HC RX MED GY IP 250 OP 250 PS 637: Performed by: STUDENT IN AN ORGANIZED HEALTH CARE EDUCATION/TRAINING PROGRAM

## 2021-02-11 PROCEDURE — 85027 COMPLETE CBC AUTOMATED: CPT | Performed by: STUDENT IN AN ORGANIZED HEALTH CARE EDUCATION/TRAINING PROGRAM

## 2021-02-11 PROCEDURE — 85610 PROTHROMBIN TIME: CPT | Performed by: STUDENT IN AN ORGANIZED HEALTH CARE EDUCATION/TRAINING PROGRAM

## 2021-02-11 PROCEDURE — 258N000003 HC RX IP 258 OP 636: Performed by: NURSE ANESTHETIST, CERTIFIED REGISTERED

## 2021-02-11 PROCEDURE — 86900 BLOOD TYPING SEROLOGIC ABO: CPT | Performed by: STUDENT IN AN ORGANIZED HEALTH CARE EDUCATION/TRAINING PROGRAM

## 2021-02-11 PROCEDURE — 360N000075 HC SURGERY LEVEL 2, PER MIN: Performed by: SURGERY

## 2021-02-11 PROCEDURE — 99232 SBSQ HOSP IP/OBS MODERATE 35: CPT | Performed by: STUDENT IN AN ORGANIZED HEALTH CARE EDUCATION/TRAINING PROGRAM

## 2021-02-11 PROCEDURE — 999N000141 HC STATISTIC PRE-PROCEDURE NURSING ASSESSMENT: Performed by: SURGERY

## 2021-02-11 PROCEDURE — 0JCC0ZZ EXTIRPATION OF MATTER FROM PELVIC REGION SUBCUTANEOUS TISSUE AND FASCIA, OPEN APPROACH: ICD-10-PCS | Performed by: SURGERY

## 2021-02-11 PROCEDURE — 250N000013 HC RX MED GY IP 250 OP 250 PS 637: Performed by: SURGERY

## 2021-02-11 PROCEDURE — 250N000011 HC RX IP 250 OP 636: Performed by: NURSE ANESTHETIST, CERTIFIED REGISTERED

## 2021-02-11 PROCEDURE — 0Y350ZZ CONTROL BLEEDING IN RIGHT INGUINAL REGION, OPEN APPROACH: ICD-10-PCS | Performed by: SURGERY

## 2021-02-11 PROCEDURE — 84100 ASSAY OF PHOSPHORUS: CPT | Performed by: STUDENT IN AN ORGANIZED HEALTH CARE EDUCATION/TRAINING PROGRAM

## 2021-02-11 PROCEDURE — 80048 BASIC METABOLIC PNL TOTAL CA: CPT | Performed by: STUDENT IN AN ORGANIZED HEALTH CARE EDUCATION/TRAINING PROGRAM

## 2021-02-11 PROCEDURE — 271N000001 HC OR GENERAL SUPPLY NON-STERILE: Performed by: SURGERY

## 2021-02-11 PROCEDURE — 272N000001 HC OR GENERAL SUPPLY STERILE: Performed by: SURGERY

## 2021-02-11 RX ORDER — LIDOCAINE HYDROCHLORIDE 20 MG/ML
INJECTION, SOLUTION INFILTRATION; PERINEURAL PRN
Status: DISCONTINUED | OUTPATIENT
Start: 2021-02-11 | End: 2021-02-11

## 2021-02-11 RX ORDER — ALBUMIN, HUMAN INJ 5% 5 %
SOLUTION INTRAVENOUS CONTINUOUS PRN
Status: DISCONTINUED | OUTPATIENT
Start: 2021-02-11 | End: 2021-02-11

## 2021-02-11 RX ORDER — ONDANSETRON 2 MG/ML
4 INJECTION INTRAMUSCULAR; INTRAVENOUS EVERY 30 MIN PRN
Status: DISCONTINUED | OUTPATIENT
Start: 2021-02-11 | End: 2021-02-11 | Stop reason: HOSPADM

## 2021-02-11 RX ORDER — CARVEDILOL 25 MG/1
25 TABLET ORAL 2 TIMES DAILY WITH MEALS
Status: DISCONTINUED | OUTPATIENT
Start: 2021-02-11 | End: 2021-02-13 | Stop reason: HOSPADM

## 2021-02-11 RX ORDER — LIDOCAINE 40 MG/G
CREAM TOPICAL
Status: DISCONTINUED | OUTPATIENT
Start: 2021-02-11 | End: 2021-02-13 | Stop reason: HOSPADM

## 2021-02-11 RX ORDER — WARFARIN SODIUM 5 MG/1
5 TABLET ORAL
Status: COMPLETED | OUTPATIENT
Start: 2021-02-11 | End: 2021-02-11

## 2021-02-11 RX ORDER — EPHEDRINE SULFATE 50 MG/ML
INJECTION, SOLUTION INTRAMUSCULAR; INTRAVENOUS; SUBCUTANEOUS PRN
Status: DISCONTINUED | OUTPATIENT
Start: 2021-02-11 | End: 2021-02-11

## 2021-02-11 RX ORDER — SODIUM CHLORIDE, SODIUM LACTATE, POTASSIUM CHLORIDE, CALCIUM CHLORIDE 600; 310; 30; 20 MG/100ML; MG/100ML; MG/100ML; MG/100ML
INJECTION, SOLUTION INTRAVENOUS CONTINUOUS
Status: DISCONTINUED | OUTPATIENT
Start: 2021-02-11 | End: 2021-02-11 | Stop reason: HOSPADM

## 2021-02-11 RX ORDER — NALOXONE HYDROCHLORIDE 0.4 MG/ML
0.2 INJECTION, SOLUTION INTRAMUSCULAR; INTRAVENOUS; SUBCUTANEOUS
Status: ACTIVE | OUTPATIENT
Start: 2021-02-11 | End: 2021-02-12

## 2021-02-11 RX ORDER — FENTANYL CITRATE 50 UG/ML
INJECTION, SOLUTION INTRAMUSCULAR; INTRAVENOUS PRN
Status: DISCONTINUED | OUTPATIENT
Start: 2021-02-11 | End: 2021-02-11

## 2021-02-11 RX ORDER — CEFAZOLIN SODIUM 1 G/3ML
1 INJECTION, POWDER, FOR SOLUTION INTRAMUSCULAR; INTRAVENOUS SEE ADMIN INSTRUCTIONS
Status: DISCONTINUED | OUTPATIENT
Start: 2021-02-11 | End: 2021-02-11 | Stop reason: HOSPADM

## 2021-02-11 RX ORDER — DIGOXIN 125 MCG
125 TABLET ORAL DAILY
Status: DISCONTINUED | OUTPATIENT
Start: 2021-02-11 | End: 2021-02-13 | Stop reason: HOSPADM

## 2021-02-11 RX ORDER — SERTRALINE HYDROCHLORIDE 25 MG/1
50 TABLET, FILM COATED ORAL DAILY
Status: DISCONTINUED | OUTPATIENT
Start: 2021-02-11 | End: 2021-02-13 | Stop reason: HOSPADM

## 2021-02-11 RX ORDER — ONDANSETRON 4 MG/1
4 TABLET, ORALLY DISINTEGRATING ORAL EVERY 30 MIN PRN
Status: DISCONTINUED | OUTPATIENT
Start: 2021-02-11 | End: 2021-02-11 | Stop reason: HOSPADM

## 2021-02-11 RX ORDER — NALOXONE HYDROCHLORIDE 0.4 MG/ML
0.4 INJECTION, SOLUTION INTRAMUSCULAR; INTRAVENOUS; SUBCUTANEOUS
Status: ACTIVE | OUTPATIENT
Start: 2021-02-11 | End: 2021-02-12

## 2021-02-11 RX ORDER — HYDROMORPHONE HYDROCHLORIDE 1 MG/ML
.3-.5 INJECTION, SOLUTION INTRAMUSCULAR; INTRAVENOUS; SUBCUTANEOUS EVERY 5 MIN PRN
Status: DISCONTINUED | OUTPATIENT
Start: 2021-02-11 | End: 2021-02-11 | Stop reason: HOSPADM

## 2021-02-11 RX ORDER — FENTANYL CITRATE 50 UG/ML
25-50 INJECTION, SOLUTION INTRAMUSCULAR; INTRAVENOUS
Status: DISCONTINUED | OUTPATIENT
Start: 2021-02-11 | End: 2021-02-11 | Stop reason: HOSPADM

## 2021-02-11 RX ORDER — LEVOTHYROXINE SODIUM 200 UG/1
200 TABLET ORAL DAILY
Status: DISCONTINUED | OUTPATIENT
Start: 2021-02-11 | End: 2021-02-13 | Stop reason: HOSPADM

## 2021-02-11 RX ORDER — SODIUM CHLORIDE, SODIUM GLUCONATE, SODIUM ACETATE, POTASSIUM CHLORIDE AND MAGNESIUM CHLORIDE 526; 502; 368; 37; 30 MG/100ML; MG/100ML; MG/100ML; MG/100ML; MG/100ML
INJECTION, SOLUTION INTRAVENOUS CONTINUOUS PRN
Status: DISCONTINUED | OUTPATIENT
Start: 2021-02-11 | End: 2021-02-11

## 2021-02-11 RX ORDER — HYDRALAZINE HYDROCHLORIDE 20 MG/ML
2.5-5 INJECTION INTRAMUSCULAR; INTRAVENOUS EVERY 10 MIN PRN
Status: DISCONTINUED | OUTPATIENT
Start: 2021-02-11 | End: 2021-02-11 | Stop reason: HOSPADM

## 2021-02-11 RX ORDER — PROPOFOL 10 MG/ML
INJECTION, EMULSION INTRAVENOUS PRN
Status: DISCONTINUED | OUTPATIENT
Start: 2021-02-11 | End: 2021-02-11

## 2021-02-11 RX ORDER — SIMVASTATIN 20 MG
20 TABLET ORAL AT BEDTIME
Status: DISCONTINUED | OUTPATIENT
Start: 2021-02-11 | End: 2021-02-13 | Stop reason: HOSPADM

## 2021-02-11 RX ORDER — CEFAZOLIN SODIUM 2 G/100ML
2 INJECTION, SOLUTION INTRAVENOUS
Status: DISCONTINUED | OUTPATIENT
Start: 2021-02-11 | End: 2021-02-11 | Stop reason: HOSPADM

## 2021-02-11 RX ORDER — ONDANSETRON 2 MG/ML
INJECTION INTRAMUSCULAR; INTRAVENOUS PRN
Status: DISCONTINUED | OUTPATIENT
Start: 2021-02-11 | End: 2021-02-11

## 2021-02-11 RX ORDER — DEXAMETHASONE SODIUM PHOSPHATE 4 MG/ML
INJECTION, SOLUTION INTRA-ARTICULAR; INTRALESIONAL; INTRAMUSCULAR; INTRAVENOUS; SOFT TISSUE PRN
Status: DISCONTINUED | OUTPATIENT
Start: 2021-02-11 | End: 2021-02-11

## 2021-02-11 RX ADMIN — PROPOFOL 30 MG: 10 INJECTION, EMULSION INTRAVENOUS at 09:31

## 2021-02-11 RX ADMIN — HYDROMORPHONE HYDROCHLORIDE 0.2 MG: 0.2 INJECTION, SOLUTION INTRAMUSCULAR; INTRAVENOUS; SUBCUTANEOUS at 07:52

## 2021-02-11 RX ADMIN — SIMVASTATIN 20 MG: 20 TABLET, FILM COATED ORAL at 21:43

## 2021-02-11 RX ADMIN — SODIUM CHLORIDE, POTASSIUM CHLORIDE, SODIUM LACTATE AND CALCIUM CHLORIDE: 600; 310; 30; 20 INJECTION, SOLUTION INTRAVENOUS at 01:14

## 2021-02-11 RX ADMIN — HYDROMORPHONE HYDROCHLORIDE 0.2 MG: 0.2 INJECTION, SOLUTION INTRAMUSCULAR; INTRAVENOUS; SUBCUTANEOUS at 05:55

## 2021-02-11 RX ADMIN — CARVEDILOL 25 MG: 25 TABLET, FILM COATED ORAL at 17:52

## 2021-02-11 RX ADMIN — SODIUM CHLORIDE, POTASSIUM CHLORIDE, SODIUM LACTATE AND CALCIUM CHLORIDE: 600; 310; 30; 20 INJECTION, SOLUTION INTRAVENOUS at 08:00

## 2021-02-11 RX ADMIN — Medication 1 UNITS: at 10:09

## 2021-02-11 RX ADMIN — PHENYLEPHRINE HYDROCHLORIDE 100 MCG: 10 INJECTION INTRAVENOUS at 09:38

## 2021-02-11 RX ADMIN — HYDROMORPHONE HYDROCHLORIDE 0.2 MG: 0.2 INJECTION, SOLUTION INTRAMUSCULAR; INTRAVENOUS; SUBCUTANEOUS at 01:16

## 2021-02-11 RX ADMIN — PHENYLEPHRINE HYDROCHLORIDE 200 MCG: 10 INJECTION INTRAVENOUS at 09:15

## 2021-02-11 RX ADMIN — PHENYLEPHRINE HYDROCHLORIDE 200 MCG: 10 INJECTION INTRAVENOUS at 09:24

## 2021-02-11 RX ADMIN — DOCUSATE SODIUM 50 MG AND SENNOSIDES 8.6 MG 2 TABLET: 8.6; 5 TABLET, FILM COATED ORAL at 20:57

## 2021-02-11 RX ADMIN — MIDAZOLAM 2 MG: 1 INJECTION INTRAMUSCULAR; INTRAVENOUS at 08:19

## 2021-02-11 RX ADMIN — Medication 10 MG: at 08:42

## 2021-02-11 RX ADMIN — OXYCODONE HYDROCHLORIDE 10 MG: 5 TABLET ORAL at 02:38

## 2021-02-11 RX ADMIN — HYDROMORPHONE HYDROCHLORIDE 0.2 MG: 0.2 INJECTION, SOLUTION INTRAMUSCULAR; INTRAVENOUS; SUBCUTANEOUS at 12:45

## 2021-02-11 RX ADMIN — OXYCODONE HYDROCHLORIDE 10 MG: 5 TABLET ORAL at 21:43

## 2021-02-11 RX ADMIN — WARFARIN SODIUM 5 MG: 5 TABLET ORAL at 17:52

## 2021-02-11 RX ADMIN — SUGAMMADEX 200 MG: 100 INJECTION, SOLUTION INTRAVENOUS at 10:20

## 2021-02-11 RX ADMIN — DEXAMETHASONE SODIUM PHOSPHATE 6 MG: 4 INJECTION, SOLUTION INTRA-ARTICULAR; INTRALESIONAL; INTRAMUSCULAR; INTRAVENOUS; SOFT TISSUE at 09:09

## 2021-02-11 RX ADMIN — Medication 15 MG: at 08:50

## 2021-02-11 RX ADMIN — FENTANYL CITRATE 50 MCG: 50 INJECTION, SOLUTION INTRAMUSCULAR; INTRAVENOUS at 09:31

## 2021-02-11 RX ADMIN — PHENYLEPHRINE HYDROCHLORIDE 100 MCG: 10 INJECTION INTRAVENOUS at 08:50

## 2021-02-11 RX ADMIN — LIDOCAINE HYDROCHLORIDE 100 MG: 20 INJECTION, SOLUTION INFILTRATION; PERINEURAL at 08:42

## 2021-02-11 RX ADMIN — Medication 1 UNITS: at 09:48

## 2021-02-11 RX ADMIN — ONDANSETRON 4 MG: 2 INJECTION INTRAMUSCULAR; INTRAVENOUS at 10:17

## 2021-02-11 RX ADMIN — SODIUM CHLORIDE, POTASSIUM CHLORIDE, SODIUM LACTATE AND CALCIUM CHLORIDE: 600; 310; 30; 20 INJECTION, SOLUTION INTRAVENOUS at 17:07

## 2021-02-11 RX ADMIN — ALBUMIN (HUMAN): 12.5 SOLUTION INTRAVENOUS at 09:45

## 2021-02-11 RX ADMIN — ROCURONIUM BROMIDE 50 MG: 10 INJECTION INTRAVENOUS at 08:42

## 2021-02-11 RX ADMIN — PHENYLEPHRINE HYDROCHLORIDE 200 MCG: 10 INJECTION INTRAVENOUS at 10:00

## 2021-02-11 RX ADMIN — FENTANYL CITRATE 50 MCG: 50 INJECTION, SOLUTION INTRAMUSCULAR; INTRAVENOUS at 08:42

## 2021-02-11 RX ADMIN — SODIUM CHLORIDE, SODIUM GLUCONATE, SODIUM ACETATE, POTASSIUM CHLORIDE AND MAGNESIUM CHLORIDE: 526; 502; 368; 37; 30 INJECTION, SOLUTION INTRAVENOUS at 09:00

## 2021-02-11 RX ADMIN — PROPOFOL 90 MG: 10 INJECTION, EMULSION INTRAVENOUS at 08:42

## 2021-02-11 ASSESSMENT — ACTIVITIES OF DAILY LIVING (ADL)
ADLS_ACUITY_SCORE: 11

## 2021-02-11 ASSESSMENT — MIFFLIN-ST. JEOR: SCORE: 1706.43

## 2021-02-11 NOTE — PROGRESS NOTES
Nursing Focus: Admission  D: Arrived at about midnight 2/11 from the [facility] ED.  Admitted for post-op hematoma and hypotension and plan for R groin hematoma evacuation in OR.    I:  Patient oriented to room, environment and use of call light. Physician notified of patients arrival on unit.     A: AVSS. C/o RLQ abdominal pain. Dilaudid and oxycodone given with decrease in pain. Steri-strips intact over abdominal incision. CMS intact in RLE. No drainage noted.     P: NPO overnight for procedure. Pain medication and IV hydration as ordered. Notify provider with changes in pt status.

## 2021-02-11 NOTE — ANESTHESIA PROCEDURE NOTES
Airway   Date/Time: 2/11/2021 8:47 AM   Patient location during procedure: OR  Staff -   CRNA: Nelia Mcclain APRN CRNA  Performed By: CRNA    Consent for Airway   Urgency: elective    Indications and Patient Condition  Indications for airway management: aung-procedural  Induction type:intravenousMask difficulty assessment: 2 - vent by mask + OA or adjuvant +/- NMBA    Final Airway Details  Final airway type: endotracheal airway  Successful airway:ETT - single  Endotracheal Airway Details   ETT size (mm): 7.5  Cuffed: yes  Successful intubation technique: direct laryngoscopy  Adjucts: stylet  Measured from: lips  Secured at (cm): 23  Secured with: pink tape  Bite block used: None    Post intubation assessment   Placement verified by: capnometry, equal breath sounds and chest rise   Number of attempts at approach: 1  Number of other approaches attempted: 0  Secured with:pink tape  Ease of procedure: easy  Dentition: Intact and Unchanged

## 2021-02-11 NOTE — PLAN OF CARE
AVSS.  Pt returned from I&D of R hip hematoma.  Dressing placed, some scant blood.  Bruising around site expanding to medial groin.  Pt on capno, respiratory status stable.  MD paged and asked if tele wanted.  Will continue to monitor.

## 2021-02-11 NOTE — PHARMACY-ANTICOAGULATION SERVICE
Clinical Pharmacy - Warfarin Dosing Consult     Pharmacy has been consulted to manage this patient s warfarin therapy.  Indication: Mechanical Aortic Valve Replacement;Atrial Fibrillation  Therapy Goal: INR 2.5-3.5  OP [facility]: [facility]  Warfarin Prior to Admission: Yes  Warfarin PTA Regimen: 4mg daily (out pt INR goal was 2.5-3.5), last dose was prior to admission.  Significant drug interactions: received vitamin K 5mg and Kcentra 2/10.    INR   Date Value Ref Range Status   2021 1.47 (H) 0.86 - 1.14 Final   02/10/2021 3.12 (H) 0.86 - 1.14 Final     Chromogenic Factor 10   Date Value Ref Range Status   2011 35 (L) 60 - 140 % Final     Comment:     Therapeutic Range: A Chromogenic Factor 10 level of 20-40% inversely   correlates   with an INR of 2-3 for patients receiving Warfarin. Chromogenic Factor 10   levels below 20% indicate an INR greater than 3, and levels above 40%   indicate   an INR lessthan 2.       Recommend warfarin 5 mg today.  Pharmacy will monitor Jovon Mantilla daily and order warfarin doses to achieve specified goal.  Confirmed with MD no need to bridge tonight.    Please contact pharmacy as soon as possible if the warfarin needs to be held for a procedure or if the warfarin goals change.      Leigh Bernard, PharmD  [phone] (text capable)

## 2021-02-11 NOTE — CONSULTS
[facility]  Consult Note - Hospitalist Service, Gold Night     Date of Admission:  2/10/2021  Consult Requested by: Franky Cedillo  Reason for Consult: co-management, pre-op clearnace    Assessment & Plan   Jovon TITO Mantilla is a 65 year old male with PMH of atrial fibrillation on warfarin, aortic sclerosis s/p AVR, HFrEF (EF 45-50, 6/4/20) 2/2 ICM, HTN, HLD who is now POD6 s/p right inguinal hernia repair who presented with enlarging R groin hematoma with an INR of 3.2 with imaging noting iliac vein bleed.    #Pre Op Assessment  Patient underwent recent surgical procedure which he tolerated well. Though he notes that after his surgery his ambulation has been more limited, this is 2/2 pain. Notes no chest pain or other MI symptoms since that time. His prior functional status was 4 METS, was able to walk for hours and has been cleaning his driveway with . Is high risk surgery with 15% risk of major adverse cardiac event per RCRI. In regards to pre-operative risk management, he has no current cardiac symptoms and was seen by his cardiologist on 10/15/20 who felt at that time was ok to proceed with surgery without further intervention. Given no recent development of cardiac symptoms and current stability from cardiopulmonary, no need for additional investigative workup prior to surgery.   -would recommend continuing coreg from cardioprotective standpoint given BP stability and no bradycardia  -continue digoxin    #Acute normocytic anemia  #Hyperbilirubinemia  #Post operative hematoma  Patient presenting with hematoma in setting of recent R inguinal hernia repair. Baseline ~12. Hyperbilirubinemia likely d/t hematoma.  -trend Hgb  -products given as below  -consider transfusion threshold of 8 given h/o CHF  -OR timing per surgical team    #Atrial fibrillation, persistent, s/p MAZE procedure  #Severe aortic stenosis s/p mechanical valve replace (24mm ATS supra-annular valve)    At visit with prior cardiologist, was recommended for bridging given his persistent AF and mechanical aortic valve. CHADSVASC 3 (reports of prior clot history after AVR but no other DVT/PE)  Rate control: continue coreg, digoxin  Rhythm control: persistent AF, pursuing rate control strategy  AC: hold warfarin, ASA per surgical team; given K centra, vit K in ED; recheck INR in AM but would expect INR normalized from prior product in setting of normal liver function and diet; would recommend restarting AC as soon as able from surgical team standpoint    Chronic medical conditions  #Hypothyoidism: continue synthroid  #Allergies: continue allegra; can consider albuterol PRN  #depression: continue zoloft  #HTN: hold lisinopril  #HLD: continue zocor         The patient's care was discussed with the Patient.    Ana Messina MD  [facility]  Contact information available via [facility] Paging/Directory  Please see sign in/sign out for up to date coverage information  ______________________________________________________________________    Chief Complaint   Groin hematoma    History is obtained from the patient and chart    History of Present Illness   Jovon Mantilla is a 65 year old male with PMH of atrial fibrillation on warfarin, aortic sclerosis s/p AVR, HFrEF (EF 45-50, 6/4/20) 2/2 ICM, HTN, HLD who is now POD6 s/p right inguinal hernia repair who presented with enlarging R groin hematoma with an INR of 3.2. Per surgical team, tentative plan to take to OR tomorrow given concern for compression of the iliac vein. Medicine consulted for co-management and clearance prior to OR.    Has noted worsening swelling since his discharge from the hospital. Seen in clinic by Dr. Millan who felt was post-op hematoma with tamponade. Discussed at that time that this may need to be removed in the future. Noted worsening pain in the area which prompted him to go to clinic or a recheck,  but when there was noted to be lethargic and pale. Sent to ER by EMS after BP was found to be low. In the ER, was found to have hematoma. Surgery and IR consulted imaging noting venous bleed. Plan for OR in AM. Was given K centra and vitamin K in the ER.     At present, patient reports no chest pain, SOB, hemoptysis, hematochezia, N/V, weight change.     States that prior to his prior surgery, he was ambulatory and able to walk without SOB or limitations. Has been able to walk up flight of stairs without limitation. Is able to sleep laying flat at night. States he had no issues with his recent anesthesia and never has had these issues in the past.     Did complete a recent Pre op appointment prior to R inguinal hernia repair.     Medications reviewed. Has been taking warfarin. Does take ASA 81mg MWF    Review of Systems   The 10 point Review of Systems is negative other than noted in the HPI or here.     Past Medical History    I have reviewed this patient's medical history and updated it with pertinent information if needed.   Past Medical History:   Diagnosis Date     Acne rosacea 04/22/2019     Acquired hypothyroidism 05/23/2003     Problem list name updated by automated process. Provider to review     Acute prostatitis      Acute sinusitis, unspecified      Aortic valve disease      Biatrial enlargement      CAD (coronary artery disease)     known 70% LAD stenosis     Depression      GI bleeding 01/22/2016     History of bleeding peptic ulcer      Hyperlipidemia      Idiopathic cardiomyopathy (H)      Mitral regurgitation      Obesity      Palpitations      Permanent atrial fibrillation (H) 06/02/2017     Right inguinal hernia 07/22/2019     S/P AVR 01/07/2011    with Maze procedure     S/P gastric bypass      Tricuspid regurgitation      Unspecified essential hypertension        Past Surgical History   I have reviewed this patient's surgical history and updated it with pertinent information if needed.  Past  Surgical History:   Procedure Laterality Date     ANGIOGRAM  02/01/2002    Normal coronary,dilated LV,Sev.decr.global LVF,+1 MR     ANGIOGRAM  01/01/2008    Mild AS,Mod PHTN,mod prox.LAD disease,Triv.CFX disease     ANGIOGRAM  01/01/2009    Mod AS,Mod PHTN,mod prox.LAD disease     ANGIOGRAM  10/01/2010    Sev.AS,CM,global hypokinesis,Mild-mod LV dilated,Mild MR,70% prox.LAD lesion,PHTN     CARDIOVERSION  2/02, 4/02, 4/11     CHOLECYSTECTOMY  2006     COLONOSCOPY N/A 01/23/2016    Procedure: COLONOSCOPY;  Surgeon: Robyn Collins MD;  Location:  GI     ESOPHAGOSCOPY, GASTROSCOPY, DUODENOSCOPY (EGD), COMBINED N/A 01/23/2016    Procedure: COMBINED ESOPHAGOSCOPY, GASTROSCOPY, DUODENOSCOPY (EGD);  Surgeon: Robyn Collins MD;  Location:  GI     HERNIORRHAPHY INGUINAL Right 2/4/2021    Procedure: OPEN RIGHT INGUINAL HENRIA WITH MESH;  Surgeon: Jaden Alex MD;  Location: [facility] OR     LAPAROSCOPIC BYPASS GASTRIC  10/08/2011    Procedure:LAPAROSCOPIC BYPASS GASTRIC; Diagnostic laparoscopy, Lysis of Adhesions, Laparoscopic Revision of Jejunojejunostomy; Surgeon:RADHA MURO; Location: OR     LAPAROSCOPIC BYPASS GASTRIC  06/26/2006    Dr Jin     LAPAROSCOPY DIAGNOSTIC (GENERAL)  10/08/2011    Procedure:LAPAROSCOPY DIAGNOSTIC (GENERAL); Surgeon:RADHA MURO; Location: OR     REPLACE VALVE AORTIC  01/07/2011     [facility] NONSPECIFIC PROCEDURE  ~1985    vein stripping       Social History   I have reviewed this patient's social history and updated it with pertinent information if needed.  Social History     Tobacco Use     Smoking status: Never Smoker     Smokeless tobacco: Never Used   Substance Use Topics     Alcohol use: No     Alcohol/week: 0.0 standard drinks     Drug use: No       Family History   I have reviewed this patient's family history and updated it with pertinent information if needed.  Family History   Problem Relation Age of Onset     Cancer - colorectal Father       Colon Cancer Father      Cancer Father      Diabetes Brother        Medications   I have reviewed this patient's current medications    Allergies   No Known Allergies    Physical Exam   Vital Signs: Temp: 98.3  F (36.8  C) Temp src: Oral BP: 125/70 Pulse: 94   Resp: 18 SpO2: 98 % O2 Device: Nasal cannula Oxygen Delivery: 2 LPM  Weight: 203 lbs 0 oz    General Appearance: older man resting comfortably in bed  HEENT: at/nc, eomi, mmm, no JVD  Respiratory: ctab on NC, nonlabored  Cardiovascular: irr irr, s1, s2, soft SADIE  GI: soft, nt/nd, R groin hematoma noted  Skin: multiple scattered ecchymoses, otherwise warm  Musculoskeletal: no LE edema, normal muscle bulk  Neurologic: alert, interactive, speech fluent    Data   Results for orders placed or performed during the hospital encounter of 02/10/21 (from the past 24 hour(s))   CBC with platelets differential   Result Value Ref Range    WBC 10.5 4.0 - 11.0 10e9/L    RBC Count 3.45 (L) 4.4 - 5.9 10e12/L    Hemoglobin 10.4 (L) 13.3 - 17.7 g/dL    Hematocrit 33.7 (L) 40.0 - 53.0 %    MCV 98 78 - 100 fl    MCH 30.1 26.5 - 33.0 pg    MCHC 30.9 (L) 31.5 - 36.5 g/dL    RDW 15.1 (H) 10.0 - 15.0 %    Platelet Count 191 150 - 450 10e9/L    Diff Method Automated Method     % Neutrophils 79.3 %    % Lymphocytes 6.4 %    % Monocytes 12.5 %    % Eosinophils 1.0 %    % Basophils 0.3 %    % Immature Granulocytes 0.5 %    Nucleated RBCs 0 0 /100    Absolute Neutrophil 8.3 1.6 - 8.3 10e9/L    Absolute Lymphocytes 0.7 (L) 0.8 - 5.3 10e9/L    Absolute Monocytes 1.3 0.0 - 1.3 10e9/L    Absolute Eosinophils 0.1 0.0 - 0.7 10e9/L    Absolute Basophils 0.0 0.0 - 0.2 10e9/L    Abs Immature Granulocytes 0.1 0 - 0.4 10e9/L    Absolute Nucleated RBC 0.0    INR   Result Value Ref Range    INR 3.12 (H) 0.86 - 1.14   Comprehensive metabolic panel   Result Value Ref Range    Sodium 139 133 - 144 mmol/L    Potassium 4.5 3.4 - 5.3 mmol/L    Chloride 105 94 - 109 mmol/L    Carbon Dioxide 31 20 - 32 mmol/L     Anion Gap 3 3 - 14 mmol/L    Glucose 138 (H) 70 - 99 mg/dL    Urea Nitrogen 27 7 - 30 mg/dL    Creatinine 0.95 0.66 - 1.25 mg/dL    GFR Estimate 85 >60 mL/min/[1.73_m2]    GFR Estimate If Black >90 >60 mL/min/[1.73_m2]    Calcium 9.2 8.5 - 10.1 mg/dL    Bilirubin Total 1.6 (H) 0.2 - 1.3 mg/dL    Albumin 3.1 (L) 3.4 - 5.0 g/dL    Protein Total 6.5 (L) 6.8 - 8.8 g/dL    Alkaline Phosphatase 86 40 - 150 U/L    ALT 23 0 - 70 U/L    AST 28 0 - 45 U/L   Troponin I   Result Value Ref Range    Troponin I ES <0.015 0.000 - 0.045 ug/L   Lactic acid whole blood   Result Value Ref Range    Lactic Acid 1.5 0.7 - 2.0 mmol/L   EKG 12 lead   Result Value Ref Range    Interpretation ECG Click View Image link to view waveform and result    XR Chest Port 1 View    Narrative    EXAM: XR CHEST PORT 1 VW  2/10/2021 5:33 PM     HISTORY:  chest pain       COMPARISON:  Chest x-ray 1/31/2020 CT chest 2/15/2019.    FINDINGS:   Portable semiupright view of the chest. Aortic valve replacement with  intact median sternotomy wires. Trachea is midline. Stable enlarged  cardiac silhouette. The pulmonary vasculature is distinct. Streaky  left basilar airspace opacities. No pleural effusion or pneumothorax.  2.5 x 1.9 cm nodule in the right lung, better seen on CT of the chest  from 2/15/2019.      Impression    IMPRESSION:   1. Cardiomegaly prominent pulmonary vasculature but distinct walls,  suggestive of mild pulmonary venous congestion.  2. Streaky left basilar opacities, likely atelectasis.  3. By today's measurement the nodule in the lateral right lung has  increased. This is likely projectional.     I have personally reviewed the examination and initial interpretation  and I agree with the findings.    PASCUAL FRIEDMAN MD   CT Abdomen Pelvis w Contrast   Result Value Ref Range    Radiologist flags Postoperative hematoma, multifocal active (AA)     Narrative    EXAMINATION: CT ABDOMEN PELVIS W CONTRAST, 2/10/2021 7:41 PM    TECHNIQUE:  Helical  CT images from the lung bases through the  symphysis pubis were obtained with IV contrast.     Contrast dose: iopamidol (ISOVUE-370) solution 123 mL    COMPARISON: 10/7/2011 CT abdomen and pelvis    Additional History from EMR: Right inguinal hernia repair on 2/4/2021,  current INR of 3.1     HISTORY: Abdominal distension    FINDINGS:    Abdomen and pelvis: Deep to the cutaneous surface and overlying the  right lower abdomen/pelvis, there is a well-circumscribed  heterogeneously, enhancing lesion with multiple hematocrit levels and  multifocal active extravasation (series 4, images 258, 273, 340) that  extends into the right inguinal canal. Diffuse soft tissue anasarca  within the anterior abdominal wall adjacent to this lesion.    No focal hepatic lesions. Gallbladder is surgically absent with mild  degree of intrahepatic biliary dilatation with the common bile duct  measuring up to 1.3 cm. Postsurgical change of Lynda-en-Y gastric  bypass. Dilation of the pancreatic duct. The parenchyma appears  homogeneous. The spleen, adrenal glands and renal parenchyma are  normal. Multiple simple appearing, subcentimeter right-sided renal  cysts. Predominately exophytic simple cyst arising off the midpole of  the left kidney. Mildly complex cystic lesion arising off the left  renal inferior pole measuring 7.6 x 6.9 x 5.4 cm with mildly complex  wall thickening and superimposed calcifications along its inferior  most wall. Nonobstructing left calyceal nephrolithiasis. No evidence  of urinary obstruction. The bladder is normal. Small bowel is normal  caliber. Mild gaseous distention of the transverse and proximal  descending colon. No abnormal colonic wall thickening. The appendix is  normal. No free fluid in the abdomen or dependent pelvis. Prostate  gland is normal.  Numerous venous varicosities overlying the bilateral  superficial femoral veinns. Mass effect from the abdominal wall  hematoma with near complete stenosis of the  distal right common iliac  vein as it crosses over the distal iliacus muscle (series 4, image  378). No pelvic or retroperitoneal adenopathy.    Lung bases/lower chest:  Lower lung fields are clear. No airspace  consolidation, pleural effusion or pneumothorax. Cardiomegaly with  prosthetic aortic valve noted. Left atrial enlargement.    Bones and soft tissues: Age dependent, multilevel degenerative changes  within the lower thoracic and lumbar spine including bridging anterior  syndesmophytes, disc height loss, endplate sclerosis and facet  arthropathy. No suspicious osseous lesions.      Impression    IMPRESSION:   1. Collection of findings are most concerning for a right anterior  abdominal wall postoperative hematoma with multifocal areas of active  extravasation and mass effect on the right external iliac vein.   2. Mildly complex cystic lesion arising off the inferior pole of the  left kidney, recommend dedicated renal ultrasound for further  characterization.  3. Intra and extrahepatic biliary dilatation along with dilatation of  the pancreatic duct raises the concern of the sphincter dysfunction or  stone. Mass is not seen. This is increased when compared to  2/15/2019..  4. Cardiomegaly with left atrial enlargement and prosthetic aortic  valve repair.  5. Stable post surgical changes of Lynda-en-Y gastric bypass    [Critical Result: Postoperative hematoma, multifocal active  extravasation]    Finding was identified on 2/10/2021 7:47 PM.     Rehan Waggoner MD was contacted by Dr. Antonio Dolan at 2/10/2021 7:53  PM and verbalized understanding of the critical finding.    CTA Abdomen Pelvis with Contrast    Impression    IMPRESSION:  1. No significant change in the large right abdominal wall  postoperative hematoma, which demonstrates extravasation on portal  venous phase imaging. No definite feeding vessel identified,  suggesting supply from small subcutaneous venous vessels. No evidence  of arterial  extravasation.  2. Mildly complex cystic lesion arising from the inferior pole of the  left kidney. Recommend dedicated renal ultrasound for further  evaluation.  3. Increased size of the right lower lobe pulmonary nodule compared to  CT chest on 2/15/2019. Differential diagnosis includes noncalcified  granuloma and slow-growing neoplasm.  4. Cardiomegaly with left atrial enlargement.   Asymptomatic SARS-CoV-2 COVID-19 Virus (Coronavirus) by PCR    Specimen: Nasopharyngeal   Result Value Ref Range    SARS-CoV-2 Virus Specimen Source Nasopharyngeal     SARS-CoV-2 PCR Result NEGATIVE     SARS-CoV-2 PCR Comment       Testing was performed using the Xpert Xpress SARS-CoV-2 Assay on the Cepheid Gene-Xpert   Instrument Systems. Additional information about this Emergency Use Authorization (EUA)   assay can be found via the Lab Guide.     Hemoglobin   Result Value Ref Range    Hemoglobin 9.7 (L) 13.3 - 17.7 g/dL

## 2021-02-11 NOTE — PROGRESS NOTES
Gen Surg Note:    Saw and evaluated Mr Mantilla.     66 yo M 1 week s/p open RIH, with worsening massive R groin hematoma. He has history of artificial aortic valve (supratx INR), CHF. Evidence of iliac venous compression.     Discussed case with IR as well. Will plan CTA to attempt to identify source of bleed,while also reversing with KCentra to avoid use of high volume FFP.    If it appears arterial, will likely need embolization, is this patient will have to be reanticoagulated. If venous, the bleeding will likely stop from reversal and compression. I do think the mass effect is of consequence, and this will probably require evacuation after bleeding has been controlled.    Discussed with patient and wife, as well as with ED and IR attendings.    Keith Lee MD

## 2021-02-11 NOTE — ANESTHESIA POSTPROCEDURE EVALUATION
Patient: Jovon Mantilla    Procedure(s):  INCISION AND DRAINAGE OF RIGHT GROIN HEMATOMA    Diagnosis:Hematoma [T14.8XXA]  Diagnosis Additional Information: No value filed.    Anesthesia Type:  General    Note:  Disposition: Admission   Postop Pain Control: Uneventful            Sign Out: Well controlled pain   PONV: No   Neuro/Psych: Uneventful            Sign Out: Acceptable/Baseline neuro status   Airway/Respiratory: Uneventful            Sign Out: Acceptable/Baseline resp. status   CV/Hemodynamics: Uneventful            Sign Out: Acceptable CV status   Other NRE: NONE   DID A NON-ROUTINE EVENT OCCUR? No         Last vitals:  Vitals:    02/11/21 1115 02/11/21 1125 02/11/21 1130   BP: 101/63 101/63 101/52   Pulse: 100 112 101   Resp: 15 15    Temp:  36.3  C (97.3  F)    SpO2: 95% 94% 94%       Last vitals prior to Anesthesia Care Transfer:  CRNA VITALS  2/11/2021 1003 - 2/11/2021 1103      2/11/2021             Pulse:  90    SpO2:  100 %          Electronically Signed By: Bill Astudillo MD  February 11, 2021  11:46 AM

## 2021-02-11 NOTE — PROGRESS NOTES
"General Surgery Progress Note    Subjective: No flatus or bowel movements. Has not been ambulating. Pain relatively well-controlled, but starts to worsen ~3 hrs after pain meds.     Objective:   /63 (BP Location: Left arm)   Pulse 80   Temp 97.2  F (36.2  C) (Oral)   Resp 18   Ht 1.753 m (5' 9.02\")   Wt 92.1 kg (203 lb)   SpO2 97%   BMI 29.96 kg/m      PE:  Gen: No acute distress  CV: Regular rate and non-cyanotic appearing   Resp: non-labored breathing on 2 LPM supplemental O2.  Abd: Soft, non-distended, appropriately tender, no rigidity or guarding. Right inguinal hematoma with assoc dark purple bruising.  Ext: warm and well perfused  Incision: c/d/i      Intake/Output Summary (Last 24 hours) at 2/11/2021 0711  Last data filed at 2/11/2021 0240  Gross per 24 hour   Intake 50 ml   Output 275 ml   Net -225 ml       Recent labs reviewed.    Recent imaging reviewed.    A/P: Jovon Mantilla is a 65 year old male, who is status post right inguinal hernia repair on 2/4/21. Now presenting with right inguinal hematoma. INR down to 1.47 today (compared to 3.12 on 2/10).     - NPO   - I & D today      Yeni Gracia, MS3    I examined the patient with the medical student and agree with the documentation as above. OR this AM.     Eva Chand MD  Surgery PGY1    " 02-Nov-2020 05:41

## 2021-02-11 NOTE — ANESTHESIA PREPROCEDURE EVALUATION
Anesthesia Pre-Procedure Evaluation    Patient: Jovon Mantilla   MRN: [id number] : 1955        Preoperative Diagnosis: Hematoma [T14.8XXA]   Procedure : Procedure(s):  INCISION AND DRAINAGE OF RIGHT GROIN HEMATOMA     Past Medical History:   Diagnosis Date     Acne rosacea 2019     Acquired hypothyroidism 2003     Problem list name updated by automated process. Provider to review     Acute prostatitis      Acute sinusitis, unspecified      Aortic valve disease      Biatrial enlargement      CAD (coronary artery disease)     known 70% LAD stenosis     Depression      GI bleeding 2016     History of bleeding peptic ulcer      Hyperlipidemia      Idiopathic cardiomyopathy (H)      Mitral regurgitation      Obesity      Palpitations      Permanent atrial fibrillation (H) 2017     Right inguinal hernia 2019     S/P AVR 2011    with Maze procedure     S/P gastric bypass      Tricuspid regurgitation      Unspecified essential hypertension       Past Surgical History:   Procedure Laterality Date     ANGIOGRAM  2002    Normal coronary,dilated LV,Sev.decr.global LVF,+1 MR     ANGIOGRAM  2008    Mild AS,Mod PHTN,mod prox.LAD disease,Triv.CFX disease     ANGIOGRAM  2009    Mod AS,Mod PHTN,mod prox.LAD disease     ANGIOGRAM  10/01/2010    Sev.AS,CM,global hypokinesis,Mild-mod LV dilated,Mild MR,70% prox.LAD lesion,PHTN     CARDIOVERSION  , ,      CHOLECYSTECTOMY       COLONOSCOPY N/A 2016    Procedure: COLONOSCOPY;  Surgeon: Robyn Collins MD;  Location:  GI     ESOPHAGOSCOPY, GASTROSCOPY, DUODENOSCOPY (EGD), COMBINED N/A 2016    Procedure: COMBINED ESOPHAGOSCOPY, GASTROSCOPY, DUODENOSCOPY (EGD);  Surgeon: Robyn Collins MD;  Location:  GI     HERNIORRHAPHY INGUINAL Right 2021    Procedure: OPEN RIGHT INGUINAL HENRIA WITH MESH;  Surgeon: Jaden Alex MD;  Location: [facility] OR     LAPAROSCOPIC BYPASS GASTRIC   10/08/2011    Procedure:LAPAROSCOPIC BYPASS GASTRIC; Diagnostic laparoscopy, Lysis of Adhesions, Laparoscopic Revision of Jejunojejunostomy; Surgeon:RADHA MURO; Location: OR     LAPAROSCOPIC BYPASS GASTRIC  06/26/2006    Dr Jin     LAPAROSCOPY DIAGNOSTIC (GENERAL)  10/08/2011    Procedure:LAPAROSCOPY DIAGNOSTIC (GENERAL); Surgeon:RADHA MURO; Location: OR     REPLACE VALVE AORTIC  01/07/2011     ZZC NONSPECIFIC PROCEDURE  ~1985    vein stripping      No Known Allergies   Social History     Tobacco Use     Smoking status: Never Smoker     Smokeless tobacco: Never Used   Substance Use Topics     Alcohol use: No     Alcohol/week: 0.0 standard drinks      Wt Readings from Last 1 Encounters:   02/10/21 90.9 kg (200 lb 8 oz)        Anesthesia Evaluation   Pt has had prior anesthetic.         ROS/MED HX  ENT/Pulmonary:  - neg pulmonary ROS     Neurologic:  - neg neurologic ROS     Cardiovascular: Comment: Aortic Stenosis s/p AVR in 2011.     (+) Dyslipidemia hypertension--CAD ---Taking blood thinners CHF etiology: ischemic cardiomyopathy dysrhythmias, a-fib, Previous cardiac testing   Echo: Date: 6/4/2020 Results:  Interpretation Summary     Left ventricular systolic function is mildly reduced. LVEF is estimated at 45-  50%.  Moderate to severe concentric left ventricular hypertrophy.  Right ventricular systolic function is mild to moderately reduced. TAPSE  1.4cm, S' 7.8 cm/s.  Mild to moderate (1-2+) mitral regurgitation.  There is a mechanical aortic valve. Normal function on Doppler interrogation.  Vmax 2.1 m/s, mean gradient 9 mmHg, DI 0.5.  Mild (1+) tricuspid regurgitation.  IVC diameter and respiratory changes fall into an intermediate range  suggesting an RA pressure of 8 mmHg (mild hypervolemia).  Ascending aorta is borderline dilated, max diameter of the visualized portion  3.8cm.     This study was compared to a TTE from 1/16/2017. There has been no significant  change.  Stress Test:  Date: Results:    ECG Reviewed: Date: Results:  A-fib w/ RVR    Cath: Date: Results:      METS/Exercise Tolerance:     Hematologic: Comments: INR 1.47 - neg hematologic  ROS   (+) anemia (hgb 9.2),     Musculoskeletal:  - neg musculoskeletal ROS     GI/Hepatic: Comment: Gastric bypass, prior small bowel obstruction with history of right inguinal hernia status post open repair on 2/4/2021 with mesh      Renal/Genitourinary:  - neg Renal ROS     Endo:     (+) thyroid problem, hypothyroidism, Obesity,     Psychiatric/Substance Use:  - neg psychiatric ROS   (+) psychiatric history depression     Infectious Disease:  - neg infectious disease ROS     Malignancy:  - neg malignancy ROS     Other:            Physical Exam    Airway        Mallampati: I   TM distance: > 3 FB   Neck ROM: full   Mouth opening: > 3 cm    Respiratory Devices and Support         Dental  no notable dental history         Cardiovascular   cardiovascular exam normal          Pulmonary   pulmonary exam normal                OUTSIDE LABS:  CBC:   Lab Results   Component Value Date    WBC 10.5 02/10/2021    WBC 4.5 01/05/2021    HGB 10.4 (L) 02/10/2021    HGB 12.4 (L) 01/05/2021    HCT 33.7 (L) 02/10/2021    HCT 41.1 01/05/2021     02/10/2021     (L) 01/05/2021     BMP:   Lab Results   Component Value Date     02/10/2021     01/05/2021    POTASSIUM 4.5 02/10/2021    POTASSIUM 4.2 02/04/2021    CHLORIDE 105 02/10/2021    CHLORIDE 106 01/05/2021    CO2 31 02/10/2021    CO2 33 (H) 01/05/2021    BUN 27 02/10/2021    BUN 16 01/05/2021    CR 0.95 02/10/2021    CR 0.78 02/04/2021     (H) 02/10/2021    GLC 72 01/05/2021     COAGS:   Lab Results   Component Value Date     (HH) 01/12/2011    INR 3.12 (H) 02/10/2021    FIBR 291 01/07/2011     POC:   Lab Results   Component Value Date     (H) 02/04/2021     HEPATIC:   Lab Results   Component Value Date    ALBUMIN 3.1 (L) 02/10/2021    PROTTOTAL 6.5 (L) 02/10/2021     ALT 23 02/10/2021    AST 28 02/10/2021    ALKPHOS 86 02/10/2021    BILITOTAL 1.6 (H) 02/10/2021     OTHER:   Lab Results   Component Value Date    PH 7.40 01/08/2011    LACT 1.5 02/10/2021    A1C 5.8 (H) 02/04/2019    REECE 9.2 02/10/2021    PHOS 3.8 01/09/2011    MAG 1.8 01/27/2016    LIPASE 133 10/07/2011    TSH 2.83 03/13/2020    T4 0.92 02/04/2019    SED 24 (H) 03/02/2005       Anesthesia Plan    ASA Status:  3, emergent    NPO Status:  NPO Appropriate    Anesthesia Type: General.     - Airway: ETT   Induction: Intravenous.   Maintenance: Balanced.   Techniques and Equipment:     - Lines/Monitors: 2nd IV     Consents    Anesthesia Plan(s) and associated risks, benefits, and realistic alternatives discussed. Questions answered and patient/representative(s) expressed understanding.     - Discussed with:  Patient      - Extended Intubation/Ventilatory Support Discussed: no Extended Intubation.      - Patient is DNR/DNI Status: No    Use of blood products discussed: Yes.     - Discussed with: Patient.     Postoperative Care    Pain management: IV analgesics.     - Plan for long acting post-op opioid use   PONV prophylaxis: Ondansetron (or other 5HT-3), Dexamethasone or Solumedrol     Comments:                Obdulio Poole DO

## 2021-02-11 NOTE — PROGRESS NOTES
"CLINICAL NUTRITION SERVICES - ASSESSMENT NOTE     Nutrition Prescription    Malnutrition Status:    Non-severe malnutrition in the context of acute illness    Recommendations already ordered by Registered Dietitian (RD):  --Nutrition supplements PRN     Future/Additional Recommendations:  -Monitor PO intake/tolerance      REASON FOR ASSESSMENT  Jovon Mantilla is a/an 65 year old male assessed by the dietitian for Admission Nutrition Risk Screen for positive weight loss    PMH: A. fib on anticoagulation, aortic sclerosis status post AVR 2011, CAD, ischemic cardiomyopathy, CHF, hypertension, hyperlipidemia, gastric bypass, prior small bowel obstruction. Pt presents with generalized weakness, lethargy, swelling near hernia surgical site, and increased abdominal pain.    NUTRITION HISTORY  --Per H&P:  Pt states that he has had problems having bowel movements since his hernia surgery on 2/4/2021. Patient states that his abdominal pain has been 8-10/10 in spite of having pain meds. His oral intake is fairly minimal, has mostly been taking in milk and water  --Pt not previously known to this service.  --Per pt recall this afternoon: pt states he was not tolerating food after initial surgery on 2/4 due to abdominal pain. States he was only able to tolerate toast, milk, and jello. However, prior to surgery, pt had no concern with eating and appetite and was eating well. Patient states weight fluctuates between 190-200 lbs. Takes daily vitamins in compliance with gastric bypass surgery.     CURRENT NUTRITION ORDERS  Diet: Regular  Intake/Tolerance: Patient has not had any food/meals since diet advancing from NPO to regular, however, he says that he is  feeling better and will be trying to eat a snack and dinner later this afternoon.     LABS  Labs reviewed    MEDICATIONS  -Senna     ANTHROPOMETRICS  Height: 175.3 cm (5' 9.016\")  Most Recent Weight: 92.1 kg (203 lb) - on 2/11  IBW: 72.7 kg (127% of IBW)  BMI: Overweight BMI " 25-29.9  Weight History:   Wt Readings from Last 10 Encounters:   02/11/21 92.1 kg (203 lb)   02/08/21 92.5 kg (204 lb)   02/04/21 92.9 kg (204 lb 12.9 oz)   01/05/21 94.8 kg (209 lb)   11/05/20 91.6 kg (202 lb)   11/03/20 92.3 kg (203 lb 6.4 oz)   10/15/20 89.8 kg (198 lb)   07/30/20 93.2 kg (205 lb 6.4 oz)   06/25/20 92.1 kg (203 lb)   03/13/20 100.2 kg (221 lb)   -- Patient states weight fluctuates between 190-200 lbs    Dosing Weight: 77 kg (based on adjusted body weight with lowest body weight this admission of 90.9 kg)    ASSESSED NUTRITION NEEDS  Estimated Energy Needs: 2401-1055 kcals/day (25 - 30 kcals/kg)  Justification: Maintenance  Estimated Protein Needs:  grams protein/day (1.2 - 1.5 grams of pro/kg)  Justification: Maintenance  Estimated Fluid Needs: 3794-1775 mL/day (1 mL/kcal)   Justification: Per provider pending fluid status    PHYSICAL FINDINGS  See malnutrition section below.    MALNUTRITION  % Intake: </= 50% for >/= 5 days (severe)  % Weight Loss: None noted  Subcutaneous Fat Loss: Facial region: mild-moderate and Upper arm: mild   Muscle Loss: Temporal: moderate, Facial & jaw region:  mild and Thoracic region (clavicle, acromium bone, deltoid, trapezius, pectoral):  moderate  Fluid Accumulation/Edema: None noted  Malnutrition Diagnosis: Non-severe malnutrition in the context of acute illness    NUTRITION DIAGNOSIS  Inadequate oral intake related to lack of appetite and abdominal pain secondary to hematoma as evidenced by consuming </= 50% for >/= 5 days and findings of fat and muscle loss.     INTERVENTIONS  Implementation  Nutrition Education: stressed importance of protein for healing    Medical food supplement therapy - discussed options available      Goals  Patient to consume % of nutritionally adequate meal trays TID, or the equivalent with supplements/snacks.     Monitoring/Evaluation  Progress toward goals will be monitored and evaluated per protocol.    Neil Miranda    Dietetic Intern

## 2021-02-11 NOTE — PROGRESS NOTES
Our team d/w pt. Plan for reversal tonite to stop bleeding. OR tomorrow for evacuation of clot to relieve pressure on vein and ensure hemostasis,due to CHF and artficial aortic valve (suggests preload dependence)    Keith Lee MD

## 2021-02-11 NOTE — PROGRESS NOTES
Update:  Spoke with IR attending-appears to be a venous bleed on CTA. Will plan full reversal, and then tentative plan to evacuate in OR tomorrow due to massive size and compression of vein, as this could worsen CHF.    Keith Lee MD

## 2021-02-11 NOTE — PROGRESS NOTES
Patient seen in preop.  Very large hematoma over right groin.  Reviewed planned procedure.  Risks of bleeding, infection, damage to surrounding organs discussed.  Potential for open wound discussed.  Questions answered.  He is understanding of discussion and agreeble to proceed.

## 2021-02-11 NOTE — ED NOTES
[facility]   ED Nurse to Floor Handoff     Jovon Mantilla is a 65 year old male who speaks English and lives alone,  in a home  They arrived in the ED by ambulance from home    ED Chief Complaint: Hypotension and Atrial Fib    ED Dx;   Final diagnoses:   Abdominal wall hematoma, initial encounter         Needed?: No    Allergies: No Known Allergies.  Past Medical Hx:   Past Medical History:   Diagnosis Date     Acne rosacea 04/22/2019     Acquired hypothyroidism 05/23/2003     Problem list name updated by automated process. Provider to review     Acute prostatitis      Acute sinusitis, unspecified      Aortic valve disease      Biatrial enlargement      CAD (coronary artery disease)     known 70% LAD stenosis     Depression      GI bleeding 01/22/2016     History of bleeding peptic ulcer      Hyperlipidemia      Idiopathic cardiomyopathy (H)      Mitral regurgitation      Obesity      Palpitations      Permanent atrial fibrillation (H) 06/02/2017     Right inguinal hernia 07/22/2019     S/P AVR 01/07/2011    with Maze procedure     S/P gastric bypass      Tricuspid regurgitation      Unspecified essential hypertension       Baseline Mental status: WDL  Current Mental Status changes: at basesline    Infection present or suspected this encounter: no  Sepsis suspected: No  Isolation type: No active isolations  Patient tested for COVID 19 prior to admission: yes    Activity level - Baseline/Home:  Independent  Activity Level - Current:   Stand with Assist    Bariatric equipment needed?: No    In the ED these meds were given:   Medications   lactated ringers infusion (has no administration in time range)   acetaminophen (TYLENOL) tablet 650 mg (has no administration in time range)   oxyCODONE (ROXICODONE) tablet 5-10 mg (has no administration in time range)   HYDROmorphone (DILAUDID) injection 0.2 mg (has no administration in time range)   ondansetron (ZOFRAN-ODT)  ODT tab 4 mg (has no administration in time range)     Or   ondansetron (ZOFRAN) injection 4 mg (has no administration in time range)   senna-docusate (SENOKOT-S/PERICOLACE) 8.6-50 MG per tablet 1 tablet (has no administration in time range)     Or   senna-docusate (SENOKOT-S/PERICOLACE) 8.6-50 MG per tablet 2 tablet (has no administration in time range)   polyethylene glycol (MIRALAX) Packet 17 g (has no administration in time range)   phytonadione (AQUA-MEPHYTON) 5 mg in sodium chloride 0.9 % 50 mL intermittent infusion (has no administration in time range)   lactated ringers BOLUS 1,000 mL (0 mLs Intravenous Stopped 2/10/21 1853)   iopamidol (ISOVUE-370) solution 123 mL (123 mLs Intravenous Given 2/10/21 1930)   sodium chloride (PF) 0.9% PF flush 81 mL (91 mLs Intravenous Given 2/10/21 1931)   HYDROmorphone (PF) (DILAUDID) injection 0.5 mg (0.5 mg Intravenous Given 2/10/21 1853)   HYDROmorphone (PF) (DILAUDID) injection 0.5 mg (0.5 mg Intravenous Given 2/10/21 2003)   prothrombin 4 factor complex concentrate (KCENTRA) infusion 2,200 Units (2,200 Units Intravenous Given 2/10/21 2145)   iopamidol (ISOVUE-370) solution 123 mL (123 mLs Intravenous Given 2/10/21 2117)   sodium chloride (PF) 0.9% PF flush 91 mL (91 mLs Intravenous Given 2/10/21 2117)       Drips running?  No    Home pump  No    Current LDAs  Peripheral IV 02/10/21 Left Upper forearm (Active)   Number of days: 0       Incision/Surgical Site 02/04/21 Right Groin (Active)   Number of days: 6       Labs results:   Labs Ordered and Resulted from Time of ED Arrival Up to the Time of Departure from the ED   CBC WITH PLATELETS DIFFERENTIAL - Abnormal; Notable for the following components:       Result Value    RBC Count 3.45 (*)     Hemoglobin 10.4 (*)     Hematocrit 33.7 (*)     MCHC 30.9 (*)     RDW 15.1 (*)     Absolute Lymphocytes 0.7 (*)     All other components within normal limits   INR - Abnormal; Notable for the following components:    INR 3.12 (*)      All other components within normal limits   COMPREHENSIVE METABOLIC PANEL - Abnormal; Notable for the following components:    Glucose 138 (*)     Bilirubin Total 1.6 (*)     Albumin 3.1 (*)     Protein Total 6.5 (*)     All other components within normal limits   TROPONIN I   LACTIC ACID WHOLE BLOOD   SARS-COV-2 (COVID-19) VIRUS RT-PCR   IP ASSIGN PROVIDER TEAM TO TREATMENT TEAM   VITAL SIGNS   STRICT INTAKE AND OUTPUT   PULSE OXIMETRY NURSING   ACTIVITY   ABDOMINAL BINDER   APPLY PNEUMATIC COMPRESSION DEVICE (PCD)       Imaging Studies:   Recent Results (from the past 24 hour(s))   XR Chest Port 1 View    Narrative    EXAM: XR CHEST PORT 1 VW  2/10/2021 5:33 PM     HISTORY:  chest pain       COMPARISON:  Chest x-ray 1/31/2020 CT chest 2/15/2019.    FINDINGS:   Portable semiupright view of the chest. Aortic valve replacement with  intact median sternotomy wires. Trachea is midline. Stable enlarged  cardiac silhouette. The pulmonary vasculature is distinct. Streaky  left basilar airspace opacities. No pleural effusion or pneumothorax.  2.5 x 1.9 cm nodule in the right lung, better seen on CT of the chest  from 2/15/2019.      Impression    IMPRESSION:   1. Cardiomegaly prominent pulmonary vasculature but distinct walls,  suggestive of mild pulmonary venous congestion.  2. Streaky left basilar opacities, likely atelectasis.  3. By today's measurement the nodule in the lateral right lung has  increased. This is likely projectional.     I have personally reviewed the examination and initial interpretation  and I agree with the findings.    PASCUAL FRIEDMAN MD   CT Abdomen Pelvis w Contrast   Result Value    Radiologist flags Postoperative hematoma, multifocal active (AA)    Narrative    EXAMINATION: CT ABDOMEN PELVIS W CONTRAST, 2/10/2021 7:41 PM    TECHNIQUE:  Helical CT images from the lung bases through the  symphysis pubis were obtained with IV contrast.     Contrast dose: iopamidol (ISOVUE-370) solution 123  mL    COMPARISON: 10/7/2011 CT abdomen and pelvis    Additional History from EMR: Right inguinal hernia repair on 2/4/2021,  current INR of 3.1     HISTORY: Abdominal distension    FINDINGS:    Abdomen and pelvis: Deep to the cutaneous surface and overlying the  right lower abdomen/pelvis, there is a well-circumscribed  heterogeneously, enhancing lesion with multiple hematocrit levels and  multifocal active extravasation (series 4, images 258, 273, 340) that  extends into the right inguinal canal. Diffuse soft tissue anasarca  within the anterior abdominal wall adjacent to this lesion.    No focal hepatic lesions. Gallbladder is surgically absent with mild  degree of intrahepatic biliary dilatation with the common bile duct  measuring up to 1.3 cm. Postsurgical change of Lynda-en-Y gastric  bypass. Dilation of the pancreatic duct. The parenchyma appears  homogeneous. The spleen, adrenal glands and renal parenchyma are  normal. Multiple simple appearing, subcentimeter right-sided renal  cysts. Predominately exophytic simple cyst arising off the midpole of  the left kidney. Mildly complex cystic lesion arising off the left  renal inferior pole measuring 7.6 x 6.9 x 5.4 cm with mildly complex  wall thickening and superimposed calcifications along its inferior  most wall. Nonobstructing left calyceal nephrolithiasis. No evidence  of urinary obstruction. The bladder is normal. Small bowel is normal  caliber. Mild gaseous distention of the transverse and proximal  descending colon. No abnormal colonic wall thickening. The appendix is  normal. No free fluid in the abdomen or dependent pelvis. Prostate  gland is normal.  Numerous venous varicosities overlying the bilateral  superficial femoral veinns. Mass effect from the abdominal wall  hematoma with near complete stenosis of the distal right common iliac  vein as it crosses over the distal iliacus muscle (series 4, image  378). No pelvic or retroperitoneal  adenopathy.    Lung bases/lower chest:  Lower lung fields are clear. No airspace  consolidation, pleural effusion or pneumothorax. Cardiomegaly with  prosthetic aortic valve noted. Left atrial enlargement.    Bones and soft tissues: Age dependent, multilevel degenerative changes  within the lower thoracic and lumbar spine including bridging anterior  syndesmophytes, disc height loss, endplate sclerosis and facet  arthropathy. No suspicious osseous lesions.      Impression    IMPRESSION:   1. Collection of findings are most concerning for a right anterior  abdominal wall postoperative hematoma with multifocal areas of active  extravasation and mass effect on the right external iliac vein.   2. Mildly complex cystic lesion arising off the inferior pole of the  left kidney, recommend dedicated renal ultrasound for further  characterization.  3. Intra and extrahepatic biliary dilatation along with dilatation of  the pancreatic duct raises the concern of the sphincter dysfunction or  stone. Mass is not seen. This is increased when compared to  2/15/2019..  4. Cardiomegaly with left atrial enlargement and prosthetic aortic  valve repair.  5. Stable post surgical changes of Lynda-en-Y gastric bypass    [Critical Result: Postoperative hematoma, multifocal active  extravasation]    Finding was identified on 2/10/2021 7:47 PM.     Rehan Waggoner MD was contacted by Dr. Antonio Dolan at 2/10/2021 7:53  PM and verbalized understanding of the critical finding.    CTA Abdomen Pelvis with Contrast    Impression    IMPRESSION:  1. No significant change in the large right abdominal wall  postoperative hematoma, which demonstrates extravasation on portal  venous phase imaging. No definite feeding vessel identified,  suggesting supply from small subcutaneous venous vessels. No evidence  of arterial extravasation.  2. Mildly complex cystic lesion arising from the inferior pole of the  left kidney. Recommend dedicated renal ultrasound for  "further  evaluation.  3. Increased size of the right lower lobe pulmonary nodule compared to  CT chest on 2/15/2019. Differential diagnosis includes noncalcified  granuloma and slow-growing neoplasm.  4. Cardiomegaly with left atrial enlargement.       Recent vital signs:   /77   Pulse 107   Temp 97.8  F (36.6  C) (Oral)   Resp 18   Ht 1.753 m (5' 9\")   Wt 90.9 kg (200 lb 8 oz)   SpO2 100%   BMI 29.61 kg/m      [place] Coma Scale Score: 15 (02/10/21 1731)       Cardiac Rhythm: A fib  Pt needs tele? No  Skin/wound Issues: incision site on right abd     Code Status: Full Code    Pain control: fair    Nausea control: fair    Abnormal labs/tests/findings requiring intervention:     Family present during ED course? Yes   Family Comments/Social Situation comments: wife    Tasks needing completion: None    Cherry sosa --   [phone] [place] [facility]  [phone] [facility] ED    "

## 2021-02-11 NOTE — OP NOTE
Procedure Date: 02/11/2021      PREOPERATIVE DIAGNOSIS:  Hematoma.      POSTOPERATIVE DIAGNOSIS:  Hematoma.      PROCEDURE:  Incision and drainage of right groin hematoma.      SURGEON:  Norma Scanlon MD       ASSISTANT:  Dr. Mallika Crane MD      ANESTHESIA:  General.      ESTIMATED BLOOD LOSS:  25 mL      INDICATIONS FOR PROCEDURE:  The patient is a 65-year-old gentleman with a number of comorbidities who is 1 week out from having an open right inguinal hernia repair with mesh and has developed a growing hematoma in the area that is impinging on the right external iliac artery based on CAT scan.  Imaging does further indicate that the bleeding appears to be from a venous source as opposed to active arterial bleeding.  In light of the fact that it is putting pressure on the artery, it was recommended that he surgery.  His INR was elevated yesterday in light of him being on Coumadin for his ischemic cardiomyopathy, congestive heart failure and having had a mechanical aortic valve replacement surgery.  INR has been reversed and is 1.47.  Risks and benefits of procedure including bleeding, infection, and damage to surrounding organs were discussed with the patient.  He was understanding of the discussion and agreeable to proceed.      DESCRIPTION OF PROCEDURE:  The patient was placed in the supine position.  General endotracheal anesthesia was administered.  The area was prepped and draped in the usual sterile fashion.  A timeout was performed.  I then went in through the incision, and a copious amount of blood and coagulation came out.  This was all washed out, and we made attempts to find if there was any active bleeding.  There was no large vessel bleeding, but in a number of places there was ooze that was fairly persistent.  Eventually, by the end of the case, all the oozing had stopped with the help of cautery.  A number of times, I would stop and wait 5 minutes by the clock and recheck to see if there is  any bleeding and at the end of the case, he had good hemostasis.  We then proceeded with closure by using several 2-0 Vicryl sutures to minimize the space by attaching the underlying external oblique fascia with the overlying tissue.  It should be noted that the external oblique was intact, and the suture that had been closed a week ago was all intact with no signs of visible mesh.  Having closed the space with numerous 2-0 Vicryl sutures, I then brought the skin edges together in several layers underneath with individual 2-0 vicryl sutures and then a skin stapler to bring the skin together.  A dressing was applied.  We used an abdominal binder to wrap around his hip area.  The patient tolerated the procedure well and was taken to recovery in good condition.  Findings were discussed with the patient's wife over the phone.         KIARRA KHALIL MD             D: 2021   T: 2021   MT: DARIUSZ      Name:     CHAS STAPLES   MRN:      [id number]        Account:        [id number]   :      1955           Procedure Date: 2021      Document: [id number]

## 2021-02-11 NOTE — ANESTHESIA CARE TRANSFER NOTE
Patient: Jovon Mantilla    Procedure(s):  INCISION AND DRAINAGE OF RIGHT GROIN HEMATOMA    Diagnosis: Hematoma [T14.8XXA]  Diagnosis Additional Information: No value filed.    Anesthesia Type:   General     Note:    Oropharynx: oropharynx clear of all foreign objects and spontaneously breathing  Level of Consciousness: awake  Oxygen Supplementation: nasal cannula  Level of Supplemental Oxygen (L/min / FiO2): 4L  Independent Airway: airway patency satisfactory and stable  Dentition: dentition unchanged  Vital Signs Stable: post-procedure vital signs reviewed and stable  Report to RN Given: handoff report given  Patient transferred to: PACU    Handoff Report: Identifed the Patient, Identified the Reponsible Provider, Reviewed the pertinent medical history, Discussed the surgical course, Reviewed Intra-OP anesthesia mangement and issues during anesthesia, Set expectations for post-procedure period and Allowed opportunity for questions and acknowledgement of understanding      Vitals: (Last set prior to Anesthesia Care Transfer)  CRNA VITALS  2/11/2021 1003 - 2/11/2021 1038      2/11/2021             Pulse:  90    SpO2:  100 %        Electronically Signed By: LILLI Hubbard CRNA  February 11, 2021  10:38 AM

## 2021-02-11 NOTE — PLAN OF CARE
Alert and oriented X 4. AVSS. Denies SOB or nausea. C/o or right lower abdominal pain. Pain exacerbated by movement. Oxycodone and hydromorphone given with decrease in pain. Heading down to OR shortly for R groin hematoma evacuation. Pre-ob scrub with alicia wipes done.

## 2021-02-11 NOTE — BRIEF OP NOTE
[facility]    Brief Operative Note    Pre-operative diagnosis: Hematoma [T14.8XXA]  Post-operative diagnosis Same as pre-operative diagnosis    Procedure: Procedure(s):  INCISION AND DRAINAGE OF RIGHT GROIN HEMATOMA  Surgeon: Surgeon(s) and Role:     * Norma Scanlon MD - Primary     * Mallika Crane MD - Resident - Assisting  Anesthesia: General   Estimated blood loss: 25 mL  Drains: None  Specimens: none  Findings:   Very large hematoma evacuated.  Oozing from multiple places.  This stopped with cautery.  Several times I waited 5 minutes by the clock to reassess for any new bleeding.  Hemostasis obtained when closure started.  Several individual 2-0 vicryl sutures used to minimize space by attaching the underlying external oblique to overlying tissue.  Care taken to not incorporate any nerves into the knot.  Wound came together with no tension with several more 2-0 nylon sutures.  Skin closed with skin stapler.  Complications: None.  Implants: None.

## 2021-02-12 LAB
ANION GAP SERPL CALCULATED.3IONS-SCNC: 2 MMOL/L (ref 3–14)
BUN SERPL-MCNC: 31 MG/DL (ref 7–30)
CALCIUM SERPL-MCNC: 8.8 MG/DL (ref 8.5–10.1)
CHLORIDE SERPL-SCNC: 107 MMOL/L (ref 94–109)
CO2 SERPL-SCNC: 30 MMOL/L (ref 20–32)
CREAT SERPL-MCNC: 0.76 MG/DL (ref 0.66–1.25)
ERYTHROCYTE [DISTWIDTH] IN BLOOD BY AUTOMATED COUNT: 15.1 % (ref 10–15)
GFR SERPL CREATININE-BSD FRML MDRD: >90 ML/MIN/{1.73_M2}
GLUCOSE SERPL-MCNC: 103 MG/DL (ref 70–99)
HCT VFR BLD AUTO: 29.9 % (ref 40–53)
HGB BLD-MCNC: 9.4 G/DL (ref 13.3–17.7)
INR PPP: 1.32 (ref 0.86–1.14)
MCH RBC QN AUTO: 30.7 PG (ref 26.5–33)
MCHC RBC AUTO-ENTMCNC: 31.4 G/DL (ref 31.5–36.5)
MCV RBC AUTO: 98 FL (ref 78–100)
PLATELET # BLD AUTO: 224 10E9/L (ref 150–450)
POTASSIUM SERPL-SCNC: 4.4 MMOL/L (ref 3.4–5.3)
RBC # BLD AUTO: 3.06 10E12/L (ref 4.4–5.9)
SODIUM SERPL-SCNC: 140 MMOL/L (ref 133–144)
WBC # BLD AUTO: 11.2 10E9/L (ref 4–11)

## 2021-02-12 PROCEDURE — 85610 PROTHROMBIN TIME: CPT

## 2021-02-12 PROCEDURE — 250N000013 HC RX MED GY IP 250 OP 250 PS 637: Performed by: SURGERY

## 2021-02-12 PROCEDURE — 99232 SBSQ HOSP IP/OBS MODERATE 35: CPT | Performed by: INTERNAL MEDICINE

## 2021-02-12 PROCEDURE — 258N000003 HC RX IP 258 OP 636: Performed by: STUDENT IN AN ORGANIZED HEALTH CARE EDUCATION/TRAINING PROGRAM

## 2021-02-12 PROCEDURE — 99207 PR CDG-MDM COMPONENT: MEETS MODERATE - DOWN CODED: CPT | Performed by: INTERNAL MEDICINE

## 2021-02-12 PROCEDURE — 250N000013 HC RX MED GY IP 250 OP 250 PS 637: Performed by: STUDENT IN AN ORGANIZED HEALTH CARE EDUCATION/TRAINING PROGRAM

## 2021-02-12 PROCEDURE — 250N000011 HC RX IP 250 OP 636

## 2021-02-12 PROCEDURE — 120N000002 HC R&B MED SURG/OB UMMC

## 2021-02-12 PROCEDURE — 85027 COMPLETE CBC AUTOMATED: CPT

## 2021-02-12 PROCEDURE — 80048 BASIC METABOLIC PNL TOTAL CA: CPT

## 2021-02-12 PROCEDURE — 250N000011 HC RX IP 250 OP 636: Performed by: STUDENT IN AN ORGANIZED HEALTH CARE EDUCATION/TRAINING PROGRAM

## 2021-02-12 PROCEDURE — 36415 COLL VENOUS BLD VENIPUNCTURE: CPT

## 2021-02-12 RX ORDER — BISACODYL 5 MG
5 TABLET, DELAYED RELEASE (ENTERIC COATED) ORAL DAILY PRN
Status: DISCONTINUED | OUTPATIENT
Start: 2021-02-12 | End: 2021-02-13 | Stop reason: HOSPADM

## 2021-02-12 RX ORDER — BISACODYL 5 MG
10 TABLET, DELAYED RELEASE (ENTERIC COATED) ORAL DAILY PRN
Status: DISCONTINUED | OUTPATIENT
Start: 2021-02-12 | End: 2021-02-13 | Stop reason: HOSPADM

## 2021-02-12 RX ORDER — POLYETHYLENE GLYCOL 3350 17 G/17G
17 POWDER, FOR SOLUTION ORAL DAILY PRN
Status: DISCONTINUED | OUTPATIENT
Start: 2021-02-12 | End: 2021-02-13 | Stop reason: HOSPADM

## 2021-02-12 RX ORDER — WARFARIN SODIUM 5 MG/1
5 TABLET ORAL
Status: DISCONTINUED | OUTPATIENT
Start: 2021-02-12 | End: 2021-02-12

## 2021-02-12 RX ORDER — WARFARIN SODIUM 7.5 MG/1
7.5 TABLET ORAL
Status: COMPLETED | OUTPATIENT
Start: 2021-02-12 | End: 2021-02-12

## 2021-02-12 RX ORDER — BISACODYL 5 MG
15 TABLET, DELAYED RELEASE (ENTERIC COATED) ORAL DAILY PRN
Status: DISCONTINUED | OUTPATIENT
Start: 2021-02-12 | End: 2021-02-13 | Stop reason: HOSPADM

## 2021-02-12 RX ADMIN — SODIUM CHLORIDE, POTASSIUM CHLORIDE, SODIUM LACTATE AND CALCIUM CHLORIDE: 600; 310; 30; 20 INJECTION, SOLUTION INTRAVENOUS at 16:26

## 2021-02-12 RX ADMIN — OXYCODONE HYDROCHLORIDE 5 MG: 5 TABLET ORAL at 07:51

## 2021-02-12 RX ADMIN — DOCUSATE SODIUM 50 MG AND SENNOSIDES 8.6 MG 2 TABLET: 8.6; 5 TABLET, FILM COATED ORAL at 19:40

## 2021-02-12 RX ADMIN — OXYCODONE HYDROCHLORIDE 5 MG: 5 TABLET ORAL at 14:46

## 2021-02-12 RX ADMIN — ENOXAPARIN SODIUM 90 MG: 100 INJECTION SUBCUTANEOUS at 18:25

## 2021-02-12 RX ADMIN — DIGOXIN 125 MCG: 125 TABLET ORAL at 07:52

## 2021-02-12 RX ADMIN — OXYCODONE HYDROCHLORIDE 5 MG: 5 TABLET ORAL at 19:40

## 2021-02-12 RX ADMIN — LEVOTHYROXINE SODIUM 200 MCG: 200 TABLET ORAL at 07:52

## 2021-02-12 RX ADMIN — SERTRALINE HYDROCHLORIDE 50 MG: 25 TABLET ORAL at 07:52

## 2021-02-12 RX ADMIN — CARVEDILOL 25 MG: 25 TABLET, FILM COATED ORAL at 18:24

## 2021-02-12 RX ADMIN — HYDROMORPHONE HYDROCHLORIDE 0.2 MG: 0.2 INJECTION, SOLUTION INTRAMUSCULAR; INTRAVENOUS; SUBCUTANEOUS at 05:31

## 2021-02-12 RX ADMIN — CARVEDILOL 25 MG: 25 TABLET, FILM COATED ORAL at 07:52

## 2021-02-12 RX ADMIN — OXYCODONE HYDROCHLORIDE 5 MG: 5 TABLET ORAL at 11:30

## 2021-02-12 RX ADMIN — WARFARIN SODIUM 7.5 MG: 7.5 TABLET ORAL at 18:24

## 2021-02-12 RX ADMIN — SIMVASTATIN 20 MG: 20 TABLET, FILM COATED ORAL at 22:13

## 2021-02-12 RX ADMIN — OXYCODONE HYDROCHLORIDE 10 MG: 5 TABLET ORAL at 23:11

## 2021-02-12 RX ADMIN — SODIUM CHLORIDE, POTASSIUM CHLORIDE, SODIUM LACTATE AND CALCIUM CHLORIDE: 600; 310; 30; 20 INJECTION, SOLUTION INTRAVENOUS at 03:53

## 2021-02-12 ASSESSMENT — MIFFLIN-ST. JEOR: SCORE: 1710.06

## 2021-02-12 ASSESSMENT — ACTIVITIES OF DAILY LIVING (ADL)
ADLS_ACUITY_SCORE: 13
ADLS_ACUITY_SCORE: 11

## 2021-02-12 NOTE — PLAN OF CARE
A&Ox4. VSS. C/o  surgical pain late in the shift. 10 mg oxycodone given to help relieve pain. Denies N/V and SOB. Capnography monitoring at bedside. Sating >95% on 1L O2. Incision site C/D/I. Bruising present. Lap binder in use. 600 mL rosas red tinged output. No BM this shift. Walker at bedside, standby assist. Restarted on warfarin tonight. IRN in AM. Continue with POC.

## 2021-02-12 NOTE — PROGRESS NOTES
[facility]    Medicine Progress Note - Hospitalist Service, Gold 8       Date of Admission:  2/10/2021  Assessment & Plan       Jovon Mantilla is a 65 year old male with PMH of atrial fibrillation on warfarin, aortic sclerosis s/p AVR, HFrEF (EF 45-50, 6/4/20) 2/2 ICM, HTN, HLD who is now POD6 s/p right inguinal hernia repair who presented with enlarging R groin hematoma with an INR of 3.2 with imaging noting iliac vein bleed.    #S/p R Inguinal Repair c/b hematoma now s/p evacuation   #Acute normocytic anemia  #Hyperbilirubinemia  #Post operative hematoma  Patient presenting with hematoma in setting of recent R inguinal hernia repair. Baseline ~12. Hyperbilirubinemia likely d/t hematoma.  - S/p reversal of INR  - S/p Hematoma evacuation   - Monitor daily CBC  - Rest per Surgery      #Atrial fibrillation, persistent, s/p MAZE procedure  #Severe aortic stenosis s/p mechanical valve replace (24mm ATS supra-annular valve)   At visit with prior cardiologist, was recommended for bridging given his persistent AF and mechanical aortic valve. CHADSVASC 3 (reports of prior clot history after AVR but no other DVT/PE)  - Rate control: continue coreg, digoxin  - Warfarin restarted, recommended Lovenox 1g/kg BID while INR is subtherapeutic (Goal 2-3) if ok with the surgery team     # Non-severe malnutrition   - Dietitian following      Chronic medical conditions  #Hypothyoidism: continue synthroid  #Allergies: continue allegra; can consider albuterol PRN  #depression: continue zoloft  #HTN: hold lisinopril  #HLD: continue zocor       Diet: Regular Diet Adult  Snacks/Supplements Adult: Other; Per pt's request.; With Meals    DVT Prophylaxis: Enoxaparin (Lovenox) SQ  Del Rosario Catheter: not present  Code Status: Full Code           Disposition Plan   - Per surgery but expect 1-3 days, can discharge on Lovenox if INR <2    Entered: Betito Tariq MD 02/12/2021, 2:35 PM       Betito FARFAN  MD Chandni  Hospitalist Service, [address]  Contact information available via [facility] Paging/Directory  Please see sign in/sign out for up to date coverage information  ______________________________________________________________________    Interval History   No acute events overnight, denies significant pain, no nausea or vomiting    4 point ROS otherwise negative     Data reviewed today: I reviewed all medications, new labs and imaging results over the last 24 hours. I personally reviewed no images or EKG's today.    Physical Exam   Vital Signs: Temp: 98.4  F (36.9  C) Temp src: Oral BP: 113/59 Pulse: 84   Resp: 18 SpO2: 95 % O2 Device: None (Room air) Oxygen Delivery: 1 LPM  Weight: 203 lbs 12.8 oz  Constitutional: Awake, alert, cooperative, no apparent distress  Respiratory: Clear to auscultation bilaterally  Cardiovascular: Regular, mechanical S2 heard   GI: Normal bowel sounds, soft, non-distended, non-tender  Skin/Integumen: Inguinal and pelvic ecchymosis       Data   Recent Labs   Lab 02/12/21  0619 02/11/21  0545 02/11/21  0146 02/10/21  1720   WBC 11.2* 9.6  --  10.5   HGB 9.4* 9.2* 9.7* 10.4*   MCV 98 96  --  98    190  --  191   INR 1.32* 1.47*  --  3.12*    140  --  139   POTASSIUM 4.4 3.9  --  4.5   CHLORIDE 107 106  --  105   CO2 30 31  --  31   BUN 31* 26  --  27   CR 0.76 0.72  --  0.95   ANIONGAP 2* 4  --  3   REECE 8.8 9.0  --  9.2   * 89  --  138*   ALBUMIN  --   --   --  3.1*   PROTTOTAL  --   --   --  6.5*   BILITOTAL  --   --   --  1.6*   ALKPHOS  --   --   --  86   ALT  --   --   --  23   AST  --   --   --  28   TROPI  --   --   --  <0.015     No results found for this or any previous visit (from the past 24 hour(s)).

## 2021-02-12 NOTE — PROGRESS NOTES
"General Surgery Progress Note    Subjective: Passing flatus. Urinating postop. No bowel movements. Will try ambulating today. Pain well-controlled.    Objective:   /73 (BP Location: Right arm)   Pulse 88   Temp 96.7  F (35.9  C) (Oral)   Resp 17   Ht 1.753 m (5' 9.02\")   Wt 92.1 kg (203 lb)   SpO2 95%   BMI 29.96 kg/m      PE:  Gen: No acute distress  CV: Regular rate and non-cyanotic appearing   Resp: non-labored breathing on 1 LPM supplemental O2.  Abd: Soft, non-distended, appropriately tender, no rigidity or guarding. Assoc deep purple bruising near the site of the I and D. Incision site healing appropriately.   Ext: warm and well perfused  Incision: c/d/i      Intake/Output Summary (Last 24 hours) at 2/11/2021 0711  Last data filed at 2/11/2021 0240  Gross per 24 hour   Intake 50 ml   Output 275 ml   Net -225 ml       Labs: INR 1.32 (1.47), WBC 11.2 (9.6), Hgb 9.4 (9.2)    Recent imaging reviewed.    A/P: Jovon Mantilla is a 65 year old male, who is status post right inguinal hernia repair on 2/4/21. Presented 2/10 for right inguinal hematoma. S/p I&D on 2/11.     - Restarted warfarin on 2/11 postop. In discussing with pt, will plan to discharge tomorrow after updated INR   - Pain control w/ dilaudid, oxycodone, tylenol prn    Yeni Gracia, MS3    I examined the patient with the medical student and agree with the documentation as above. Start therapeutic lovenox as per Med recs    Mallika Crane MD  Chief Resident (General Surgery)  PGY 5      "

## 2021-02-12 NOTE — PLAN OF CARE
[id number]  Alert and oriented x4. Stand by assist. VSS. Incision site c/d/I. Hematoma site slightly hard, however looks to be healing. INR not therapeutic. Plan for discharge tomorrow. Great appetite. Will continue to monitor.

## 2021-02-12 NOTE — PLAN OF CARE
Alert and oriented X 4. AVSS. SpO2 mid 90s on oxygen at 1 LPM/NC. Denies SOB. Denied pain and declined pain medication most of the night. This morning he reports pressure-like pain at surgical incision. 0.2 mg of IV dilaudid given. Encouraged pt to take oral pain medications to keep pain under control. Pt verbalized understanding. Dressing intact over right abdominal incision. Marked drainage unchanged. Denies numbness or tingling in RLE. Pedal pulses palpable. He is planning to ambulate this morning after breakfast.

## 2021-02-13 ENCOUNTER — PATIENT OUTREACH (OUTPATIENT)
Dept: CARE COORDINATION | Facility: CLINIC | Age: 66
End: 2021-02-13

## 2021-02-13 VITALS
WEIGHT: 203.8 LBS | BODY MASS INDEX: 30.18 KG/M2 | HEIGHT: 69 IN | SYSTOLIC BLOOD PRESSURE: 109 MMHG | HEART RATE: 77 BPM | RESPIRATION RATE: 18 BRPM | OXYGEN SATURATION: 93 % | TEMPERATURE: 96.4 F | DIASTOLIC BLOOD PRESSURE: 65 MMHG

## 2021-02-13 LAB
ERYTHROCYTE [DISTWIDTH] IN BLOOD BY AUTOMATED COUNT: 15.2 % (ref 10–15)
GLUCOSE BLDC GLUCOMTR-MCNC: 91 MG/DL (ref 70–99)
HCT VFR BLD AUTO: 30.9 % (ref 40–53)
HGB BLD-MCNC: 9.7 G/DL (ref 13.3–17.7)
INR PPP: 1.43 (ref 0.86–1.14)
MCH RBC QN AUTO: 31.1 PG (ref 26.5–33)
MCHC RBC AUTO-ENTMCNC: 31.4 G/DL (ref 31.5–36.5)
MCV RBC AUTO: 99 FL (ref 78–100)
PLATELET # BLD AUTO: 270 10E9/L (ref 150–450)
RBC # BLD AUTO: 3.12 10E12/L (ref 4.4–5.9)
WBC # BLD AUTO: 8.9 10E9/L (ref 4–11)

## 2021-02-13 PROCEDURE — 999N001017 HC STATISTIC GLUCOSE BY METER IP

## 2021-02-13 PROCEDURE — 99232 SBSQ HOSP IP/OBS MODERATE 35: CPT | Performed by: INTERNAL MEDICINE

## 2021-02-13 PROCEDURE — 85027 COMPLETE CBC AUTOMATED: CPT

## 2021-02-13 PROCEDURE — 250N000013 HC RX MED GY IP 250 OP 250 PS 637: Performed by: STUDENT IN AN ORGANIZED HEALTH CARE EDUCATION/TRAINING PROGRAM

## 2021-02-13 PROCEDURE — 250N000011 HC RX IP 250 OP 636

## 2021-02-13 PROCEDURE — 99207 PR CDG-CUT & PASTE-POTENTIAL IMPACT ON LEVEL: CPT | Performed by: INTERNAL MEDICINE

## 2021-02-13 PROCEDURE — 258N000003 HC RX IP 258 OP 636: Performed by: STUDENT IN AN ORGANIZED HEALTH CARE EDUCATION/TRAINING PROGRAM

## 2021-02-13 PROCEDURE — 36415 COLL VENOUS BLD VENIPUNCTURE: CPT

## 2021-02-13 PROCEDURE — 85610 PROTHROMBIN TIME: CPT

## 2021-02-13 RX ORDER — BISACODYL 5 MG
5 TABLET, DELAYED RELEASE (ENTERIC COATED) ORAL DAILY PRN
Qty: 30 TABLET | Refills: 0 | Status: SHIPPED | OUTPATIENT
Start: 2021-02-13 | End: 2021-08-05

## 2021-02-13 RX ORDER — AMOXICILLIN 250 MG
1 CAPSULE ORAL 2 TIMES DAILY
Qty: 30 TABLET | Refills: 0 | Status: SHIPPED | OUTPATIENT
Start: 2021-02-13 | End: 2021-08-05

## 2021-02-13 RX ORDER — OXYCODONE HYDROCHLORIDE 5 MG/1
5-10 TABLET ORAL EVERY 6 HOURS PRN
Qty: 20 TABLET | Refills: 0 | Status: SHIPPED | OUTPATIENT
Start: 2021-02-13 | End: 2021-02-18

## 2021-02-13 RX ORDER — WARFARIN SODIUM 7.5 MG/1
7.5 TABLET ORAL
Status: DISCONTINUED | OUTPATIENT
Start: 2021-02-13 | End: 2021-02-13 | Stop reason: HOSPADM

## 2021-02-13 RX ADMIN — DIGOXIN 125 MCG: 125 TABLET ORAL at 08:16

## 2021-02-13 RX ADMIN — LEVOTHYROXINE SODIUM 200 MCG: 200 TABLET ORAL at 08:10

## 2021-02-13 RX ADMIN — DOCUSATE SODIUM 50 MG AND SENNOSIDES 8.6 MG 2 TABLET: 8.6; 5 TABLET, FILM COATED ORAL at 08:09

## 2021-02-13 RX ADMIN — CARVEDILOL 25 MG: 25 TABLET, FILM COATED ORAL at 08:17

## 2021-02-13 RX ADMIN — SERTRALINE HYDROCHLORIDE 50 MG: 25 TABLET ORAL at 08:10

## 2021-02-13 RX ADMIN — OXYCODONE HYDROCHLORIDE 5 MG: 5 TABLET ORAL at 08:09

## 2021-02-13 RX ADMIN — SODIUM CHLORIDE, POTASSIUM CHLORIDE, SODIUM LACTATE AND CALCIUM CHLORIDE: 600; 310; 30; 20 INJECTION, SOLUTION INTRAVENOUS at 06:03

## 2021-02-13 RX ADMIN — ENOXAPARIN SODIUM 90 MG: 100 INJECTION SUBCUTANEOUS at 05:47

## 2021-02-13 ASSESSMENT — ACTIVITIES OF DAILY LIVING (ADL)
ADLS_ACUITY_SCORE: 13

## 2021-02-13 NOTE — PLAN OF CARE
0700 - 1530: AVSS ex soft SBP 97 - 109, c/o incisional pain at 3 managed with oxycodone 5mg w/ decrease in pain. Abdominal binder on, no drainage from incision.   Good appetite, ate 75%. PIV on LR 75ml/hr.  Voiding in urinal w/ adequate UOP, last bm was 02/08, not passing gas, gave scheduled 2 senna.  Pt possibly discharging this evening.

## 2021-02-13 NOTE — DISCHARGE SUMMARY
"[facility]   General Surgery Discharge Summary    Date of Admission: 2/10/2021  Date of Discharge: 02/13/21      Admission Diagnosis:  Abdominal wall hematoma, initial encounter [S30.1XXA]    Discharge Diagnosis:  1. Same as above    Consultations:  INTERNAL MEDICINE ADULT IP CONSULT FOR [employer]  PHARMACY TO DOSE WARFARIN    Procedures:  Incision and drainage of right groin hematoma.     Brief HPI:  Jovon Mantilla is a 65 year old male with history of A. fib w/ RVR on warfarin, aortic sclerosis status post AVR 2011, CAD, ischemic cardiomyopathy, CHF, hypertension, hyperlipidemia, gastric bypass, prior small bowel obstruction, who is s/p open RIHR 2/4/2021 with Dr. Alex, who presents with an enlarging massive right groin hematoma with active venous bleeding on CTA.     Hospital Course:  The patient was admitted and underwent the above procedure. The patient tolerated the procedure well. There were no complications. The patient's diet was slowly advanced as bowel function returned. Pain was controlled with oral pain medication and the patient was able to ambulate and void without difficulty. His Warfarin was restarted on POD #1 with Lovenox bridging. The patient received appropriate education post operatively. On February 13, 2021 the patient was discharged to home.     Discharge Physical Exam:  BP 97/60 (BP Location: Right arm)   Pulse 84   Temp 98.4  F (36.9  C) (Oral)   Resp 16   Ht 1.753 m (5' 9.02\")   Wt 92.4 kg (203 lb 12.8 oz)   SpO2 94%   BMI 30.08 kg/m      Gen: No acute distress  CV: Regular rate and non-cyanotic appearing   Resp: non-labored breathing on 1 LPM supplemental O2.  Abd: Soft, non-distended, appropriately tender, no rigidity or guarding. Assoc deep purple bruising near the site of the I and D. Incision site healing appropriately.   Ext: warm and well perfused  Incision: c/d/i    Meds:       Review of your medicines      START taking      Dose / " Directions   bisacodyl 5 MG EC tablet      Dose: 5 mg  Take 1 tablet (5 mg) by mouth daily as needed for constipation  Quantity: 30 tablet  Refills: 0        CONTINUE these medicines which may have CHANGED, or have new prescriptions. If we are uncertain of the size of tablets/capsules you have at home, strength may be listed as something that might have changed.      Dose / Directions   enoxaparin ANTICOAGULANT 100 MG/ML syringe  Commonly known as: LOVENOX  This may have changed:     when to take this    additional instructions      Dose: 1 mg/kg  Inject 0.9 mLs (90 mg) Subcutaneous every 12 hours  Quantity: 10 mL  Refills: 0     oxyCODONE 5 MG tablet  Commonly known as: ROXICODONE  This may have changed:     how much to take    reasons to take this      Dose: 5-10 mg  Take 1-2 tablets (5-10 mg) by mouth every 6 hours as needed for pain  Quantity: 20 tablet  Refills: 0     senna-docusate 8.6-50 MG tablet  Commonly known as: SENOKOT-S/PERICOLACE  This may have changed: how much to take      Dose: 1 tablet  Take 1 tablet by mouth 2 times daily  Quantity: 30 tablet  Refills: 0     warfarin ANTICOAGULANT 4 MG tablet  Commonly known as: COUMADIN  This may have changed: See the new instructions.  Used for: Long term current use of anticoagulant therapy      Take as directed. If you are unsure how to take this medication, talk to your nurse or doctor.  Original instructions: TAKE 1 TABLET MONDAY, WEDNESDAY, AND FRIDAY. TAKE ONE AND ONE-HALF TABLETS ALL OTHER DAYS OF THE WEEK  Quantity: 117 tablet  Refills: 3        CONTINUE these medicines which have NOT CHANGED      Dose / Directions   ALLERGY 24-HR PO      Dose: 180 mg  Take 180 mg by mouth daily  Refills: 0     aspirin 81 MG tablet  Commonly known as: ASA      Dose: 81 mg  Take 81 mg by mouth every other day  Refills: 0     Calcium 600+D 600-200 MG-UNIT Tabs  Generic drug: calcium carbonate-vitamin D      Dose: 1 tablet  Take 1 tablet by mouth 2 times daily  Refills:  0     carvedilol 25 MG tablet  Commonly known as: COREG  Used for: ASVD (arteriosclerotic vascular disease)      Dose: 25 mg  Take 1 tablet (25 mg) by mouth 2 times daily (with meals)  Quantity: 180 tablet  Refills: 2     digoxin 125 MCG tablet  Commonly known as: LANOXIN  Used for: Chronic atrial fibrillation (H)      Dose: 125 mcg  Take 1 tablet (125 mcg) by mouth daily  Quantity: 90 tablet  Refills: 3     Fiber 0.52 g Caps      Dose: 2 capsule  Take 2 capsules by mouth At Bedtime  Refills: 0     * HYDROcodone-acetaminophen 5-325 MG tablet  Commonly known as: NORCO  Used for: Right inguinal hernia      Dose: 1-2 tablet  Take 1-2 tablets by mouth every 4 hours as needed for moderate to severe pain  Quantity: 15 tablet  Refills: 0     * HYDROcodone-acetaminophen 5-325 MG tablet  Commonly known as: Norco  Used for: Right inguinal hernia      Dose: 1 tablet  Take 1 tablet by mouth every 6 hours as needed for severe pain  Quantity: 15 tablet  Refills: 0     levothyroxine 200 MCG tablet  Commonly known as: SYNTHROID/LEVOTHROID  Used for: Acquired hypothyroidism      Dose: 200 mcg  TAKE 1 TABLET (200 MCG) BY MOUTH DAILY SEE DOCTOR IN MARCH  Quantity: 30 tablet  Refills: 0     lisinopril 20 MG tablet  Commonly known as: ZESTRIL  Used for: ASVD (arteriosclerotic vascular disease)      Take  20 mg (1 tablet in am) and 10 mg  (one half tablet) in  pm  Quantity: 135 tablet  Refills: 3     multivitamin chewable tablet      Dose: 1 tablet  Take 1 tablet by mouth daily  Refills: 0     nitroGLYcerin 0.3 MG sublingual tablet  Commonly known as: NITROSTAT  Used for: H/O aortic valve replacement, SOBOE (shortness of breath on exertion), ASVD (arteriosclerotic vascular disease)      For chest pain place 1 tablet under the tongue every 5 minutes for 3 doses. If symptoms persist 5 minutes after 1st dose call 911.  Quantity: 25 tablet  Refills: 1     sertraline 50 MG tablet  Commonly known as: ZOLOFT  Used for: Anxiety      Dose: 50  mg  Take 1 tablet (50 mg) by mouth daily  Quantity: 90 tablet  Refills: 3     simvastatin 20 MG tablet  Commonly known as: ZOCOR  Used for: Hyperlipidemia LDL goal <70      TAKE 1 TABLET BY MOUTH AT BEDTIME  Quantity: 90 tablet  Refills: 2     trolamine salicylate 10 % external cream  Commonly known as: ASPERCREME      Apply topically as needed for moderate pain  Refills: 0     Vitamin B-12 500 MCG Subl      Dose: 500 mcg  Place 500 mcg under the tongue daily  Refills: 0     VITAMIN C PO      Dose: 500 mg  Take 500 mg by mouth At Bedtime  Refills: 0     vitamin D 50 MCG (2000 UT) Caps      Dose: 1 tablet  Take 1 tablet by mouth daily  Refills: 0         * This list has 2 medication(s) that are the same as other medications prescribed for you. Read the directions carefully, and ask your doctor or other care provider to review them with you.               Where to get your medicines      These medications were sent to [facility] - [place], [place] - [address]  [address], [place] [address]    Phone: [phone]     bisacodyl 5 MG EC tablet    enoxaparin ANTICOAGULANT 100 MG/ML syringe    senna-docusate 8.6-50 MG tablet     Some of these will need a paper prescription and others can be bought over the counter. Ask your nurse if you have questions.    Bring a paper prescription for each of these medications    oxyCODONE 5 MG tablet         Additional instructions:  After Care     Future Labs/Procedures    Discharge Instructions     Comments:    Do not drive on narcotic pain medications. Please seek treatment if you develop a fever over 101.4F, if your groin bulge grows in size, or if you feel new onset dizziness or weakness.     Please follow up with Anticoagulation clinic on Monday, 02/15/2021 for your next INR check. Continue your enoxaparin (Lovenox) injections twice daily until your INR is therapeutic (betweem 2.5 and 3.5).    Wound care and dressings     Comments:     Instructions to care for your wound at home: keep wound clean and dry. Your incision is closed with staples. These will be removed at your post-operative visit with general surgery. Do not submerge in water. You may shower and let soapy water run over your incisions, pat dry.            Follow Up:    -Follow up with general surgery 1-2 week(s) after discharge.   -Follow up in Anticoagulation clinic on Monday, 02/15/2021  -Follow up with PCP within 1 week    GENERAL SURGERY PATIENTS: Call [phone] for scheduling needs or to reach your care team    The patient was discussed with the chief resident and staff on the day of discharge     Ramana Redd MD  Surgery PGY-1          '

## 2021-02-13 NOTE — PLAN OF CARE
2300 - 0700  VS stable, afebrile. Pt denied pain/SOB/n/v. Bowel sounds WDL, pt states he is not yet passing gas. Abdominal binder on, no drainage. Lovenox given as scheduled. LR infusing 75cc as ordered. No significant events, continue POC.

## 2021-02-13 NOTE — PLAN OF CARE
A&Ox4. VSS. Pain controlled with 5 mg prn oxycodone given x1 and 10 mg x1 at bedtime.  Denies N/V and SOB. Urinating using beside urinal. No BM this shift. MD paged for other stool softeners. Up with standby assist. Incisional dressing C/D/I. Abdominal binder on soiled yellow due to gas. Ordered new abdominal binder. Pt noted increase in swelling on shaft of penis and in scrotum. INR 1.32 today. Potentially discharging tomorrow on Lovenox once INR therapuetic, >2. Continue to monitor.

## 2021-02-13 NOTE — PROGRESS NOTES
[facility]    Medicine Progress Note - Hospitalist Service, Gold 8       Date of Admission:  2/10/2021  Assessment & Plan         Jovon Mantilla is a 65 year old male with PMH of atrial fibrillation on warfarin, aortic sclerosis s/p AVR, HFrEF (EF 45-50, 6/4/20) 2/2 ICM, HTN, HLD who is now POD6 s/p right inguinal hernia repair who presented with enlarging R groin hematoma with an INR of 3.2 with imaging noting iliac vein bleed.    #S/p R Inguinal Repair c/b hematoma now s/p evacuation   #Acute normocytic anemia  #Hyperbilirubinemia  #Post operative hematoma  Patient presenting with hematoma in setting of recent R inguinal hernia repair. Baseline ~12. Hyperbilirubinemia likely d/t hematoma.  - S/p reversal of INR  - S/p Hematoma evacuation   - Monitor daily CBC  - Rest per Surgery      #Atrial fibrillation, persistent, s/p MAZE procedure  #Severe aortic stenosis s/p mechanical valve replace (24mm ATS supra-annular valve)   At visit with prior cardiologist, was recommended for bridging given his persistent AF and mechanical aortic valve. CHADSVASC 3 (reports of prior clot history after AVR but no other DVT/PE)  - Rate control: continue coreg, digoxin  - Warfarin restarted, cont Lovenox 1g/kg BID while INR is subtherapeutic (Goal 2-3)  - If patient is to discharge today would give him three days of Lovenox, repeat INR in 3 days    # Non-severe malnutrition   - Dietitian following      Chronic medical conditions  #Hypothyoidism: continue synthroid  #Allergies: continue allegra; can consider albuterol PRN  #depression: continue zoloft  #HTN: hold lisinopril  #HLD: continue zocor       Diet: Regular Diet Adult  Snacks/Supplements Adult: Other; Per pt's request.; With Meals    DVT Prophylaxis: Enoxaparin (Lovenox) SQ  Del Rosario Catheter: not present  Code Status: Full Code           Disposition Plan   - Per surgery but expect 1-3 days, can discharge on Lovenox if INR  <2    Entered: Betito Tariq MD 02/13/2021, 1:13 PM       Betito Tariq MD  Hospitalist Service, [address]  Contact information available via [facility] Paging/Directory  Please see sign in/sign out for up to date coverage information  ______________________________________________________________________    Interval History   No acute events overnight, denies pain, had a good night sleep, Hgb stable     4 point ROS otherwise negative     Data reviewed today: I reviewed all medications, new labs and imaging results over the last 24 hours. I personally reviewed no images or EKG's today.    Physical Exam   Vital Signs: Temp: 96.4  F (35.8  C) Temp src: Oral BP: 109/65 Pulse: 77   Resp: 18 SpO2: 93 % O2 Device: None (Room air)    Weight: 203 lbs 12.8 oz  Constitutional: Awake, alert, cooperative, no apparent distress  Respiratory: Clear to auscultation bilaterally  Cardiovascular: Regular, mechanical S2 heard   GI: Normal bowel sounds, soft, non-distended, non-tender  Skin/Integumen: Inguinal and pelvic ecchymosis       Data   Recent Labs   Lab 02/13/21  0751 02/12/21  0619 02/11/21  0545 02/10/21  1720 02/10/21  1720   WBC 8.9 11.2* 9.6  --  10.5   HGB 9.7* 9.4* 9.2*   < > 10.4*   MCV 99 98 96  --  98    224 190  --  191   INR 1.43* 1.32* 1.47*  --  3.12*   NA  --  140 140  --  139   POTASSIUM  --  4.4 3.9  --  4.5   CHLORIDE  --  107 106  --  105   CO2  --  30 31  --  31   BUN  --  31* 26  --  27   CR  --  0.76 0.72  --  0.95   ANIONGAP  --  2* 4  --  3   REECE  --  8.8 9.0  --  9.2   GLC  --  103* 89  --  138*   ALBUMIN  --   --   --   --  3.1*   PROTTOTAL  --   --   --   --  6.5*   BILITOTAL  --   --   --   --  1.6*   ALKPHOS  --   --   --   --  86   ALT  --   --   --   --  23   AST  --   --   --   --  28   TROPI  --   --   --   --  <0.015    < > = values in this interval not displayed.     No results found for this or any previous visit (from the past 24  hour(s)).

## 2021-02-15 ENCOUNTER — PATIENT OUTREACH (OUTPATIENT)
Dept: SURGERY | Facility: CLINIC | Age: 66
End: 2021-02-15

## 2021-02-15 NOTE — PROGRESS NOTES
RN Post-Op/Post-Discharge Care Coordination Note    Mr. Joovn Mantilla is a 65 year old male who underwent I&D of a groin hematoma on 2/11 with  Dr. Norma Scanlon. He was recently discharged from the hospital. Spoke with Patient.    Support  Patient able to care for self independently. Spouse is available.     Health Status  Fevers/chills: Patient denies any fever or chills.  Nausea/Vomiting: Patient denies nausea/vomiting.  Eating/drinking: Patient is able to eat and drink without any complaints.  Bowel habits: Patient reports having a normal bowel movement. Patient is taking Senna.  Drains (ALEJANDRO): N/A  Incisions: Patient denies any signs and symptoms of infection..  Wound closure:  Staples  Pain: Improving.  He is taking Oxycodone PRN.  Using local heat protecting skin from injury.  Reports scrotal edema and ecchymosis- athletic supporter uncomfortable. May wear snug briefs or spandex biking shorts.  New Medications:  Coumadin (taking), Lovenox BID (will take through tomorrow morning and will wait for INR results/instruction), Sulcolox, Senna, Oxycodone.    INR arranged for tomorrow, 2/16.  Could not get an appointment today.    Activity/Restrictions  No lifting in excess of 15-20 pounds for 3-4 weeks    Equipment  None    Pathology reviewed with patient:  N/A    Forms/Letters  No    All of his questions were answered including reviewing restrictions and wound care.  He will call this office if he has any further questions and/or concerns.      Post op appointment with Dr. Alex 2/18 at 2:30 PM- [facility].      Whom and When to Call  Patient acknowledges understanding of how to manage any medication changes and   when to seek medical care.     Patient advised that if after hour medical concerns arise to please call [phone] and choose option 4 to speak to the physician on call.

## 2021-02-15 NOTE — PROGRESS NOTES
The patient will be contacted by another surgery RNCC for post-hospital follow up. Will close encounter at this time.    Chasity Mackey CMA, PEDERSON  Post Hospital Discharge Team

## 2021-02-16 ENCOUNTER — ANTICOAGULATION THERAPY VISIT (OUTPATIENT)
Dept: FAMILY MEDICINE | Facility: CLINIC | Age: 66
End: 2021-02-16

## 2021-02-16 ENCOUNTER — DOCUMENTATION ONLY (OUTPATIENT)
Dept: FAMILY MEDICINE | Facility: CLINIC | Age: 66
End: 2021-02-16

## 2021-02-16 DIAGNOSIS — Z95.2 S/P AORTIC VALVE REPLACEMENT: ICD-10-CM

## 2021-02-16 DIAGNOSIS — S30.1XXA ABDOMINAL WALL HEMATOMA, INITIAL ENCOUNTER: ICD-10-CM

## 2021-02-16 DIAGNOSIS — Z79.01 LONG TERM CURRENT USE OF ANTICOAGULANTS WITH INR GOAL OF 2.5-3.5: ICD-10-CM

## 2021-02-16 DIAGNOSIS — Z95.2 S/P AVR (AORTIC VALVE REPLACEMENT): ICD-10-CM

## 2021-02-16 DIAGNOSIS — Z79.01 LONG TERM CURRENT USE OF ANTICOAGULANT THERAPY: ICD-10-CM

## 2021-02-16 DIAGNOSIS — I48.21 PERMANENT ATRIAL FIBRILLATION (H): ICD-10-CM

## 2021-02-16 LAB
CAPILLARY BLOOD COLLECTION: NORMAL
INR PPP: 2.7 (ref 0.86–1.14)

## 2021-02-16 PROCEDURE — 99207 PR NO CHARGE NURSE ONLY: CPT | Performed by: FAMILY MEDICINE

## 2021-02-16 PROCEDURE — 85610 PROTHROMBIN TIME: CPT | Performed by: FAMILY MEDICINE

## 2021-02-16 PROCEDURE — 36416 COLLJ CAPILLARY BLOOD SPEC: CPT | Performed by: FAMILY MEDICINE

## 2021-02-16 NOTE — PROGRESS NOTES
ANTICOAGULATION  MANAGEMENT: Discharge Review    Jovon Mantilla chart reviewed for anticoagulation continuity of care  Admit Er for hematoma    Discharge disposition: Home    Results:    Recent labs: (last 7 days)     02/10/21  1547 02/10/21  1720 02/11/21  0545 02/12/21  0619 02/13/21  0751 02/16/21  1532   INR 3.20* 3.12* 1.47* 1.32* 1.43* 2.70*     Anticoagulation inpatient management:     home regimen continued    Anticoagulation discharge instructions:     Warfarin dosing: home regimen continued   Bridging: No   INR goal change: No      Medication changes affecting anticoagulation: No    Additional factors affecting anticoagulation: No    Plan     No adjustment to anticoagulation plan needed    Spoke with Jovon    Anticoagulation Calendar updated    Karina Pierre RN

## 2021-02-16 NOTE — PROGRESS NOTES
ANTICOAGULATION FOLLOW-UP CLINIC VISIT    Patient Name:  Jovon Mantilla  Date:  2021  Contact Type:  Telephone    SUBJECTIVE:  Patient Findings     Comments:    Assessed for S/S bleeding, clotting, medication, diet, health, activity and alcohol changes          Clinical Outcomes     Negatives:  Major bleeding event, Thromboembolic event, Anticoagulation-related hospital admission, Anticoagulation-related ED visit, Anticoagulation-related fatality    Comments:    Assessed for S/S bleeding, clotting, medication, diet, health, activity and alcohol changes             OBJECTIVE    Recent labs: (last 7 days)     21  1532   INR 2.70*       ASSESSMENT / PLAN  INR assessment THER    Recheck INR In: 2 WEEKS    INR Location Clinic      Anticoagulation Summary  As of 2021    INR goal:  2.5-3.5   TTR:  67.7 % (12 mo)   INR used for dosin.70 (2021)   Warfarin maintenance plan:  4 mg (4 mg x 1) every day   Full warfarin instructions:  4 mg every day   Weekly warfarin total:  28 mg   Plan last modified:  Karina Pierre RN (2021)   Next INR check:  2021   Priority:  High   Target end date:  Indefinite    Indications    S/P AVR (aortic valve replacement) [Z95.2]  Permanent atrial fibrillation (H) [I48.21]  Long term current use of anticoagulants with INR goal of 2.5-3.5 [Z79.01]  Abdominal wall hematoma  initial encounter [S30.1XXA]             Anticoagulation Episode Summary     INR check location:      Preferred lab:      Send INR reminders to:  [facility]    Comments:  24mm ATS Valve  Cardiomyopathy / CAD      Anticoagulation Care Providers     Provider Role Specialty Phone number    Rehan Lorenzana MD Referring Family Medicine [phone]    Ramana Redd MD Referring Student in organized health care education/training program [phone]            See the Encounter Report to view Anticoagulation Flowsheet and Dosing Calendar (Go to Encounters tab in chart  review, and find the Anticoagulation Therapy Visit)        Karina Pierre RN

## 2021-02-18 ENCOUNTER — OFFICE VISIT (OUTPATIENT)
Dept: SURGERY | Facility: CLINIC | Age: 66
End: 2021-02-18
Payer: COMMERCIAL

## 2021-02-18 VITALS
OXYGEN SATURATION: 97 % | WEIGHT: 218.4 LBS | HEART RATE: 67 BPM | SYSTOLIC BLOOD PRESSURE: 194 MMHG | BODY MASS INDEX: 32.35 KG/M2 | TEMPERATURE: 98.4 F | HEIGHT: 69 IN | DIASTOLIC BLOOD PRESSURE: 60 MMHG

## 2021-02-18 DIAGNOSIS — S30.1XXA ABDOMINAL WALL HEMATOMA, INITIAL ENCOUNTER: ICD-10-CM

## 2021-02-18 DIAGNOSIS — Z98.890 POSTOPERATIVE STATE: Primary | ICD-10-CM

## 2021-02-18 PROCEDURE — 99024 POSTOP FOLLOW-UP VISIT: CPT | Performed by: SURGERY

## 2021-02-18 RX ORDER — OXYCODONE HYDROCHLORIDE 5 MG/1
5-10 TABLET ORAL EVERY 6 HOURS PRN
Qty: 20 TABLET | Refills: 0 | Status: SHIPPED | OUTPATIENT
Start: 2021-02-18 | End: 2021-08-05

## 2021-02-18 ASSESSMENT — PAIN SCALES - GENERAL: PAINLEVEL: MILD PAIN (2)

## 2021-02-18 ASSESSMENT — MIFFLIN-ST. JEOR: SCORE: 1776.35

## 2021-02-18 NOTE — NURSING NOTE
"Chief Complaint   Patient presents with     RECHECK     Post op appointment.       Vitals:    02/18/21 1418   BP: (!) 194/60   BP Location: Left arm   Patient Position: Sitting   Cuff Size: Adult Regular   Pulse: 67   Temp: 98.4  F (36.9  C)   TempSrc: Oral   SpO2: 97%   Weight: 99.1 kg (218 lb 6.4 oz)   Height: 1.753 m (5' 9.02\")       Body mass index is 32.23 kg/m .                            BRENDA RAMÍREZ, EMT\    "

## 2021-02-18 NOTE — PATIENT INSTRUCTIONS
You met with Dr. Jaden Alex.      Today's visit instructions:    Return to the Surgery Clinic to see Dr. Alex next week for staple removal.    If you have questions please contact Kalina GODINEZ during regular clinic hours, Monday through Friday 7:30 AM - 4:00 PM, or you can contact us via [employer] at anytime.       If you have urgent needs after-hours, weekends, or holidays please call the hospital at [phone] and ask to speak with our on-call General Surgery Team.    Appointment [phone], option #1   Nurse Advice (Kalina): [phone]   Surgery Scheduler (Khadijah): [phone]  Fax: [phone]

## 2021-02-18 NOTE — PROGRESS NOTES
Jovon TITO Jose Rafael is status post:  2/4/2021  Herniorrhaphy inguinal(Right)Procedure: OPEN RIGHT INGUINAL HENRIA WITH MESH;  Surgeon: Jaden Alex MD;  Location: [facility] OR    Surgery without complications.  Post-op course had significant hematoma that required:  2/11/2021  Excise mass groin(Right)Procedure: INCISION AND DRAINAGE OF RIGHT GROIN HEMATOMA;  Surgeon: Norma Scanlon MD;  Location: [facility] OR    Incisions examined, no signs of infection. Staples in place.  All of the patients questions were answered.  INR management per medical team.  Will remove staples next week.  Standard post-op instructions and restrictions given.  Follow-up with me next week

## 2021-02-18 NOTE — LETTER
2/18/2021       RE: Jovon Mantilla  [address]  [facility] [place] [facility] [address]     Dear Colleague,    Thank you for referring your patient, Jovon Mantilla, to the [facility] GENER[facility] SURGERY [facility]IN[facility] [place] at [facility]. Please see a copy of my visit note below.    Jovon Mantilla is status post:  2/4/2021  Herniorrhaphy inguinal(Right)Procedure: OPEN RIGHT INGUINAL HENRIA WITH MESH;  Surgeon: Jaden Alex MD;  Location: [facility] OR    Surgery without complications.  Post-op course had significant hematoma that required:  2/11/2021  Excise mass groin(Right)Procedure: INCISION AND DRAINAGE OF RIGHT GROIN HEMATOMA;  Surgeon: Norma Scanlon MD;  Location: [facility] OR    Incisions examined, no signs of infection. Staples in place.  All of the patients questions were answered.  INR management per medical team.  Will remove staples next week.  Standard post-op instructions and restrictions given.  Follow-up with me next week      Again, thank you for allowing me to participate in the care of your patient.      Sincerely,    Jaden Alex MD

## 2021-02-23 ENCOUNTER — ANTICOAGULATION THERAPY VISIT (OUTPATIENT)
Dept: FAMILY MEDICINE | Facility: CLINIC | Age: 66
End: 2021-02-23

## 2021-02-23 DIAGNOSIS — Z95.2 S/P AVR (AORTIC VALVE REPLACEMENT): ICD-10-CM

## 2021-02-23 DIAGNOSIS — Z95.2 S/P AORTIC VALVE REPLACEMENT: ICD-10-CM

## 2021-02-23 DIAGNOSIS — S30.1XXA ABDOMINAL WALL HEMATOMA, INITIAL ENCOUNTER: ICD-10-CM

## 2021-02-23 DIAGNOSIS — Z79.01 LONG TERM CURRENT USE OF ANTICOAGULANTS WITH INR GOAL OF 2.5-3.5: ICD-10-CM

## 2021-02-23 DIAGNOSIS — I48.21 PERMANENT ATRIAL FIBRILLATION (H): ICD-10-CM

## 2021-02-23 DIAGNOSIS — Z79.01 LONG TERM CURRENT USE OF ANTICOAGULANT THERAPY: ICD-10-CM

## 2021-02-23 LAB
CAPILLARY BLOOD COLLECTION: NORMAL
INR PPP: 2 (ref 0.86–1.14)

## 2021-02-23 PROCEDURE — 85610 PROTHROMBIN TIME: CPT | Performed by: FAMILY MEDICINE

## 2021-02-23 PROCEDURE — 36416 COLLJ CAPILLARY BLOOD SPEC: CPT | Performed by: FAMILY MEDICINE

## 2021-02-23 NOTE — PROGRESS NOTES
ANTICOAGULATION MANAGEMENT     Patient Name:  Jovon Mantilla  Date:  2/23/2021    ASSESSMENT /SUBJECTIVE:    Today's INR result of 2.00 is subtherapeutic. Goal INR of 2.5-3.5      Warfarin dose taken: Warfarin taken as instructed    Diet: No new diet changes affecting INR    Medication changes/ interactions: No new medications/supplements affecting INR    Previous INR: Therapeutic     S/S of bleeding or thromboembolism: No    New injury or illness: No    Upcoming surgery, procedure or cardioversion: No    Additional findings: None      PLAN:    Telephone call with Jovon regarding INR result and instructed:     Warfarin Dosing Instructions: 8mg tonight then change your warfarin dose to 6mg every Wed & Sat; 4mg all other days of the week.   . (14.3 % change)    Instructed patient to follow up no later than: 2 weeks  Lab visit scheduled    Education provided: Target INR goal and significance of current INR result and Importance of therapeutic range      Jovon verbalizes understanding and agrees to warfarin dosing plan.    Instructed to call the Anticoagulation Clinic for any changes, questions or concerns. (#[phone])        Neel Mcdermott RN      OBJECTIVE:  Recent labs: (last 7 days)     02/16/21  1532 02/23/21  1253   INR 2.70* 2.00*         No question data found.  Anticoagulation Summary  As of 2/23/2021    INR goal:  2.5-3.5   TTR:  67.4 % (12 mo)   INR used for dosing:     Warfarin maintenance plan:  4 mg (4 mg x 1) every day   Full warfarin instructions:  4 mg every day   Weekly warfarin total:  28 mg   Plan last modified:  Karina Pierre RN (2/16/2021)   Next INR check:     Target end date:  Indefinite    Indications    S/P AVR (aortic valve replacement) [Z95.2]  Permanent atrial fibrillation (H) [I48.21]  Long term current use of anticoagulants with INR goal of 2.5-3.5 [Z79.01]  Abdominal wall hematoma  initial encounter [S30.1XXA]             Anticoagulation Episode Summary     INR check location:       Preferred lab:      Send INR reminders to:  CARYN ROSE    Comments:  24mm ATS Valve  Cardiomyopathy / CAD      Anticoagulation Care Providers     Provider Role Specialty Phone number    Rehan Lorenzana MD Referring Family Medicine [phone]    Ramana Redd MD Referring Student in organized health care education/training program [phone]

## 2021-02-25 ENCOUNTER — OFFICE VISIT (OUTPATIENT)
Dept: SURGERY | Facility: CLINIC | Age: 66
End: 2021-02-25
Payer: COMMERCIAL

## 2021-02-25 VITALS
BODY MASS INDEX: 31.5 KG/M2 | HEIGHT: 69 IN | WEIGHT: 212.7 LBS | OXYGEN SATURATION: 97 % | HEART RATE: 61 BPM | DIASTOLIC BLOOD PRESSURE: 68 MMHG | SYSTOLIC BLOOD PRESSURE: 105 MMHG

## 2021-02-25 DIAGNOSIS — Z98.890 POSTOPERATIVE STATE: Primary | ICD-10-CM

## 2021-02-25 PROCEDURE — 99024 POSTOP FOLLOW-UP VISIT: CPT | Performed by: SURGERY

## 2021-02-25 ASSESSMENT — MIFFLIN-ST. JEOR: SCORE: 1750.49

## 2021-02-25 ASSESSMENT — PAIN SCALES - GENERAL: PAINLEVEL: NO PAIN (0)

## 2021-02-25 NOTE — LETTER
2/25/2021       RE: Jovon Mantilla  [address]  [place] [facility] [address]     Dear Colleague,    Thank you for referring your patient, Jovon Mantilla, to the [facility] SURGERY CLIN[facility] [place] at [facility]. Please see a copy of my visit note below.    Jovon Mantilla is status post:  Open RIH with post op hematoma that required operative evacuation.  Post-op course without complications.  Incisions examined, no signs of infection. Staples removed today.  Hematoma has returned though not as big as last presentation.  All of the patients questions were answered.  Standard post-op instructions and restrictions given.    Again, thank you for allowing me to participate in the care of your patient.      Sincerely,    Jaden Alex MD

## 2021-02-25 NOTE — NURSING NOTE
"Chief Complaint   Patient presents with     Consult     Post op for staple removal.       Vitals:    02/25/21 1411   BP: 105/68   BP Location: Left arm   Patient Position: Sitting   Cuff Size: Adult Large   Pulse: 61   SpO2: 97%   Weight: 96.5 kg (212 lb 11.2 oz)   Height: 1.753 m (5' 9.02\")       Body mass index is 31.39 kg/m .                            BRENDA RAMÍREZ, EMT    "

## 2021-02-25 NOTE — PATIENT INSTRUCTIONS
You met with Dr. Jaden Alex.      Today's visit instructions:    Return to the Surgery Clinic on an as needed basis.      If you have questions please contact Kalina GODINEZ during regular clinic hours, Monday through Friday 7:30 AM - 4:00 PM, or you can contact us via [employer] at anytime.       If you have urgent needs after-hours, weekends, or holidays please call the hospital at [phone] and ask to speak with our on-call General Surgery Team.    Appointment [phone], option #1   Nurse Advice (Kalina): [phone]   Surgery Scheduler (Khadijah): [phone]  Fax: [phone]

## 2021-02-25 NOTE — PROGRESS NOTES
Jovon Mantilla is status post:  Open [facility] with post op hematoma that required operative evacuation.  Post-op course without complications.  Incisions examined, no signs of infection. Staples removed today.  Hematoma has returned though not as big as last presentation.  All of the patients questions were answered.  Standard post-op instructions and restrictions given.

## 2021-03-09 ENCOUNTER — ANTICOAGULATION THERAPY VISIT (OUTPATIENT)
Dept: FAMILY MEDICINE | Facility: CLINIC | Age: 66
End: 2021-03-09

## 2021-03-09 DIAGNOSIS — Z95.2 S/P AORTIC VALVE REPLACEMENT: ICD-10-CM

## 2021-03-09 DIAGNOSIS — I48.21 PERMANENT ATRIAL FIBRILLATION (H): ICD-10-CM

## 2021-03-09 DIAGNOSIS — Z79.01 LONG TERM CURRENT USE OF ANTICOAGULANT THERAPY: ICD-10-CM

## 2021-03-09 DIAGNOSIS — S30.1XXA ABDOMINAL WALL HEMATOMA, INITIAL ENCOUNTER: ICD-10-CM

## 2021-03-09 DIAGNOSIS — Z79.01 LONG TERM CURRENT USE OF ANTICOAGULANTS WITH INR GOAL OF 2.5-3.5: ICD-10-CM

## 2021-03-09 DIAGNOSIS — Z95.2 S/P AVR (AORTIC VALVE REPLACEMENT): ICD-10-CM

## 2021-03-09 LAB
CAPILLARY BLOOD COLLECTION: NORMAL
INR PPP: 2.6 (ref 0.86–1.14)

## 2021-03-09 PROCEDURE — 85610 PROTHROMBIN TIME: CPT | Performed by: FAMILY MEDICINE

## 2021-03-09 PROCEDURE — 36416 COLLJ CAPILLARY BLOOD SPEC: CPT | Performed by: FAMILY MEDICINE

## 2021-03-09 NOTE — PROGRESS NOTES
Anticoagulation Management    Unable to reach Jovon today.    Today's INR result of 2.6 is therapeutic (goal INR of 2.5-3.5).  Result received from: Clinic Lab    Follow up required to assess for changes     resume current dose of warfain and make follow up apt in 2 weeks for inr recheck      Anticoagulation clinic to follow up    Karina Pierre RN

## 2021-03-24 ENCOUNTER — TELEPHONE (OUTPATIENT)
Dept: FAMILY MEDICINE | Facility: CLINIC | Age: 66
End: 2021-03-24

## 2021-03-24 NOTE — TELEPHONE ENCOUNTER
ANTICOAGULATION     Jovon Mantilla is overdue for INR check.      Left message for patient to call and schedule lab appointment as soon as possible. If returning call, please schedule.     Neel Mcdermott RN

## 2021-04-01 ENCOUNTER — DOCUMENTATION ONLY (OUTPATIENT)
Dept: FAMILY MEDICINE | Facility: CLINIC | Age: 66
End: 2021-04-01

## 2021-04-01 NOTE — TELEPHONE ENCOUNTER
ANTICOAGULATION     Jovon Mantilla is overdue for INR check.      Spoke with Jovon and scheduled lab appointment on 4/8/21.    Gabino Martinez RN

## 2021-04-08 ENCOUNTER — ANTICOAGULATION THERAPY VISIT (OUTPATIENT)
Dept: FAMILY MEDICINE | Facility: CLINIC | Age: 66
End: 2021-04-08

## 2021-04-08 DIAGNOSIS — Z95.2 S/P AORTIC VALVE REPLACEMENT: ICD-10-CM

## 2021-04-08 DIAGNOSIS — Z79.01 LONG TERM CURRENT USE OF ANTICOAGULANTS WITH INR GOAL OF 2.5-3.5: ICD-10-CM

## 2021-04-08 DIAGNOSIS — Z79.01 LONG TERM CURRENT USE OF ANTICOAGULANT THERAPY: ICD-10-CM

## 2021-04-08 DIAGNOSIS — S30.1XXA ABDOMINAL WALL HEMATOMA, INITIAL ENCOUNTER: ICD-10-CM

## 2021-04-08 DIAGNOSIS — I48.21 PERMANENT ATRIAL FIBRILLATION (H): ICD-10-CM

## 2021-04-08 DIAGNOSIS — Z95.2 S/P AVR (AORTIC VALVE REPLACEMENT): ICD-10-CM

## 2021-04-08 LAB
CAPILLARY BLOOD COLLECTION: NORMAL
INR PPP: 3.4 (ref 0.86–1.14)

## 2021-04-08 PROCEDURE — 36416 COLLJ CAPILLARY BLOOD SPEC: CPT | Performed by: FAMILY MEDICINE

## 2021-04-08 PROCEDURE — 85610 PROTHROMBIN TIME: CPT | Performed by: FAMILY MEDICINE

## 2021-04-08 NOTE — PROGRESS NOTES
ANTICOAGULATION MANAGEMENT     Patient Name:  Jovon Mantilla  Date:  4/8/2021    ASSESSMENT /SUBJECTIVE:    Today's INR result of 3.40 is therapeutic. Goal INR of 2.5-3.5      Warfarin dose taken: Warfarin taken as instructed    Diet: No new diet changes affecting INR    Medication changes/ interactions: No new medications/supplements affecting INR    Previous INR: Therapeutic     S/S of bleeding or thromboembolism: No    New injury or illness: No    Upcoming surgery, procedure or cardioversion: No    Additional findings: On vacation the last few weeks.       PLAN:    Telephone call with Jovon regarding INR result and instructed:     Warfarin Dosing Instructions: Continue your current warfarin dose 6mg every Wed & Sat; 4mg all other days of the week.     Instructed patient to follow up no later than: 5 weeks  Lab visit scheduled    Education provided: Target INR goal and significance of current INR result and Importance of therapeutic range      Jovon verbalizes understanding and agrees to warfarin dosing plan.    Instructed to call the Anticoagulation Clinic for any changes, questions or concerns. (#[phone])        Neel Mcdermott RN      OBJECTIVE:  Recent labs: (last 7 days)     04/08/21  1507   INR 3.40*         No question data found.  Anticoagulation Summary  As of 4/8/2021    INR goal:  2.5-3.5   TTR:  64.2 % (12 mo)   INR used for dosing:     Plan last modified:  Neel Mcdermott RN (2/23/2021)   Next INR check:     Target end date:  Indefinite    Indications    S/P AVR (aortic valve replacement) [Z95.2]  Permanent atrial fibrillation (H) [I48.21]  Long term current use of anticoagulants with INR goal of 2.5-3.5 [Z79.01]  Abdominal wall hematoma  initial encounter [S30.1XXA]             Anticoagulation Episode Summary     INR check location:      Preferred lab:      Send INR reminders to:  CARYN ROSE    Comments:  24mm ATS Valve  Cardiomyopathy / CAD      Anticoagulation Care Providers     Provider Role  Specialty Phone number    Rehan Lorenzana MD Referring Family Medicine [phone]    Ramana Redd MD Referring Student in organized health care education/training program [phone]

## 2021-04-16 DIAGNOSIS — I48.21 PERMANENT ATRIAL FIBRILLATION (H): Primary | ICD-10-CM

## 2021-04-21 ENCOUNTER — IMMUNIZATION (OUTPATIENT)
Dept: NURSING | Facility: CLINIC | Age: 66
End: 2021-04-21
Payer: COMMERCIAL

## 2021-04-21 PROCEDURE — 91300 PR COVID VAC PFIZER DIL RECON 30 MCG/0.3 ML IM: CPT

## 2021-04-21 PROCEDURE — 0001A PR COVID VAC PFIZER DIL RECON 30 MCG/0.3 ML IM: CPT

## 2021-04-27 NOTE — LETTER
[address] [address] [phone]          July 28, 2015    Jovon Mantilla                                                                                                                     [address] [facility] [address]            Dear Jovon,    This letter is being sent to you because you missed your INR blood test appointment yesterday afternoon, 7-27-17 @ 4:50 PM.    Please contact either the INR clinic directly to reschedule at [phone] or the main clinic scheduling department at your earliest convenience at [phone].  Thank you.      Sincerely,       Monica GODINEZ / INR CLINIC /  Rehan Lorenzana MD     Right arm;

## 2021-05-12 ENCOUNTER — IMMUNIZATION (OUTPATIENT)
Dept: NURSING | Facility: CLINIC | Age: 66
End: 2021-05-12
Attending: INTERNAL MEDICINE
Payer: COMMERCIAL

## 2021-05-12 PROCEDURE — 91300 PR COVID VAC PFIZER DIL RECON 30 MCG/0.3 ML IM: CPT

## 2021-05-12 PROCEDURE — 0002A PR COVID VAC PFIZER DIL RECON 30 MCG/0.3 ML IM: CPT

## 2021-05-13 ENCOUNTER — ANTICOAGULATION THERAPY VISIT (OUTPATIENT)
Dept: FAMILY MEDICINE | Facility: CLINIC | Age: 66
End: 2021-05-13

## 2021-05-13 DIAGNOSIS — Z79.01 LONG TERM CURRENT USE OF ANTICOAGULANTS WITH INR GOAL OF 2.5-3.5: ICD-10-CM

## 2021-05-13 DIAGNOSIS — Z95.2 S/P AVR (AORTIC VALVE REPLACEMENT): ICD-10-CM

## 2021-05-13 DIAGNOSIS — I48.21 PERMANENT ATRIAL FIBRILLATION (H): ICD-10-CM

## 2021-05-13 DIAGNOSIS — S30.1XXA ABDOMINAL WALL HEMATOMA, INITIAL ENCOUNTER: ICD-10-CM

## 2021-05-13 LAB
CAPILLARY BLOOD COLLECTION: NORMAL
INR PPP: 3.4 (ref 0.86–1.14)

## 2021-05-13 PROCEDURE — 85610 PROTHROMBIN TIME: CPT | Performed by: FAMILY MEDICINE

## 2021-05-13 PROCEDURE — 36416 COLLJ CAPILLARY BLOOD SPEC: CPT | Performed by: FAMILY MEDICINE

## 2021-05-13 NOTE — PROGRESS NOTES
ANTICOAGULATION MANAGEMENT     Patient Name:  Jovon Mantilla  Date:  5/13/2021    ASSESSMENT /SUBJECTIVE:    Today's INR result of 3.40 is therapeutic. Goal INR of 2.5-3.5      Warfarin dose taken: Warfarin taken as instructed    Diet: No new diet changes affecting INR    Medication changes/ interactions: No new medications/supplements affecting INR    Previous INR: Therapeutic     S/S of bleeding or thromboembolism: No    New injury or illness: No    Upcoming surgery, procedure or cardioversion: No    Additional findings: Received second dose of COVID 19 vaccine this morning      PLAN:    Telephone call with Jovon regarding INR result and instructed:     Warfarin Dosing Instructions: Continue your current warfarin dose 6mg every Wed & Sat; 4mg all other days of the week.     Instructed patient to follow up no later than: 6 weeks  Lab visit scheduled    Education provided: Target INR goal and significance of current INR result and Importance of therapeutic range      Jovon verbalizes understanding and agrees to warfarin dosing plan.    Instructed to call the Anticoagulation Clinic for any changes, questions or concerns. (#[phone])        Neel Mcdermott RN      OBJECTIVE:  Recent labs: (last 7 days)     05/13/21  0839   INR 3.40*         No question data found.  Anticoagulation Summary  As of 5/13/2021    INR goal:  2.5-3.5   TTR:  64.2 % (12 mo)   INR used for dosing:     Plan last modified:  Neel Mcdermott RN (2/23/2021)   Next INR check:     Target end date:  Indefinite    Indications    S/P AVR (aortic valve replacement) [Z95.2]  Permanent atrial fibrillation (H) [I48.21]  Long term current use of anticoagulants with INR goal of 2.5-3.5 [Z79.01]  Abdominal wall hematoma  initial encounter [S30.1XXA]             Anticoagulation Episode Summary     INR check location:      Preferred lab:      Send INR reminders to:  CARYN ROSE    Comments:  24mm ATS Valve  Cardiomyopathy / CAD      Anticoagulation Care  Providers     Provider Role Specialty Phone number    Rehan Lorenzana MD Referring Family Medicine [phone]    Ramana Redd MD Referring Student in organized health care education/training program [phone]

## 2021-06-05 DIAGNOSIS — E78.5 HYPERLIPIDEMIA LDL GOAL <70: ICD-10-CM

## 2021-06-05 DIAGNOSIS — E03.9 ACQUIRED HYPOTHYROIDISM: ICD-10-CM

## 2021-06-07 ENCOUNTER — PATIENT OUTREACH (OUTPATIENT)
Dept: FAMILY MEDICINE | Facility: CLINIC | Age: 66
End: 2021-06-07

## 2021-06-07 NOTE — TELEPHONE ENCOUNTER
"[facility] message sent to patient as RN PAL outreach. Patient due for physical:    \"Return in about 9 months (around 7/31/2021) for Preventative Care Visit.\"     Sneha Holcomb RN  "

## 2021-06-08 RX ORDER — SIMVASTATIN 20 MG
TABLET ORAL
Qty: 30 TABLET | Refills: 0 | Status: SHIPPED | OUTPATIENT
Start: 2021-06-08 | End: 2021-07-02

## 2021-06-08 RX ORDER — LEVOTHYROXINE SODIUM 200 UG/1
200 TABLET ORAL DAILY
Qty: 30 TABLET | Refills: 0 | Status: SHIPPED | OUTPATIENT
Start: 2021-06-08 | End: 2021-07-02

## 2021-06-08 NOTE — TELEPHONE ENCOUNTER
Medication is being filled for 1 time refill only due to:  Patient needs to be seen because it has been more than one year since last visit/labs overdue, routed FYI to [facility] (see recent outreach to pt, also routed to TC, please call pt to schedule, recommend seeing another provider as labs were due in March  Prescription approved per [facility] Refill Protocol.  Tennille Marse RN, BSN  Message handled by CLINIC NURSE.

## 2021-06-22 ENCOUNTER — ANTICOAGULATION THERAPY VISIT (OUTPATIENT)
Dept: FAMILY MEDICINE | Facility: CLINIC | Age: 66
End: 2021-06-22

## 2021-06-22 DIAGNOSIS — I48.21 PERMANENT ATRIAL FIBRILLATION (H): ICD-10-CM

## 2021-06-22 DIAGNOSIS — Z95.2 S/P AVR (AORTIC VALVE REPLACEMENT): ICD-10-CM

## 2021-06-22 DIAGNOSIS — Z79.01 LONG TERM CURRENT USE OF ANTICOAGULANTS WITH INR GOAL OF 2.5-3.5: ICD-10-CM

## 2021-06-22 DIAGNOSIS — S30.1XXA ABDOMINAL WALL HEMATOMA, INITIAL ENCOUNTER: ICD-10-CM

## 2021-06-22 LAB
CAPILLARY BLOOD COLLECTION: NORMAL
INR PPP: 3.1 (ref 0.86–1.14)

## 2021-06-22 PROCEDURE — 85610 PROTHROMBIN TIME: CPT | Performed by: FAMILY MEDICINE

## 2021-06-22 PROCEDURE — 36416 COLLJ CAPILLARY BLOOD SPEC: CPT | Performed by: FAMILY MEDICINE

## 2021-06-22 NOTE — PROGRESS NOTES
ANTICOAGULATION MANAGEMENT     Jovon Mantilla 65 year old male is on warfarin with therapeutic INR result. (Goal INR 2.5-3.5)    Recent labs: (last 7 days)     06/22/21  1419   INR 3.10*       ASSESSMENT     Source(s): Chart Review       Warfarin doses taken: Warfarin taken as instructed    Diet: No new diet changes identified    New illness, injury, or hospitalization: No    Medication/supplement changes: None noted    Signs or symptoms of bleeding or clotting: No    Previous INR: Therapeutic last 2(+) visits    Additional findings: None     PLAN     Recommended plan for no diet, medication or health factor changes affecting INR     Dosing Instructions: Continue your current warfarin dose with next INR in 6 weeks       Summary  As of 6/22/2021    Full warfarin instructions:  6 mg every Wed, Sat; 4 mg all other days   Next INR check:  8/3/2021             Telephone call with Jovon whom verbalizes understanding and agrees to plan    Lab visit scheduled    Education provided: Please call back if any changes to your diet, medications or how you've been taking warfarin    Plan made per [facility] anticoagulation protocol    Karina Pierre RN  Anticoagulation Clinic  6/22/2021    _______________________________________________________________________     Anticoagulation Episode Summary     Current INR goal:  2.5-3.5   TTR:  64.2 % (12 mo)   Target end date:  Indefinite   Send INR reminders to:  CARYN ROSE    Indications    S/P AVR (aortic valve replacement) [Z95.2]  Permanent atrial fibrillation (H) [I48.21]  Long term current use of anticoagulants with INR goal of 2.5-3.5 [Z79.01]  Abdominal wall hematoma  initial encounter [S30.1XXA]           Comments:  24mm ATS Valve  Cardiomyopathy / CAD         Anticoagulation Care Providers     Provider Role Specialty Phone number    Rehan Lorenzana MD Referring Family Medicine [phone]    Ramana Redd MD Referring Student in organized health care  education/training program [phone]

## 2021-06-30 ENCOUNTER — MYC MEDICAL ADVICE (OUTPATIENT)
Dept: FAMILY MEDICINE | Facility: CLINIC | Age: 66
End: 2021-06-30

## 2021-07-01 DIAGNOSIS — E78.5 HYPERLIPIDEMIA LDL GOAL <70: ICD-10-CM

## 2021-07-01 DIAGNOSIS — E03.9 ACQUIRED HYPOTHYROIDISM: ICD-10-CM

## 2021-07-02 RX ORDER — SIMVASTATIN 20 MG
TABLET ORAL
Qty: 30 TABLET | Refills: 0 | Status: SHIPPED | OUTPATIENT
Start: 2021-07-02 | End: 2021-08-09

## 2021-07-02 RX ORDER — LEVOTHYROXINE SODIUM 200 UG/1
200 TABLET ORAL DAILY
Qty: 30 TABLET | Refills: 0 | Status: SHIPPED | OUTPATIENT
Start: 2021-07-02 | End: 2021-08-12

## 2021-07-02 NOTE — TELEPHONE ENCOUNTER
Routing refill request to provider for review/approval because:  Kemi given x1 and patient did not follow up, please advise  Labs not current:  LDL, TSH    Shania Pichardo RN on 7/2/2021 at 12:00 PM

## 2021-07-30 NOTE — TELEPHONE ENCOUNTER
MyChart reply sent.    GAGE RosasN, RN - Patient Advocate and Liaison (PAL)   [facility]   [phone]

## 2021-08-03 ENCOUNTER — ANTICOAGULATION THERAPY VISIT (OUTPATIENT)
Dept: FAMILY MEDICINE | Facility: CLINIC | Age: 66
End: 2021-08-03

## 2021-08-03 ENCOUNTER — LAB (OUTPATIENT)
Dept: LAB | Facility: CLINIC | Age: 66
End: 2021-08-03
Payer: COMMERCIAL

## 2021-08-03 DIAGNOSIS — Z79.01 LONG TERM CURRENT USE OF ANTICOAGULANTS WITH INR GOAL OF 2.5-3.5: ICD-10-CM

## 2021-08-03 DIAGNOSIS — I48.21 PERMANENT ATRIAL FIBRILLATION (H): ICD-10-CM

## 2021-08-03 DIAGNOSIS — Z95.2 S/P AVR (AORTIC VALVE REPLACEMENT): Primary | ICD-10-CM

## 2021-08-03 DIAGNOSIS — S30.1XXA ABDOMINAL WALL HEMATOMA, INITIAL ENCOUNTER: ICD-10-CM

## 2021-08-03 LAB — INR BLD: >8 (ref 0.9–1.1)

## 2021-08-03 PROCEDURE — 85610 PROTHROMBIN TIME: CPT

## 2021-08-03 PROCEDURE — 36416 COLLJ CAPILLARY BLOOD SPEC: CPT

## 2021-08-03 NOTE — PROGRESS NOTES
ANTICOAGULATION MANAGEMENT     Jovon Mantilla 65 year old male is on warfarin with supratherapeutic INR result. (Goal INR 2.5-3.5)    Recent labs: (last 7 days)     08/03/21  1018   INR >8.0*       ASSESSMENT     Source(s): Chart Review and Patient/Caregiver Call       Warfarin doses taken: has sinus cold taking nyquil dayquil and Tylenol 1000 mg     Diet: cold symptoms and otc meds may be affecting diet and INR    New illness, injury, or hospitalization: Yes: upr    Medication/supplement changes: dayquil nyquil and tylenol es    Signs or symptoms of bleeding or clotting: No    Previous INR: Supratherapeutic    Additional findings: None     PLAN     Recommended plan for no diet, medication or health factor changes affecting INR     Dosing Instructions: Hold 2 doses then continue your current warfarin dose with next INR in 1 week       Summary  As of 8/3/2021    Full warfarin instructions:  8/3: Hold; Otherwise 6 mg every Wed, Sat; 4 mg all other days   Next INR check:               Telephone call with Jovon who verbalizes understanding and agrees to plan    Lab visit scheduled    Education provided: Please call back if any changes to your diet, medications or how you've been taking warfarin, Potential interaction between warfarin and otc meds and Monitoring for bleeding signs and symptoms    Plan made per [facility] anticoagulation protocol    Karina Pierre RN  Anticoagulation Clinic  8/3/2021    _______________________________________________________________________     Anticoagulation Episode Summary     Current INR goal:  2.5-3.5   TTR:  64.7 % (10.6 mo)   Target end date:  Indefinite   Send INR reminders to:  CARYN ROSE    Indications    S/P AVR (aortic valve replacement) [Z95.2]  Permanent atrial fibrillation (H) [I48.21]  Long term current use of anticoagulants with INR goal of 2.5-3.5 [Z79.01]  Abdominal wall hematoma  initial encounter [S30.1XXA]           Comments:  24mm ATS Valve  Cardiomyopathy / CAD          Anticoagulation Care Providers     Provider Role Specialty Phone number    Rehan Lorenzana MD Referring Family Medicine [phone]    Ramana Redd MD Referring Student in organized health care education/training program [phone]        Anticoagulation Management    Unable to reach   Jovon today.    Today's INR result of >8 poc is supratherapeutic (goal INR of 2.5-3.5).  Result received from: Clinic Lab refused venous at clinic    Follow up required to confirm warfarin dose taken and assess for changes    left message to call back.      Anticoagulation clinic to follow up    Karina Pierre RN

## 2021-08-03 NOTE — PROGRESS NOTES
Patient left a VM on the Home care line returning a call at 12:27 pm.  Please call back to [phone]  Nellie Esqueda, RN  Anticoagulation Nurse - [facility], [place]

## 2021-08-04 DIAGNOSIS — E03.9 ACQUIRED HYPOTHYROIDISM: ICD-10-CM

## 2021-08-04 DIAGNOSIS — E78.5 HYPERLIPIDEMIA LDL GOAL <70: ICD-10-CM

## 2021-08-05 ENCOUNTER — VIRTUAL VISIT (OUTPATIENT)
Dept: FAMILY MEDICINE | Facility: CLINIC | Age: 66
End: 2021-08-05
Payer: COMMERCIAL

## 2021-08-05 DIAGNOSIS — R09.81 NASAL CONGESTION: Primary | ICD-10-CM

## 2021-08-05 DIAGNOSIS — I70.90 ASVD (ARTERIOSCLEROTIC VASCULAR DISEASE): ICD-10-CM

## 2021-08-05 DIAGNOSIS — R05.9 COUGH: ICD-10-CM

## 2021-08-05 DIAGNOSIS — R09.81 NASAL CONGESTION: ICD-10-CM

## 2021-08-05 LAB — SARS-COV-2 RNA RESP QL NAA+PROBE: NEGATIVE

## 2021-08-05 PROCEDURE — U0005 INFEC AGEN DETEC AMPLI PROBE: HCPCS

## 2021-08-05 PROCEDURE — U0003 INFECTIOUS AGENT DETECTION BY NUCLEIC ACID (DNA OR RNA); SEVERE ACUTE RESPIRATORY SYNDROME CORONAVIRUS 2 (SARS-COV-2) (CORONAVIRUS DISEASE [COVID-19]), AMPLIFIED PROBE TECHNIQUE, MAKING USE OF HIGH THROUGHPUT TECHNOLOGIES AS DESCRIBED BY CMS-2020-01-R: HCPCS

## 2021-08-05 PROCEDURE — 99214 OFFICE O/P EST MOD 30 MIN: CPT | Mod: 95 | Performed by: NURSE PRACTITIONER

## 2021-08-05 RX ORDER — LEVOTHYROXINE SODIUM 200 UG/1
200 TABLET ORAL DAILY
Qty: 30 TABLET | Refills: 0 | OUTPATIENT
Start: 2021-08-05

## 2021-08-05 RX ORDER — DIGOXIN 125 MCG
125 TABLET ORAL DAILY
Qty: 90 TABLET | Refills: 3 | Status: CANCELLED | OUTPATIENT
Start: 2021-08-05

## 2021-08-05 RX ORDER — LEVOTHYROXINE SODIUM 200 UG/1
200 TABLET ORAL DAILY
Qty: 30 TABLET | Refills: 0 | Status: CANCELLED | OUTPATIENT
Start: 2021-08-05

## 2021-08-05 RX ORDER — CARVEDILOL 25 MG/1
25 TABLET ORAL 2 TIMES DAILY WITH MEALS
Qty: 90 TABLET | Refills: 0 | Status: SHIPPED | OUTPATIENT
Start: 2021-08-05 | End: 2021-11-16

## 2021-08-05 RX ORDER — SIMVASTATIN 20 MG
TABLET ORAL
Qty: 30 TABLET | Refills: 0 | OUTPATIENT
Start: 2021-08-05

## 2021-08-05 ASSESSMENT — ENCOUNTER SYMPTOMS
JOINT SWELLING: 0
NERVOUS/ANXIOUS: 1
DIARRHEA: 0
PARESTHESIAS: 0
DIZZINESS: 0
HEADACHES: 0
ARTHRALGIAS: 0
EYE PAIN: 0
FREQUENCY: 0
MYALGIAS: 0
FEVER: 0
ABDOMINAL PAIN: 0
HEMATOCHEZIA: 0
NAUSEA: 0
CONSTIPATION: 0
HEARTBURN: 0
CHILLS: 0
SORE THROAT: 0
HEMATURIA: 0
SHORTNESS OF BREATH: 0
PALPITATIONS: 0
WEAKNESS: 0
COUGH: 0
DYSURIA: 0

## 2021-08-05 ASSESSMENT — ACTIVITIES OF DAILY LIVING (ADL): CURRENT_FUNCTION: NO ASSISTANCE NEEDED

## 2021-08-05 NOTE — PROGRESS NOTES
"Jovon is a 65 year old who is being evaluated via a billable telephone visit.      What phone number would you like to be contacted at? [phone]  How would you like to obtain your AVS? MyChart    Assessment & Plan     Nasal congestion  Cough  Patient with cough and sinus symptoms, returning from vacation of high transmission area. Is vaccinated. Given patient's health, agreed to COVID-19 testing.   Has annual exam scheduled for 8/9, if not resolved and COVID-19 negative will consider bacterial sinusitis management.    - Symptomatic COVID-19 Virus (Coronavirus) by PCR Nose               BMI:   Estimated body mass index is 31.39 kg/m  as calculated from the following:    Height as of 2/25/21: 1.753 m (5' 9.02\").    Weight as of 2/25/21: 96.5 kg (212 lb 11.2 oz).           Return in about 4 days (around 8/9/2021) for appointment already scheduled.    LILLI Mcgregor [facility]    Subjective   Jovon is a 65 year old who presents for the following health issues     HPI           Acute Illness  Acute illness concerns:   Onset/Duration: 1 week   Symptoms:  Fever: no  Chills/Sweats: no  Headache (location?): no  Sinus Pressure: no  Conjunctivitis:  YES- chronic allergic.   Ear Pain: no  Rhinorrhea: yes  Congestion: yes  Sore Throat: no  Cough: mild and rare  Wheeze: no  Decreased Appetite: no  Nausea: no  Vomiting: no  Diarrhea: no  Dysuria/Freq.: no  Dysuria or Hematuria: no  Fatigue/Achiness: no  Sick/Strep Exposure: no  Therapies tried and outcome: tylenol, dayquil and nitequil-has helped symptoms.     Fully vaccinated for COVID-19  Started with runny nose and sneezing.   Using generic Allegra.   Started during vacation to [place].   Elevated INR and had to stop use of over the counter medication.     Review of Systems   Constitutional, HEENT, cardiovascular, pulmonary, gi and gu systems are negative, except as otherwise noted.       Objective           Vitals:  No vitals " were obtained today due to virtual visit.    Physical Exam   healthy, alert and no distress  PSYCH: Alert and oriented times 3; coherent speech, normal   rate and volume, able to articulate logical thoughts, able   to abstract reason, no tangential thoughts, no hallucinations   or delusions  His affect is normal  RESP: No cough, no audible wheezing, able to talk in full sentences  Remainder of exam unable to be completed due to telephone visits                Phone call duration: 8 minutes

## 2021-08-06 ASSESSMENT — ANXIETY QUESTIONNAIRES
7. FEELING AFRAID AS IF SOMETHING AWFUL MIGHT HAPPEN: NOT AT ALL
6. BECOMING EASILY ANNOYED OR IRRITABLE: NOT AT ALL
8. IF YOU CHECKED OFF ANY PROBLEMS, HOW DIFFICULT HAVE THESE MADE IT FOR YOU TO DO YOUR WORK, TAKE CARE OF THINGS AT HOME, OR GET ALONG WITH OTHER PEOPLE?: NOT DIFFICULT AT ALL
GAD7 TOTAL SCORE: 0
2. NOT BEING ABLE TO STOP OR CONTROL WORRYING: NOT AT ALL
5. BEING SO RESTLESS THAT IT IS HARD TO SIT STILL: NOT AT ALL
3. WORRYING TOO MUCH ABOUT DIFFERENT THINGS: NOT AT ALL
GAD7 TOTAL SCORE: 0
1. FEELING NERVOUS, ANXIOUS, OR ON EDGE: NOT AT ALL
GAD7 TOTAL SCORE: 0
7. FEELING AFRAID AS IF SOMETHING AWFUL MIGHT HAPPEN: NOT AT ALL
4. TROUBLE RELAXING: NOT AT ALL

## 2021-08-06 ASSESSMENT — PATIENT HEALTH QUESTIONNAIRE - PHQ9
SUM OF ALL RESPONSES TO PHQ QUESTIONS 1-9: 1
SUM OF ALL RESPONSES TO PHQ QUESTIONS 1-9: 1
10. IF YOU CHECKED OFF ANY PROBLEMS, HOW DIFFICULT HAVE THESE PROBLEMS MADE IT FOR YOU TO DO YOUR WORK, TAKE CARE OF THINGS AT HOME, OR GET ALONG WITH OTHER PEOPLE: NOT DIFFICULT AT ALL

## 2021-08-07 ASSESSMENT — PATIENT HEALTH QUESTIONNAIRE - PHQ9: SUM OF ALL RESPONSES TO PHQ QUESTIONS 1-9: 1

## 2021-08-07 ASSESSMENT — ANXIETY QUESTIONNAIRES: GAD7 TOTAL SCORE: 0

## 2021-08-09 ENCOUNTER — OFFICE VISIT (OUTPATIENT)
Dept: FAMILY MEDICINE | Facility: CLINIC | Age: 66
End: 2021-08-09
Payer: COMMERCIAL

## 2021-08-09 ENCOUNTER — ANTICOAGULATION THERAPY VISIT (OUTPATIENT)
Dept: FAMILY MEDICINE | Facility: CLINIC | Age: 66
End: 2021-08-09

## 2021-08-09 VITALS
TEMPERATURE: 98.2 F | WEIGHT: 199 LBS | DIASTOLIC BLOOD PRESSURE: 60 MMHG | OXYGEN SATURATION: 96 % | HEART RATE: 78 BPM | RESPIRATION RATE: 18 BRPM | SYSTOLIC BLOOD PRESSURE: 118 MMHG | HEIGHT: 69 IN | BODY MASS INDEX: 29.47 KG/M2

## 2021-08-09 DIAGNOSIS — I48.21 PERMANENT ATRIAL FIBRILLATION (H): ICD-10-CM

## 2021-08-09 DIAGNOSIS — Z51.81 MEDICATION MONITORING ENCOUNTER: ICD-10-CM

## 2021-08-09 DIAGNOSIS — Z98.84 S/P BARIATRIC SURGERY: ICD-10-CM

## 2021-08-09 DIAGNOSIS — N52.39 OTHER POST-PROCEDURAL ERECTILE DYSFUNCTION: ICD-10-CM

## 2021-08-09 DIAGNOSIS — Z79.01 LONG TERM CURRENT USE OF ANTICOAGULANTS WITH INR GOAL OF 2.5-3.5: ICD-10-CM

## 2021-08-09 DIAGNOSIS — R06.02 SOBOE (SHORTNESS OF BREATH ON EXERTION): ICD-10-CM

## 2021-08-09 DIAGNOSIS — E03.9 ACQUIRED HYPOTHYROIDISM: ICD-10-CM

## 2021-08-09 DIAGNOSIS — I70.90 ASVD (ARTERIOSCLEROTIC VASCULAR DISEASE): ICD-10-CM

## 2021-08-09 DIAGNOSIS — S30.1XXA ABDOMINAL WALL HEMATOMA, INITIAL ENCOUNTER: ICD-10-CM

## 2021-08-09 DIAGNOSIS — E78.5 HYPERLIPIDEMIA LDL GOAL <70: ICD-10-CM

## 2021-08-09 DIAGNOSIS — Z00.00 ROUTINE HISTORY AND PHYSICAL EXAMINATION OF ADULT: Primary | ICD-10-CM

## 2021-08-09 DIAGNOSIS — Z95.2 H/O AORTIC VALVE REPLACEMENT: ICD-10-CM

## 2021-08-09 DIAGNOSIS — F41.9 ANXIETY: ICD-10-CM

## 2021-08-09 DIAGNOSIS — Z95.2 S/P AVR (AORTIC VALVE REPLACEMENT): Primary | ICD-10-CM

## 2021-08-09 LAB
ALBUMIN SERPL-MCNC: 3.4 G/DL (ref 3.4–5)
ALP SERPL-CCNC: 83 U/L (ref 40–150)
ALT SERPL W P-5'-P-CCNC: 18 U/L (ref 0–70)
ANION GAP SERPL CALCULATED.3IONS-SCNC: <1 MMOL/L (ref 3–14)
AST SERPL W P-5'-P-CCNC: 23 U/L (ref 0–45)
BILIRUB SERPL-MCNC: 1 MG/DL (ref 0.2–1.3)
BUN SERPL-MCNC: 15 MG/DL (ref 7–30)
CALCIUM SERPL-MCNC: 9.2 MG/DL (ref 8.5–10.1)
CHLORIDE BLD-SCNC: 103 MMOL/L (ref 94–109)
CHOLEST SERPL-MCNC: 99 MG/DL
CO2 SERPL-SCNC: 34 MMOL/L (ref 20–32)
CREAT SERPL-MCNC: 0.86 MG/DL (ref 0.66–1.25)
DEPRECATED CALCIDIOL+CALCIFEROL SERPL-MC: 69 UG/L (ref 20–75)
ERYTHROCYTE [DISTWIDTH] IN BLOOD BY AUTOMATED COUNT: 20 % (ref 10–15)
FASTING STATUS PATIENT QL REPORTED: YES
GFR SERPL CREATININE-BSD FRML MDRD: >90 ML/MIN/1.73M2
GLUCOSE BLD-MCNC: 87 MG/DL (ref 70–99)
HCT VFR BLD AUTO: 35.3 % (ref 40–53)
HDLC SERPL-MCNC: 43 MG/DL
HGB BLD-MCNC: 10.8 G/DL (ref 13.3–17.7)
INR BLD: 3.1 (ref 0.9–1.1)
IRON SATN MFR SERPL: 13 % (ref 15–46)
IRON SERPL-MCNC: 46 UG/DL (ref 35–180)
LDLC SERPL CALC-MCNC: 46 MG/DL
MAGNESIUM SERPL-MCNC: 2.1 MG/DL (ref 1.6–2.3)
MCH RBC QN AUTO: 26 PG (ref 26.5–33)
MCHC RBC AUTO-ENTMCNC: 30.6 G/DL (ref 31.5–36.5)
MCV RBC AUTO: 85 FL (ref 78–100)
NONHDLC SERPL-MCNC: 56 MG/DL
PLATELET # BLD AUTO: 219 10E3/UL (ref 150–450)
POTASSIUM BLD-SCNC: 4.2 MMOL/L (ref 3.4–5.3)
PROT SERPL-MCNC: 6.5 G/DL (ref 6.8–8.8)
PSA SERPL-MCNC: 1.41 UG/L (ref 0–4)
RBC # BLD AUTO: 4.16 10E6/UL (ref 4.4–5.9)
SODIUM SERPL-SCNC: 136 MMOL/L (ref 133–144)
TIBC SERPL-MCNC: 366 UG/DL (ref 240–430)
TRIGL SERPL-MCNC: 48 MG/DL
VIT B12 SERPL-MCNC: 1207 PG/ML (ref 193–986)
WBC # BLD AUTO: 5.1 10E3/UL (ref 4–11)

## 2021-08-09 PROCEDURE — 83550 IRON BINDING TEST: CPT | Performed by: NURSE PRACTITIONER

## 2021-08-09 PROCEDURE — 80061 LIPID PANEL: CPT | Performed by: NURSE PRACTITIONER

## 2021-08-09 PROCEDURE — 85610 PROTHROMBIN TIME: CPT | Performed by: NURSE PRACTITIONER

## 2021-08-09 PROCEDURE — 99396 PREV VISIT EST AGE 40-64: CPT | Performed by: NURSE PRACTITIONER

## 2021-08-09 PROCEDURE — 80053 COMPREHEN METABOLIC PANEL: CPT | Performed by: NURSE PRACTITIONER

## 2021-08-09 PROCEDURE — 36415 COLL VENOUS BLD VENIPUNCTURE: CPT | Performed by: NURSE PRACTITIONER

## 2021-08-09 PROCEDURE — 83735 ASSAY OF MAGNESIUM: CPT | Performed by: NURSE PRACTITIONER

## 2021-08-09 PROCEDURE — 85027 COMPLETE CBC AUTOMATED: CPT | Performed by: NURSE PRACTITIONER

## 2021-08-09 PROCEDURE — G0103 PSA SCREENING: HCPCS | Performed by: NURSE PRACTITIONER

## 2021-08-09 PROCEDURE — 84590 ASSAY OF VITAMIN A: CPT | Mod: 90 | Performed by: NURSE PRACTITIONER

## 2021-08-09 PROCEDURE — 82306 VITAMIN D 25 HYDROXY: CPT | Performed by: NURSE PRACTITIONER

## 2021-08-09 PROCEDURE — 36416 COLLJ CAPILLARY BLOOD SPEC: CPT | Performed by: NURSE PRACTITIONER

## 2021-08-09 PROCEDURE — 82607 VITAMIN B-12: CPT | Performed by: NURSE PRACTITIONER

## 2021-08-09 PROCEDURE — 99213 OFFICE O/P EST LOW 20 MIN: CPT | Mod: 25 | Performed by: NURSE PRACTITIONER

## 2021-08-09 PROCEDURE — 99000 SPECIMEN HANDLING OFFICE-LAB: CPT | Performed by: NURSE PRACTITIONER

## 2021-08-09 RX ORDER — NITROGLYCERIN 0.3 MG/1
TABLET SUBLINGUAL
Qty: 25 TABLET | Refills: 1 | Status: SHIPPED | OUTPATIENT
Start: 2021-08-09 | End: 2022-02-08

## 2021-08-09 RX ORDER — SIMVASTATIN 20 MG
20 TABLET ORAL AT BEDTIME
Qty: 90 TABLET | Refills: 3 | Status: SHIPPED | OUTPATIENT
Start: 2021-08-09 | End: 2022-08-11

## 2021-08-09 ASSESSMENT — ENCOUNTER SYMPTOMS
NAUSEA: 0
HEADACHES: 0
MYALGIAS: 0
SHORTNESS OF BREATH: 0
CHILLS: 0
ARTHRALGIAS: 0
DIARRHEA: 0
PALPITATIONS: 0
DYSURIA: 0
WEAKNESS: 0
HEMATOCHEZIA: 0
HEARTBURN: 0
DIZZINESS: 0
ABDOMINAL PAIN: 0
CONSTIPATION: 0
EYE PAIN: 0
HEMATURIA: 0
FREQUENCY: 0
PARESTHESIAS: 0
NERVOUS/ANXIOUS: 1
SORE THROAT: 0
FEVER: 0
COUGH: 0
JOINT SWELLING: 0

## 2021-08-09 ASSESSMENT — MIFFLIN-ST. JEOR: SCORE: 1683.04

## 2021-08-09 ASSESSMENT — PAIN SCALES - GENERAL: PAINLEVEL: NO PAIN (0)

## 2021-08-09 NOTE — PROGRESS NOTES
SUBJECTIVE:   Jovon Mantilla is a 66 year old male who presents for Preventive Visit.      Patient has been advised of split billing requirements and indicates understanding: Yes   Are you in the first 12 months of your Medicare coverage?  No    Healthy Habits:     Frequency of exercise:  2-3 days/week    Duration of exercise:  45-60 minutes    Taking medications regularly:  Yes    Medication side effects:  None    PHQ-2 Total Score: 0    Additional concerns today:  No    Has never needed use of Nitroglycerin. Current prescription , needs refill.     Has been having thumb splintering of nails x approximately 1 year. No surrounding skin concerns. History of gastric bypass. Not following with Bariatric Clinic since COVID-19.     Concerns for erectile dysfunction. Decreased since hernia surgery 2021. Difficulty to obtain erection.   Postop complication of abdomen hematoma. No concerns prior to surgery.      PHQ-9 score:    PHQ 2021   PHQ-9 Total Score 1   Q9: Thoughts of better off dead/self-harm past 2 weeks Not at all       Do you feel safe in your environment? Yes  Have you ever done Advance Care Planning? (For example, a Health Directive, POLST, or a discussion with a medical provider or your loved ones about your wishes): Yes, advance care planning is on file.  Fall risk  Fallen 2 or more times in the past year?: No  Any fall with injury in the past year?: No    Cognitive Screening   1) Repeat 3 items (Leader, Season, Table)    2) Clock draw: NORMAL  3) 3 item recall: Recalls 3 objects  Results: NORMAL clock, 1-2 items recalled: COGNITIVE IMPAIRMENT LESS LIKELY      Reviewed and updated as needed this visit by clinical staff  Tobacco  Allergies  Meds  Problems  Med Hx  Surg Hx  Fam Hx          Reviewed and updated as needed this visit by Provider  Tobacco  Allergies  Meds  Problems  Med Hx  Surg Hx  Fam Hx         Social History     Tobacco Use     Smoking status: Never Smoker      Smokeless tobacco: Never Used   Substance Use Topics     Alcohol use: No     Alcohol/week: 0.0 standard drinks         Alcohol Use 8/5/2021   Prescreen: >3 drinks/day or >7 drinks/week? Not Applicable   Prescreen: >3 drinks/day or >7 drinks/week? -         Current providers sharing in care for this patient include:   Patient Care Team:  Rehan Lorenzana MD as PCP - General  Rehan Lorenzana MD as Assigned PCP  Noy Cooley MD as Assigned Heart and Vascular Provider  Jaden Alex MD as Assigned Surgical Provider  Sneha Holcomb, RN as Personal Advocate & Liaison (PAL) (Family Medicine)    The following health maintenance items are reviewed in Epic and correct as of today:  Health Maintenance Due   Topic Date Due     HIV SCREENING  Never done     ZOSTER IMMUNIZATION (1 of 2) Never done     DTAP/TDAP/TD IMMUNIZATION (2 - Td or Tdap) 12/03/2020     TSH W/FREE T4 REFLEX  03/13/2021     DIGOXIN  03/13/2021     MICROALBUMIN  06/25/2021     PREVENTIVE CARE VISIT  07/30/2021     BMP  08/12/2021     Lab work is in process  Labs reviewed in EPIC    Review of Systems   Constitutional: Negative for chills and fever.   HENT: Negative for congestion, ear pain, hearing loss and sore throat.    Eyes: Negative for pain and visual disturbance.   Respiratory: Negative for cough and shortness of breath.    Cardiovascular: Negative for chest pain, palpitations and peripheral edema.   Gastrointestinal: Negative for abdominal pain, constipation, diarrhea, heartburn, hematochezia and nausea.   Genitourinary: Positive for impotence. Negative for discharge, dysuria, frequency, genital sores, hematuria and urgency.   Musculoskeletal: Negative for arthralgias, joint swelling and myalgias.   Skin: Negative for rash.   Neurological: Negative for dizziness, weakness, headaches and paresthesias.   Psychiatric/Behavioral: Negative for mood changes. The patient is nervous/anxious.      Constitutional, HEENT,  "cardiovascular, pulmonary, gi and gu systems are negative, except as otherwise noted.    OBJECTIVE:   /60 (BP Location: Right arm, Patient Position: Sitting, Cuff Size: Adult Large)   Pulse 78   Temp 98.2  F (36.8  C) (Oral)   Resp 18   Ht 1.753 m (5' 9\")   Wt 90.3 kg (199 lb)   SpO2 96%   BMI 29.39 kg/m   Estimated body mass index is 29.39 kg/m  as calculated from the following:    Height as of this encounter: 1.753 m (5' 9\").    Weight as of this encounter: 90.3 kg (199 lb).  Physical Exam  GENERAL: healthy, alert and no distress  NECK: no adenopathy, no asymmetry, masses, or scars and thyroid normal to palpation  RESP: lungs clear to auscultation - no rales, rhonchi or wheezes  CV: regular rate and rhythm, normal S1 S2, no S3 or S4, no murmur, click or rub, no peripheral edema and peripheral pulses strong  ABDOMEN: soft, nontender, no hepatosplenomegaly, no masses and bowel sounds normal   (male): testicles normal without atrophy or masses, hernia right inguinal (previous repair) and penis normal without urethral discharge  MS: no gross musculoskeletal defects noted, no edema  NEURO: Normal strength and tone, mentation intact and speech normal  PSYCH: mentation appears normal, affect normal/bright    Diagnostic Test Results:  Labs reviewed in Epic    ASSESSMENT / PLAN:   Jovon was seen today for physical.    Diagnoses and all orders for this visit:    Routine history and physical examination of adult    Permanent atrial fibrillation (H)  -     Cancel: INR  -     INR point of care, Interfaced Result  Recent INR 8.0 likely 2/2 cold medication use. Use has subsided and INR returned to normal limits. Dosing AC clinic.      Medication monitoring encounter  -     TSH WITH FREE T4 REFLEX  -     Digoxin level    H/O aortic valve replacement  SOBOE (shortness of breath on exertion)  ASVD (arteriosclerotic vascular disease)  -     nitroGLYcerin (NITROSTAT) 0.3 MG sublingual tablet; For chest pain place 1 " "tablet under the tongue every 5 minutes for 3 doses. If symptoms persist 5 minutes after 1st dose call 911.  Controlled. Never history of nitroglycerin use.     Anxiety  -     sertraline (ZOLOFT) 50 MG tablet; Take 1 tablet (50 mg) by mouth daily  Controlled.     Hyperlipidemia LDL goal <70  -     simvastatin (ZOCOR) 20 MG tablet; Take 1 tablet (20 mg) by mouth At Bedtime  -     Lipid panel reflex to direct LDL Fasting; Future  -     Lipid panel reflex to direct LDL Fasting    S/P bariatric surgery  -     Comprehensive metabolic panel (BMP + Alb, Alk Phos, ALT, AST, Total. Bili, TP); Future  -     CBC with platelets; Future  -     Vitamin D Deficiency; Future  -     Iron and iron binding capacity; Future  -     Vitamin B12; Future  -     Vitamin A; Future  -     Magnesium; Future  -     Comprehensive metabolic panel (BMP + Alb, Alk Phos, ALT, AST, Total. Bili, TP)  -     CBC with platelets  -     Vitamin D Deficiency  -     Iron and iron binding capacity  -     Vitamin B12  -     Vitamin A  -     Magnesium  Patient has not had follow-up with Bariatric surgery since prior to COVID-19. Will update labs due to nail concerns.     Other post-procedural erectile dysfunction  -     Adult Urology Referral; Future  -     PSA, screen; Future  -     PSA, screen  Refer.     Other orders  -     REVIEW OF HEALTH MAINTENANCE PROTOCOL ORDERS        Patient has been advised of split billing requirements and indicates understanding: Yes  COUNSELING:  Reviewed preventive health counseling, as reflected in patient instructions    Estimated body mass index is 29.39 kg/m  as calculated from the following:    Height as of this encounter: 1.753 m (5' 9\").    Weight as of this encounter: 90.3 kg (199 lb).    Weight management plan: Discussed healthy diet and exercise guidelines    He reports that he has never smoked. He has never used smokeless tobacco.      Appropriate preventive services were discussed with this patient, including " applicable screening as appropriate for cardiovascular disease, diabetes, osteopenia/osteoporosis, and glaucoma.  As appropriate for age/gender, discussed screening for colorectal cancer, prostate cancer, breast cancer, and cervical cancer. Checklist reviewing preventive services available has been given to the patient.    Reviewed patients plan of care and provided an AVS. The Intermediate Care Plan ( asthma action plan, low back pain action plan, and migraine action plan) for Jovon meets the Care Plan requirement. This Care Plan has been established and reviewed with the Patient.    Counseling Resources:  ATP IV Guidelines  Pooled Cohorts Equation Calculator  Breast Cancer Risk Calculator  Breast Cancer: Medication to Reduce Risk  FRAX Risk Assessment  ICSI Preventive Guidelines  Dietary Guidelines for Americans, 2010  [employer] MyPlate  ASA Prophylaxis  Lung CA Screening    LILLI Mcgregor CNP  [facility]    Identified Health Risks:  Answers for HPI/ROS submitted by the patient on 8/6/2021  If you checked off any problems, how difficult have these problems made it for you to do your work, take care of things at home, or get along with other people?: Not difficult at all  PHQ9 TOTAL SCORE: 1  DARNELL 7 TOTAL SCORE: 0

## 2021-08-09 NOTE — PROGRESS NOTES
ANTICOAGULATION MANAGEMENT     Jovon Mantilla 66 year old male is on warfarin with therapeutic INR result. (Goal INR 2.5-3.5)    Recent labs: (last 7 days)     08/09/21  1155   INR 3.1*       ASSESSMENT     Source(s): Chart Review and Patient/Caregiver Call       Warfarin doses taken: Warfarin taken as instructed     Diet: No new diet changes identified    New illness, injury, or hospitalization: No    Medication/supplement changes: None noted    Signs or symptoms of bleeding or clotting: No    Previous INR: Therapeutic last 2(+) visits    Additional findings: None     PLAN     Recommended plan for no diet, medication or health factor changes affecting INR     Dosing Instructions: Continue your current warfarin dose with next INR in 1 week       Summary  As of 8/9/2021    Full warfarin instructions:  6 mg every Wed, Sat; 4 mg all other days   Next INR check:               Telephone call with Jovon who verbalizes understanding and agrees to plan    Lab visit scheduled    Education provided: None required    Plan made per [facility] anticoagulation protocol    Leigh Vera RN  Anticoagulation Clinic  8/9/2021    _______________________________________________________________________     Anticoagulation Episode Summary     Current INR goal:  2.5-3.5   TTR:  70.5 % (12 mo)   Target end date:  Indefinite   Send INR reminders to:  CARYN ROSE    Indications    S/P AVR (aortic valve replacement) [Z95.2]  Permanent atrial fibrillation (H) [I48.21]  Long term current use of anticoagulants with INR goal of 2.5-3.5 [Z79.01]  Abdominal wall hematoma  initial encounter [S30.1XXA]           Comments:  24mm ATS Valve  Cardiomyopathy / CAD         Anticoagulation Care Providers     Provider Role Specialty Phone number    Rehan Lorenzana MD Referring Family Medicine [phone]    Ramana Redd MD Referring Student in organized health care education/training program [phone]

## 2021-08-11 ENCOUNTER — LAB (OUTPATIENT)
Dept: LAB | Facility: CLINIC | Age: 66
End: 2021-08-11

## 2021-08-11 DIAGNOSIS — Z98.84 S/P BARIATRIC SURGERY: Primary | ICD-10-CM

## 2021-08-11 LAB
ANNOTATION COMMENT IMP: NORMAL
RETINYL PALMITATE SERPL-MCNC: <0.02 MG/L
VIT A SERPL-MCNC: 0.42 MG/L

## 2021-08-11 PROCEDURE — 84443 ASSAY THYROID STIM HORMONE: CPT | Performed by: NURSE PRACTITIONER

## 2021-08-11 PROCEDURE — 36415 COLL VENOUS BLD VENIPUNCTURE: CPT | Performed by: NURSE PRACTITIONER

## 2021-08-11 PROCEDURE — 84439 ASSAY OF FREE THYROXINE: CPT | Performed by: NURSE PRACTITIONER

## 2021-08-11 PROCEDURE — 80162 ASSAY OF DIGOXIN TOTAL: CPT | Performed by: NURSE PRACTITIONER

## 2021-08-12 LAB
DIGOXIN SERPL-MCNC: 0.6 UG/L
T4 FREE SERPL-MCNC: 1.02 NG/DL (ref 0.76–1.46)
TSH SERPL DL<=0.005 MIU/L-ACNC: 4.62 MU/L (ref 0.4–4)

## 2021-08-12 RX ORDER — LEVOTHYROXINE SODIUM 200 UG/1
200 TABLET ORAL DAILY
Qty: 90 TABLET | Refills: 3 | Status: SHIPPED | OUTPATIENT
Start: 2021-08-12 | End: 2022-08-11

## 2021-08-16 ENCOUNTER — ANTICOAGULATION THERAPY VISIT (OUTPATIENT)
Dept: ANTICOAGULATION | Facility: CLINIC | Age: 66
End: 2021-08-16

## 2021-08-16 ENCOUNTER — LAB (OUTPATIENT)
Dept: LAB | Facility: CLINIC | Age: 66
End: 2021-08-16
Payer: COMMERCIAL

## 2021-08-16 DIAGNOSIS — Z95.2 S/P AVR (AORTIC VALVE REPLACEMENT): Primary | ICD-10-CM

## 2021-08-16 DIAGNOSIS — S30.1XXA ABDOMINAL WALL HEMATOMA, INITIAL ENCOUNTER: ICD-10-CM

## 2021-08-16 DIAGNOSIS — I48.21 PERMANENT ATRIAL FIBRILLATION (H): ICD-10-CM

## 2021-08-16 DIAGNOSIS — Z79.01 LONG TERM CURRENT USE OF ANTICOAGULANTS WITH INR GOAL OF 2.5-3.5: ICD-10-CM

## 2021-08-16 LAB — INR BLD: 3.9 (ref 0.9–1.1)

## 2021-08-16 PROCEDURE — 85610 PROTHROMBIN TIME: CPT

## 2021-08-16 PROCEDURE — 36415 COLL VENOUS BLD VENIPUNCTURE: CPT

## 2021-08-16 NOTE — PROGRESS NOTES
ANTICOAGULATION MANAGEMENT     Jovon Mantilla 66 year old male is on warfarin with supratherapeutic INR result. (Goal INR 2.5-3.5)    Recent labs: (last 7 days)     08/16/21  1348   INR 3.9*       ASSESSMENT     Source(s): Chart Review       Warfarin doses taken: Warfarin taken as instructed    Diet: just got over a cold has been out in heat working on cars.    New illness, injury, or hospitalization: No    Medication/supplement changes: None noted    Signs or symptoms of bleeding or clotting: No    Previous INR: Therapeutic last visit; previously outside of goal range    Additional findings: None     PLAN     Recommended plan for no diet, medication or health factor changes affecting INR     Dosing Instructions: Continue your current warfarin dose with next INR in 2 weeks       Summary  As of 8/16/2021    Full warfarin instructions:  8/16: 2 mg; Otherwise 6 mg every Wed, Sat; 4 mg all other days   Next INR check:  9/3/2021             Telephone call with Jovon who verbalizes understanding and agrees to plan    Lab visit scheduled    Education provided: Please call back if any changes to your diet, medications or how you've been taking warfarin, Importance of therapeutic range and Monitoring for bleeding signs and symptoms    Plan made per [facility] anticoagulation protocol    Karina Pierre RN  Anticoagulation Clinic  8/16/2021    _______________________________________________________________________     Anticoagulation Episode Summary     Current INR goal:  2.5-3.5   TTR:  71.4 % (12 mo)   Target end date:  Indefinite   Send INR reminders to:  CARYN ROSE    Indications    S/P AVR (aortic valve replacement) [Z95.2]  Permanent atrial fibrillation (H) [I48.21]  Long term current use of anticoagulants with INR goal of 2.5-3.5 [Z79.01]  Abdominal wall hematoma  initial encounter [S30.1XXA]           Comments:  24mm ATS Valve  Cardiomyopathy / CAD         Anticoagulation Care Providers     Provider Role Specialty  Phone number    Rehan Lorenzana MD Referring Family Medicine [phone]    Ramana Redd MD Referring Student in organized health care education/training program [phone]

## 2021-08-24 ENCOUNTER — PRE VISIT (OUTPATIENT)
Dept: UROLOGY | Facility: CLINIC | Age: 66
End: 2021-08-24

## 2021-08-25 DIAGNOSIS — I70.90 ASVD (ARTERIOSCLEROTIC VASCULAR DISEASE): ICD-10-CM

## 2021-08-25 RX ORDER — LISINOPRIL 20 MG/1
TABLET ORAL
Qty: 135 TABLET | Refills: 0 | Status: SHIPPED | OUTPATIENT
Start: 2021-08-25 | End: 2021-12-17

## 2021-09-01 NOTE — CONSULTS
EGS Surgery Consult  February 10, 2021    Jovon Mantilla  : 1955    Date of Service: 2/10/2021 9:26 PM    Assessment and Plan:  Jovon Mantilla is a 65 year old male with history of A. fib w/ RVR on warfarin, aortic sclerosis status post AVR , CAD, ischemic cardiomyopathy, CHF, hypertension, hyperlipidemia, gastric bypass, prior small bowel obstruction, who is POD6 s/p open RIHR 2021 with Dr. Alex, who presents with an enlarging massive right groin hematoma with active venous bleeding on CTA. He is hemodynamically stable and responsive to fluids. INR is 3.2. Hb 10. Discussed with IR, given venous bleed, will plan for reversal of INR and tentative plan to evacuate in OR tomorrow given massive size and compression of the iliac vein which may worsen his CHF.     - Vitamin K and PCC to be given in the ED  - Check INR in AM  - Will defer FFP for now given CHF, but may need 1-2 units in the AM if INR remains >2  - Abdominal binder to hold compression  - NPO after MN, LR 75  - Trend Hb  - Pain control  - Tentative plan for OR tomorrow for hematoma evacuation   - Consented and marked. Preop orders done.  - Medicine consult to assist with management of multiple comorbidities and also provide preop clearance.    Discussed and seen with Dr. Jesus Godfrey  Medical Student    I, Franky Cedillo MD, personally saw the patient and agree with the above documentation and have made any necessary edits to the note.    Franky Cedillo MD  Surgery PGY2    History of Present Illness:    Jovon Mantilla is a 65 year old male with history of A. fib on warfarin, aortic sclerosis status post AVR , CAD, ischemic cardiomyopathy, CHF, hypertension, hyperlipidemia, gastric bypass, prior small bowel obstruction with history of right inguinal hernia status post open repair on 2021 with mesh who presents for further evaluation and management of generalized weakness, lethargy, swelling near his surgical site, and increased abdominal  pain.  Patient states that he had this surgery and tolerated it well however he did he developed increased abdominal pain and swelling at his hernia repair site.  He was discharged with a prescription for Norco but ran out of this.  He discussed symptoms with nurse triage 2 days ago and described this area of swelling as being a 10 x 10 x 3 inches in dimension.  He was seen in clinic by Dr. Millan who felt that this was a postop hematoma with tamponade and that this may increase in size.  He was instructed that the coagulum may be removed in the future, and refilled his prescription for Norco and instructed him to see Dr. Alex in 2 weeks.  Patient has been taking pain medications but developed worsening pain.  His prescription was changed to oxycodone for breakthrough pain.  He presented to clinic today for routine INR check and went to the bathroom to have a bowel movement when he became lethargic and pale.  Staff found his blood pressure is 76/48 with a heart rate of 54, temperature 97.5  F, O2 sats 96% on room air.  He was sent here for further evaluation via EMS.  Paramedics found him with blood pressure in the 80s systolic, gave him 500 mL of normal saline and his blood pressure improved to 120s systolic.  He also has lower abdominal pain with this.  He states that he has had problems having bowel movements since his hernia surgery 4 days ago.  Last dose of pain medications was oxycodone at 2 PM.  Patient states that his abdominal pain has been 8-10/10 in spite of having pain meds.  Pain is focal at surgical site.  He states that his abdominal pain worsens with getting up or getting in or out of bed.  He states that in the past 2 days he has had 2 episodes of lightheadedness.  His oral intake is fairly minimal, has mostly been taking in milk and water.  He did have 3 bowel movements over the past 3 days.  He states that he has an appointment with interventional radiology in the future. He was on a lovenox  bridge which he completed this morning. Resumed taking warfarin on Saturday.     Past Medical History:  Past Medical History:   Diagnosis Date     Acne rosacea 04/22/2019     Acquired hypothyroidism 05/23/2003     Problem list name updated by automated process. Provider to review     Acute prostatitis      Acute sinusitis, unspecified      Aortic valve disease      Biatrial enlargement      CAD (coronary artery disease)     known 70% LAD stenosis     Depression      GI bleeding 01/22/2016     History of bleeding peptic ulcer      Hyperlipidemia      Idiopathic cardiomyopathy (H)      Mitral regurgitation      Obesity      Palpitations      Permanent atrial fibrillation (H) 06/02/2017     Right inguinal hernia 07/22/2019     S/P AVR 01/07/2011    with Maze procedure     S/P gastric bypass      Tricuspid regurgitation      Unspecified essential hypertension        Past Surgical History  Past Surgical History:   Procedure Laterality Date     ANGIOGRAM  02/01/2002    Normal coronary,dilated LV,Sev.decr.global LVF,+1 MR     ANGIOGRAM  01/01/2008    Mild AS,Mod PHTN,mod prox.LAD disease,Triv.CFX disease     ANGIOGRAM  01/01/2009    Mod AS,Mod PHTN,mod prox.LAD disease     ANGIOGRAM  10/01/2010    Sev.AS,CM,global hypokinesis,Mild-mod LV dilated,Mild MR,70% prox.LAD lesion,PHTN     CARDIOVERSION  2/02, 4/02, 4/11     CHOLECYSTECTOMY  2006     COLONOSCOPY N/A 01/23/2016    Procedure: COLONOSCOPY;  Surgeon: Robyn Collins MD;  Location:  GI     ESOPHAGOSCOPY, GASTROSCOPY, DUODENOSCOPY (EGD), COMBINED N/A 01/23/2016    Procedure: COMBINED ESOPHAGOSCOPY, GASTROSCOPY, DUODENOSCOPY (EGD);  Surgeon: Robyn Collins MD;  Location:  GI     HERNIORRHAPHY INGUINAL Right 2/4/2021    Procedure: OPEN RIGHT INGUINAL HENRIA WITH MESH;  Surgeon: Jaden Alex MD;  Location: [facility] OR     LAPAROSCOPIC BYPASS GASTRIC  10/08/2011    Procedure:LAPAROSCOPIC BYPASS GASTRIC; Diagnostic laparoscopy, Lysis of Adhesions,  Laparoscopic Revision of Jejunojejunostomy; Surgeon:RADHA MURO; Location: OR     LAPAROSCOPIC BYPASS GASTRIC  06/26/2006    Dr Jin     LAPAROSCOPY DIAGNOSTIC (GENERAL)  10/08/2011    Procedure:LAPAROSCOPY DIAGNOSTIC (GENERAL); Surgeon:RADHA MURO; Location: OR     REPLACE VALVE AORTIC  01/07/2011     ZZC NONSPECIFIC PROCEDURE  ~1985    vein stripping       Family History:  Family History   Problem Relation Age of Onset     Cancer - colorectal Father      Colon Cancer Father      Cancer Father      Diabetes Brother        Social History:  Social History     Socioeconomic History     Marital status:      Spouse name: Tracey     Number of children: 0     Years of education: Not on file     Highest education level: Not on file   Occupational History     Occupation:      Comment: [employer]   Social Needs     Financial resource strain: Not on file     Food insecurity     Worry: Not on file     Inability: Not on file     Transportation needs     Medical: Not on file     Non-medical: Not on file   Tobacco Use     Smoking status: Never Smoker     Smokeless tobacco: Never Used   Substance and Sexual Activity     Alcohol use: No     Alcohol/week: 0.0 standard drinks     Drug use: No     Sexual activity: Not Currently     Partners: Female   Lifestyle     Physical activity     Days per week: Not on file     Minutes per session: Not on file     Stress: Not on file   Relationships     Social connections     Talks on phone: Not on file     Gets together: Not on file     Attends Orthodoxy service: Not on file     Active member of club or organization: Not on file     Attends meetings of clubs or organizations: Not on file     Relationship status: Not on file     Intimate partner violence     Fear of current or ex partner: Not on file     Emotionally abused: Not on file     Physically abused: Not on file     Forced sexual activity: Not on file   Other Topics Concern     Parent/sibling w/  CABG, MI or angioplasty before 65F 55M? Yes      Service Not Asked     Blood Transfusions No     Caffeine Concern Yes     Comment: 1 can daily     Occupational Exposure Yes     Hobby Hazards No     Sleep Concern No     Stress Concern No     Weight Concern No     Special Diet Yes     Comment: low sodium, low calories     Back Care No     Exercise Yes     Comment: walks daily     Bike Helmet Not Asked     Comment: NA     Seat Belt Yes     Self-Exams Yes   Social History Narrative     Not on file       Medications:  Current Outpatient Medications   Medication Sig Dispense Refill     Ascorbic Acid (VITAMIN C PO) Take 500 mg by mouth At Bedtime       aspirin 81 MG tablet Take 81 mg by mouth every other day       Calcium Carbonate-Vitamin D (CALCIUM 600+D) 600-200 MG-UNIT TABS Take 1 tablet by mouth 2 times daily        carvedilol (COREG) 25 MG tablet Take 1 tablet (25 mg) by mouth 2 times daily (with meals) 180 tablet 2     Cholecalciferol (VITAMIN D) 2000 UNIT CAPS Take 1 tablet by mouth daily        Cyanocobalamin (VITAMIN B-12) 500 MCG SUBL Place 500 mcg under the tongue daily        digoxin (LANOXIN) 125 MCG tablet Take 1 tablet (125 mcg) by mouth daily 90 tablet 3     enoxaparin ANTICOAGULANT (LOVENOX ANTICOAGULANT) 100 MG/ML syringe Inject 0.9 mLs (90 mg) Subcutaneous 2 times daily Begin injections on the 15th every 12 hours, The 16th every 12 hours, the 17th am dose only! None on day of surgery. Evening night after surgery take pm dose then twice daily until inr is greater than 2.5 15 Syringe 0     Fexofenadine HCl (ALLERGY 24-HR PO) Take 180 mg by mouth daily        HYDROcodone-acetaminophen (NORCO) 5-325 MG tablet Take 1 tablet by mouth every 6 hours as needed for severe pain 15 tablet 0     HYDROcodone-acetaminophen (NORCO) 5-325 MG tablet Take 1-2 tablets by mouth every 4 hours as needed for moderate to severe pain 15 tablet 0     levothyroxine (SYNTHROID/LEVOTHROID) 200 MCG tablet TAKE 1 TABLET (200  "MCG) BY MOUTH DAILY SEE DOCTOR IN MARCH 30 tablet 0     lisinopril (ZESTRIL) 20 MG tablet Take  20 mg (1 tablet in am) and 10 mg  (one half tablet) in  pm 135 tablet 3     Multiple Vitamins-Minerals (CENTRUM) CHEW Take 1 tablet by mouth daily        nitroGLYcerin (NITROSTAT) 0.3 MG sublingual tablet For chest pain place 1 tablet under the tongue every 5 minutes for 3 doses. If symptoms persist 5 minutes after 1st dose call 911. 25 tablet 1     oxyCODONE (ROXICODONE) 5 MG tablet Take 1 tablet (5 mg) by mouth every 6 hours as needed for severe pain 20 tablet 0     Psyllium (FIBER) 0.52 GM CAPS Take 2 capsules by mouth At Bedtime        senna-docusate (SENOKOT-S/PERICOLACE) 8.6-50 MG tablet Take 1-2 tablets by mouth 2 times daily 30 tablet 0     sertraline (ZOLOFT) 50 MG tablet Take 1 tablet (50 mg) by mouth daily 90 tablet 3     simvastatin (ZOCOR) 20 MG tablet TAKE 1 TABLET BY MOUTH AT BEDTIME 90 tablet 2     trolamine salicylate (ASPERCREME) 10 % external cream Apply topically as needed for moderate pain       warfarin ANTICOAGULANT (COUMADIN) 4 MG tablet TAKE 1 TABLET MONDAY, WEDNESDAY, AND FRIDAY. TAKE ONE AND ONE-HALF TABLETS ALL OTHER DAYS OF THE WEEK (Patient taking differently: Take 4 mg by mouth every evening ) 117 tablet 3       Allergies:   No Known Allergies    Review of Symptoms:  A 10 point review of symptoms has been conducted and is negative except for that mentioned in the above HPI.    Physical Exam:    Blood pressure 104/72, pulse 112, temperature 97.8  F (36.6  C), temperature source Oral, resp. rate 18, height 1.753 m (5' 9\"), weight 90.9 kg (200 lb 8 oz), SpO2 97 %.  General- Pale and tired looking, NAD, AOx3, answers questions appropriately.  Pulm- Non labored Breathing on Room Air  Card- Tachy to 110s, irregular rhythm.   ABD- [id number] 15cm x 10cm firm mass in the right inguinal area is tender to palpation, with extensive ecchymosis. Incision with steri strips, intact without drainage. Abdomen " is soft, ND, NT.   Ext- warm and well perfused. Varicose veins. Distal pulses 2+.             Labs:  CBC RESULTS:   Recent Labs   Lab Test 02/10/21  1720   WBC 10.5   RBC 3.45*   HGB 10.4*   HCT 33.7*   MCV 98   MCH 30.1   MCHC 30.9*   RDW 15.1*        Last Basic Metabolic Panel:  Lab Results   Component Value Date     02/10/2021      Lab Results   Component Value Date    POTASSIUM 4.5 02/10/2021     Lab Results   Component Value Date    CHLORIDE 105 02/10/2021     Lab Results   Component Value Date    REECE 9.2 02/10/2021     Lab Results   Component Value Date    CO2 31 02/10/2021     Lab Results   Component Value Date    BUN 27 02/10/2021     Lab Results   Component Value Date    CR 0.95 02/10/2021     Lab Results   Component Value Date     02/10/2021       Imaging:  Xr Chest Port 1 View    Result Date: 2/10/2021  EXAM: XR CHEST PORT 1 VW  2/10/2021 5:33 PM HISTORY:  chest pain   COMPARISON:  Chest x-ray 1/31/2020 CT chest 2/15/2019. FINDINGS: Portable semiupright view of the chest. Aortic valve replacement with intact median sternotomy wires. Trachea is midline. Stable enlarged cardiac silhouette. The pulmonary vasculature is distinct. Streaky left basilar airspace opacities. No pleural effusion or pneumothorax. 2.5 x 1.9 cm nodule in the right lung, better seen on CT of the chest from 2/15/2019.     IMPRESSION: 1. Cardiomegaly prominent pulmonary vasculature but distinct walls, suggestive of mild pulmonary venous congestion. 2. Streaky left basilar opacities, likely atelectasis. 3. By today's measurement the nodule in the lateral right lung has increased. This is likely projectional. I have personally reviewed the examination and initial interpretation and I agree with the findings. PASCUAL FRIEDMAN MD    Ct Abdomen Pelvis W Contrast    Result Date: 2/10/2021  EXAMINATION: CT ABDOMEN PELVIS W CONTRAST, 2/10/2021 7:41 PM TECHNIQUE:  Helical CT images from the lung bases through the symphysis pubis  were obtained with IV contrast. Contrast dose: iopamidol (ISOVUE-370) solution 123 mL COMPARISON: 10/7/2011 CT abdomen and pelvis Additional History from EMR: Right inguinal hernia repair on 2/4/2021, current INR of 3.1 HISTORY: Abdominal distension FINDINGS: Abdomen and pelvis: Deep to the cutaneous surface and overlying the right lower abdomen/pelvis, there is a well-circumscribed heterogeneously, enhancing lesion with multiple hematocrit levels and multifocal active extravasation (series 4, images 258, 273, 340) that extends into the right inguinal canal. Diffuse soft tissue anasarca within the anterior abdominal wall adjacent to this lesion. No focal hepatic lesions. Gallbladder is surgically absent with mild degree of intrahepatic biliary dilatation with the common bile duct measuring up to 1.3 cm. Postsurgical change of Lynda-en-Y gastric bypass. Dilation of the pancreatic duct. The parenchyma appears homogeneous. The spleen, adrenal glands and renal parenchyma are normal. Multiple simple appearing, subcentimeter right-sided renal cysts. Predominately exophytic simple cyst arising off the midpole of the left kidney. Mildly complex cystic lesion arising off the left renal inferior pole measuring 7.6 x 6.9 x 5.4 cm with mildly complex wall thickening and superimposed calcifications along its inferior most wall. Nonobstructing left calyceal nephrolithiasis. No evidence of urinary obstruction. The bladder is normal. Small bowel is normal caliber. Mild gaseous distention of the transverse and proximal descending colon. No abnormal colonic wall thickening. The appendix is normal. No free fluid in the abdomen or dependent pelvis. Prostate gland is normal.  Numerous venous varicosities overlying the bilateral superficial femoral veinns. Mass effect from the abdominal wall hematoma with near complete stenosis of the distal right common iliac vein as it crosses over the distal iliacus muscle (series 4, image 378). No  pelvic or retroperitoneal adenopathy. Lung bases/lower chest:  Lower lung fields are clear. No airspace consolidation, pleural effusion or pneumothorax. Cardiomegaly with prosthetic aortic valve noted. Left atrial enlargement. Bones and soft tissues: Age dependent, multilevel degenerative changes within the lower thoracic and lumbar spine including bridging anterior syndesmophytes, disc height loss, endplate sclerosis and facet arthropathy. No suspicious osseous lesions.     IMPRESSION: 1. Collection of findings are most concerning for a right anterior abdominal wall postoperative hematoma with multifocal areas of active extravasation and mass effect on the right external iliac vein. 2. Mildly complex cystic lesion arising off the inferior pole of the left kidney, recommend dedicated renal ultrasound for further characterization. 3. Intra and extrahepatic biliary dilatation along with dilatation of the pancreatic duct raises the concern of the sphincter dysfunction or stone. Mass is not seen. This is increased when compared to 2/15/2019.. 4. Cardiomegaly with left atrial enlargement and prosthetic aortic valve repair. 5. Stable post surgical changes of Lynda-en-Y gastric bypass [Critical Result: Postoperative hematoma, multifocal active extravasation] Finding was identified on 2/10/2021 7:47 PM. Rehan Waggoner MD was contacted by Dr. Antonio Dolan at 2/10/2021 7:53 PM and verbalized understanding of the critical finding.       CTA 2/10:   IMPRESSION:  1. No significant change in the large right abdominal wall  postoperative hematoma, which demonstrates extravasation on portal  venous phase imaging. No definite feeding vessel identified,  suggesting supply from small subcutaneous venous vessels. No evidence  of arterial extravasation.  2. Mildly complex cystic lesion arising from the inferior pole of the  left kidney. Recommend dedicated renal ultrasound for further  evaluation.  3. Increased size of the right lower  lobe pulmonary nodule compared to  CT chest on 2/15/2019. Differential diagnosis includes noncalcified  granuloma and slow-growing neoplasm.  4. Cardiomegaly with left atrial enlargement.         Calcipotriene Pregnancy And Lactation Text: This medication has not been proven safe during pregnancy. It is unknown if this medication is excreted in breast milk.

## 2021-09-08 ENCOUNTER — LAB (OUTPATIENT)
Dept: LAB | Facility: CLINIC | Age: 66
End: 2021-09-08
Payer: COMMERCIAL

## 2021-09-08 ENCOUNTER — ANTICOAGULATION THERAPY VISIT (OUTPATIENT)
Dept: ANTICOAGULATION | Facility: CLINIC | Age: 66
End: 2021-09-08

## 2021-09-08 DIAGNOSIS — I48.21 PERMANENT ATRIAL FIBRILLATION (H): ICD-10-CM

## 2021-09-08 DIAGNOSIS — Z79.01 LONG TERM CURRENT USE OF ANTICOAGULANTS WITH INR GOAL OF 2.5-3.5: ICD-10-CM

## 2021-09-08 DIAGNOSIS — Z95.2 S/P AVR (AORTIC VALVE REPLACEMENT): Primary | ICD-10-CM

## 2021-09-08 DIAGNOSIS — S30.1XXA ABDOMINAL WALL HEMATOMA, INITIAL ENCOUNTER: ICD-10-CM

## 2021-09-08 LAB — INR BLD: 2.5 (ref 0.9–1.1)

## 2021-09-08 PROCEDURE — 36415 COLL VENOUS BLD VENIPUNCTURE: CPT

## 2021-09-08 PROCEDURE — 85610 PROTHROMBIN TIME: CPT

## 2021-09-08 NOTE — PROGRESS NOTES
ANTICOAGULATION MANAGEMENT     Jovon Mantilla 66 year old male is on warfarin with therapeutic INR result. (Goal INR 2.5-3.5)    Recent labs: (last 7 days)     09/08/21  1432   INR 2.5*       ASSESSMENT     Source(s): Chart Review and Patient/Caregiver Call       Warfarin doses taken: Warfarin taken as instructed    Diet: No new diet changes identified    New illness, injury, or hospitalization: No    Medication/supplement changes: None noted    Signs or symptoms of bleeding or clotting: No    Previous INR: Supratherapeutic    Additional findings: None     PLAN     Recommended plan for no diet, medication or health factor changes affecting INR     Dosing Instructions: Continue your current warfarin dose with next INR in 4 weeks       Summary  As of 9/8/2021    Full warfarin instructions:  6 mg every Wed, Sat; 4 mg all other days   Next INR check:  9/29/2021             Telephone call with Jovon who verbalizes understanding and agrees to plan    Lab visit scheduled    Education provided: Goal range and significance of current result    Plan made per [facility] anticoagulation protocol    Samra Blanca RN  Anticoagulation Clinic  9/8/2021    _______________________________________________________________________     Anticoagulation Episode Summary     Current INR goal:  2.5-3.5   TTR:  73.6 % (12 mo)   Target end date:  Indefinite   Send INR reminders to:  CARYN ROSE    Indications    S/P AVR (aortic valve replacement) [Z95.2]  Permanent atrial fibrillation (H) [I48.21]  Long term current use of anticoagulants with INR goal of 2.5-3.5 [Z79.01]  Abdominal wall hematoma  initial encounter [S30.1XXA]           Comments:  24mm ATS Valve  Cardiomyopathy / CAD         Anticoagulation Care Providers     Provider Role Specialty Phone number    Rehan Lorenzana MD Referring Family Medicine [phone]    Ramana Redd MD Referring Student in organized health care education/training program [phone]

## 2021-09-28 ENCOUNTER — PRE VISIT (OUTPATIENT)
Dept: UROLOGY | Facility: CLINIC | Age: 66
End: 2021-09-28

## 2021-09-28 NOTE — TELEPHONE ENCOUNTER
Reason for visit: consult     Relevant information: post procedural ED    Records/imaging/labs/orders: in epic    Pt called: no    At Rooming: normal

## 2021-10-06 ENCOUNTER — ANTICOAGULATION THERAPY VISIT (OUTPATIENT)
Dept: ANTICOAGULATION | Facility: CLINIC | Age: 66
End: 2021-10-06

## 2021-10-06 ENCOUNTER — LAB (OUTPATIENT)
Dept: LAB | Facility: CLINIC | Age: 66
End: 2021-10-06
Payer: COMMERCIAL

## 2021-10-06 DIAGNOSIS — S30.1XXA ABDOMINAL WALL HEMATOMA, INITIAL ENCOUNTER: ICD-10-CM

## 2021-10-06 DIAGNOSIS — Z95.2 S/P AVR (AORTIC VALVE REPLACEMENT): Primary | ICD-10-CM

## 2021-10-06 DIAGNOSIS — I48.21 PERMANENT ATRIAL FIBRILLATION (H): ICD-10-CM

## 2021-10-06 DIAGNOSIS — Z79.01 LONG TERM CURRENT USE OF ANTICOAGULANTS WITH INR GOAL OF 2.5-3.5: ICD-10-CM

## 2021-10-06 LAB — INR BLD: 2.6 (ref 0.9–1.1)

## 2021-10-06 PROCEDURE — 85610 PROTHROMBIN TIME: CPT

## 2021-10-06 PROCEDURE — 36415 COLL VENOUS BLD VENIPUNCTURE: CPT

## 2021-10-06 NOTE — PROGRESS NOTES
ANTICOAGULATION MANAGEMENT     Jovon Mantilla 66 year old male is on warfarin with therapeutic INR result. (Goal INR 2.5-3.5)    Recent labs: (last 7 days)     10/06/21  1052   INR 2.6*       ASSESSMENT     Source(s): Chart Review and Patient/Caregiver Call       Warfarin doses taken: Warfarin taken as instructed    Diet: No new diet changes identified    New illness, injury, or hospitalization: No    Medication/supplement changes: None noted    Signs or symptoms of bleeding or clotting: No    Previous INR: Therapeutic last visit; previously outside of goal range    Additional findings: None     PLAN     Recommended plan for no diet, medication or health factor changes affecting INR     Dosing Instructions: Continue your current warfarin dose with next INR in 5 weeks       Summary  As of 10/6/2021    Full warfarin instructions:  6 mg every Wed, Sat; 4 mg all other days   Next INR check:  11/10/2021             Telephone call with Jovon who verbalizes understanding and agrees to plan    Lab visit scheduled    Education provided: Goal range and significance of current result and Importance of therapeutic range    Plan made per [facility] anticoagulation protocol    Neel Mcdermott RN  Anticoagulation Clinic  10/6/2021    _______________________________________________________________________     Anticoagulation Episode Summary     Current INR goal:  2.5-3.5   TTR:  76.4 % (12 mo)   Target end date:  Indefinite   Send INR reminders to:  CARYN ROSE    Indications    S/P AVR (aortic valve replacement) [Z95.2]  Permanent atrial fibrillation (H) [I48.21]  Long term current use of anticoagulants with INR goal of 2.5-3.5 [Z79.01]  Abdominal wall hematoma  initial encounter [S30.1XXA]           Comments:  24mm ATS Valve  Cardiomyopathy / CAD         Anticoagulation Care Providers     Provider Role Specialty Phone number    Rehan Lorenzana MD Referring Family Medicine [phone]    Ramana Redd MD Referring Student in  [facility] health care education/training program [phone]

## 2021-10-11 NOTE — TELEPHONE ENCOUNTER
MEDICAL RECORDS REQUEST   [facility]  Urology Clinic  [address] [place] [address]  PHONE: [phone]  Fax: [phone]        FUTURE VISIT INFORMATION                                                   Jovon Mantilla, : 1955 scheduled for future visit at [facility]    APPOINTMENT INFORMATION:    Date: 11/15/2021    Provider:  Татьяна Srivastava PA    Reason for Visit/Diagnosis: Post-procedural erectile dysfunction // per patient // Ref dby Sneha Tran CNP // no outside recs, csc location verified    REFERRAL INFORMATION:    Referring provider:  Sneha Tran APRN CNP    Specialty: family med    Referring providers clinic:  [facility]     Clinic contact number:  n/a    RECORDS REQUESTED FOR VISIT                                                     Action 2021   Action Taken Records in epic cdk          PRE-VISIT CHECKLIST      Record collection complete Yes   Appointment appropriately scheduled           (right time/right provider) Yes   Joint diagnostic appointment coordinated correctly          (ensure right order & amount of time) N/A   MyChart activation Yes   Questionnaire complete If no, please explain

## 2021-10-24 ENCOUNTER — HEALTH MAINTENANCE LETTER (OUTPATIENT)
Age: 66
End: 2021-10-24

## 2021-11-05 ENCOUNTER — MYC MEDICAL ADVICE (OUTPATIENT)
Dept: FAMILY MEDICINE | Facility: CLINIC | Age: 66
End: 2021-11-05

## 2021-11-05 ENCOUNTER — NURSE TRIAGE (OUTPATIENT)
Dept: FAMILY MEDICINE | Facility: CLINIC | Age: 66
End: 2021-11-05

## 2021-11-05 NOTE — TELEPHONE ENCOUNTER
I think waiting for Urology this short time is fine.  Jovon has heart issues that could affect circulation and is on warfarin so he's unlikely to have arterial issues there but Noe can check and see if he needs a formal circulation test.

## 2021-11-05 NOTE — TELEPHONE ENCOUNTER
"Dr. Lorenzana, please let us know if you recommend urgent care.     We scheduled first available appointment Thursday November 11 with Noe.     1. Patient reports found pea sized lump right scrotum yesterday, he thinks this is related to hernia surgery in February and can wait until scheduled urology follow up on November 15. Patient agrees to call urology nurse today to determine if they recommend sooner eval of lump.      2. Patient reports left toes feel cold when he is in bed since early summer 2021. Toes warm up as soon as he gets out of bed. Progressively worsening. We asked him to compare toes both feet now; denies color changes or decreased sensation.  Left foot feels cooler only when he's in bed otherwise toes both feet warm when he is up and about.     Additional Information    Negative: Rash or color change of scrotum BUT no swelling or pain    Negative: Swelling followed a genital injury    Negative: Pain in scrotum is main symptom    Negative: Scrotum is painful or tender to touch    Negative: Vomiting    Negative: Scrotum looks infected (e.g., draining sore, ulcer, red rash)    Negative: Patient sounds very sick or weak to the triager    Negative: Leg or ankle swelling also present    Negative: Swelling goes away when you push on it and no pain    Negative: Lumpy area discovered inside the scrotum and no pain    Negative: Scrotum swelling and no pain    Negative: Patient wants to be seen    Answer Assessment - Initial Assessment Questions  1. SCROTAL SWELLING: \"What does the scrotum look like?\"  No swelling, rather calls to report a pea sized lump     2. LOCATION: \"Where is the swelling located?\"   Right testicle  3. ONSET: \"When was the lump found?\"      11/4/21  4. PATTERN: \"Does it come and go, or has it been constant since it started?\"      Scrotal sensitivity since hernia surgery in February  5. SCROTAL PAIN: \"Is there any pain?\"     Denies pain, rather reports he's had scrotal sensitivity when " "sitting for several months since hernia surgery  6. HERNIA: \"Has a doctor ever told you that you have a hernia?\"      Yes.   7. OTHER SYMPTOMS: \"Do you have any other symptoms?\" (e.g., fever, abdominal pain, vomiting, difficulty passing urine)      none    Protocols used: SCROTUM SWELLING-A-OH  Casey Lieberman RN    "

## 2021-11-08 DIAGNOSIS — Z79.01 LONG TERM CURRENT USE OF ANTICOAGULANT THERAPY: ICD-10-CM

## 2021-11-10 RX ORDER — WARFARIN SODIUM 4 MG/1
TABLET ORAL
Qty: 117 TABLET | Refills: 1 | Status: SHIPPED | OUTPATIENT
Start: 2021-11-10 | End: 2022-05-09

## 2021-11-10 NOTE — TELEPHONE ENCOUNTER
"Requested Prescriptions   Pending Prescriptions Disp Refills     warfarin ANTICOAGULANT (COUMADIN) 4 MG tablet [Pharmacy Med Name: WARFARIN TABS 4MG] 117 tablet 3     Sig: TAKE 1 TABLET MONDAY, WEDNESDAY, AND FRIDAY. TAKE ONE AND ONE-HALF TABLETS ALL OTHER DAYS OF THE WEEK       Vitamin K Antagonists Failed - 11/8/2021 11:25 PM        Failed - INR is within goal in the past 6 weeks     Confirm INR is within goal in the past 6 weeks.     Recent Labs   Lab Test 10/06/21  1052   INR 2.6*                       Passed - Recent (12 mo) or future (30 days) visit within the authorizing provider's specialty     Patient has had an office visit with the authorizing provider or a provider within the authorizing providers department within the previous 12 mos or has a future within next 30 days. See \"Patient Info\" tab in inbasket, or \"Choose Columns\" in Meds & Orders section of the refill encounter.              Passed - Medication is active on med list        Passed - Patient is 18 years of age or older           Last office visit 8/9/21.  Warfarin dosing is managed by INR Clinic.  Prescription approved per [facility] Refill Protocol.    "

## 2021-11-11 ENCOUNTER — LAB (OUTPATIENT)
Dept: LAB | Facility: CLINIC | Age: 66
End: 2021-11-11

## 2021-11-11 ENCOUNTER — PRE VISIT (OUTPATIENT)
Dept: UROLOGY | Facility: CLINIC | Age: 66
End: 2021-11-11
Payer: COMMERCIAL

## 2021-11-11 ENCOUNTER — ANTICOAGULATION THERAPY VISIT (OUTPATIENT)
Dept: ANTICOAGULATION | Facility: CLINIC | Age: 66
End: 2021-11-11

## 2021-11-11 ENCOUNTER — OFFICE VISIT (OUTPATIENT)
Dept: FAMILY MEDICINE | Facility: CLINIC | Age: 66
End: 2021-11-11
Payer: COMMERCIAL

## 2021-11-11 VITALS
DIASTOLIC BLOOD PRESSURE: 75 MMHG | SYSTOLIC BLOOD PRESSURE: 123 MMHG | RESPIRATION RATE: 16 BRPM | HEART RATE: 83 BPM | BODY MASS INDEX: 28.47 KG/M2 | OXYGEN SATURATION: 95 % | HEIGHT: 70 IN | TEMPERATURE: 97.6 F | WEIGHT: 198.9 LBS

## 2021-11-11 DIAGNOSIS — I48.21 PERMANENT ATRIAL FIBRILLATION (H): ICD-10-CM

## 2021-11-11 DIAGNOSIS — I34.0 NONRHEUMATIC MITRAL VALVE REGURGITATION: Primary | ICD-10-CM

## 2021-11-11 DIAGNOSIS — Z79.01 LONG TERM CURRENT USE OF ANTICOAGULANTS WITH INR GOAL OF 2.5-3.5: ICD-10-CM

## 2021-11-11 DIAGNOSIS — I73.00 RAYNAUD'S PHENOMENON WITHOUT GANGRENE: ICD-10-CM

## 2021-11-11 DIAGNOSIS — Z95.2 S/P AVR (AORTIC VALVE REPLACEMENT): Primary | ICD-10-CM

## 2021-11-11 DIAGNOSIS — S30.1XXA ABDOMINAL WALL HEMATOMA, INITIAL ENCOUNTER: ICD-10-CM

## 2021-11-11 LAB — INR BLD: 2.7 (ref 0.9–1.1)

## 2021-11-11 PROCEDURE — 85610 PROTHROMBIN TIME: CPT

## 2021-11-11 PROCEDURE — 90471 IMMUNIZATION ADMIN: CPT | Performed by: PHYSICIAN ASSISTANT

## 2021-11-11 PROCEDURE — 36415 COLL VENOUS BLD VENIPUNCTURE: CPT

## 2021-11-11 PROCEDURE — 99213 OFFICE O/P EST LOW 20 MIN: CPT | Mod: 25 | Performed by: PHYSICIAN ASSISTANT

## 2021-11-11 PROCEDURE — 90682 RIV4 VACC RECOMBINANT DNA IM: CPT | Performed by: PHYSICIAN ASSISTANT

## 2021-11-11 ASSESSMENT — MIFFLIN-ST. JEOR: SCORE: 1690.51

## 2021-11-11 NOTE — PROGRESS NOTES
"  Assessment & Plan     Nonrheumatic mitral valve regurgitation    Patient is worried that his mitral valve may be causing symptoms. Will obtain new echocardiogram to rule out worsening.    - Echocardiogram Complete; Future      Raynaud's phenomenon without gangrene    Most likely cause of cool toes. Circulation is normal on exam today. No sign of this on exam but only happens when laying flat at night.                    No follow-ups on file.    FEROZ [facility] ELVER Sigala is a 66 year old who presents for the following health issues   History of Present Illness       Vascular Disease:  He presents for follow up of vascular disease.  He is not taking daily aspirin.    He eats 0-1 servings of fruits and vegetables daily.He consumes 3 sweetened beverage(s) daily.He exercises with enough effort to increase his heart rate 60 or more minutes per day.  He exercises with enough effort to increase his heart rate 4 days per week.   He is taking medications regularly.     Concern - cool toes of left foot (only at night)  Onset: 5-6 months  Description: Cooler toes at night of left foot only  Intensity: mild  Progression of Symptoms: same and intermittent  Accompanying Signs & Symptoms: None  Previous history of similar problem: YES- he has Raynaud's in his hands  Precipitating factors:        Worsened by: None  Alleviating factors:        Improved by: None  Therapies tried and outcome: Uses heated blanket at night which helps some        Review of Systems   Constitutional, HEENT, cardiovascular, pulmonary, gi and gu systems are negative, except as otherwise noted.        Objective    /75 (BP Location: Right arm, Patient Position: Sitting, Cuff Size: Adult Regular)   Pulse 83   Temp 97.6  F (36.4  C) (Oral)   Resp 16   Ht 1.765 m (5' 9.5\")   Wt 90.2 kg (198 lb 14.4 oz)   SpO2 95%   BMI 28.95 kg/m    Body mass index is 28.95 kg/m .       Physical Exam "   GENERAL: healthy, alert and no distress  EYES: Eyes grossly normal to inspection, PERRL and conjunctivae and sclerae normal  RESP: lungs clear to auscultation - no rales, rhonchi or wheezes  CV: regular rate and rhythm, normal S1 S2, no S3 or S4, no murmur, click or rub, no peripheral edema and peripheral pulses strong  MS: no gross musculoskeletal defects noted, no edema  SKIN: no suspicious lesions or rashes  NEURO: Normal strength and tone, mentation intact and speech normal  PSYCH: mentation appears normal, affect normal/bright  Bilateral feet: There are no color changes, erythema, edema, or ecchymosis. Non-tender to palpation. No temperature differences noted between feet or between feet and toes. Pulses intact bilaterally. Sensation intact bilaterally.

## 2021-11-11 NOTE — CONFIDENTIAL NOTE
Reason for visit: consult     Relevant information: post-op erectile dysfunction    Problem list   Patient Active Problem List   Diagnosis     Acquired hypothyroidism     Neck pain     Chronic rhinitis     Aortic stenosis     Hyperlipidemia LDL goal <70     Hypertension goal BP (blood pressure) < 140/90     Dilated cardiomyopathy (H)     CAD (coronary artery disease)     Mitral regurgitation     ASVD (arteriosclerotic vascular disease)     S/P AVR (aortic valve replacement)     GI bleeding     Gastrointestinal hemorrhage associated with anorectal source     Long-term (current) use of anticoagulants [Z79.01]     Adjustment disorder with mixed anxiety and depressed mood     Anxiety     Idiopathic cardiomyopathy (H)     S/P aortic valve replacement     Coronary artery disease involving native coronary artery of native heart without angina pectoris     Hypotension, unspecified hypotension type     Permanent atrial fibrillation (H)     Acne rosacea     Right inguinal hernia     Long term current use of anticoagulants with INR goal of 2.5-3.5     Obesity     Abdominal wall hematoma, initial encounter     S/P bariatric surgery       Records/imaging/labs/orders: in [facility]    Pt called: n/a    At Rooming: standard

## 2021-11-11 NOTE — PROGRESS NOTES
Anticoagulation Management    Unable to reach Jovon today.    Today's INR result of 2.7 is therapeutic (goal INR of 2.5-3.5).  Result received from: Clinic Lab    Follow up required to confirm warfarin dose taken and assess for changes    Left VM to call [phone] with transfer to Madeline Correa:[phone] OR transfer to:[facility]        Anticoagulation clinic to follow up    Madeline Wayne RN

## 2021-11-15 ENCOUNTER — PRE VISIT (OUTPATIENT)
Dept: UROLOGY | Facility: CLINIC | Age: 66
End: 2021-11-15

## 2021-11-15 ENCOUNTER — OFFICE VISIT (OUTPATIENT)
Dept: UROLOGY | Facility: CLINIC | Age: 66
End: 2021-11-15
Payer: COMMERCIAL

## 2021-11-15 ENCOUNTER — MYC MEDICAL ADVICE (OUTPATIENT)
Dept: UROLOGY | Facility: CLINIC | Age: 66
End: 2021-11-15

## 2021-11-15 VITALS
HEIGHT: 69 IN | WEIGHT: 198 LBS | HEART RATE: 80 BPM | BODY MASS INDEX: 29.33 KG/M2 | SYSTOLIC BLOOD PRESSURE: 125 MMHG | DIASTOLIC BLOOD PRESSURE: 85 MMHG

## 2021-11-15 DIAGNOSIS — N50.82 SCROTAL PAIN: ICD-10-CM

## 2021-11-15 DIAGNOSIS — N50.89 LUMP IN SCROTUM: Primary | ICD-10-CM

## 2021-11-15 DIAGNOSIS — N52.39 OTHER POST-PROCEDURAL ERECTILE DYSFUNCTION: ICD-10-CM

## 2021-11-15 PROCEDURE — 99203 OFFICE O/P NEW LOW 30 MIN: CPT | Performed by: PHYSICIAN ASSISTANT

## 2021-11-15 RX ORDER — SILDENAFIL 25 MG/1
25 TABLET, FILM COATED ORAL DAILY PRN
Qty: 10 TABLET | Refills: 0 | Status: SHIPPED | OUTPATIENT
Start: 2021-11-15 | End: 2021-11-15

## 2021-11-15 ASSESSMENT — PAIN SCALES - GENERAL: PAINLEVEL: NO PAIN (0)

## 2021-11-15 ASSESSMENT — MIFFLIN-ST. JEOR: SCORE: 1678.5

## 2021-11-15 NOTE — PROGRESS NOTES
Chief Complaint:   Erectile dysfunction         History of Present Illness:   Jovon Mantilla is a 66 year old male with a history of hypertension, hyperlipidemia, CAD, atrial fibrillation, s/p AVR and MAZE who presents for evaluation of erectile dysfunction following right inguinal hernia repair in 2/2021.  He reports discomfort and sensitivity in his right scrotum which has been ongoing since his hernia surgery.  Recently he reports feeling a new new lump in his right testicle, which he noticed last week.  He denies any swelling in the right scrotum.  He denies any abnormalities or discomfort on the left scrotum.  In regards to his erectile dysfunction, he reports he is only getting an erection at half the strength it previously was prior to his hernia surgery.  He has not tried any medications for ED.  No urinary issues.  He has nitroglycerin on hand due to his history of CAD but has never needed to take nitroglycerin.  He reports normal exercise capacity and denies any chest pain or shortness of breath and is able to walk a few blocks or up a flight of stairs without issue.          Past Medical History:     Past Medical History:   Diagnosis Date     Acne rosacea 04/22/2019     Acquired hypothyroidism 05/23/2003     Problem list name updated by automated process. Provider to review     Acute prostatitis      Acute sinusitis, unspecified      Aortic valve disease      Biatrial enlargement      CAD (coronary artery disease)     known 70% LAD stenosis     Congestive heart failure (H)      Depression      GI bleeding 01/22/2016     History of bleeding peptic ulcer      Hyperlipidemia      Idiopathic cardiomyopathy (H)      Mitral regurgitation      Obesity      Palpitations      Permanent atrial fibrillation (H) 06/02/2017     Right inguinal hernia 07/22/2019     S/P AVR 01/07/2011    with Maze procedure     S/P gastric bypass      Tricuspid regurgitation      Unspecified essential hypertension             Past  Surgical History:     Past Surgical History:   Procedure Laterality Date     ANGIOGRAM  02/01/2002    Normal coronary,dilated LV,Sev.decr.global LVF,+1 MR     ANGIOGRAM  01/01/2008    Mild AS,Mod PHTN,mod prox.LAD disease,Triv.CFX disease     ANGIOGRAM  01/01/2009    Mod AS,Mod PHTN,mod prox.LAD disease     ANGIOGRAM  10/01/2010    Sev.AS,CM,global hypokinesis,Mild-mod LV dilated,Mild MR,70% prox.LAD lesion,PHTN     CARDIOVERSION  2/02, 4/02, 4/11     CHOLECYSTECTOMY  2006     COLONOSCOPY N/A 01/23/2016    Procedure: COLONOSCOPY;  Surgeon: Robyn Collins MD;  Location:  GI     ESOPHAGOSCOPY, GASTROSCOPY, DUODENOSCOPY (EGD), COMBINED N/A 01/23/2016    Procedure: COMBINED ESOPHAGOSCOPY, GASTROSCOPY, DUODENOSCOPY (EGD);  Surgeon: Robyn Collins MD;  Location:  GI     EXCISE MASS GROIN Right 2/11/2021    Procedure: INCISION AND DRAINAGE OF RIGHT GROIN HEMATOMA;  Surgeon: Norma Scanlon MD;  Location: [facility] OR     HERNIORRHAPHY INGUINAL Right 2/4/2021    Procedure: OPEN RIGHT INGUINAL HENRIA WITH MESH;  Surgeon: Jaden Alex MD;  Location: [facility] OR     LAPAROSCOPIC BYPASS GASTRIC  10/08/2011    Procedure:LAPAROSCOPIC BYPASS GASTRIC; Diagnostic laparoscopy, Lysis of Adhesions, Laparoscopic Revision of Jejunojejunostomy; Surgeon:RAHDA MURO; Location: OR     LAPAROSCOPIC BYPASS GASTRIC  06/26/2006    Dr Jin     LAPAROSCOPY DIAGNOSTIC (GENERAL)  10/08/2011    Procedure:LAPAROSCOPY DIAGNOSTIC (GENERAL); Surgeon:RADHA MURO; Location: OR     REPLACE VALVE AORTIC  01/07/2011     ZZC NONSPECIFIC PROCEDURE  ~1985    vein stripping            Medications     Current Outpatient Medications   Medication     Ascorbic Acid (VITAMIN C PO)     aspirin 81 MG tablet     Calcium Carbonate-Vitamin D (CALCIUM 600+D) 600-200 MG-UNIT TABS     carvedilol (COREG) 25 MG tablet     Cholecalciferol (VITAMIN D) 2000 UNIT CAPS     Cyanocobalamin (VITAMIN B-12) 500 MCG SUBL      digoxin (LANOXIN) 125 MCG tablet     Fexofenadine HCl (ALLERGY 24-HR PO)     levothyroxine (SYNTHROID/LEVOTHROID) 200 MCG tablet     lisinopril (ZESTRIL) 20 MG tablet     Multiple Vitamins-Minerals (CENTRUM) CHEW     nitroGLYcerin (NITROSTAT) 0.3 MG sublingual tablet     Psyllium (FIBER) 0.52 GM CAPS     sertraline (ZOLOFT) 50 MG tablet     simvastatin (ZOCOR) 20 MG tablet     warfarin ANTICOAGULANT (COUMADIN) 4 MG tablet     No current facility-administered medications for this visit.            Family History:     Family History   Problem Relation Age of Onset     Cancer - colorectal Father      Colon Cancer Father      Cancer Father      Diabetes Brother             Social History:     Social History     Socioeconomic History     Marital status:      Spouse name: Tracey     Number of children: 0     Years of education: Not on file     Highest education level: Not on file   Occupational History     Occupation:      Comment: [employer]   Tobacco Use     Smoking status: Never Smoker     Smokeless tobacco: Never Used   Substance and Sexual Activity     Alcohol use: No     Alcohol/week: 0.0 standard drinks     Drug use: No     Sexual activity: Not Currently     Partners: Female   Other Topics Concern     Parent/sibling w/ CABG, MI or angioplasty before 65F 55M? Yes      Service Not Asked     Blood Transfusions No     Caffeine Concern Yes     Comment: 1 can daily     Occupational Exposure Yes     Hobby Hazards No     Sleep Concern No     Stress Concern No     Weight Concern No     Special Diet Yes     Comment: low sodium, low calories     Back Care No     Exercise Yes     Comment: walks daily     Bike Helmet Not Asked     Comment: NA     Seat Belt Yes     Self-Exams Yes   Social History Narrative     Not on file     Social Determinants of Health     Financial Resource Strain: Not on file   Food Insecurity: Not on file   Transportation Needs: Not on file   Physical Activity: Not on file   Stress:  "Not on file   Social Connections: Not on file   Intimate Partner Violence: Not on file   Housing Stability: Not on file            Allergies:   Patient has no known allergies.         Review of Systems:  From intake questionnaire   Negative 14 system review except as noted on HPI, nurse's note.         Physical Exam:   Patient is a 66 year old  male   Vitals: Blood pressure 125/85, pulse 80, height 1.753 m (5' 9\"), weight 89.8 kg (198 lb).  General Appearance Adult: Alert, no acute distress, oriented  Lungs: no respiratory distress, or pursed lip breathing  Heart: No obvious jugular venous distension present  Abdomen: Body mass index is 29.24 kg/m .  Musculoskeltal: extremities normal, no peripheral edema  Skin: no suspicious lesions or rashes  Neuro: Alert, oriented, speech and mentation normal  : testicles normal without atrophy, ~1 cm palpable soft mass superior to the right testicle in the location of the epididymis       Labs and Pathology:    I personally reviewed all applicable laboratory data and went over findings with patient  Significant for:    CBC RESULTS:  Recent Labs   Lab Test 08/09/21  1146 02/13/21  0751 02/12/21  0619 02/11/21  0545   WBC 5.1 8.9 11.2* 9.6   HGB 10.8* 9.7* 9.4* 9.2*    270 224 190        BMP RESULTS:  Recent Labs   Lab Test 08/09/21  1146 02/12/21  0619 02/11/21  0545 02/10/21  1720 02/04/21  0615    140 140 139  --    POTASSIUM 4.2 4.4 3.9 4.5 4.2   CHLORIDE 103 107 106 105  --    CO2 34* 30 31 31  --    ANIONGAP <1* 2* 4 3  --    GLC 87 103* 89 138*  --    BUN 15 31* 26 27  --    CR 0.86 0.76 0.72 0.95 0.78   GFRESTIMATED >90 >90 >90 85 >90   GFRESTBLACK  --  >90 >90 >90 >90   REECE 9.2 8.8 9.0 9.2  --        UA RESULTS:   Recent Labs   Lab Test 03/06/14  1347   SG 1.015   URINEPH 7.0   NITRITE Negative       PSA RESULTS  PSA   Date Value Ref Range Status   08/28/2020 1.27 0 - 4 ug/L Final     Comment:     Assay Method:  Chemiluminescence using Siemens Vista " analyzer   02/16/2015 1.44 0 - 4 ug/L Final   03/11/2014 1.63 0 - 4 ug/L Final   02/06/2008 1.53 0 - 4 ug/L Final     Prostate Specific Antigen Screen   Date Value Ref Range Status   08/09/2021 1.41 0.00 - 4.00 ug/L Final         Imaging:    I personally reviewed all applicable imaging and went over findings with patient.  Significant for: no recent relevant imaging         Assessment and Plan:     Assessment: 66 year old male with new onset erectile dysfunction which has been ongoing since his hernia repair in 2/2021.  Reviewed treatment options including PDE5 inhibitors, MUNDO, ICI, and IPP.  Given history of CAD and prescription for nitroglycerin, we discussed contraindication of use with PDE5 inhibitors due to risk for severe hypotension.  Patient wishes to think about alternative options and will contact me if he is interested in pursuing alternative treatments.  In regards to his right scrotal tenderness, he does have a small palpable mass superior to the right testicle which I suspect is an epididymal head cyst.  We will plan for a testicular ultrasound to further evaluate.      Plan:  - Schedule testicular ultrasound next available at your convenience.   - Patient will think about alternative treatments for ED and will contact me should he wish to pursue ICI, MUNDO, or IPP in the future.        TRINA Cowart  Department of Urology

## 2021-11-15 NOTE — TELEPHONE ENCOUNTER
Called patient and further discussed ED treatment and contraindication of PDE5 inhibitors with nitroglycerin.  Patient has never used nitroglycerin but does have a prescription for it due to history of CAD.  Reviewed that it is unknown if he will ever need to use nitroglycerin, but should he ever need it it would be contraindicated to use with sildenafil due to severe hypotension.  Patient in agreement and his prescription was cancelled.  Discussed alternative therapies for ED including ICI, MUNDO, and IPP and patient will think about whether he would like to pursue any of these options.    TRINA Cowart on 11/15/2021 at 1:02 PM

## 2021-11-15 NOTE — NURSING NOTE
"Chief Complaint   Patient presents with     Consult For     ED       Blood pressure 125/85, pulse 80, height 1.753 m (5' 9\"), weight 89.8 kg (198 lb). Body mass index is 29.24 kg/m .    Patient Active Problem List   Diagnosis     Acquired hypothyroidism     Neck pain     Chronic rhinitis     Aortic stenosis     Hyperlipidemia LDL goal <70     Hypertension goal BP (blood pressure) < 140/90     Dilated cardiomyopathy (H)     CAD (coronary artery disease)     Mitral regurgitation     ASVD (arteriosclerotic vascular disease)     S/P AVR (aortic valve replacement)     GI bleeding     Gastrointestinal hemorrhage associated with anorectal source     Long-term (current) use of anticoagulants [Z79.01]     Adjustment disorder with mixed anxiety and depressed mood     Anxiety     Idiopathic cardiomyopathy (H)     S/P aortic valve replacement     Coronary artery disease involving native coronary artery of native heart without angina pectoris     Hypotension, unspecified hypotension type     Permanent atrial fibrillation (H)     Acne rosacea     Right inguinal hernia     Long term current use of anticoagulants with INR goal of 2.5-3.5     Obesity     Abdominal wall hematoma, initial encounter     S/P bariatric surgery       No Known Allergies    Current Outpatient Medications   Medication Sig Dispense Refill     Ascorbic Acid (VITAMIN C PO) Take 500 mg by mouth At Bedtime       aspirin 81 MG tablet Take 81 mg by mouth every other day       Calcium Carbonate-Vitamin D (CALCIUM 600+D) 600-200 MG-UNIT TABS Take 1 tablet by mouth 2 times daily        carvedilol (COREG) 25 MG tablet Take 1 tablet (25 mg) by mouth 2 times daily (with meals) 90 tablet 0     Cholecalciferol (VITAMIN D) 2000 UNIT CAPS Take 1 tablet by mouth daily        Cyanocobalamin (VITAMIN B-12) 500 MCG SUBL Place 500 mcg under the tongue daily        digoxin (LANOXIN) 125 MCG tablet Take 1 tablet (125 mcg) by mouth daily 90 tablet 3     Fexofenadine HCl (ALLERGY " 24-HR PO) Take 180 mg by mouth daily        levothyroxine (SYNTHROID/LEVOTHROID) 200 MCG tablet Take 1 tablet (200 mcg) by mouth daily 90 tablet 3     lisinopril (ZESTRIL) 20 MG tablet Take  20 mg (1 tablet in am) and 10 mg  (one half tablet) in  pm 135 tablet 0     Multiple Vitamins-Minerals (CENTRUM) CHEW Take 1 tablet by mouth daily        nitroGLYcerin (NITROSTAT) 0.3 MG sublingual tablet For chest pain place 1 tablet under the tongue every 5 minutes for 3 doses. If symptoms persist 5 minutes after 1st dose call 911. 25 tablet 1     Psyllium (FIBER) 0.52 GM CAPS Take 2 capsules by mouth At Bedtime        sertraline (ZOLOFT) 50 MG tablet Take 1 tablet (50 mg) by mouth daily 90 tablet 3     simvastatin (ZOCOR) 20 MG tablet Take 1 tablet (20 mg) by mouth At Bedtime 90 tablet 3     warfarin ANTICOAGULANT (COUMADIN) 4 MG tablet Take one tablet (4 mg) by mouth daily except take one a half tablets (6 mg) Wed, Sat or as directed by INR Clinic 117 tablet 1       Social History     Tobacco Use     Smoking status: Never Smoker     Smokeless tobacco: Never Used   Substance Use Topics     Alcohol use: No     Alcohol/week: 0.0 standard drinks     Drug use: No       Kayleelio Jeffery  11/15/2021  9:29 AM

## 2021-11-15 NOTE — LETTER
11/15/2021       RE: Jovon Mantilla  [address]  [facility] [id number]     Dear Colleague,    Thank you for referring your patient, Jovon Mantilla, to the [facility] [place] at [facility]. Please see a copy of my visit note below.          Chief Complaint:   Erectile dysfunction         History of Present Illness:   Jovon Mantilla is a 66 year old male with a history of hypertension, hyperlipidemia, CAD, atrial fibrillation, s/p AVR and MAZE who presents for evaluation of erectile dysfunction following right inguinal hernia repair in 2/2021.  He reports discomfort and sensitivity in his right scrotum which has been ongoing since his hernia surgery.  Recently he reports feeling a new new lump in his right testicle, which he noticed last week.  He denies any swelling in the right scrotum.  He denies any abnormalities or discomfort on the left scrotum.  In regards to his erectile dysfunction, he reports he is only getting an erection at half the strength it previously was prior to his hernia surgery.  He has not tried any medications for ED.  No urinary issues.  He has nitroglycerin on hand due to his history of CAD but has never needed to take nitroglycerin.  He reports normal exercise capacity and denies any chest pain or shortness of breath and is able to walk a few blocks or up a flight of stairs without issue.          Past Medical History:     Past Medical History:   Diagnosis Date     Acne rosacea 04/22/2019     Acquired hypothyroidism 05/23/2003     Problem list name updated by automated process. Provider to review     Acute prostatitis      Acute sinusitis, unspecified      Aortic valve disease      Biatrial enlargement      CAD (coronary artery disease)     known 70% LAD stenosis     Congestive heart failure (H)      Depression      GI bleeding 01/22/2016     History of bleeding peptic ulcer      Hyperlipidemia      Idiopathic  cardiomyopathy (H)      Mitral regurgitation      Obesity      Palpitations      Permanent atrial fibrillation (H) 06/02/2017     Right inguinal hernia 07/22/2019     S/P AVR 01/07/2011    with Maze procedure     S/P gastric bypass      Tricuspid regurgitation      Unspecified essential hypertension             Past Surgical History:     Past Surgical History:   Procedure Laterality Date     ANGIOGRAM  02/01/2002    Normal coronary,dilated LV,Sev.decr.global LVF,+1 MR     ANGIOGRAM  01/01/2008    Mild AS,Mod PHTN,mod prox.LAD disease,Triv.CFX disease     ANGIOGRAM  01/01/2009    Mod AS,Mod PHTN,mod prox.LAD disease     ANGIOGRAM  10/01/2010    Sev.AS,CM,global hypokinesis,Mild-mod LV dilated,Mild MR,70% prox.LAD lesion,PHTN     CARDIOVERSION  2/02, 4/02, 4/11     CHOLECYSTECTOMY  2006     COLONOSCOPY N/A 01/23/2016    Procedure: COLONOSCOPY;  Surgeon: Robyn Collins MD;  Location:  GI     ESOPHAGOSCOPY, GASTROSCOPY, DUODENOSCOPY (EGD), COMBINED N/A 01/23/2016    Procedure: COMBINED ESOPHAGOSCOPY, GASTROSCOPY, DUODENOSCOPY (EGD);  Surgeon: Robyn Collins MD;  Location:  GI     EXCISE MASS GROIN Right 2/11/2021    Procedure: INCISION AND DRAINAGE OF RIGHT GROIN HEMATOMA;  Surgeon: Norma Scanlon MD;  Location: [facility] OR     HERNIORRHAPHY INGUINAL Right 2/4/2021    Procedure: OPEN RIGHT INGUINAL HENRIA WITH MESH;  Surgeon: Jaden Alex MD;  Location: [facility] OR     LAPAROSCOPIC BYPASS GASTRIC  10/08/2011    Procedure:LAPAROSCOPIC BYPASS GASTRIC; Diagnostic laparoscopy, Lysis of Adhesions, Laparoscopic Revision of Jejunojejunostomy; Surgeon:RADHA MURO; Location: OR     LAPAROSCOPIC BYPASS GASTRIC  06/26/2006    Dr Jin     LAPAROSCOPY DIAGNOSTIC (GENERAL)  10/08/2011    Procedure:LAPAROSCOPY DIAGNOSTIC (GENERAL); Surgeon:RADHA MURO; Location: OR     REPLACE VALVE AORTIC  01/07/2011     Z NONSPECIFIC PROCEDURE  ~1985    vein stripping             Medications     Current Outpatient Medications   Medication     Ascorbic Acid (VITAMIN C PO)     aspirin 81 MG tablet     Calcium Carbonate-Vitamin D (CALCIUM 600+D) 600-200 MG-UNIT TABS     carvedilol (COREG) 25 MG tablet     Cholecalciferol (VITAMIN D) 2000 UNIT CAPS     Cyanocobalamin (VITAMIN B-12) 500 MCG SUBL     digoxin (LANOXIN) 125 MCG tablet     Fexofenadine HCl (ALLERGY 24-HR PO)     levothyroxine (SYNTHROID/LEVOTHROID) 200 MCG tablet     lisinopril (ZESTRIL) 20 MG tablet     Multiple Vitamins-Minerals (CENTRUM) CHEW     nitroGLYcerin (NITROSTAT) 0.3 MG sublingual tablet     Psyllium (FIBER) 0.52 GM CAPS     sertraline (ZOLOFT) 50 MG tablet     simvastatin (ZOCOR) 20 MG tablet     warfarin ANTICOAGULANT (COUMADIN) 4 MG tablet     No current facility-administered medications for this visit.            Family History:     Family History   Problem Relation Age of Onset     Cancer - colorectal Father      Colon Cancer Father      Cancer Father      Diabetes Brother             Social History:     Social History     Socioeconomic History     Marital status:      Spouse name: Tracey     Number of children: 0     Years of education: Not on file     Highest education level: Not on file   Occupational History     Occupation:      Comment: [employer]   Tobacco Use     Smoking status: Never Smoker     Smokeless tobacco: Never Used   Substance and Sexual Activity     Alcohol use: No     Alcohol/week: 0.0 standard drinks     Drug use: No     Sexual activity: Not Currently     Partners: Female   Other Topics Concern     Parent/sibling w/ CABG, MI or angioplasty before 65F 55M? Yes      Service Not Asked     Blood Transfusions No     Caffeine Concern Yes     Comment: 1 can daily     Occupational Exposure Yes     Hobby Hazards No     Sleep Concern No     Stress Concern No     Weight Concern No     Special Diet Yes     Comment: low sodium, low calories     Back Care No     Exercise Yes     Comment:  "walks daily     Bike Helmet Not Asked     Comment: NA     Seat Belt Yes     Self-Exams Yes   Social History Narrative     Not on file     Social Determinants of Health     Financial Resource Strain: Not on file   Food Insecurity: Not on file   Transportation Needs: Not on file   Physical Activity: Not on file   Stress: Not on file   Social Connections: Not on file   Intimate Partner Violence: Not on file   Housing Stability: Not on file            Allergies:   Patient has no known allergies.         Review of Systems:  From intake questionnaire   Negative 14 system review except as noted on HPI, nurse's note.         Physical Exam:   Patient is a 66 year old  male   Vitals: Blood pressure 125/85, pulse 80, height 1.753 m (5' 9\"), weight 89.8 kg (198 lb).  General Appearance Adult: Alert, no acute distress, oriented  Lungs: no respiratory distress, or pursed lip breathing  Heart: No obvious jugular venous distension present  Abdomen: Body mass index is 29.24 kg/m .  Musculoskeltal: extremities normal, no peripheral edema  Skin: no suspicious lesions or rashes  Neuro: Alert, oriented, speech and mentation normal  : testicles normal without atrophy, ~1 cm palpable soft mass superior to the right testicle in the location of the epididymis       Labs and Pathology:    I personally reviewed all applicable laboratory data and went over findings with patient  Significant for:    CBC RESULTS:  Recent Labs   Lab Test 08/09/21  1146 02/13/21  0751 02/12/21  0619 02/11/21  0545   WBC 5.1 8.9 11.2* 9.6   HGB 10.8* 9.7* 9.4* 9.2*    270 224 190        BMP RESULTS:  Recent Labs   Lab Test 08/09/21  1146 02/12/21  0619 02/11/21  0545 02/10/21  1720 02/04/21  0615    140 140 139  --    POTASSIUM 4.2 4.4 3.9 4.5 4.2   CHLORIDE 103 107 106 105  --    CO2 34* 30 31 31  --    ANIONGAP <1* 2* 4 3  --    GLC 87 103* 89 138*  --    BUN 15 31* 26 27  --    CR 0.86 0.76 0.72 0.95 0.78   GFRESTIMATED >90 >90 >90 85 >90 "   GFRESTBLACK  --  >90 >90 >90 >90   REECE 9.2 8.8 9.0 9.2  --        UA RESULTS:   Recent Labs   Lab Test 03/06/14  1347   SG 1.015   URINEPH 7.0   NITRITE Negative       PSA RESULTS  PSA   Date Value Ref Range Status   08/28/2020 1.27 0 - 4 ug/L Final     Comment:     Assay Method:  Chemiluminescence using Siemens Vista analyzer   02/16/2015 1.44 0 - 4 ug/L Final   03/11/2014 1.63 0 - 4 ug/L Final   02/06/2008 1.53 0 - 4 ug/L Final     Prostate Specific Antigen Screen   Date Value Ref Range Status   08/09/2021 1.41 0.00 - 4.00 ug/L Final         Imaging:    I personally reviewed all applicable imaging and went over findings with patient.  Significant for: no recent relevant imaging         Assessment and Plan:     Assessment: 66 year old male with new onset erectile dysfunction which has been ongoing since his hernia repair in 2/2021.  Reviewed treatment options including PDE5 inhibitors, MUNDO, ICI, and IPP.  Given history of CAD and prescription for nitroglycerin, we discussed contraindication of use with PDE5 inhibitors due to risk for severe hypotension.  Patient wishes to think about alternative options and will contact me if he is interested in pursuing alternative treatments.  In regards to his right scrotal tenderness, he does have a small palpable mass superior to the right testicle which I suspect is an epididymal head cyst.  We will plan for a testicular ultrasound to further evaluate.      Plan:  - Schedule testicular ultrasound next available at your convenience.   - Patient will think about alternative treatments for ED and will contact me should he wish to pursue ICI, MUNDO, or IPP in the future.        TRINA Cowart  Department of Urology

## 2021-11-15 NOTE — PATIENT INSTRUCTIONS
UROLOGY CLINIC VISIT PATIENT INSTRUCTIONS    - Schedule testicular ultrasound next available at your convenience.       If you have any issues, questions or concerns in the meantime, do not hesitate to contact us at [phone] or via [employer].     It was a pleasure meeting with you today.  Thank you for allowing me and my team the privilege of caring for you today.  YOU are the reason we are here, and I truly hope we provided you with the excellent service you deserve.  Please let us know if there is anything else we can do for you so that we can be sure you are leaving completely satisfied with your care experience.    Татьяна Srivastava PA-C  Department of Urology

## 2021-11-16 DIAGNOSIS — I70.90 ASVD (ARTERIOSCLEROTIC VASCULAR DISEASE): ICD-10-CM

## 2021-11-16 RX ORDER — CARVEDILOL 25 MG/1
25 TABLET ORAL 2 TIMES DAILY WITH MEALS
Qty: 90 TABLET | Refills: 0 | Status: SHIPPED | OUTPATIENT
Start: 2021-11-16 | End: 2021-12-13

## 2021-11-16 NOTE — TELEPHONE ENCOUNTER
Patient called and is out of his Carvedilol. He was last seen 10/2020. I told him I could give him a 90 day supply but he needs to be seen. I transferred him to scheduling to make an appointment.

## 2021-11-19 ENCOUNTER — HOSPITAL ENCOUNTER (OUTPATIENT)
Dept: ULTRASOUND IMAGING | Facility: CLINIC | Age: 66
Discharge: HOME | End: 2021-11-19
Attending: PHYSICIAN ASSISTANT | Admitting: PHYSICIAN ASSISTANT
Payer: COMMERCIAL

## 2021-11-19 DIAGNOSIS — N50.89 LUMP IN SCROTUM: ICD-10-CM

## 2021-11-19 DIAGNOSIS — N50.82 SCROTAL PAIN: ICD-10-CM

## 2021-11-19 PROCEDURE — 76870 US EXAM SCROTUM: CPT

## 2021-11-22 ENCOUNTER — MYC MEDICAL ADVICE (OUTPATIENT)
Dept: FAMILY MEDICINE | Facility: CLINIC | Age: 66
End: 2021-11-22
Payer: COMMERCIAL

## 2021-11-22 ENCOUNTER — LAB (OUTPATIENT)
Dept: URGENT CARE | Facility: URGENT CARE | Age: 66
End: 2021-11-22
Attending: FAMILY MEDICINE
Payer: COMMERCIAL

## 2021-11-22 ENCOUNTER — TELEPHONE (OUTPATIENT)
Dept: FAMILY MEDICINE | Facility: CLINIC | Age: 66
End: 2021-11-22
Payer: COMMERCIAL

## 2021-11-22 ENCOUNTER — E-VISIT (OUTPATIENT)
Dept: FAMILY MEDICINE | Facility: CLINIC | Age: 66
End: 2021-11-22
Payer: COMMERCIAL

## 2021-11-22 DIAGNOSIS — Z20.822 SUSPECTED 2019 NOVEL CORONAVIRUS INFECTION: ICD-10-CM

## 2021-11-22 DIAGNOSIS — J20.9 ACUTE BRONCHITIS, UNSPECIFIED ORGANISM: ICD-10-CM

## 2021-11-22 DIAGNOSIS — J20.9 ACUTE BRONCHITIS, UNSPECIFIED ORGANISM: Primary | ICD-10-CM

## 2021-11-22 PROCEDURE — 99421 OL DIG E/M SVC 5-10 MIN: CPT | Performed by: FAMILY MEDICINE

## 2021-11-22 PROCEDURE — U0003 INFECTIOUS AGENT DETECTION BY NUCLEIC ACID (DNA OR RNA); SEVERE ACUTE RESPIRATORY SYNDROME CORONAVIRUS 2 (SARS-COV-2) (CORONAVIRUS DISEASE [COVID-19]), AMPLIFIED PROBE TECHNIQUE, MAKING USE OF HIGH THROUGHPUT TECHNOLOGIES AS DESCRIBED BY CMS-2020-01-R: HCPCS

## 2021-11-22 PROCEDURE — U0005 INFEC AGEN DETEC AMPLI PROBE: HCPCS

## 2021-11-22 NOTE — TELEPHONE ENCOUNTER
Provider E-Visit time total (minutes): cough and mucous, immunized but needs covid test . No soboe    10 minutes

## 2021-11-22 NOTE — TELEPHONE ENCOUNTER
Please call patient. His insurance will not cover the echocardiogram that we ordered. I would recommend scheduling to see a cardiologist (looks like he may have an appt already) so they can determine if the test would be of benefit before proceeding. If he still wants the echocardiogram done, he will have to pay out of pocket for it.    Noe Ramirez PA-C on 11/22/2021 at 7:48 AM

## 2021-11-22 NOTE — TELEPHONE ENCOUNTER
----- Message from Kristy Strange sent at 2021 12:29 PM CST -----  Regarding: PEER TO PEER REQUEST  Peer to Peer Request    Patient Name:  Jovon Mantilla  :    1955  MRN:    [id number]    Dr. Ramirez,    The authorization for procedure ECHO COMPLETE on date of service 2021 has been denied. We were unsuccessful in obtaining approval through clinical review. A gcbg-er-ptum review can be done by calling the insurance/third party authorization vendor with the following information:    Insurance: GISSELL Cruz Vendor:  [employer]  Phone:  [phone]  Due:  2021    Patient ID: [id number]  Case/Ref #: [id number]    Patient contact: N/A  Patient Phone #: [phone]    Denial Reason: Your request for Resting Transthoracic Echocardiography does not meet medical necessity criteria based on the information provided. Please review the Clinical Criteria information specific to this exam below.  The ordering provider can call [phone] for a [id number] discussion with an [facility] physician reviewer.    Please complete this as soon as you are able and let me know when it is done.    Thank you,    Kristy RACHEL  [facility] [place]

## 2021-11-23 LAB — SARS-COV-2 RNA RESP QL NAA+PROBE: NEGATIVE

## 2021-12-02 ENCOUNTER — HOSPITAL ENCOUNTER (OUTPATIENT)
Dept: CARDIOLOGY | Facility: CLINIC | Age: 66
Discharge: HOME | End: 2021-12-02
Attending: PHYSICIAN ASSISTANT | Admitting: PHYSICIAN ASSISTANT
Payer: COMMERCIAL

## 2021-12-02 DIAGNOSIS — I34.0 NONRHEUMATIC MITRAL VALVE REGURGITATION: ICD-10-CM

## 2021-12-02 LAB — LVEF ECHO: NORMAL

## 2021-12-02 PROCEDURE — 93306 TTE W/DOPPLER COMPLETE: CPT

## 2021-12-02 PROCEDURE — 93306 TTE W/DOPPLER COMPLETE: CPT | Mod: 26 | Performed by: INTERNAL MEDICINE

## 2021-12-08 ENCOUNTER — MYC MEDICAL ADVICE (OUTPATIENT)
Dept: CARDIOLOGY | Facility: CLINIC | Age: 66
End: 2021-12-08
Payer: COMMERCIAL

## 2021-12-08 DIAGNOSIS — I34.0 MITRAL REGURGITATION: Primary | ICD-10-CM

## 2021-12-09 ENCOUNTER — OFFICE VISIT (OUTPATIENT)
Dept: CARDIOLOGY | Facility: CLINIC | Age: 66
End: 2021-12-09
Payer: COMMERCIAL

## 2021-12-09 VITALS
DIASTOLIC BLOOD PRESSURE: 71 MMHG | BODY MASS INDEX: 28.92 KG/M2 | HEIGHT: 70 IN | SYSTOLIC BLOOD PRESSURE: 118 MMHG | HEART RATE: 91 BPM | WEIGHT: 202 LBS

## 2021-12-09 DIAGNOSIS — I50.812 CHRONIC RIGHT-SIDED HEART FAILURE (H): Primary | ICD-10-CM

## 2021-12-09 PROCEDURE — 99215 OFFICE O/P EST HI 40 MIN: CPT | Performed by: PHYSICIAN ASSISTANT

## 2021-12-09 RX ORDER — SPIRONOLACTONE 25 MG/1
25 TABLET ORAL DAILY
Qty: 30 TABLET | Refills: 3 | Status: SHIPPED | OUTPATIENT
Start: 2021-12-09 | End: 2021-12-09

## 2021-12-09 RX ORDER — SPIRONOLACTONE 25 MG/1
TABLET ORAL
Qty: 15 TABLET | Refills: 3 | Status: SHIPPED | OUTPATIENT
Start: 2021-12-09 | End: 2022-02-11

## 2021-12-09 ASSESSMENT — MIFFLIN-ST. JEOR: SCORE: 1704.58

## 2021-12-09 NOTE — PROGRESS NOTES
Primary Cardiologist: Dr. Cooley    Reason for Visit: Annual follow up with repeat echocardiogram    History of Present Illness:   Jovon is a pleasant 66 year old male with past medical history notable for:     # Hx of Severe Bicuspid Aortic Stenosis (s/p AVR with mechanical valve in 2011 w/ Dr. Prado, 24 mm ATS; on warfarin with INR goal of 2.5-3.5; recent echo showed good function)  # Persistent Atrial Fibrillation (s/p MAZE procedure but A fib recurred; rate control w/ BB and digoxin; on warfarin for VTE prophylaxis)  # CAD (70% stenosis of the LAD noted at the time of his AVR but it was not bypassed as it was completely intramyocardial; exercise nuclear stress scan in 2018 showed no significant ischemia)   # Hx of Moderate RV Dysfunction with hx of nonischemic dilated CM (was noted previously; unclear etiology at this time; on BB and Acei; not on diuretics)  # Moderate to Severe MR on recent echo  # Hypertension (at goal)  # Hyperlipidemia (LDL of 46 on simvastatin 20 mg)  # Vein Insufficiency (s/p vein stripping)  # Hypothyroidism   # Hx of GI bleed  # Hx of Obesity (s/p gastric bypass)    He returns to clinic today stating he has worsening dyspnea and exertion.  He denies orthopnea, abdominal distention, peripheral edema, palpitations, syncope, or chest discomfort.  He has noticed a significant decline in his exercise tolerance.  He is quite concerned about his right heart dysfunction as well as his leaky valve.  He denies any bleeding issues.    Assessment and Plan:  Jovon is a pleasant 66 year old male with past medical history notable for:     # Hx of Severe Bicuspid Aortic Stenosis (s/p AVR with mechanical valve in 2011 w/ Dr. Prado, 24 mm ATS; on warfarin with INR goal of 2.5-3.5; recent echo showed good function)  # Persistent Atrial Fibrillation (s/p MAZE procedure but A fib recurred; rate control w/ BB and digoxin; on warfarin for VTE prophylaxis)  # CAD (70% stenosis of the LAD noted at the time of  his AVR but it was not bypassed as it was completely intramyocardial; exercise nuclear stress scan in 2018 showed no significant ischemia)   #  Moderate to severe RV Dysfunction with hx of nonischemic dilated CM (was noted previously; unclear etiology at this time; on BB and Acei; not on diuretics)  # Moderate to Severe MR noted on recent echo  # Hypertension (at goal)  # Hyperlipidemia (LDL of 46 on simvastatin 20 mg)  # Vein Insufficiency (s/p vein stripping)  # Hypothyroidism   # Hx of GI bleed  # Hx of Obesity (s/p gastric bypass)      Jovon's echocardiogram demonstrates significant progression of his mitral valve regurgitation.  He has worsening right-sided dysfunction as well.  He denies significant symptoms of hypervolemia but does have worsening dyspnea and exertion and exercise tolerance.  Dr. Klein has reviewed the results of his echocardiogram and recommended proceeding to transesophageal echocardiogram for further evaluation.  I have discussed risk and benefits of this with Jovon today.This but is not limited to damage to the oral cavity, esophageal perforation, GI bleeding, pharyngeal hematoma, transient bronchospasm, transient hypoxia, arrhthymias (NSVT, transient atrial fibrillation), vomiting, hemoptysis, and complications from anesthesia. Patient understands and wishes to proceed with it. A formal consent form will be signed by the procedural physician.He has no history of esophageal stricture, odynphagia, dysphagia, airway problems, or medication allergies.     Given his significant RV dysfunction we will attempt to start him on low-dose spironolactone.  He will return to our clinic in 1 to 2 weeks for repeat BMP.  He will follow-up with Dr. Cooley in 1 month.    45 minutes spent on the date of the encounter with chart review, patient visit, care coordination, and documentation.      This note was completed in part using Dragon voice recognition software. Although reviewed after completion, some word  and grammatical errors may occur.    Orders this Visit:  No orders of the defined types were placed in this encounter.    No orders of the defined types were placed in this encounter.    There are no discontinued medications.      No diagnosis found.    CURRENT MEDICATIONS:  Current Outpatient Medications   Medication Sig Dispense Refill     Ascorbic Acid (VITAMIN C PO) Take 500 mg by mouth At Bedtime       aspirin 81 MG tablet Take 81 mg by mouth every other day       Calcium Carbonate-Vitamin D (CALCIUM 600+D) 600-200 MG-UNIT TABS Take 1 tablet by mouth 2 times daily        carvedilol (COREG) 25 MG tablet Take 1 tablet (25 mg) by mouth 2 times daily (with meals) 90 tablet 0     Cholecalciferol (VITAMIN D) 2000 UNIT CAPS Take 1 tablet by mouth daily        Cyanocobalamin (VITAMIN B-12) 500 MCG SUBL Place 500 mcg under the tongue daily        digoxin (LANOXIN) 125 MCG tablet Take 1 tablet (125 mcg) by mouth daily (Patient taking differently: Take 250 mcg by mouth daily ) 90 tablet 3     Fexofenadine HCl (ALLERGY 24-HR PO) Take 180 mg by mouth daily        levothyroxine (SYNTHROID/LEVOTHROID) 200 MCG tablet Take 1 tablet (200 mcg) by mouth daily 90 tablet 3     lisinopril (ZESTRIL) 20 MG tablet Take  20 mg (1 tablet in am) and 10 mg  (one half tablet) in  pm 135 tablet 0     Multiple Vitamins-Minerals (CENTRUM) CHEW Take 1 tablet by mouth daily        nitroGLYcerin (NITROSTAT) 0.3 MG sublingual tablet For chest pain place 1 tablet under the tongue every 5 minutes for 3 doses. If symptoms persist 5 minutes after 1st dose call 911. 25 tablet 1     Psyllium (FIBER) 0.52 GM CAPS Take 2 capsules by mouth At Bedtime        sertraline (ZOLOFT) 50 MG tablet Take 1 tablet (50 mg) by mouth daily 90 tablet 3     simvastatin (ZOCOR) 20 MG tablet Take 1 tablet (20 mg) by mouth At Bedtime 90 tablet 3     warfarin ANTICOAGULANT (COUMADIN) 4 MG tablet Take one tablet (4 mg) by mouth daily except take one a half tablets (6 mg) Wed,  Sat or as directed by INR Clinic 117 tablet 1       ALLERGIES   No Known Allergies    PAST MEDICAL HISTORY:  Past Medical History:   Diagnosis Date     Acne rosacea 04/22/2019     Acquired hypothyroidism 05/23/2003     Problem list name updated by automated process. Provider to review     Acute prostatitis      Acute sinusitis, unspecified      Aortic valve disease      Biatrial enlargement      CAD (coronary artery disease)     known 70% LAD stenosis     Congestive heart failure (H)      Depression      GI bleeding 01/22/2016     History of bleeding peptic ulcer      Hyperlipidemia      Idiopathic cardiomyopathy (H)      Mitral regurgitation      Obesity      Palpitations      Permanent atrial fibrillation (H) 06/02/2017     Right inguinal hernia 07/22/2019     S/P AVR 01/07/2011    with Maze procedure     S/P gastric bypass      Tricuspid regurgitation      Unspecified essential hypertension        PAST SURGICAL HISTORY:  Past Surgical History:   Procedure Laterality Date     ANGIOGRAM  02/01/2002    Normal coronary,dilated LV,Sev.decr.global LVF,+1 MR     ANGIOGRAM  01/01/2008    Mild AS,Mod PHTN,mod prox.LAD disease,Triv.CFX disease     ANGIOGRAM  01/01/2009    Mod AS,Mod PHTN,mod prox.LAD disease     ANGIOGRAM  10/01/2010    Sev.AS,CM,global hypokinesis,Mild-mod LV dilated,Mild MR,70% prox.LAD lesion,PHTN     CARDIOVERSION  2/02, 4/02, 4/11     CHOLECYSTECTOMY  2006     COLONOSCOPY N/A 01/23/2016    Procedure: COLONOSCOPY;  Surgeon: Robyn Collins MD;  Location:  GI     ESOPHAGOSCOPY, GASTROSCOPY, DUODENOSCOPY (EGD), COMBINED N/A 01/23/2016    Procedure: COMBINED ESOPHAGOSCOPY, GASTROSCOPY, DUODENOSCOPY (EGD);  Surgeon: Robyn Collins MD;  Location:  GI     EXCISE MASS GROIN Right 2/11/2021    Procedure: INCISION AND DRAINAGE OF RIGHT GROIN HEMATOMA;  Surgeon: Norma Scanlon MD;  Location: [facility] OR     HERNIORRHAPHY INGUINAL Right 2/4/2021    Procedure: OPEN RIGHT INGUINAL  ELVIA WITH MESH;  Surgeon: Jaden Alex MD;  Location: [facility] OR     LAPAROSCOPIC BYPASS GASTRIC  10/08/2011    Procedure:LAPAROSCOPIC BYPASS GASTRIC; Diagnostic laparoscopy, Lysis of Adhesions, Laparoscopic Revision of Jejunojejunostomy; Surgeon:RADHA MURO; Location:[facility] OR     LAPAROSCOPIC BYPASS GASTRIC  06/26/2006    Dr Jin     LAPAROSCOPY DIAGNOSTIC (GENERAL)  10/08/2011    Procedure:LAPAROSCOPY DIAGNOSTIC (GENERAL); Surgeon:RADHA MURO; Location:[facility] OR     REPLACE VALVE AORTIC  01/07/2011     ZZC NONSPECIFIC PROCEDURE  ~1985    vein stripping       FAMILY HISTORY:  Family History   Problem Relation Age of Onset     Cancer - colorectal Father      Colon Cancer Father      Cancer Father      Diabetes Brother        SOCIAL HISTORY:  Social History     Socioeconomic History     Marital status:      Spouse name: Tracey     Number of children: 0     Years of education: Not on file     Highest education level: Not on file   Occupational History     Occupation:      Comment: [employer]   Tobacco Use     Smoking status: Never Smoker     Smokeless tobacco: Never Used   Substance and Sexual Activity     Alcohol use: No     Alcohol/week: 0.0 standard drinks     Drug use: No     Sexual activity: Not Currently     Partners: Female   Other Topics Concern     Parent/sibling w/ CABG, MI or angioplasty before 65F 55M? Yes      Service Not Asked     Blood Transfusions No     Caffeine Concern Yes     Comment: 1 can daily     Occupational Exposure Yes     Hobby Hazards No     Sleep Concern No     Stress Concern No     Weight Concern No     Special Diet Yes     Comment: low sodium, low calories     Back Care No     Exercise Yes     Comment: walks daily     Bike Helmet Not Asked     Comment: NA     Seat Belt Yes     Self-Exams Yes   Social History Narrative     Not on file     Social Determinants of Health     Financial Resource Strain: Not on file   Food Insecurity: Not on file    Transportation Needs: Not on file   Physical Activity: Not on file   Stress: Not on file   Social Connections: Not on file   Intimate Partner Violence: Not on file   Housing Stability: Not on file       Review of Systems:  Skin:        Eyes:       ENT:       Respiratory:       Cardiovascular:       Gastroenterology:      Genitourinary:       Musculoskeletal:       Neurologic:       Psychiatric:       Heme/Lymph/Imm:       Endocrine:         Physical Exam:  Vitals: There were no vitals taken for this visit.     GEN:  NAD  NECK: No JVD  C/V: Irregularly irregular rhythm with controlled rate; 2 out of 6 holosystolic murmur auscultated along the left lower sternal border.  RESP: Clear to auscultation bilaterally without wheezing, rales, or rhonchi.  GI: Abdomen soft, nontender, nondistended.   EXTREM: Trace pitting LE edema.   NEURO: Alert and oriented, cooperative. No obvious focal deficits.   PSYCH: Normal affect.  SKIN: Warm and dry.       Recent Lab Results:  LIPID RESULTS:  Lab Results   Component Value Date    CHOL 99 08/09/2021    CHOL 109 03/13/2020    HDL 43 08/09/2021    HDL 51 03/13/2020    LDL 46 08/09/2021    LDL 49 03/13/2020    TRIG 48 08/09/2021    TRIG 45 03/13/2020    CHOLHDLRATIO 2.5 02/16/2015       LIVER ENZYME RESULTS:  Lab Results   Component Value Date    AST 23 08/09/2021    AST 28 02/10/2021    ALT 18 08/09/2021    ALT 23 02/10/2021       CBC RESULTS:  Lab Results   Component Value Date    WBC 5.1 08/09/2021    WBC 8.9 02/13/2021    RBC 4.16 (L) 08/09/2021    RBC 3.12 (L) 02/13/2021    HGB 10.8 (L) 08/09/2021    HGB 9.7 (L) 02/13/2021    HCT 35.3 (L) 08/09/2021    HCT 30.9 (L) 02/13/2021    MCV 85 08/09/2021    MCV 99 02/13/2021    MCH 26.0 (L) 08/09/2021    MCH 31.1 02/13/2021    MCHC 30.6 (L) 08/09/2021    MCHC 31.4 (L) 02/13/2021    RDW 20.0 (H) 08/09/2021    RDW 15.2 (H) 02/13/2021     08/09/2021     02/13/2021       BMP RESULTS:  Lab Results   Component Value Date      08/09/2021     02/12/2021    POTASSIUM 4.2 08/09/2021    POTASSIUM 4.4 02/12/2021    CHLORIDE 103 08/09/2021    CHLORIDE 107 02/12/2021    CO2 34 (H) 08/09/2021    CO2 30 02/12/2021    ANIONGAP <1 (L) 08/09/2021    ANIONGAP 2 (L) 02/12/2021    GLC 87 08/09/2021     (H) 02/12/2021    BUN 15 08/09/2021    BUN 31 (H) 02/12/2021    CR 0.86 08/09/2021    CR 0.76 02/12/2021    GFRESTIMATED >90 08/09/2021    GFRESTIMATED >90 02/12/2021    GFRESTBLACK >90 02/12/2021    REECE 9.2 08/09/2021    REECE 8.8 02/12/2021        A1C RESULTS:  Lab Results   Component Value Date    A1C 5.8 (H) 02/04/2019       INR RESULTS:  Lab Results   Component Value Date    INR 2.7 (H) 11/11/2021    INR 2.6 (H) 10/06/2021    INR 3.10 (H) 06/22/2021    INR 3.40 (H) 05/13/2021           Ingrid Alas PA-C  December 9, 2021

## 2021-12-09 NOTE — LETTER
12/9/2021    Rehan Lorenzana MD  [address] [facility] [address]    RE: Jovon Mantilla       Dear Colleague,     I had the pleasure of seeing Jovon TITO Mantilla in the [facility].  Primary Cardiologist: Dr. Cooley    Reason for Visit: Annual follow up with repeat echocardiogram    History of Present Illness:   Jovon is a pleasant 66 year old male with past medical history notable for:     # Hx of Severe Bicuspid Aortic Stenosis (s/p AVR with mechanical valve in 2011 w/ Dr. Prado, 24 mm ATS; on warfarin with INR goal of 2.5-3.5; recent echo showed good function)  # Persistent Atrial Fibrillation (s/p MAZE procedure but A fib recurred; rate control w/ BB and digoxin; on warfarin for VTE prophylaxis)  # CAD (70% stenosis of the LAD noted at the time of his AVR but it was not bypassed as it was completely intramyocardial; exercise nuclear stress scan in 2018 showed no significant ischemia)   # Hx of Moderate RV Dysfunction with hx of nonischemic dilated CM (was noted previously; unclear etiology at this time; on BB and Acei; not on diuretics)  # Moderate to Severe MR on recent echo  # Hypertension (at goal)  # Hyperlipidemia (LDL of 46 on simvastatin 20 mg)  # Vein Insufficiency (s/p vein stripping)  # Hypothyroidism   # Hx of GI bleed  # Hx of Obesity (s/p gastric bypass)    He returns to clinic today stating he has worsening dyspnea and exertion.  He denies orthopnea, abdominal distention, peripheral edema, palpitations, syncope, or chest discomfort.  He has noticed a significant decline in his exercise tolerance.  He is quite concerned about his right heart dysfunction as well as his leaky valve.  He denies any bleeding issues.    Assessment and Plan:  Jovon is a pleasant 66 year old male with past medical history notable for:     # Hx of Severe Bicuspid Aortic Stenosis (s/p AVR with mechanical valve in 2011 w/ Dr. Prado, 24 mm ATS; on warfarin with INR goal of 2.5-3.5; recent echo  showed good function)  # Persistent Atrial Fibrillation (s/p MAZE procedure but A fib recurred; rate control w/ BB and digoxin; on warfarin for VTE prophylaxis)  # CAD (70% stenosis of the LAD noted at the time of his AVR but it was not bypassed as it was completely intramyocardial; exercise nuclear stress scan in 2018 showed no significant ischemia)   #  Moderate to severe RV Dysfunction with hx of nonischemic dilated CM (was noted previously; unclear etiology at this time; on BB and Acei; not on diuretics)  # Moderate to Severe MR noted on recent echo  # Hypertension (at goal)  # Hyperlipidemia (LDL of 46 on simvastatin 20 mg)  # Vein Insufficiency (s/p vein stripping)  # Hypothyroidism   # Hx of GI bleed  # Hx of Obesity (s/p gastric bypass)      Jovon's echocardiogram demonstrates significant progression of his mitral valve regurgitation.  He has worsening right-sided dysfunction as well.  He denies significant symptoms of hypervolemia but does have worsening dyspnea and exertion and exercise tolerance.  Dr. Klein has reviewed the results of his echocardiogram and recommended proceeding to transesophageal echocardiogram for further evaluation.  I have discussed risk and benefits of this with Jovon today.This but is not limited to damage to the oral cavity, esophageal perforation, GI bleeding, pharyngeal hematoma, transient bronchospasm, transient hypoxia, arrhthymias (NSVT, transient atrial fibrillation), vomiting, hemoptysis, and complications from anesthesia. Patient understands and wishes to proceed with it. A formal consent form will be signed by the procedural physician.He has no history of esophageal stricture, odynphagia, dysphagia, airway problems, or medication allergies.     Given his significant RV dysfunction we will attempt to start him on low-dose spironolactone.  He will return to our clinic in 1 to 2 weeks for repeat BMP.  He will follow-up with Dr. Cooley in 1 month.    45 minutes spent on the date  of the encounter with chart review, patient visit, care coordination, and documentation.      This note was completed in part using Dragon voice recognition software. Although reviewed after completion, some word and grammatical errors may occur.    Orders this Visit:  No orders of the defined types were placed in this encounter.    No orders of the defined types were placed in this encounter.    There are no discontinued medications.      No diagnosis found.    CURRENT MEDICATIONS:  Current Outpatient Medications   Medication Sig Dispense Refill     Ascorbic Acid (VITAMIN C PO) Take 500 mg by mouth At Bedtime       aspirin 81 MG tablet Take 81 mg by mouth every other day       Calcium Carbonate-Vitamin D (CALCIUM 600+D) 600-200 MG-UNIT TABS Take 1 tablet by mouth 2 times daily        carvedilol (COREG) 25 MG tablet Take 1 tablet (25 mg) by mouth 2 times daily (with meals) 90 tablet 0     Cholecalciferol (VITAMIN D) 2000 UNIT CAPS Take 1 tablet by mouth daily        Cyanocobalamin (VITAMIN B-12) 500 MCG SUBL Place 500 mcg under the tongue daily        digoxin (LANOXIN) 125 MCG tablet Take 1 tablet (125 mcg) by mouth daily (Patient taking differently: Take 250 mcg by mouth daily ) 90 tablet 3     Fexofenadine HCl (ALLERGY 24-HR PO) Take 180 mg by mouth daily        levothyroxine (SYNTHROID/LEVOTHROID) 200 MCG tablet Take 1 tablet (200 mcg) by mouth daily 90 tablet 3     lisinopril (ZESTRIL) 20 MG tablet Take  20 mg (1 tablet in am) and 10 mg  (one half tablet) in  pm 135 tablet 0     Multiple Vitamins-Minerals (CENTRUM) CHEW Take 1 tablet by mouth daily        nitroGLYcerin (NITROSTAT) 0.3 MG sublingual tablet For chest pain place 1 tablet under the tongue every 5 minutes for 3 doses. If symptoms persist 5 minutes after 1st dose call 911. 25 tablet 1     Psyllium (FIBER) 0.52 GM CAPS Take 2 capsules by mouth At Bedtime        sertraline (ZOLOFT) 50 MG tablet Take 1 tablet (50 mg) by mouth daily 90 tablet 3      simvastatin (ZOCOR) 20 MG tablet Take 1 tablet (20 mg) by mouth At Bedtime 90 tablet 3     warfarin ANTICOAGULANT (COUMADIN) 4 MG tablet Take one tablet (4 mg) by mouth daily except take one a half tablets (6 mg) Wed, Sat or as directed by INR Clinic 117 tablet 1       ALLERGIES   No Known Allergies    PAST MEDICAL HISTORY:  Past Medical History:   Diagnosis Date     Acne rosacea 04/22/2019     Acquired hypothyroidism 05/23/2003     Problem list name updated by automated process. Provider to review     Acute prostatitis      Acute sinusitis, unspecified      Aortic valve disease      Biatrial enlargement      CAD (coronary artery disease)     known 70% LAD stenosis     Congestive heart failure (H)      Depression      GI bleeding 01/22/2016     History of bleeding peptic ulcer      Hyperlipidemia      Idiopathic cardiomyopathy (H)      Mitral regurgitation      Obesity      Palpitations      Permanent atrial fibrillation (H) 06/02/2017     Right inguinal hernia 07/22/2019     S/P AVR 01/07/2011    with Maze procedure     S/P gastric bypass      Tricuspid regurgitation      Unspecified essential hypertension        PAST SURGICAL HISTORY:  Past Surgical History:   Procedure Laterality Date     ANGIOGRAM  02/01/2002    Normal coronary,dilated LV,Sev.decr.global LVF,+1 MR     ANGIOGRAM  01/01/2008    Mild AS,Mod PHTN,mod prox.LAD disease,Triv.CFX disease     ANGIOGRAM  01/01/2009    Mod AS,Mod PHTN,mod prox.LAD disease     ANGIOGRAM  10/01/2010    Sev.AS,CM,global hypokinesis,Mild-mod LV dilated,Mild MR,70% prox.LAD lesion,PHTN     CARDIOVERSION  2/02, 4/02, 4/11     CHOLECYSTECTOMY  2006     COLONOSCOPY N/A 01/23/2016    Procedure: COLONOSCOPY;  Surgeon: Robyn Collins MD;  Location:  GI     ESOPHAGOSCOPY, GASTROSCOPY, DUODENOSCOPY (EGD), COMBINED N/A 01/23/2016    Procedure: COMBINED ESOPHAGOSCOPY, GASTROSCOPY, DUODENOSCOPY (EGD);  Surgeon: Robyn Collins MD;  Location:  GI     EXCISE MASS GROIN  Right 2/11/2021    Procedure: INCISION AND DRAINAGE OF RIGHT GROIN HEMATOMA;  Surgeon: Norma Scanlon MD;  Location: [facility] OR     HERNIORRHAPHY INGUINAL Right 2/4/2021    Procedure: OPEN RIGHT INGUINAL HENRIA WITH MESH;  Surgeon: Jaden Alex MD;  Location: [facility] OR     LAPAROSCOPIC BYPASS GASTRIC  10/08/2011    Procedure:LAPAROSCOPIC BYPASS GASTRIC; Diagnostic laparoscopy, Lysis of Adhesions, Laparoscopic Revision of Jejunojejunostomy; Surgeon:RADHA MURO; Location:[facility] OR     LAPAROSCOPIC BYPASS GASTRIC  06/26/2006    Dr Jin     LAPAROSCOPY DIAGNOSTIC (GENERAL)  10/08/2011    Procedure:LAPAROSCOPY DIAGNOSTIC (GENERAL); Surgeon:RADHA MURO; Location:[facility] OR     REPLACE VALVE AORTIC  01/07/2011     ZZC NONSPECIFIC PROCEDURE  ~1985    vein stripping       FAMILY HISTORY:  Family History   Problem Relation Age of Onset     Cancer - colorectal Father      Colon Cancer Father      Cancer Father      Diabetes Brother        SOCIAL HISTORY:  Social History     Socioeconomic History     Marital status:      Spouse name: Tracey     Number of children: 0     Years of education: Not on file     Highest education level: Not on file   Occupational History     Occupation:      Comment: [employer]   Tobacco Use     Smoking status: Never Smoker     Smokeless tobacco: Never Used   Substance and Sexual Activity     Alcohol use: No     Alcohol/week: 0.0 standard drinks     Drug use: No     Sexual activity: Not Currently     Partners: Female   Other Topics Concern     Parent/sibling w/ CABG, MI or angioplasty before 65F 55M? Yes      Service Not Asked     Blood Transfusions No     Caffeine Concern Yes     Comment: 1 can daily     Occupational Exposure Yes     Hobby Hazards No     Sleep Concern No     Stress Concern No     Weight Concern No     Special Diet Yes     Comment: low sodium, low calories     Back Care No     Exercise Yes     Comment: walks daily     Bike Helmet Not  Asked     Comment: NA     Seat Belt Yes     Self-Exams Yes   Social History Narrative     Not on file     Social Determinants of Health     Financial Resource Strain: Not on file   Food Insecurity: Not on file   Transportation Needs: Not on file   Physical Activity: Not on file   Stress: Not on file   Social Connections: Not on file   Intimate Partner Violence: Not on file   Housing Stability: Not on file       Review of Systems:  Skin:        Eyes:       ENT:       Respiratory:       Cardiovascular:       Gastroenterology:      Genitourinary:       Musculoskeletal:       Neurologic:       Psychiatric:       Heme/Lymph/Imm:       Endocrine:         Physical Exam:  Vitals: There were no vitals taken for this visit.     GEN:  NAD  NECK: No JVD  C/V: Irregularly irregular rhythm with controlled rate; 2 out of 6 holosystolic murmur auscultated along the left lower sternal border.  RESP: Clear to auscultation bilaterally without wheezing, rales, or rhonchi.  GI: Abdomen soft, nontender, nondistended.   EXTREM: Trace pitting LE edema.   NEURO: Alert and oriented, cooperative. No obvious focal deficits.   PSYCH: Normal affect.  SKIN: Warm and dry.       Recent Lab Results:  LIPID RESULTS:  Lab Results   Component Value Date    CHOL 99 08/09/2021    CHOL 109 03/13/2020    HDL 43 08/09/2021    HDL 51 03/13/2020    LDL 46 08/09/2021    LDL 49 03/13/2020    TRIG 48 08/09/2021    TRIG 45 03/13/2020    CHOLHDLRATIO 2.5 02/16/2015       LIVER ENZYME RESULTS:  Lab Results   Component Value Date    AST 23 08/09/2021    AST 28 02/10/2021    ALT 18 08/09/2021    ALT 23 02/10/2021       CBC RESULTS:  Lab Results   Component Value Date    WBC 5.1 08/09/2021    WBC 8.9 02/13/2021    RBC 4.16 (L) 08/09/2021    RBC 3.12 (L) 02/13/2021    HGB 10.8 (L) 08/09/2021    HGB 9.7 (L) 02/13/2021    HCT 35.3 (L) 08/09/2021    HCT 30.9 (L) 02/13/2021    MCV 85 08/09/2021    MCV 99 02/13/2021    MCH 26.0 (L) 08/09/2021    MCH 31.1 02/13/2021    MCHC  30.6 (L) 08/09/2021    MCHC 31.4 (L) 02/13/2021    RDW 20.0 (H) 08/09/2021    RDW 15.2 (H) 02/13/2021     08/09/2021     02/13/2021       BMP RESULTS:  Lab Results   Component Value Date     08/09/2021     02/12/2021    POTASSIUM 4.2 08/09/2021    POTASSIUM 4.4 02/12/2021    CHLORIDE 103 08/09/2021    CHLORIDE 107 02/12/2021    CO2 34 (H) 08/09/2021    CO2 30 02/12/2021    ANIONGAP <1 (L) 08/09/2021    ANIONGAP 2 (L) 02/12/2021    GLC 87 08/09/2021     (H) 02/12/2021    BUN 15 08/09/2021    BUN 31 (H) 02/12/2021    CR 0.86 08/09/2021    CR 0.76 02/12/2021    GFRESTIMATED >90 08/09/2021    GFRESTIMATED >90 02/12/2021    GFRESTBLACK >90 02/12/2021    REECE 9.2 08/09/2021    REECE 8.8 02/12/2021        A1C RESULTS:  Lab Results   Component Value Date    A1C 5.8 (H) 02/04/2019       INR RESULTS:  Lab Results   Component Value Date    INR 2.7 (H) 11/11/2021    INR 2.6 (H) 10/06/2021    INR 3.10 (H) 06/22/2021    INR 3.40 (H) 05/13/2021       Ingrid Alas PA-C  December 9, 2021     Thank you for allowing me to participate in the care of your patient.    Sincerely,   Ingrid Alas PA-C   [facility]  cc: No referring provider defined for this encounter.

## 2021-12-09 NOTE — TELEPHONE ENCOUNTER
I reviewed the echo. The MR is certainly worse. It is probably related to severe biatrial enlargement in the setting of AF, but a CADE is appropriate. Thanks.

## 2021-12-09 NOTE — PATIENT INSTRUCTIONS
Today's Plan:   1) I will see if Dr. Cooley if we should go ahead with scope ultrasound test to see if your heart valve better (this is called transesophageal echocardiogram- CADE).   2) He may recommend heart MRI as well.   3) Start spironolactone 12.5 mg (half tablet) once every morning to help with your heart function.   4) Come back for non-fasting blood work to check potassium level after starting spironolactone.    5) Come back in 1 month for follow up with Dr. Cooley.     If you have questions or concerns please call my nurse team at [phone].     Scheduling phone number: [phone].  Reminder: Please bring in all current medications, over the counter supplements and vitamin bottles to your next appointment.    It was a pleasure seeing you today!     Ingrid Alas PA-C  12/9/2021

## 2021-12-09 NOTE — TELEPHONE ENCOUNTER
Message from VALDO Ingrid Alas:  Ingrid Alas PA-C [facility] Heart Team 2  Cc: Noy Cooley MD  Hi team,     I already discussed the possibility of doing CADE with patient in clinic today. Can we update him and place an order for this to be done in the next few weeks?     Thank you,     Ingrid         Order placed for CADE    Attempted to contact patient to discuss scheduling CADE per Dr. Cooley and VALDO Ingrid Alas. Left message for patient to call back.

## 2021-12-10 NOTE — TELEPHONE ENCOUNTER
message from patient re: CADE. He is working as a [place] daily from 1:30 PM  to 8:00 PM.    Spoke with patient, he is willing to schedule the CADE but prefers to pick a date after 12/24/2021. His wife can drive him, so no timing issues once his [employer] job is done.    Message to scheduling to contact patient to schedule.

## 2021-12-13 DIAGNOSIS — I70.90 ASVD (ARTERIOSCLEROTIC VASCULAR DISEASE): ICD-10-CM

## 2021-12-13 RX ORDER — CARVEDILOL 25 MG/1
25 TABLET ORAL 2 TIMES DAILY WITH MEALS
Qty: 180 TABLET | Refills: 1 | Status: SHIPPED | OUTPATIENT
Start: 2021-12-13 | End: 2022-06-15

## 2021-12-14 DIAGNOSIS — Z11.59 ENCOUNTER FOR SCREENING FOR OTHER VIRAL DISEASES: ICD-10-CM

## 2021-12-16 ENCOUNTER — ANTICOAGULATION THERAPY VISIT (OUTPATIENT)
Dept: ANTICOAGULATION | Facility: CLINIC | Age: 66
End: 2021-12-16

## 2021-12-16 ENCOUNTER — TELEPHONE (OUTPATIENT)
Dept: ANTICOAGULATION | Facility: CLINIC | Age: 66
End: 2021-12-16

## 2021-12-16 ENCOUNTER — LAB (OUTPATIENT)
Dept: LAB | Facility: CLINIC | Age: 66
End: 2021-12-16
Payer: COMMERCIAL

## 2021-12-16 DIAGNOSIS — Z95.2 S/P AVR (AORTIC VALVE REPLACEMENT): Primary | ICD-10-CM

## 2021-12-16 DIAGNOSIS — Z79.01 LONG TERM CURRENT USE OF ANTICOAGULANTS WITH INR GOAL OF 2.5-3.5: ICD-10-CM

## 2021-12-16 DIAGNOSIS — I48.21 PERMANENT ATRIAL FIBRILLATION (H): ICD-10-CM

## 2021-12-16 DIAGNOSIS — S30.1XXA ABDOMINAL WALL HEMATOMA, INITIAL ENCOUNTER: ICD-10-CM

## 2021-12-16 LAB — INR BLD: 3.1 (ref 0.9–1.1)

## 2021-12-16 PROCEDURE — 85610 PROTHROMBIN TIME: CPT

## 2021-12-16 PROCEDURE — 36415 COLL VENOUS BLD VENIPUNCTURE: CPT

## 2021-12-16 NOTE — PROGRESS NOTES
ANTICOAGULATION MANAGEMENT     Jovon Mantilla 66 year old male is on warfarin with therapeutic INR result. (Goal INR 2.5-3.5)    Recent labs: (last 7 days)     12/16/21  0857   INR 3.1*       ASSESSMENT     Source(s): Chart Review and Patient/Caregiver Call       Warfarin doses taken: Warfarin taken as instructed    Diet: No new diet changes identified    New illness, injury, or hospitalization: Yes: Patient has mitral valve regurgitation, per cardiology visit notes dated 12/9/21 (patient had echo on 12/2/21).     Medication/supplement changes: Pt started low dose Spironolactone today     Concurrent use of WARFARIN and SPIRONOLACTONE may result in decreased anticoagulant effectiveness.    Signs or symptoms of bleeding or clotting: No    Previous INR: Therapeutic last 2(+) visits    Additional findings: Cardiology visit notes states pt's INR goal range is 2.0-3.0, I will send TE to clarify as we have goal of 2.5-3.5.  Also, pt scheduled for CADE on 12/28/221, he states he was not told that he needs to hold warfarin.     PLAN     Recommended plan for ongoing change(s) affecting INR     Dosing Instructions: Continue your current warfarin dose with next INR in 5 days (patient not available to come back in 1 week).       Summary  As of 12/16/2021    Full warfarin instructions:  6 mg every Wed, Sat; 4 mg all other days   Next INR check:  12/21/2021             Telephone call with Jovon who verbalizes understanding and agrees to plan    Lab visit scheduled    Education provided: Monitoring for bleeding signs and symptoms and Contact [phone]  with any changes, questions or concerns.     Plan made per [facility] anticoagulation protocol    Madeline Wayne, RN  Anticoagulation Clinic  12/16/2021    _______________________________________________________________________     Anticoagulation Episode Summary     Current INR goal:  2.5-3.5   TTR:  91.1 % (12 mo)   Target end date:  Indefinite   Send INR reminders to:  [facility]    Indications    S/P AVR (aortic valve replacement) [Z95.2]  Permanent atrial fibrillation (H) [I48.21]  Long term current use of anticoagulants with INR goal of 2.5-3.5 [Z79.01]  Abdominal wall hematoma  initial encounter [S30.1XXA]           Comments:  24mm ATS Valve         Anticoagulation Care Providers     Provider Role Specialty Phone number    Rehan Lorenzana MD Referring Family Medicine [phone]    Ramana Redd MD Referring Student in organized health care education/training program [phone]

## 2021-12-16 NOTE — CONFIDENTIAL NOTE
Made the mistake again-- long clinic days... INR goal of 2.5-3.5 OK. No need to change anything.     Thanks,     Aybike     Will leave INR goal range at 2.5-3.5    Madeline Wayne RN  Anticoagulation Clinic

## 2021-12-16 NOTE — TELEPHONE ENCOUNTER
Mita Quintero,    In reviewing your visit notes on 12/9/21, patient's INR goal range is listed as 2.0-3.0, [facility] clinic has always had his goal range at 2.5-3.5, can you please clarify?    Understandably, many patient's with AVR are at 2.0-3.0 INR goal range if they don't have any other risk factors.  Uncertain reasons behind patient's higher goal range, just want clarification on whether we should continue at higher goal range.    Also, understand if patient does end up with mitral valve replacement, he would need the higher goal range of 2.5-3.5.      Thank you,  Madeline Wayne RN  Anticoagulation Clinic

## 2021-12-17 DIAGNOSIS — I70.90 ASVD (ARTERIOSCLEROTIC VASCULAR DISEASE): ICD-10-CM

## 2021-12-17 RX ORDER — LISINOPRIL 20 MG/1
TABLET ORAL
Qty: 135 TABLET | Refills: 3 | Status: SHIPPED | OUTPATIENT
Start: 2021-12-17 | End: 2022-02-11

## 2021-12-21 ENCOUNTER — ANTICOAGULATION THERAPY VISIT (OUTPATIENT)
Dept: ANTICOAGULATION | Facility: CLINIC | Age: 66
End: 2021-12-21

## 2021-12-21 ENCOUNTER — LAB (OUTPATIENT)
Dept: LAB | Facility: CLINIC | Age: 66
End: 2021-12-21
Payer: COMMERCIAL

## 2021-12-21 DIAGNOSIS — S30.1XXA ABDOMINAL WALL HEMATOMA, INITIAL ENCOUNTER: ICD-10-CM

## 2021-12-21 DIAGNOSIS — I48.21 PERMANENT ATRIAL FIBRILLATION (H): ICD-10-CM

## 2021-12-21 DIAGNOSIS — Z95.2 S/P AVR (AORTIC VALVE REPLACEMENT): Primary | ICD-10-CM

## 2021-12-21 DIAGNOSIS — Z79.01 LONG TERM CURRENT USE OF ANTICOAGULANTS WITH INR GOAL OF 2.5-3.5: ICD-10-CM

## 2021-12-21 LAB — INR BLD: 3 (ref 0.9–1.1)

## 2021-12-21 PROCEDURE — 85610 PROTHROMBIN TIME: CPT

## 2021-12-21 PROCEDURE — 36415 COLL VENOUS BLD VENIPUNCTURE: CPT

## 2021-12-21 NOTE — PROGRESS NOTES
ANTICOAGULATION MANAGEMENT     Jovon Mantilla 66 year old male is on warfarin with therapeutic INR result. (Goal INR 2.5-3.5)    Recent labs: (last 7 days)     12/21/21  1214   INR 3.0*       ASSESSMENT     Source(s): Chart Review and Patient/Caregiver Call       Warfarin doses taken: Warfarin taken as instructed    Diet: No new diet changes identified    New illness, injury, or hospitalization: No    Medication/supplement changes: amlodipine started 5 days ago (can lower INR)    Signs or symptoms of bleeding or clotting: No    Previous INR: Therapeutic last 2(+) visits    Additional findings: goal range was verified with cardiology since last visit and confirmed to be 2.5-3.5 as previously set.      PLAN     Recommended plan for ongoing change(s) affecting INR     Dosing Instructions: Continue your current warfarin dose with next INR in 3 weeks       Summary  As of 12/21/2021    Full warfarin instructions:  6 mg every Wed, Sat; 4 mg all other days   Next INR check:  1/11/2022             Telephone call with Jovon who agrees to plan and repeated back plan correctly    Lab visit scheduled    Education provided: Please call back if any changes to your diet, medications or how you've been taking warfarin    Plan made per [facility] anticoagulation protocol    Isabel Cheney RN  Anticoagulation Clinic  12/21/2021    _______________________________________________________________________     Anticoagulation Episode Summary     Current INR goal:  2.5-3.5   TTR:  91.1 % (12 mo)   Target end date:  Indefinite   Send INR reminders to:  [facility]    Indications    S/P AVR (aortic valve replacement) [Z95.2]  Permanent atrial fibrillation (H) [I48.21]  Long term current use of anticoagulants with INR goal of 2.5-3.5 [Z79.01]  Abdominal wall hematoma  initial encounter [S30.1XXA]           Comments:  24mm ATS Valve         Anticoagulation Care Providers     Provider Role Specialty Phone number    Rehan Lorenzana MD  Referring Family Medicine [phone]    Ramana Redd MD Referring Student in [facility] health care education/training program [phone]

## 2021-12-23 ENCOUNTER — TELEPHONE (OUTPATIENT)
Dept: CARDIOLOGY | Facility: CLINIC | Age: 66
End: 2021-12-23
Payer: COMMERCIAL

## 2021-12-23 NOTE — TELEPHONE ENCOUNTER
Attempted to contact patient to review prep for CADE. Left message for patient to call back.    1500 left a message to remind patient to get his COVID-19 test on 12/26/2021.

## 2021-12-26 ENCOUNTER — LAB (OUTPATIENT)
Dept: URGENT CARE | Facility: URGENT CARE | Age: 66
End: 2021-12-26
Payer: COMMERCIAL

## 2021-12-26 DIAGNOSIS — Z11.59 ENCOUNTER FOR SCREENING FOR OTHER VIRAL DISEASES: ICD-10-CM

## 2021-12-26 LAB — SARS-COV-2 RNA RESP QL NAA+PROBE: NEGATIVE

## 2021-12-26 PROCEDURE — U0003 INFECTIOUS AGENT DETECTION BY NUCLEIC ACID (DNA OR RNA); SEVERE ACUTE RESPIRATORY SYNDROME CORONAVIRUS 2 (SARS-COV-2) (CORONAVIRUS DISEASE [COVID-19]), AMPLIFIED PROBE TECHNIQUE, MAKING USE OF HIGH THROUGHPUT TECHNOLOGIES AS DESCRIBED BY CMS-2020-01-R: HCPCS

## 2021-12-26 PROCEDURE — U0005 INFEC AGEN DETEC AMPLI PROBE: HCPCS

## 2021-12-27 NOTE — TELEPHONE ENCOUNTER
0830 Attempted to contact patient to review prep for CADE. Left message for patient to call back.  COVID-19 results are negative.    Patient is scheduled for CADE on 12/28/2021, arriving at 0630 for procedure at 0830. Patient is not diabetic. Patient has no known swallowing disorder or history of esophageal bleeding varices. Patient is aware to be NPO for 8 hours except for medications. Patient has arranged for transportation after the procedure.

## 2021-12-28 ENCOUNTER — TELEPHONE (OUTPATIENT)
Dept: CARDIOLOGY | Facility: CLINIC | Age: 66
End: 2021-12-28

## 2021-12-28 ENCOUNTER — HOSPITAL ENCOUNTER (OUTPATIENT)
Facility: CLINIC | Age: 66
Discharge: HOME | End: 2021-12-28
Admitting: INTERNAL MEDICINE
Payer: COMMERCIAL

## 2021-12-28 ENCOUNTER — HOSPITAL ENCOUNTER (OUTPATIENT)
Dept: CARDIOLOGY | Facility: CLINIC | Age: 66
End: 2021-12-28
Attending: INTERNAL MEDICINE
Payer: COMMERCIAL

## 2021-12-28 ENCOUNTER — DOCUMENTATION ONLY (OUTPATIENT)
Dept: ANTICOAGULATION | Facility: CLINIC | Age: 66
End: 2021-12-28

## 2021-12-28 VITALS
BODY MASS INDEX: 28.49 KG/M2 | SYSTOLIC BLOOD PRESSURE: 124 MMHG | RESPIRATION RATE: 12 BRPM | HEART RATE: 75 BPM | HEIGHT: 70 IN | OXYGEN SATURATION: 94 % | DIASTOLIC BLOOD PRESSURE: 98 MMHG | TEMPERATURE: 99.9 F | WEIGHT: 199 LBS

## 2021-12-28 DIAGNOSIS — I34.0 MITRAL REGURGITATION: ICD-10-CM

## 2021-12-28 LAB — LVEF ECHO: NORMAL

## 2021-12-28 PROCEDURE — 93320 DOPPLER ECHO COMPLETE: CPT

## 2021-12-28 PROCEDURE — 999N000184 HC STATISTIC TELEMETRY

## 2021-12-28 PROCEDURE — 250N000011 HC RX IP 250 OP 636: Performed by: INTERNAL MEDICINE

## 2021-12-28 PROCEDURE — 93325 DOPPLER ECHO COLOR FLOW MAPG: CPT | Mod: 26 | Performed by: INTERNAL MEDICINE

## 2021-12-28 PROCEDURE — 258N000003 HC RX IP 258 OP 636: Performed by: INTERNAL MEDICINE

## 2021-12-28 PROCEDURE — 999N000183 HC STATISTIC TEE INCLUDES SEDATION

## 2021-12-28 PROCEDURE — 93320 DOPPLER ECHO COMPLETE: CPT | Mod: 26 | Performed by: INTERNAL MEDICINE

## 2021-12-28 PROCEDURE — 250N000009 HC RX 250: Performed by: INTERNAL MEDICINE

## 2021-12-28 PROCEDURE — 93312 ECHO TRANSESOPHAGEAL: CPT | Mod: 26 | Performed by: INTERNAL MEDICINE

## 2021-12-28 PROCEDURE — 93325 DOPPLER ECHO COLOR FLOW MAPG: CPT

## 2021-12-28 RX ORDER — NALOXONE HYDROCHLORIDE 0.4 MG/ML
0.2 INJECTION, SOLUTION INTRAMUSCULAR; INTRAVENOUS; SUBCUTANEOUS
Status: DISCONTINUED | OUTPATIENT
Start: 2021-12-28 | End: 2021-12-28 | Stop reason: HOSPADM

## 2021-12-28 RX ORDER — NALOXONE HYDROCHLORIDE 0.4 MG/ML
0.4 INJECTION, SOLUTION INTRAMUSCULAR; INTRAVENOUS; SUBCUTANEOUS
Status: DISCONTINUED | OUTPATIENT
Start: 2021-12-28 | End: 2021-12-28 | Stop reason: HOSPADM

## 2021-12-28 RX ORDER — FLUMAZENIL 0.1 MG/ML
0.2 INJECTION, SOLUTION INTRAVENOUS
Status: DISCONTINUED | OUTPATIENT
Start: 2021-12-28 | End: 2021-12-28 | Stop reason: HOSPADM

## 2021-12-28 RX ORDER — DEXTROSE MONOHYDRATE 25 G/50ML
9.5 INJECTION, SOLUTION INTRAVENOUS
Status: DISCONTINUED | OUTPATIENT
Start: 2021-12-28 | End: 2021-12-28 | Stop reason: HOSPADM

## 2021-12-28 RX ORDER — GLYCOPYRROLATE 0.2 MG/ML
0.1 INJECTION, SOLUTION INTRAMUSCULAR; INTRAVENOUS ONCE
Status: COMPLETED | OUTPATIENT
Start: 2021-12-28 | End: 2021-12-28

## 2021-12-28 RX ORDER — SODIUM CHLORIDE 9 MG/ML
INJECTION, SOLUTION INTRAVENOUS CONTINUOUS PRN
Status: DISCONTINUED | OUTPATIENT
Start: 2021-12-28 | End: 2021-12-28 | Stop reason: HOSPADM

## 2021-12-28 RX ORDER — LIDOCAINE 50 MG/G
OINTMENT TOPICAL ONCE
Status: COMPLETED | OUTPATIENT
Start: 2021-12-28 | End: 2021-12-28

## 2021-12-28 RX ORDER — FENTANYL CITRATE 50 UG/ML
50 INJECTION, SOLUTION INTRAMUSCULAR; INTRAVENOUS ONCE
Status: COMPLETED | OUTPATIENT
Start: 2021-12-28 | End: 2021-12-28

## 2021-12-28 RX ORDER — LIDOCAINE HYDROCHLORIDE 40 MG/ML
1.5 SOLUTION TOPICAL ONCE
Status: COMPLETED | OUTPATIENT
Start: 2021-12-28 | End: 2021-12-28

## 2021-12-28 RX ORDER — LIDOCAINE 40 MG/G
CREAM TOPICAL
Status: DISCONTINUED | OUTPATIENT
Start: 2021-12-28 | End: 2021-12-28 | Stop reason: HOSPADM

## 2021-12-28 RX ORDER — FENTANYL CITRATE 50 UG/ML
25 INJECTION, SOLUTION INTRAMUSCULAR; INTRAVENOUS
Status: DISCONTINUED | OUTPATIENT
Start: 2021-12-28 | End: 2021-12-28 | Stop reason: HOSPADM

## 2021-12-28 RX ADMIN — GLYCOPYRROLATE 0.1 MG: 0.2 INJECTION, SOLUTION INTRAMUSCULAR; INTRAVENOUS at 08:05

## 2021-12-28 RX ADMIN — SODIUM CHLORIDE: 9 INJECTION, SOLUTION INTRAVENOUS at 07:18

## 2021-12-28 RX ADMIN — FENTANYL CITRATE 50 MCG: 50 INJECTION INTRAMUSCULAR; INTRAVENOUS at 08:48

## 2021-12-28 RX ADMIN — LIDOCAINE HYDROCHLORIDE 1.5 ML: 40 SOLUTION TOPICAL at 08:07

## 2021-12-28 RX ADMIN — MIDAZOLAM 1 MG: 1 INJECTION INTRAMUSCULAR; INTRAVENOUS at 09:03

## 2021-12-28 RX ADMIN — FENTANYL CITRATE 25 MCG: 50 INJECTION INTRAMUSCULAR; INTRAVENOUS at 08:53

## 2021-12-28 RX ADMIN — MIDAZOLAM 2 MG: 1 INJECTION INTRAMUSCULAR; INTRAVENOUS at 08:50

## 2021-12-28 RX ADMIN — FENTANYL CITRATE 25 MCG: 50 INJECTION INTRAMUSCULAR; INTRAVENOUS at 09:12

## 2021-12-28 RX ADMIN — MIDAZOLAM 1 MG: 1 INJECTION INTRAMUSCULAR; INTRAVENOUS at 08:54

## 2021-12-28 RX ADMIN — TOPICAL ANESTHETIC 1 ML: 200 SPRAY DENTAL; PERIODONTAL at 08:45

## 2021-12-28 ASSESSMENT — MIFFLIN-ST. JEOR: SCORE: 1690.97

## 2021-12-28 NOTE — PROGRESS NOTES
PATIENT/VISITOR WELLNESS SCREENING    Step 1 Patient Screening    1. In the last month, have you been in contact with someone who was confirmed or suspected to have Coronavirus/COVID-19? No    2. Do you have the following symptoms?  Fever/Chills? No   Cough? No   Shortness of breath? No   New loss of taste or smell? No  Sore throat? No  Muscle or body aches? No  Headaches? No  Fatigue? No  Vomiting or diarrhea? No    Step 2 Visitor Screening    1. Name of Visitor (1 visitor per patient): Tracey    2. In the last month, have you been in contact with someone who was confirmed or suspected to have Coronavirus/COVID-19? No    3. Do you have the following symptoms?  Fever/Chills? No   Cough? No   Shortness of breath? No   Skin rash? No   Loss of taste or smell? No  Sore throat? No  Runny or stuffy nose? No  Muscle or body aches? No  Headaches? No  Fatigue? No  Vomiting or diarrhea? No    If the visitor has positive symptoms, notify supervisor/manger  Per policy, the visitor will need to leave the facility     Step 3 Refer to logic grid below for actions    NO SYMPTOM(S)    ACTIONS:  1. Standard rooming process  2. Provider to assess per normal protocol  3. Implement precautions as needed and per guidelines     POSITIVE SYMPTOM(S)  If positive for ANY of the following symptoms: fever, cough, shortness of breath, rash    ACTION:  1. Continue to have the patient wear a mask   2. Room patient as soon as possible  3. Don appropriate PPE when entering room  4. Provider evaluation  Care Suites Admission Nursing Note    Patient Information  Name: Jovon Mantilla  Age: 66 year old  Reason for admission: [facility]  Care Suites arrival time: 0640    Visitor Information  Name: Tracey  Informed of visitor restrictions: Yes  1 visitor allowed per patient   Visitor must screen negative for COVID symptoms   Visitor must wear a mask  Waiting rooms closed to visitors    Patient Admission/Assessment   Pre-procedure assessment complete:  Yes  If abnormal assessment/labs, provider notified: No  NPO: Yes  Medications held per instructions/orders: N/A  Consent: deferred  If applicable, pregnancy test status: deferred  Patient oriented to room: Yes  Education/questions answered: Yes  Plan/other: lungs clear, prominent valve click with murmur. Reviewed plan for today and discharge instructions. Pt has had procedure before and has no questions.    Discharge Planning  Discharge name/phone number: Tracey  Overnight post sedation caregiver: Tracey  Discharge location: home    Rosa Elena Sykes RN

## 2021-12-28 NOTE — DISCHARGE INSTRUCTIONS
CADE  (Transesophageal Echocardiogram)  Discharge Instructions    After you go home:      Have an adult stay with you for 6 hours.       For 24 hours - due to the sedation you received:    Relax and take it easy.    Do NOT make any important or legal decisions.    Do NOT drive or operate machines at home or at work.    Do NOT drink alcohol.    Diet:    You may resume your normal diet, but no scratchy foods for two days.    If your throat is sore, eat cold, bland or soft foods.    You may have heartburn if the tube used in the exam entered your stomach.  If so:   - Do not eat acidic and spicy foods.   - Do not eat three hours before bedtime. Clear liquids are okay.   - When lying down, use two pillows to raise your head.    Medicines:      Take your medications, including blood thinners, unless your provider tells you not to.    If you have stopped any medicines, check with your provider about when to restart them.    You may take Tylenol (Acetaminophen) if your throat is sore.    You may take antacids if you have heartburn.      Follow Up Appointments:      Follow up with your cardiologist at [facility] of patient preference as instructed.    Follow up with your primary care provider as needed.    Call the clinic if:      You have heartburn that is severe or lasts more than 72 hours.    You have a sore throat that feels worse after 72 hours.    You have shortness of breath, neck pain, chest pain, fever, chills, coughing up blood, or other unusual signs.    Questions or concerns      [facility] [place]:    [phone] [facility] (7 days a week)

## 2021-12-28 NOTE — PROGRESS NOTES
Care Suites Post Procedure Note    Patient Information  Name: Jovon Mantilla  Age: 66 year old    Post Procedure  Concerns/abnormal assessment: none  If abnormal assessment, provider notified: N/A  Plan/Other: Sedated but responds to voice, denies pain.VSS, Remains on 4l nc with sats 94-97%. Dr. Schroeder to speak to wife.    Care Suites Discharge Nursing Note    Patient Information  Name: Jovon Mantilla  Age: 66 year old    Discharge Education:  Discharge instructions reviewed: Yes  Additional education/resources provided: NA  Patient/patient representative verbalizes understanding: Yes  Patient discharging on new medications: No  Medication education completed: N/A    Discharge Plans:   Discharge location: home  Discharge ride contacted: Yes  Approximate discharge time: 1100    Discharge Criteria:  Discharge criteria met and vital signs stable: Yes    Patient Belongs:  Patient belongings returned to patient: Yes    Rosa Elena Sykes RN

## 2021-12-28 NOTE — PROGRESS NOTES
ANTICOAGULATION  MANAGEMENT: Discharge Review    Jovon TITO Mantilla chart reviewed for anticoagulation continuity of care    Outpatient surgery/procedure on 12/28/21 for CADE.    Discharge disposition: Home    Results:    Recent labs: (last 7 days)     12/21/21  1214   INR 3.0*     Anticoagulation inpatient management:     not applicable     Anticoagulation discharge instructions:     Warfarin dosing: home regimen continued   Bridging: No   INR goal change: No      Medication changes affecting anticoagulation: No    Additional factors affecting anticoagulation: Yes: Possible up-coming mitral valve replacement--notes/results from today's visit not complete    Plan     No adjustment to anticoagulation plan needed    Patient not contacted, INR already scheduled for 1/11/22    No adjustment to Anticoagulation Calendar was required    Madeline Wayne RN

## 2021-12-28 NOTE — PRE-PROCEDURE
GENERAL PRE-PROCEDURE:   Procedure:  Transesophageal echocardiogram   Date/Time:  12/28/2021 8:46 AM    Verbal consent obtained?: Yes    Written consent obtained?: Yes    Risks and benefits: Risks, benefits and alternatives were discussed    Consent given by:  Patient  Patient states understanding of procedure being performed: Yes    Patient's understanding of procedure matches consent: Yes    Procedure consent matches procedure scheduled: Yes    Expected level of sedation:  Moderate  Appropriately NPO:  Yes  Mallampati  :  Grade 2- soft palate, base of uvula, tonsillar pillars, and portion of posterior pharyngeal wall visible  Lungs:  Lungs clear with good breath sounds bilaterally  Heart:  A-fib  History & Physical reviewed:  History and physical reviewed and no updates needed  Statement of review:  I have reviewed the lab findings, diagnostic data, medications, and the plan for sedation

## 2021-12-28 NOTE — TELEPHONE ENCOUNTER
CADE completed today as below. Ordered for evaluation of progressing MR. Next OV 1/10/22 with Dr Cooley. Routed to provider for preliminary review.     Left ventricular systolic function is mildly reduced. The visual ejection  fraction is 45-50%.  Right ventricular systolic function is moderately reduced.  Severe biatrial enlargement.  Moderate (2+) mitral regurgitation. There is mild prolapse of a thickened  leaflet tip at P2, leaflet appears myxomatous. MR ERO 0.24cm2. MR volume 47  cc/cardiac cycle. There is no mitral valve stenosis.  There is a mechanical aortic valve. The prosthetic aortic valve is well-  seated. Suboptimal alignment precludes accurate assessment of transvalvular  velocities, however no obvious mechanical valve leaflet dysfunction  visualized. No abnormal aortic insufficiency.  Mild to moderate (1-2+) tricuspid regurgitation.  No thrombus is detected in the left atrial appendage. Spontaneous contrast in  left atrial appendage.     This study was compared to a prior TTE from 12/2/2021. The mitral valve was  better visualized on this present study, severity of MR is moderate. RV  function was better visualized and is moderately reduced. The thickened  leaflet tip at P2 was also visualized on a CADE from 11/2010.

## 2021-12-30 ENCOUNTER — PRE VISIT (OUTPATIENT)
Dept: UROLOGY | Facility: CLINIC | Age: 66
End: 2021-12-30
Payer: COMMERCIAL

## 2021-12-30 NOTE — TELEPHONE ENCOUNTER
Reason for visit: Follow up     Relevant information: scrotal pain; ED    Records/imaging/labs/orders: in Epic; US completed    Pt called: no    At Rooming: normal

## 2021-12-30 NOTE — TELEPHONE ENCOUNTER
The MR is moderate and would not explain his symptoms. Let's set up a visit with me in 2-3 weeks to go over this.Thanks.

## 2022-01-09 NOTE — PROGRESS NOTES
HPI and Plan:      HISTORY OF PRESENT ILLNESS:  I had the pleasure of seeing Mr. Mantilla in followup at the [facility].  He is a very pleasant 66-year-old gentleman who I saw originally in October 2020.  Prior to that he was being followed by Dr. Lewis.      Mr. Mantilla has a history of severe aortic stenosis and is status post aortic valve replacement in 01/2011 with a 24 mm ATS supra-annular valve.  He also has a history of atrial fibrillation and underwent a Maze procedure at that time, but has remained in atrial fibrillation subsequently.  He has been on rate control and anticoagulation, which has worked well in that he has been essentially asymptomatic from his atrial fibrillation.  He also has a history of a 70% stenosis involving the LAD on coronary angiography prior to aortic valve replacement.  This, however, was not bypassed since it was found to be completely intramyocardial at the time of surgery.      At his last office visit he was doing well overall from a cardiovascular standpoint.  In December 2021, however, he was seen by my colleague Ingrid Alas PA-C, and reported exertional dyspnea which was new compared to the prior year.  An echocardiogram subsequently demonstrated moderate to severe eccentric mitral regurgitation which had progressed compared to his prior echocardiogram from June 2020.  Given these findings, I recommended a CADE for further evaluation.    The CADE was performed on 12/28/2021.  I personally reviewed the images.  Severe biatrial enlargement is present with mildly reduced left ventricular systolic function.  There is mild thickening and prolapse of P2 with moderate mitral regurgitation both qualitatively and quantitatively by Pisa analysis.  I suspect that the mitral regurgitation is secondary to both annular dilatation in the setting of chronic persistent atrial fibrillation as well as an element of P2 prolapse as reported.  The mechanical aortic  valve appeared to be functioning normally on both CADE and transthoracic imaging.    Today he feels that he is still experiencing exertional dyspnea which is particularly noticeable upon climbing stairs.  Having said that, he was fairly active over the winter with his normal household activities and denied any chest discomfort or palpitations while doing so.  He denies any syncope or presyncope.     PHYSICAL EXAMINATION:  Dictated below.        I personally reviewed his echocardiograms dating back to 2016.  Both left and right ventricular systolic function have been mildly reduced since 2016 at least, and I do not appreciate any dramatic differences in that regard.  The aortic valve mechanical prosthesis appears to function normally.  The mitral regurgitation is well evaluated on CADE and appears moderate.  Of note, his blood pressure was actually higher on the CADE than it was on his most recent transthoracic echocardiogram.  He was noted to have mild pulmonary hypertension on his most recent transthoracic echocardiogram with an estimated RVSP of 31 mmHg plus right atrial pressure.    IMPRESSION:   1.  Severe aortic stenosis, status post aortic valve replacement with a mechanical valve as described above.   2.  Persistent atrial fibrillation.   3.  Coronary artery disease as described above.    4.  Recent dyspnea on exertion as described above.  His CADE demonstrated moderate mitral regurgitation which is probably of mixed etiology as described above.     The patient presents with persistent dyspnea on exertion that has been a new development since last year.  His CADE demonstrated moderate mitral regurgitation which would not be expected to account for his symptoms.  It is possible that his mitral regurgitation may progress in severity under conditions of strenuous activity, and I have ordered a bicycle stress echocardiogram for an evaluation in that regard.    The other possibility would include progression of his  known coronary artery disease, which was not addressed at the time of surgery as described above.      I discussed the options for further evaluation in this regard which would include right and left heart catheterization and revascularization if clinically indicated.  The patient desires a definitive evaluation in this regard and therefore wishes to proceed.  I have explained the indications, risks and benefits of angiography in the setting and the patient understands the risks and is willing to proceed.  He will require Lovenox bridging given his mechanical aortic valve and persistent atrial fibrillation.    If he is noted to have severe exercise-induced mitral regurgitation then MitraClip therapy may be an option depending on the severity of his coronary artery disease.    It was a pleasure seeing him today.      CURRENT MEDICATIONS:  Current Outpatient Medications   Medication Sig Dispense Refill     Ascorbic Acid (VITAMIN C PO) Take 500 mg by mouth At Bedtime       aspirin 81 MG tablet Take 81 mg by mouth every other day       Calcium Carbonate-Vitamin D (CALCIUM 600+D) 600-200 MG-UNIT TABS Take 1 tablet by mouth 2 times daily        carvedilol (COREG) 25 MG tablet Take 1 tablet (25 mg) by mouth 2 times daily (with meals) 180 tablet 1     Cholecalciferol (VITAMIN D) 2000 UNIT CAPS Take 1 tablet by mouth daily        Cyanocobalamin (VITAMIN B-12) 500 MCG SUBL Place 500 mcg under the tongue daily        digoxin (LANOXIN) 125 MCG tablet Take 1 tablet (125 mcg) by mouth daily (Patient taking differently: Take 250 mcg by mouth daily ) 90 tablet 3     Fexofenadine HCl (ALLERGY 24-HR PO) Take 180 mg by mouth daily        levothyroxine (SYNTHROID/LEVOTHROID) 200 MCG tablet Take 1 tablet (200 mcg) by mouth daily 90 tablet 3     lisinopril (ZESTRIL) 20 MG tablet Take  20 mg (1 tablet in am) and 10 mg  (one half tablet) in  pm 135 tablet 3     Multiple Vitamins-Minerals (CENTRUM) CHEW Take 1 tablet by mouth daily         nitroGLYcerin (NITROSTAT) 0.3 MG sublingual tablet For chest pain place 1 tablet under the tongue every 5 minutes for 3 doses. If symptoms persist 5 minutes after 1st dose call 911. 25 tablet 1     Psyllium (FIBER) 0.52 GM CAPS Take 2 capsules by mouth At Bedtime        sertraline (ZOLOFT) 50 MG tablet Take 1 tablet (50 mg) by mouth daily 90 tablet 3     simvastatin (ZOCOR) 20 MG tablet Take 1 tablet (20 mg) by mouth At Bedtime 90 tablet 3     spironolactone (ALDACTONE) 25 MG tablet Take half tablet (12.5 mg) once every morning. 15 tablet 3     warfarin ANTICOAGULANT (COUMADIN) 4 MG tablet Take one tablet (4 mg) by mouth daily except take one a half tablets (6 mg) Wed, Sat or as directed by INR Clinic 117 tablet 1       ALLERGIES   No Known Allergies    PAST MEDICAL HISTORY:  Past Medical History:   Diagnosis Date     Acne rosacea 04/22/2019     Acquired hypothyroidism 05/23/2003     Problem list name updated by automated process. Provider to review     Acute prostatitis      Acute sinusitis, unspecified      Aortic valve disease      Biatrial enlargement      CAD (coronary artery disease)     known 70% LAD stenosis     Congestive heart failure (H)      Depression      GI bleeding 01/22/2016     History of bleeding peptic ulcer      Hyperlipidemia      Idiopathic cardiomyopathy (H)      Mitral regurgitation      Obesity      Palpitations      Permanent atrial fibrillation (H) 06/02/2017     Right inguinal hernia 07/22/2019     S/P AVR 01/07/2011    with Maze procedure     S/P gastric bypass      Tricuspid regurgitation      Unspecified essential hypertension        PAST SURGICAL HISTORY:  Past Surgical History:   Procedure Laterality Date     ANGIOGRAM  02/01/2002    Normal coronary,dilated LV,Sev.decr.global LVF,+1 MR     ANGIOGRAM  01/01/2008    Mild AS,Mod PHTN,mod prox.LAD disease,Triv.CFX disease     ANGIOGRAM  01/01/2009    Mod AS,Mod PHTN,mod prox.LAD disease     ANGIOGRAM  10/01/2010    Sev.AS,CM,global  hypokinesis,Mild-mod LV dilated,Mild MR,70% prox.LAD lesion,PHTN     CARDIOVERSION  2/02, 4/02, 4/11     CHOLECYSTECTOMY  2006     COLONOSCOPY N/A 01/23/2016    Procedure: COLONOSCOPY;  Surgeon: Robyn Collins MD;  Location:  GI     ESOPHAGOSCOPY, GASTROSCOPY, DUODENOSCOPY (EGD), COMBINED N/A 01/23/2016    Procedure: COMBINED ESOPHAGOSCOPY, GASTROSCOPY, DUODENOSCOPY (EGD);  Surgeon: Robyn Collins MD;  Location:  GI     EXCISE MASS GROIN Right 2/11/2021    Procedure: INCISION AND DRAINAGE OF RIGHT GROIN HEMATOMA;  Surgeon: Norma Scanlon MD;  Location: [facility] OR     HERNIORRHAPHY INGUINAL Right 2/4/2021    Procedure: OPEN RIGHT INGUINAL HENRIA WITH MESH;  Surgeon: Jaden Alex MD;  Location: [facility] OR     LAPAROSCOPIC BYPASS GASTRIC  10/08/2011    Procedure:LAPAROSCOPIC BYPASS GASTRIC; Diagnostic laparoscopy, Lysis of Adhesions, Laparoscopic Revision of Jejunojejunostomy; Surgeon:RADHA MURO; Location: OR     LAPAROSCOPIC BYPASS GASTRIC  06/26/2006    Dr Jin     LAPAROSCOPY DIAGNOSTIC (GENERAL)  10/08/2011    Procedure:LAPAROSCOPY DIAGNOSTIC (GENERAL); Surgeon:RADHA MURO; Location: OR     REPLACE VALVE AORTIC  01/07/2011     ZZC NONSPECIFIC PROCEDURE  ~1985    vein stripping       FAMILY HISTORY:  Family History   Problem Relation Age of Onset     Cancer - colorectal Father      Colon Cancer Father      Cancer Father      Diabetes Brother        SOCIAL HISTORY:  Social History     Socioeconomic History     Marital status:      Spouse name: Tracey     Number of children: 0     Years of education: Not on file     Highest education level: Not on file   Occupational History     Occupation:      Comment: [employer]   Tobacco Use     Smoking status: Never Smoker     Smokeless tobacco: Never Used   Substance and Sexual Activity     Alcohol use: No     Alcohol/week: 0.0 standard drinks     Drug use: No     Sexual activity: Not Currently      Partners: Female   Other Topics Concern     Parent/sibling w/ CABG, MI or angioplasty before 65F 55M? Yes      Service Not Asked     Blood Transfusions No     Caffeine Concern Yes     Comment: 1 can daily     Occupational Exposure Yes     Hobby Hazards No     Sleep Concern No     Stress Concern No     Weight Concern No     Special Diet Yes     Comment: low sodium, low calories     Back Care No     Exercise Yes     Comment: walks daily     Bike Helmet Not Asked     Comment: NA     Seat Belt Yes     Self-Exams Yes   Social History Narrative     Not on file     Social Determinants of Health     Financial Resource Strain: Not on file   Food Insecurity: Not on file   Transportation Needs: Not on file   Physical Activity: Not on file   Stress: Not on file   Social Connections: Not on file   Intimate Partner Violence: Not on file   Housing Stability: Not on file       Review of Systems:  Skin:          Eyes:         ENT:         Respiratory:          Cardiovascular:         Gastroenterology:        Genitourinary:         Musculoskeletal:         Neurologic:         Psychiatric:         Heme/Lymph/Imm:         Endocrine:           Physical Exam:  Vitals: There were no vitals taken for this visit.    Constitutional:  cooperative, alert and oriented, well developed, well nourished, in no acute distress        Skin:  warm and dry to the touch, no apparent skin lesions or masses noted          Head:  normocephalic, no masses or lesions        Eyes:           Lymph:No Cervical lymphadenopathy present     ENT:           Neck:           Respiratory:            Cardiac: regular rhythm                pulses full and equal                                        GI:  abdomen soft        Extremities and Muscular Skeletal:  no edema              Neurological:           Psych:  Alert and Oriented x 3        CC  Ingrid Alas PA-C  [address] [place] [address]

## 2022-01-10 ENCOUNTER — OFFICE VISIT (OUTPATIENT)
Dept: CARDIOLOGY | Facility: CLINIC | Age: 67
End: 2022-01-10
Payer: COMMERCIAL

## 2022-01-10 VITALS
WEIGHT: 192 LBS | DIASTOLIC BLOOD PRESSURE: 66 MMHG | HEART RATE: 76 BPM | BODY MASS INDEX: 26.88 KG/M2 | HEIGHT: 71 IN | SYSTOLIC BLOOD PRESSURE: 106 MMHG

## 2022-01-10 DIAGNOSIS — I48.20 CHRONIC ATRIAL FIBRILLATION (H): ICD-10-CM

## 2022-01-10 DIAGNOSIS — I50.812 CHRONIC RIGHT-SIDED HEART FAILURE (H): ICD-10-CM

## 2022-01-10 PROCEDURE — 99214 OFFICE O/P EST MOD 30 MIN: CPT | Performed by: INTERNAL MEDICINE

## 2022-01-10 RX ORDER — DIGOXIN 125 MCG
125 TABLET ORAL DAILY
Qty: 90 TABLET | Refills: 3 | Status: SHIPPED | OUTPATIENT
Start: 2022-01-10 | End: 2022-05-27

## 2022-01-10 ASSESSMENT — MIFFLIN-ST. JEOR: SCORE: 1675.1

## 2022-01-10 NOTE — LETTER
1/10/2022    Rehan Lorenzana MD  [address] [facility] [address]    RE: Jovon Mantilla       Dear Colleague,     I had the pleasure of seeing Jovon Mantilla in the [facility].  HPI and Plan:      HISTORY OF PRESENT ILLNESS:  I had the pleasure of seeing Mr. Mantilla in followup at the [facility].  He is a very pleasant 66-year-old gentleman who I saw originally in October 2020.  Prior to that he was being followed by Dr. Lewis.      Mr. Mantilla has a history of severe aortic stenosis and is status post aortic valve replacement in 01/2011 with a 24 mm ATS supra-annular valve.  He also has a history of atrial fibrillation and underwent a Maze procedure at that time, but has remained in atrial fibrillation subsequently.  He has been on rate control and anticoagulation, which has worked well in that he has been essentially asymptomatic from his atrial fibrillation.  He also has a history of a 70% stenosis involving the LAD on coronary angiography prior to aortic valve replacement.  This, however, was not bypassed since it was found to be completely intramyocardial at the time of surgery.      At his last office visit he was doing well overall from a cardiovascular standpoint.  In December 2021, however, he was seen by my colleague Ingrid Alas PA-C, and reported exertional dyspnea which was new compared to the prior year.  An echocardiogram subsequently demonstrated moderate to severe eccentric mitral regurgitation which had progressed compared to his prior echocardiogram from June 2020.  Given these findings, I recommended a CADE for further evaluation.    The CADE was performed on 12/28/2021.  I personally reviewed the images.  Severe biatrial enlargement is present with mildly reduced left ventricular systolic function.  There is mild thickening and prolapse of P2 with moderate mitral regurgitation both qualitatively and quantitatively by Pisa analysis.  I  suspect that the mitral regurgitation is secondary to both annular dilatation in the setting of chronic persistent atrial fibrillation as well as an element of P2 prolapse as reported.  The mechanical aortic valve appeared to be functioning normally on both CADE and transthoracic imaging.    Today he feels that he is still experiencing exertional dyspnea which is particularly noticeable upon climbing stairs.  Having said that, he was fairly active over the winter with his normal household activities and denied any chest discomfort or palpitations while doing so.  He denies any syncope or presyncope.     PHYSICAL EXAMINATION:  Dictated below.        I personally reviewed his echocardiograms dating back to 2016.  Both left and right ventricular systolic function have been mildly reduced since 2016 at least, and I do not appreciate any dramatic differences in that regard.  The aortic valve mechanical prosthesis appears to function normally.  The mitral regurgitation is well evaluated on CADE and appears moderate.  Of note, his blood pressure was actually higher on the CADE than it was on his most recent transthoracic echocardiogram.  He was noted to have mild pulmonary hypertension on his most recent transthoracic echocardiogram with an estimated RVSP of 31 mmHg plus right atrial pressure.    IMPRESSION:   1.  Severe aortic stenosis, status post aortic valve replacement with a mechanical valve as described above.   2.  Persistent atrial fibrillation.   3.  Coronary artery disease as described above.    4.  Recent dyspnea on exertion as described above.  His CADE demonstrated moderate mitral regurgitation which is probably of mixed etiology as described above.     The patient presents with persistent dyspnea on exertion that has been a new development since last year.  His CADE demonstrated moderate mitral regurgitation which would not be expected to account for his symptoms.  It is possible that his mitral regurgitation may  progress in severity under conditions of strenuous activity, and I have ordered a bicycle stress echocardiogram for an evaluation in that regard.    The other possibility would include progression of his known coronary artery disease, which was not addressed at the time of surgery as described above.      I discussed the options for further evaluation in this regard which would include right and left heart catheterization and revascularization if clinically indicated.  The patient desires a definitive evaluation in this regard and therefore wishes to proceed.  I have explained the indications, risks and benefits of angiography in the setting and the patient understands the risks and is willing to proceed.  He will require Lovenox bridging given his mechanical aortic valve and persistent atrial fibrillation.    If he is noted to have severe exercise-induced mitral regurgitation then MitraClip therapy may be an option depending on the severity of his coronary artery disease.    It was a pleasure seeing him today.      CURRENT MEDICATIONS:  Current Outpatient Medications   Medication Sig Dispense Refill     Ascorbic Acid (VITAMIN C PO) Take 500 mg by mouth At Bedtime       aspirin 81 MG tablet Take 81 mg by mouth every other day       Calcium Carbonate-Vitamin D (CALCIUM 600+D) 600-200 MG-UNIT TABS Take 1 tablet by mouth 2 times daily        carvedilol (COREG) 25 MG tablet Take 1 tablet (25 mg) by mouth 2 times daily (with meals) 180 tablet 1     Cholecalciferol (VITAMIN D) 2000 UNIT CAPS Take 1 tablet by mouth daily        Cyanocobalamin (VITAMIN B-12) 500 MCG SUBL Place 500 mcg under the tongue daily        digoxin (LANOXIN) 125 MCG tablet Take 1 tablet (125 mcg) by mouth daily (Patient taking differently: Take 250 mcg by mouth daily ) 90 tablet 3     Fexofenadine HCl (ALLERGY 24-HR PO) Take 180 mg by mouth daily        levothyroxine (SYNTHROID/LEVOTHROID) 200 MCG tablet Take 1 tablet (200 mcg) by mouth daily 90  tablet 3     lisinopril (ZESTRIL) 20 MG tablet Take  20 mg (1 tablet in am) and 10 mg  (one half tablet) in  pm 135 tablet 3     Multiple Vitamins-Minerals (CENTRUM) CHEW Take 1 tablet by mouth daily        nitroGLYcerin (NITROSTAT) 0.3 MG sublingual tablet For chest pain place 1 tablet under the tongue every 5 minutes for 3 doses. If symptoms persist 5 minutes after 1st dose call 911. 25 tablet 1     Psyllium (FIBER) 0.52 GM CAPS Take 2 capsules by mouth At Bedtime        sertraline (ZOLOFT) 50 MG tablet Take 1 tablet (50 mg) by mouth daily 90 tablet 3     simvastatin (ZOCOR) 20 MG tablet Take 1 tablet (20 mg) by mouth At Bedtime 90 tablet 3     spironolactone (ALDACTONE) 25 MG tablet Take half tablet (12.5 mg) once every morning. 15 tablet 3     warfarin ANTICOAGULANT (COUMADIN) 4 MG tablet Take one tablet (4 mg) by mouth daily except take one a half tablets (6 mg) Wed, Sat or as directed by INR Clinic 117 tablet 1       ALLERGIES   No Known Allergies    PAST MEDICAL HISTORY:  Past Medical History:   Diagnosis Date     Acne rosacea 04/22/2019     Acquired hypothyroidism 05/23/2003     Problem list name updated by automated process. Provider to review     Acute prostatitis      Acute sinusitis, unspecified      Aortic valve disease      Biatrial enlargement      CAD (coronary artery disease)     known 70% LAD stenosis     Congestive heart failure (H)      Depression      GI bleeding 01/22/2016     History of bleeding peptic ulcer      Hyperlipidemia      Idiopathic cardiomyopathy (H)      Mitral regurgitation      Obesity      Palpitations      Permanent atrial fibrillation (H) 06/02/2017     Right inguinal hernia 07/22/2019     S/P AVR 01/07/2011    with Maze procedure     S/P gastric bypass      Tricuspid regurgitation      Unspecified essential hypertension        PAST SURGICAL HISTORY:  Past Surgical History:   Procedure Laterality Date     ANGIOGRAM  02/01/2002    Normal coronary,dilated LV,Sev.decr.global  LVF,+1 MR     ANGIOGRAM  01/01/2008    Mild AS,Mod PHTN,mod prox.LAD disease,Triv.CFX disease     ANGIOGRAM  01/01/2009    Mod AS,Mod PHTN,mod prox.LAD disease     ANGIOGRAM  10/01/2010    Sev.AS,CM,global hypokinesis,Mild-mod LV dilated,Mild MR,70% prox.LAD lesion,PHTN     CARDIOVERSION  2/02, 4/02, 4/11     CHOLECYSTECTOMY  2006     COLONOSCOPY N/A 01/23/2016    Procedure: COLONOSCOPY;  Surgeon: Robyn Collins MD;  Location:  GI     ESOPHAGOSCOPY, GASTROSCOPY, DUODENOSCOPY (EGD), COMBINED N/A 01/23/2016    Procedure: COMBINED ESOPHAGOSCOPY, GASTROSCOPY, DUODENOSCOPY (EGD);  Surgeon: Robyn Collins MD;  Location:  GI     EXCISE MASS GROIN Right 2/11/2021    Procedure: INCISION AND DRAINAGE OF RIGHT GROIN HEMATOMA;  Surgeon: Norma Scanlon MD;  Location: [facility] OR     HERNIORRHAPHY INGUINAL Right 2/4/2021    Procedure: OPEN RIGHT INGUINAL HENRIA WITH MESH;  Surgeon: Jaden Alex MD;  Location: [facility] OR     LAPAROSCOPIC BYPASS GASTRIC  10/08/2011    Procedure:LAPAROSCOPIC BYPASS GASTRIC; Diagnostic laparoscopy, Lysis of Adhesions, Laparoscopic Revision of Jejunojejunostomy; Surgeon:RADHA MURO; Location: OR     LAPAROSCOPIC BYPASS GASTRIC  06/26/2006    Dr Jin     LAPAROSCOPY DIAGNOSTIC (GENERAL)  10/08/2011    Procedure:LAPAROSCOPY DIAGNOSTIC (GENERAL); Surgeon:RADHA MURO; Location: OR     REPLACE VALVE AORTIC  01/07/2011     ZZC NONSPECIFIC PROCEDURE  ~1985    vein stripping       FAMILY HISTORY:  Family History   Problem Relation Age of Onset     Cancer - colorectal Father      Colon Cancer Father      Cancer Father      Diabetes Brother        SOCIAL HISTORY:  Social History     Socioeconomic History     Marital status:      Spouse name: Tracey     Number of children: 0     Years of education: Not on file     Highest education level: Not on file   Occupational History     Occupation:      Comment: [employer]   Tobacco Use     Smoking  status: Never Smoker     Smokeless tobacco: Never Used   Substance and Sexual Activity     Alcohol use: No     Alcohol/week: 0.0 standard drinks     Drug use: No     Sexual activity: Not Currently     Partners: Female   Other Topics Concern     Parent/sibling w/ CABG, MI or angioplasty before 65F 55M? Yes      Service Not Asked     Blood Transfusions No     Caffeine Concern Yes     Comment: 1 can daily     Occupational Exposure Yes     Hobby Hazards No     Sleep Concern No     Stress Concern No     Weight Concern No     Special Diet Yes     Comment: low sodium, low calories     Back Care No     Exercise Yes     Comment: walks daily     Bike Helmet Not Asked     Comment: NA     Seat Belt Yes     Self-Exams Yes   Social History Narrative     Not on file     Social Determinants of Health     Financial Resource Strain: Not on file   Food Insecurity: Not on file   Transportation Needs: Not on file   Physical Activity: Not on file   Stress: Not on file   Social Connections: Not on file   Intimate Partner Violence: Not on file   Housing Stability: Not on file       Review of Systems:  Skin:          Eyes:         ENT:         Respiratory:          Cardiovascular:         Gastroenterology:        Genitourinary:         Musculoskeletal:         Neurologic:         Psychiatric:         Heme/Lymph/Imm:         Endocrine:           Physical Exam:  Vitals: There were no vitals taken for this visit.    Constitutional:  cooperative, alert and oriented, well developed, well nourished, in no acute distress        Skin:  warm and dry to the touch, no apparent skin lesions or masses noted          Head:  normocephalic, no masses or lesions        Eyes:           Lymph:No Cervical lymphadenopathy present     ENT:           Neck:           Respiratory:            Cardiac: regular rhythm                pulses full and equal                                        GI:  abdomen soft        Extremities and Muscular Skeletal:  no  edema              Neurological:           Psych:  Alert and Oriented x 3        CC  Ingrid Alas PA-C  [address] [place] [address]

## 2022-01-11 ENCOUNTER — ANTICOAGULATION THERAPY VISIT (OUTPATIENT)
Dept: ANTICOAGULATION | Facility: CLINIC | Age: 67
End: 2022-01-11

## 2022-01-11 ENCOUNTER — TELEPHONE (OUTPATIENT)
Dept: ANTICOAGULATION | Facility: CLINIC | Age: 67
End: 2022-01-11

## 2022-01-11 ENCOUNTER — LAB (OUTPATIENT)
Dept: LAB | Facility: CLINIC | Age: 67
End: 2022-01-11
Payer: COMMERCIAL

## 2022-01-11 DIAGNOSIS — I48.21 PERMANENT ATRIAL FIBRILLATION (H): ICD-10-CM

## 2022-01-11 DIAGNOSIS — S30.1XXA ABDOMINAL WALL HEMATOMA, INITIAL ENCOUNTER: ICD-10-CM

## 2022-01-11 DIAGNOSIS — Z11.4 SCREENING FOR HIV (HUMAN IMMUNODEFICIENCY VIRUS): Primary | ICD-10-CM

## 2022-01-11 DIAGNOSIS — Z79.01 LONG TERM CURRENT USE OF ANTICOAGULANTS WITH INR GOAL OF 2.5-3.5: ICD-10-CM

## 2022-01-11 DIAGNOSIS — Z95.2 S/P AVR (AORTIC VALVE REPLACEMENT): Primary | ICD-10-CM

## 2022-01-11 LAB — INR BLD: 1.9 (ref 0.9–1.1)

## 2022-01-11 PROCEDURE — 85610 PROTHROMBIN TIME: CPT

## 2022-01-11 PROCEDURE — 36415 COLL VENOUS BLD VENIPUNCTURE: CPT

## 2022-01-11 NOTE — TELEPHONE ENCOUNTER
Patient is scheduled for heart catheterization on 1/31/22 by Dr. Cooley.  Per cardiology notes on 1/11/22, patient will need lovenox bridging, but it is not clear how long he will need to hold.    Patient does have a follow up INR check on 1/18/22 and plan can be discussed at that visit.  Isabel Cheney RN

## 2022-01-11 NOTE — PROGRESS NOTES
ANTICOAGULATION MANAGEMENT     Jovon Mantilla 66 year old male is on warfarin with subtherapeutic INR result. (Goal INR 2.5-3.5)    Recent labs: (last 7 days)     01/11/22  1000   INR 1.9*       ASSESSMENT     Source(s): Chart Review and Patient/Caregiver Call       Warfarin doses taken: Warfarin taken as instructed    Diet: No new diet changes identified    New illness, injury, or hospitalization: Yes: continues to have SOB and following up with cardiology    Medication/supplement changes: Recently started on spironolactone which can decrease INR.    Signs or symptoms of bleeding or clotting: No    Previous INR: Therapeutic last 2(+) visits    Additional findings: Upcoming surgery/procedure heart catheterization scheduled on 1/31.  Will need lovenox bridging.  Message sent to [facility] pharmcacist for review.     PLAN     Recommended plan for ongoing change(s) affecting INR     Dosing Instructions: Booster dose then Increase your warfarin dose (6% change) with next INR in 1 week       Summary  As of 1/11/2022    Full warfarin instructions:  1/11: 8 mg; Otherwise 6 mg every Mon, Wed, Sat; 4 mg all other days   Next INR check:  1/18/2022             Telephone call with Jovon who agrees to plan and repeated back plan correctly    Lab visit scheduled    Education provided: Please call back if any changes to your diet, medications or how you've been taking warfarin    Plan made per [facility] anticoagulation protocol    Isabel Cheney RN  Anticoagulation Clinic  1/11/2022    _______________________________________________________________________     Anticoagulation Episode Summary     Current INR goal:  2.5-3.5   TTR:  87.9 % (12 mo)   Target end date:  Indefinite   Send INR reminders to:  [facility]    Indications    S/P AVR (aortic valve replacement) [Z95.2]  Permanent atrial fibrillation (H) [I48.21]  Long term current use of anticoagulants with INR goal of 2.5-3.5 [Z79.01]  Abdominal wall hematoma  initial encounter  [S30.1XXA]           Comments:  24mm ATS Valve         Anticoagulation Care Providers     Provider Role Specialty Phone number    Rehan Lorenzana MD Referring Family Medicine [phone]    Ramana Redd MD Referring Student in organized health care education/training program [phone]

## 2022-01-12 NOTE — TELEPHONE ENCOUNTER
"MIKE-PROCEDURAL ANTICOAGULATION  MANAGEMENT    ASSESSMENT     Warfarin interruption plan for right and left heart catheterization on 1/31/22.    Indication for Anticoagulation: Atrial Fibrillation and Mechanical AVR      Mike-Procedure Risk stratification for thromboembolism: moderate (2012 Chest guidelines)    AVR: 2020 AHA/ACC Management of Valvular Heart disease guidelines suggests bridging is reasonable on individual basis with risk of bleeding weighed against risks of clotting for mechanical AVR patients with risk factors for thrombosis (Afib, previous thromboembolism, hypercoagulable condition, LV systolic dysfunction, older generation valves, or > 1 valve)    AFIB: 2019 AHA/ACC/HRS update of Afib guidelines recommend bridging for patients with Afib and a mechanical heart valve undergoing procedures. Decision on bridging therapy should balance the risks of stroke and bleeding     RECOMMENDATION       Routed to cardiology for recommendation on length of warfarin hold.   ?   JUAN DAVID WILSON [facility]    SUBJECTIVE/OBJECTIVE     Jovon Mantilla, a 66 year old male    Goal INR Range: 2.5-3.5     Patient bridged in past: Yes: 2021 hernia repair    Wt Readings from Last 3 Encounters:   01/10/22 87.1 kg (192 lb)   12/28/21 90.3 kg (199 lb)   12/09/21 91.6 kg (202 lb)      Ideal body weight: 74.1 kg (163 lb 7.5 oz)  Adjusted ideal body weight: 79.3 kg (174 lb 14.1 oz)     Estimated body mass index is 27.16 kg/m  as calculated from the following:    Height as of 1/10/22: 1.791 m (5' 10.5\").    Weight as of 1/10/22: 87.1 kg (192 lb).    Lab Results   Component Value Date    INR 1.9 (H) 01/11/2022    INR 3.0 (H) 12/21/2021    INR 3.1 (H) 12/16/2021     Lab Results   Component Value Date    HGB 10.8 08/09/2021    HGB 9.7 02/13/2021    HCT 35.3 08/09/2021    HCT 30.9 02/13/2021     08/09/2021     02/13/2021     Lab Results   Component Value Date    CR 0.86 08/09/2021    CR 0.76 02/12/2021    CR 0.72 02/11/2021 "     CrCl = 90 mL/min

## 2022-01-13 NOTE — TELEPHONE ENCOUNTER
Left a phone message for INR team, Isabel Cheney, with update DrBritta Cooley. Routed his reply to INR team and [facility].

## 2022-01-13 NOTE — TELEPHONE ENCOUNTER
Per cardiology, patient should hold warfarin for 4 days and also needs lovenox.  Lovenox dosing has not been documented yet, so encounter routed to [facility] pharmacist to advise on lovenox dosing.  Isabel Cheney RN

## 2022-01-13 NOTE — TELEPHONE ENCOUNTER
Patient has INR appointment on 1/18/22.  Will review hold plan with him at that time as notified on 1/11 when I talked to him.

## 2022-01-13 NOTE — TELEPHONE ENCOUNTER
"MIKE-PROCEDURAL ANTICOAGULATION  MANAGEMENT    ASSESSMENT     See note below for assessment regarding hold for heart cath     RECOMMENDATION       Pre-Procedure:  o Hold warfarin for 4 days, until after procedure startin2022   o Enoxaparin (Lovenox) 90 mg subq Q 12 hrs (1 mg/kg Q 12 hrs for CrCl >= 60 ml/min and BMI <= 40 kg/m2)   - Start enoxaparin: 2022 AM  - Last dose of enoxaparin prior to procedure: 2022 AM  (~24 hours prior to procedure)      Post-Procedure:  o Resume warfarin dose if okay with provider doing procedure on night of procedure, 2022 PM: 6mg  o Resume  enoxaparin (Lovenox) ~ 24-48 hrs post procedure when okay with provider doing procedure. Continue until INR >= 2.5 x 2  o Recheck INR ~5 days after resuming warfarin   ?         Plan routed to referring provider for approval  ?   Dee Redd [facility]    SUBJECTIVE/OBJECTIVE     Jovon Mantilla, a 66 year old male    Goal INR Range: 2.5-3.5     Patient bridged in past: Yes: with  hernia surgery    Pertinent History: NA    Wt Readings from Last 3 Encounters:   01/10/22 87.1 kg (192 lb)   21 90.3 kg (199 lb)   21 91.6 kg (202 lb)      Ideal body weight: 74.1 kg (163 lb 7.5 oz)  Adjusted ideal body weight: 79.3 kg (174 lb 14.1 oz)     Estimated body mass index is 27.16 kg/m  as calculated from the following:    Height as of 1/10/22: 1.791 m (5' 10.5\").    Weight as of 1/10/22: 87.1 kg (192 lb).    Lab Results   Component Value Date    INR 1.9 (H) 2022    INR 3.0 (H) 2021    INR 3.1 (H) 2021     Lab Results   Component Value Date    HGB 10.8 2021    HGB 9.7 2021    HCT 35.3 2021    HCT 30.9 2021     2021     2021     Lab Results   Component Value Date    CR 0.86 2021    CR 0.76 2021    CR 0.72 2021     CrCl cannot be calculated (Patient's most recent lab result is older than the maximum 30 days allowed.).     Calc CrCl " 97ml/min from 08/2021 SCr

## 2022-01-17 ENCOUNTER — HOSPITAL ENCOUNTER (OUTPATIENT)
Dept: CARDIOLOGY | Facility: CLINIC | Age: 67
End: 2022-01-17
Attending: INTERNAL MEDICINE
Payer: COMMERCIAL

## 2022-01-17 ENCOUNTER — TELEPHONE (OUTPATIENT)
Dept: CARDIOLOGY | Facility: CLINIC | Age: 67
End: 2022-01-17

## 2022-01-17 DIAGNOSIS — I50.812 CHRONIC RIGHT-SIDED HEART FAILURE (H): ICD-10-CM

## 2022-01-17 PROCEDURE — 93350 STRESS TTE ONLY: CPT | Mod: TC

## 2022-01-17 PROCEDURE — 93321 DOPPLER ECHO F-UP/LMTD STD: CPT | Mod: 26 | Performed by: INTERNAL MEDICINE

## 2022-01-17 PROCEDURE — 93225 XTRNL ECG REC<48 HRS REC: CPT

## 2022-01-17 PROCEDURE — 93350 STRESS TTE ONLY: CPT | Mod: 26 | Performed by: INTERNAL MEDICINE

## 2022-01-17 PROCEDURE — 93325 DOPPLER ECHO COLOR FLOW MAPG: CPT | Mod: TC

## 2022-01-17 PROCEDURE — 93321 DOPPLER ECHO F-UP/LMTD STD: CPT | Mod: TC

## 2022-01-17 PROCEDURE — 93018 CV STRESS TEST I&R ONLY: CPT | Performed by: INTERNAL MEDICINE

## 2022-01-17 PROCEDURE — 93325 DOPPLER ECHO COLOR FLOW MAPG: CPT | Mod: 26 | Performed by: INTERNAL MEDICINE

## 2022-01-17 PROCEDURE — 93227 XTRNL ECG REC<48 HR R&I: CPT | Performed by: INTERNAL MEDICINE

## 2022-01-17 PROCEDURE — 93016 CV STRESS TEST SUPVJ ONLY: CPT | Performed by: INTERNAL MEDICINE

## 2022-01-17 NOTE — TELEPHONE ENCOUNTER
Stress echo completed today as below. Holter monitor is pending. Testing was ordered in the setting of patient's exertional dyspnea, hx MR & CAD. L&R heart cath is scheduled for 1/31/22. Routed to Dr Cooley.     This was a stress test geared to evaluate mitral regurgitation. Patient was in  atrial fibrillation at the time of the study.  The LV is borderline dilated and there is proximal septal thickening. At  baseline LVEF appears reduced to 35-40%. there is global hypokinesis but the  septum appears more hypokinetic to diskinetic especially in the basal  segments.  Normal RV size and function.  There is mild prolapse of the posterior mitral valve leaflet. Trace to mild  MR.  There is a mechanical valve in the aortic position. It is well seated with no  evidence of dehiscence. No significant AI.  Normal TV leaflets with mild TR, The pulmonic valve is normal with trace PI.  IVC is severely dilated at 3.2cm with no respiratory variation. No pericardial  effusion.  With stress LV function improved and LV cavity decreased in size yet septal  hypokinesis persisted. MR increased to mild to moderate with stress.

## 2022-01-18 ENCOUNTER — ANTICOAGULATION THERAPY VISIT (OUTPATIENT)
Dept: ANTICOAGULATION | Facility: CLINIC | Age: 67
End: 2022-01-18

## 2022-01-18 ENCOUNTER — LAB (OUTPATIENT)
Dept: LAB | Facility: CLINIC | Age: 67
End: 2022-01-18
Payer: COMMERCIAL

## 2022-01-18 DIAGNOSIS — I48.21 PERMANENT ATRIAL FIBRILLATION (H): ICD-10-CM

## 2022-01-18 DIAGNOSIS — Z95.2 S/P AVR (AORTIC VALVE REPLACEMENT): Primary | ICD-10-CM

## 2022-01-18 DIAGNOSIS — Z11.59 ENCOUNTER FOR SCREENING FOR OTHER VIRAL DISEASES: Primary | ICD-10-CM

## 2022-01-18 DIAGNOSIS — Z79.01 LONG TERM CURRENT USE OF ANTICOAGULANTS WITH INR GOAL OF 2.5-3.5: ICD-10-CM

## 2022-01-18 DIAGNOSIS — I10 HYPERTENSION GOAL BP (BLOOD PRESSURE) < 140/90: Primary | ICD-10-CM

## 2022-01-18 DIAGNOSIS — S30.1XXA ABDOMINAL WALL HEMATOMA, INITIAL ENCOUNTER: ICD-10-CM

## 2022-01-18 DIAGNOSIS — Z11.59 ENCOUNTER FOR SCREENING FOR OTHER VIRAL DISEASES: ICD-10-CM

## 2022-01-18 LAB — INR BLD: 2.6 (ref 0.9–1.1)

## 2022-01-18 PROCEDURE — 85610 PROTHROMBIN TIME: CPT

## 2022-01-18 PROCEDURE — 36415 COLL VENOUS BLD VENIPUNCTURE: CPT

## 2022-01-18 NOTE — TELEPHONE ENCOUNTER
Stress echo reviewed by Patient via My Chart.  My Chart message sent to Patient.  Good Morning, Dr. Yasir Weiss reviewed your stress echo, No changes at this time.    Please return Holter when finished and remember the heart cath 1-31-22.   Follow up all test results at [facility] visit 2-10-22.     Thank you,    Team 2 Nurses.

## 2022-01-18 NOTE — PROGRESS NOTES
ANTICOAGULATION MANAGEMENT     Jovon FRANCIS Jose Rafael 66 year old male is on warfarin with therapeutic INR result. (Goal INR 2.5-3.5)    Recent labs: (last 7 days)     22  1440   INR 2.6*       ASSESSMENT     Source(s): Chart Review       Warfarin doses taken: Warfarin taken as instructed    Diet: No new diet changes identified    New illness, injury, or hospitalization: No    Medication/supplement changes: None noted    Signs or symptoms of bleeding or clotting: No    Previous INR: Subtherapeutic    Additional findings: Upcoming surgery/procedure Heart Cath scheduled on 22     PLAN     Recommended plan for no diet, medication or health factor changes affecting INR     Dosing Instructions: Continue your current warfarin dose until you begin your warfarin hold plan on 22 with next INR in 2 weeks       Hold plan for 22 Heart Cath    Pre-Procedure:  ? Hold warfarin for 4 days, until after procedure startin2022   ? Enoxaparin (Lovenox) 90 mg subq Q 12 hrs (1 mg/kg Q 12 hrs for CrCl >= 60 ml/min and BMI <= 40 kg/m2)     Start enoxaparin: 2022 AM    Last dose of enoxaparin prior to procedure: 2022 AM  (~24 hours prior to procedure)       Post-Procedure:  ? Resume warfarin dose if okay with provider doing procedure on night of procedure, 2022 PM: 6mg  ? Resume  enoxaparin (Lovenox) ~ 24-48 hrs post procedure when okay with provider doing procedure. Continue until INR >= 2.5 x 2  ? Recheck INR ~5 days after resuming warfarin       Summary  As of 2022    Full warfarin instructions:  : Hold; : Hold; : Hold; : Hold; Otherwise 6 mg every Mon, Wed, Sat; 4 mg all other days   Next INR check:  2022             Telephone call with Jovon who verbalizes understanding and agrees to plan    Lab visit scheduled    Education provided: Please call back if any changes to your diet, medications or how you've been taking warfarin and Contact [phone]  with any changes,  questions or concerns.     Plan made per [facility] anticoagulation protocol    Stephania Redd RN  Anticoagulation Clinic  1/18/2022    _______________________________________________________________________     Anticoagulation Episode Summary     Current INR goal:  2.5-3.5   TTR:  86.2 % (12 mo)   Target end date:  Indefinite   Send INR reminders to:  [facility]    Indications    S/P AVR (aortic valve replacement) [Z95.2]  Permanent atrial fibrillation (H) [I48.21]  Long term current use of anticoagulants with INR goal of 2.5-3.5 [Z79.01]  Abdominal wall hematoma  initial encounter [S30.1XXA]           Comments:  24mm ATS Valve         Anticoagulation Care Providers     Provider Role Specialty Phone number    Rehan Lorenzana MD Referring Family Medicine [phone]    Ramana Redd MD Referring Student in organized health care education/training program [phone]

## 2022-01-21 ENCOUNTER — OFFICE VISIT (OUTPATIENT)
Dept: UROLOGY | Facility: CLINIC | Age: 67
End: 2022-01-21
Payer: COMMERCIAL

## 2022-01-21 VITALS
SYSTOLIC BLOOD PRESSURE: 101 MMHG | DIASTOLIC BLOOD PRESSURE: 68 MMHG | WEIGHT: 186 LBS | HEART RATE: 74 BPM | BODY MASS INDEX: 27.55 KG/M2 | HEIGHT: 69 IN

## 2022-01-21 DIAGNOSIS — N43.40 SPERMATOCELE: Primary | ICD-10-CM

## 2022-01-21 PROCEDURE — 99213 OFFICE O/P EST LOW 20 MIN: CPT | Performed by: UROLOGY

## 2022-01-21 ASSESSMENT — MIFFLIN-ST. JEOR: SCORE: 1624.07

## 2022-01-21 ASSESSMENT — PAIN SCALES - GENERAL: PAINLEVEL: NO PAIN (0)

## 2022-01-21 NOTE — LETTER
"1/21/2022       RE: Jovon Mantilla  [address]  [facility] [id number]     Dear Colleague,    Thank you for referring your patient, Jovon Mantilla, to the [facility] UROLOGY [facility]INIC [place] at [facility]. Please see a copy of my visit note below.    HPI:  Jovon Mantilla is a 66 year old male being seen for follow-up right epididymal head cyst.    Pt met with our PA Татьяна several week ago.  He noted a lump on the right testis.  Scrotal ultrasound done 11/19/21, this showed a 4mm right epididymal head cyst.  No evidence of malignancy.    Pt complains of ED but can't take PDE5i secondary to ntg prescription.     Exam:  Physical Exam  /68   Pulse 74   Ht 1.753 m (5' 9\")   Wt 84.4 kg (186 lb)   BMI 27.47 kg/m      General: Alert, oriented, nad.  Pleasant and conversant.  Eyes: anicteric, EOMI.  Pulse: regular  Resps: normal, non-labored.  Abdomen:  Nondistended.  Neurological - no tremors  Skin - no discoloration/ lesions noted   exam   Phallus normal   Testes ++, anodular, nontender.  Vas and epididymis present and normal on the left .  4mm cyst right epididymis head, slightly tender to palpation.  no varicocele noted.  STEPH deferred.     Review of Imaging:  The following imaging exams were independently viewed and interpreted by me and discussed with patient:  Scrotal ultrasound as above.    Review of Labs:  The following labs were reviewed by me and discussed with the patient:  Recent Results (from the past 720 hour(s))   Asymptomatic COVID-19 Virus (Coronavirus) by PCR Nose    Collection Time: 12/26/21  1:28 PM    Specimen: Nose; Swab   Result Value Ref Range    SARS CoV2 PCR Negative Negative   CADE Only- Transesophageal Echocardiogram    Collection Time: 12/28/21  9:57 AM   Result Value Ref Range    LVEF  45-50%    INR point of care, Interfaced Result    Collection Time: 01/11/22 10:00 AM   Result Value Ref Range    INR 1.9 (H) 0.9 - 1.1 "   INR point of care, Interfaced Result    Collection Time: 01/18/22  2:40 PM   Result Value Ref Range    INR 2.6 (H) 0.9 - 1.1         Assessment & Plan   1. Right epididymal head cyst   a. Advised observation, OTC analgesics recommended if needed.  b. Surgery not recommended for this.  c. PRN follow-up with urology.     Sedrick Beltran MD  [facility] UROLOGY CLINIC [place]      ==========================      Additional Coding Information:    Problems:  3 -- one acute uncomplicated illness or injury    Data Reviewed  Review of the result(s) of each unique test - UMBERTO    Tests ordered: N/A     Level of risk:  3 -- low risk (e.g., OTC medication or observation, minor surgery without risks)    Time spent:  20 minutes spent on the date of the encounter doing chart review, history and exam, documentation and further activities per the note

## 2022-01-21 NOTE — PROGRESS NOTES
"HPI:  Jovon Mantilla is a 66 year old male being seen for ***.    The following subcategories of the HISTORY were reviewed with the patient and updated accordingly. If no updates, this indicates no change since last visit:    {ACCOMPANIED BY STATEMENT (Optional):433121}  Reviewed previous notes from  *** from *** service at ***    Exam:  /68   Pulse 74   Ht 1.753 m (5' 9\")   Wt 84.4 kg (186 lb)   BMI 27.47 kg/m    {urology exam options:393159}    Review of Imaging:  The following imaging exams were independently viewed and interpreted by me and discussed with patient:  {Imaging Urology:411447}    Review of Labs:  The following labs were reviewed by me and discussed with the patient:  Recent Results (from the past 720 hour(s))   Asymptomatic COVID-19 Virus (Coronavirus) by PCR Nose    Collection Time: 12/26/21  1:28 PM    Specimen: Nose; Swab   Result Value Ref Range    SARS CoV2 PCR Negative Negative   CADE Only- Transesophageal Echocardiogram    Collection Time: 12/28/21  9:57 AM   Result Value Ref Range    LVEF  45-50%    INR point of care, Interfaced Result    Collection Time: 01/11/22 10:00 AM   Result Value Ref Range    INR 1.9 (H) 0.9 - 1.1   INR point of care, Interfaced Result    Collection Time: 01/18/22  2:40 PM   Result Value Ref Range    INR 2.6 (H) 0.9 - 1.1         Assessment & Plan   1. ***  2. ***    Sedrick Beltran MD  [facility] UROLOGY CLINIC [place]      ==========================      Additional Coding Information:    Problems:  {mdm problems:520332}    Data Reviewed  {Tests, documents, or independent historian(s) (Optional):182634}    Tests ordered: ***    {Independent interpretation of tests (Optional):358770::\"Independent interpretation of a test performed by another physician/other qualified health care professional (not separately reported) - ***\"}    {Discussion of management or test interpretation (Optional):175696::\"Discussion of management or test interpretation " "with external physician/other qualified healthcare professional/appropriate source - ***\"}    Level of risk:  {MDM risk of intervention:409258}    Time spent:  {2021 E&M time (Optional):642043}          "

## 2022-01-21 NOTE — PATIENT INSTRUCTIONS
Please follow up as needed with Dr. Beltran.    It was a pleasure meeting with you today.  Thank you for allowing me and my team the privilege of caring for you today.  YOU are the reason we are here, and I truly hope we provided you with the excellent service you deserve.  Please let us know if there is anything else we can do for you so that we can be sure you are leaving completely satisfied with your care experience.

## 2022-01-21 NOTE — NURSING NOTE
"Chief Complaint   Patient presents with     Follow Up     discuss surgery       Height 1.753 m (5' 9\"), weight 84.4 kg (186 lb). Body mass index is 27.47 kg/m .    Patient Active Problem List   Diagnosis     Acquired hypothyroidism     Neck pain     Chronic rhinitis     Aortic stenosis     Hyperlipidemia LDL goal <70     Hypertension goal BP (blood pressure) < 140/90     Dilated cardiomyopathy (H)     CAD (coronary artery disease)     Mitral regurgitation     ASVD (arteriosclerotic vascular disease)     S/P AVR (aortic valve replacement)     GI bleeding     Gastrointestinal hemorrhage associated with anorectal source     Long-term (current) use of anticoagulants [Z79.01]     Adjustment disorder with mixed anxiety and depressed mood     Anxiety     Idiopathic cardiomyopathy (H)     S/P aortic valve replacement     Coronary artery disease involving native coronary artery of native heart without angina pectoris     Hypotension, unspecified hypotension type     Permanent atrial fibrillation (H)     Acne rosacea     Right inguinal hernia     Long term current use of anticoagulants with INR goal of 2.5-3.5     Obesity     Abdominal wall hematoma, initial encounter     S/P bariatric surgery       No Known Allergies    Current Outpatient Medications   Medication Sig Dispense Refill     Ascorbic Acid (VITAMIN C PO) Take 500 mg by mouth At Bedtime       aspirin 81 MG tablet Take 81 mg by mouth every other day       Calcium Carbonate-Vitamin D (CALCIUM 600+D) 600-200 MG-UNIT TABS Take 1 tablet by mouth 2 times daily        carvedilol (COREG) 25 MG tablet Take 1 tablet (25 mg) by mouth 2 times daily (with meals) 180 tablet 1     Cholecalciferol (VITAMIN D) 2000 UNIT CAPS Take 1 tablet by mouth daily        Cyanocobalamin (VITAMIN B-12) 500 MCG SUBL Place 500 mcg under the tongue daily        digoxin (LANOXIN) 125 MCG tablet Take 1 tablet (125 mcg) by mouth daily 90 tablet 3     Fexofenadine HCl (ALLERGY 24-HR PO) Take 180 mg by " mouth daily        levothyroxine (SYNTHROID/LEVOTHROID) 200 MCG tablet Take 1 tablet (200 mcg) by mouth daily 90 tablet 3     lisinopril (ZESTRIL) 20 MG tablet Take  20 mg (1 tablet in am) and 10 mg  (one half tablet) in  pm 135 tablet 3     Multiple Vitamins-Minerals (CENTRUM) CHEW Take 1 tablet by mouth daily        nitroGLYcerin (NITROSTAT) 0.3 MG sublingual tablet For chest pain place 1 tablet under the tongue every 5 minutes for 3 doses. If symptoms persist 5 minutes after 1st dose call 911. 25 tablet 1     Psyllium (FIBER) 0.52 GM CAPS Take 2 capsules by mouth At Bedtime        sertraline (ZOLOFT) 50 MG tablet Take 1 tablet (50 mg) by mouth daily 90 tablet 3     simvastatin (ZOCOR) 20 MG tablet Take 1 tablet (20 mg) by mouth At Bedtime 90 tablet 3     spironolactone (ALDACTONE) 25 MG tablet Take half tablet (12.5 mg) once every morning. 15 tablet 3     warfarin ANTICOAGULANT (COUMADIN) 4 MG tablet Take one tablet (4 mg) by mouth daily except take one a half tablets (6 mg) Wed, Sat or as directed by INR Clinic 117 tablet 1     enoxaparin ANTICOAGULANT (LOVENOX) 100 MG/ML syringe Inject 0.9 mLs (90 mg) Subcutaneous 2 times daily (Patient not taking: Reported on 1/21/2022) 16 mL 1       Social History     Tobacco Use     Smoking status: Never Smoker     Smokeless tobacco: Never Used   Substance Use Topics     Alcohol use: No     Alcohol/week: 0.0 standard drinks     Drug use: No       Lloyd TUCKER  1/21/2022  8:17 AM

## 2022-01-25 ENCOUNTER — TELEPHONE (OUTPATIENT)
Dept: CARDIOLOGY | Facility: CLINIC | Age: 67
End: 2022-01-25
Payer: COMMERCIAL

## 2022-01-25 NOTE — TELEPHONE ENCOUNTER
Attempted to contact patient to review holter report of afib and Dr. Cooley's recommendation for no changes to therapy. Unable to leave a message as the voice mailbox was full.  Will try again.

## 2022-01-25 NOTE — TELEPHONE ENCOUNTER
48hr Holter monitor resulted as below. CADE done 12/28/21 and bicycle stress test done 1/17/21. Testing was ordered for patient reports of worsening dyspnea. Hx CAD, afib, MR/TR/aortic stenosis. Bilateral heart cath scheduled for 1/31/22. Routed to provider for review.       1. The rhythm was atrial fibrillation throughout.   2. The maximum heart rate was 156 bpm, the minimum heart rate was 44 bpm and the average heart rate was 90 bpm.   3. There was occasional ventricular ectopy seen ranging from 1-41 beats an hour. There were 11 couplets and one ventricular run seen at a rate of 106 bpm.  4. There were no ST-T wave changes seen.   5. There were no symptoms reported for ECG-symptom correlation.

## 2022-01-26 ENCOUNTER — TELEPHONE (OUTPATIENT)
Dept: CARDIOLOGY | Facility: CLINIC | Age: 67
End: 2022-01-26
Payer: COMMERCIAL

## 2022-01-26 DIAGNOSIS — I35.0 AORTIC STENOSIS: Primary | ICD-10-CM

## 2022-01-26 RX ORDER — ASPIRIN 325 MG
325 TABLET ORAL ONCE
Status: CANCELLED | OUTPATIENT
Start: 2022-01-26 | End: 2022-01-26

## 2022-01-26 RX ORDER — POTASSIUM CHLORIDE 1500 MG/1
20 TABLET, EXTENDED RELEASE ORAL
Status: CANCELLED | OUTPATIENT
Start: 2022-01-26

## 2022-01-26 RX ORDER — SODIUM CHLORIDE 9 MG/ML
INJECTION, SOLUTION INTRAVENOUS CONTINUOUS
Status: CANCELLED | OUTPATIENT
Start: 2022-01-26

## 2022-01-26 RX ORDER — ASPIRIN 81 MG/1
243 TABLET, CHEWABLE ORAL ONCE
Status: CANCELLED | OUTPATIENT
Start: 2022-01-26

## 2022-01-26 RX ORDER — LIDOCAINE 40 MG/G
CREAM TOPICAL
Status: CANCELLED | OUTPATIENT
Start: 2022-01-26

## 2022-01-26 NOTE — TELEPHONE ENCOUNTER
Contacted patient to review pre-cath procedures: patient is scheduled for coronary angiogram (left & right)  on 2022.   Patient's arrival time is 0630 and procedure time is 0830. Reviewed that these times are not precise due to unplanned emergent needs of other patients.  Patient is aware to be NPO except for medications. Patient will hold the medication warfarin and spironolactone.   Patient has arranged for transportation and 24 hour f/u care.   Patient has no known contract dye allergy.   Patient is not diabetic.  Patient is taking the anti-coagulation medication warfarin - using lovanox bridging due to mitral valve.   Patient's renal function is WNL for procedure.   Patient will continue daily aspirin dose 81mg with 3 additional doses the morning of the procedure.   Patient reminded to check temperature the morning of procedure; if T>100 then call [facility] @ [phone] for instructions.  Order for procedure entered.    COVID-19 test is scheduled for 2022.    Hold plan for 22 Heart Cath    Pre-Procedure:  ? Hold warfarin for 4 days, until after procedure startin2022   ? Enoxaparin (Lovenox) 90 mg subq Q 12 hrs (1 mg/kg Q 12 hrs for CrCl >= 60 ml/min and BMI <= 40 kg/m2)     Start enoxaparin: 2022 AM    Last dose of enoxaparin prior to procedure: 2022 AM  (~24 hours prior to procedure)       Post-Procedure:  ? Resume warfarin dose if okay with provider doing procedure on night of procedure, 2022 PM: 6mg  ? Resume  enoxaparin (Lovenox) ~ 24-48 hrs post procedure when okay with provider doing procedure. Continue until INR >= 2.5 x 2  ? Recheck INR ~5 days after resuming warfarin       COVID-19 results are negative

## 2022-01-26 NOTE — TELEPHONE ENCOUNTER
Spoke with Patient and Holter results reviewed.  Patient verbalized understanding.  All questions answered.    Per Dr. Cooley, no changes at this time.

## 2022-01-28 ENCOUNTER — LAB (OUTPATIENT)
Dept: URGENT CARE | Facility: URGENT CARE | Age: 67
End: 2022-01-28
Payer: COMMERCIAL

## 2022-01-28 DIAGNOSIS — Z11.59 ENCOUNTER FOR SCREENING FOR OTHER VIRAL DISEASES: ICD-10-CM

## 2022-01-28 LAB — SARS-COV-2 RNA RESP QL NAA+PROBE: NEGATIVE

## 2022-01-28 PROCEDURE — U0003 INFECTIOUS AGENT DETECTION BY NUCLEIC ACID (DNA OR RNA); SEVERE ACUTE RESPIRATORY SYNDROME CORONAVIRUS 2 (SARS-COV-2) (CORONAVIRUS DISEASE [COVID-19]), AMPLIFIED PROBE TECHNIQUE, MAKING USE OF HIGH THROUGHPUT TECHNOLOGIES AS DESCRIBED BY CMS-2020-01-R: HCPCS

## 2022-01-28 PROCEDURE — U0005 INFEC AGEN DETEC AMPLI PROBE: HCPCS

## 2022-01-31 ENCOUNTER — HOSPITAL ENCOUNTER (OUTPATIENT)
Facility: CLINIC | Age: 67
Discharge: HOME | End: 2022-01-31
Admitting: INTERNAL MEDICINE
Payer: COMMERCIAL

## 2022-01-31 VITALS
RESPIRATION RATE: 16 BRPM | OXYGEN SATURATION: 95 % | DIASTOLIC BLOOD PRESSURE: 79 MMHG | HEIGHT: 69 IN | WEIGHT: 198 LBS | BODY MASS INDEX: 29.33 KG/M2 | SYSTOLIC BLOOD PRESSURE: 114 MMHG | TEMPERATURE: 97.4 F | HEART RATE: 80 BPM

## 2022-01-31 DIAGNOSIS — I35.0 AORTIC STENOSIS: ICD-10-CM

## 2022-01-31 DIAGNOSIS — I50.812 CHRONIC RIGHT-SIDED HEART FAILURE (H): ICD-10-CM

## 2022-01-31 PROBLEM — Z98.890 STATUS POST CORONARY ANGIOGRAM: Status: ACTIVE | Noted: 2022-01-31

## 2022-01-31 LAB
ANION GAP SERPL CALCULATED.3IONS-SCNC: <1 MMOL/L (ref 3–14)
APTT PPP: 34 SECONDS (ref 22–38)
BUN SERPL-MCNC: 15 MG/DL (ref 7–30)
CALCIUM SERPL-MCNC: 8.5 MG/DL (ref 8.5–10.1)
CHLORIDE BLD-SCNC: 106 MMOL/L (ref 94–109)
CO2 SERPL-SCNC: 33 MMOL/L (ref 20–32)
COHGB MFR BLD: 87 % (ref 92–100)
CREAT SERPL-MCNC: 0.78 MG/DL (ref 0.66–1.25)
ERYTHROCYTE [DISTWIDTH] IN BLOOD BY AUTOMATED COUNT: 17.6 % (ref 10–15)
GFR SERPL CREATININE-BSD FRML MDRD: >90 ML/MIN/1.73M2
GLUCOSE BLD-MCNC: 90 MG/DL (ref 70–99)
HCO3 BLDA-SCNC: 30 MMOL/L (ref 21–28)
HCO3 BLDV-SCNC: 32 MMOL/L (ref 21–28)
HCT VFR BLD AUTO: 38.3 % (ref 40–53)
HGB BLD-MCNC: 11.5 G/DL (ref 13.3–17.7)
INR PPP: 1.45 (ref 0.85–1.15)
LACTATE BLD-SCNC: 0.4 MMOL/L
LACTATE BLD-SCNC: 0.4 MMOL/L
MCH RBC QN AUTO: 27.9 PG (ref 26.5–33)
MCHC RBC AUTO-ENTMCNC: 30 G/DL (ref 31.5–36.5)
MCV RBC AUTO: 93 FL (ref 78–100)
PCO2 BLDA: 54 MM HG (ref 35–45)
PCO2 BLDV: 60 MM HG (ref 40–50)
PH BLDA: 7.35 [PH] (ref 7.35–7.45)
PH BLDV: 7.34 [PH] (ref 7.32–7.43)
PLATELET # BLD AUTO: 163 10E3/UL (ref 150–450)
PO2 BLDA: 57 MM HG (ref 80–105)
PO2 BLDV: 29 MM HG (ref 25–47)
POTASSIUM BLD-SCNC: 4.4 MMOL/L (ref 3.4–5.3)
RBC # BLD AUTO: 4.12 10E6/UL (ref 4.4–5.9)
SAO2 % BLDV: 50 % (ref 94–100)
SODIUM SERPL-SCNC: 139 MMOL/L (ref 133–144)
WBC # BLD AUTO: 4 10E3/UL (ref 4–11)

## 2022-01-31 PROCEDURE — 99152 MOD SED SAME PHYS/QHP 5/>YRS: CPT | Performed by: INTERNAL MEDICINE

## 2022-01-31 PROCEDURE — 93571 IV DOP VEL&/PRESS C FLO 1ST: CPT | Performed by: INTERNAL MEDICINE

## 2022-01-31 PROCEDURE — 93571 IV DOP VEL&/PRESS C FLO 1ST: CPT | Mod: 26 | Performed by: INTERNAL MEDICINE

## 2022-01-31 PROCEDURE — 36415 COLL VENOUS BLD VENIPUNCTURE: CPT | Performed by: INTERNAL MEDICINE

## 2022-01-31 PROCEDURE — 99153 MOD SED SAME PHYS/QHP EA: CPT | Performed by: INTERNAL MEDICINE

## 2022-01-31 PROCEDURE — C1894 INTRO/SHEATH, NON-LASER: HCPCS | Performed by: INTERNAL MEDICINE

## 2022-01-31 PROCEDURE — 82803 BLOOD GASES ANY COMBINATION: CPT

## 2022-01-31 PROCEDURE — 85027 COMPLETE CBC AUTOMATED: CPT | Performed by: INTERNAL MEDICINE

## 2022-01-31 PROCEDURE — 93456 R HRT CORONARY ARTERY ANGIO: CPT | Mod: 26 | Performed by: INTERNAL MEDICINE

## 2022-01-31 PROCEDURE — 93456 R HRT CORONARY ARTERY ANGIO: CPT | Performed by: INTERNAL MEDICINE

## 2022-01-31 PROCEDURE — 999N000184 HC STATISTIC TELEMETRY

## 2022-01-31 PROCEDURE — 85730 THROMBOPLASTIN TIME PARTIAL: CPT | Performed by: INTERNAL MEDICINE

## 2022-01-31 PROCEDURE — 250N000009 HC RX 250: Performed by: INTERNAL MEDICINE

## 2022-01-31 PROCEDURE — C1887 CATHETER, GUIDING: HCPCS | Performed by: INTERNAL MEDICINE

## 2022-01-31 PROCEDURE — 999N000054 HC STATISTIC EKG NON-CHARGEABLE

## 2022-01-31 PROCEDURE — 85610 PROTHROMBIN TIME: CPT | Performed by: INTERNAL MEDICINE

## 2022-01-31 PROCEDURE — 250N000011 HC RX IP 250 OP 636: Performed by: INTERNAL MEDICINE

## 2022-01-31 PROCEDURE — 80048 BASIC METABOLIC PNL TOTAL CA: CPT | Performed by: INTERNAL MEDICINE

## 2022-01-31 PROCEDURE — 272N000001 HC OR GENERAL SUPPLY STERILE: Performed by: INTERNAL MEDICINE

## 2022-01-31 PROCEDURE — 93005 ELECTROCARDIOGRAM TRACING: CPT

## 2022-01-31 PROCEDURE — 999N000071 HC STATISTIC HEART CATH LAB OR EP LAB

## 2022-01-31 PROCEDURE — 83605 ASSAY OF LACTIC ACID: CPT

## 2022-01-31 PROCEDURE — C1769 GUIDE WIRE: HCPCS | Performed by: INTERNAL MEDICINE

## 2022-01-31 PROCEDURE — 258N000003 HC RX IP 258 OP 636: Performed by: INTERNAL MEDICINE

## 2022-01-31 RX ORDER — ASPIRIN 81 MG/1
243 TABLET, CHEWABLE ORAL ONCE
Status: DISCONTINUED | OUTPATIENT
Start: 2022-01-31 | End: 2022-01-31 | Stop reason: HOSPADM

## 2022-01-31 RX ORDER — IOPAMIDOL 755 MG/ML
INJECTION, SOLUTION INTRAVASCULAR
Status: DISCONTINUED | OUTPATIENT
Start: 2022-01-31 | End: 2022-01-31 | Stop reason: HOSPADM

## 2022-01-31 RX ORDER — FLUTICASONE PROPIONATE 50 MCG
1 SPRAY, SUSPENSION (ML) NASAL DAILY
COMMUNITY
End: 2024-07-02

## 2022-01-31 RX ORDER — FENTANYL CITRATE 50 UG/ML
INJECTION, SOLUTION INTRAMUSCULAR; INTRAVENOUS
Status: DISCONTINUED | OUTPATIENT
Start: 2022-01-31 | End: 2022-01-31 | Stop reason: HOSPADM

## 2022-01-31 RX ORDER — SODIUM CHLORIDE 9 MG/ML
INJECTION, SOLUTION INTRAVENOUS CONTINUOUS
Status: DISCONTINUED | OUTPATIENT
Start: 2022-01-31 | End: 2022-01-31 | Stop reason: HOSPADM

## 2022-01-31 RX ORDER — OXYCODONE HYDROCHLORIDE 5 MG/1
5 TABLET ORAL EVERY 4 HOURS PRN
Status: DISCONTINUED | OUTPATIENT
Start: 2022-01-31 | End: 2022-01-31 | Stop reason: HOSPADM

## 2022-01-31 RX ORDER — ATROPINE SULFATE 0.1 MG/ML
0.5 INJECTION INTRAVENOUS
Status: DISCONTINUED | OUTPATIENT
Start: 2022-01-31 | End: 2022-01-31 | Stop reason: HOSPADM

## 2022-01-31 RX ORDER — HEPARIN SODIUM 1000 [USP'U]/ML
INJECTION, SOLUTION INTRAVENOUS; SUBCUTANEOUS
Status: DISCONTINUED | OUTPATIENT
Start: 2022-01-31 | End: 2022-01-31 | Stop reason: HOSPADM

## 2022-01-31 RX ORDER — LIDOCAINE 40 MG/G
CREAM TOPICAL
Status: DISCONTINUED | OUTPATIENT
Start: 2022-01-31 | End: 2022-01-31 | Stop reason: HOSPADM

## 2022-01-31 RX ORDER — OXYCODONE HYDROCHLORIDE 5 MG/1
10 TABLET ORAL EVERY 4 HOURS PRN
Status: DISCONTINUED | OUTPATIENT
Start: 2022-01-31 | End: 2022-01-31 | Stop reason: HOSPADM

## 2022-01-31 RX ORDER — FLUMAZENIL 0.1 MG/ML
0.2 INJECTION, SOLUTION INTRAVENOUS
Status: DISCONTINUED | OUTPATIENT
Start: 2022-01-31 | End: 2022-01-31 | Stop reason: HOSPADM

## 2022-01-31 RX ORDER — VERAPAMIL HYDROCHLORIDE 2.5 MG/ML
INJECTION, SOLUTION INTRAVENOUS
Status: DISCONTINUED | OUTPATIENT
Start: 2022-01-31 | End: 2022-01-31 | Stop reason: HOSPADM

## 2022-01-31 RX ORDER — NITROGLYCERIN 5 MG/ML
VIAL (ML) INTRAVENOUS
Status: DISCONTINUED | OUTPATIENT
Start: 2022-01-31 | End: 2022-01-31 | Stop reason: HOSPADM

## 2022-01-31 RX ORDER — POTASSIUM CHLORIDE 1500 MG/1
20 TABLET, EXTENDED RELEASE ORAL
Status: DISCONTINUED | OUTPATIENT
Start: 2022-01-31 | End: 2022-01-31 | Stop reason: HOSPADM

## 2022-01-31 RX ORDER — NALOXONE HYDROCHLORIDE 0.4 MG/ML
0.4 INJECTION, SOLUTION INTRAMUSCULAR; INTRAVENOUS; SUBCUTANEOUS
Status: DISCONTINUED | OUTPATIENT
Start: 2022-01-31 | End: 2022-01-31 | Stop reason: HOSPADM

## 2022-01-31 RX ORDER — FENTANYL CITRATE 50 UG/ML
25 INJECTION, SOLUTION INTRAMUSCULAR; INTRAVENOUS
Status: DISCONTINUED | OUTPATIENT
Start: 2022-01-31 | End: 2022-01-31 | Stop reason: HOSPADM

## 2022-01-31 RX ORDER — ASPIRIN 325 MG
325 TABLET ORAL ONCE
Status: DISCONTINUED | OUTPATIENT
Start: 2022-01-31 | End: 2022-01-31 | Stop reason: HOSPADM

## 2022-01-31 RX ORDER — NALOXONE HYDROCHLORIDE 0.4 MG/ML
0.2 INJECTION, SOLUTION INTRAMUSCULAR; INTRAVENOUS; SUBCUTANEOUS
Status: DISCONTINUED | OUTPATIENT
Start: 2022-01-31 | End: 2022-01-31 | Stop reason: HOSPADM

## 2022-01-31 RX ORDER — ACETAMINOPHEN 325 MG/1
650 TABLET ORAL EVERY 4 HOURS PRN
Status: DISCONTINUED | OUTPATIENT
Start: 2022-01-31 | End: 2022-01-31 | Stop reason: HOSPADM

## 2022-01-31 RX ADMIN — SODIUM CHLORIDE 150 ML/HR: 9 INJECTION, SOLUTION INTRAVENOUS at 07:06

## 2022-01-31 ASSESSMENT — MIFFLIN-ST. JEOR: SCORE: 1678.5

## 2022-01-31 NOTE — Clinical Note
Potential access sites were evaluated for patency using ultrasound.   The right femoral artery and right radial artery was selected. Access was obtained under with Sonosite and Fluoroscopic guidance using a micropuncture 21 gauge needle with direct visualization of needle entry.

## 2022-01-31 NOTE — Clinical Note
Potential access sites were evaluated for patency using ultrasound.   The right jugular vein was selected. Access was obtained under with Sonosite and Fluoroscopic guidance using a micropuncture 21 gauge needle with direct visualization of needle entry.

## 2022-01-31 NOTE — PROGRESS NOTES
Care Suites Post Procedure Note    Patient Information  Name: Jovon Mantilla  Age: 66 year old    Post Procedure  Time patient returned to Care Suites: 0920  Concerns/abnormal assessment: none  If abnormal assessment, provider notified: N/A  Plan/Other: Right neck with transparent dressing CDI, flat and soft. Right radial TR band CDI with 10 ml air. CMS good pulse good. Denies pain. Sleepy and sats dipping to 90-91% placed on 2l nc other vss. Wife at bedside.    Rosa Elena Sykes RN

## 2022-01-31 NOTE — PROGRESS NOTES
TR band to 0 @1230, watched for 5 min without changes. Remover and quickly small hematoma formed, manual pressure held and once it was removed, hematoma reformed quickly and was slightly larger, manual pressure applied, held and when removed again hematoma formed quickly. TR band replaced with 5 ml  air in band. Site has remained wnl, will monitor and continue to assess and remove air as able.

## 2022-01-31 NOTE — PROGRESS NOTES
PATIENT/VISITOR WELLNESS SCREENING    Step 1 Patient Screening    1. In the last month, have you been in contact with someone who was confirmed or suspected to have Coronavirus/COVID-19? No    2. Do you have the following symptoms?  Fever/Chills? No   Cough? No   Shortness of breath? No   New loss of taste or smell? No  Sore throat? No  Muscle or body aches? No  Headaches? No  Fatigue? No  Vomiting or diarrhea? No    Step 2 Visitor Screening    1. Name of Visitor (1 visitor per patient): Tracey    2. In the last month, have you been in contact with someone who was confirmed or suspected to have Coronavirus/COVID-19? No    3. Do you have the following symptoms?  Fever/Chills? No   Cough? No   Shortness of breath? No   Skin rash? No   Loss of taste or smell? No  Sore throat? No  Runny or stuffy nose? No  Muscle or body aches? No  Headaches? No  Fatigue? No  Vomiting or diarrhea? No    If the visitor has positive symptoms, notify supervisor/manger  Per policy, the visitor will need to leave the facility     Step 3 Refer to logic grid below for actions    NO SYMPTOM(S)    ACTIONS:  1. Standard rooming process  2. Provider to assess per normal protocol  3. Implement precautions as needed and per guidelines     POSITIVE SYMPTOM(S)  If positive for ANY of the following symptoms: fever, cough, shortness of breath, rash    ACTION:  1. Continue to have the patient wear a mask   2. Room patient as soon as possible  3. Don appropriate PPE when entering room  4. Provider evaluation  Care Suites Admission Nursing Note    Patient Information  Name: Jovon Mantilla  Age: 66 year old  Reason for admission: Right and left heart cath  Care Suites arrival time: 0635    Visitor Information  Name: Tracey  Informed of visitor restrictions: Yes  1 visitor allowed per patient   Visitor must screen negative for COVID symptoms   Visitor must wear a mask  Waiting rooms closed to visitors    Patient Admission/Assessment   Pre-procedure  assessment complete: Yes  If abnormal assessment/labs, provider notified: N/A  NPO: Yes  Medications held per instructions/orders: Yes  Consent: deferred  If applicable, pregnancy test status: deferred  Patient oriented to room: Yes  Education/questions answered: Yes  Plan/other: Reviewed plan for today pt has no questions. Lungs clear, + valve click, 2+ DP pulses, Doppler PT pulses, 2+ radial pulse.    Discharge Planning  Discharge name/phone number: Tracey   Overnight post sedation caregiver: Tracey  Discharge location: home    Rosa Elena Sykes RN

## 2022-01-31 NOTE — PROGRESS NOTES
Discharge instruction is given to pt and to wife.  All questions are answered.  Pt is off O2, VS stable, taking po fluids well.  Has some minor neck discomfort.  Stable sites. TR band to be off per protocol.    Detailed report back to Rosa Elena to continue monitoring pt.

## 2022-01-31 NOTE — DISCHARGE INSTRUCTIONS
Cardiac Angiogram Discharge Instructions - Neck & Radial    After you go home:      Have an adult stay with you until tomorrow.    Drink extra fluids for 2 days.    You may resume your normal diet.    No smoking       For 24 hours - due to the sedation you received:    Relax and take it easy.    Do NOT make any important or legal decisions.    Do NOT drive or operate machines at home or at work.    Do NOT drink alcohol.    Care of Neck & Wrist Puncture Sites:      For the first 24 hrs - check the puncture site every 1-2 hours while awake.    It is normal to have soreness at the puncture site and mild tingling in your hand for up to 3 days.    Remove the bandaid after 24 hours. If there is minor oozing, apply another bandaid and remove it after 12 hours.    You may shower tomorrow. Do NOT take a bath, or use a hot tub or pool for at least 3 days. Do NOT scrub the site. Do not use lotion or powder near the puncture site.           Activity - For 2 days:    Neck site:    Avoid heavy lifting or the overuse of your shoulder.        Wrist site:    do not use your hand or arm to support your weight (such as rising from a chair)     do not bend your wrist (such as lifting a garage door).    do not lift more than 5 pounds or exercise your arm (such as tennis, golf or bowling).    Do NOT do any heavy activity such as exercise, lifting, or straining.     Bleeding:     Neck site:    If you start bleeding from the site in your neck, sit down and press gently on the site for 10 minutes.     Once bleeding stops, sit still for 2 hours.     Call [facility] as soon as you can    Wrist site:    If you start bleeding from the site in your wrist, sit down and press firmly on/above the site for 10 minutes.     Once bleeding stops, keep arm still for 2 hours.     Call [facility] as soon as you can.    Call 911 right away if you have heavy bleeding or bleeding that does not stop.      Medicines:      Take your medications, including blood  thinners, unless your provider tells you not to.      If you take Coumadin (Warfarin), have your INR checked by your provider in  3-5 days. Call your clinic to schedule this.    If you have stopped any medicines, check with your provider about when to restart them.    Follow Up Appointments:      Follow up with [facility] Practitioner at [facility] of [facility] preference in 7-10 days.    Call the clinic if:      You have increased pain or a large or growing hard lump around the site.    The site is red, swollen, hot or tender.    Blood or fluid is draining from the site.    You have chills or a fever greater than 101 F (38 C).    Your arm feels numb, cool or changes color.    You have hives, a rash or unusual itching.    Any questions or concerns.      [facility] [place]:    [phone] [facility] (7 days a week)

## 2022-01-31 NOTE — Clinical Note
No intervention needed at this time  6 fr sheath pulled with TR band applied and 7 fr RIJ pulled and manual pressure held

## 2022-02-02 ENCOUNTER — DOCUMENTATION ONLY (OUTPATIENT)
Dept: ANTICOAGULATION | Facility: CLINIC | Age: 67
End: 2022-02-02
Payer: COMMERCIAL

## 2022-02-02 NOTE — PROGRESS NOTES
ANTICOAGULATION  MANAGEMENT: Discharge Review    Jovon TITO Mantilla chart reviewed for anticoagulation continuity of care    Outpatient surgery/procedure on 1/31/22 for heart cath.    Discharge disposition: Home    Results:    Recent labs: (last 7 days)     01/31/22  0702   INR 1.45*     Anticoagulation inpatient management:     not applicable     Anticoagulation discharge instructions:     Warfarin dosing: home regimen continued   Bridging: bridging with enoxaparin (Lovenox)   INR goal change: No      Medication changes affecting anticoagulation: No    Additional factors affecting anticoagulation: No    Plan     No adjustment to anticoagulation plan needed    Recommended follow up is scheduled    No adjustment to Anticoagulation Calendar was required    Isabel Cheney RN

## 2022-02-03 LAB
ATRIAL RATE - MUSE: 192 BPM
DIASTOLIC BLOOD PRESSURE - MUSE: NORMAL MMHG
INTERPRETATION ECG - MUSE: NORMAL
P AXIS - MUSE: NORMAL DEGREES
PR INTERVAL - MUSE: NORMAL MS
QRS DURATION - MUSE: 132 MS
QT - MUSE: 424 MS
QTC - MUSE: 457 MS
R AXIS - MUSE: -78 DEGREES
SYSTOLIC BLOOD PRESSURE - MUSE: NORMAL MMHG
T AXIS - MUSE: 58 DEGREES
VENTRICULAR RATE- MUSE: 70 BPM

## 2022-02-04 ENCOUNTER — ANTICOAGULATION THERAPY VISIT (OUTPATIENT)
Dept: ANTICOAGULATION | Facility: CLINIC | Age: 67
End: 2022-02-04

## 2022-02-04 ENCOUNTER — LAB (OUTPATIENT)
Dept: LAB | Facility: CLINIC | Age: 67
End: 2022-02-04
Payer: COMMERCIAL

## 2022-02-04 DIAGNOSIS — Z79.01 LONG TERM CURRENT USE OF ANTICOAGULANTS WITH INR GOAL OF 2.5-3.5: ICD-10-CM

## 2022-02-04 DIAGNOSIS — S30.1XXA ABDOMINAL WALL HEMATOMA, INITIAL ENCOUNTER: ICD-10-CM

## 2022-02-04 DIAGNOSIS — I48.21 PERMANENT ATRIAL FIBRILLATION (H): ICD-10-CM

## 2022-02-04 DIAGNOSIS — Z95.2 S/P AVR (AORTIC VALVE REPLACEMENT): Primary | ICD-10-CM

## 2022-02-04 LAB — INR BLD: 1.6 (ref 0.9–1.1)

## 2022-02-04 PROCEDURE — 36416 COLLJ CAPILLARY BLOOD SPEC: CPT

## 2022-02-04 PROCEDURE — 85610 PROTHROMBIN TIME: CPT

## 2022-02-04 NOTE — PROGRESS NOTES
ANTICOAGULATION MANAGEMENT     Jovon Mantilla 66 year old male is on warfarin with subtherapeutic INR result. (Goal INR 2.5-3.5)    Recent labs: (last 7 days)     02/04/22  0908   INR 1.6*       ASSESSMENT     Source(s): Chart Review and Patient/Caregiver Call       Warfarin doses taken: Warfarin recently held for R heart cath (held 1/27-1/30/22) which may be affecting INR    Diet: No new diet changes identified    New illness, injury, or hospitalization: Yes: R heart cath performed on 1/31/22, patient has consult/follow up with cardiology on 2/10/22 to discuss whether or not he needs a valve replacement.    Medication/supplement changes: Spironolactone increased early Dec, 2021 due to fluid overload, per pt (med list is up-to-date)    Signs or symptoms of bleeding or clotting: No    Previous INR: Therapeutic last visit; previously outside of goal range    Additional findings: Pt did NOT inject Lovenox this morning as he didn't have official instructions to do so and I could not reach pt until now-4:25 p.m.   Pt will inject Lovenox tonight and then resume Q12 hour dosing until INR is 2.5 or above x 2 readings.     PLAN     Recommended plan for temporary change(s) affecting INR     Dosing Instructions: Booster dose then continue your current warfarin dose with next INR in 3 days       Summary  As of 2/4/2022    Full warfarin instructions:  2/4: 8 mg; Otherwise 6 mg every Mon, Wed, Sat; 4 mg all other days   Next INR check:  2/7/2022             Telephone call with Jovon who agrees to plan and repeated back plan correctly    Lab visit scheduled    Education provided: Contact [phone]  with any changes, questions or concerns.     Plan made per [facility] anticoagulation protocol    Madeline Wayne, RN  Anticoagulation Clinic  2/4/2022    _______________________________________________________________________     Anticoagulation Episode Summary     Current INR goal:  2.5-3.5   TTR:  82.4 % (12 mo)   Target end date:   Indefinite   Send INR reminders to:  [facility] [place]    Indications    S/P AVR (aortic valve replacement) [Z95.2]  Permanent atrial fibrillation (H) [I48.21]  Long term current use of anticoagulants with INR goal of 2.5-3.5 [Z79.01]  Abdominal wall hematoma  initial encounter [S30.1XXA]           Comments:  24mm ATS Valve         Anticoagulation Care Providers     Provider Role Specialty Phone number    Rehan Lorenzana MD Referring Family Medicine [phone]    Ramana Redd MD Referring Student in organized health care education/training program [phone]

## 2022-02-04 NOTE — PROGRESS NOTES
Anticoagulation Management    Unable to reach Jovon today x 2    Today's INR result of 1.6 is subtherapeutic (goal INR of 2.5-3.5).  Result received from: Clinic Lab    Follow up required to confirm warfarin dose taken and assess for changes    No instructions provided. Unable to leave voicemail.   Patient is bridging with Lovenox and needs to continue.      Anticoagulation clinic to follow up    Madeline Wayne RN

## 2022-02-07 ENCOUNTER — LAB (OUTPATIENT)
Dept: LAB | Facility: CLINIC | Age: 67
End: 2022-02-07
Payer: COMMERCIAL

## 2022-02-07 ENCOUNTER — ANTICOAGULATION THERAPY VISIT (OUTPATIENT)
Dept: ANTICOAGULATION | Facility: CLINIC | Age: 67
End: 2022-02-07

## 2022-02-07 DIAGNOSIS — I48.21 PERMANENT ATRIAL FIBRILLATION (H): ICD-10-CM

## 2022-02-07 DIAGNOSIS — Z95.2 S/P AVR (AORTIC VALVE REPLACEMENT): Primary | ICD-10-CM

## 2022-02-07 DIAGNOSIS — Z79.01 LONG TERM CURRENT USE OF ANTICOAGULANTS WITH INR GOAL OF 2.5-3.5: ICD-10-CM

## 2022-02-07 DIAGNOSIS — S30.1XXA ABDOMINAL WALL HEMATOMA, INITIAL ENCOUNTER: ICD-10-CM

## 2022-02-07 DIAGNOSIS — R06.02 SOBOE (SHORTNESS OF BREATH ON EXERTION): ICD-10-CM

## 2022-02-07 DIAGNOSIS — Z95.2 H/O AORTIC VALVE REPLACEMENT: ICD-10-CM

## 2022-02-07 DIAGNOSIS — I70.90 ASVD (ARTERIOSCLEROTIC VASCULAR DISEASE): ICD-10-CM

## 2022-02-07 LAB — INR BLD: 2.1 (ref 0.9–1.1)

## 2022-02-07 PROCEDURE — 85610 PROTHROMBIN TIME: CPT

## 2022-02-07 PROCEDURE — 36416 COLLJ CAPILLARY BLOOD SPEC: CPT

## 2022-02-07 NOTE — PROGRESS NOTES
ANTICOAGULATION MANAGEMENT     Jovon Mantilla 66 year old male is on warfarin with subtherapeutic INR result. (Goal INR 2.5-3.5)    Recent labs: (last 7 days)     22  1218   INR 2.1*       ASSESSMENT     Source(s): Chart Review and Patient/Caregiver Call       Warfarin doses taken: Warfarin recently held for R heart cath (held -22) which may be affecting INR    Diet: No new diet changes identified    New illness, injury, or hospitalization: Yes: Yes: R heart cath performed on 22, patient has consult/follow up with cardiology on 2/10/22 to discuss whether or not he needs a valve replacement.    Medication/supplement changes: None noted,patient will continue on Lovenox, he has enough until morning of 2/10/22 (had 3 syringes left from earlier procedure and they do not  until ).    Signs or symptoms of bleeding or clotting: Yes: bruising on abdomen from Lovenox injections    Previous INR: Subtherapeutic    Additional findings: bigger boost today, OK per Rebecca, [facility] PharmD     PLAN     Recommended plan for temporary change(s) affecting INR     Dosing Instructions: Booster dose then continue your current warfarin dose with next INR in 3 days       Summary  As of 2022    Full warfarin instructions:  : 10 mg; Otherwise 6 mg every Mon, Wed, Sat; 4 mg all other days   Next INR check:  2/10/2022             Telephone call with Jovon who agrees to plan and repeated back plan correctly    Lab visit scheduled    Education provided: Monitoring for bleeding signs and symptoms and Contact [phone]  with any changes, questions or concerns. and to avoid bruises on abdomen when injecting Lovenox.    Plan made per [facility] anticoagulation protocol    Madeline Wayne, RN  Anticoagulation Clinic  2022    _______________________________________________________________________     Anticoagulation Episode Summary     Current INR goal:  2.5-3.5   TTR:  82.2 % (12 mo)   Target end date:  Indefinite   Send INR  reminders to:  [facility]    Indications    S/P AVR (aortic valve replacement) [Z95.2]  Permanent atrial fibrillation (H) [I48.21]  Long term current use of anticoagulants with INR goal of 2.5-3.5 [Z79.01]  Abdominal wall hematoma  initial encounter [S30.1XXA]           Comments:  24mm ATS Valve         Anticoagulation Care Providers     Provider Role Specialty Phone number    Rehan Lorenzana MD Referring Family Medicine [phone]    Ramana Redd MD Referring Student in organized health care education/training program [phone]

## 2022-02-08 RX ORDER — NITROGLYCERIN 0.3 MG/1
TABLET SUBLINGUAL
Qty: 25 TABLET | Refills: 1 | Status: SHIPPED | OUTPATIENT
Start: 2022-02-08

## 2022-02-08 NOTE — TELEPHONE ENCOUNTER
Routing refill request to provider for review/approval because:  Pt has a cardiology appointment tomorrow, 2/10, please advise if you are prescriber.   Celestina Gonzalez, RN, BSN, PHN  [facility]

## 2022-02-10 ENCOUNTER — ANTICOAGULATION THERAPY VISIT (OUTPATIENT)
Dept: ANTICOAGULATION | Facility: CLINIC | Age: 67
End: 2022-02-10

## 2022-02-10 ENCOUNTER — OFFICE VISIT (OUTPATIENT)
Dept: CARDIOLOGY | Facility: CLINIC | Age: 67
End: 2022-02-10
Payer: COMMERCIAL

## 2022-02-10 VITALS
HEART RATE: 73 BPM | HEIGHT: 70 IN | WEIGHT: 193 LBS | BODY MASS INDEX: 27.63 KG/M2 | OXYGEN SATURATION: 96 % | SYSTOLIC BLOOD PRESSURE: 107 MMHG | DIASTOLIC BLOOD PRESSURE: 64 MMHG

## 2022-02-10 DIAGNOSIS — S30.1XXA ABDOMINAL WALL HEMATOMA, INITIAL ENCOUNTER: ICD-10-CM

## 2022-02-10 DIAGNOSIS — R06.09 DOE (DYSPNEA ON EXERTION): ICD-10-CM

## 2022-02-10 DIAGNOSIS — I48.21 PERMANENT ATRIAL FIBRILLATION (H): ICD-10-CM

## 2022-02-10 DIAGNOSIS — I25.10 CORONARY ARTERY DISEASE INVOLVING NATIVE CORONARY ARTERY OF NATIVE HEART WITHOUT ANGINA PECTORIS: Primary | ICD-10-CM

## 2022-02-10 DIAGNOSIS — I35.0 AORTIC STENOSIS: Primary | ICD-10-CM

## 2022-02-10 DIAGNOSIS — Z79.01 LONG TERM CURRENT USE OF ANTICOAGULANTS WITH INR GOAL OF 2.5-3.5: ICD-10-CM

## 2022-02-10 DIAGNOSIS — Z95.2 S/P AVR (AORTIC VALVE REPLACEMENT): Primary | ICD-10-CM

## 2022-02-10 PROCEDURE — 99214 OFFICE O/P EST MOD 30 MIN: CPT | Performed by: PHYSICIAN ASSISTANT

## 2022-02-10 ASSESSMENT — MIFFLIN-ST. JEOR: SCORE: 1663.75

## 2022-02-10 NOTE — PROGRESS NOTES
Primary Cardiologist: Dr. Cooley    Reason for Visit: Follow up after L and R heart cath and holter monitor    History of Present Illness:   Jovon is a pleasant 66 year old male with past medical history notable for:      # Hx of Severe Bicuspid Aortic Stenosis (s/p AVR with mechanical valve in 2011 w/ Dr. Prado, 24 mm ATS; on warfarin with INR goal of 2.5-3.5; most recent echo showed good function)  # Persistent Atrial Fibrillation (s/p MAZE procedure but A fib recurred; rate control w/ BB and digoxin; on warfarin for VTE prophylaxis)  # CAD (70% stenosis of the LAD noted at the time of his AVR but it was not bypassed as it was completely intramyocardial; exercise nuclear stress scan in 2018 showed no significant ischemia)   #  Moderate to severe RV Dysfunction with hx of nonischemic dilated CM (was noted previously; unclear etiology at this time; on BB and Acei; recently placed on low-dose spironolactone)  # Moderate MR (recent CADE and stress echo showed moderate MR; serial echo imaging recommended at this time)  # Hypertension (at goal)  # Hyperlipidemia (LDL of 46 on simvastatin 20 mg)  # Vein Insufficiency (s/p vein stripping)  # Hypothyroidism   # Hx of GI bleed  # Hx of Obesity (s/p gastric bypass)    His recent left heart catheterization showed known 70% stenosis of his LAD which was felt to be not flow-limiting. No intervention was recommended. He did have mildly increased left-sided and right-sided filling pressures and therefore initiation of diuretic therapy was recommended.  Of note, he is already on spironolactone 12.5 mg daily.  If patient had no significant improvement in symptoms despite medication adjustments that was recommended to consider PCI of LAD lesion.     Assessment and Plan:  Jovon is a pleasant 66 year old male with past medical history notable for:      # Hx of Severe Bicuspid Aortic Stenosis (s/p AVR with mechanical valve in 2011 w/ Dr. Prado, 24 mm ATS; on warfarin with INR goal of  2.5-3.5; most recent echo showed good function)  # Persistent Atrial Fibrillation (s/p MAZE procedure but A fib recurred; rate control w/ BB and digoxin; on warfarin for VTE prophylaxis)  # CAD (70% stenosis of the LAD noted at the time of his AVR but it was not bypassed as it was completely intramyocardial; exercise nuclear stress scan in 2018 showed no significant ischemia)   #  Moderate to severe RV Dysfunction with hx of nonischemic dilated CM (was noted previously; unclear etiology at this time; on BB and Acei; recently placed on low-dose spironolactone)  # Moderate MR (recent CADE and stress echo showed moderate MR; serial echo imaging recommended at this time)  # Hypertension (at goal)  # Hyperlipidemia (LDL of 46 on simvastatin 20 mg)  # Vein Insufficiency (s/p vein stripping)  # Hypothyroidism   # Hx of GI bleed  # Hx of Obesity (s/p gastric bypass)    Jovon and I reviewed his recent angiogram and 48-hour Holter monitor results.  His recent CADE as well as bicycle stress echocardiogram showed only moderate mitral regurgitation.  At this time there is no indication for MitraClip procedure.  His angiogram showed 70% LAD lesion as it was noted in the past.  Dr. Mike felt this was not flow-limiting and therefore conservative management was recommended.  His right heart cath did demonstrate mildly increased right and left filling pressures and therefore diuretic therapy was recommended.  This has not been initiated.  The only diuretic pills on is spironolactone 12.5 mg daily.  He has not had a completed BMP study and therefore we will have him stop by lab today to check this.  Depending on his numbers we will consider decreasing/discontinuing afternoon dose of lisinopril and adding low-dose furosemide to his regimen.  We will then follow-up again to reassess his symptoms and determine if he should be referred back to Cath Lab for consideration of PCI of LAD.  Dr. Mike recommended that if he has continued  symptoms despite diuresis he would consider intervening upon the lesion.  We will otherwise have him return to our clinic in 6 months with a repeat echocardiogram.    45 minutes spent on the date of the encounter with chart review, patient visit, care coordination, and documentation.      This note was completed in part using Dragon voice recognition software. Although reviewed after completion, some word and grammatical errors may occur.    Orders this Visit:  Orders Placed This Encounter   Procedures     Basic metabolic panel     Basic metabolic panel     N terminal pro BNP outpatient     Follow-Up with Cardiology     No orders of the defined types were placed in this encounter.    There are no discontinued medications.      Encounter Diagnoses   Name Primary?     Coronary artery disease involving native coronary artery of native heart without angina pectoris Yes     RUIZ (dyspnea on exertion)        CURRENT MEDICATIONS:  Current Outpatient Medications   Medication Sig Dispense Refill     Ascorbic Acid (VITAMIN C PO) Take 500 mg by mouth At Bedtime       aspirin 81 MG tablet Take 81 mg by mouth every other day       Calcium Carbonate-Vitamin D (CALCIUM 600+D) 600-200 MG-UNIT TABS Take 1 tablet by mouth 2 times daily        carvedilol (COREG) 25 MG tablet Take 1 tablet (25 mg) by mouth 2 times daily (with meals) 180 tablet 1     Cholecalciferol (VITAMIN D) 2000 UNIT CAPS Take 1 tablet by mouth daily        Cyanocobalamin (VITAMIN B-12) 500 MCG SUBL Place 500 mcg under the tongue daily        digoxin (LANOXIN) 125 MCG tablet Take 1 tablet (125 mcg) by mouth daily 90 tablet 3     enoxaparin ANTICOAGULANT (LOVENOX) 100 MG/ML syringe Inject 0.9 mLs (90 mg) Subcutaneous 2 times daily 16 mL 1     Fexofenadine HCl (ALLERGY 24-HR PO) Take 180 mg by mouth daily        fluticasone (FLONASE) 50 MCG/ACT nasal spray Spray 1 spray into both nostrils daily       levothyroxine (SYNTHROID/LEVOTHROID) 200 MCG tablet Take 1 tablet  (200 mcg) by mouth daily 90 tablet 3     lisinopril (ZESTRIL) 20 MG tablet Take  20 mg (1 tablet in am) and 10 mg  (one half tablet) in  pm 135 tablet 3     Multiple Vitamins-Minerals (CENTRUM) CHEW Take 1 tablet by mouth daily        nitroGLYcerin (NITROSTAT) 0.3 MG sublingual tablet For chest pain place 1 tablet under the tongue every 5 minutes for 3 doses. If symptoms persist 5 minutes after 1st dose call 911. 25 tablet 1     Psyllium (FIBER) 0.52 GM CAPS Take 2 capsules by mouth At Bedtime        sertraline (ZOLOFT) 50 MG tablet Take 1 tablet (50 mg) by mouth daily 90 tablet 3     simvastatin (ZOCOR) 20 MG tablet Take 1 tablet (20 mg) by mouth At Bedtime 90 tablet 3     spironolactone (ALDACTONE) 25 MG tablet Take half tablet (12.5 mg) once every morning. 15 tablet 3     warfarin ANTICOAGULANT (COUMADIN) 4 MG tablet Take one tablet (4 mg) by mouth daily except take one a half tablets (6 mg) Wed, Sat or as directed by INR Clinic 117 tablet 1       ALLERGIES   No Known Allergies    PAST MEDICAL HISTORY:  Past Medical History:   Diagnosis Date     Acne rosacea 04/22/2019     Acquired hypothyroidism 05/23/2003     Problem list name updated by automated process. Provider to review     Acute prostatitis      Acute sinusitis, unspecified      Aortic valve disease      Biatrial enlargement      CAD (coronary artery disease)     known 70% LAD stenosis     Congestive heart failure (H)      Depression      GI bleeding 01/22/2016     History of bleeding peptic ulcer      Hyperlipidemia      Idiopathic cardiomyopathy (H)      Mitral regurgitation      Obesity      Palpitations      Permanent atrial fibrillation (H) 06/02/2017     Right inguinal hernia 07/22/2019     S/P AVR 01/07/2011    with Maze procedure     S/P gastric bypass      Tricuspid regurgitation      Unspecified essential hypertension        PAST SURGICAL HISTORY:  Past Surgical History:   Procedure Laterality Date     ANGIOGRAM  02/01/2002    Normal  coronary,dilated LV,Sev.decr.global LVF,+1 MR     ANGIOGRAM  01/01/2008    Mild AS,Mod PHTN,mod prox.LAD disease,Triv.CFX disease     ANGIOGRAM  01/01/2009    Mod AS,Mod PHTN,mod prox.LAD disease     ANGIOGRAM  10/01/2010    Sev.AS,CM,global hypokinesis,Mild-mod LV dilated,Mild MR,70% prox.LAD lesion,PHTN     CARDIOVERSION  2/02, 4/02, 4/11     CHOLECYSTECTOMY  2006     COLONOSCOPY N/A 01/23/2016    Procedure: COLONOSCOPY;  Surgeon: Robyn Collins MD;  Location:  GI     CV CORONARY ANGIOGRAM N/A 1/31/2022    Procedure: Coronary Angiogram;  Surgeon: Rome Mike MD;  Location:  HEART CARDIAC CATH LAB     CV INSTANTANEOUS WAVE-FREE RATIO N/A 1/31/2022    Procedure: Instantaneous Wave-Free Ratio;  Surgeon: Rome Mike MD;  Location:  HEART CARDIAC CATH LAB     CV RIGHT HEART CATH MEASUREMENTS RECORDED N/A 1/31/2022    Procedure: Right Heart Cath;  Surgeon: Rome Mike MD;  Location:  HEART CARDIAC CATH LAB     ESOPHAGOSCOPY, GASTROSCOPY, DUODENOSCOPY (EGD), COMBINED N/A 01/23/2016    Procedure: COMBINED ESOPHAGOSCOPY, GASTROSCOPY, DUODENOSCOPY (EGD);  Surgeon: Robyn Collins MD;  Location:  GI     EXCISE MASS GROIN Right 2/11/2021    Procedure: INCISION AND DRAINAGE OF RIGHT GROIN HEMATOMA;  Surgeon: Norma Scanlon MD;  Location: [facility] OR     HERNIORRHAPHY INGUINAL Right 2/4/2021    Procedure: OPEN RIGHT INGUINAL HENRIA WITH MESH;  Surgeon: Jaden Alex MD;  Location: [facility] OR     LAPAROSCOPIC BYPASS GASTRIC  10/08/2011    Procedure:LAPAROSCOPIC BYPASS GASTRIC; Diagnostic laparoscopy, Lysis of Adhesions, Laparoscopic Revision of Jejunojejunostomy; Surgeon:RADHA MURO; Location: OR     LAPAROSCOPIC BYPASS GASTRIC  06/26/2006    Dr Jin     LAPAROSCOPY DIAGNOSTIC (GENERAL)  10/08/2011    Procedure:LAPAROSCOPY DIAGNOSTIC (GENERAL); Surgeon:RADHA MURO; Location: OR     REPLACE VALVE AORTIC  01/07/2011     [facility]  NONSPECIFIC PROCEDURE  ~1985    vein stripping       FAMILY HISTORY:  Family History   Problem Relation Age of Onset     Cancer - colorectal Father      Colon Cancer Father      Cancer Father      Diabetes Brother        SOCIAL HISTORY:  Social History     Socioeconomic History     Marital status:      Spouse name: Tracey     Number of children: 0     Years of education: None     Highest education level: None   Occupational History     Occupation:      Comment: [employer]   Tobacco Use     Smoking status: Never Smoker     Smokeless tobacco: Never Used   Substance and Sexual Activity     Alcohol use: No     Alcohol/week: 0.0 standard drinks     Drug use: No     Sexual activity: Not Currently     Partners: Female   Other Topics Concern     Parent/sibling w/ CABG, MI or angioplasty before 65F 55M? Yes      Service Not Asked     Blood Transfusions No     Caffeine Concern Yes     Comment: 1 can daily     Occupational Exposure Yes     Hobby Hazards No     Sleep Concern No     Stress Concern No     Weight Concern No     Special Diet Yes     Comment: low sodium, low calories     Back Care No     Exercise Yes     Comment: walks daily     Bike Helmet Not Asked     Comment: NA     Seat Belt Yes     Self-Exams Yes   Social History Narrative     None     Social Determinants of Health     Financial Resource Strain: Not on file   Food Insecurity: Not on file   Transportation Needs: Not on file   Physical Activity: Not on file   Stress: Not on file   Social Connections: Not on file   Intimate Partner Violence: Not on file   Housing Stability: Not on file       Review of Systems:  Skin:  Negative     Eyes:  Positive for glasses  ENT:  Negative    Respiratory:  Negative    Cardiovascular:  Negative Positive for;palpitations;edema  Gastroenterology: Negative    Genitourinary:  Positive for prostate problem  Musculoskeletal:  Negative    Neurologic:  Negative    Psychiatric:  Negative    Heme/Lymph/Imm:  Positive  "for allergies  Endocrine:  Positive for thyroid disorder    Physical Exam:  Vitals: /64   Pulse 73   Ht 1.765 m (5' 9.5\")   Wt 87.5 kg (193 lb)   SpO2 96%   BMI 28.09 kg/m       GEN:  NAD  NECK: No JVD  C/V:  Regular rate and rhythm; 2/6 holosystolic murmur at LSB.  RESP: Clear to auscultation bilaterally without wheezing, rales, or rhonchi.  GI: Abdomen soft, nontender, nondistended.  EXTREM: No pitting LE edema.   NEURO: Alert and oriented, cooperative. No obvious focal deficits.   PSYCH: Normal affect.  SKIN: Warm and dry.       Recent Lab Results:  LIPID RESULTS:  Lab Results   Component Value Date    CHOL 99 08/09/2021    CHOL 109 03/13/2020    HDL 43 08/09/2021    HDL 51 03/13/2020    LDL 46 08/09/2021    LDL 49 03/13/2020    TRIG 48 08/09/2021    TRIG 45 03/13/2020    CHOLHDLRATIO 2.5 02/16/2015       LIVER ENZYME RESULTS:  Lab Results   Component Value Date    AST 23 08/09/2021    AST 28 02/10/2021    ALT 18 08/09/2021    ALT 23 02/10/2021       CBC RESULTS:  Lab Results   Component Value Date    WBC 4.0 01/31/2022    WBC 8.9 02/13/2021    RBC 4.12 (L) 01/31/2022    RBC 3.12 (L) 02/13/2021    HGB 11.5 (L) 01/31/2022    HGB 9.7 (L) 02/13/2021    HCT 38.3 (L) 01/31/2022    HCT 30.9 (L) 02/13/2021    MCV 93 01/31/2022    MCV 99 02/13/2021    MCH 27.9 01/31/2022    MCH 31.1 02/13/2021    MCHC 30.0 (L) 01/31/2022    MCHC 31.4 (L) 02/13/2021    RDW 17.6 (H) 01/31/2022    RDW 15.2 (H) 02/13/2021     01/31/2022     02/13/2021       BMP RESULTS:  Lab Results   Component Value Date     01/31/2022     02/12/2021    POTASSIUM 4.4 01/31/2022    POTASSIUM 4.4 02/12/2021    CHLORIDE 106 01/31/2022    CHLORIDE 107 02/12/2021    CO2 33 (H) 01/31/2022    CO2 30 02/12/2021    ANIONGAP <1 (L) 01/31/2022    ANIONGAP 2 (L) 02/12/2021    GLC 90 01/31/2022     (H) 02/12/2021    BUN 15 01/31/2022    BUN 31 (H) 02/12/2021    CR 0.78 01/31/2022    CR 0.76 02/12/2021    GFRESTIMATED >90 " 01/31/2022    GFRESTIMATED >90 02/12/2021    GFRESTBLACK >90 02/12/2021    REECE 8.5 01/31/2022    REECE 8.8 02/12/2021        A1C RESULTS:  Lab Results   Component Value Date    A1C 5.8 (H) 02/04/2019       INR RESULTS:  Lab Results   Component Value Date    INR 2.3 (H) 02/10/2022    INR 2.1 (H) 02/07/2022    INR 1.45 (H) 01/31/2022    INR 3.10 (H) 06/22/2021    INR 3.40 (H) 05/13/2021           Ingrid Alas PA-C  February 10, 2022

## 2022-02-10 NOTE — PROGRESS NOTES
ANTICOAGULATION MANAGEMENT     Jovon Mantilla 66 year old male is on warfarin with subtherapeutic INR result. (Goal INR 2.5-3.5)    Recent labs: (last 7 days)     02/10/22  1411   INR 2.3*       ASSESSMENT     Source(s): Chart Review and Patient/Caregiver Call       Warfarin doses taken: Warfarin taken as instructed with boosts on 2/4 and 2/7/22, as prescribed    Diet: No new diet changes identified    New illness, injury, or hospitalization: Yes: R heart cath performed on 1/31/22    Medication/supplement changes: None noted    Signs or symptoms of bleeding or clotting: No    Previous INR: Subtherapeutic    Additional findings:Pt will use last Lovenox tonight     PLAN     Recommended plan for temporary change(s) affecting INR     Dosing Instructions: Booster dose with next INR in 1 day       Summary  As of 2/10/2022    Full warfarin instructions:  2/10: 10 mg; Otherwise 6 mg every Mon, Wed, Sat; 4 mg all other days   Next INR check:  2/11/2022             Telephone call with Jovon who verbalizes understanding and agrees to plan    Lab visit scheduled    Education provided: Contact [phone]  with any changes, questions or concerns.     Plan made with [facility] Pharmacist Dee Wayne, RN  Anticoagulation Clinic  2/10/2022    _______________________________________________________________________     Anticoagulation Episode Summary     Current INR goal:  2.5-3.5   TTR:  81.6 % (11.9 mo)   Target end date:  Indefinite   Send INR reminders to:  [facility]    Indications    S/P AVR (aortic valve replacement) [Z95.2]  Permanent atrial fibrillation (H) [I48.21]  Long term current use of anticoagulants with INR goal of 2.5-3.5 [Z79.01]  Abdominal wall hematoma  initial encounter [S30.1XXA]           Comments:  24mm ATS Valve         Anticoagulation Care Providers     Provider Role Specialty Phone number    Rehan Lorenzana MD Referring Family Medicine [phone]    Ramana Redd MD  Referring Student in [facility] health care education/training program [phone]        '

## 2022-02-10 NOTE — LETTER
2/10/2022    Rehan Lorenzana MD  [id number]  [id number]    RE: Jovon Mantilla       Dear Colleague,     I had the pleasure of seeing Jovon TITO Mantilla in the [facility].  Primary Cardiologist: Dr. Cooley    Reason for Visit: Follow up after L and R heart cath and holter monitor    History of Present Illness:   Jovon is a pleasant 66 year old male with past medical history notable for:      # Hx of Severe Bicuspid Aortic Stenosis (s/p AVR with mechanical valve in 2011 w/ Dr. Prado, 24 mm ATS; on warfarin with INR goal of 2.5-3.5; most recent echo showed good function)  # Persistent Atrial Fibrillation (s/p MAZE procedure but A fib recurred; rate control w/ BB and digoxin; on warfarin for VTE prophylaxis)  # CAD (70% stenosis of the LAD noted at the time of his AVR but it was not bypassed as it was completely intramyocardial; exercise nuclear stress scan in 2018 showed no significant ischemia)   #  Moderate to severe RV Dysfunction with hx of nonischemic dilated CM (was noted previously; unclear etiology at this time; on BB and Acei; recently placed on low-dose spironolactone)  # Moderate MR (recent CADE and stress echo showed moderate MR; serial echo imaging recommended at this time)  # Hypertension (at goal)  # Hyperlipidemia (LDL of 46 on simvastatin 20 mg)  # Vein Insufficiency (s/p vein stripping)  # Hypothyroidism   # Hx of GI bleed  # Hx of Obesity (s/p gastric bypass)    His recent left heart catheterization showed known 70% stenosis of his LAD which was felt to be not flow-limiting. No intervention was recommended. He did have mildly increased left-sided and right-sided filling pressures and therefore initiation of diuretic therapy was recommended.  Of note, he is already on spironolactone 12.5 mg daily.  If patient had no significant improvement in symptoms despite medication adjustments that was recommended to consider PCI of LAD lesion.     Assessment and Plan:  Jovon  is a pleasant 66 year old male with past medical history notable for:      # Hx of Severe Bicuspid Aortic Stenosis (s/p AVR with mechanical valve in 2011 w/ Dr. Prado, 24 mm ATS; on warfarin with INR goal of 2.5-3.5; most recent echo showed good function)  # Persistent Atrial Fibrillation (s/p MAZE procedure but A fib recurred; rate control w/ BB and digoxin; on warfarin for VTE prophylaxis)  # CAD (70% stenosis of the LAD noted at the time of his AVR but it was not bypassed as it was completely intramyocardial; exercise nuclear stress scan in 2018 showed no significant ischemia)   #  Moderate to severe RV Dysfunction with hx of nonischemic dilated CM (was noted previously; unclear etiology at this time; on BB and Acei; recently placed on low-dose spironolactone)  # Moderate MR (recent CADE and stress echo showed moderate MR; serial echo imaging recommended at this time)  # Hypertension (at goal)  # Hyperlipidemia (LDL of 46 on simvastatin 20 mg)  # Vein Insufficiency (s/p vein stripping)  # Hypothyroidism   # Hx of GI bleed  # Hx of Obesity (s/p gastric bypass)    Jovon and I reviewed his recent angiogram and 48-hour Holter monitor results.  His recent CADE as well as bicycle stress echocardiogram showed only moderate mitral regurgitation.  At this time there is no indication for MitraClip procedure.  His angiogram showed 70% LAD lesion as it was noted in the past.  Dr. Mike felt this was not flow-limiting and therefore conservative management was recommended.  His right heart cath did demonstrate mildly increased right and left filling pressures and therefore diuretic therapy was recommended.  This has not been initiated.  The only diuretic pills on is spironolactone 12.5 mg daily.  He has not had a completed BMP study and therefore we will have him stop by lab today to check this.  Depending on his numbers we will consider decreasing/discontinuing afternoon dose of lisinopril and adding low-dose furosemide to  his regimen.  We will then follow-up again to reassess his symptoms and determine if he should be referred back to Cath Lab for consideration of PCI of LAD.  Dr. Mike recommended that if he has continued symptoms despite diuresis he would consider intervening upon the lesion.  We will otherwise have him return to our clinic in 6 months with a repeat echocardiogram.    45 minutes spent on the date of the encounter with chart review, patient visit, care coordination, and documentation.      This note was completed in part using Dragon voice recognition software. Although reviewed after completion, some word and grammatical errors may occur.    Orders this Visit:  Orders Placed This Encounter   Procedures     Basic metabolic panel     Basic metabolic panel     N terminal pro BNP outpatient     Follow-Up with Cardiology     No orders of the defined types were placed in this encounter.    There are no discontinued medications.      Encounter Diagnoses   Name Primary?     Coronary artery disease involving native coronary artery of native heart without angina pectoris Yes     RUIZ (dyspnea on exertion)        CURRENT MEDICATIONS:  Current Outpatient Medications   Medication Sig Dispense Refill     Ascorbic Acid (VITAMIN C PO) Take 500 mg by mouth At Bedtime       aspirin 81 MG tablet Take 81 mg by mouth every other day       Calcium Carbonate-Vitamin D (CALCIUM 600+D) 600-200 MG-UNIT TABS Take 1 tablet by mouth 2 times daily        carvedilol (COREG) 25 MG tablet Take 1 tablet (25 mg) by mouth 2 times daily (with meals) 180 tablet 1     Cholecalciferol (VITAMIN D) 2000 UNIT CAPS Take 1 tablet by mouth daily        Cyanocobalamin (VITAMIN B-12) 500 MCG SUBL Place 500 mcg under the tongue daily        digoxin (LANOXIN) 125 MCG tablet Take 1 tablet (125 mcg) by mouth daily 90 tablet 3     enoxaparin ANTICOAGULANT (LOVENOX) 100 MG/ML syringe Inject 0.9 mLs (90 mg) Subcutaneous 2 times daily 16 mL 1     Fexofenadine HCl  (ALLERGY 24-HR PO) Take 180 mg by mouth daily        fluticasone (FLONASE) 50 MCG/ACT nasal spray Spray 1 spray into both nostrils daily       levothyroxine (SYNTHROID/LEVOTHROID) 200 MCG tablet Take 1 tablet (200 mcg) by mouth daily 90 tablet 3     lisinopril (ZESTRIL) 20 MG tablet Take  20 mg (1 tablet in am) and 10 mg  (one half tablet) in  pm 135 tablet 3     Multiple Vitamins-Minerals (CENTRUM) CHEW Take 1 tablet by mouth daily        nitroGLYcerin (NITROSTAT) 0.3 MG sublingual tablet For chest pain place 1 tablet under the tongue every 5 minutes for 3 doses. If symptoms persist 5 minutes after 1st dose call 911. 25 tablet 1     Psyllium (FIBER) 0.52 GM CAPS Take 2 capsules by mouth At Bedtime        sertraline (ZOLOFT) 50 MG tablet Take 1 tablet (50 mg) by mouth daily 90 tablet 3     simvastatin (ZOCOR) 20 MG tablet Take 1 tablet (20 mg) by mouth At Bedtime 90 tablet 3     spironolactone (ALDACTONE) 25 MG tablet Take half tablet (12.5 mg) once every morning. 15 tablet 3     warfarin ANTICOAGULANT (COUMADIN) 4 MG tablet Take one tablet (4 mg) by mouth daily except take one a half tablets (6 mg) Wed, Sat or as directed by INR Clinic 117 tablet 1       ALLERGIES   No Known Allergies    PAST MEDICAL HISTORY:  Past Medical History:   Diagnosis Date     Acne rosacea 04/22/2019     Acquired hypothyroidism 05/23/2003     Problem list name updated by automated process. Provider to review     Acute prostatitis      Acute sinusitis, unspecified      Aortic valve disease      Biatrial enlargement      CAD (coronary artery disease)     known 70% LAD stenosis     Congestive heart failure (H)      Depression      GI bleeding 01/22/2016     History of bleeding peptic ulcer      Hyperlipidemia      Idiopathic cardiomyopathy (H)      Mitral regurgitation      Obesity      Palpitations      Permanent atrial fibrillation (H) 06/02/2017     Right inguinal hernia 07/22/2019     S/P AVR 01/07/2011    with Maze procedure     S/P  gastric bypass      Tricuspid regurgitation      Unspecified essential hypertension        PAST SURGICAL HISTORY:  Past Surgical History:   Procedure Laterality Date     ANGIOGRAM  02/01/2002    Normal coronary,dilated LV,Sev.decr.global LVF,+1 MR     ANGIOGRAM  01/01/2008    Mild AS,Mod PHTN,mod prox.LAD disease,Triv.CFX disease     ANGIOGRAM  01/01/2009    Mod AS,Mod PHTN,mod prox.LAD disease     ANGIOGRAM  10/01/2010    Sev.AS,CM,global hypokinesis,Mild-mod LV dilated,Mild MR,70% prox.LAD lesion,PHTN     CARDIOVERSION  2/02, 4/02, 4/11     CHOLECYSTECTOMY  2006     COLONOSCOPY N/A 01/23/2016    Procedure: COLONOSCOPY;  Surgeon: Robyn Collins MD;  Location:  GI     CV CORONARY ANGIOGRAM N/A 1/31/2022    Procedure: Coronary Angiogram;  Surgeon: Rome Mike MD;  Location:  HEART CARDIAC CATH LAB     CV INSTANTANEOUS WAVE-FREE RATIO N/A 1/31/2022    Procedure: Instantaneous Wave-Free Ratio;  Surgeon: Rome Mike MD;  Location:  HEART CARDIAC CATH LAB     CV RIGHT HEART CATH MEASUREMENTS RECORDED N/A 1/31/2022    Procedure: Right Heart Cath;  Surgeon: Rome Mike MD;  Location:  HEART CARDIAC CATH LAB     ESOPHAGOSCOPY, GASTROSCOPY, DUODENOSCOPY (EGD), COMBINED N/A 01/23/2016    Procedure: COMBINED ESOPHAGOSCOPY, GASTROSCOPY, DUODENOSCOPY (EGD);  Surgeon: Robyn Collins MD;  Location:  GI     EXCISE MASS GROIN Right 2/11/2021    Procedure: INCISION AND DRAINAGE OF RIGHT GROIN HEMATOMA;  Surgeon: Norma Scanlon MD;  Location: [facility] OR     HERNIORRHAPHY INGUINAL Right 2/4/2021    Procedure: OPEN RIGHT INGUINAL HENRIA WITH MESH;  Surgeon: Jaden Alex MD;  Location: [facility] OR     LAPAROSCOPIC BYPASS GASTRIC  10/08/2011    Procedure:LAPAROSCOPIC BYPASS GASTRIC; Diagnostic laparoscopy, Lysis of Adhesions, Laparoscopic Revision of Jejunojejunostomy; Surgeon:RADHA MURO; Location: OR     LAPAROSCOPIC BYPASS GASTRIC  06/26/2006     Dr Jin     LAPAROSCOPY DIAGNOSTIC (GENERAL)  10/08/2011    Procedure:LAPAROSCOPY DIAGNOSTIC (GENERAL); Surgeon:RADHA MURO; Location:[facility] OR     REPLACE VALVE AORTIC  01/07/2011     ZZC NONSPECIFIC PROCEDURE  ~1985    vein stripping       FAMILY HISTORY:  Family History   Problem Relation Age of Onset     Cancer - colorectal Father      Colon Cancer Father      Cancer Father      Diabetes Brother        SOCIAL HISTORY:  Social History     Socioeconomic History     Marital status:      Spouse name: Tracey     Number of children: 0     Years of education: None     Highest education level: None   Occupational History     Occupation:      Comment: [employer]   Tobacco Use     Smoking status: Never Smoker     Smokeless tobacco: Never Used   Substance and Sexual Activity     Alcohol use: No     Alcohol/week: 0.0 standard drinks     Drug use: No     Sexual activity: Not Currently     Partners: Female   Other Topics Concern     Parent/sibling w/ CABG, MI or angioplasty before 65F 55M? Yes      Service Not Asked     Blood Transfusions No     Caffeine Concern Yes     Comment: 1 can daily     Occupational Exposure Yes     Hobby Hazards No     Sleep Concern No     Stress Concern No     Weight Concern No     Special Diet Yes     Comment: low sodium, low calories     Back Care No     Exercise Yes     Comment: walks daily     Bike Helmet Not Asked     Comment: NA     Seat Belt Yes     Self-Exams Yes   Social History Narrative     None     Social Determinants of Health     Financial Resource Strain: Not on file   Food Insecurity: Not on file   Transportation Needs: Not on file   Physical Activity: Not on file   Stress: Not on file   Social Connections: Not on file   Intimate Partner Violence: Not on file   Housing Stability: Not on file       Review of Systems:  Skin:  Negative     Eyes:  Positive for glasses  ENT:  Negative    Respiratory:  Negative    Cardiovascular:  Negative Positive  "for;palpitations;edema  Gastroenterology: Negative    Genitourinary:  Positive for prostate problem  Musculoskeletal:  Negative    Neurologic:  Negative    Psychiatric:  Negative    Heme/Lymph/Imm:  Positive for allergies  Endocrine:  Positive for thyroid disorder    Physical Exam:  Vitals: /64   Pulse 73   Ht 1.765 m (5' 9.5\")   Wt 87.5 kg (193 lb)   SpO2 96%   BMI 28.09 kg/m       GEN:  NAD  NECK: No JVD  C/V:  Regular rate and rhythm; 2/6 holosystolic murmur at LSB.  RESP: Clear to auscultation bilaterally without wheezing, rales, or rhonchi.  GI: Abdomen soft, nontender, nondistended.  EXTREM: No pitting LE edema.   NEURO: Alert and oriented, cooperative. No obvious focal deficits.   PSYCH: Normal affect.  SKIN: Warm and dry.       Recent Lab Results:  LIPID RESULTS:  Lab Results   Component Value Date    CHOL 99 08/09/2021    CHOL 109 03/13/2020    HDL 43 08/09/2021    HDL 51 03/13/2020    LDL 46 08/09/2021    LDL 49 03/13/2020    TRIG 48 08/09/2021    TRIG 45 03/13/2020    CHOLHDLRATIO 2.5 02/16/2015       LIVER ENZYME RESULTS:  Lab Results   Component Value Date    AST 23 08/09/2021    AST 28 02/10/2021    ALT 18 08/09/2021    ALT 23 02/10/2021       CBC RESULTS:  Lab Results   Component Value Date    WBC 4.0 01/31/2022    WBC 8.9 02/13/2021    RBC 4.12 (L) 01/31/2022    RBC 3.12 (L) 02/13/2021    HGB 11.5 (L) 01/31/2022    HGB 9.7 (L) 02/13/2021    HCT 38.3 (L) 01/31/2022    HCT 30.9 (L) 02/13/2021    MCV 93 01/31/2022    MCV 99 02/13/2021    MCH 27.9 01/31/2022    MCH 31.1 02/13/2021    MCHC 30.0 (L) 01/31/2022    MCHC 31.4 (L) 02/13/2021    RDW 17.6 (H) 01/31/2022    RDW 15.2 (H) 02/13/2021     01/31/2022     02/13/2021       BMP RESULTS:  Lab Results   Component Value Date     01/31/2022     02/12/2021    POTASSIUM 4.4 01/31/2022    POTASSIUM 4.4 02/12/2021    CHLORIDE 106 01/31/2022    CHLORIDE 107 02/12/2021    CO2 33 (H) 01/31/2022    CO2 30 02/12/2021    ANIONGAP " <1 (L) 01/31/2022    ANIONGAP 2 (L) 02/12/2021    GLC 90 01/31/2022     (H) 02/12/2021    BUN 15 01/31/2022    BUN 31 (H) 02/12/2021    CR 0.78 01/31/2022    CR 0.76 02/12/2021    GFRESTIMATED >90 01/31/2022    GFRESTIMATED >90 02/12/2021    GFRESTBLACK >90 02/12/2021    REECE 8.5 01/31/2022    REECE 8.8 02/12/2021        A1C RESULTS:  Lab Results   Component Value Date    A1C 5.8 (H) 02/04/2019       INR RESULTS:  Lab Results   Component Value Date    INR 2.3 (H) 02/10/2022    INR 2.1 (H) 02/07/2022    INR 1.45 (H) 01/31/2022    INR 3.10 (H) 06/22/2021    INR 3.40 (H) 05/13/2021           Ingrid Alas PA-C  February 10, 2022

## 2022-02-11 ENCOUNTER — ANTICOAGULATION THERAPY VISIT (OUTPATIENT)
Dept: ANTICOAGULATION | Facility: CLINIC | Age: 67
End: 2022-02-11

## 2022-02-11 ENCOUNTER — LAB (OUTPATIENT)
Dept: LAB | Facility: CLINIC | Age: 67
End: 2022-02-11
Payer: COMMERCIAL

## 2022-02-11 ENCOUNTER — TELEPHONE (OUTPATIENT)
Dept: CARDIOLOGY | Facility: CLINIC | Age: 67
End: 2022-02-11
Payer: COMMERCIAL

## 2022-02-11 DIAGNOSIS — I50.812 CHRONIC RIGHT-SIDED HEART FAILURE (H): ICD-10-CM

## 2022-02-11 DIAGNOSIS — I48.21 PERMANENT ATRIAL FIBRILLATION (H): ICD-10-CM

## 2022-02-11 DIAGNOSIS — I70.90 ASVD (ARTERIOSCLEROTIC VASCULAR DISEASE): ICD-10-CM

## 2022-02-11 LAB — INR BLD: 3.3 (ref 0.9–1.1)

## 2022-02-11 PROCEDURE — 36415 COLL VENOUS BLD VENIPUNCTURE: CPT

## 2022-02-11 PROCEDURE — 85610 PROTHROMBIN TIME: CPT

## 2022-02-11 RX ORDER — LISINOPRIL 20 MG/1
20 TABLET ORAL DAILY
Qty: 135 TABLET | Refills: 3 | COMMUNITY
Start: 2022-02-11 | End: 2023-02-16

## 2022-02-11 RX ORDER — SPIRONOLACTONE 25 MG/1
25 TABLET ORAL DAILY
Qty: 90 TABLET | Refills: 3 | Status: SHIPPED | OUTPATIENT
Start: 2022-02-11 | End: 2022-03-22

## 2022-02-11 NOTE — TELEPHONE ENCOUNTER
----- Message from Ingrid Alas PA-C sent at 2/11/2022 10:37 AM CST -----  BMP is stable. Please have patient stop afternoon dose of lisinopril and increase spironolactone to 1 full tablet (25 mg) once a day. Repeat BMP and follow up with me in 2 weeks.     Thanks,     Ingrid      Spoke to patient, reviewed BMP and message from Ingrid as above. Pt verbalized understanding and was agreeable to plan. New Rx sent for spironolactone. Orders placed for follow up and message sent to scheduling to arrange.

## 2022-02-11 NOTE — PROGRESS NOTES
ANTICOAGULATION MANAGEMENT     Jovon Mantilla 66 year old male is on warfarin with therapeutic INR result. (Goal INR 2.5-3.5)    Recent labs: (last 7 days)     02/11/22  1454   INR 3.3*       ASSESSMENT     Source(s): Chart Review and Patient/Caregiver Call       Warfarin doses taken: Warfarin taken as instructed    Diet: No new diet changes identified    New illness, injury, or hospitalization: No    Medication/supplement changes: None noted    Signs or symptoms of bleeding or clotting: No    Previous INR: Subtherapeutic    Additional findings: INR was slow to rise after warfarin interruption for a procedure. INR in range today. Pt stopped lovenox yesterday.      PLAN     Recommended plan for no diet, medication or health factor changes affecting INR     Dosing Instructions: Continue your current warfarin dose with next INR in 1 week       Summary  As of 2/11/2022    Full warfarin instructions:  6 mg every Mon, Wed, Sat; 4 mg all other days   Next INR check:               Telephone call with Jovon who verbalizes understanding and agrees to plan    Lab visit scheduled    Education provided: Importance of notifying clinic for changes in medications; a sooner lab recheck maybe needed. and Contact [phone]  with any changes, questions or concerns.     Plan made per [facility] anticoagulation protocol    Lindsay Espinal RN  Anticoagulation Clinic  2/11/2022    _______________________________________________________________________     Anticoagulation Episode Summary     Current INR goal:  2.5-3.5   TTR:  81.6 % (12 mo)   Target end date:  Indefinite   Send INR reminders to:  [facility] [place]    Indications    S/P AVR (aortic valve replacement) [Z95.2]  Permanent atrial fibrillation (H) [I48.21]  Long term current use of anticoagulants with INR goal of 2.5-3.5 [Z79.01]  Abdominal wall hematoma  initial encounter [S30.1XXA]           Comments:  24mm ATS Valve         Anticoagulation Care Providers     Provider Role  Specialty Phone number    Rehan Lorenzana MD Referring Family Medicine [phone]    Ramana Redd MD Referring Student in organized health care education/training program [phone]

## 2022-02-17 ENCOUNTER — MYC MEDICAL ADVICE (OUTPATIENT)
Dept: FAMILY MEDICINE | Facility: CLINIC | Age: 67
End: 2022-02-17
Payer: COMMERCIAL

## 2022-02-18 ENCOUNTER — LAB (OUTPATIENT)
Dept: LAB | Facility: CLINIC | Age: 67
End: 2022-02-18
Payer: COMMERCIAL

## 2022-02-18 ENCOUNTER — DOCUMENTATION ONLY (OUTPATIENT)
Dept: ANTICOAGULATION | Facility: CLINIC | Age: 67
End: 2022-02-18

## 2022-02-18 ENCOUNTER — ANTICOAGULATION THERAPY VISIT (OUTPATIENT)
Dept: ANTICOAGULATION | Facility: CLINIC | Age: 67
End: 2022-02-18

## 2022-02-18 DIAGNOSIS — I48.21 PERMANENT ATRIAL FIBRILLATION (H): ICD-10-CM

## 2022-02-18 DIAGNOSIS — Z95.2 S/P AVR (AORTIC VALVE REPLACEMENT): Primary | ICD-10-CM

## 2022-02-18 DIAGNOSIS — Z79.01 LONG TERM CURRENT USE OF ANTICOAGULANT THERAPY: ICD-10-CM

## 2022-02-18 DIAGNOSIS — Z79.01 LONG TERM CURRENT USE OF ANTICOAGULANTS WITH INR GOAL OF 2.5-3.5: ICD-10-CM

## 2022-02-18 DIAGNOSIS — S30.1XXA ABDOMINAL WALL HEMATOMA, INITIAL ENCOUNTER: ICD-10-CM

## 2022-02-18 LAB — INR BLD: 2.2 (ref 0.9–1.1)

## 2022-02-18 PROCEDURE — 36415 COLL VENOUS BLD VENIPUNCTURE: CPT

## 2022-02-18 PROCEDURE — 85610 PROTHROMBIN TIME: CPT

## 2022-02-18 NOTE — PROGRESS NOTES
ANTICOAGULATION MANAGEMENT      Jovon Mantilla due for annual renewal of referral to anticoagulation monitoring. Order pended for your review and signature.      ANTICOAGULATION SUMMARY      Warfarin indication(s)     Atrial fibrillation  Heart Valve Replacement    Heart valve present?  AVR with mechanical valve in 2011 w/ Dr. Prado, 24 mm ATS       Current goal range   INR: 2.5-3.5     Goal appropriate for indication? Yes, INR 2.5-3.5 appropriate for hx  Mechanical MVR, Mechanical AVR with risk factors or older generation (Brannon-Shiley (Tilting disc), Quispe-Mendez (Caged Ball) or Monoleaflet valve)     Current duration of therapy Indefinite/long term therapy   Time in Therapeutic Range (TTR)  (Goal > 60%) 81.3%       Office visit with referring provider's group within last year yes on 11/11/21       Isabel Cheney RN

## 2022-02-18 NOTE — PROGRESS NOTES
ANTICOAGULATION MANAGEMENT     Jovon Mantilla 66 year old male is on warfarin with subtherapeutic INR result. (Goal INR 2.5-3.5)    Recent labs: (last 7 days)     02/18/22  0907   INR 2.2*       ASSESSMENT     Source(s): Chart Review and Patient/Caregiver Call       Warfarin doses taken: Warfarin taken as instructed    Diet: No new diet changes identified    New illness, injury, or hospitalization: Cold symptoms within the last few days that are slowly improving.    Medication/supplement changes: Nyquil prn to help with cold symptoms.  Per Up to Date, this can increase INR if taken for an extended period of time.    Signs or symptoms of bleeding or clotting: No    Previous INR: Therapeutic last visit; previously outside of goal range. Prior to holding his warfarin for his angiogram, INR was running sub therapeutic, or borderline therapeutic.  INR dropped significantly within the last week after resuming his maintenance dose, so adjustment was made today.    Additional findings: None     PLAN     Recommended plan for no diet, medication or health factor changes affecting INR     Dosing Instructions: Booster dose then Increase your warfarin dose (6% change) with next INR in 1 week       Summary  As of 2/18/2022    Full warfarin instructions:  2/18: 8 mg; Otherwise 4 mg every Sun, Tue, Fri; 6 mg all other days   Next INR check:  2/25/2022             Telephone call with Jovon who agrees to plan and repeated back plan correctly    Lab visit scheduled    Education provided: Please call back if any changes to your diet, medications or how you've been taking warfarin    Plan made per [facility] anticoagulation protocol    Isabel Cheney RN  Anticoagulation Clinic  2/18/2022    _______________________________________________________________________     Anticoagulation Episode Summary     Current INR goal:  2.5-3.5   TTR:  81.3 % (1 y)   Target end date:  Indefinite   Send INR reminders to:  [facility] [place]    Indications     S/P AVR (aortic valve replacement) [Z95.2]  Permanent atrial fibrillation (H) [I48.21]  Long term current use of anticoagulants with INR goal of 2.5-3.5 [Z79.01]  Abdominal wall hematoma  initial encounter [S30.1XXA]           Comments:  24mm ATS Valve         Anticoagulation Care Providers     Provider Role Specialty Phone number    Rehan Lorenzana MD Referring Family Medicine [phone]    Ramana Redd MD Referring Student in organized health care education/training program [phone]

## 2022-02-25 ENCOUNTER — ANTICOAGULATION THERAPY VISIT (OUTPATIENT)
Dept: ANTICOAGULATION | Facility: CLINIC | Age: 67
End: 2022-02-25

## 2022-02-25 ENCOUNTER — LAB (OUTPATIENT)
Dept: LAB | Facility: CLINIC | Age: 67
End: 2022-02-25
Payer: COMMERCIAL

## 2022-02-25 DIAGNOSIS — S30.1XXA ABDOMINAL WALL HEMATOMA, INITIAL ENCOUNTER: ICD-10-CM

## 2022-02-25 DIAGNOSIS — I70.90 ASVD (ARTERIOSCLEROTIC VASCULAR DISEASE): ICD-10-CM

## 2022-02-25 DIAGNOSIS — Z79.01 LONG TERM CURRENT USE OF ANTICOAGULANTS WITH INR GOAL OF 2.5-3.5: ICD-10-CM

## 2022-02-25 DIAGNOSIS — Z79.01 LONG TERM CURRENT USE OF ANTICOAGULANT THERAPY: ICD-10-CM

## 2022-02-25 DIAGNOSIS — I48.21 PERMANENT ATRIAL FIBRILLATION (H): ICD-10-CM

## 2022-02-25 DIAGNOSIS — Z95.2 S/P AVR (AORTIC VALVE REPLACEMENT): Primary | ICD-10-CM

## 2022-02-25 DIAGNOSIS — I50.812 CHRONIC RIGHT-SIDED HEART FAILURE (H): ICD-10-CM

## 2022-02-25 LAB
ANION GAP SERPL CALCULATED.3IONS-SCNC: 2 MMOL/L (ref 3–14)
BUN SERPL-MCNC: 24 MG/DL (ref 7–30)
CALCIUM SERPL-MCNC: 9.3 MG/DL (ref 8.5–10.1)
CHLORIDE BLD-SCNC: 105 MMOL/L (ref 94–109)
CO2 SERPL-SCNC: 33 MMOL/L (ref 20–32)
CREAT SERPL-MCNC: 0.98 MG/DL (ref 0.66–1.25)
GFR SERPL CREATININE-BSD FRML MDRD: 86 ML/MIN/1.73M2
GLUCOSE BLD-MCNC: 113 MG/DL (ref 70–99)
INR BLD: 1.9 (ref 0.9–1.1)
POTASSIUM BLD-SCNC: 4.4 MMOL/L (ref 3.4–5.3)
SODIUM SERPL-SCNC: 140 MMOL/L (ref 133–144)

## 2022-02-25 PROCEDURE — 36415 COLL VENOUS BLD VENIPUNCTURE: CPT

## 2022-02-25 PROCEDURE — 80048 BASIC METABOLIC PNL TOTAL CA: CPT

## 2022-02-25 PROCEDURE — 85610 PROTHROMBIN TIME: CPT

## 2022-02-25 NOTE — PROGRESS NOTES
ANTICOAGULATION MANAGEMENT     Jovon Mantilla 66 year old male is on warfarin with subtherapeutic INR result. (Goal INR 2.5-3.5)    Recent labs: (last 7 days)     02/25/22  1103   INR 1.9*       ASSESSMENT     Source(s): Chart Review and Patient/Caregiver Call       Warfarin doses taken: Warfarin taken as instructed    Diet: No new diet changes identified    New illness, injury, or hospitalization: No    Medication/supplement changes: None noted    Signs or symptoms of bleeding or clotting: Continues to have bruising from lovenox injections, but resolving.    Previous INR: Subtherapeutic    Additional findings: None     PLAN     Recommended plan for ongoing change(s) affecting INR     Dosing Instructions: Booster dose then Increase your warfarin dose (16.7% change) with next INR in 1 week       Summary  As of 2/25/2022    Full warfarin instructions:  2/25: 8 mg; Otherwise 6 mg every day   Next INR check:  3/4/2022             Telephone call with Jovon who agrees to plan and repeated back plan correctly    Lab visit scheduled    Education provided: Please call back if any changes to your diet, medications or how you've been taking warfarin and Monitoring for clotting signs and symptoms    Plan made per [facility] anticoagulation protocol    Ashley Garza RN  Anticoagulation Clinic  2/25/2022    _______________________________________________________________________     Anticoagulation Episode Summary     Current INR goal:  2.5-3.5   TTR:  81.1 % (1 y)   Target end date:  Indefinite   Send INR reminders to:  [facility] [place]    Indications    S/P AVR (aortic valve replacement) [Z95.2]  Permanent atrial fibrillation (H) [I48.21]  Long term current use of anticoagulants with INR goal of 2.5-3.5 [Z79.01]  Abdominal wall hematoma  initial encounter [S30.1XXA]  Long-term (current) use of anticoagulants [Z79.01] [Z79.01]           Comments:  24mm ATS Valve         Anticoagulation Care Providers     Provider Role Specialty  Phone number    Rehan Lorenzana MD Referring Family Medicine [phone]    Ramana Redd MD Referring Student in organized health care education/training program [phone]

## 2022-03-02 ENCOUNTER — LAB (OUTPATIENT)
Dept: LAB | Facility: CLINIC | Age: 67
End: 2022-03-02
Payer: COMMERCIAL

## 2022-03-02 ENCOUNTER — OFFICE VISIT (OUTPATIENT)
Dept: CARDIOLOGY | Facility: CLINIC | Age: 67
End: 2022-03-02
Attending: PHYSICIAN ASSISTANT
Payer: COMMERCIAL

## 2022-03-02 VITALS
DIASTOLIC BLOOD PRESSURE: 87 MMHG | SYSTOLIC BLOOD PRESSURE: 122 MMHG | HEIGHT: 70 IN | BODY MASS INDEX: 27.79 KG/M2 | HEART RATE: 73 BPM | WEIGHT: 194.1 LBS

## 2022-03-02 DIAGNOSIS — I70.90 ASVD (ARTERIOSCLEROTIC VASCULAR DISEASE): ICD-10-CM

## 2022-03-02 DIAGNOSIS — I48.20 CHRONIC ATRIAL FIBRILLATION (H): ICD-10-CM

## 2022-03-02 DIAGNOSIS — I50.812 CHRONIC RIGHT-SIDED HEART FAILURE (H): ICD-10-CM

## 2022-03-02 DIAGNOSIS — R06.09 DOE (DYSPNEA ON EXERTION): ICD-10-CM

## 2022-03-02 DIAGNOSIS — Z95.2 S/P AORTIC VALVE REPLACEMENT: ICD-10-CM

## 2022-03-02 DIAGNOSIS — I42.0 DILATED CARDIOMYOPATHY (H): Primary | ICD-10-CM

## 2022-03-02 DIAGNOSIS — I25.10 CORONARY ARTERY DISEASE INVOLVING NATIVE CORONARY ARTERY OF NATIVE HEART WITHOUT ANGINA PECTORIS: ICD-10-CM

## 2022-03-02 DIAGNOSIS — I25.10 CORONARY ARTERY DISEASE INVOLVING NATIVE CORONARY ARTERY OF NATIVE HEART WITHOUT ANGINA PECTORIS: Primary | ICD-10-CM

## 2022-03-02 DIAGNOSIS — I34.0 NONRHEUMATIC MITRAL VALVE REGURGITATION: ICD-10-CM

## 2022-03-02 LAB
ANION GAP SERPL CALCULATED.3IONS-SCNC: 3 MMOL/L (ref 3–14)
BUN SERPL-MCNC: 20 MG/DL (ref 7–30)
CALCIUM SERPL-MCNC: 9.6 MG/DL (ref 8.5–10.1)
CHLORIDE BLD-SCNC: 101 MMOL/L (ref 94–109)
CO2 SERPL-SCNC: 32 MMOL/L (ref 20–32)
CREAT SERPL-MCNC: 0.8 MG/DL (ref 0.66–1.25)
GFR SERPL CREATININE-BSD FRML MDRD: >90 ML/MIN/1.73M2
GLUCOSE BLD-MCNC: 111 MG/DL (ref 70–99)
POTASSIUM BLD-SCNC: 4 MMOL/L (ref 3.4–5.3)
SODIUM SERPL-SCNC: 136 MMOL/L (ref 133–144)

## 2022-03-02 PROCEDURE — 80048 BASIC METABOLIC PNL TOTAL CA: CPT | Performed by: PHYSICIAN ASSISTANT

## 2022-03-02 PROCEDURE — 36415 COLL VENOUS BLD VENIPUNCTURE: CPT | Performed by: PHYSICIAN ASSISTANT

## 2022-03-02 PROCEDURE — 99214 OFFICE O/P EST MOD 30 MIN: CPT | Performed by: PHYSICIAN ASSISTANT

## 2022-03-02 RX ORDER — FUROSEMIDE 20 MG
20 TABLET ORAL DAILY
Qty: 30 TABLET | Refills: 3 | Status: SHIPPED | OUTPATIENT
Start: 2022-03-02 | End: 2022-05-27

## 2022-03-02 NOTE — PROGRESS NOTES
Primary Cardiologist: Dr. Cooley    Reason for Visit: 2 week follow up with repeat BMP    History of Present Illness:   Jovon is a pleasant 66 year old male with past medical history notable for:     # Chronic RHF  (Class I; Stage B- spironolactone recently increased to 25 mg as recent RHC showed increased pressures)  # Hx of Severe Bicuspid Aortic Stenosis (s/p AVR with mechanical valve in 2011 w/ Dr. Prado, 24 mm ATS; on warfarin with INR goal of 2.5-3.5; most recent echo showed good function)  # Persistent Atrial Fibrillation (s/p MAZE procedure but A fib recurred; rate control w/ BB and digoxin; on warfarin for VTE prophylaxis)  # CAD (70% stenosis of the LAD noted at the time of his AVR but it was not bypassed as it was completely intramyocardial; exercise nuclear stress scan in 2018 showed no significant ischemia)   #  Moderate to severe RV Dysfunction with hx of nonischemic dilated CM (was noted previously; unclear etiology at this time; on BB, Acei, and spironolactone)  # Moderate MR (recent CADE and stress echo showed moderate MR; serial echo imaging recommended at this time)  # Hypertension (at goal)  # Hyperlipidemia (LDL of 46 on simvastatin 20 mg)  # Vein Insufficiency (s/p vein stripping)  # Hypothyroidism   # Hx of GI bleed  # Hx of Obesity (s/p gastric bypass)     Jovon presents to clinic today, stating he feels well.  He is tolerating the recent increase in spironolactone.  He reports that prior to starting spironolactone his weight was 200 pounds at home now it is down to 186 pounds.  He denies lightheadedness, orthopnea, peripheral edema, abdominal distention, chest discomfort, shortness of breath.  He has noticed that since he increased spironolactone from 12.5 mg to 25 mg he has been experiencing nocturnal diaphoresis.  He is not sure if this is medication because or not.    Assessment and Plan:  Jovon is a pleasant 66 year old male with past medical history notable for:     # Chronic RHF  (Class  I; Stage B- spironolactone recently increased to 25 mg as recent RHC showed increased pressures)  # Hx of Severe Bicuspid Aortic Stenosis (s/p AVR with mechanical valve in 2011 w/ Dr. Prado, 24 mm ATS; on warfarin with INR goal of 2.5-3.5; most recent echo showed good function)  # Persistent Atrial Fibrillation (s/p MAZE procedure but A fib recurred; rate control w/ BB and digoxin; on warfarin for VTE prophylaxis)  # CAD (70% stenosis of the LAD noted at the time of his AVR but it was not bypassed as it was completely intramyocardial; exercise nuclear stress scan in 2018 showed no significant ischemia)   #  Moderate to severe RV Dysfunction with hx of nonischemic dilated CM (was noted previously; unclear etiology at this time; on BB, Acei, and spironolactone)  # Moderate MR (recent CADE and stress echo showed moderate MR; serial echo imaging recommended at this time)  # Hypertension (at goal)  # Hyperlipidemia (LDL of 46 on simvastatin 20 mg)  # Vein Insufficiency (s/p vein stripping)  # Hypothyroidism   # Hx of GI bleed  # Hx of Obesity (s/p gastric bypass)    I am not sure if his nighttime symptoms are due to the increase in spironolactone but we will try with him lowering back down to 12.5 mg for 1 week.  If he does not notice a significant change then he will resume the original dose.  In addition to spironolactone I suspect he will need further diuresis with a loop diuretic.  I will have him start taking furosemide 20 mg once every morning.  Despite the decrease in home weights here his weight seems to be about the same.  At the time of his right heart catheterization his weight was 198 pounds.  If he develops lightheadedness, cramping, or excessive thirst he will contact our clinic.  I will see him back in 3 to 4 weeks with repeat BMP.  During the follow-up visit we will also discuss possible polysomnography to rule out LINDA.    35  minutes spent on the date of the encounter with chart review, patient visit,  care coordination, and documentation.      This note was completed in part using Dragon voice recognition software. Although reviewed after completion, some word and grammatical errors may occur.    Orders this Visit:  No orders of the defined types were placed in this encounter.    Orders Placed This Encounter   Medications     furosemide (LASIX) 20 MG tablet     Sig: Take 1 tablet (20 mg) by mouth daily     Dispense:  30 tablet     Refill:  3     Medications Discontinued During This Encounter   Medication Reason     enoxaparin ANTICOAGULANT (LOVENOX) 100 MG/ML syringe Medication Reconciliation Clean Up         Encounter Diagnoses   Name Primary?     ASVD (arteriosclerotic vascular disease)      Chronic right-sided heart failure (H)      Coronary artery disease involving native coronary artery of native heart without angina pectoris Yes       CURRENT MEDICATIONS:  Current Outpatient Medications   Medication Sig Dispense Refill     Ascorbic Acid (VITAMIN C PO) Take 500 mg by mouth At Bedtime       aspirin 81 MG tablet Take 81 mg by mouth every other day       Calcium Carbonate-Vitamin D (CALCIUM 600+D) 600-200 MG-UNIT TABS Take 1 tablet by mouth 2 times daily        carvedilol (COREG) 25 MG tablet Take 1 tablet (25 mg) by mouth 2 times daily (with meals) 180 tablet 1     Cholecalciferol (VITAMIN D) 2000 UNIT CAPS Take 1 tablet by mouth daily        Cyanocobalamin (VITAMIN B-12) 500 MCG SUBL Place 500 mcg under the tongue daily        digoxin (LANOXIN) 125 MCG tablet Take 1 tablet (125 mcg) by mouth daily 90 tablet 3     Fexofenadine HCl (ALLERGY 24-HR PO) Take 180 mg by mouth daily        fluticasone (FLONASE) 50 MCG/ACT nasal spray Spray 1 spray into both nostrils daily       furosemide (LASIX) 20 MG tablet Take 1 tablet (20 mg) by mouth daily 30 tablet 3     levothyroxine (SYNTHROID/LEVOTHROID) 200 MCG tablet Take 1 tablet (200 mcg) by mouth daily 90 tablet 3     lisinopril (ZESTRIL) 20 MG tablet Take 1 tablet (20  mg) by mouth daily 135 tablet 3     Multiple Vitamins-Minerals (CENTRUM) CHEW Take 1 tablet by mouth daily        nitroGLYcerin (NITROSTAT) 0.3 MG sublingual tablet For chest pain place 1 tablet under the tongue every 5 minutes for 3 doses. If symptoms persist 5 minutes after 1st dose call 911. 25 tablet 1     Psyllium (FIBER) 0.52 GM CAPS Take 2 capsules by mouth At Bedtime        sertraline (ZOLOFT) 50 MG tablet Take 1 tablet (50 mg) by mouth daily 90 tablet 3     simvastatin (ZOCOR) 20 MG tablet Take 1 tablet (20 mg) by mouth At Bedtime 90 tablet 3     spironolactone (ALDACTONE) 25 MG tablet Take 1 tablet (25 mg) by mouth daily 90 tablet 3     warfarin ANTICOAGULANT (COUMADIN) 4 MG tablet Take one tablet (4 mg) by mouth daily except take one a half tablets (6 mg) Wed, Sat or as directed by INR Clinic 117 tablet 1       ALLERGIES   No Known Allergies    PAST MEDICAL HISTORY:  Past Medical History:   Diagnosis Date     Acne rosacea 04/22/2019     Acquired hypothyroidism 05/23/2003     Problem list name updated by automated process. Provider to review     Acute prostatitis      Acute sinusitis, unspecified      Aortic valve disease      Biatrial enlargement      CAD (coronary artery disease)     known 70% LAD stenosis     Congestive heart failure (H)      Depression      GI bleeding 01/22/2016     History of bleeding peptic ulcer      Hyperlipidemia      Idiopathic cardiomyopathy (H)      Mitral regurgitation      Obesity      Palpitations      Permanent atrial fibrillation (H) 06/02/2017     Right inguinal hernia 07/22/2019     S/P AVR 01/07/2011    with Maze procedure     S/P gastric bypass      Tricuspid regurgitation      Unspecified essential hypertension        PAST SURGICAL HISTORY:  Past Surgical History:   Procedure Laterality Date     ANGIOGRAM  02/01/2002    Normal coronary,dilated LV,Sev.decr.global LVF,+1 MR     ANGIOGRAM  01/01/2008    Mild AS,Mod PHTN,mod prox.LAD disease,Triv.CFX disease      ANGIOGRAM  01/01/2009    Mod AS,Mod PHTN,mod prox.LAD disease     ANGIOGRAM  10/01/2010    Sev.AS,CM,global hypokinesis,Mild-mod LV dilated,Mild MR,70% prox.LAD lesion,PHTN     CARDIOVERSION  2/02, 4/02, 4/11     CHOLECYSTECTOMY  2006     COLONOSCOPY N/A 01/23/2016    Procedure: COLONOSCOPY;  Surgeon: Robyn Collins MD;  Location:  GI     CV CORONARY ANGIOGRAM N/A 1/31/2022    Procedure: Coronary Angiogram;  Surgeon: Rome Mike MD;  Location:  HE[facility]ATH [facility]     CV INSTANTANEOUS WAVE-FREE RATIO N/A 1/31/2022    Procedure: Instantaneous Wave-Free Ratio;  Surgeon: Rome iMke MD;  Location:  HE[facility]ATH [facility]     CV RIGHT HEART CATH MEASUREMENTS RECORDED N/A 1/31/2022    Procedure: Right Heart Cath;  Surgeon: Rome Mike MD;  Location:  HE[facility]ATH [facility]     ESOPHAGOSCOPY, GASTROSCOPY, DUODENOSCOPY (EGD), COMBINED N/A 01/23/2016    Procedure: COMBINED ESOPHAGOSCOPY, GASTROSCOPY, DUODENOSCOPY (EGD);  Surgeon: Robyn Collins MD;  Location:  GI     EXCISE MASS GROIN Right 2/11/2021    Procedure: INCISION AND DRAINAGE OF RIGHT GROIN HEMATOMA;  Surgeon: Norma Scanlon MD;  Location: [facility] OR     HERNIORRHAPHY INGUINAL Right 2/4/2021    Procedure: OPEN RIGHT INGUINAL HENRIA WITH MESH;  Surgeon: Jaden Alex MD;  Location: [facility] OR     LAPAROSCOPIC BYPASS GASTRIC  10/08/2011    Procedure:LAPAROSCOPIC BYPASS GASTRIC; Diagnostic laparoscopy, Lysis of Adhesions, Laparoscopic Revision of Jejunojejunostomy; Surgeon:RADHA MURO; Location: OR     LAPAROSCOPIC BYPASS GASTRIC  06/26/2006    Dr Jin     LAPAROSCOPY DIAGNOSTIC (GENERAL)  10/08/2011    Procedure:LAPAROSCOPY DIAGNOSTIC (GENERAL); Surgeon:RADHA MURO; Location: OR     REPLACE VALVE AORTIC  01/07/2011     ZZC NONSPECIFIC PROCEDURE  ~1985    vein stripping       FAMILY HISTORY:  Family History   Problem Relation Age of Onset     Cancer - colorectal  "Father      Colon Cancer Father      Cancer Father      Diabetes Brother        SOCIAL HISTORY:  Social History     Socioeconomic History     Marital status:      Spouse name: Tracey     Number of children: 0     Years of education: None     Highest education level: None   Occupational History     Occupation:      Comment: [employer]   Tobacco Use     Smoking status: Never Smoker     Smokeless tobacco: Never Used   Substance and Sexual Activity     Alcohol use: No     Alcohol/week: 0.0 standard drinks     Drug use: No     Sexual activity: Not Currently     Partners: Female   Other Topics Concern     Parent/sibling w/ CABG, MI or angioplasty before 65F 55M? Yes      Service Not Asked     Blood Transfusions No     Caffeine Concern Yes     Comment: 1 can daily     Occupational Exposure Yes     Hobby Hazards No     Sleep Concern No     Stress Concern No     Weight Concern No     Special Diet Yes     Comment: low sodium, low calories     Back Care No     Exercise Yes     Comment: walks daily     Bike Helmet Not Asked     Comment: NA     Seat Belt Yes     Self-Exams Yes   Social History Narrative     None     Social Determinants of Health     Financial Resource Strain: Not on file   Food Insecurity: Not on file   Transportation Needs: Not on file   Physical Activity: Not on file   Stress: Not on file   Social Connections: Not on file   Intimate Partner Violence: Not on file   Housing Stability: Not on file       Review of Systems:  Skin:  Negative     Eyes:  Positive for glasses  ENT:  Negative    Respiratory:  Negative    Cardiovascular:    Positive for;palpitations  Gastroenterology: Negative    Genitourinary:  Positive for prostate problem  Musculoskeletal:  Positive for joint pain  Neurologic:  Positive for headaches  Psychiatric:  Negative    Heme/Lymph/Imm:  Negative    Endocrine:  Positive for thyroid disorder    Physical Exam:  Vitals: /87   Pulse 73   Ht 1.765 m (5' 9.5\")   Wt 88 kg " (194 lb 1.6 oz)   BMI 28.25 kg/m       GEN:  NAD  NECK: No JVD  C/V: Irregularly irregular rhythm with normal rate; 2/6 holosystolic murmur at left lower sternal border.  RESP: Clear to auscultation bilaterally without wheezing, rales, or rhonchi.  GI: Abdomen soft, nontender, nondistended.   EXTREM: No pitting LE edema.   NEURO: Alert and oriented, cooperative. No obvious focal deficits.   PSYCH: Normal affect.  SKIN: Warm and dry.       Recent Lab Results:  LIPID RESULTS:  Lab Results   Component Value Date    CHOL 99 08/09/2021    CHOL 109 03/13/2020    HDL 43 08/09/2021    HDL 51 03/13/2020    LDL 46 08/09/2021    LDL 49 03/13/2020    TRIG 48 08/09/2021    TRIG 45 03/13/2020    CHOLHDLRATIO 2.5 02/16/2015       LIVER ENZYME RESULTS:  Lab Results   Component Value Date    AST 23 08/09/2021    AST 28 02/10/2021    ALT 18 08/09/2021    ALT 23 02/10/2021       CBC RESULTS:  Lab Results   Component Value Date    WBC 4.0 01/31/2022    WBC 8.9 02/13/2021    RBC 4.12 (L) 01/31/2022    RBC 3.12 (L) 02/13/2021    HGB 11.5 (L) 01/31/2022    HGB 9.7 (L) 02/13/2021    HCT 38.3 (L) 01/31/2022    HCT 30.9 (L) 02/13/2021    MCV 93 01/31/2022    MCV 99 02/13/2021    MCH 27.9 01/31/2022    MCH 31.1 02/13/2021    MCHC 30.0 (L) 01/31/2022    MCHC 31.4 (L) 02/13/2021    RDW 17.6 (H) 01/31/2022    RDW 15.2 (H) 02/13/2021     01/31/2022     02/13/2021       BMP RESULTS:  Lab Results   Component Value Date     03/02/2022     02/12/2021    POTASSIUM 4.0 03/02/2022    POTASSIUM 4.4 02/12/2021    CHLORIDE 101 03/02/2022    CHLORIDE 107 02/12/2021    CO2 32 03/02/2022    CO2 30 02/12/2021    ANIONGAP 3 03/02/2022    ANIONGAP 2 (L) 02/12/2021     (H) 03/02/2022     (H) 02/12/2021    BUN 20 03/02/2022    BUN 31 (H) 02/12/2021    CR 0.80 03/02/2022    CR 0.76 02/12/2021    GFRESTIMATED >90 03/02/2022    GFRESTIMATED >90 02/12/2021    GFRESTBLACK >90 02/12/2021    REECE 9.6 03/02/2022    REECE 8.8  02/12/2021        A1C RESULTS:  Lab Results   Component Value Date    A1C 5.8 (H) 02/04/2019       INR RESULTS:  Lab Results   Component Value Date    INR 1.9 (H) 02/25/2022    INR 2.2 (H) 02/18/2022    INR 1.45 (H) 01/31/2022    INR 3.10 (H) 06/22/2021    INR 3.40 (H) 05/13/2021           Ingrid Alas PA-C, [facility]   March 2, 2022

## 2022-03-02 NOTE — PATIENT INSTRUCTIONS
Today's Plan:   1) Decrease spironolactone to half-tablet for 1 week to see if this improves your night time symptoms. If not, resume 1 full tablet once a day.   2) Start furosemide- take one tablet (20 mg) once a day in the morning.   3) Return to clinic in 4 weeks with blood work (no need to fast).   4) Call us if you have progressive lightheadedness/dizziness or excessive thirst which could indicate that you are losing too much water.   5) Keep checking your weight daily and bring your numbers with you to the next visit.     If you have questions or concerns please call my nurse team at [phone].     Scheduling phone number: [phone].  Reminder: Please bring in all current medications, over the counter supplements and vitamin bottles to your next appointment.    It was a pleasure seeing you today!     Ingrid Alas PA-C  3/2/2022

## 2022-03-02 NOTE — LETTER
3/2/2022    Rehan Lorenzana MD  [address] [facility] [address]    RE: Jovon FRANCIS Jose Rafael       Dear Colleague,     I had the pleasure of seeing Jovon TITO Mantilla in the [facility].  Primary Cardiologist: Dr. Cooley    Reason for Visit: 2 week follow up with repeat BMP    History of Present Illness:   Jovon is a pleasant 66 year old male with past medical history notable for:     # Chronic RHF  (Class I; Stage B- spironolactone recently increased to 25 mg as recent RHC showed increased pressures)  # Hx of Severe Bicuspid Aortic Stenosis (s/p AVR with mechanical valve in 2011 w/ Dr. Prado, 24 mm ATS; on warfarin with INR goal of 2.5-3.5; most recent echo showed good function)  # Persistent Atrial Fibrillation (s/p MAZE procedure but A fib recurred; rate control w/ BB and digoxin; on warfarin for VTE prophylaxis)  # CAD (70% stenosis of the LAD noted at the time of his AVR but it was not bypassed as it was completely intramyocardial; exercise nuclear stress scan in 2018 showed no significant ischemia)   #  Moderate to severe RV Dysfunction with hx of nonischemic dilated CM (was noted previously; unclear etiology at this time; on BB, Acei, and spironolactone)  # Moderate MR (recent CADE and stress echo showed moderate MR; serial echo imaging recommended at this time)  # Hypertension (at goal)  # Hyperlipidemia (LDL of 46 on simvastatin 20 mg)  # Vein Insufficiency (s/p vein stripping)  # Hypothyroidism   # Hx of GI bleed  # Hx of Obesity (s/p gastric bypass)     Jovon presents to clinic today, stating he feels well.  He is tolerating the recent increase in spironolactone.  He reports that prior to starting spironolactone his weight was 200 pounds at home now it is down to 186 pounds.  He denies lightheadedness, orthopnea, peripheral edema, abdominal distention, chest discomfort, shortness of breath.  He has noticed that since he increased spironolactone from 12.5 mg to 25 mg he has been  experiencing nocturnal diaphoresis.  He is not sure if this is medication because or not.    Assessment and Plan:  Jovon is a pleasant 66 year old male with past medical history notable for:     # Chronic RHF  (Class I; Stage B- spironolactone recently increased to 25 mg as recent RHC showed increased pressures)  # Hx of Severe Bicuspid Aortic Stenosis (s/p AVR with mechanical valve in 2011 w/ Dr. Prado, 24 mm ATS; on warfarin with INR goal of 2.5-3.5; most recent echo showed good function)  # Persistent Atrial Fibrillation (s/p MAZE procedure but A fib recurred; rate control w/ BB and digoxin; on warfarin for VTE prophylaxis)  # CAD (70% stenosis of the LAD noted at the time of his AVR but it was not bypassed as it was completely intramyocardial; exercise nuclear stress scan in 2018 showed no significant ischemia)   #  Moderate to severe RV Dysfunction with hx of nonischemic dilated CM (was noted previously; unclear etiology at this time; on BB, Acei, and spironolactone)  # Moderate MR (recent CADE and stress echo showed moderate MR; serial echo imaging recommended at this time)  # Hypertension (at goal)  # Hyperlipidemia (LDL of 46 on simvastatin 20 mg)  # Vein Insufficiency (s/p vein stripping)  # Hypothyroidism   # Hx of GI bleed  # Hx of Obesity (s/p gastric bypass)    I am not sure if his nighttime symptoms are due to the increase in spironolactone but we will try with him lowering back down to 12.5 mg for 1 week.  If he does not notice a significant change then he will resume the original dose.  In addition to spironolactone I suspect he will need further diuresis with a loop diuretic.  I will have him start taking furosemide 20 mg once every morning.  Despite the decrease in home weights here his weight seems to be about the same.  At the time of his right heart catheterization his weight was 198 pounds.  If he develops lightheadedness, cramping, or excessive thirst he will contact our clinic.  I will see  him back in 3 to 4 weeks with repeat BMP.  During the follow-up visit we will also discuss possible polysomnography to rule out LINDA.    35  minutes spent on the date of the encounter with chart review, patient visit, care coordination, and documentation.      This note was completed in part using Dragon voice recognition software. Although reviewed after completion, some word and grammatical errors may occur.    Orders this Visit:  No orders of the defined types were placed in this encounter.    Orders Placed This Encounter   Medications     furosemide (LASIX) 20 MG tablet     Sig: Take 1 tablet (20 mg) by mouth daily     Dispense:  30 tablet     Refill:  3     Medications Discontinued During This Encounter   Medication Reason     enoxaparin ANTICOAGULANT (LOVENOX) 100 MG/ML syringe Medication Reconciliation Clean Up         Encounter Diagnoses   Name Primary?     ASVD (arteriosclerotic vascular disease)      Chronic right-sided heart failure (H)      Coronary artery disease involving native coronary artery of native heart without angina pectoris Yes       CURRENT MEDICATIONS:  Current Outpatient Medications   Medication Sig Dispense Refill     Ascorbic Acid (VITAMIN C PO) Take 500 mg by mouth At Bedtime       aspirin 81 MG tablet Take 81 mg by mouth every other day       Calcium Carbonate-Vitamin D (CALCIUM 600+D) 600-200 MG-UNIT TABS Take 1 tablet by mouth 2 times daily        carvedilol (COREG) 25 MG tablet Take 1 tablet (25 mg) by mouth 2 times daily (with meals) 180 tablet 1     Cholecalciferol (VITAMIN D) 2000 UNIT CAPS Take 1 tablet by mouth daily        Cyanocobalamin (VITAMIN B-12) 500 MCG SUBL Place 500 mcg under the tongue daily        digoxin (LANOXIN) 125 MCG tablet Take 1 tablet (125 mcg) by mouth daily 90 tablet 3     Fexofenadine HCl (ALLERGY 24-HR PO) Take 180 mg by mouth daily        fluticasone (FLONASE) 50 MCG/ACT nasal spray Spray 1 spray into both nostrils daily       furosemide (LASIX) 20 MG  tablet Take 1 tablet (20 mg) by mouth daily 30 tablet 3     levothyroxine (SYNTHROID/LEVOTHROID) 200 MCG tablet Take 1 tablet (200 mcg) by mouth daily 90 tablet 3     lisinopril (ZESTRIL) 20 MG tablet Take 1 tablet (20 mg) by mouth daily 135 tablet 3     Multiple Vitamins-Minerals (CENTRUM) CHEW Take 1 tablet by mouth daily        nitroGLYcerin (NITROSTAT) 0.3 MG sublingual tablet For chest pain place 1 tablet under the tongue every 5 minutes for 3 doses. If symptoms persist 5 minutes after 1st dose call 911. 25 tablet 1     Psyllium (FIBER) 0.52 GM CAPS Take 2 capsules by mouth At Bedtime        sertraline (ZOLOFT) 50 MG tablet Take 1 tablet (50 mg) by mouth daily 90 tablet 3     simvastatin (ZOCOR) 20 MG tablet Take 1 tablet (20 mg) by mouth At Bedtime 90 tablet 3     spironolactone (ALDACTONE) 25 MG tablet Take 1 tablet (25 mg) by mouth daily 90 tablet 3     warfarin ANTICOAGULANT (COUMADIN) 4 MG tablet Take one tablet (4 mg) by mouth daily except take one a half tablets (6 mg) Wed, Sat or as directed by INR Clinic 117 tablet 1       ALLERGIES   No Known Allergies    PAST MEDICAL HISTORY:  Past Medical History:   Diagnosis Date     Acne rosacea 04/22/2019     Acquired hypothyroidism 05/23/2003     Problem list name updated by automated process. Provider to review     Acute prostatitis      Acute sinusitis, unspecified      Aortic valve disease      Biatrial enlargement      CAD (coronary artery disease)     known 70% LAD stenosis     Congestive heart failure (H)      Depression      GI bleeding 01/22/2016     History of bleeding peptic ulcer      Hyperlipidemia      Idiopathic cardiomyopathy (H)      Mitral regurgitation      Obesity      Palpitations      Permanent atrial fibrillation (H) 06/02/2017     Right inguinal hernia 07/22/2019     S/P AVR 01/07/2011    with Maze procedure     S/P gastric bypass      Tricuspid regurgitation      Unspecified essential hypertension        PAST SURGICAL HISTORY:  Past  Surgical History:   Procedure Laterality Date     ANGIOGRAM  02/01/2002    Normal coronary,dilated LV,Sev.decr.global LVF,+1 MR     ANGIOGRAM  01/01/2008    Mild AS,Mod PHTN,mod prox.LAD disease,Triv.CFX disease     ANGIOGRAM  01/01/2009    Mod AS,Mod PHTN,mod prox.LAD disease     ANGIOGRAM  10/01/2010    Sev.AS,CM,global hypokinesis,Mild-mod LV dilated,Mild MR,70% prox.LAD lesion,PHTN     CARDIOVERSION  2/02, 4/02, 4/11     CHOLECYSTECTOMY  2006     COLONOSCOPY N/A 01/23/2016    Procedure: COLONOSCOPY;  Surgeon: Robyn Collins MD;  Location:  GI     CV CORONARY ANGIOGRAM N/A 1/31/2022    Procedure: Coronary Angiogram;  Surgeon: Rome Mike MD;  Location:  HEART CARDIAC CATH LAB     CV INSTANTANEOUS WAVE-FREE RATIO N/A 1/31/2022    Procedure: Instantaneous Wave-Free Ratio;  Surgeon: Rome Mike MD;  Location:  HEART CARDIAC CATH LAB     CV RIGHT HEART CATH MEASUREMENTS RECORDED N/A 1/31/2022    Procedure: Right Heart Cath;  Surgeon: Rome Mike MD;  Location:  HEART CARDIAC CATH LAB     ESOPHAGOSCOPY, GASTROSCOPY, DUODENOSCOPY (EGD), COMBINED N/A 01/23/2016    Procedure: COMBINED ESOPHAGOSCOPY, GASTROSCOPY, DUODENOSCOPY (EGD);  Surgeon: Robyn Collins MD;  Location:  GI     EXCISE MASS GROIN Right 2/11/2021    Procedure: INCISION AND DRAINAGE OF RIGHT GROIN HEMATOMA;  Surgeon: Norma Scanlon MD;  Location: [facility] OR     HERNIORRHAPHY INGUINAL Right 2/4/2021    Procedure: OPEN RIGHT INGUINAL HENRIA WITH MESH;  Surgeon: Jaden Alex MD;  Location: [facility] OR     LAPAROSCOPIC BYPASS GASTRIC  10/08/2011    Procedure:LAPAROSCOPIC BYPASS GASTRIC; Diagnostic laparoscopy, Lysis of Adhesions, Laparoscopic Revision of Jejunojejunostomy; Surgeon:RADHA MURO; Location: OR     LAPAROSCOPIC BYPASS GASTRIC  06/26/2006    Dr Jin     LAPAROSCOPY DIAGNOSTIC (GENERAL)  10/08/2011    Procedure:LAPAROSCOPY DIAGNOSTIC (GENERAL); Surgeon:JINA  RADHA RICHARDSON; Location:[facility] OR     REPLACE VALVE AORTIC  01/07/2011     ZZC NONSPECIFIC PROCEDURE  ~1985    vein stripping       FAMILY HISTORY:  Family History   Problem Relation Age of Onset     Cancer - colorectal Father      Colon Cancer Father      Cancer Father      Diabetes Brother        SOCIAL HISTORY:  Social History     Socioeconomic History     Marital status:      Spouse name: Tracey     Number of children: 0     Years of education: None     Highest education level: None   Occupational History     Occupation:      Comment: [employer]   Tobacco Use     Smoking status: Never Smoker     Smokeless tobacco: Never Used   Substance and Sexual Activity     Alcohol use: No     Alcohol/week: 0.0 standard drinks     Drug use: No     Sexual activity: Not Currently     Partners: Female   Other Topics Concern     Parent/sibling w/ CABG, MI or angioplasty before 65F 55M? Yes      Service Not Asked     Blood Transfusions No     Caffeine Concern Yes     Comment: 1 can daily     Occupational Exposure Yes     Hobby Hazards No     Sleep Concern No     Stress Concern No     Weight Concern No     Special Diet Yes     Comment: low sodium, low calories     Back Care No     Exercise Yes     Comment: walks daily     Bike Helmet Not Asked     Comment: NA     Seat Belt Yes     Self-Exams Yes   Social History Narrative     None     Social Determinants of Health     Financial Resource Strain: Not on file   Food Insecurity: Not on file   Transportation Needs: Not on file   Physical Activity: Not on file   Stress: Not on file   Social Connections: Not on file   Intimate Partner Violence: Not on file   Housing Stability: Not on file       Review of Systems:  Skin:  Negative     Eyes:  Positive for glasses  ENT:  Negative    Respiratory:  Negative    Cardiovascular:    Positive for;palpitations  Gastroenterology: Negative    Genitourinary:  Positive for prostate problem  Musculoskeletal:  Positive for joint  "pain  Neurologic:  Positive for headaches  Psychiatric:  Negative    Heme/Lymph/Imm:  Negative    Endocrine:  Positive for thyroid disorder    Physical Exam:  Vitals: /87   Pulse 73   Ht 1.765 m (5' 9.5\")   Wt 88 kg (194 lb 1.6 oz)   BMI 28.25 kg/m       GEN:  NAD  NECK: No JVD  C/V: Irregularly irregular rhythm with normal rate; 2/6 holosystolic murmur at left lower sternal border.  RESP: Clear to auscultation bilaterally without wheezing, rales, or rhonchi.  GI: Abdomen soft, nontender, nondistended.   EXTREM: No pitting LE edema.   NEURO: Alert and oriented, cooperative. No obvious focal deficits.   PSYCH: Normal affect.  SKIN: Warm and dry.       Recent Lab Results:  LIPID RESULTS:  Lab Results   Component Value Date    CHOL 99 08/09/2021    CHOL 109 03/13/2020    HDL 43 08/09/2021    HDL 51 03/13/2020    LDL 46 08/09/2021    LDL 49 03/13/2020    TRIG 48 08/09/2021    TRIG 45 03/13/2020    CHOLHDLRATIO 2.5 02/16/2015       LIVER ENZYME RESULTS:  Lab Results   Component Value Date    AST 23 08/09/2021    AST 28 02/10/2021    ALT 18 08/09/2021    ALT 23 02/10/2021       CBC RESULTS:  Lab Results   Component Value Date    WBC 4.0 01/31/2022    WBC 8.9 02/13/2021    RBC 4.12 (L) 01/31/2022    RBC 3.12 (L) 02/13/2021    HGB 11.5 (L) 01/31/2022    HGB 9.7 (L) 02/13/2021    HCT 38.3 (L) 01/31/2022    HCT 30.9 (L) 02/13/2021    MCV 93 01/31/2022    MCV 99 02/13/2021    MCH 27.9 01/31/2022    MCH 31.1 02/13/2021    MCHC 30.0 (L) 01/31/2022    MCHC 31.4 (L) 02/13/2021    RDW 17.6 (H) 01/31/2022    RDW 15.2 (H) 02/13/2021     01/31/2022     02/13/2021       BMP RESULTS:  Lab Results   Component Value Date     03/02/2022     02/12/2021    POTASSIUM 4.0 03/02/2022    POTASSIUM 4.4 02/12/2021    CHLORIDE 101 03/02/2022    CHLORIDE 107 02/12/2021    CO2 32 03/02/2022    CO2 30 02/12/2021    ANIONGAP 3 03/02/2022    ANIONGAP 2 (L) 02/12/2021     (H) 03/02/2022     (H) 02/12/2021 "    BUN 20 03/02/2022    BUN 31 (H) 02/12/2021    CR 0.80 03/02/2022    CR 0.76 02/12/2021    GFRESTIMATED >90 03/02/2022    GFRESTIMATED >90 02/12/2021    GFRESTBLACK >90 02/12/2021    REECE 9.6 03/02/2022    REECE 8.8 02/12/2021        A1C RESULTS:  Lab Results   Component Value Date    A1C 5.8 (H) 02/04/2019       INR RESULTS:  Lab Results   Component Value Date    INR 1.9 (H) 02/25/2022    INR 2.2 (H) 02/18/2022    INR 1.45 (H) 01/31/2022    INR 3.10 (H) 06/22/2021    INR 3.40 (H) 05/13/2021           Ingrid Alas PA-C, FEROZ   March 2, 2022     Thank you for allowing me to participate in the care of your patient.      Sincerely,     Ingrid Alas PA-C     [facility]  cc:   Ingrid Alas PA-C  [address] [place] [address]

## 2022-03-04 ENCOUNTER — LAB (OUTPATIENT)
Dept: LAB | Facility: CLINIC | Age: 67
End: 2022-03-04
Payer: COMMERCIAL

## 2022-03-04 ENCOUNTER — ANTICOAGULATION THERAPY VISIT (OUTPATIENT)
Dept: ANTICOAGULATION | Facility: CLINIC | Age: 67
End: 2022-03-04

## 2022-03-04 DIAGNOSIS — S30.1XXA ABDOMINAL WALL HEMATOMA, INITIAL ENCOUNTER: ICD-10-CM

## 2022-03-04 DIAGNOSIS — Z79.01 LONG TERM CURRENT USE OF ANTICOAGULANT THERAPY: ICD-10-CM

## 2022-03-04 DIAGNOSIS — Z79.01 LONG TERM CURRENT USE OF ANTICOAGULANTS WITH INR GOAL OF 2.5-3.5: ICD-10-CM

## 2022-03-04 DIAGNOSIS — I48.21 PERMANENT ATRIAL FIBRILLATION (H): ICD-10-CM

## 2022-03-04 DIAGNOSIS — Z95.2 S/P AVR (AORTIC VALVE REPLACEMENT): Primary | ICD-10-CM

## 2022-03-04 LAB — INR BLD: 4 (ref 0.9–1.1)

## 2022-03-04 PROCEDURE — 36416 COLLJ CAPILLARY BLOOD SPEC: CPT

## 2022-03-04 PROCEDURE — 85610 PROTHROMBIN TIME: CPT

## 2022-03-04 NOTE — PROGRESS NOTES
ANTICOAGULATION MANAGEMENT     Jovon Mantilla 66 year old male is on warfarin with supratherapeutic INR result. (Goal INR 2.5-3.5)    Recent labs: (last 7 days)     03/04/22  0959   INR 4.0*       ASSESSMENT       Source(s): Chart Review and Patient/Caregiver Call       Warfarin doses taken: Warfarin taken as instructed    Diet: No new diet changes identified    New illness, injury, or hospitalization: No    Medication/supplement changes: Lasix started on 3/3/22 No interaction anticipated    Signs or symptoms of bleeding or clotting: No    Previous INR: Subtherapeutic    Additional findings: None       PLAN     Recommended plan for no diet, medication or health factor changes affecting INR. Patient has had 2 recent dose increases and warfarin boosts. Will hold 2 mg today then resume current maintenance dose. If INR continues to be supratherapeutic, consider a warfarin maintenance dose decrease.     Dosing Instructions: Partial hold then continue your current warfarin dose with next INR in 11 days to give maintenance dose to level out       Summary  As of 3/4/2022    Full warfarin instructions:  3/4: 4 mg; Otherwise 6 mg every day   Next INR check:  3/15/2022             Telephone call with Jovon who verbalizes understanding and agrees to plan    Lab visit scheduled    Education provided: Please call back if any changes to your diet, medications or how you've been taking warfarin and Contact [phone]  with any changes, questions or concerns.     Plan made per [facility] anticoagulation protocol    Stephania Redd RN  Anticoagulation Clinic  3/4/2022    _______________________________________________________________________     Anticoagulation Episode Summary     Current INR goal:  2.5-3.5   TTR:  82.1 % (1 y)   Target end date:  Indefinite   Send INR reminders to:  [facility]    Indications    S/P AVR (aortic valve replacement) [Z95.2]  Permanent atrial fibrillation (H) [I48.21]  Long term current use of  anticoagulants with INR goal of 2.5-3.5 [Z79.01]  Abdominal wall hematoma  initial encounter [S30.1XXA]  Long-term (current) use of anticoagulants [Z79.01] [Z79.01]           Comments:  24mm ATS Valve         Anticoagulation Care Providers     Provider Role Specialty Phone number    Rehan Lorenzana MD Referring Family Medicine [phone]    Ramana Redd MD Referring Student in organized health care education/training program [phone]

## 2022-03-15 ENCOUNTER — LAB (OUTPATIENT)
Dept: LAB | Facility: CLINIC | Age: 67
End: 2022-03-15
Payer: COMMERCIAL

## 2022-03-15 ENCOUNTER — ANTICOAGULATION THERAPY VISIT (OUTPATIENT)
Dept: ANTICOAGULATION | Facility: CLINIC | Age: 67
End: 2022-03-15

## 2022-03-15 DIAGNOSIS — I48.21 PERMANENT ATRIAL FIBRILLATION (H): ICD-10-CM

## 2022-03-15 DIAGNOSIS — S30.1XXA ABDOMINAL WALL HEMATOMA, INITIAL ENCOUNTER: ICD-10-CM

## 2022-03-15 DIAGNOSIS — Z95.2 S/P AVR (AORTIC VALVE REPLACEMENT): Primary | ICD-10-CM

## 2022-03-15 DIAGNOSIS — Z79.01 LONG TERM CURRENT USE OF ANTICOAGULANT THERAPY: ICD-10-CM

## 2022-03-15 DIAGNOSIS — Z79.01 LONG TERM CURRENT USE OF ANTICOAGULANTS WITH INR GOAL OF 2.5-3.5: ICD-10-CM

## 2022-03-15 LAB — INR BLD: 2.8 (ref 0.9–1.1)

## 2022-03-15 PROCEDURE — 36415 COLL VENOUS BLD VENIPUNCTURE: CPT

## 2022-03-15 PROCEDURE — 85610 PROTHROMBIN TIME: CPT

## 2022-03-15 NOTE — PROGRESS NOTES
ANTICOAGULATION MANAGEMENT     Jovon Mantilla 66 year old male is on warfarin with therapeutic INR result. (Goal INR 2.5-3.5)    Recent labs: (last 7 days)     03/15/22  1416   INR 2.8*       ASSESSMENT       Source(s): Chart Review and Patient/Caregiver Call       Warfarin doses taken: Warfarin taken as instructed    Diet: No new diet changes identified    New illness, injury, or hospitalization: No    Medication/supplement changes: sertraline dose decreased on 3/13/22 which has potential for interaction; increasing INR.  Since dose of Sertraline was decreased from a full to tablet to a half tablet, INR may actually decrease.        Signs or symptoms of bleeding or clotting: No    Previous INR: Supratherapeutic    Additional findings:Pt will be leaving out of state on 3/23 and returning around 4/19.    Patient's PCP has retired.  Patient is not sure who he plans to see as a PCP.  He may establish care in [place] to be closer to home.       PLAN     Recommended plan for ongoing change(s) affecting INR     Dosing Instructions: Continue your current warfarin dose with next INR in 1 week       Summary  As of 3/15/2022    Full warfarin instructions:  6 mg every day   Next INR check:  3/22/2022             Telephone call with Jovon who agrees to plan and repeated back plan correctly    Lab visit scheduled    Education provided: Please call back if any changes to your diet, medications or how you've been taking warfarin and Potential interaction between warfarin and sertraline    Plan made per [facility] anticoagulation protocol    Isabel Cheney RN  Anticoagulation Clinic  3/15/2022    _______________________________________________________________________     Anticoagulation Episode Summary     Current INR goal:  2.5-3.5   TTR:  81.7 % (1 y)   Target end date:  Indefinite   Send INR reminders to:  [facility]    Indications    S/P AVR (aortic valve replacement) [Z95.2]  Permanent atrial fibrillation (H) [I48.21]  Long  term current use of anticoagulants with INR goal of 2.5-3.5 [Z79.01]  Abdominal wall hematoma  initial encounter [S30.1XXA]  Long-term (current) use of anticoagulants [Z79.01] [Z79.01]           Comments:  24mm ATS Valve         Anticoagulation Care Providers     Provider Role Specialty Phone number    Rehan Lorenzana MD Referring Family Medicine [phone]    Ramana Redd MD Referring Student in organized health care education/training program [phone]

## 2022-03-22 ENCOUNTER — ANTICOAGULATION THERAPY VISIT (OUTPATIENT)
Dept: ANTICOAGULATION | Facility: CLINIC | Age: 67
End: 2022-03-22

## 2022-03-22 ENCOUNTER — LAB (OUTPATIENT)
Dept: LAB | Facility: CLINIC | Age: 67
End: 2022-03-22
Payer: COMMERCIAL

## 2022-03-22 DIAGNOSIS — I50.812 CHRONIC RIGHT-SIDED HEART FAILURE (H): ICD-10-CM

## 2022-03-22 DIAGNOSIS — S30.1XXA ABDOMINAL WALL HEMATOMA, INITIAL ENCOUNTER: ICD-10-CM

## 2022-03-22 DIAGNOSIS — I48.21 PERMANENT ATRIAL FIBRILLATION (H): ICD-10-CM

## 2022-03-22 DIAGNOSIS — Z79.01 LONG TERM CURRENT USE OF ANTICOAGULANTS WITH INR GOAL OF 2.5-3.5: ICD-10-CM

## 2022-03-22 DIAGNOSIS — Z79.01 LONG TERM CURRENT USE OF ANTICOAGULANT THERAPY: ICD-10-CM

## 2022-03-22 DIAGNOSIS — Z95.2 S/P AVR (AORTIC VALVE REPLACEMENT): Primary | ICD-10-CM

## 2022-03-22 LAB — INR BLD: 2.8 (ref 0.9–1.1)

## 2022-03-22 PROCEDURE — 36416 COLLJ CAPILLARY BLOOD SPEC: CPT

## 2022-03-22 PROCEDURE — 85610 PROTHROMBIN TIME: CPT

## 2022-03-22 RX ORDER — SPIRONOLACTONE 25 MG/1
12.5 TABLET ORAL DAILY
Qty: 90 TABLET | Refills: 3 | COMMUNITY
Start: 2022-03-22 | End: 2022-05-20

## 2022-03-22 NOTE — PROGRESS NOTES
ANTICOAGULATION MANAGEMENT     Jovon Mantilla 66 year old male is on warfarin with therapeutic INR result. (Goal INR 2.5-3.5)    Recent labs: (last 7 days)     03/22/22  1317   INR 2.8*       ASSESSMENT       Source(s): Chart Review and Patient/Caregiver Call       Warfarin doses taken: Less warfarin taken than planned which may be affecting INR--patient thought he was supposed to be taking 4mg every Sat.    Diet: No new diet changes identified    New illness, injury, or hospitalization: No    Medication/supplement changes: Spironolactone reduced to 12.5mg daily about 2 weeks ago--med list updated.     Concurrent use of WARFARIN and SPIRONOLACTONE may result in decreased anticoagulant effectiveness.    Signs or symptoms of bleeding or clotting: No    Previous INR: Therapeutic last visit; previously outside of goal range    Additional findings: Pt is going to [place] 3/23 x 4 weeks, he will try to find a lab to get his INR checked while there.  Pt aware to get lab name and fax # and call [facility] so we can fax them an INR order.     With reduced dose of spironolactone it is possible that patient will need lower weekly warfarin dose; however, patient traveling by car to [place] so I suggested to resume 6mg daily and retest INR in 2 weeks.       PLAN     Recommended plan for ongoing change(s) affecting INR     Dosing Instructions: Continue your current warfarin dose with next INR in 2 weeks       Summary  As of 3/22/2022    Full warfarin instructions:  6 mg every day   Next INR check:  4/5/2022             Telephone call with Jovon who agrees to plan and repeated back plan correctly    Pt to check in [place]    Education provided: Importance of therapeutic range, Importance of taking warfarin as instructed and Contact [phone]  with any changes, questions or concerns.     Plan made per [facility] anticoagulation protocol    Madeline Wayne, RN  Anticoagulation  Clinic  3/22/2022    _______________________________________________________________________     Anticoagulation Episode Summary     Current INR goal:  2.5-3.5   TTR:  81.7 % (1 y)   Target end date:  Indefinite   Send INR reminders to:  [facility]    Indications    S/P AVR (aortic valve replacement) [Z95.2]  Permanent atrial fibrillation (H) [I48.21]  Long term current use of anticoagulants with INR goal of 2.5-3.5 [Z79.01]  Abdominal wall hematoma  initial encounter [S30.1XXA]  Long-term (current) use of anticoagulants [Z79.01] [Z79.01]           Comments:  24mm ATS Valve         Anticoagulation Care Providers     Provider Role Specialty Phone number    Rehan Lorenzana MD Referring Family Medicine [phone]    Ramana Redd MD Referring Student in organized health care education/training program [phone]

## 2022-04-07 ENCOUNTER — TELEPHONE (OUTPATIENT)
Dept: ANTICOAGULATION | Facility: CLINIC | Age: 67
End: 2022-04-07
Payer: COMMERCIAL

## 2022-04-07 NOTE — TELEPHONE ENCOUNTER
Patient left message at [facility] on SB5 line    Returning a call to Madeline.    Please callback patient.    Thank you  Tre RACHEL   - Complex Care Team

## 2022-04-07 NOTE — TELEPHONE ENCOUNTER
ANTICOAGULATION     Jovon Mantilla is overdue for INR check.      Left message for patient to call and schedule lab appointment as soon as possible. If returning call, please schedule.     Madeline Wayne RN

## 2022-04-07 NOTE — TELEPHONE ENCOUNTER
I spoke to patient, he is flying back to [place] on 4/9, he already scheduled INR at [facility] and understands I will call him with results/dosing plan on 4/11/22.    Madeline Wayne RN  Anticoagulation Clinic

## 2022-04-09 ENCOUNTER — LAB (OUTPATIENT)
Dept: LAB | Facility: CLINIC | Age: 67
End: 2022-04-09
Payer: COMMERCIAL

## 2022-04-09 DIAGNOSIS — I48.21 PERMANENT ATRIAL FIBRILLATION (H): ICD-10-CM

## 2022-04-09 LAB — INR BLD: 4 (ref 0.9–1.1)

## 2022-04-09 PROCEDURE — 85610 PROTHROMBIN TIME: CPT

## 2022-04-09 PROCEDURE — 36416 COLLJ CAPILLARY BLOOD SPEC: CPT

## 2022-04-11 ENCOUNTER — ANTICOAGULATION THERAPY VISIT (OUTPATIENT)
Dept: ANTICOAGULATION | Facility: CLINIC | Age: 67
End: 2022-04-11
Payer: COMMERCIAL

## 2022-04-11 DIAGNOSIS — I48.21 PERMANENT ATRIAL FIBRILLATION (H): ICD-10-CM

## 2022-04-11 DIAGNOSIS — Z95.2 S/P AVR (AORTIC VALVE REPLACEMENT): Primary | ICD-10-CM

## 2022-04-11 DIAGNOSIS — Z79.01 LONG TERM CURRENT USE OF ANTICOAGULANTS WITH INR GOAL OF 2.5-3.5: ICD-10-CM

## 2022-04-11 DIAGNOSIS — Z79.01 LONG TERM CURRENT USE OF ANTICOAGULANT THERAPY: ICD-10-CM

## 2022-04-11 DIAGNOSIS — S30.1XXA ABDOMINAL WALL HEMATOMA, INITIAL ENCOUNTER: ICD-10-CM

## 2022-04-11 NOTE — PROGRESS NOTES
ANTICOAGULATION MANAGEMENT     Jovon Mantilla 66 year old male is on warfarin with supratherapeutic INR result. (Goal INR 2.5-3.5)    Recent labs: (last 7 days)     04/09/22  1336   INR 4.0*       ASSESSMENT       Source(s): Chart Review and Patient/Caregiver Call       Warfarin doses taken: Warfarin taken as instructed;however, patient did intentionally hold his dose on Sat. 4/9/22 after seeing the INR result.    Diet: No new diet changes identified    New illness, injury, or hospitalization: No    Medication/supplement changes: None noted    Signs or symptoms of bleeding or clotting: No    Previous INR: Therapeutic last 2(+) visits    Additional findings: Pt back in [place] until [facility].       PLAN     Recommended plan for no diet, medication or health factor changes affecting INR     Dosing Instructions: decrease your warfarin dose (~5% change) with next INR in 10 days       Summary  As of 4/11/2022    Full warfarin instructions:  4 mg every Sat; 6 mg all other days   Next INR check:  4/20/2022             Telephone call with Jovon who verbalizes understanding and agrees to plan    lab order placed for venous draw so pt can have drawn at cardiology appt on 4/20/22    Education provided: Monitoring for bleeding signs and symptoms and Contact [phone]  with any changes, questions or concerns.     Plan made per [facility] anticoagulation protocol    Madeline Wayne RN  Anticoagulation Clinic  4/11/2022    _______________________________________________________________________     Anticoagulation Episode Summary     Current INR goal:  2.5-3.5   TTR:  79.5 % (1 y)   Target end date:  Indefinite   Send INR reminders to:  [facility]    Indications    S/P AVR (aortic valve replacement) [Z95.2]  Permanent atrial fibrillation (H) [I48.21]  Long term current use of anticoagulants with INR goal of 2.5-3.5 [Z79.01]  Abdominal wall hematoma  initial encounter [S30.1XXA]  Long-term (current) use of anticoagulants [Z79.01]  [Z79.01]           Comments:  24mm ATS Valve         Anticoagulation Care Providers     Provider Role Specialty Phone number    Rehan Lorenzana MD Referring Family Medicine [phone]    Ramana Redd MD Referring Student in organized health care education/training program [phone]

## 2022-04-20 ENCOUNTER — LAB (OUTPATIENT)
Dept: LAB | Facility: CLINIC | Age: 67
End: 2022-04-20
Payer: COMMERCIAL

## 2022-04-20 ENCOUNTER — ANTICOAGULATION THERAPY VISIT (OUTPATIENT)
Dept: ANTICOAGULATION | Facility: CLINIC | Age: 67
End: 2022-04-20

## 2022-04-20 DIAGNOSIS — Z79.01 LONG TERM CURRENT USE OF ANTICOAGULANTS WITH INR GOAL OF 2.5-3.5: ICD-10-CM

## 2022-04-20 DIAGNOSIS — I42.0 DILATED CARDIOMYOPATHY (H): ICD-10-CM

## 2022-04-20 DIAGNOSIS — S30.1XXA ABDOMINAL WALL HEMATOMA, INITIAL ENCOUNTER: ICD-10-CM

## 2022-04-20 DIAGNOSIS — I48.21 PERMANENT ATRIAL FIBRILLATION (H): ICD-10-CM

## 2022-04-20 DIAGNOSIS — Z79.01 LONG TERM CURRENT USE OF ANTICOAGULANT THERAPY: ICD-10-CM

## 2022-04-20 DIAGNOSIS — Z95.2 S/P AVR (AORTIC VALVE REPLACEMENT): Primary | ICD-10-CM

## 2022-04-20 DIAGNOSIS — Z95.2 S/P AVR (AORTIC VALVE REPLACEMENT): ICD-10-CM

## 2022-04-20 LAB — INR BLD: 3.6 (ref 0.9–1.1)

## 2022-04-20 PROCEDURE — 36416 COLLJ CAPILLARY BLOOD SPEC: CPT

## 2022-04-20 PROCEDURE — 80048 BASIC METABOLIC PNL TOTAL CA: CPT

## 2022-04-20 PROCEDURE — 36415 COLL VENOUS BLD VENIPUNCTURE: CPT

## 2022-04-20 PROCEDURE — 85610 PROTHROMBIN TIME: CPT

## 2022-04-20 NOTE — PROGRESS NOTES
ANTICOAGULATION MANAGEMENT     Jovon Mantilla 66 year old male is on warfarin with supratherapeutic INR result. (Goal INR 2.5-3.5)    Recent labs: (last 7 days)     04/20/22  1324   INR 3.6*       ASSESSMENT       Source(s): Chart Review and Patient/Caregiver Call       Warfarin doses taken: Warfarin taken as instructed    Diet: No new diet changes identified Patient will try to increase green veggies by 1 serving/week    New illness, injury, or hospitalization: No    Medication/supplement changes: None noted    Signs or symptoms of bleeding or clotting: No    Previous INR: Supratherapeutic    Additional findings: None       PLAN     Recommended plan for no diet, medication or health factor changes affecting INR     Dosing Instructions: continue your current warfarin dose with next INR in 2 weeks       Summary  As of 4/20/2022    Full warfarin instructions:  4 mg every Sat; 6 mg all other days   Next INR check:  5/4/2022             Telephone call with Jovon who agrees to plan and repeated back plan correctly    Lab visit scheduled    Education provided: Please call back if any changes to your diet, medications or how you've been taking warfarin and Contact [phone]  with any changes, questions or concerns.     Plan made per [facility] anticoagulation protocol    Stephania Redd RN  Anticoagulation Clinic  4/20/2022    _______________________________________________________________________     Anticoagulation Episode Summary     Current INR goal:  2.5-3.5   TTR:  76.7 % (1 y)   Target end date:  Indefinite   Send INR reminders to:  [facility] [place]    Indications    S/P AVR (aortic valve replacement) [Z95.2]  Permanent atrial fibrillation (H) [I48.21]  Long term current use of anticoagulants with INR goal of 2.5-3.5 [Z79.01]  Abdominal wall hematoma  initial encounter [S30.1XXA]  Long-term (current) use of anticoagulants [Z79.01] [Z79.01]           Comments:  24mm ATS Valve         Anticoagulation Care Providers      Provider Role Specialty Phone number    Rehan Lorenzana MD Referring Family Medicine [phone]    Ramana Redd MD Referring Student in organized health care education/training program [phone]

## 2022-04-21 LAB
ANION GAP SERPL CALCULATED.3IONS-SCNC: 3 MMOL/L (ref 3–14)
BUN SERPL-MCNC: 16 MG/DL (ref 7–30)
CALCIUM SERPL-MCNC: 9.4 MG/DL (ref 8.5–10.1)
CHLORIDE BLD-SCNC: 104 MMOL/L (ref 94–109)
CO2 SERPL-SCNC: 31 MMOL/L (ref 20–32)
CREAT SERPL-MCNC: 0.88 MG/DL (ref 0.66–1.25)
GFR SERPL CREATININE-BSD FRML MDRD: >90 ML/MIN/1.73M2
GLUCOSE BLD-MCNC: 116 MG/DL (ref 70–99)
POTASSIUM BLD-SCNC: 4 MMOL/L (ref 3.4–5.3)
SODIUM SERPL-SCNC: 138 MMOL/L (ref 133–144)

## 2022-04-25 DIAGNOSIS — I42.0 DILATED CARDIOMYOPATHY (H): Primary | ICD-10-CM

## 2022-05-04 ENCOUNTER — LAB (OUTPATIENT)
Dept: LAB | Facility: CLINIC | Age: 67
End: 2022-05-04
Payer: COMMERCIAL

## 2022-05-04 ENCOUNTER — ANTICOAGULATION THERAPY VISIT (OUTPATIENT)
Dept: ANTICOAGULATION | Facility: CLINIC | Age: 67
End: 2022-05-04

## 2022-05-04 DIAGNOSIS — Z79.01 LONG TERM CURRENT USE OF ANTICOAGULANTS WITH INR GOAL OF 2.5-3.5: ICD-10-CM

## 2022-05-04 DIAGNOSIS — S30.1XXA ABDOMINAL WALL HEMATOMA, INITIAL ENCOUNTER: ICD-10-CM

## 2022-05-04 DIAGNOSIS — I48.21 PERMANENT ATRIAL FIBRILLATION (H): ICD-10-CM

## 2022-05-04 DIAGNOSIS — Z79.01 LONG TERM CURRENT USE OF ANTICOAGULANT THERAPY: ICD-10-CM

## 2022-05-04 DIAGNOSIS — Z95.2 S/P AVR (AORTIC VALVE REPLACEMENT): Primary | ICD-10-CM

## 2022-05-04 LAB — INR BLD: 3.3 (ref 0.9–1.1)

## 2022-05-04 PROCEDURE — 85610 PROTHROMBIN TIME: CPT

## 2022-05-04 PROCEDURE — 36415 COLL VENOUS BLD VENIPUNCTURE: CPT

## 2022-05-04 NOTE — PROGRESS NOTES
ANTICOAGULATION MANAGEMENT     Jovon Mantilla 66 year old male is on warfarin with therapeutic INR result. (Goal INR 2.5-3.5)    Recent labs: (last 7 days)     05/04/22  1000   INR 3.3*       ASSESSMENT       Source(s): Chart Review and Patient/Caregiver Call       Warfarin doses taken: Warfarin taken as instructed    Diet: Increased greens/vitamin K in diet; ongoing change    New illness, injury, or hospitalization: No    Medication/supplement changes: None noted    Signs or symptoms of bleeding or clotting: No    Previous INR: Supratherapeutic    Additional findings: None       PLAN     Recommended plan for no diet, medication or health factor changes affecting INR     Dosing Instructions: continue your current warfarin dose with next INR in 3 weeks       Summary  As of 5/4/2022    Full warfarin instructions:  4 mg every Sat; 6 mg all other days   Next INR check:  5/26/2022             Telephone call with Jovon who agrees to plan and repeated back plan correctly    Lab visit scheduled    Education provided: Please call back if any changes to your diet, medications or how you've been taking warfarin    Plan made per [facility] anticoagulation protocol    Isabel Cheney RN  Anticoagulation Clinic  5/4/2022    _______________________________________________________________________     Anticoagulation Episode Summary     Current INR goal:  2.5-3.5   TTR:  75.4 % (1 y)   Target end date:  Indefinite   Send INR reminders to:  [facility]    Indications    S/P AVR (aortic valve replacement) [Z95.2]  Permanent atrial fibrillation (H) [I48.21]  Long term current use of anticoagulants with INR goal of 2.5-3.5 [Z79.01]  Abdominal wall hematoma  initial encounter [S30.1XXA]  Long-term (current) use of anticoagulants [Z79.01] [Z79.01]           Comments:  24mm ATS Valve         Anticoagulation Care Providers     Provider Role Specialty Phone number    Rehan Lorenzana MD Referring Family Medicine [phone]     Ramana Redd MD Referring Student in organized health care education/training program [phone]

## 2022-05-08 DIAGNOSIS — Z95.2 S/P AVR (AORTIC VALVE REPLACEMENT): Primary | ICD-10-CM

## 2022-05-08 DIAGNOSIS — Z79.01 LONG TERM CURRENT USE OF ANTICOAGULANTS WITH INR GOAL OF 2.5-3.5: ICD-10-CM

## 2022-05-08 DIAGNOSIS — I48.21 PERMANENT ATRIAL FIBRILLATION (H): ICD-10-CM

## 2022-05-08 DIAGNOSIS — Z79.01 LONG TERM CURRENT USE OF ANTICOAGULANT THERAPY: ICD-10-CM

## 2022-05-09 RX ORDER — WARFARIN SODIUM 4 MG/1
TABLET ORAL
Qty: 120 TABLET | Refills: 1 | Status: SHIPPED | OUTPATIENT
Start: 2022-05-09 | End: 2022-10-07

## 2022-05-09 NOTE — TELEPHONE ENCOUNTER
ANTICOAGULATION MANAGEMENT:  Medication Refill    Anticoagulation Summary  As of 5/4/2022    Warfarin maintenance plan:  4 mg (4 mg x 1) every Sat; 6 mg (4 mg x 1.5) all other days   Next INR check:  5/26/2022   Target end date:  Indefinite    Indications    S/P AVR (aortic valve replacement) [Z95.2]  Permanent atrial fibrillation (H) [I48.21]  Long term current use of anticoagulants with INR goal of 2.5-3.5 [Z79.01]  Abdominal wall hematoma  initial encounter [S30.1XXA]  Long-term (current) use of anticoagulants [Z79.01] [Z79.01]             Anticoagulation Care Providers     Provider Role Specialty Phone number    Rehan Lorenzana MD Referring Family Medicine [phone]    Ramana Redd MD Referring Student in organized health care education/training program [phone]          Visit with referring provider/group within last year: Yes; however, listed PCP has retired. Patient has been reminded to choose new PCP.    [facility] referral signed within last year: Yes    Jovon meets all criteria for refill (current [facility] referral, office visit with referring provider/group in last year, lab monitoring up to date or not exceeding 2 weeks overdue). Rx instructions and quantity supplied updated to match patient's current dosing plan. Warfarin 90 day supply with 1 refill granted per [facility] protocol     Madeline Wayne RN  Anticoagulation Clinic

## 2022-05-20 DIAGNOSIS — I50.812 CHRONIC RIGHT-SIDED HEART FAILURE (H): ICD-10-CM

## 2022-05-20 RX ORDER — SPIRONOLACTONE 25 MG/1
12.5 TABLET ORAL DAILY
Qty: 45 TABLET | Refills: 3 | Status: SHIPPED | OUTPATIENT
Start: 2022-05-20 | End: 2022-06-09

## 2022-05-20 NOTE — TELEPHONE ENCOUNTER
[facility] Cardiology Refill Guideline reviewed.  Medication meets criteria for refill.   Aye Tineo RN on 5/20/2022 at 11:39 AM

## 2022-05-26 ENCOUNTER — LAB (OUTPATIENT)
Dept: LAB | Facility: CLINIC | Age: 67
End: 2022-05-26
Payer: COMMERCIAL

## 2022-05-26 ENCOUNTER — NURSE TRIAGE (OUTPATIENT)
Dept: NURSING | Facility: CLINIC | Age: 67
End: 2022-05-26

## 2022-05-26 ENCOUNTER — ANTICOAGULATION THERAPY VISIT (OUTPATIENT)
Dept: ANTICOAGULATION | Facility: CLINIC | Age: 67
End: 2022-05-26

## 2022-05-26 DIAGNOSIS — Z79.01 LONG TERM CURRENT USE OF ANTICOAGULANTS WITH INR GOAL OF 2.5-3.5: ICD-10-CM

## 2022-05-26 DIAGNOSIS — S30.1XXA ABDOMINAL WALL HEMATOMA, INITIAL ENCOUNTER: ICD-10-CM

## 2022-05-26 DIAGNOSIS — I48.21 PERMANENT ATRIAL FIBRILLATION (H): ICD-10-CM

## 2022-05-26 DIAGNOSIS — Z79.01 LONG TERM CURRENT USE OF ANTICOAGULANT THERAPY: ICD-10-CM

## 2022-05-26 DIAGNOSIS — Z95.2 S/P AVR (AORTIC VALVE REPLACEMENT): Primary | ICD-10-CM

## 2022-05-26 LAB
INR BLD: >8 (ref 0.9–1.1)
INR PPP: 8.67 (ref 0.85–1.15)

## 2022-05-26 PROCEDURE — 36416 COLLJ CAPILLARY BLOOD SPEC: CPT

## 2022-05-26 PROCEDURE — 85610 PROTHROMBIN TIME: CPT

## 2022-05-26 NOTE — TELEPHONE ENCOUNTER
Call from lab with critical INR - 8.67. Transferred to [place] priority line to speak to clinic staff.      Manjeet Simons RN/[facility]    Additional Information    Information only question and nurse able to answer    Protocols used: INFORMATION ONLY CALL - NO TRIAGE-A-[place]

## 2022-05-26 NOTE — PROGRESS NOTES
ANTICOAGULATION MANAGEMENT     Jovon Mantilla 66 year old male is on warfarin with supratherapeutic INR result. (Goal INR 2.5-3.5)    Recent labs: (last 7 days)     05/26/22  0958   INR >8.0*       ASSESSMENT       Source(s): Chart Review and Patient/Caregiver Call       Warfarin doses taken: Warfarin taken as instructed    Diet: No new diet changes identified    New illness, injury, or hospitalization: No    Medication/supplement changes: Yes, patient ran out of his MVI with vitamin K, missed about 6 days and also missed his Zoloft x 6 days while vacationing.  Patient bought a new MVI WITHOUT vitamin K and will continue with that going forward.    Signs or symptoms of bleeding or clotting: No    Previous INR: Therapeutic last visit; previously outside of goal range    Additional findings: pt gave the OK to switch PCP to TRINA Barrios, he did see her in 11/2021.       PLAN     Recommended plan for ongoing change(s) affecting INR     Dosing Instructions: HOLD dose today with next INR in 1 day       Summary  As of 5/26/2022    Full warfarin instructions:  5/26: Hold; 5/27: Hold; Otherwise 4 mg every Sat; 6 mg all other days   Next INR check:  5/27/2022             Telephone call with Jovon who agrees to plan and repeated back plan correctly    Lab visit scheduled    Education provided: Monitoring for bleeding signs and symptoms and Contact [phone]  with any changes, questions or concerns.     Plan made per [facility] anticoagulation protocol    Madeline Wayne RN  Anticoagulation Clinic  5/26/2022    _______________________________________________________________________     Anticoagulation Episode Summary     Current INR goal:  2.5-3.5   TTR:  69.6 % (1 y)   Target end date:  Indefinite   Send INR reminders to:  [facility]    Indications    S/P AVR (aortic valve replacement) [Z95.2]  Permanent atrial fibrillation (H) [I48.21]  Long term current use of anticoagulants with INR goal of 2.5-3.5  [Z79.01]  Abdominal wall hematoma  initial encounter [S30.1XXA]  Long-term (current) use of anticoagulants [Z79.01] [Z79.01]           Comments:  24mm ATS Valve         Anticoagulation Care Providers     Provider Role Specialty Phone number    Rehan Lorenzana MD Referring Family Medicine [phone]    Ramana Redd MD Referring Student in organized health care education/training program [phone]

## 2022-05-26 NOTE — PROGRESS NOTES
Per protocol, reporting to PCP a supratherapeutic INR of 8.67 on this patient. INR was managed by the [facility].     Madeline Wayne RN  Anticoagulation Clinic

## 2022-05-27 ENCOUNTER — LAB (OUTPATIENT)
Dept: LAB | Facility: CLINIC | Age: 67
End: 2022-05-27
Payer: COMMERCIAL

## 2022-05-27 DIAGNOSIS — Z79.01 LONG TERM CURRENT USE OF ANTICOAGULANTS WITH INR GOAL OF 2.5-3.5: Primary | ICD-10-CM

## 2022-05-27 DIAGNOSIS — I48.20 CHRONIC ATRIAL FIBRILLATION (H): ICD-10-CM

## 2022-05-27 DIAGNOSIS — I25.10 CORONARY ARTERY DISEASE INVOLVING NATIVE CORONARY ARTERY OF NATIVE HEART WITHOUT ANGINA PECTORIS: ICD-10-CM

## 2022-05-27 DIAGNOSIS — I50.812 CHRONIC RIGHT-SIDED HEART FAILURE (H): ICD-10-CM

## 2022-05-27 RX ORDER — DIGOXIN 125 MCG
125 TABLET ORAL DAILY
Qty: 90 TABLET | Refills: 0 | Status: SHIPPED | OUTPATIENT
Start: 2022-05-27 | End: 2022-09-06

## 2022-05-27 RX ORDER — FUROSEMIDE 20 MG
20 TABLET ORAL DAILY
Qty: 90 TABLET | Refills: 0 | Status: SHIPPED | OUTPATIENT
Start: 2022-05-27 | End: 2022-09-06

## 2022-05-31 ENCOUNTER — ANTICOAGULATION THERAPY VISIT (OUTPATIENT)
Dept: ANTICOAGULATION | Facility: CLINIC | Age: 67
End: 2022-05-31

## 2022-05-31 ENCOUNTER — LAB (OUTPATIENT)
Dept: LAB | Facility: CLINIC | Age: 67
End: 2022-05-31
Payer: COMMERCIAL

## 2022-05-31 DIAGNOSIS — Z79.01 LONG TERM CURRENT USE OF ANTICOAGULANTS WITH INR GOAL OF 2.5-3.5: ICD-10-CM

## 2022-05-31 DIAGNOSIS — I48.21 PERMANENT ATRIAL FIBRILLATION (H): ICD-10-CM

## 2022-05-31 DIAGNOSIS — Z95.2 S/P AVR (AORTIC VALVE REPLACEMENT): Primary | ICD-10-CM

## 2022-05-31 DIAGNOSIS — S30.1XXA ABDOMINAL WALL HEMATOMA, INITIAL ENCOUNTER: ICD-10-CM

## 2022-05-31 DIAGNOSIS — Z79.01 LONG TERM CURRENT USE OF ANTICOAGULANT THERAPY: ICD-10-CM

## 2022-05-31 LAB — INR BLD: 1.6 (ref 0.9–1.1)

## 2022-05-31 PROCEDURE — 36416 COLLJ CAPILLARY BLOOD SPEC: CPT

## 2022-05-31 PROCEDURE — 85610 PROTHROMBIN TIME: CPT

## 2022-05-31 NOTE — PROGRESS NOTES
"ANTICOAGULATION MANAGEMENT     Jovon Mantilla 66 year old male is on warfarin with subtherapeutic INR result. (Goal INR 2.5-3.5)    Recent labs: (last 7 days)     05/31/22  1303   INR 1.6*       ASSESSMENT       Source(s): Chart Review and Patient/Caregiver Call       Warfarin doses taken: Missed dose(s) may be affecting INR--see calendar.      Diet: No new diet changes identified    New illness, injury, or hospitalization: No    Medication/supplement changes: Patient recently switched to an MVI without vitamin     Signs or symptoms of bleeding or clotting: No    Previous INR: Supratherapeutic    Additional findings: Venous INR from 5/27/22 is listed as \"in process\" so we never received his venous result from that day.  I will have to follow up with  lab or [facility] if it was sent out.       PLAN     Recommended plan for temporary change(s) and ongoing change(s) affecting INR     Dosing Instructions: booster dose then decrease your warfarin dose (20% change) with next INR in 9 days  (I had not decreased his weekly warfarin dose with the 8.67 INR).     Summary  As of 5/31/2022    Full warfarin instructions:  5/31: 12 mg; Otherwise 6 mg every Wed, Sat; 4 mg all other days   Next INR check:  6/9/2022             Telephone call with Jovon who agrees to plan and repeated back plan correctly    check with cardiology labs already scheduled for 6/9/22. INR order placed since will be drawn venous.    Education provided: Contact [phone]  with any changes, questions or concerns.     Plan made with [facility] Pharmacist Rebecca Wayne, RN  Anticoagulation Clinic  5/31/2022    _______________________________________________________________________     Anticoagulation Episode Summary     Current INR goal:  2.5-3.5   TTR:  68.4 % (1 y)   Target end date:  Indefinite   Send INR reminders to:  [facility]    Indications    S/P AVR (aortic valve replacement) [Z95.2]  Permanent atrial fibrillation (H) " [I48.21]  Long term current use of anticoagulants with INR goal of 2.5-3.5 [Z79.01]  Abdominal wall hematoma  initial encounter [S30.1XXA]  Long-term (current) use of anticoagulants [Z79.01] [Z79.01]           Comments:  24mm ATS Valve         Anticoagulation Care Providers     Provider Role Specialty Phone number    Rehan Lorenzana MD Referring Family Medicine [phone]    Ramana Redd MD Referring Student in organized health care education/training program [phone]    Noe Ramirez, PADamianC Responsible Family Medicine [phone]

## 2022-06-09 ENCOUNTER — OFFICE VISIT (OUTPATIENT)
Dept: CARDIOLOGY | Facility: CLINIC | Age: 67
End: 2022-06-09
Attending: PHYSICIAN ASSISTANT
Payer: COMMERCIAL

## 2022-06-09 ENCOUNTER — LAB (OUTPATIENT)
Dept: LAB | Facility: CLINIC | Age: 67
End: 2022-06-09
Payer: COMMERCIAL

## 2022-06-09 ENCOUNTER — ANTICOAGULATION THERAPY VISIT (OUTPATIENT)
Dept: ANTICOAGULATION | Facility: CLINIC | Age: 67
End: 2022-06-09

## 2022-06-09 ENCOUNTER — TELEPHONE (OUTPATIENT)
Dept: FAMILY MEDICINE | Facility: CLINIC | Age: 67
End: 2022-06-09

## 2022-06-09 VITALS
HEART RATE: 56 BPM | BODY MASS INDEX: 27.63 KG/M2 | OXYGEN SATURATION: 96 % | WEIGHT: 193 LBS | HEIGHT: 70 IN | SYSTOLIC BLOOD PRESSURE: 96 MMHG | DIASTOLIC BLOOD PRESSURE: 63 MMHG

## 2022-06-09 DIAGNOSIS — Z95.2 S/P AVR (AORTIC VALVE REPLACEMENT): ICD-10-CM

## 2022-06-09 DIAGNOSIS — I50.82 BIVENTRICULAR HEART FAILURE (H): Primary | ICD-10-CM

## 2022-06-09 DIAGNOSIS — Z79.01 LONG TERM CURRENT USE OF ANTICOAGULANTS WITH INR GOAL OF 2.5-3.5: ICD-10-CM

## 2022-06-09 DIAGNOSIS — Z95.2 S/P AORTIC VALVE REPLACEMENT: ICD-10-CM

## 2022-06-09 DIAGNOSIS — I42.0 DILATED CARDIOMYOPATHY (H): ICD-10-CM

## 2022-06-09 DIAGNOSIS — I34.0 NONRHEUMATIC MITRAL VALVE REGURGITATION: ICD-10-CM

## 2022-06-09 DIAGNOSIS — I25.10 CORONARY ARTERY DISEASE INVOLVING NATIVE CORONARY ARTERY OF NATIVE HEART WITHOUT ANGINA PECTORIS: ICD-10-CM

## 2022-06-09 DIAGNOSIS — I42.9 CARDIOMYOPATHY, UNSPECIFIED TYPE (H): ICD-10-CM

## 2022-06-09 DIAGNOSIS — I48.21 PERMANENT ATRIAL FIBRILLATION (H): ICD-10-CM

## 2022-06-09 DIAGNOSIS — Z95.2 S/P AVR (AORTIC VALVE REPLACEMENT): Primary | ICD-10-CM

## 2022-06-09 DIAGNOSIS — S30.1XXA ABDOMINAL WALL HEMATOMA, INITIAL ENCOUNTER: ICD-10-CM

## 2022-06-09 DIAGNOSIS — R06.09 DOE (DYSPNEA ON EXERTION): ICD-10-CM

## 2022-06-09 DIAGNOSIS — I50.812 CHRONIC RIGHT-SIDED HEART FAILURE (H): ICD-10-CM

## 2022-06-09 DIAGNOSIS — I48.20 CHRONIC ATRIAL FIBRILLATION (H): ICD-10-CM

## 2022-06-09 DIAGNOSIS — Z79.01 LONG TERM CURRENT USE OF ANTICOAGULANT THERAPY: ICD-10-CM

## 2022-06-09 LAB
ANION GAP SERPL CALCULATED.3IONS-SCNC: 2 MMOL/L (ref 3–14)
BUN SERPL-MCNC: 17 MG/DL (ref 7–30)
CALCIUM SERPL-MCNC: 9 MG/DL (ref 8.5–10.1)
CHLORIDE BLD-SCNC: 103 MMOL/L (ref 94–109)
CO2 SERPL-SCNC: 33 MMOL/L (ref 20–32)
CREAT SERPL-MCNC: 0.92 MG/DL (ref 0.66–1.25)
GFR SERPL CREATININE-BSD FRML MDRD: >90 ML/MIN/1.73M2
GLUCOSE BLD-MCNC: 118 MG/DL (ref 70–99)
INR PPP: 5.36 (ref 0.85–1.15)
NT-PROBNP SERPL-MCNC: 1082 PG/ML (ref 0–900)
POTASSIUM BLD-SCNC: 3.8 MMOL/L (ref 3.4–5.3)
SODIUM SERPL-SCNC: 138 MMOL/L (ref 133–144)

## 2022-06-09 PROCEDURE — 99215 OFFICE O/P EST HI 40 MIN: CPT | Performed by: PHYSICIAN ASSISTANT

## 2022-06-09 PROCEDURE — 85610 PROTHROMBIN TIME: CPT | Performed by: INTERNAL MEDICINE

## 2022-06-09 PROCEDURE — 36415 COLL VENOUS BLD VENIPUNCTURE: CPT | Performed by: PHYSICIAN ASSISTANT

## 2022-06-09 PROCEDURE — 80048 BASIC METABOLIC PNL TOTAL CA: CPT | Performed by: PHYSICIAN ASSISTANT

## 2022-06-09 PROCEDURE — 83880 ASSAY OF NATRIURETIC PEPTIDE: CPT | Performed by: PHYSICIAN ASSISTANT

## 2022-06-09 RX ORDER — SPIRONOLACTONE 25 MG/1
25 TABLET ORAL DAILY
Qty: 45 TABLET | Refills: 3 | Status: SHIPPED | OUTPATIENT
Start: 2022-06-09 | End: 2023-02-16

## 2022-06-09 NOTE — PROGRESS NOTES
Primary Cardiologist: Dr. Cooley    Reason for Visit: 3 month follow up with repeat BMP (decreasing spironolactone to 12.5 mg and starting furosemide 20 mg) and discuss sleep study for possible LINDA    History of Present Illness:   Jovon is a pleasant 66 year old male with past complex medical history notable for:      # Chronic BiV HF  (uncler etiology; Class I; Stage B- spironolactone was increased to 25 mg as recent RHC in 1/2022 showed increased left filling pressures with mean wedge of 23 mmHg, his wt at that time was 192 Ibs)  # Hx of Severe Bicuspid Aortic Stenosis (s/p AVR with mechanical valve in 2011 w/ Dr. Prado, 24 mm ATS; on warfarin with INR goal of 2.5-3.5; echo in 12/2021 showed normal function)  # Persistent Atrial Fibrillation (s/p MAZE procedure but A fib recurred; rate control w/ BB and digoxin; on warfarin for VTE prophylaxis)  # CAD (70% stenosis of the LAD noted at the time of his AVR but it was not bypassed as it was completely intramyocardial; exercise nuclear stress scan in 2018 showed no significant ischemia; repeat left heart cath in 1/2022 showed proximal 70% LAD stenosis with borderline flow-limiting based on IFR of 0.9-0.91 but it appeared to have no significant change compared to his last angiogram in 2010; case was discussed with Dr. Cooley and it was recommended to proceed with conservative management as his dyspnea was felt to be likely due to increased left filling pressures)   #  Moderate to severe RV Dysfunction with hx of nonischemic dilated CM (was noted previously; unclear etiology at this time; on BB, Acei, and spironolactone; no prior cMRI but would benefit from this to further evaluation of infiltrative causes in the setting of severe LVH noted on prior echocardiogram studies)  # Moderate MR (CADE and stress echo in 12/2021 showed moderate MR both functional and primary; repeat echo in 6 months recommended, due for this in a few weeks)  # Hypertension (at goal)  #  Hyperlipidemia (LDL of 46 on simvastatin 20 mg)  # Vein Insufficiency (s/p vein stripping)  # Hypothyroidism   # Hx of GI bleed, continues to be anemic (no active bleeding issues; takes vit B12 in the setting of hx of gastric bypass)  # Hx of Obesity (s/p gastric bypass)     During last visit he had reports of nighttime symptoms such sweating and pruritis that he felt may be due to spironolactone.  I was not sure if this was due to his medication but agreed to lower it from 25 mg to 12.5 mg for 1 week to see if it makes a difference.  If it did not make a difference I had then recommended that he increases spironolactone back to 25 mg daily.  Due to slight hypervolemia based on his RHC we also decided to add furosemide 20 mg daily.  We also discussed possible sleep study to rule out significant LINDA but he reports his spouse never reported him snoring or having apneic episodes.     Of note, during his INR check in late May he was noted to have a significant supratherapeutic INR value at 8 in the setting of missing his multivitamin with vitamin K and it for about a week.  He also forgot his Zoloft medication while he was vacationing.  With adjustments his INR came down to 1.3 after week. His INR goal remains to be 2.5-3.5.     He now returns for follow-up. He reports his RUIZ is better. He walks about a mile every day with no limitations.  He is able to complete chores around the house with no symptoms.  He denies orthopnea, abdominal distention, or peripheral edema.  He does report 1 isolated episode of lightheadedness during a labor-intensive project he was working on in his basement.  He was putting up a beam for a frame and was bending forward and standing up quickly multiple times.  During this he developed lightheadedness and palpitations requiring him to sit down.  After 10 to 15 minutes his symptoms resolved.  He thinks that he may have pushed himself a bit too much.  He denies any other symptoms since that  time.  He denies having orthostatic lightheadedness or any feeling of unsteadiness when ambulating.    Assessment and Plan:  Jovon is a pleasant 66 year old male with past complex medical history notable for:      # Chronic BiV HF  (uncler etiology; Class I; Stage B- spironolactone was increased to 25 mg as recent RHC in 1/2022 showed increased left filling pressures with mean wedge of 23 mmHg, his wt at that time was 192 Ibs)  # Hx of Severe Bicuspid Aortic Stenosis (s/p AVR with mechanical valve in 2011 w/ Dr. Prado, 24 mm ATS; on warfarin with INR goal of 2.5-3.5; echo in 12/2021 showed normal function)  # Persistent Atrial Fibrillation (s/p MAZE procedure but A fib recurred; rate control w/ BB and digoxin; on warfarin for VTE prophylaxis)  # CAD (70% stenosis of the LAD noted at the time of his AVR but it was not bypassed as it was completely intramyocardial; exercise nuclear stress scan in 2018 showed no significant ischemia; repeat left heart cath in 1/2022 showed proximal 70% LAD stenosis with borderline flow-limiting based on IFR of 0.9-0.91 but it appeared to have no significant change compared to his last angiogram in 2010; case was discussed with Dr. Cooley and it was recommended to proceed with conservative management as his dyspnea was felt to be likely due to increased left filling pressures)   #  Moderate to severe RV Dysfunction with hx of nonischemic dilated CM (was noted previously; unclear etiology at this time; on BB, Acei, and spironolactone; no prior cMRI but would benefit from this to further evaluation of infiltrative causes in the setting of severe LVH noted on prior echocardiogram studies)  # Moderate MR (CADE and stress echo in 12/2021 showed moderate MR both functional and primary; repeat echo in 6 months recommended, due for this in a few weeks)  # Hypertension (at goal)  # Hyperlipidemia (LDL of 46 on simvastatin 20 mg)  # Vein Insufficiency (s/p vein stripping)  # Hypothyroidism   # Hx  of GI bleed, continues to be anemic (no active bleeding issues; takes vit B12 in the setting of hx of gastric bypass)  # Hx of Obesity (s/p gastric bypass)    Jovon appears to be doing well with no significant symptoms.  He is able to complete most activities during the day with hardly any limitation.  It is unclear what his dry weight is but at the time of his right heart cath his weight was 192 pounds.  At that time his mean wedge was 23 mmHg. based on BMP today his renal function remains normal.  He also tells me that he has been taking additional 10 mg of lisinopril in the afternoon which had been discontinued in the past due to lightheadedness.  I have told him that he should continue with 20 mg in the morning alone and discontinue the additional afternoon dose.  I would like to further increase his spironolactone as he had no significant improvement with diaphoresis and pruritus during nighttime.  He will start taking 25 mg daily.  I will have him continue with furosemide 20 mg daily.  Interestingly he tells me after receiving his refill on furosemide a few days ago he started to urinate a lot more frequently.  He feels like somehow his refill medication is working better.  He denies persistent lightheadedness or presyncope.  We will continue at this dose with no additional changes.  We have talked about possibly obtaining sleep study but his spouse never reported that he snores or has apneic episodes thus we will defer this at this time.  Given his cardiomyopathy with unknown cause and evidence of severe concentric LVH on prior echocardiogram images we will also plan on obtaining cardiac MRI for further evaluation.  He is in agreement with this.  He will return in a few weeks with repeat echocardiogram for evaluation of his MR.     65 minutes spent on the date of the encounter with chart review, patient visit, care coordination, and documentation.      This note was completed in part using Dragon voice  recognition software. Although reviewed after completion, some word and grammatical errors may occur.    Orders this Visit:  No orders of the defined types were placed in this encounter.    Orders Placed This Encounter   Medications     spironolactone (ALDACTONE) 25 MG tablet     Sig: Take 1 tablet (25 mg) by mouth daily     Dispense:  45 tablet     Refill:  3     Medications Discontinued During This Encounter   Medication Reason     spironolactone (ALDACTONE) 25 MG tablet          Encounter Diagnoses   Name Primary?     Coronary artery disease involving native coronary artery of native heart without angina pectoris      Cardiomyopathy, unspecified type (H)      Chronic atrial fibrillation (H)      S/P aortic valve replacement      Dilated cardiomyopathy (H)      Nonrheumatic mitral valve regurgitation      Chronic right-sided heart failure (H)      Biventricular heart failure (H) Yes       CURRENT MEDICATIONS:  Current Outpatient Medications   Medication Sig Dispense Refill     Ascorbic Acid (VITAMIN C PO) Take 500 mg by mouth At Bedtime       aspirin 81 MG tablet Take 81 mg by mouth every other day       calcium carbonate-vitamin D 600-200 MG-UNIT TABS Take 1 tablet by mouth 2 times daily        carvedilol (COREG) 25 MG tablet Take 1 tablet (25 mg) by mouth 2 times daily (with meals) 180 tablet 1     Cholecalciferol (VITAMIN D) 2000 UNIT CAPS Take 1 tablet by mouth daily        Cyanocobalamin (VITAMIN B-12) 500 MCG SUBL Place 500 mcg under the tongue daily        digoxin (LANOXIN) 125 MCG tablet Take 1 tablet (125 mcg) by mouth daily 90 tablet 0     Fexofenadine HCl (ALLERGY 24-HR PO) Take 180 mg by mouth daily        fluticasone (FLONASE) 50 MCG/ACT nasal spray Spray 1 spray into both nostrils daily       furosemide (LASIX) 20 MG tablet Take 1 tablet (20 mg) by mouth daily 90 tablet 0     levothyroxine (SYNTHROID/LEVOTHROID) 200 MCG tablet Take 1 tablet (200 mcg) by mouth daily 90 tablet 3     lisinopril  (ZESTRIL) 20 MG tablet Take 1 tablet (20 mg) by mouth daily 135 tablet 3     Multiple Vitamins-Minerals (CENTRUM) CHEW Take 1 tablet by mouth daily        nitroGLYcerin (NITROSTAT) 0.3 MG sublingual tablet For chest pain place 1 tablet under the tongue every 5 minutes for 3 doses. If symptoms persist 5 minutes after 1st dose call 911. 25 tablet 1     Psyllium (FIBER) 0.52 GM CAPS Take 2 capsules by mouth At Bedtime        sertraline (ZOLOFT) 50 MG tablet Take 1 tablet (50 mg) by mouth daily 90 tablet 3     simvastatin (ZOCOR) 20 MG tablet Take 1 tablet (20 mg) by mouth At Bedtime 90 tablet 3     spironolactone (ALDACTONE) 25 MG tablet Take 1 tablet (25 mg) by mouth daily 45 tablet 3     warfarin ANTICOAGULANT (COUMADIN) 4 MG tablet Take 4mg every Sat / Take 6mg all other days OR per INR clinic 120 tablet 1       ALLERGIES   No Known Allergies    PAST MEDICAL HISTORY:  Past Medical History:   Diagnosis Date     Acne rosacea 04/22/2019     Acquired hypothyroidism 05/23/2003     Problem list name updated by automated process. Provider to review     Acute prostatitis      Acute sinusitis, unspecified      Aortic valve disease      Biatrial enlargement      CAD (coronary artery disease)     known 70% LAD stenosis     Congestive heart failure (H)      Depression      GI bleeding 01/22/2016     History of bleeding peptic ulcer      Hyperlipidemia      Idiopathic cardiomyopathy (H)      Mitral regurgitation      Obesity      Palpitations      Permanent atrial fibrillation (H) 06/02/2017     Right inguinal hernia 07/22/2019     S/P AVR 01/07/2011    with Maze procedure     S/P gastric bypass      Tricuspid regurgitation      Unspecified essential hypertension        PAST SURGICAL HISTORY:  Past Surgical History:   Procedure Laterality Date     ANGIOGRAM  02/01/2002    Normal coronary,dilated LV,Sev.decr.global LVF,+1 MR     ANGIOGRAM  01/01/2008    Mild AS,Mod PHTN,mod prox.LAD disease,Triv.CFX disease     ANGIOGRAM   01/01/2009    Mod AS,Mod PHTN,mod prox.LAD disease     ANGIOGRAM  10/01/2010    Sev.AS,CM,global hypokinesis,Mild-mod LV dilated,Mild MR,70% prox.LAD lesion,PHTN     CARDIOVERSION  2/02, 4/02, 4/11     CHOLECYSTECTOMY  2006     COLONOSCOPY N/A 01/23/2016    Procedure: COLONOSCOPY;  Surgeon: Robyn Collins MD;  Location:  GI     CV CORONARY ANGIOGRAM N/A 1/31/2022    Procedure: Coronary Angiogram;  Surgeon: Rome Mike MD;  Location:  HE[facility]ARD[facility] CATH [facility]     CV INSTANTANEOUS WAVE-FREE RATIO N/A 1/31/2022    Procedure: Instantaneous Wave-Free Ratio;  Surgeon: Rome Mike MD;  Location:  HE[facility] CATH [facility]     CV RIGHT HEART CATH MEASUREMENTS RECORDED N/A 1/31/2022    Procedure: Right Heart Cath;  Surgeon: Rome Mike MD;  Location:  HEART [facility]ARD[facility] CATH [facility]     ESOPHAGOSCOPY, GASTROSCOPY, DUODENOSCOPY (EGD), COMBINED N/A 01/23/2016    Procedure: COMBINED ESOPHAGOSCOPY, GASTROSCOPY, DUODENOSCOPY (EGD);  Surgeon: Robyn Collins MD;  Location:  GI     EXCISE MASS GROIN Right 2/11/2021    Procedure: INCISION AND DRAINAGE OF RIGHT GROIN HEMATOMA;  Surgeon: Norma Scanlon MD;  Location: [facility] OR     HERNIORRHAPHY INGUINAL Right 2/4/2021    Procedure: OPEN RIGHT INGUINAL HENRIA WITH MESH;  Surgeon: Jaden Alex MD;  Location: [facility] OR     LAPAROSCOPIC BYPASS GASTRIC  10/08/2011    Procedure:LAPAROSCOPIC BYPASS GASTRIC; Diagnostic laparoscopy, Lysis of Adhesions, Laparoscopic Revision of Jejunojejunostomy; Surgeon:RADHA MURO; Location: OR     LAPAROSCOPIC BYPASS GASTRIC  06/26/2006    Dr Jin     LAPAROSCOPY DIAGNOSTIC (GENERAL)  10/08/2011    Procedure:LAPAROSCOPY DIAGNOSTIC (GENERAL); Surgeon:RADHA MURO; Location: OR     REPLACE VALVE AORTIC  01/07/2011     ZZC NONSPECIFIC PROCEDURE  ~1985    vein stripping       FAMILY HISTORY:  Family History   Problem Relation Age of Onset     Cancer - colorectal Father       "Colon Cancer Father      Cancer Father      Diabetes Brother        SOCIAL HISTORY:  Social History     Socioeconomic History     Marital status:      Spouse name: Tracey     Number of children: 0     Years of education: None     Highest education level: None   Occupational History     Occupation:      Comment: [employer]   Tobacco Use     Smoking status: Never Smoker     Smokeless tobacco: Never Used   Substance and Sexual Activity     Alcohol use: No     Alcohol/week: 0.0 standard drinks     Drug use: No     Sexual activity: Not Currently     Partners: Female   Other Topics Concern     Parent/sibling w/ CABG, MI or angioplasty before 65F 55M? Yes     Blood Transfusions No     Caffeine Concern Yes     Comment: 1 can daily     Occupational Exposure Yes     Hobby Hazards No     Sleep Concern No     Stress Concern No     Weight Concern No     Special Diet Yes     Comment: low sodium, low calories     Back Care No     Exercise Yes     Comment: walks daily     Seat Belt Yes     Self-Exams Yes       Review of Systems:  Skin:  Negative     Eyes:  Positive for glasses  ENT:  Negative    Respiratory:  Negative    Cardiovascular:  Negative    Gastroenterology: Negative    Genitourinary:  Positive for prostate problem  Musculoskeletal:  Positive for joint pain  Neurologic:  Positive for headaches  Psychiatric:  Negative    Heme/Lymph/Imm:  Negative allergies  Endocrine:  Positive for thyroid disorder    Physical Exam:  Vitals: BP 96/63   Pulse 56   Ht 1.765 m (5' 9.5\")   Wt 87.5 kg (193 lb)   SpO2 96%   BMI 28.09 kg/m       GEN:  NAD  NECK: Mild JVD  C/V:  Irregularly irregular rhythm with normal rate; there is 2/6 holosystolic murmur at LLSB.  RESP: Clear to auscultation bilaterally.  GI: Abdomen soft, nontender, nondistended.  EXTREM: No pitting LE edema.   NEURO: Alert and oriented, cooperative. No obvious focal deficits.   PSYCH: Normal affect.  SKIN: Warm and dry.       Recent Lab Results:  LIPID " RESULTS:  Lab Results   Component Value Date    CHOL 99 08/09/2021    CHOL 109 03/13/2020    HDL 43 08/09/2021    HDL 51 03/13/2020    LDL 46 08/09/2021    LDL 49 03/13/2020    TRIG 48 08/09/2021    TRIG 45 03/13/2020    CHOLHDLRATIO 2.5 02/16/2015       LIVER ENZYME RESULTS:  Lab Results   Component Value Date    AST 23 08/09/2021    AST 28 02/10/2021    ALT 18 08/09/2021    ALT 23 02/10/2021       CBC RESULTS:  Lab Results   Component Value Date    WBC 4.0 01/31/2022    WBC 8.9 02/13/2021    RBC 4.12 (L) 01/31/2022    RBC 3.12 (L) 02/13/2021    HGB 11.5 (L) 01/31/2022    HGB 9.7 (L) 02/13/2021    HCT 38.3 (L) 01/31/2022    HCT 30.9 (L) 02/13/2021    MCV 93 01/31/2022    MCV 99 02/13/2021    MCH 27.9 01/31/2022    MCH 31.1 02/13/2021    MCHC 30.0 (L) 01/31/2022    MCHC 31.4 (L) 02/13/2021    RDW 17.6 (H) 01/31/2022    RDW 15.2 (H) 02/13/2021     01/31/2022     02/13/2021       BMP RESULTS:  Lab Results   Component Value Date     06/09/2022     02/12/2021    POTASSIUM 3.8 06/09/2022    POTASSIUM 4.4 02/12/2021    CHLORIDE 103 06/09/2022    CHLORIDE 107 02/12/2021    CO2 33 (H) 06/09/2022    CO2 30 02/12/2021    ANIONGAP 2 (L) 06/09/2022    ANIONGAP 2 (L) 02/12/2021     (H) 06/09/2022     (H) 02/12/2021    BUN 17 06/09/2022    BUN 31 (H) 02/12/2021    CR 0.92 06/09/2022    CR 0.76 02/12/2021    GFRESTIMATED >90 06/09/2022    GFRESTIMATED >90 02/12/2021    GFRESTBLACK >90 02/12/2021    REECE 9.0 06/09/2022    REECE 8.8 02/12/2021        A1C RESULTS:  Lab Results   Component Value Date    A1C 5.8 (H) 02/04/2019       INR RESULTS:  Lab Results   Component Value Date    INR 5.36 (HH) 06/09/2022    INR 1.6 (H) 05/31/2022    INR 8.67 (HH) 05/26/2022    INR >8.0 (HH) 05/26/2022    INR 3.10 (H) 06/22/2021    INR 3.40 (H) 05/13/2021           Ingrid Alas PA-C  June 9, 2022

## 2022-06-09 NOTE — TELEPHONE ENCOUNTER
Gladys calling from [facility] calling with critical lab value.    INR- 5.36 taken today at 9:47am on 6/9/22    Routing to INR team to review and advise.     Helen Mckeon RN

## 2022-06-09 NOTE — LETTER
6/9/2022    Noe Ramirez PA-C  [address] [facility] [address]    RE: Jovon Mantilla       Dear Colleague,     I had the pleasure of seeing Jovon Mantilla in the [facility].  Primary Cardiologist: Dr. Cooley    Reason for Visit: 3 month follow up with repeat BMP (decreasing spironolactone to 12.5 mg and starting furosemide 20 mg) and discuss sleep study for possible LINDA    History of Present Illness:   Jovon is a pleasant 66 year old male with past complex medical history notable for:      # Chronic BiV HF  (uncler etiology; Class I; Stage B- spironolactone was increased to 25 mg as recent RHC in 1/2022 showed increased left filling pressures with mean wedge of 23 mmHg, his wt at that time was 192 Ibs)  # Hx of Severe Bicuspid Aortic Stenosis (s/p AVR with mechanical valve in 2011 w/ Dr. Prado, 24 mm ATS; on warfarin with INR goal of 2.5-3.5; echo in 12/2021 showed normal function)  # Persistent Atrial Fibrillation (s/p MAZE procedure but A fib recurred; rate control w/ BB and digoxin; on warfarin for VTE prophylaxis)  # CAD (70% stenosis of the LAD noted at the time of his AVR but it was not bypassed as it was completely intramyocardial; exercise nuclear stress scan in 2018 showed no significant ischemia; repeat left heart cath in 1/2022 showed proximal 70% LAD stenosis with borderline flow-limiting based on IFR of 0.9-0.91 but it appeared to have no significant change compared to his last angiogram in 2010; case was discussed with Dr. Cooley and it was recommended to proceed with conservative management as his dyspnea was felt to be likely due to increased left filling pressures)   #  Moderate to severe RV Dysfunction with hx of nonischemic dilated CM (was noted previously; unclear etiology at this time; on BB, Acei, and spironolactone; no prior cMRI but would benefit from this to further evaluation of infiltrative causes in the setting of severe LVH noted on prior echocardiogram  studies)  # Moderate MR (CADE and stress echo in 12/2021 showed moderate MR both functional and primary; repeat echo in 6 months recommended, due for this in a few weeks)  # Hypertension (at goal)  # Hyperlipidemia (LDL of 46 on simvastatin 20 mg)  # Vein Insufficiency (s/p vein stripping)  # Hypothyroidism   # Hx of GI bleed, continues to be anemic (no active bleeding issues; takes vit B12 in the setting of hx of gastric bypass)  # Hx of Obesity (s/p gastric bypass)     During last visit he had reports of nighttime symptoms such sweating and pruritis that he felt may be due to spironolactone.  I was not sure if this was due to his medication but agreed to lower it from 25 mg to 12.5 mg for 1 week to see if it makes a difference.  If it did not make a difference I had then recommended that he increases spironolactone back to 25 mg daily.  Due to slight hypervolemia based on his RHC we also decided to add furosemide 20 mg daily.  We also discussed possible sleep study to rule out significant LINDA but he reports his spouse never reported him snoring or having apneic episodes.     Of note, during his INR check in late May he was noted to have a significant supratherapeutic INR value at 8 in the setting of missing his multivitamin with vitamin K and it for about a week.  He also forgot his Zoloft medication while he was vacationing.  With adjustments his INR came down to 1.3 after week. His INR goal remains to be 2.5-3.5.     He now returns for follow-up. He reports his RUIZ is better. He walks about a mile every day with no limitations.  He is able to complete chores around the house with no symptoms.  He denies orthopnea, abdominal distention, or peripheral edema.  He does report 1 isolated episode of lightheadedness during a labor-intensive project he was working on in his basement.  He was putting up a beam for a frame and was bending forward and standing up quickly multiple times.  During this he developed  lightheadedness and palpitations requiring him to sit down.  After 10 to 15 minutes his symptoms resolved.  He thinks that he may have pushed himself a bit too much.  He denies any other symptoms since that time.  He denies having orthostatic lightheadedness or any feeling of unsteadiness when ambulating.    Assessment and Plan:  Jovon is a pleasant 66 year old male with past complex medical history notable for:      # Chronic BiV HF  (uncler etiology; Class I; Stage B- spironolactone was increased to 25 mg as recent RHC in 1/2022 showed increased left filling pressures with mean wedge of 23 mmHg, his wt at that time was 192 Ibs)  # Hx of Severe Bicuspid Aortic Stenosis (s/p AVR with mechanical valve in 2011 w/ Dr. Prado, 24 mm ATS; on warfarin with INR goal of 2.5-3.5; echo in 12/2021 showed normal function)  # Persistent Atrial Fibrillation (s/p MAZE procedure but A fib recurred; rate control w/ BB and digoxin; on warfarin for VTE prophylaxis)  # CAD (70% stenosis of the LAD noted at the time of his AVR but it was not bypassed as it was completely intramyocardial; exercise nuclear stress scan in 2018 showed no significant ischemia; repeat left heart cath in 1/2022 showed proximal 70% LAD stenosis with borderline flow-limiting based on IFR of 0.9-0.91 but it appeared to have no significant change compared to his last angiogram in 2010; case was discussed with Dr. Cooley and it was recommended to proceed with conservative management as his dyspnea was felt to be likely due to increased left filling pressures)   #  Moderate to severe RV Dysfunction with hx of nonischemic dilated CM (was noted previously; unclear etiology at this time; on BB, Acei, and spironolactone; no prior cMRI but would benefit from this to further evaluation of infiltrative causes in the setting of severe LVH noted on prior echocardiogram studies)  # Moderate MR (CADE and stress echo in 12/2021 showed moderate MR both functional and primary;  repeat echo in 6 months recommended, due for this in a few weeks)  # Hypertension (at goal)  # Hyperlipidemia (LDL of 46 on simvastatin 20 mg)  # Vein Insufficiency (s/p vein stripping)  # Hypothyroidism   # Hx of GI bleed, continues to be anemic (no active bleeding issues; takes vit B12 in the setting of hx of gastric bypass)  # Hx of Obesity (s/p gastric bypass)    Jovon appears to be doing well with no significant symptoms.  He is able to complete most activities during the day with hardly any limitation.  It is unclear what his dry weight is but at the time of his right heart cath his weight was 192 pounds.  At that time his mean wedge was 23 mmHg. based on BMP today his renal function remains normal.  He also tells me that he has been taking additional 10 mg of lisinopril in the afternoon which had been discontinued in the past due to lightheadedness.  I have told him that he should continue with 20 mg in the morning alone and discontinue the additional afternoon dose.  I would like to further increase his spironolactone as he had no significant improvement with diaphoresis and pruritus during nighttime.  He will start taking 25 mg daily.  I will have him continue with furosemide 20 mg daily.  Interestingly he tells me after receiving his refill on furosemide a few days ago he started to urinate a lot more frequently.  He feels like somehow his refill medication is working better.  He denies persistent lightheadedness or presyncope.  We will continue at this dose with no additional changes.  We have talked about possibly obtaining sleep study but his spouse never reported that he snores or has apneic episodes thus we will defer this at this time.  Given his cardiomyopathy with unknown cause and evidence of severe concentric LVH on prior echocardiogram images we will also plan on obtaining cardiac MRI for further evaluation.  He is in agreement with this.  He will return in a few weeks with repeat echocardiogram  for evaluation of his MR.     65 minutes spent on the date of the encounter with chart review, patient visit, care coordination, and documentation.      This note was completed in part using Dragon voice recognition software. Although reviewed after completion, some word and grammatical errors may occur.    Orders this Visit:  No orders of the defined types were placed in this encounter.    Orders Placed This Encounter   Medications     spironolactone (ALDACTONE) 25 MG tablet     Sig: Take 1 tablet (25 mg) by mouth daily     Dispense:  45 tablet     Refill:  3     Medications Discontinued During This Encounter   Medication Reason     spironolactone (ALDACTONE) 25 MG tablet          Encounter Diagnoses   Name Primary?     Coronary artery disease involving native coronary artery of native heart without angina pectoris      Cardiomyopathy, unspecified type (H)      Chronic atrial fibrillation (H)      S/P aortic valve replacement      Dilated cardiomyopathy (H)      Nonrheumatic mitral valve regurgitation      Chronic right-sided heart failure (H)      Biventricular heart failure (H) Yes       CURRENT MEDICATIONS:  Current Outpatient Medications   Medication Sig Dispense Refill     Ascorbic Acid (VITAMIN C PO) Take 500 mg by mouth At Bedtime       aspirin 81 MG tablet Take 81 mg by mouth every other day       calcium carbonate-vitamin D 600-200 MG-UNIT TABS Take 1 tablet by mouth 2 times daily        carvedilol (COREG) 25 MG tablet Take 1 tablet (25 mg) by mouth 2 times daily (with meals) 180 tablet 1     Cholecalciferol (VITAMIN D) 2000 UNIT CAPS Take 1 tablet by mouth daily        Cyanocobalamin (VITAMIN B-12) 500 MCG SUBL Place 500 mcg under the tongue daily        digoxin (LANOXIN) 125 MCG tablet Take 1 tablet (125 mcg) by mouth daily 90 tablet 0     Fexofenadine HCl (ALLERGY 24-HR PO) Take 180 mg by mouth daily        fluticasone (FLONASE) 50 MCG/ACT nasal spray Spray 1 spray into both nostrils daily        furosemide (LASIX) 20 MG tablet Take 1 tablet (20 mg) by mouth daily 90 tablet 0     levothyroxine (SYNTHROID/LEVOTHROID) 200 MCG tablet Take 1 tablet (200 mcg) by mouth daily 90 tablet 3     lisinopril (ZESTRIL) 20 MG tablet Take 1 tablet (20 mg) by mouth daily 135 tablet 3     Multiple Vitamins-Minerals (CENTRUM) CHEW Take 1 tablet by mouth daily        nitroGLYcerin (NITROSTAT) 0.3 MG sublingual tablet For chest pain place 1 tablet under the tongue every 5 minutes for 3 doses. If symptoms persist 5 minutes after 1st dose call 911. 25 tablet 1     Psyllium (FIBER) 0.52 GM CAPS Take 2 capsules by mouth At Bedtime        sertraline (ZOLOFT) 50 MG tablet Take 1 tablet (50 mg) by mouth daily 90 tablet 3     simvastatin (ZOCOR) 20 MG tablet Take 1 tablet (20 mg) by mouth At Bedtime 90 tablet 3     spironolactone (ALDACTONE) 25 MG tablet Take 1 tablet (25 mg) by mouth daily 45 tablet 3     warfarin ANTICOAGULANT (COUMADIN) 4 MG tablet Take 4mg every Sat / Take 6mg all other days OR per INR clinic 120 tablet 1       ALLERGIES   No Known Allergies    PAST MEDICAL HISTORY:  Past Medical History:   Diagnosis Date     Acne rosacea 04/22/2019     Acquired hypothyroidism 05/23/2003     Problem list name updated by automated process. Provider to review     Acute prostatitis      Acute sinusitis, unspecified      Aortic valve disease      Biatrial enlargement      CAD (coronary artery disease)     known 70% LAD stenosis     Congestive heart failure (H)      Depression      GI bleeding 01/22/2016     History of bleeding peptic ulcer      Hyperlipidemia      Idiopathic cardiomyopathy (H)      Mitral regurgitation      Obesity      Palpitations      Permanent atrial fibrillation (H) 06/02/2017     Right inguinal hernia 07/22/2019     S/P AVR 01/07/2011    with Maze procedure     S/P gastric bypass      Tricuspid regurgitation      Unspecified essential hypertension        PAST SURGICAL HISTORY:  Past Surgical History:   Procedure  Laterality Date     ANGIOGRAM  02/01/2002    Normal coronary,dilated LV,Sev.decr.global LVF,+1 MR     ANGIOGRAM  01/01/2008    Mild AS,Mod PHTN,mod prox.LAD disease,Triv.CFX disease     ANGIOGRAM  01/01/2009    Mod AS,Mod PHTN,mod prox.LAD disease     ANGIOGRAM  10/01/2010    Sev.AS,CM,global hypokinesis,Mild-mod LV dilated,Mild MR,70% prox.LAD lesion,PHTN     CARDIOVERSION  2/02, 4/02, 4/11     CHOLECYSTECTOMY  2006     COLONOSCOPY N/A 01/23/2016    Procedure: COLONOSCOPY;  Surgeon: Robyn Collins MD;  Location:  GI     CV CORONARY ANGIOGRAM N/A 1/31/2022    Procedure: Coronary Angiogram;  Surgeon: Rome Mike MD;  Location:  HEART CARDIAC CATH LAB     CV INSTANTANEOUS WAVE-FREE RATIO N/A 1/31/2022    Procedure: Instantaneous Wave-Free Ratio;  Surgeon: Rome Mike MD;  Location:  HEART CARDIAC CATH LAB     CV RIGHT HEART CATH MEASUREMENTS RECORDED N/A 1/31/2022    Procedure: Right Heart Cath;  Surgeon: Rome Mike MD;  Location:  HEART CARDIAC CATH LAB     ESOPHAGOSCOPY, GASTROSCOPY, DUODENOSCOPY (EGD), COMBINED N/A 01/23/2016    Procedure: COMBINED ESOPHAGOSCOPY, GASTROSCOPY, DUODENOSCOPY (EGD);  Surgeon: Robyn Collins MD;  Location:  GI     EXCISE MASS GROIN Right 2/11/2021    Procedure: INCISION AND DRAINAGE OF RIGHT GROIN HEMATOMA;  Surgeon: Norma Scanlon MD;  Location: [facility] OR     HERNIORRHAPHY INGUINAL Right 2/4/2021    Procedure: OPEN RIGHT INGUINAL HENRIA WITH MESH;  Surgeon: Jaden Alex MD;  Location: [facility] OR     LAPAROSCOPIC BYPASS GASTRIC  10/08/2011    Procedure:LAPAROSCOPIC BYPASS GASTRIC; Diagnostic laparoscopy, Lysis of Adhesions, Laparoscopic Revision of Jejunojejunostomy; Surgeon:RADHA MURO; Location: OR     LAPAROSCOPIC BYPASS GASTRIC  06/26/2006    Dr Jin     LAPAROSCOPY DIAGNOSTIC (GENERAL)  10/08/2011    Procedure:LAPAROSCOPY DIAGNOSTIC (GENERAL); Surgeon:RADHA MURO; Location: OR  "    REPLACE VALVE AORTIC  01/07/2011     ZC NONSPECIFIC PROCEDURE  ~1985    vein stripping       FAMILY HISTORY:  Family History   Problem Relation Age of Onset     Cancer - colorectal Father      Colon Cancer Father      Cancer Father      Diabetes Brother        SOCIAL HISTORY:  Social History     Socioeconomic History     Marital status:      Spouse name: Tracey     Number of children: 0     Years of education: None     Highest education level: None   Occupational History     Occupation:      Comment: [employer]   Tobacco Use     Smoking status: Never Smoker     Smokeless tobacco: Never Used   Substance and Sexual Activity     Alcohol use: No     Alcohol/week: 0.0 standard drinks     Drug use: No     Sexual activity: Not Currently     Partners: Female   Other Topics Concern     Parent/sibling w/ CABG, MI or angioplasty before 65F 55M? Yes     Blood Transfusions No     Caffeine Concern Yes     Comment: 1 can daily     Occupational Exposure Yes     Hobby Hazards No     Sleep Concern No     Stress Concern No     Weight Concern No     Special Diet Yes     Comment: low sodium, low calories     Back Care No     Exercise Yes     Comment: walks daily     Seat Belt Yes     Self-Exams Yes       Review of Systems:  Skin:  Negative     Eyes:  Positive for glasses  ENT:  Negative    Respiratory:  Negative    Cardiovascular:  Negative    Gastroenterology: Negative    Genitourinary:  Positive for prostate problem  Musculoskeletal:  Positive for joint pain  Neurologic:  Positive for headaches  Psychiatric:  Negative    Heme/Lymph/Imm:  Negative allergies  Endocrine:  Positive for thyroid disorder    Physical Exam:  Vitals: BP 96/63   Pulse 56   Ht 1.765 m (5' 9.5\")   Wt 87.5 kg (193 lb)   SpO2 96%   BMI 28.09 kg/m       GEN:  NAD  NECK: Mild JVD  C/V:  Irregularly irregular rhythm with normal rate; there is 2/6 holosystolic murmur at LLSB.  RESP: Clear to auscultation bilaterally.  GI: Abdomen soft, " nontender, nondistended.  EXTREM: No pitting LE edema.   NEURO: Alert and oriented, cooperative. No obvious focal deficits.   PSYCH: Normal affect.  SKIN: Warm and dry.       Recent Lab Results:  LIPID RESULTS:  Lab Results   Component Value Date    CHOL 99 08/09/2021    CHOL 109 03/13/2020    HDL 43 08/09/2021    HDL 51 03/13/2020    LDL 46 08/09/2021    LDL 49 03/13/2020    TRIG 48 08/09/2021    TRIG 45 03/13/2020    CHOLHDLRATIO 2.5 02/16/2015       LIVER ENZYME RESULTS:  Lab Results   Component Value Date    AST 23 08/09/2021    AST 28 02/10/2021    ALT 18 08/09/2021    ALT 23 02/10/2021       CBC RESULTS:  Lab Results   Component Value Date    WBC 4.0 01/31/2022    WBC 8.9 02/13/2021    RBC 4.12 (L) 01/31/2022    RBC 3.12 (L) 02/13/2021    HGB 11.5 (L) 01/31/2022    HGB 9.7 (L) 02/13/2021    HCT 38.3 (L) 01/31/2022    HCT 30.9 (L) 02/13/2021    MCV 93 01/31/2022    MCV 99 02/13/2021    MCH 27.9 01/31/2022    MCH 31.1 02/13/2021    MCHC 30.0 (L) 01/31/2022    MCHC 31.4 (L) 02/13/2021    RDW 17.6 (H) 01/31/2022    RDW 15.2 (H) 02/13/2021     01/31/2022     02/13/2021       BMP RESULTS:  Lab Results   Component Value Date     06/09/2022     02/12/2021    POTASSIUM 3.8 06/09/2022    POTASSIUM 4.4 02/12/2021    CHLORIDE 103 06/09/2022    CHLORIDE 107 02/12/2021    CO2 33 (H) 06/09/2022    CO2 30 02/12/2021    ANIONGAP 2 (L) 06/09/2022    ANIONGAP 2 (L) 02/12/2021     (H) 06/09/2022     (H) 02/12/2021    BUN 17 06/09/2022    BUN 31 (H) 02/12/2021    CR 0.92 06/09/2022    CR 0.76 02/12/2021    GFRESTIMATED >90 06/09/2022    GFRESTIMATED >90 02/12/2021    GFRESTBLACK >90 02/12/2021    REECE 9.0 06/09/2022    REECE 8.8 02/12/2021        A1C RESULTS:  Lab Results   Component Value Date    A1C 5.8 (H) 02/04/2019       INR RESULTS:  Lab Results   Component Value Date    INR 5.36 (HH) 06/09/2022    INR 1.6 (H) 05/31/2022    INR 8.67 (HH) 05/26/2022    INR >8.0 (HH) 05/26/2022    INR 3.10  (H) 06/22/2021    INR 3.40 (H) 05/13/2021       Ingrid Alas PA-C  June 9, 2022     Thank you for allowing me to participate in the care of your patient.      Sincerely,     Ingrid Alas PA-C     [facility]    cc:   Ingrid Alas PA-C  [address] [place] [address]

## 2022-06-09 NOTE — PROGRESS NOTES
Per protocol, reporting to PCP a supratherapeutic INR of 5.36 on this patient. INR was managed by the [facility].   Madeline Wayne RN  Anticoagulation Clinic

## 2022-06-09 NOTE — PROGRESS NOTES
ANTICOAGULATION MANAGEMENT     Jovon Mantilla 66 year old male is on warfarin with supratherapeutic INR result. (Goal INR 2.5-3.5)    Recent labs: (last 7 days)     06/09/22  0947   INR 5.36*       ASSESSMENT       Source(s): Chart Review    Previous INR was Subtherapeutic , greatly supra prior to that    Medication, diet, health changes since last INR     Recently discontinued MVI with vitamin K    Cardiology visit today--spironolactone decreased x 1 week; lasix added and sleep study ordered           PLAN     Unable to reach Jovon today.    Left message to hold warfarin tonight. Request call back for assessment.    Follow up required to confirm warfarin dose taken and assess for changes    Madeline Wayne, RN  Anticoagulation Clinic  6/9/2022

## 2022-06-09 NOTE — PROGRESS NOTES
ANTICOAGULATION MANAGEMENT     Jovon Mantilla 66 year old male is on warfarin with supratherapeutic INR result. (Goal INR 2.5-3.5)    Recent labs: (last 7 days)     06/09/22  0947   INR 5.36*       ASSESSMENT       Source(s): Chart Review and Patient/Caregiver Call       Warfarin doses taken: Warfarin taken as instructed    Diet: No new diet changes identified    New illness, injury, or hospitalization: No    Medication/supplement changes: See notes below    Signs or symptoms of bleeding or clotting: No    Previous INR: Subtherapeutic    Additional findings: we increased his warfarin MD 20% at last visit, this is probable cause of supra INR today       PLAN     Recommended plan for no diet, medication or health factor changes affecting INR     Dosing Instructions: hold 1 1/2 doses then decrease your warfarin dose (12.5% change) with next INR in 4 days       Summary  As of 6/9/2022    Full warfarin instructions:  6/9: Hold; 6/10: 2 mg; Otherwise 4 mg every day   Next INR check:  6/13/2022             Telephone call with Jovon who agrees to plan and repeated back plan correctly    Lab visit scheduled    Education provided: Contact [phone]  with any changes, questions or concerns.     Plan made per [facility] anticoagulation protocol    Madeline Wayne, RN  Anticoagulation Clinic  6/9/2022    _______________________________________________________________________     Anticoagulation Episode Summary     Current INR goal:  2.5-3.5   TTR:  66.6 % (1 y)   Target end date:  Indefinite   Send INR reminders to:  [facility] [employer] [place]    Indications    S/P AVR (aortic valve replacement) [Z95.2]  Permanent atrial fibrillation (H) [I48.21]  Long term current use of anticoagulants with INR goal of 2.5-3.5 [Z79.01]  Abdominal wall hematoma  initial encounter [S30.1XXA]  Long-term (current) use of anticoagulants [Z79.01] [Z79.01]           Comments:  24mm ATS Valve         Anticoagulation Care Providers     Provider Role Specialty Phone  number    Rehan Lorenzana MD Referring Family Medicine [phone]    Ramana Redd MD Referring Student in [facility] health care education/training program [phone]    Noe Ramirez PADamianC Responsible Family Medicine [phone]

## 2022-06-09 NOTE — PATIENT INSTRUCTIONS
Today's Plan:   1) Keep checking your weight daily and bring those numbers with you.   2) Schedule heart MRI for the next few weeks to look at the thick heart muscle.   3) Keep appt with Dr. Cooley along with heart ultrasound (echo).   4) Increase spinorolactone to 25 mg daily.  5) Discontinue afternoon dose of lisinopril.   6) No sleep study since you have no symptoms to suggest sleep apnea.     If you have questions or concerns please call my nurse team at [phone].     Scheduling phone number: [phone].  Reminder: Please bring in all current medications, over the counter supplements and vitamin bottles to your next appointment.    It was a pleasure seeing you today!     Ingrid Alas PA-C  6/9/2022

## 2022-06-13 ENCOUNTER — LAB (OUTPATIENT)
Dept: LAB | Facility: CLINIC | Age: 67
End: 2022-06-13
Payer: COMMERCIAL

## 2022-06-13 ENCOUNTER — ANTICOAGULATION THERAPY VISIT (OUTPATIENT)
Dept: ANTICOAGULATION | Facility: CLINIC | Age: 67
End: 2022-06-13

## 2022-06-13 DIAGNOSIS — I48.21 PERMANENT ATRIAL FIBRILLATION (H): ICD-10-CM

## 2022-06-13 DIAGNOSIS — Z95.2 S/P AVR (AORTIC VALVE REPLACEMENT): Primary | ICD-10-CM

## 2022-06-13 DIAGNOSIS — Z79.01 LONG TERM CURRENT USE OF ANTICOAGULANTS WITH INR GOAL OF 2.5-3.5: ICD-10-CM

## 2022-06-13 DIAGNOSIS — S30.1XXA ABDOMINAL WALL HEMATOMA, INITIAL ENCOUNTER: ICD-10-CM

## 2022-06-13 DIAGNOSIS — Z79.01 LONG TERM CURRENT USE OF ANTICOAGULANT THERAPY: ICD-10-CM

## 2022-06-13 LAB — INR BLD: 3.4 (ref 0.9–1.1)

## 2022-06-13 PROCEDURE — 36416 COLLJ CAPILLARY BLOOD SPEC: CPT

## 2022-06-13 PROCEDURE — 85610 PROTHROMBIN TIME: CPT

## 2022-06-13 NOTE — PROGRESS NOTES
ANTICOAGULATION MANAGEMENT     Jovon Mantilla 66 year old male is on warfarin with therapeutic INR result. (Goal INR 2.5-3.5)    Recent labs: (last 7 days)     06/13/22  1517   INR 3.4*       ASSESSMENT       Source(s): Chart Review and Patient/Caregiver Call       Warfarin doses taken: Warfarin taken as instructed    Diet: No new diet changes identified    New illness, injury, or hospitalization: No    Medication/supplement changes: None noted    Signs or symptoms of bleeding or clotting: No    Previous INR: Supratherapeutic    Additional findings: None       PLAN     Recommended plan for no diet, medication or health factor changes affecting INR     Dosing Instructions: continue your current warfarin dose with next INR in 1 week       Summary  As of 6/13/2022    Full warfarin instructions:  4 mg every day   Next INR check:  6/20/2022             Telephone call with Jovon who verbalizes understanding and agrees to plan    Lab visit scheduled    Education provided: Please call back if any changes to your diet, medications or how you've been taking warfarin    Plan made per [facility] anticoagulation protocol    Chasity Moody RN  Anticoagulation Clinic  6/13/2022    _______________________________________________________________________     Anticoagulation Episode Summary     Current INR goal:  2.5-3.5   TTR:  65.5 % (1 y)   Target end date:  Indefinite   Send INR reminders to:  [facility] [place]    Indications    S/P AVR (aortic valve replacement) [Z95.2]  Permanent atrial fibrillation (H) [I48.21]  Long term current use of anticoagulants with INR goal of 2.5-3.5 [Z79.01]  Abdominal wall hematoma  initial encounter [S30.1XXA]  Long-term (current) use of anticoagulants [Z79.01] [Z79.01]           Comments:  24mm ATS Valve         Anticoagulation Care Providers     Provider Role Specialty Phone number    Rehan Lorenzana MD Referring Family Medicine [phone]    Ramana Redd MD Referring Student in organized  health care education/training program [phone]    Noe Ramirez PA-C [facility] Family Medicine [phone]

## 2022-06-15 DIAGNOSIS — I70.90 ASVD (ARTERIOSCLEROTIC VASCULAR DISEASE): ICD-10-CM

## 2022-06-15 RX ORDER — CARVEDILOL 25 MG/1
25 TABLET ORAL 2 TIMES DAILY WITH MEALS
Qty: 180 TABLET | Refills: 3 | Status: SHIPPED | OUTPATIENT
Start: 2022-06-15 | End: 2023-02-16

## 2022-06-15 NOTE — TELEPHONE ENCOUNTER
[facility] Cardiology Refill Guideline reviewed.  Medication meets criteria for refill.    Aye Tineo RN on 6/15/2022 at 11:10 AM

## 2022-06-20 ENCOUNTER — LAB (OUTPATIENT)
Dept: LAB | Facility: CLINIC | Age: 67
End: 2022-06-20
Payer: COMMERCIAL

## 2022-06-20 ENCOUNTER — ANTICOAGULATION THERAPY VISIT (OUTPATIENT)
Dept: ANTICOAGULATION | Facility: CLINIC | Age: 67
End: 2022-06-20

## 2022-06-20 DIAGNOSIS — Z95.2 S/P AVR (AORTIC VALVE REPLACEMENT): Primary | ICD-10-CM

## 2022-06-20 DIAGNOSIS — I48.21 PERMANENT ATRIAL FIBRILLATION (H): ICD-10-CM

## 2022-06-20 DIAGNOSIS — S30.1XXA ABDOMINAL WALL HEMATOMA, INITIAL ENCOUNTER: ICD-10-CM

## 2022-06-20 DIAGNOSIS — Z79.01 LONG TERM CURRENT USE OF ANTICOAGULANTS WITH INR GOAL OF 2.5-3.5: ICD-10-CM

## 2022-06-20 DIAGNOSIS — Z79.01 LONG TERM CURRENT USE OF ANTICOAGULANT THERAPY: ICD-10-CM

## 2022-06-20 LAB — INR BLD: 2.8 (ref 0.9–1.1)

## 2022-06-20 PROCEDURE — 36416 COLLJ CAPILLARY BLOOD SPEC: CPT

## 2022-06-20 PROCEDURE — 85610 PROTHROMBIN TIME: CPT

## 2022-06-20 NOTE — PROGRESS NOTES
ANTICOAGULATION MANAGEMENT     Jovon Mantilla 66 year old male is on warfarin with therapeutic INR result. (Goal INR 2.5-3.5)    Recent labs: (last 7 days)     06/20/22  1454   INR 2.8*       ASSESSMENT       Source(s): Chart Review and Patient/Caregiver Call       Warfarin doses taken: Warfarin taken as instructed    Diet: No new diet changes identified    New illness, injury, or hospitalization: No    Medication/supplement changes: Spironolactone increased to 25mg daily on 6/9/22 by cardiology, pt did start the increased dose on 6/9/22.     Concurrent use of WARFARIN and SPIRONOLACTONE may result in decreased anticoagulant effectiveness.    Signs or symptoms of bleeding or clotting: No    Previous INR: Therapeutic last visit; previously outside of goal range    Additional findings: If INR drops again or goes sub-therapeutic, we will increase weekly warfarin dose       PLAN     Recommended plan for ongoing change(s) affecting INR     Dosing Instructions: continue your current warfarin dose with next INR in 1 week       Summary  As of 6/20/2022    Full warfarin instructions:  4 mg every day   Next INR check:  6/27/2022             Telephone call with Jovon who agrees to plan and repeated back plan correctly    Lab visit scheduled    Education provided: Contact [phone]  with any changes, questions or concerns.  and how spironolactone can affect INR    Plan made per [facility] anticoagulation protocol    Madeline Wayne RN  Anticoagulation Clinic  6/20/2022    _______________________________________________________________________     Anticoagulation Episode Summary     Current INR goal:  2.5-3.5   TTR:  65.5 % (1 y)   Target end date:  Indefinite   Send INR reminders to:  [facility]    Indications    S/P AVR (aortic valve replacement) [Z95.2]  Permanent atrial fibrillation (H) [I48.21]  Long term current use of anticoagulants with INR goal of 2.5-3.5 [Z79.01]  Abdominal wall hematoma  initial encounter  [S30.1XXA]  Long-term (current) use of anticoagulants [Z79.01] [Z79.01]           Comments:  24mm ATS Valve         Anticoagulation Care Providers     Provider Role Specialty Phone number    Rehan Lorenzana MD Referring Family Medicine [phone]    Ramana Redd MD Referring Student in organized health care education/training program [phone]    Noe Ramirez PADamianC Responsible Family Medicine [phone]

## 2022-06-24 ENCOUNTER — HOSPITAL ENCOUNTER (OUTPATIENT)
Dept: CARDIOLOGY | Facility: CLINIC | Age: 67
Discharge: HOME | End: 2022-06-24
Admitting: PHYSICIAN ASSISTANT
Payer: COMMERCIAL

## 2022-06-24 DIAGNOSIS — I35.0 AORTIC STENOSIS: ICD-10-CM

## 2022-06-24 LAB — LVEF ECHO: NORMAL

## 2022-06-24 PROCEDURE — 93306 TTE W/DOPPLER COMPLETE: CPT

## 2022-06-24 PROCEDURE — 93306 TTE W/DOPPLER COMPLETE: CPT | Mod: 26 | Performed by: INTERNAL MEDICINE

## 2022-06-26 NOTE — PROGRESS NOTES
HPI and Plan:     I had the pleasure of seeing Mr. Mantilla in followup at the [facility].  He is a very pleasant 66-year-old gentleman who I last saw in 01/2022.       Mr. Mantilla has a history of severe aortic stenosis and is status post aortic valve replacement in 01/2011 with a 24 mm ATS supra-annular valve.  He also has a history of atrial fibrillation and underwent a Maze procedure at that time, but has remained in atrial fibrillation subsequently.  He has been on rate control and anticoagulation, which has worked well in that he has been essentially asymptomatic from his atrial fibrillation.  He also has a history of a 70% stenosis involving the LAD on coronary angiography prior to aortic valve replacement.  This, however, was not bypassed since it was found to be completely intramyocardial at the time of surgery.     Over the past few months he had noted exertional dyspnea which was new compared to the prior year.  An echocardiogram subsequently demonstrated moderate to severe eccentric mitral regurgitation which had progressed compared to his prior echocardiogram from June 2020.  Given these findings, I recommended a CADE for further evaluation.    The CADE was performed on 12/28/2021 and demonstrated severe biatrial enlargement mildly reduced left ventricular systolic function.  There was mild thickening and prolapse of P2 with moderate mitral regurgitation both qualitatively and quantitatively by Pisa analysis.  I suspect that the mitral regurgitation is secondary to both annular dilatation in the setting of chronic persistent atrial fibrillation as well as an element of P2 prolapse as reported.  The mechanical aortic valve appeared to be functioning normally on both CADE and transthoracic imaging.    He subsequently underwent an exercise stress echocardiogram on 1/17/2022 to evaluate for worsening mitral regurgitation postexercise.  This demonstrated moderately reduced left ventricular  systolic function with trace to mild mitral regurgitation at baseline.  Left ventricular systolic function improved post exercise although mitral regurgitation only increased to the mild to moderate range.  A Holter monitor was also performed which demonstrated an average heart rate of 90 bpm with atrial fibrillation throughout.    He then underwent right and left heart catheterization on 1/31/2022.  His 70% proximal LAD stenosis appeared unchanged.  No significant coronary artery disease was noted elsewhere.  He was also found to have moderate pulmonary hypertension with moderately elevated left-sided filling pressures and mildly elevated right-sided filling pressures with a normal cardiac index.    He has been followed closely by Ingrid Ballard, one of my physician assistant colleagues, closely over the past few months.  Given the elevated filling pressures noted on his right heart cath he has been started on furosemide 20 mg daily as well as spironolactone which was recently increased to 25 mg daily.  In addition, a cardiovascular MRI is scheduled for later on this week to evaluate his RV dysfunction as well as the severe concentric left ventricular hypertrophy reported on his echocardiogram from December 2021.    Today he feels much better overall from a cardiovascular standpoint.  His exertional dyspnea has actually resolved.  He has been walking regularly without any limitations.  He has, however, noted dizziness and lightheadedness upon standing from a sitting position.  Fortunately he has not had a regi syncopal episode.       PHYSICAL EXAMINATION:  Dictated below.       I personally reviewed his echocardiogram dated 6/24/2022 and discussed the findings with him in detail.  Interestingly, only mild concentric left ventricular hypertrophy is noted and both left and right ventricular systolic function are within normal limits with an estimated LVEF of 55 to 60%.  Moderate mitral regurgitation was noted.  His  prosthetic aortic valve appeared to be functioning normally.      IMPRESSION:   1.  Severe aortic stenosis, status post aortic valve replacement with a mechanical valve as described above.   2.  Persistent atrial fibrillation.   3.  Coronary artery disease as described above.    4.  Recent dyspnea on exertion as described above.  He has undergone a comprehensive cardiac work-up as described above.  His coronary angiography demonstrated an unchanged 70% proximal LAD stenosis and moderate pulmonary hypertension with moderately elevated left-sided filling pressures.     The patient appears to be doing much better overall from a cardiovascular standpoint.  As stated above, he has undergone a comprehensive cardiac work-up and his symptoms appear to have improved with diuretic therapy.  Given the absence of angina, coronary revascularization is not indicated at this time.    I have asked him to decrease his Aldactone to 12.5 mg daily given his orthostatic symptoms.  I will follow-up on the results of his cardiovascular MRI but given the reassuring findings on his echocardiogram I doubt that this will identify any significant pathology.    It was a pleasure seeing him today.                        CURRENT MEDICATIONS:  Current Outpatient Medications   Medication Sig Dispense Refill     Ascorbic Acid (VITAMIN C PO) Take 500 mg by mouth At Bedtime       aspirin 81 MG tablet Take 81 mg by mouth every other day       calcium carbonate-vitamin D 600-200 MG-UNIT TABS Take 1 tablet by mouth 2 times daily        carvedilol (COREG) 25 MG tablet Take 1 tablet (25 mg) by mouth 2 times daily (with meals) 180 tablet 3     Cholecalciferol (VITAMIN D) 2000 UNIT CAPS Take 1 tablet by mouth daily        Cyanocobalamin (VITAMIN B-12) 500 MCG SUBL Place 500 mcg under the tongue daily        digoxin (LANOXIN) 125 MCG tablet Take 1 tablet (125 mcg) by mouth daily 90 tablet 0     Fexofenadine HCl (ALLERGY 24-HR PO) Take 180 mg by mouth daily         fluticasone (FLONASE) 50 MCG/ACT nasal spray Spray 1 spray into both nostrils daily       furosemide (LASIX) 20 MG tablet Take 1 tablet (20 mg) by mouth daily 90 tablet 0     levothyroxine (SYNTHROID/LEVOTHROID) 200 MCG tablet Take 1 tablet (200 mcg) by mouth daily 90 tablet 3     lisinopril (ZESTRIL) 20 MG tablet Take 1 tablet (20 mg) by mouth daily 135 tablet 3     Multiple Vitamins-Minerals (CENTRUM) CHEW Take 1 tablet by mouth daily        nitroGLYcerin (NITROSTAT) 0.3 MG sublingual tablet For chest pain place 1 tablet under the tongue every 5 minutes for 3 doses. If symptoms persist 5 minutes after 1st dose call 911. 25 tablet 1     Psyllium (FIBER) 0.52 GM CAPS Take 2 capsules by mouth At Bedtime        sertraline (ZOLOFT) 50 MG tablet Take 1 tablet (50 mg) by mouth daily 90 tablet 3     simvastatin (ZOCOR) 20 MG tablet Take 1 tablet (20 mg) by mouth At Bedtime 90 tablet 3     spironolactone (ALDACTONE) 25 MG tablet Take 1 tablet (25 mg) by mouth daily 45 tablet 3     warfarin ANTICOAGULANT (COUMADIN) 4 MG tablet Take 4mg every Sat / Take 6mg all other days OR per INR clinic 120 tablet 1       ALLERGIES   No Known Allergies    PAST MEDICAL HISTORY:  Past Medical History:   Diagnosis Date     Acne rosacea 04/22/2019     Acquired hypothyroidism 05/23/2003     Problem list name updated by automated process. Provider to review     Acute prostatitis      Acute sinusitis, unspecified      Aortic valve disease      Biatrial enlargement      CAD (coronary artery disease)     known 70% LAD stenosis     Congestive heart failure (H)      Depression      GI bleeding 01/22/2016     History of bleeding peptic ulcer      Hyperlipidemia      Idiopathic cardiomyopathy (H)      Mitral regurgitation      Obesity      Palpitations      Permanent atrial fibrillation (H) 06/02/2017     Right inguinal hernia 07/22/2019     S/P AVR 01/07/2011    with Maze procedure     S/P gastric bypass      Tricuspid regurgitation       Unspecified essential hypertension        PAST SURGICAL HISTORY:  Past Surgical History:   Procedure Laterality Date     ANGIOGRAM  02/01/2002    Normal coronary,dilated LV,Sev.decr.global LVF,+1 MR     ANGIOGRAM  01/01/2008    Mild AS,Mod PHTN,mod prox.LAD disease,Triv.CFX disease     ANGIOGRAM  01/01/2009    Mod AS,Mod PHTN,mod prox.LAD disease     ANGIOGRAM  10/01/2010    Sev.AS,CM,global hypokinesis,Mild-mod LV dilated,Mild MR,70% prox.LAD lesion,PHTN     CARDIOVERSION  2/02, 4/02, 4/11     CHOLECYSTECTOMY  2006     COLONOSCOPY N/A 01/23/2016    Procedure: COLONOSCOPY;  Surgeon: Robyn Collins MD;  Location:  GI     CV CORONARY ANGIOGRAM N/A 1/31/2022    Procedure: Coronary Angiogram;  Surgeon: Rome Mike MD;  Location:  HEART CARDIAC CATH LAB     CV INSTANTANEOUS WAVE-FREE RATIO N/A 1/31/2022    Procedure: Instantaneous Wave-Free Ratio;  Surgeon: Rome Mike MD;  Location:  HEART CARDIAC CATH LAB     CV RIGHT HEART CATH MEASUREMENTS RECORDED N/A 1/31/2022    Procedure: Right Heart Cath;  Surgeon: Rome Mike MD;  Location:  HEART CARDIAC CATH LAB     ESOPHAGOSCOPY, GASTROSCOPY, DUODENOSCOPY (EGD), COMBINED N/A 01/23/2016    Procedure: COMBINED ESOPHAGOSCOPY, GASTROSCOPY, DUODENOSCOPY (EGD);  Surgeon: Robyn Collins MD;  Location:  GI     EXCISE MASS GROIN Right 2/11/2021    Procedure: INCISION AND DRAINAGE OF RIGHT GROIN HEMATOMA;  Surgeon: Norma Scanlon MD;  Location: [facility] OR     HERNIORRHAPHY INGUINAL Right 2/4/2021    Procedure: OPEN RIGHT INGUINAL HENRIA WITH MESH;  Surgeon: Jaden Alex MD;  Location: [facility] OR     LAPAROSCOPIC BYPASS GASTRIC  10/08/2011    Procedure:LAPAROSCOPIC BYPASS GASTRIC; Diagnostic laparoscopy, Lysis of Adhesions, Laparoscopic Revision of Jejunojejunostomy; Surgeon:RADHA MURO; Location: OR     LAPAROSCOPIC BYPASS GASTRIC  06/26/2006    Dr Jin     LAPAROSCOPY DIAGNOSTIC (GENERAL)   10/08/2011    Procedure:LAPAROSCOPY DIAGNOSTIC (GENERAL); Surgeon:RADHA MURO; Location:[facility] OR     REPLACE VALVE AORTIC  01/07/2011     ZZC NONSPECIFIC PROCEDURE  ~1985    vein stripping       FAMILY HISTORY:  Family History   Problem Relation Age of Onset     Cancer - colorectal Father      Colon Cancer Father      Cancer Father      Diabetes Brother        SOCIAL HISTORY:  Social History     Socioeconomic History     Marital status:      Spouse name: Tracey     Number of children: 0   Occupational History     Occupation:      Comment: [employer]   Tobacco Use     Smoking status: Never Smoker     Smokeless tobacco: Never Used   Substance and Sexual Activity     Alcohol use: No     Alcohol/week: 0.0 standard drinks     Drug use: No     Sexual activity: Not Currently     Partners: Female   Other Topics Concern     Parent/sibling w/ CABG, MI or angioplasty before 65F 55M? Yes     Blood Transfusions No     Caffeine Concern Yes     Comment: 1 can daily     Occupational Exposure Yes     Hobby Hazards No     Sleep Concern No     Stress Concern No     Weight Concern No     Special Diet Yes     Comment: low sodium, low calories     Back Care No     Exercise Yes     Comment: walks daily     Seat Belt Yes     Self-Exams Yes       Review of Systems:  Skin:          Eyes:         ENT:         Respiratory:          Cardiovascular:         Gastroenterology:        Genitourinary:         Musculoskeletal:         Neurologic:         Psychiatric:         Heme/Lymph/Imm:         Endocrine:           Physical Exam:  Vitals: There were no vitals taken for this visit.    Constitutional:  cooperative, alert and oriented, well developed, well nourished, in no acute distress        Skin:  warm and dry to the touch, no apparent skin lesions or masses noted          Head:  normocephalic, no masses or lesions        Eyes:  pupils equal and round        Lymph:      ENT:  no pallor or cyanosis        Neck:  JVP normal         Respiratory:  clear to auscultation         Cardiac: regular rhythm                pulses full and equal                                        GI:  abdomen soft        Extremities and Muscular Skeletal:                 Neurological:  affect appropriate        Psych:  Alert and Oriented x 3        CC  Ingrid Alas PA-C  [address] [place] [address]

## 2022-06-27 ENCOUNTER — LAB (OUTPATIENT)
Dept: LAB | Facility: CLINIC | Age: 67
End: 2022-06-27

## 2022-06-27 ENCOUNTER — ANTICOAGULATION THERAPY VISIT (OUTPATIENT)
Dept: ANTICOAGULATION | Facility: CLINIC | Age: 67
End: 2022-06-27

## 2022-06-27 ENCOUNTER — OFFICE VISIT (OUTPATIENT)
Dept: CARDIOLOGY | Facility: CLINIC | Age: 67
End: 2022-06-27
Payer: COMMERCIAL

## 2022-06-27 VITALS
DIASTOLIC BLOOD PRESSURE: 68 MMHG | HEART RATE: 68 BPM | SYSTOLIC BLOOD PRESSURE: 103 MMHG | BODY MASS INDEX: 28.06 KG/M2 | HEIGHT: 70 IN | WEIGHT: 196 LBS

## 2022-06-27 DIAGNOSIS — Z79.01 LONG TERM CURRENT USE OF ANTICOAGULANTS WITH INR GOAL OF 2.5-3.5: ICD-10-CM

## 2022-06-27 DIAGNOSIS — I48.21 PERMANENT ATRIAL FIBRILLATION (H): ICD-10-CM

## 2022-06-27 DIAGNOSIS — Z95.2 S/P AVR (AORTIC VALVE REPLACEMENT): Primary | ICD-10-CM

## 2022-06-27 DIAGNOSIS — Z79.01 LONG TERM CURRENT USE OF ANTICOAGULANT THERAPY: ICD-10-CM

## 2022-06-27 DIAGNOSIS — S30.1XXA ABDOMINAL WALL HEMATOMA, INITIAL ENCOUNTER: ICD-10-CM

## 2022-06-27 DIAGNOSIS — I25.10 CORONARY ARTERY DISEASE INVOLVING NATIVE CORONARY ARTERY OF NATIVE HEART WITHOUT ANGINA PECTORIS: Primary | ICD-10-CM

## 2022-06-27 LAB — INR BLD: 3.7 (ref 0.9–1.1)

## 2022-06-27 PROCEDURE — 85610 PROTHROMBIN TIME: CPT

## 2022-06-27 PROCEDURE — 99214 OFFICE O/P EST MOD 30 MIN: CPT | Performed by: INTERNAL MEDICINE

## 2022-06-27 PROCEDURE — 36416 COLLJ CAPILLARY BLOOD SPEC: CPT

## 2022-06-27 NOTE — LETTER
6/27/2022    Noe Ramirez PA-C  [address] [facility] [address]    RE: Jovon TITO Mantilla       Dear Colleague,     I had the pleasure of seeing Jovon Mantilla in the [facility].  HPI and Plan:     I had the pleasure of seeing Mr. Mantilla in followup at the [facility].  He is a very pleasant 66-year-old gentleman who I last saw in 01/2022.       Mr. Mantilla has a history of severe aortic stenosis and is status post aortic valve replacement in 01/2011 with a 24 mm ATS supra-annular valve.  He also has a history of atrial fibrillation and underwent a Maze procedure at that time, but has remained in atrial fibrillation subsequently.  He has been on rate control and anticoagulation, which has worked well in that he has been essentially asymptomatic from his atrial fibrillation.  He also has a history of a 70% stenosis involving the LAD on coronary angiography prior to aortic valve replacement.  This, however, was not bypassed since it was found to be completely intramyocardial at the time of surgery.     Over the past few months he had noted exertional dyspnea which was new compared to the prior year.  An echocardiogram subsequently demonstrated moderate to severe eccentric mitral regurgitation which had progressed compared to his prior echocardiogram from June 2020.  Given these findings, I recommended a CADE for further evaluation.    The CADE was performed on 12/28/2021 and demonstrated severe biatrial enlargement mildly reduced left ventricular systolic function.  There was mild thickening and prolapse of P2 with moderate mitral regurgitation both qualitatively and quantitatively by Pisa analysis.  I suspect that the mitral regurgitation is secondary to both annular dilatation in the setting of chronic persistent atrial fibrillation as well as an element of P2 prolapse as reported.  The mechanical aortic valve appeared to be functioning normally on both CADE and  transthoracic imaging.    He subsequently underwent an exercise stress echocardiogram on 1/17/2022 to evaluate for worsening mitral regurgitation postexercise.  This demonstrated moderately reduced left ventricular systolic function with trace to mild mitral regurgitation at baseline.  Left ventricular systolic function improved post exercise although mitral regurgitation only increased to the mild to moderate range.  A Holter monitor was also performed which demonstrated an average heart rate of 90 bpm with atrial fibrillation throughout.    He then underwent right and left heart catheterization on 1/31/2022.  His 70% proximal LAD stenosis appeared unchanged.  No significant coronary artery disease was noted elsewhere.  He was also found to have moderate pulmonary hypertension with moderately elevated left-sided filling pressures and mildly elevated right-sided filling pressures with a normal cardiac index.    He has been followed closely by Ingrid Ballard, one of my physician assistant colleagues, closely over the past few months.  Given the elevated filling pressures noted on his right heart cath he has been started on furosemide 20 mg daily as well as spironolactone which was recently increased to 25 mg daily.  In addition, a cardiovascular MRI is scheduled for later on this week to evaluate his RV dysfunction as well as the severe concentric left ventricular hypertrophy reported on his echocardiogram from December 2021.    Today he feels much better overall from a cardiovascular standpoint.  His exertional dyspnea has actually resolved.  He has been walking regularly without any limitations.  He has, however, noted dizziness and lightheadedness upon standing from a sitting position.  Fortunately he has not had a regi syncopal episode.       PHYSICAL EXAMINATION:  Dictated below.       I personally reviewed his echocardiogram dated 6/24/2022 and discussed the findings with him in detail.  Interestingly, only mild  concentric left ventricular hypertrophy is noted and both left and right ventricular systolic function are within normal limits with an estimated LVEF of 55 to 60%.  Moderate mitral regurgitation was noted.  His prosthetic aortic valve appeared to be functioning normally.      IMPRESSION:   1.  Severe aortic stenosis, status post aortic valve replacement with a mechanical valve as described above.   2.  Persistent atrial fibrillation.   3.  Coronary artery disease as described above.    4.  Recent dyspnea on exertion as described above.  He has undergone a comprehensive cardiac work-up as described above.  His coronary angiography demonstrated an unchanged 70% proximal LAD stenosis and moderate pulmonary hypertension with moderately elevated left-sided filling pressures.     The patient appears to be doing much better overall from a cardiovascular standpoint.  As stated above, he has undergone a comprehensive cardiac work-up and his symptoms appear to have improved with diuretic therapy.  Given the absence of angina, coronary revascularization is not indicated at this time.    I have asked him to decrease his Aldactone to 12.5 mg daily given his orthostatic symptoms.  I will follow-up on the results of his cardiovascular MRI but given the reassuring findings on his echocardiogram I doubt that this will identify any significant pathology.    It was a pleasure seeing him today.                        CURRENT MEDICATIONS:  Current Outpatient Medications   Medication Sig Dispense Refill     Ascorbic Acid (VITAMIN C PO) Take 500 mg by mouth At Bedtime       aspirin 81 MG tablet Take 81 mg by mouth every other day       calcium carbonate-vitamin D 600-200 MG-UNIT TABS Take 1 tablet by mouth 2 times daily        carvedilol (COREG) 25 MG tablet Take 1 tablet (25 mg) by mouth 2 times daily (with meals) 180 tablet 3     Cholecalciferol (VITAMIN D) 2000 UNIT CAPS Take 1 tablet by mouth daily        Cyanocobalamin (VITAMIN  B-12) 500 MCG SUBL Place 500 mcg under the tongue daily        digoxin (LANOXIN) 125 MCG tablet Take 1 tablet (125 mcg) by mouth daily 90 tablet 0     Fexofenadine HCl (ALLERGY 24-HR PO) Take 180 mg by mouth daily        fluticasone (FLONASE) 50 MCG/ACT nasal spray Spray 1 spray into both nostrils daily       furosemide (LASIX) 20 MG tablet Take 1 tablet (20 mg) by mouth daily 90 tablet 0     levothyroxine (SYNTHROID/LEVOTHROID) 200 MCG tablet Take 1 tablet (200 mcg) by mouth daily 90 tablet 3     lisinopril (ZESTRIL) 20 MG tablet Take 1 tablet (20 mg) by mouth daily 135 tablet 3     Multiple Vitamins-Minerals (CENTRUM) CHEW Take 1 tablet by mouth daily        nitroGLYcerin (NITROSTAT) 0.3 MG sublingual tablet For chest pain place 1 tablet under the tongue every 5 minutes for 3 doses. If symptoms persist 5 minutes after 1st dose call 911. 25 tablet 1     Psyllium (FIBER) 0.52 GM CAPS Take 2 capsules by mouth At Bedtime        sertraline (ZOLOFT) 50 MG tablet Take 1 tablet (50 mg) by mouth daily 90 tablet 3     simvastatin (ZOCOR) 20 MG tablet Take 1 tablet (20 mg) by mouth At Bedtime 90 tablet 3     spironolactone (ALDACTONE) 25 MG tablet Take 1 tablet (25 mg) by mouth daily 45 tablet 3     warfarin ANTICOAGULANT (COUMADIN) 4 MG tablet Take 4mg every Sat / Take 6mg all other days OR per INR clinic 120 tablet 1       ALLERGIES   No Known Allergies    PAST MEDICAL HISTORY:  Past Medical History:   Diagnosis Date     Acne rosacea 04/22/2019     Acquired hypothyroidism 05/23/2003     Problem list name updated by automated process. Provider to review     Acute prostatitis      Acute sinusitis, unspecified      Aortic valve disease      Biatrial enlargement      CAD (coronary artery disease)     known 70% LAD stenosis     Congestive heart failure (H)      Depression      GI bleeding 01/22/2016     History of bleeding peptic ulcer      Hyperlipidemia      Idiopathic cardiomyopathy (H)      Mitral regurgitation      Obesity       Palpitations      Permanent atrial fibrillation (H) 06/02/2017     Right inguinal hernia 07/22/2019     S/P AVR 01/07/2011    with Maze procedure     S/P gastric bypass      Tricuspid regurgitation      Unspecified essential hypertension        PAST SURGICAL HISTORY:  Past Surgical History:   Procedure Laterality Date     ANGIOGRAM  02/01/2002    Normal coronary,dilated LV,Sev.decr.global LVF,+1 MR     ANGIOGRAM  01/01/2008    Mild AS,Mod PHTN,mod prox.LAD disease,Triv.CFX disease     ANGIOGRAM  01/01/2009    Mod AS,Mod PHTN,mod prox.LAD disease     ANGIOGRAM  10/01/2010    Sev.AS,CM,global hypokinesis,Mild-mod LV dilated,Mild MR,70% prox.LAD lesion,PHTN     CARDIOVERSION  2/02, 4/02, 4/11     CHOLECYSTECTOMY  2006     COLONOSCOPY N/A 01/23/2016    Procedure: COLONOSCOPY;  Surgeon: Robyn Collins MD;  Location:  GI     CV CORONARY ANGIOGRAM N/A 1/31/2022    Procedure: Coronary Angiogram;  Surgeon: Rome Mike MD;  Location:  HEART CARDIAC CATH LAB     CV INSTANTANEOUS WAVE-FREE RATIO N/A 1/31/2022    Procedure: Instantaneous Wave-Free Ratio;  Surgeon: Rome Mike MD;  Location:  HEART CARDIAC CATH LAB     CV RIGHT HEART CATH MEASUREMENTS RECORDED N/A 1/31/2022    Procedure: Right Heart Cath;  Surgeon: Rome Mike MD;  Location:  HEART CARDIAC CATH LAB     ESOPHAGOSCOPY, GASTROSCOPY, DUODENOSCOPY (EGD), COMBINED N/A 01/23/2016    Procedure: COMBINED ESOPHAGOSCOPY, GASTROSCOPY, DUODENOSCOPY (EGD);  Surgeon: Robyn Collins MD;  Location:  GI     EXCISE MASS GROIN Right 2/11/2021    Procedure: INCISION AND DRAINAGE OF RIGHT GROIN HEMATOMA;  Surgeon: Norma Scanlon MD;  Location: [facility] OR     HERNIORRHAPHY INGUINAL Right 2/4/2021    Procedure: OPEN RIGHT INGUINAL HENRIA WITH MESH;  Surgeon: Jaden Alex MD;  Location: [facility] OR     LAPAROSCOPIC BYPASS GASTRIC  10/08/2011    Procedure:LAPAROSCOPIC BYPASS GASTRIC; Diagnostic laparoscopy,  Lysis of Adhesions, Laparoscopic Revision of Jejunojejunostomy; Surgeon:RADHA MURO; Location:[facility] OR     LAPAROSCOPIC BYPASS GASTRIC  06/26/2006    Dr Jin     LAPAROSCOPY DIAGNOSTIC (GENERAL)  10/08/2011    Procedure:LAPAROSCOPY DIAGNOSTIC (GENERAL); Surgeon:RADHA MURO; Location: OR     REPLACE VALVE AORTIC  01/07/2011     ZZC NONSPECIFIC PROCEDURE  ~1985    vein stripping       FAMILY HISTORY:  Family History   Problem Relation Age of Onset     Cancer - colorectal Father      Colon Cancer Father      Cancer Father      Diabetes Brother        SOCIAL HISTORY:  Social History     Socioeconomic History     Marital status:      Spouse name: Tracey     Number of children: 0   Occupational History     Occupation:      Comment: [employer]   Tobacco Use     Smoking status: Never Smoker     Smokeless tobacco: Never Used   Substance and Sexual Activity     Alcohol use: No     Alcohol/week: 0.0 standard drinks     Drug use: No     Sexual activity: Not Currently     Partners: Female   Other Topics Concern     Parent/sibling w/ CABG, MI or angioplasty before 65F 55M? Yes     Blood Transfusions No     Caffeine Concern Yes     Comment: 1 can daily     Occupational Exposure Yes     Hobby Hazards No     Sleep Concern No     Stress Concern No     Weight Concern No     Special Diet Yes     Comment: low sodium, low calories     Back Care No     Exercise Yes     Comment: walks daily     Seat Belt Yes     Self-Exams Yes       Review of Systems:  Skin:          Eyes:         ENT:         Respiratory:          Cardiovascular:         Gastroenterology:        Genitourinary:         Musculoskeletal:         Neurologic:         Psychiatric:         Heme/Lymph/Imm:         Endocrine:           Physical Exam:  Vitals: There were no vitals taken for this visit.    Constitutional:  cooperative, alert and oriented, well developed, well nourished, in no acute distress        Skin:  warm and dry to the touch,  no apparent skin lesions or masses noted          Head:  normocephalic, no masses or lesions        Eyes:  pupils equal and round        Lymph:      ENT:  no pallor or cyanosis        Neck:  JVP normal        Respiratory:  clear to auscultation         Cardiac: regular rhythm                pulses full and equal                                        GI:  abdomen soft        Extremities and Muscular Skeletal:                 Neurological:  affect appropriate        Psych:  Alert and Oriented x 3        CC  Ingrid Alas PA-C  [address] [place] [address]    Thank you for allowing me to participate in the care of your patient.      Sincerely,     Noy Cooley MD     [facility]

## 2022-06-27 NOTE — PROGRESS NOTES
ANTICOAGULATION MANAGEMENT     Jovon Mantilla 66 year old male is on warfarin with supratherapeutic INR result. (Goal INR 2.5-3.5)    Recent labs: (last 7 days)     06/27/22  1343   INR 3.7*       ASSESSMENT       Source(s): Chart Review and Patient/Caregiver Call       Warfarin doses taken: Warfarin taken as instructed    Diet: No new diet changes identified    New illness, injury, or hospitalization: No    Medication/supplement changes: Effective 6/28/22, Spironolactone will be decreased to 12.5mg daily    Signs or symptoms of bleeding or clotting: No    Previous INR: Therapeutic last 2(+) visits    Additional findings: no change in weekly warfarin dose, yet, since last 2 INRs were in range.       PLAN     Recommended plan for ongoing change(s) affecting INR     Dosing Instructions: continue your current warfarin dose with next INR in 10 days       Summary  As of 6/27/2022    Full warfarin instructions:  4 mg every day   Next INR check:  7/7/2022             Telephone call with Jovon who verbalizes understanding and agrees to plan    Lab visit scheduled    Education provided: Contact [phone]  with any changes, questions or concerns.     Plan made per [facility] anticoagulation protocol    Madeline Wayne, RN  Anticoagulation Clinic  6/27/2022    _______________________________________________________________________     Anticoagulation Episode Summary     Current INR goal:  2.5-3.5   TTR:  65.0 % (1 y)   Target end date:  Indefinite   Send INR reminders to:  [facility]    Indications    S/P AVR (aortic valve replacement) [Z95.2]  Permanent atrial fibrillation (H) [I48.21]  Long term current use of anticoagulants with INR goal of 2.5-3.5 [Z79.01]  Abdominal wall hematoma  initial encounter [S30.1XXA]  Long-term (current) use of anticoagulants [Z79.01] [Z79.01]           Comments:  24mm ATS Valve         Anticoagulation Care Providers     Provider Role Specialty Phone number    Rehan Lorenzana MD  Referring Family Medicine [phone]    Ramana Redd MD Referring Student in [facility] health care education/training program [phone]    Noe Ramirez, PA-C Responsible Family Medicine [phone]

## 2022-06-30 ENCOUNTER — HOSPITAL ENCOUNTER (OUTPATIENT)
Dept: CARDIOLOGY | Facility: CLINIC | Age: 67
Discharge: HOME | End: 2022-06-30
Attending: PHYSICIAN ASSISTANT | Admitting: PHYSICIAN ASSISTANT
Payer: COMMERCIAL

## 2022-06-30 DIAGNOSIS — I42.9 CARDIOMYOPATHY, UNSPECIFIED TYPE (H): ICD-10-CM

## 2022-06-30 PROCEDURE — A9585 GADOBUTROL INJECTION: HCPCS | Performed by: PHYSICIAN ASSISTANT

## 2022-06-30 PROCEDURE — 255N000002 HC RX 255 OP 636: Performed by: PHYSICIAN ASSISTANT

## 2022-06-30 PROCEDURE — 75565 CARD MRI VELOC FLOW MAPPING: CPT | Mod: 26 | Performed by: INTERNAL MEDICINE

## 2022-06-30 PROCEDURE — 75561 CARDIAC MRI FOR MORPH W/DYE: CPT | Mod: 26 | Performed by: INTERNAL MEDICINE

## 2022-06-30 PROCEDURE — 75561 CARDIAC MRI FOR MORPH W/DYE: CPT

## 2022-06-30 RX ORDER — DIPHENHYDRAMINE HCL 25 MG
25 CAPSULE ORAL
Status: DISCONTINUED | OUTPATIENT
Start: 2022-06-30 | End: 2022-07-01 | Stop reason: HOSPADM

## 2022-06-30 RX ORDER — DIPHENHYDRAMINE HYDROCHLORIDE 50 MG/ML
25-50 INJECTION INTRAMUSCULAR; INTRAVENOUS
Status: DISCONTINUED | OUTPATIENT
Start: 2022-06-30 | End: 2022-07-01 | Stop reason: HOSPADM

## 2022-06-30 RX ORDER — ONDANSETRON 2 MG/ML
4 INJECTION INTRAMUSCULAR; INTRAVENOUS
Status: DISCONTINUED | OUTPATIENT
Start: 2022-06-30 | End: 2022-07-01 | Stop reason: HOSPADM

## 2022-06-30 RX ORDER — ACYCLOVIR 200 MG/1
0-1 CAPSULE ORAL
Status: DISCONTINUED | OUTPATIENT
Start: 2022-06-30 | End: 2022-07-01 | Stop reason: HOSPADM

## 2022-06-30 RX ORDER — DIAZEPAM 5 MG
5 TABLET ORAL EVERY 30 MIN PRN
Status: DISCONTINUED | OUTPATIENT
Start: 2022-06-30 | End: 2022-07-01 | Stop reason: HOSPADM

## 2022-06-30 RX ORDER — GADOBUTROL 604.72 MG/ML
11 INJECTION INTRAVENOUS ONCE
Status: COMPLETED | OUTPATIENT
Start: 2022-06-30 | End: 2022-06-30

## 2022-06-30 RX ORDER — METHYLPREDNISOLONE SODIUM SUCCINATE 125 MG/2ML
125 INJECTION, POWDER, LYOPHILIZED, FOR SOLUTION INTRAMUSCULAR; INTRAVENOUS
Status: DISCONTINUED | OUTPATIENT
Start: 2022-06-30 | End: 2022-07-01 | Stop reason: HOSPADM

## 2022-06-30 RX ADMIN — GADOBUTROL 11 ML: 604.72 INJECTION INTRAVENOUS at 12:35

## 2022-07-07 ENCOUNTER — LAB (OUTPATIENT)
Dept: LAB | Facility: CLINIC | Age: 67
End: 2022-07-07
Payer: COMMERCIAL

## 2022-07-07 ENCOUNTER — ANTICOAGULATION THERAPY VISIT (OUTPATIENT)
Dept: ANTICOAGULATION | Facility: CLINIC | Age: 67
End: 2022-07-07

## 2022-07-07 DIAGNOSIS — Z79.01 LONG TERM CURRENT USE OF ANTICOAGULANTS WITH INR GOAL OF 2.5-3.5: ICD-10-CM

## 2022-07-07 DIAGNOSIS — S30.1XXA ABDOMINAL WALL HEMATOMA, INITIAL ENCOUNTER: ICD-10-CM

## 2022-07-07 DIAGNOSIS — I48.21 PERMANENT ATRIAL FIBRILLATION (H): ICD-10-CM

## 2022-07-07 DIAGNOSIS — Z79.01 LONG TERM CURRENT USE OF ANTICOAGULANT THERAPY: ICD-10-CM

## 2022-07-07 DIAGNOSIS — Z95.2 S/P AVR (AORTIC VALVE REPLACEMENT): Primary | ICD-10-CM

## 2022-07-07 LAB — INR BLD: 3.2 (ref 0.9–1.1)

## 2022-07-07 PROCEDURE — 85610 PROTHROMBIN TIME: CPT

## 2022-07-07 PROCEDURE — 36416 COLLJ CAPILLARY BLOOD SPEC: CPT

## 2022-07-07 NOTE — PROGRESS NOTES
ANTICOAGULATION MANAGEMENT     Jovon Mantilla 66 year old male is on warfarin with therapeutic INR result. (Goal INR 2.5-3.5)    Recent labs: (last 7 days)     07/07/22  1134   INR 3.2*       ASSESSMENT       Source(s): Chart Review and Patient/Caregiver Call       Warfarin doses taken: Warfarin taken as instructed    Diet: No new diet changes identified    New illness, injury, or hospitalization: No    Medication/supplement changes: Spironolactone decreased on 6/27.  Spironolactone can decrease INR.    Signs or symptoms of bleeding or clotting: No    Previous INR: Supratherapeutic    Additional findings: None       PLAN     Recommended plan for ongoing change(s) affecting INR     Dosing Instructions: continue your current warfarin dose with next INR in 2 weeks       Summary  As of 7/7/2022    Full warfarin instructions:  4 mg every day   Next INR check:  7/21/2022             Telephone call with Jovon who agrees to plan and repeated back plan correctly    Lab visit scheduled    Education provided: Please call back if any changes to your diet, medications or how you've been taking warfarin    Plan made per [facility] anticoagulation protocol    Isabel Cheney RN  Anticoagulation Clinic  7/7/2022    _______________________________________________________________________     Anticoagulation Episode Summary     Current INR goal:  2.5-3.5   TTR:  63.9 % (1 y)   Target end date:  Indefinite   Send INR reminders to:  [facility]    Indications    S/P AVR (aortic valve replacement) [Z95.2]  Permanent atrial fibrillation (H) [I48.21]  Long term current use of anticoagulants with INR goal of 2.5-3.5 [Z79.01]  Abdominal wall hematoma  initial encounter [S30.1XXA]  Long-term (current) use of anticoagulants [Z79.01] [Z79.01]           Comments:  24mm ATS Valve         Anticoagulation Care Providers     Provider Role Specialty Phone number    Rehan Lorenzana MD Referring Family Medicine [phone]    Ramana Redd,  MD Referring Student in organized health care education/training program [phone]    Noe Ramirez PADamianC [facility] [phone]

## 2022-07-13 DIAGNOSIS — Z95.2 S/P AVR (AORTIC VALVE REPLACEMENT): Primary | ICD-10-CM

## 2022-07-21 ENCOUNTER — ANTICOAGULATION THERAPY VISIT (OUTPATIENT)
Dept: ANTICOAGULATION | Facility: CLINIC | Age: 67
End: 2022-07-21

## 2022-07-21 ENCOUNTER — LAB (OUTPATIENT)
Dept: LAB | Facility: CLINIC | Age: 67
End: 2022-07-21
Payer: COMMERCIAL

## 2022-07-21 DIAGNOSIS — Z79.01 LONG TERM CURRENT USE OF ANTICOAGULANT THERAPY: ICD-10-CM

## 2022-07-21 DIAGNOSIS — Z95.2 S/P AVR (AORTIC VALVE REPLACEMENT): Primary | ICD-10-CM

## 2022-07-21 DIAGNOSIS — S30.1XXA ABDOMINAL WALL HEMATOMA, INITIAL ENCOUNTER: ICD-10-CM

## 2022-07-21 DIAGNOSIS — Z95.2 S/P AVR (AORTIC VALVE REPLACEMENT): ICD-10-CM

## 2022-07-21 DIAGNOSIS — Z79.01 LONG TERM CURRENT USE OF ANTICOAGULANTS WITH INR GOAL OF 2.5-3.5: ICD-10-CM

## 2022-07-21 DIAGNOSIS — I48.21 PERMANENT ATRIAL FIBRILLATION (H): ICD-10-CM

## 2022-07-21 LAB — INR BLD: 3.4 (ref 0.9–1.1)

## 2022-07-21 PROCEDURE — 85610 PROTHROMBIN TIME: CPT

## 2022-07-21 PROCEDURE — 36416 COLLJ CAPILLARY BLOOD SPEC: CPT

## 2022-07-21 NOTE — PROGRESS NOTES
ANTICOAGULATION MANAGEMENT     Jovon Mantilla 66 year old male is on warfarin with therapeutic INR result. (Goal INR 2.5-3.5)    Recent labs: (last 7 days)     07/21/22  0951   INR 3.4*       ASSESSMENT       Source(s): Chart Review and Patient/Caregiver Call       Warfarin doses taken: Warfarin taken as instructed    Diet: No new diet changes identified    New illness, injury, or hospitalization: No    Medication/supplement changes: None noted    Signs or symptoms of bleeding or clotting: No    Previous INR: Therapeutic last visit; previously outside of goal range    Additional findings: None       PLAN     Recommended plan for no diet, medication or health factor changes affecting INR     Dosing Instructions: continue your current warfarin dose with next INR in 3 weeks       Summary  As of 7/21/2022    Full warfarin instructions:  4 mg every day   Next INR check:  8/11/2022             Telephone call with Jovon who verbalizes understanding and agrees to plan    Lab visit scheduled    Education provided: Contact [phone]  with any changes, questions or concerns.     Plan made per [facility] anticoagulation protocol    Madeline Wayne RN  Anticoagulation Clinic  7/21/2022    _______________________________________________________________________     Anticoagulation Episode Summary     Current INR goal:  2.5-3.5   TTR:  63.9 % (1 y)   Target end date:  Indefinite   Send INR reminders to:  [facility]    Indications    S/P AVR (aortic valve replacement) [Z95.2]  Permanent atrial fibrillation (H) [I48.21]  Long term current use of anticoagulants with INR goal of 2.5-3.5 [Z79.01]  Abdominal wall hematoma  initial encounter [S30.1XXA]  Long-term (current) use of anticoagulants [Z79.01] [Z79.01]           Comments:  24mm ATS Valve         Anticoagulation Care Providers     Provider Role Specialty Phone number    Rehan Lorenzana MD Referring Family Medicine [phone]    Ramana Redd MD Referring Student  in organized health care education/training program [phone]    Noe Ramirez PA-C [facility] [phone]

## 2022-08-08 DIAGNOSIS — E78.5 HYPERLIPIDEMIA LDL GOAL <70: ICD-10-CM

## 2022-08-08 DIAGNOSIS — E03.9 ACQUIRED HYPOTHYROIDISM: ICD-10-CM

## 2022-08-11 ENCOUNTER — LAB (OUTPATIENT)
Dept: LAB | Facility: CLINIC | Age: 67
End: 2022-08-11
Payer: COMMERCIAL

## 2022-08-11 ENCOUNTER — ANTICOAGULATION THERAPY VISIT (OUTPATIENT)
Dept: ANTICOAGULATION | Facility: CLINIC | Age: 67
End: 2022-08-11

## 2022-08-11 DIAGNOSIS — Z51.81 MEDICATION MONITORING ENCOUNTER: ICD-10-CM

## 2022-08-11 DIAGNOSIS — S30.1XXA ABDOMINAL WALL HEMATOMA, INITIAL ENCOUNTER: ICD-10-CM

## 2022-08-11 DIAGNOSIS — Z79.01 LONG TERM CURRENT USE OF ANTICOAGULANTS WITH INR GOAL OF 2.5-3.5: ICD-10-CM

## 2022-08-11 DIAGNOSIS — Z95.2 S/P AVR (AORTIC VALVE REPLACEMENT): ICD-10-CM

## 2022-08-11 DIAGNOSIS — Z95.2 S/P AVR (AORTIC VALVE REPLACEMENT): Primary | ICD-10-CM

## 2022-08-11 DIAGNOSIS — I48.21 PERMANENT ATRIAL FIBRILLATION (H): ICD-10-CM

## 2022-08-11 DIAGNOSIS — Z79.01 LONG TERM CURRENT USE OF ANTICOAGULANT THERAPY: ICD-10-CM

## 2022-08-11 LAB — INR BLD: 2.2 (ref 0.9–1.1)

## 2022-08-11 PROCEDURE — 36416 COLLJ CAPILLARY BLOOD SPEC: CPT

## 2022-08-11 PROCEDURE — 85610 PROTHROMBIN TIME: CPT

## 2022-08-11 RX ORDER — SIMVASTATIN 20 MG
20 TABLET ORAL AT BEDTIME
Qty: 90 TABLET | Refills: 2 | Status: SHIPPED | OUTPATIENT
Start: 2022-08-11 | End: 2023-04-14

## 2022-08-11 RX ORDER — LEVOTHYROXINE SODIUM 200 UG/1
200 TABLET ORAL DAILY
Qty: 90 TABLET | Refills: 2 | Status: SHIPPED | OUTPATIENT
Start: 2022-08-11 | End: 2023-05-26

## 2022-08-11 NOTE — PROGRESS NOTES
ANTICOAGULATION MANAGEMENT     Jovon Mantilla 67 year old male is on warfarin with subtherapeutic INR result. (Goal INR 2.5-3.5)    Recent labs: (last 7 days)     08/11/22  0929   INR 2.2*       ASSESSMENT       Source(s): Chart Review and Patient/Caregiver Call       Warfarin doses taken: Warfarin taken as instructed, patient denies any missed warfarin doses.    Diet: No new diet changes identified    New illness, injury, or hospitalization: No    Medication/supplement changes: None noted    Signs or symptoms of bleeding or clotting: No    Previous INR: Therapeutic last 2(+) visits    Additional findings: Patient reports an increase in physical activity for the last 3 weeks doing a lot of yardwork. Patient leaving for vacation 8/15/22 for 2 weeks, patient requested INR check prior to going out of town       PLAN     Recommended plan for no diet, medication or health factor changes affecting INR. INR has been in the mid to upper 3 range the last few INR checks with current warfarin maintenance so only gave a warfarin boost dose today.      Dosing Instructions: booster dose then continue your current warfarin dose with next INR in 4 days at patient request    Summary  As of 8/11/2022    Full warfarin instructions:  8/11: 6 mg; Otherwise 4 mg every day   Next INR check:  8/15/2022             Telephone call with Jovon who verbalizes understanding and agrees to plan    Lab visit scheduled    Education provided: Please call back if any changes to your diet, medications or how you've been taking warfarin and Contact [phone]  with any changes, questions or concerns.     Plan made per [facility] anticoagulation protocol    Stephania Redd RN  Anticoagulation Clinic  8/11/2022    _______________________________________________________________________     Anticoagulation Episode Summary     Current INR goal:  2.5-3.5   TTR:  62.5 % (1 y)   Target end date:  Indefinite   Send INR reminders to:  [facility]     Indications    S/P AVR (aortic valve replacement) [Z95.2]  Permanent atrial fibrillation (H) [I48.21]  Long term current use of anticoagulants with INR goal of 2.5-3.5 [Z79.01]  Abdominal wall hematoma  initial encounter [S30.1XXA]  Long-term (current) use of anticoagulants [Z79.01] [Z79.01]           Comments:  24mm ATS Valve         Anticoagulation Care Providers     Provider Role Specialty Phone number    Rehan Lorenzana MD Referring Family Medicine [phone]    Ramana Redd MD Referring Student in organized health care education/training program [phone]    Noe Ramirez PADamianC Responsible Family Medicine [phone]

## 2022-08-11 NOTE — TELEPHONE ENCOUNTER
Prescription approved per [facility] Refill Protocol.    Dayanna See, BSN-RN

## 2022-08-15 ENCOUNTER — ANTICOAGULATION THERAPY VISIT (OUTPATIENT)
Dept: ANTICOAGULATION | Facility: CLINIC | Age: 67
End: 2022-08-15

## 2022-08-15 ENCOUNTER — LAB (OUTPATIENT)
Dept: LAB | Facility: CLINIC | Age: 67
End: 2022-08-15
Payer: COMMERCIAL

## 2022-08-15 DIAGNOSIS — Z79.01 LONG TERM CURRENT USE OF ANTICOAGULANT THERAPY: ICD-10-CM

## 2022-08-15 DIAGNOSIS — S30.1XXA ABDOMINAL WALL HEMATOMA, INITIAL ENCOUNTER: ICD-10-CM

## 2022-08-15 DIAGNOSIS — Z79.01 LONG TERM CURRENT USE OF ANTICOAGULANTS WITH INR GOAL OF 2.5-3.5: ICD-10-CM

## 2022-08-15 DIAGNOSIS — Z95.2 S/P AVR (AORTIC VALVE REPLACEMENT): ICD-10-CM

## 2022-08-15 DIAGNOSIS — I48.21 PERMANENT ATRIAL FIBRILLATION (H): ICD-10-CM

## 2022-08-15 DIAGNOSIS — Z95.2 S/P AVR (AORTIC VALVE REPLACEMENT): Primary | ICD-10-CM

## 2022-08-15 LAB — INR BLD: 2.7 (ref 0.9–1.1)

## 2022-08-15 PROCEDURE — 85610 PROTHROMBIN TIME: CPT

## 2022-08-15 PROCEDURE — 36416 COLLJ CAPILLARY BLOOD SPEC: CPT

## 2022-08-15 NOTE — PROGRESS NOTES
ANTICOAGULATION MANAGEMENT     Jovon Mantilla 67 year old male is on warfarin with therapeutic INR result. (Goal INR 2.5-3.5)    Recent labs: (last 7 days)     08/15/22  1218   INR 2.7*       ASSESSMENT       Source(s): Chart Review and Patient/Caregiver Call       Warfarin doses taken: Warfarin taken as instructed    Diet: No new diet changes identified    New illness, injury, or hospitalization: No    Medication/supplement changes: None noted    Signs or symptoms of bleeding or clotting: No    Previous INR: Subtherapeutic    Additional findings: None       PLAN     Recommended plan for no diet, medication or health factor changes affecting INR     Dosing Instructions: Continue your current warfarin dose with next INR in 2 weeks   (upon returning from vacation)    Summary  As of 8/15/2022    Full warfarin instructions:  4 mg every day   Next INR check:  8/31/2022             Telephone call with Jovon who verbalizes understanding and agrees to plan    Lab visit scheduled    Education provided: Please call back if any changes to your diet, medications or how you've been taking warfarin    Plan made per [facility] anticoagulation protocol    Teodora Redd RN  Anticoagulation Clinic  8/15/2022    _______________________________________________________________________     Anticoagulation Episode Summary     Current INR goal:  2.5-3.5   TTR:  62.4 % (1 y)   Target end date:  Indefinite   Send INR reminders to:  [facility]    Indications    S/P AVR (aortic valve replacement) [Z95.2]  Permanent atrial fibrillation (H) [I48.21]  Long term current use of anticoagulants with INR goal of 2.5-3.5 [Z79.01]  Abdominal wall hematoma  initial encounter [S30.1XXA]  Long-term (current) use of anticoagulants [Z79.01] [Z79.01]           Comments:  24mm ATS Valve         Anticoagulation Care Providers     Provider Role Specialty Phone number    Rehan Lorenzana MD Referring Family Medicine [phone]    Tramaine  MD Ramana Referring Student in organized health care education/training program [phone]    Noe Ramirez, PA-C [facility] [phone]

## 2022-08-31 ENCOUNTER — ANTICOAGULATION THERAPY VISIT (OUTPATIENT)
Dept: ANTICOAGULATION | Facility: CLINIC | Age: 67
End: 2022-08-31

## 2022-08-31 ENCOUNTER — LAB (OUTPATIENT)
Dept: LAB | Facility: CLINIC | Age: 67
End: 2022-08-31
Payer: COMMERCIAL

## 2022-08-31 DIAGNOSIS — Z79.01 LONG TERM CURRENT USE OF ANTICOAGULANT THERAPY: ICD-10-CM

## 2022-08-31 DIAGNOSIS — S30.1XXA ABDOMINAL WALL HEMATOMA, INITIAL ENCOUNTER: ICD-10-CM

## 2022-08-31 DIAGNOSIS — Z95.2 S/P AVR (AORTIC VALVE REPLACEMENT): Primary | ICD-10-CM

## 2022-08-31 DIAGNOSIS — I48.21 PERMANENT ATRIAL FIBRILLATION (H): ICD-10-CM

## 2022-08-31 DIAGNOSIS — Z95.2 S/P AVR (AORTIC VALVE REPLACEMENT): ICD-10-CM

## 2022-08-31 DIAGNOSIS — Z79.01 LONG TERM CURRENT USE OF ANTICOAGULANTS WITH INR GOAL OF 2.5-3.5: ICD-10-CM

## 2022-08-31 LAB — INR BLD: 2.9 (ref 0.9–1.1)

## 2022-08-31 PROCEDURE — 85610 PROTHROMBIN TIME: CPT

## 2022-08-31 PROCEDURE — 36416 COLLJ CAPILLARY BLOOD SPEC: CPT

## 2022-08-31 NOTE — PROGRESS NOTES
ANTICOAGULATION MANAGEMENT     Jovon Mantilla 67 year old male is on warfarin with therapeutic INR result. (Goal INR 2.5-3.5)    Recent labs: (last 7 days)     08/31/22  0929   INR 2.9*       ASSESSMENT       Source(s): Chart Review and Patient/Caregiver Call       Warfarin doses taken: Warfarin taken as instructed    Diet: No new diet changes identified    New illness, injury, or hospitalization: No    Medication/supplement changes: None noted    Signs or symptoms of bleeding or clotting: No    Previous INR: Therapeutic last 2(+) visits    Additional findings: leaving Tues 9/6/2022 for [place] for 2 months. Will back for 2 months, and then gone again for 4 months. will need lab order for 9/21 while away, He will call that week, to discuss follow up       PLAN     Recommended plan for no diet, medication or health factor changes affecting INR     Dosing Instructions: Continue your current warfarin dose with next INR in 3 weeks       Summary  As of 8/31/2022    Full warfarin instructions:  4 mg every day   Next INR check:  9/21/2022             Telephone call with Jovon who verbalizes understanding and agrees to plan    Will need follow up on labs order for clinic in [place]    Education provided: Goal range and significance of current result, Importance of therapeutic range, Importance of following up at instructed interval, Importance of notifying clinic for changes in medications; a sooner lab recheck maybe needed. and Contact [phone]  with any changes, questions or concerns.     Plan made per [facility] anticoagulation protocol    Shonda Magdaleno RN  Anticoagulation Clinic  8/31/2022    _______________________________________________________________________     Anticoagulation Episode Summary     Current INR goal:  2.5-3.5   TTR:  64.6 % (1 y)   Target end date:  Indefinite   Send INR reminders to:  [facility]    Indications    S/P AVR (aortic valve replacement) [Z95.2]  Permanent atrial fibrillation (H)  [I48.21]  Long term current use of anticoagulants with INR goal of 2.5-3.5 [Z79.01]  Abdominal wall hematoma  initial encounter [S30.1XXA]  Long-term (current) use of anticoagulants [Z79.01] [Z79.01]           Comments:  24mm ATS Valve         Anticoagulation Care Providers     Provider Role Specialty Phone number    Rehan Lorenzana MD Referring Family Medicine [phone]    Ramana Redd MD Referring Student in organized health care education/training program [phone]    Noe Ramirez, PADamianC Responsible Family Medicine [phone]

## 2022-09-06 DIAGNOSIS — I48.20 CHRONIC ATRIAL FIBRILLATION (H): ICD-10-CM

## 2022-09-06 DIAGNOSIS — I50.812 CHRONIC RIGHT-SIDED HEART FAILURE (H): ICD-10-CM

## 2022-09-06 DIAGNOSIS — I25.10 CORONARY ARTERY DISEASE INVOLVING NATIVE CORONARY ARTERY OF NATIVE HEART WITHOUT ANGINA PECTORIS: ICD-10-CM

## 2022-09-06 RX ORDER — DIGOXIN 125 MCG
125 TABLET ORAL DAILY
Qty: 90 TABLET | Refills: 2 | Status: SHIPPED | OUTPATIENT
Start: 2022-09-06 | End: 2023-02-16

## 2022-09-06 RX ORDER — FUROSEMIDE 20 MG
20 TABLET ORAL DAILY
Qty: 90 TABLET | Refills: 2 | Status: SHIPPED | OUTPATIENT
Start: 2022-09-06 | End: 2023-02-16

## 2022-09-06 NOTE — TELEPHONE ENCOUNTER
Received refill request for:  Digoxin, Furosemide  Last OV was: 6/27/22 Dr. Cooley   Labs/EKG: [facility] 6/9/22  F/U scheduled: No future appointments. Orders for 12/2022.   Pharmacy: [facility] Card[facility] Refill Guideline reviewed.  Medication meets criteria for refill.    Jessica Loja RN, BSN  09/06/22 at 1:13 PM

## 2022-09-08 ENCOUNTER — TELEPHONE (OUTPATIENT)
Dept: FAMILY MEDICINE | Facility: CLINIC | Age: 67
End: 2022-09-08

## 2022-09-08 NOTE — TELEPHONE ENCOUNTER
Patient Quality Outreach    Patient is due for the following:   Physical Annual Wellness Visit      Topic Date Due     Zoster (Shingles) Vaccine (1 of 2) Never done     Pneumococcal Vaccine (2 - PCV) 06/02/2018     Diptheria Tetanus Pertussis (DTAP/TDAP/TD) Vaccine (2 - Td or Tdap) 12/03/2020     COVID-19 Vaccine (4 - Booster for Pfizer series) 04/13/2022     Flu Vaccine (1) 09/01/2022       Next Steps:   Schedule a Annual Wellness Visit    Type of outreach:    Sent [employer] message.      Questions for provider review:    None     Efe Pryor

## 2022-09-29 ENCOUNTER — TELEPHONE (OUTPATIENT)
Dept: ANTICOAGULATION | Facility: CLINIC | Age: 67
End: 2022-09-29

## 2022-09-29 NOTE — TELEPHONE ENCOUNTER
"ANTICOAGULATION     Jovon Mantilla is overdue for INR check.      Spoke with Jovon and scheduled lab appointment on 11/4/22, he is in [place] until 11/1/22   And then patient plans to be in [place] beginning of January for the rest of the winter. Patient has not decided if [facility] will manage his INR while he is there or if he will establish with \"[facility]\".    Madeline Wayne RN      "

## 2022-10-06 DIAGNOSIS — Z79.01 LONG TERM CURRENT USE OF ANTICOAGULANTS WITH INR GOAL OF 2.5-3.5: ICD-10-CM

## 2022-10-06 DIAGNOSIS — Z95.2 S/P AVR (AORTIC VALVE REPLACEMENT): ICD-10-CM

## 2022-10-06 DIAGNOSIS — I48.21 PERMANENT ATRIAL FIBRILLATION (H): ICD-10-CM

## 2022-10-07 RX ORDER — WARFARIN SODIUM 4 MG/1
TABLET ORAL
Qty: 30 TABLET | Refills: 0 | Status: SHIPPED | OUTPATIENT
Start: 2022-10-07 | End: 2023-08-15

## 2022-10-07 NOTE — TELEPHONE ENCOUNTER
ANTICOAGULATION MANAGEMENT:  Medication Refill    Anticoagulation Summary  As of 8/31/2022    Warfarin maintenance plan:  4 mg (4 mg x 1) every day   Next INR check:  9/21/2022   Target end date:  Indefinite    Indications    S/P AVR (aortic valve replacement) [Z95.2]  Permanent atrial fibrillation (H) [I48.21]  Long term current use of anticoagulants with INR goal of 2.5-3.5 [Z79.01]  Abdominal wall hematoma  initial encounter [S30.1XXA]  Long-term (current) use of anticoagulants [Z79.01] [Z79.01]             Anticoagulation Care Providers     Provider Role Specialty Phone number    Rehan Lorenzana MD Referring Family Medicine [phone]    Ramana Redd MD Referring Student in organized health care education/training program [phone]    Noe Ramirez, PA-C Responsible Family Medicine [phone]          Visit with referring provider/group within last year: Yes    [facility] referral signed within last year: Yes    Jovon does NOT meet all criteria for refill: > 2 weeks overdue for lab monitoring . 30 day emely fill approved; patient notified to schedule labs per [facility] protocol. INR scheduled for 11/4/22    Chasity Moody RN  Anticoagulation Clinic

## 2022-10-07 NOTE — TELEPHONE ENCOUNTER
Routing refill request to provider for review/approval because:  SHARATH Joaquin RN on 10/7/2022 at 1:19 PM

## 2022-10-15 ENCOUNTER — HEALTH MAINTENANCE LETTER (OUTPATIENT)
Age: 67
End: 2022-10-15

## 2022-11-04 ENCOUNTER — ANTICOAGULATION THERAPY VISIT (OUTPATIENT)
Dept: ANTICOAGULATION | Facility: CLINIC | Age: 67
End: 2022-11-04

## 2022-11-04 ENCOUNTER — LAB (OUTPATIENT)
Dept: LAB | Facility: CLINIC | Age: 67
End: 2022-11-04
Payer: COMMERCIAL

## 2022-11-04 DIAGNOSIS — Z95.2 S/P AVR (AORTIC VALVE REPLACEMENT): ICD-10-CM

## 2022-11-04 DIAGNOSIS — I48.21 PERMANENT ATRIAL FIBRILLATION (H): ICD-10-CM

## 2022-11-04 DIAGNOSIS — Z79.01 LONG TERM CURRENT USE OF ANTICOAGULANT THERAPY: ICD-10-CM

## 2022-11-04 DIAGNOSIS — Z79.01 LONG TERM CURRENT USE OF ANTICOAGULANTS WITH INR GOAL OF 2.5-3.5: ICD-10-CM

## 2022-11-04 DIAGNOSIS — Z95.2 S/P AVR (AORTIC VALVE REPLACEMENT): Primary | ICD-10-CM

## 2022-11-04 DIAGNOSIS — S30.1XXA ABDOMINAL WALL HEMATOMA, INITIAL ENCOUNTER: ICD-10-CM

## 2022-11-04 LAB — INR BLD: 3.8 (ref 0.9–1.1)

## 2022-11-04 PROCEDURE — 85610 PROTHROMBIN TIME: CPT

## 2022-11-04 PROCEDURE — 36416 COLLJ CAPILLARY BLOOD SPEC: CPT

## 2022-11-04 NOTE — PROGRESS NOTES
ANTICOAGULATION MANAGEMENT     Jovon Mantilla 67 year old male is on warfarin with supratherapeutic INR result. (Goal INR 2.5-3.5)    Recent labs: (last 7 days)     11/04/22  0950   INR 3.8*       ASSESSMENT       Source(s): Chart Review and Patient/Caregiver Call       Warfarin doses taken: Warfarin taken as instructed    Diet: No new diet changes identified    New illness, injury, or hospitalization: No    Medication/supplement changes: None noted    Signs or symptoms of bleeding or clotting: No    Previous INR: Therapeutic last 2(+) visits    Additional findings: long car ride back home from [place]. Patient will be in [place] through January, 2023.       PLAN     Recommended plan for temporary change(s) affecting INR     Dosing Instructions: Continue your current warfarin dose with next INR in 2 weeks       Summary  As of 11/4/2022    Full warfarin instructions:  4 mg every day; Starting 11/4/2022   Next INR check:  11/18/2022             Telephone call with Jovon who verbalizes understanding and agrees to plan    Lab visit scheduled    Education provided:     Contact [phone]  with any changes, questions or concerns.     Plan made per [facility] anticoagulation protocol    Madeline Wayne RN  Anticoagulation Clinic  11/4/2022    _______________________________________________________________________     Anticoagulation Episode Summary     Current INR goal:  2.5-3.5   TTR:  58.7 % (1 y)   Target end date:  Indefinite   Send INR reminders to:  [facility]    Indications    S/P AVR (aortic valve replacement) [Z95.2]  Permanent atrial fibrillation (H) [I48.21]  Long term current use of anticoagulants with INR goal of 2.5-3.5 [Z79.01]  Abdominal wall hematoma  initial encounter [S30.1XXA]  Long-term (current) use of anticoagulants [Z79.01] [Z79.01]           Comments:  24mm ATS Valve         Anticoagulation Care Providers     Provider Role Specialty Phone number    Rehan Lorenzana MD Referring Family Medicine  [phone]    Ramana Redd MD Referring Student in organized health care education/training program [phone]    Noe Ramirez, PA-C [facility] Family Medicine [phone]

## 2022-11-18 ENCOUNTER — LAB (OUTPATIENT)
Dept: LAB | Facility: CLINIC | Age: 67
End: 2022-11-18
Payer: COMMERCIAL

## 2022-11-18 ENCOUNTER — ANTICOAGULATION THERAPY VISIT (OUTPATIENT)
Dept: ANTICOAGULATION | Facility: CLINIC | Age: 67
End: 2022-11-18

## 2022-11-18 DIAGNOSIS — Z95.2 S/P AVR (AORTIC VALVE REPLACEMENT): Primary | ICD-10-CM

## 2022-11-18 DIAGNOSIS — Z79.01 LONG TERM CURRENT USE OF ANTICOAGULANTS WITH INR GOAL OF 2.5-3.5: ICD-10-CM

## 2022-11-18 DIAGNOSIS — Z79.01 LONG TERM CURRENT USE OF ANTICOAGULANT THERAPY: ICD-10-CM

## 2022-11-18 DIAGNOSIS — S30.1XXA ABDOMINAL WALL HEMATOMA, INITIAL ENCOUNTER: ICD-10-CM

## 2022-11-18 DIAGNOSIS — Z95.2 S/P AVR (AORTIC VALVE REPLACEMENT): ICD-10-CM

## 2022-11-18 DIAGNOSIS — I48.21 PERMANENT ATRIAL FIBRILLATION (H): ICD-10-CM

## 2022-11-18 LAB — INR BLD: 2.8 (ref 0.9–1.1)

## 2022-11-18 PROCEDURE — 36416 COLLJ CAPILLARY BLOOD SPEC: CPT

## 2022-11-18 PROCEDURE — 85610 PROTHROMBIN TIME: CPT

## 2022-11-18 NOTE — PROGRESS NOTES
ANTICOAGULATION MANAGEMENT     Jovon Mantilla 67 year old male is on warfarin with therapeutic INR result. (Goal INR 2.5-3.5)    Recent labs: (last 7 days)     11/18/22  0957   INR 2.8*       ASSESSMENT       Source(s): Chart Review and Patient/Caregiver Call       Warfarin doses taken: Warfarin taken as instructed    Diet: No new diet changes identified    New illness, injury, or hospitalization: No    Medication/supplement changes: None noted    Signs or symptoms of bleeding or clotting: No    Previous INR: Supratherapeutic    Additional findings: None       PLAN     Recommended plan for no diet, medication or health factor changes affecting INR     Dosing Instructions: Continue your current warfarin dose with next INR in 4 weeks       Summary  As of 11/18/2022    Full warfarin instructions:  4 mg every day; Starting 11/18/2022   Next INR check:  12/16/2022             Telephone call with Jovon who agrees to plan and repeated back plan correctly    Lab visit scheduled    Education provided:     Please call back if any changes to your diet, medications or how you've been taking warfarin    Plan made per [facility] anticoagulation protocol    Isabel Cheney RN  Anticoagulation Clinic  11/18/2022    _______________________________________________________________________     Anticoagulation Episode Summary     Current INR goal:  2.5-3.5   TTR:  57.5 % (1 y)   Target end date:  Indefinite   Send INR reminders to:  [facility]    Indications    S/P AVR (aortic valve replacement) [Z95.2]  Permanent atrial fibrillation (H) [I48.21]  Long term current use of anticoagulants with INR goal of 2.5-3.5 [Z79.01]  Abdominal wall hematoma  initial encounter [S30.1XXA]  Long-term (current) use of anticoagulants [Z79.01] [Z79.01]           Comments:  24mm ATS Valve         Anticoagulation Care Providers     Provider Role Specialty Phone number    Rehan Lorenzana MD Referring Family Medicine [phone]    Ramana Redd  MD Referring Student in organized health care education/training program [phone]    Noe Ramirez PADamianC [facility] Family Medicine [phone]

## 2022-11-29 DIAGNOSIS — F41.9 ANXIETY: ICD-10-CM

## 2022-11-29 NOTE — TELEPHONE ENCOUNTER
Routing refill request to provider for review/approval because:  A break in medication  Patient needs to be seen because it has been more than 1 year since last office visit.     Kemi LOCK RN   Patient Advocate Liaison (PAL)  [facility]

## 2022-11-29 NOTE — TELEPHONE ENCOUNTER
Needs appointment to establish care with new primary care provider/medication check. 90 days sent.    Noe Ramirez PA-C on 11/29/2022 at 3:44 PM

## 2022-12-23 ENCOUNTER — TELEPHONE (OUTPATIENT)
Dept: ANTICOAGULATION | Facility: CLINIC | Age: 67
End: 2022-12-23

## 2022-12-23 NOTE — TELEPHONE ENCOUNTER
ANTICOAGULATION     Jovon Mantilla is overdue for INR check.      Left message for patient to call and schedule lab appointment as soon as possible. If returning call, please schedule.     Stephania Redd RN

## 2022-12-28 ENCOUNTER — LAB (OUTPATIENT)
Dept: LAB | Facility: CLINIC | Age: 67
End: 2022-12-28
Payer: COMMERCIAL

## 2022-12-28 ENCOUNTER — ANTICOAGULATION THERAPY VISIT (OUTPATIENT)
Dept: ANTICOAGULATION | Facility: CLINIC | Age: 67
End: 2022-12-28

## 2022-12-28 DIAGNOSIS — Z79.01 LONG TERM CURRENT USE OF ANTICOAGULANTS WITH INR GOAL OF 2.5-3.5: ICD-10-CM

## 2022-12-28 DIAGNOSIS — Z95.2 S/P AVR (AORTIC VALVE REPLACEMENT): Primary | ICD-10-CM

## 2022-12-28 DIAGNOSIS — Z79.01 LONG TERM CURRENT USE OF ANTICOAGULANT THERAPY: ICD-10-CM

## 2022-12-28 DIAGNOSIS — I48.21 PERMANENT ATRIAL FIBRILLATION (H): ICD-10-CM

## 2022-12-28 DIAGNOSIS — Z95.2 S/P AVR (AORTIC VALVE REPLACEMENT): ICD-10-CM

## 2022-12-28 DIAGNOSIS — S30.1XXA ABDOMINAL WALL HEMATOMA, INITIAL ENCOUNTER: ICD-10-CM

## 2022-12-28 LAB — INR BLD: 3.4 (ref 0.9–1.1)

## 2022-12-28 PROCEDURE — 36415 COLL VENOUS BLD VENIPUNCTURE: CPT

## 2022-12-28 PROCEDURE — 85610 PROTHROMBIN TIME: CPT

## 2022-12-28 NOTE — PROGRESS NOTES
ANTICOAGULATION MANAGEMENT     Jovon Mantilla 67 year old male is on warfarin with therapeutic INR result. (Goal INR 2.5-3.5)    Recent labs: (last 7 days)     12/28/22  0907   INR 3.4*       ASSESSMENT       Source(s): Chart Review and Patient/Caregiver Call       Warfarin doses taken: Warfarin taken as instructed    Diet: No new diet changes identified    New illness, injury, or hospitalization: No    Medication/supplement changes: None noted    Signs or symptoms of bleeding or clotting: No    Previous INR: Therapeutic last visit; previously outside of goal range    Additional findings: Patient will be leaving next week to travel to [place] and will be gone most of the winter.  He plans to come back in February for a short time and then return.  He is unsure if he will establish care with a new provider while he is there to help manage his warfarin or have an order sent to a lab so that we can manage.  Will call back to discuss this with INR Clinic.       PLAN     Recommended plan for no diet, medication or health factor changes affecting INR     Dosing Instructions: Continue your current warfarin dose with next INR in 4 weeks       Summary  As of 12/28/2022    Full warfarin instructions:  4 mg every day   Next INR check:  1/25/2023             Telephone call with Jovon who agrees to plan and repeated back plan correctly    Plans to have INR checked in [place] when due.    Education provided:     Please call back if any changes to your diet, medications or how you've been taking warfarin    Plan made per [facility] anticoagulation protocol    Isabel Cheney RN  Anticoagulation Clinic  12/28/2022    _______________________________________________________________________     Anticoagulation Episode Summary     Current INR goal:  2.5-3.5   TTR:  57.5 % (1 y)   Target end date:  Indefinite   Send INR reminders to:  [facility]    Indications    S/P AVR (aortic valve replacement) [Z95.2]  Permanent atrial fibrillation (H)  [I48.21]  Long term current use of anticoagulants with INR goal of 2.5-3.5 [Z79.01]  Abdominal wall hematoma  initial encounter [S30.1XXA]  Long-term (current) use of anticoagulants [Z79.01] [Z79.01]           Comments:  24mm ATS Valve         Anticoagulation Care Providers     Provider Role Specialty Phone number    Rehan Lorenzana MD Referring Family Medicine [phone]    Ramana Redd MD Referring Student in organized health care education/training program [phone]    Noe Ramirez, PADamianC Responsible Family Medicine [phone]

## 2023-02-10 ENCOUNTER — ANTICOAGULATION THERAPY VISIT (OUTPATIENT)
Dept: ANTICOAGULATION | Facility: CLINIC | Age: 68
End: 2023-02-10

## 2023-02-10 ENCOUNTER — DOCUMENTATION ONLY (OUTPATIENT)
Dept: ANTICOAGULATION | Facility: CLINIC | Age: 68
End: 2023-02-10

## 2023-02-10 ENCOUNTER — LAB (OUTPATIENT)
Dept: LAB | Facility: CLINIC | Age: 68
End: 2023-02-10
Payer: COMMERCIAL

## 2023-02-10 DIAGNOSIS — I48.21 PERMANENT ATRIAL FIBRILLATION (H): ICD-10-CM

## 2023-02-10 DIAGNOSIS — S30.1XXA ABDOMINAL WALL HEMATOMA, INITIAL ENCOUNTER: ICD-10-CM

## 2023-02-10 DIAGNOSIS — Z95.2 S/P AVR (AORTIC VALVE REPLACEMENT): ICD-10-CM

## 2023-02-10 DIAGNOSIS — Z95.2 S/P AVR (AORTIC VALVE REPLACEMENT): Primary | ICD-10-CM

## 2023-02-10 DIAGNOSIS — Z79.01 LONG TERM CURRENT USE OF ANTICOAGULANT THERAPY: ICD-10-CM

## 2023-02-10 DIAGNOSIS — Z79.01 LONG TERM CURRENT USE OF ANTICOAGULANTS WITH INR GOAL OF 2.5-3.5: ICD-10-CM

## 2023-02-10 LAB — INR BLD: 4.7 (ref 0.9–1.1)

## 2023-02-10 PROCEDURE — 36416 COLLJ CAPILLARY BLOOD SPEC: CPT

## 2023-02-10 PROCEDURE — 85610 PROTHROMBIN TIME: CPT

## 2023-02-10 NOTE — PROGRESS NOTES
ANTICOAGULATION CLINIC REFERRAL RENEWAL REQUEST       An annual renewal order is required for all patients referred to [facility] Antico[facility].?  Please review and sign the pended referral order for Jovon Mantilla.       ANTICOAGULATION SUMMARY      Warfarin indication(s)   Atrial Fibrillation and Mechanical AVR    Mechanical heart valve present?  Mechanical  AVR-Bileaflet       Current goal range   INR: 2.5-3.5     Goal appropriate for indication? Goal INR 2.5-3.5 standard for indication(s) above     Time in Therapeutic Range (TTR)  (Goal > 60%) 57.1%       Office visit with referring provider's group within last year No, last visit 11/11/21.  Advised patient that he needs to schedule a well visit.       Isabel Cheney RN  [facility] Anticoagulation Clinic

## 2023-02-10 NOTE — PROGRESS NOTES
ANTICOAGULATION MANAGEMENT     Jovon Mantilla 67 year old male is on warfarin with supratherapeutic INR result. (Goal INR 2.5-3.5)    Recent labs: (last 7 days)     02/10/23  1114   INR 4.7*       ASSESSMENT       Source(s): Chart Review and Patient/Caregiver Call       Warfarin doses taken: Warfarin taken as instructed    Diet: No new diet changes identified    New illness, injury, or hospitalization: Yes: cold symptoms started about 5 days ago when he returned from [place].    Medication/supplement changes: Taking Nyquil for cold symptoms.    Signs or symptoms of bleeding or clotting: No    Previous INR: Therapeutic last 2(+) visits    Additional findings: patient will be returning to [place] on 2/19/23 and returning mid April.    Patient is due for an annual physical.  Advised that he schedule one now for when he returns in April.       PLAN     Recommended plan for temporary change(s) affecting INR     Dosing Instructions: hold dose then continue your current warfarin dose with next INR in 1 week       Summary  As of 2/10/2023    Full warfarin instructions:  2/10: Hold; Otherwise 4 mg every day   Next INR check:  2/17/2023             Telephone call with Jovon who agrees to plan and repeated back plan correctly    Lab visit scheduled    Education provided:     Please call back if any changes to your diet, medications or how you've been taking warfarin    Goal range and lab monitoring: goal range and significance of current result    Symptom monitoring: monitoring for bleeding signs and symptoms    Plan made per [facility] anticoagulation protocol    Isabel Cheney RN  Anticoagulation Clinic  2/10/2023    _______________________________________________________________________     Anticoagulation Episode Summary     Current INR goal:  2.5-3.5   TTR:  57.1 % (1 y)   Target end date:  Indefinite   Send INR reminders to:  [facility]    Indications    S/P AVR (aortic valve replacement) [Z95.2]  Permanent atrial fibrillation  (H) [I48.21]  Long term current use of anticoagulants with INR goal of 2.5-3.5 [Z79.01]  Abdominal wall hematoma  initial encounter [S30.1XXA]  Long-term (current) use of anticoagulants [Z79.01] [Z79.01]           Comments:  24mm ATS Valve         Anticoagulation Care Providers     Provider Role Specialty Phone number    Rehan Lorenzana MD Referring Family Medicine [phone]    Ramana Redd MD Referring Student in organized health care education/training program [phone]    Noe Ramirez, PADamianC Responsible Family Medicine [phone]

## 2023-02-11 DIAGNOSIS — E78.5 HYPERLIPIDEMIA LDL GOAL <70: ICD-10-CM

## 2023-02-13 ENCOUNTER — TELEPHONE (OUTPATIENT)
Dept: FAMILY MEDICINE | Facility: CLINIC | Age: 68
End: 2023-02-13
Payer: COMMERCIAL

## 2023-02-13 DIAGNOSIS — I48.21 PERMANENT ATRIAL FIBRILLATION (H): ICD-10-CM

## 2023-02-13 DIAGNOSIS — Z95.2 S/P AVR (AORTIC VALVE REPLACEMENT): Primary | ICD-10-CM

## 2023-02-13 RX ORDER — SIMVASTATIN 20 MG
TABLET ORAL
Qty: 90 TABLET | Refills: 4 | OUTPATIENT
Start: 2023-02-13

## 2023-02-13 NOTE — TELEPHONE ENCOUNTER
Standing INR lab order placed, patient informed that he must establish care with new provider upon his return from [place] in 04/2023.    [facility] referral encounter was opened on 2/10/23, I will route that to cardiologist to sign off on until patient can get established with [place] PCP (his previous PCP retired).    Madeline Wayne RN  Anticoagulation Clinic

## 2023-02-13 NOTE — TELEPHONE ENCOUNTER
Pt calling, informs he is returning call from clinic. Pt informs voicemail was left for him informing him that there are no lab orders placed for his lab appt on 2/17/23. Pt has INR lab scheduled for 2/17/23.    Pt had anticoagulation visit on 2/10/23 (please see relevant notes below)    Dosing Instructions: hold dose then continue your current warfarin dose with next INR in 1 week             Summary  As of 2/10/2023     Full warfarin instructions:  2/10: Hold; Otherwise 4 mg every day   Next INR check:  2/17/2023                Telephone call with Jovon who agrees to plan and repeated back plan correctly     Lab visit scheduled     Education provided:     Please call back if any changes to your diet, medications or how you've been taking warfarin    Goal range and lab monitoring: goal range and significance of current result    Symptom monitoring: monitoring for bleeding signs and symptoms     Plan made per [facility] anticoagulation protocol     Isabel Cheney RN  Anticoagulation Clinic  2/10/2023    Pt requests a call back to inform.    T'd up lab order and routing to anticoagulation clinic to review and advise.  Helen Mckeon RN

## 2023-02-13 NOTE — PROGRESS NOTES
Re-routing to patient's cardiologist until he can get reestablished with [place] PCP upon his return from [place] in 04/2023. Patient aware to get an appointment scheduled.    Madeline Wayne RN  Anticoagulation Clinic

## 2023-02-16 ENCOUNTER — TELEPHONE (OUTPATIENT)
Dept: CARDIOLOGY | Facility: CLINIC | Age: 68
End: 2023-02-16

## 2023-02-16 DIAGNOSIS — I70.90 ASVD (ARTERIOSCLEROTIC VASCULAR DISEASE): ICD-10-CM

## 2023-02-16 DIAGNOSIS — I50.812 CHRONIC RIGHT-SIDED HEART FAILURE (H): ICD-10-CM

## 2023-02-16 DIAGNOSIS — I48.20 CHRONIC ATRIAL FIBRILLATION (H): ICD-10-CM

## 2023-02-16 DIAGNOSIS — I25.10 CORONARY ARTERY DISEASE INVOLVING NATIVE CORONARY ARTERY OF NATIVE HEART WITHOUT ANGINA PECTORIS: ICD-10-CM

## 2023-02-16 RX ORDER — SPIRONOLACTONE 25 MG/1
25 TABLET ORAL DAILY
Qty: 90 TABLET | Refills: 0 | Status: SHIPPED | OUTPATIENT
Start: 2023-02-16 | End: 2023-04-06

## 2023-02-16 RX ORDER — LISINOPRIL 20 MG/1
20 TABLET ORAL DAILY
Qty: 90 TABLET | Refills: 0 | Status: SHIPPED | OUTPATIENT
Start: 2023-02-16 | End: 2023-05-16

## 2023-02-16 RX ORDER — CARVEDILOL 25 MG/1
25 TABLET ORAL 2 TIMES DAILY WITH MEALS
Qty: 180 TABLET | Refills: 0 | Status: SHIPPED | OUTPATIENT
Start: 2023-02-16 | End: 2023-05-16 | Stop reason: DRUGHIGH

## 2023-02-16 RX ORDER — DIGOXIN 125 MCG
125 TABLET ORAL DAILY
Qty: 90 TABLET | Refills: 0 | Status: SHIPPED | OUTPATIENT
Start: 2023-02-16 | End: 2023-06-08

## 2023-02-16 RX ORDER — FUROSEMIDE 20 MG
20 TABLET ORAL DAILY
Qty: 90 TABLET | Refills: 0 | Status: SHIPPED | OUTPATIENT
Start: 2023-02-16 | End: 2023-06-29

## 2023-02-16 NOTE — TELEPHONE ENCOUNTER
[facility] Call Center    Phone Message    May a detailed message be left on voicemail: yes     Reason for Call: Medication Refill Request    Has the patient contacted the pharmacy for the refill? Yes   Name of medication being requested: lisinopril (ZESTRIL) 20 MG tablet  spironolactone (ALDACTONE) 25 MG tablet  furosemide (LASIX) 20 MG tablet  digoxin (LANOXIN) 125 MCG tablet  carvedilol (COREG) 25 MG tablet    Provider who prescribed the medication: Dr. Cooley  Pharmacy:    [facility] #[id number] - [facility], [address]    Date medication is needed: 02/16/2023     **Pt is almost out of medication!      Action Taken: Message routed to:  Other: Cardiology    Travel Screening: Not Applicable       Thank you!  [facility]

## 2023-02-17 ENCOUNTER — LAB (OUTPATIENT)
Dept: LAB | Facility: CLINIC | Age: 68
End: 2023-02-17
Payer: COMMERCIAL

## 2023-02-17 ENCOUNTER — ANTICOAGULATION THERAPY VISIT (OUTPATIENT)
Dept: ANTICOAGULATION | Facility: CLINIC | Age: 68
End: 2023-02-17

## 2023-02-17 DIAGNOSIS — Z79.01 LONG TERM CURRENT USE OF ANTICOAGULANT THERAPY: ICD-10-CM

## 2023-02-17 DIAGNOSIS — S30.1XXA ABDOMINAL WALL HEMATOMA, INITIAL ENCOUNTER: ICD-10-CM

## 2023-02-17 DIAGNOSIS — I48.21 PERMANENT ATRIAL FIBRILLATION (H): ICD-10-CM

## 2023-02-17 DIAGNOSIS — Z79.01 LONG TERM CURRENT USE OF ANTICOAGULANTS WITH INR GOAL OF 2.5-3.5: ICD-10-CM

## 2023-02-17 DIAGNOSIS — Z95.2 S/P AVR (AORTIC VALVE REPLACEMENT): Primary | ICD-10-CM

## 2023-02-17 DIAGNOSIS — Z95.2 S/P AVR (AORTIC VALVE REPLACEMENT): ICD-10-CM

## 2023-02-17 LAB — INR BLD: 3.7 (ref 0.9–1.1)

## 2023-02-17 PROCEDURE — 36415 COLL VENOUS BLD VENIPUNCTURE: CPT

## 2023-02-17 PROCEDURE — 85610 PROTHROMBIN TIME: CPT

## 2023-02-17 NOTE — PROGRESS NOTES
ANTICOAGULATION MANAGEMENT     Jovon Mantilla 67 year old male is on warfarin with supratherapeutic INR result. (Goal INR 2.5-3.5)    Recent labs: (last 7 days)     02/17/23  1131   INR 3.7*       ASSESSMENT       Source(s): Chart Review and Patient/Caregiver Call       Warfarin doses taken: Warfarin taken as instructed    Diet: No new diet changes identified    New illness, injury, or hospitalization: No - no longer having cold symptoms    Medication/supplement changes: None noted    Signs or symptoms of bleeding or clotting: No    Previous INR: Supratherapeutic    Additional findings: Jovon reports he is traveling back to [place] 2/19 and will not return until mid April. He plans to see a provider there who will manage INR. Did not have contact information at this time so will call or send [employer] message with detailing.    Also let Jovon know he is still due for PCP visit. Cardiology was willing to sign referral today after paged request to Dr. Cooley.       PLAN     Recommended plan for no diet, medication or health factor changes affecting INR     Dosing Instructions: decrease your warfarin dose (7.1% change) with next INR in 2 weeks       Summary  As of 2/17/2023    Full warfarin instructions:  2 mg every Fri; 4 mg all other days   Next INR check:  3/17/2023             Telephone call with Jovon who verbalizes understanding and agrees to plan    Patient using outside facility for labs    Education provided:     Please call back if any changes to your diet, medications or how you've been taking warfarin    Let [facility] know (via call or [employer] message) contact information of provider and lab during travel    Plan made per [facility] anticoagulation protocol    Teodora Redd RN  Anticoagulation Clinic  2/17/2023    _______________________________________________________________________     Anticoagulation Episode Summary     Current INR goal:  2.5-3.5   TTR:  55.5 % (1 y)   Target end date:  Indefinite   Send INR  reminders to:  [facility]    Indications    S/P AVR (aortic valve replacement) [Z95.2]  Permanent atrial fibrillation (H) [I48.21]  Long term current use of anticoagulants with INR goal of 2.5-3.5 [Z79.01]  Abdominal wall hematoma  initial encounter [S30.1XXA]  Long-term (current) use of anticoagulants [Z79.01] [Z79.01]           Comments:  24mm ATS Valve         Anticoagulation Care Providers     Provider Role Specialty Phone number    Rehan Lorenzana MD Referring Family Medicine [phone]    Ramana Redd MD Referring Student in organized health care education/training program [phone]    Noy Cooley MD Referring Cardiovascular Disease [phone]    Noe Ramirez PADamianC Responsible Family Medicine [phone]

## 2023-02-26 DIAGNOSIS — F41.9 ANXIETY: ICD-10-CM

## 2023-02-28 ENCOUNTER — TELEPHONE (OUTPATIENT)
Dept: FAMILY MEDICINE | Facility: CLINIC | Age: 68
End: 2023-02-28
Payer: COMMERCIAL

## 2023-02-28 DIAGNOSIS — Z79.01 LONG TERM CURRENT USE OF ANTICOAGULANT THERAPY: ICD-10-CM

## 2023-02-28 DIAGNOSIS — Z95.2 S/P AVR (AORTIC VALVE REPLACEMENT): Primary | ICD-10-CM

## 2023-02-28 DIAGNOSIS — I48.21 PERMANENT ATRIAL FIBRILLATION (H): ICD-10-CM

## 2023-02-28 LAB — INR (EXTERNAL): 2.8 (ref 0.9–1.1)

## 2023-02-28 NOTE — TELEPHONE ENCOUNTER
Kemi refill provided. Patient needs a visit/appointment for further refills.     Routing to [facility] pool to assist the patient in scheduling an appointment for further medication refills.     Kemi LOCK RN   [facility]

## 2023-02-28 NOTE — TELEPHONE ENCOUNTER
Order/Referral Request    Who is requesting: patient    Orders being requested: INR    Reason service is needed/diagnosis: patient is in [place] and will need order to have his INR done there.     When are orders needed by: asap today    Has this been discussed with Provider: No    Does patient have a preference on a Group/Provider/Facility? [facility] [place] [phone] fax number Address [address] [address]    Does patient have an appointment scheduled?: No he may walk in today if order is placed    Where to send orders: Fax    Could we send this information to you in [employer] or would you prefer to receive a phone call?:   Patient would prefer a phone call   Okay to leave a detailed message?: Yes at Home number on file [phone] (home)

## 2023-03-02 ENCOUNTER — TELEPHONE (OUTPATIENT)
Dept: FAMILY MEDICINE | Facility: CLINIC | Age: 68
End: 2023-03-02
Payer: COMMERCIAL

## 2023-03-02 NOTE — TELEPHONE ENCOUNTER
Patient Quality Outreach    Patient is due for the following:   Physical Annual Wellness Visit    Next Steps:   Schedule a Annual Wellness Visit    Type of outreach:    Sent [employer] message.      Questions for provider review:    None     Luz Elena Isbell MA

## 2023-03-06 ENCOUNTER — TELEPHONE (OUTPATIENT)
Dept: LAB | Facility: CLINIC | Age: 68
End: 2023-03-06
Payer: COMMERCIAL

## 2023-03-06 ENCOUNTER — MYC MEDICAL ADVICE (OUTPATIENT)
Dept: FAMILY MEDICINE | Facility: CLINIC | Age: 68
End: 2023-03-06
Payer: COMMERCIAL

## 2023-03-06 NOTE — TELEPHONE ENCOUNTER
Pt left VM stating that he had an INR done at [facility] in [place]. Reports a results of 2.8. States that they are supposed to be faxing the result as well.     [facility] has not received the fax at this time.    Pt is requesting a call back.

## 2023-03-06 NOTE — TELEPHONE ENCOUNTER
Patient Returning Call    Reason for call:  Pt would like call back from inr nurse to discuss if nurse received inr result from [place] on 2/28. Please call pt back to discuss.    Information relayed to patient:  na    Patient has additional questions:  Yes    What are your questions/concerns:  na    Could we send this information to you in [facility] or would you prefer to receive a phone call?:   Patient would prefer a phone call   Okay to leave a detailed message?: No at Cell number on file:    Telephone Information:   Mobile [phone]

## 2023-03-07 ENCOUNTER — ANTICOAGULATION THERAPY VISIT (OUTPATIENT)
Dept: ANTICOAGULATION | Facility: CLINIC | Age: 68
End: 2023-03-07
Payer: COMMERCIAL

## 2023-03-07 DIAGNOSIS — S30.1XXA ABDOMINAL WALL HEMATOMA, INITIAL ENCOUNTER: ICD-10-CM

## 2023-03-07 DIAGNOSIS — Z79.01 LONG TERM CURRENT USE OF ANTICOAGULANTS WITH INR GOAL OF 2.5-3.5: ICD-10-CM

## 2023-03-07 DIAGNOSIS — Z95.2 S/P AVR (AORTIC VALVE REPLACEMENT): Primary | ICD-10-CM

## 2023-03-07 DIAGNOSIS — Z79.01 LONG TERM CURRENT USE OF ANTICOAGULANT THERAPY: ICD-10-CM

## 2023-03-07 DIAGNOSIS — I48.21 PERMANENT ATRIAL FIBRILLATION (H): ICD-10-CM

## 2023-03-07 NOTE — PROGRESS NOTES
ANTICOAGULATION MANAGEMENT     Jovon Mantilla 67 year old male is on warfarin with therapeutic INR result. (Goal INR 2.5-3.5)      ASSESSMENT       Source(s): Chart Review    Previous INR was Supratherapeutic    Medication, diet, health changes since last INR chart reviewed; none identified     [employer] message sent to patient with further assessment questions.             PLAN       Follow up required to confirm warfarin dose taken and assess for changes    Isabel Cheney RN  Anticoagulation Clinic  3/7/2023

## 2023-03-07 NOTE — PROGRESS NOTES
ANTICOAGULATION MANAGEMENT       ASSESSMENT       Source(s): Patient/Caregiver Call       Warfarin doses taken: Warfarin taken as instructed    Diet: No new diet changes identified    New illness, injury, or hospitalization: Yes: cold symptoms 3/4-3/6.  Most likely, this did not effect INR reading on 2/28, but INR may have increased with the illness.  Patient is feeling better now.    Medication/supplement changes: Tylenol and Nyquil 3/4-3/6.    Signs or symptoms of bleeding or clotting: No    Previous INR: Supratherapeutic    Additional findings: Will be returning mid April to [place] (pending weather)         PLAN     Recommended plan for no diet, medication or health factor changes affecting INR     Dosing Instructions: Continue your current warfarin dose with next INR in 3 weeks       Summary  As of 3/7/2023    Full warfarin instructions:  2 mg every Fri; 4 mg all other days   Next INR check:  3/21/2023             Telephone call with Jovon who agrees to plan and repeated back plan correctly    Patient using outside facility for labs    Education provided:     Please call back if any changes to your diet, medications or how you've been taking warfarin    Plan made per [facility] anticoagulation protocol    Isabel Cheney RN  Anticoagulation Clinic  3/7/2023    _______________________________________________________________________     Anticoagulation Episode Summary     Current INR goal:  2.5-3.5   TTR:  58.0 % (11.9 mo)   Target end date:  Indefinite   Send INR reminders to:  [facility]    Indications    S/P AVR (aortic valve replacement) [Z95.2]  Permanent atrial fibrillation (H) [I48.21]  Long term current use of anticoagulants with INR goal of 2.5-3.5 [Z79.01]  Abdominal wall hematoma  initial encounter [S30.1XXA]  Long-term (current) use of anticoagulants [Z79.01] [Z79.01]           Comments:  24mm ATS Valve         Anticoagulation Care Providers     Provider Role Specialty Phone number    Rehan Lorenzana  MD Matheus Referring Family Medicine [phone]    Ramana Redd MD Referring Student in organized health care education/training program [phone]    Noy Cooley MD Referring Cardiovascular Disease [phone]    Noe Ramirez, PA-C Responsible Family Medicine [phone]

## 2023-03-22 ENCOUNTER — TELEPHONE (OUTPATIENT)
Dept: SCHEDULING | Facility: CLINIC | Age: 68
End: 2023-03-22
Payer: COMMERCIAL

## 2023-03-22 ENCOUNTER — TELEPHONE (OUTPATIENT)
Dept: FAMILY MEDICINE | Facility: CLINIC | Age: 68
End: 2023-03-22
Payer: COMMERCIAL

## 2023-03-22 ENCOUNTER — TRANSFERRED RECORDS (OUTPATIENT)
Dept: HEALTH INFORMATION MANAGEMENT | Facility: CLINIC | Age: 68
End: 2023-03-22
Payer: COMMERCIAL

## 2023-03-22 ENCOUNTER — ANTICOAGULATION THERAPY VISIT (OUTPATIENT)
Dept: ANTICOAGULATION | Facility: CLINIC | Age: 68
End: 2023-03-22
Payer: COMMERCIAL

## 2023-03-22 DIAGNOSIS — Z95.2 S/P AVR (AORTIC VALVE REPLACEMENT): Primary | ICD-10-CM

## 2023-03-22 DIAGNOSIS — S30.1XXA ABDOMINAL WALL HEMATOMA, INITIAL ENCOUNTER: ICD-10-CM

## 2023-03-22 DIAGNOSIS — Z79.01 LONG TERM CURRENT USE OF ANTICOAGULANT THERAPY: ICD-10-CM

## 2023-03-22 DIAGNOSIS — I48.21 PERMANENT ATRIAL FIBRILLATION (H): ICD-10-CM

## 2023-03-22 DIAGNOSIS — Z79.01 LONG TERM CURRENT USE OF ANTICOAGULANTS WITH INR GOAL OF 2.5-3.5: ICD-10-CM

## 2023-03-22 DIAGNOSIS — Z95.2 H/O AORTIC VALVE REPLACEMENT: ICD-10-CM

## 2023-03-22 DIAGNOSIS — Z95.2 S/P AORTIC VALVE REPLACEMENT: ICD-10-CM

## 2023-03-22 LAB — INR (EXTERNAL): 4.2 (ref 0.9–101)

## 2023-03-22 NOTE — PROGRESS NOTES
Incoming fax from [facility]    Date of result 3/22 @ collected 255 reported 315    INR result 4.2

## 2023-03-22 NOTE — PROGRESS NOTES
ANTICOAGULATION MANAGEMENT     Jovon Mantilla 67 year old male is on warfarin with supratherapeutic INR result. (Goal INR 2.5-3.5)    Recent labs: (last 7 days)     03/22/23  1455   INR 4.2       ASSESSMENT       Source(s): Chart Review and Patient/Caregiver Call       Warfarin doses taken: Warfarin taken as instructed    Diet: No new diet changes identified    New illness, injury, or hospitalization: Yes: Patient reports he had a head cold that last a couple days    Medication/supplement changes: Patient reports he was taking Nyquil for his cold, last dose 3/20/23    Signs or symptoms of bleeding or clotting: No    Previous INR: Therapeutic last visit; previously outside of goal range    Additional findings: None         PLAN     Recommended plan for no diet, medication or health factor changes affecting INR     Dosing Instructions: partial hold then continue your current warfarin dose with next INR in 1-2 weeks       Summary  As of 3/22/2023    Full warfarin instructions:  3/22: 2 mg; Otherwise 2 mg every Fri; 4 mg all other days   Next INR check:  4/5/2023             Telephone call with Jovon who verbalizes understanding and agrees to plan    Patient using outside facility for labs    Education provided:     Please call back if any changes to your diet, medications or how you've been taking warfarin    Contact [phone]  with any changes, questions or concerns.     Plan made per [facility] anticoagulation protocol    Stephania Redd RN  Anticoagulation Clinic  3/22/2023    _______________________________________________________________________     Anticoagulation Episode Summary     Current INR goal:  2.5-3.5   TTR:  56.3 % (1 y)   Target end date:  Indefinite   Send INR reminders to:  [facility]    Indications    S/P AVR (aortic valve replacement) [Z95.2]  Permanent atrial fibrillation (H) [I48.21]  Long term current use of anticoagulants with INR goal of 2.5-3.5 [Z79.01]  Abdominal wall  hematoma  initial encounter [S30.1XXA]  Long-term (current) use of anticoagulants [Z79.01] [Z79.01]           Comments:  24mm ATS Valve         Anticoagulation Care Providers     Provider Role Specialty Phone number    Rehan Lorenzana MD Referring Family Medicine [phone]    Ramana Redd MD Referring Student in organized health care education/training program [phone]    Noy Cooley MD Referring Cardiovascular Disease [phone]    Noe Ramirez PADamianC Responsible Family Medicine [phone]

## 2023-03-22 NOTE — TELEPHONE ENCOUNTER
FYI - Status Update    Who is Calling: patient    Update: Pt needs INR order sent over to clinic in [place]. Fax: [phone]    Does caller want a call/response back: Yes     Could we send this information to you in [employer] or would you prefer to receive a phone call?:   Patient would prefer a phone call   Okay to leave a detailed message?: Yes at Cell number on file:    Telephone Information:   Mobile [phone]

## 2023-03-22 NOTE — TELEPHONE ENCOUNTER
I have tried x 3 to fax via [employer] and it will not go through, please hand fax standing order for venous INR that I placed today and inform patient when completed.    Thank you,  Madeline Wayne RN  Anticoagulation Clinic

## 2023-03-22 NOTE — TELEPHONE ENCOUNTER
Pt calls with a fax number for the specific [facility] lab that he is at in [place]- apparently the other lab # was for a general lab, which can take 24 hours to get orders.   Order faxed to [phone]    Kristin Faria RN

## 2023-03-22 NOTE — TELEPHONE ENCOUNTER
FYI - Status Update    Who is Calling: patient    Update: Pt is currently in [place]. Going in for INR lab at 1:30 today at [facility] in [place] & states lab has the wrong fax number to send order from [place] to [place]. Please send order to [phone]. Pt also requests that the INR order has the return fax number listed on it. States it was not listed last time.     Does caller want a call/response back: No

## 2023-04-06 DIAGNOSIS — I50.812 CHRONIC RIGHT-SIDED HEART FAILURE (H): ICD-10-CM

## 2023-04-06 RX ORDER — SPIRONOLACTONE 25 MG/1
25 TABLET ORAL DAILY
Qty: 90 TABLET | Refills: 0 | Status: SHIPPED | OUTPATIENT
Start: 2023-04-06 | End: 2023-08-15

## 2023-04-13 DIAGNOSIS — E78.5 HYPERLIPIDEMIA LDL GOAL <70: ICD-10-CM

## 2023-04-14 RX ORDER — SIMVASTATIN 20 MG
TABLET ORAL
Qty: 30 TABLET | Refills: 0 | Status: SHIPPED | OUTPATIENT
Start: 2023-04-14 | End: 2023-05-26

## 2023-04-14 NOTE — TELEPHONE ENCOUNTER
Refilled x 1 month; needs appointment for further refills.   Thank you  LILLI Mcgregor CNP on 4/14/2023 at 10:33 AM

## 2023-04-14 NOTE — TELEPHONE ENCOUNTER
8:42 AM  Patient possibly out of state. Will f/up with pt later today (current time in [place] is only 645am).

## 2023-04-14 NOTE — TELEPHONE ENCOUNTER
Routing refill request to provider for review/approval because:  Labs not current:  LDL  Patient needs to be seen because it has been more than 1 year since last office visit.    Arabella Gonzales RN

## 2023-04-18 ENCOUNTER — ANTICOAGULATION THERAPY VISIT (OUTPATIENT)
Dept: ANTICOAGULATION | Facility: CLINIC | Age: 68
End: 2023-04-18

## 2023-04-18 ENCOUNTER — LAB (OUTPATIENT)
Dept: LAB | Facility: CLINIC | Age: 68
End: 2023-04-18
Payer: COMMERCIAL

## 2023-04-18 DIAGNOSIS — I48.21 PERMANENT ATRIAL FIBRILLATION (H): ICD-10-CM

## 2023-04-18 DIAGNOSIS — S30.1XXA ABDOMINAL WALL HEMATOMA, INITIAL ENCOUNTER: ICD-10-CM

## 2023-04-18 DIAGNOSIS — Z95.2 H/O AORTIC VALVE REPLACEMENT: ICD-10-CM

## 2023-04-18 DIAGNOSIS — Z79.01 LONG TERM CURRENT USE OF ANTICOAGULANTS WITH INR GOAL OF 2.5-3.5: ICD-10-CM

## 2023-04-18 DIAGNOSIS — Z79.01 LONG TERM CURRENT USE OF ANTICOAGULANT THERAPY: ICD-10-CM

## 2023-04-18 DIAGNOSIS — Z95.2 S/P AVR (AORTIC VALVE REPLACEMENT): ICD-10-CM

## 2023-04-18 DIAGNOSIS — Z95.2 S/P AVR (AORTIC VALVE REPLACEMENT): Primary | ICD-10-CM

## 2023-04-18 LAB — INR BLD: 2.3 (ref 0.9–1.1)

## 2023-04-18 PROCEDURE — 85610 PROTHROMBIN TIME: CPT

## 2023-04-18 PROCEDURE — 36415 COLL VENOUS BLD VENIPUNCTURE: CPT

## 2023-04-18 NOTE — PROGRESS NOTES
ANTICOAGULATION MANAGEMENT     Jovon Mantilla 67 year old male is on warfarin with subtherapeutic INR result. (Goal INR 2.5-3.5)    Recent labs: (last 7 days)     04/18/23  1125   INR 2.3*       ASSESSMENT       Source(s): Chart Review and Patient/Caregiver Call       Warfarin doses taken: Warfarin taken as instructed    Diet: No new diet changes identified - Jovon confirmed he does not eat greens or drink alcohol    Medication/supplement changes: None noted    New illness, injury, or hospitalization: No    Signs or symptoms of bleeding or clotting: No    Previous INR: Supratherapeutic    Additional findings: Last INR was supra, therefore would not advise increasing maintenance dose today. Rather, recheck INR in 2 weeks and determine if increase needed at that point.         PLAN     Recommended plan for no diet, medication or health factor changes affecting INR     Dosing Instructions: Continue your current warfarin dose with next INR in 2 weeks       Summary  As of 4/18/2023    Full warfarin instructions:  2 mg every Fri; 4 mg all other days   Next INR check:  5/2/2023             Telephone call with Jovon who verbalizes understanding and agrees to plan    Lab visit scheduled    Education provided:     Please call back if any changes to your diet, medications or how you've been taking warfarin    Plan made per [facility] anticoagulation protocol    Teodora Redd RN  Anticoagulation Clinic  4/18/2023    _______________________________________________________________________     Anticoagulation Episode Summary     Current INR goal:  2.5-3.5   TTR:  57.3 % (1 y)   Target end date:  Indefinite   Send INR reminders to:  [facility]    Indications    S/P AVR (aortic valve replacement) [Z95.2]  Permanent atrial fibrillation (H) [I48.21]  Long term current use of anticoagulants with INR goal of 2.5-3.5 [Z79.01]  Abdominal wall hematoma  initial encounter [S30.1XXA]  Long-term (current) use of anticoagulants  [Z79.01] [Z79.01]           Comments:  24mm ATS Valve         Anticoagulation Care Providers     Provider Role Specialty Phone number    Rehan Lorenzana MD Referring Family Medicine [phone]    Ramana Redd MD Referring Student in organized health care education/training program [phone]    Noy Cooley MD Referring Cardiovascular Disease [phone]    Noe Ramirez PA-C Responsible Family Medicine [phone]

## 2023-04-26 DIAGNOSIS — E78.5 HYPERLIPIDEMIA LDL GOAL <70: ICD-10-CM

## 2023-04-28 RX ORDER — SIMVASTATIN 20 MG
TABLET ORAL
Qty: 90 TABLET | Refills: 1 | OUTPATIENT
Start: 2023-04-28

## 2023-05-02 ENCOUNTER — LAB (OUTPATIENT)
Dept: LAB | Facility: CLINIC | Age: 68
End: 2023-05-02
Payer: COMMERCIAL

## 2023-05-02 ENCOUNTER — ANTICOAGULATION THERAPY VISIT (OUTPATIENT)
Dept: ANTICOAGULATION | Facility: CLINIC | Age: 68
End: 2023-05-02

## 2023-05-02 DIAGNOSIS — I48.21 PERMANENT ATRIAL FIBRILLATION (H): ICD-10-CM

## 2023-05-02 DIAGNOSIS — Z79.01 LONG TERM CURRENT USE OF ANTICOAGULANT THERAPY: ICD-10-CM

## 2023-05-02 DIAGNOSIS — Z95.2 S/P AVR (AORTIC VALVE REPLACEMENT): ICD-10-CM

## 2023-05-02 DIAGNOSIS — Z79.01 LONG TERM CURRENT USE OF ANTICOAGULANTS WITH INR GOAL OF 2.5-3.5: ICD-10-CM

## 2023-05-02 DIAGNOSIS — Z95.2 S/P AVR (AORTIC VALVE REPLACEMENT): Primary | ICD-10-CM

## 2023-05-02 DIAGNOSIS — Z95.2 H/O AORTIC VALVE REPLACEMENT: ICD-10-CM

## 2023-05-02 DIAGNOSIS — S30.1XXA ABDOMINAL WALL HEMATOMA, INITIAL ENCOUNTER: ICD-10-CM

## 2023-05-02 LAB — INR BLD: 2.2 (ref 0.9–1.1)

## 2023-05-02 PROCEDURE — 36415 COLL VENOUS BLD VENIPUNCTURE: CPT

## 2023-05-02 PROCEDURE — 85610 PROTHROMBIN TIME: CPT

## 2023-05-02 NOTE — PROGRESS NOTES
ANTICOAGULATION MANAGEMENT     Jovon Mantilla 67 year old male is on warfarin with subtherapeutic INR result. (Goal INR 2.5-3.5)    Recent labs: (last 7 days)     05/02/23  1501   INR 2.2*       ASSESSMENT       Source(s): Chart Review and Patient/Caregiver Call       Warfarin doses taken: Warfarin taken as instructed    Diet: No new diet changes identified    Medication/supplement changes: None noted    New illness, injury, or hospitalization: No    Signs or symptoms of bleeding or clotting: No    Previous result: Subtherapeutic.  Warfarin maintenance dose adjusted today since INR has been consistently sub therapeutic.    Additional findings: None         PLAN     Recommended plan for no diet, medication or health factor changes affecting INR     Dosing Instructions: booster dose then Increase your warfarin dose (7.7% change) with next INR in 2 weeks       Summary  As of 5/2/2023    Full warfarin instructions:  5/2: 6 mg; Otherwise 4 mg every day   Next INR check:  5/16/2023             Telephone call with Jovon who agrees to plan and repeated back plan correctly    Lab visit scheduled    Education provided:     Please call back if any changes to your diet, medications or how you've been taking warfarin    Plan made per [facility] anticoagulation protocol    Isable Cheney, RN  Anticoagulation Clinic  5/2/2023    _______________________________________________________________________     Anticoagulation Episode Summary     Current INR goal:  2.5-3.5   TTR:  55.4 % (1 y)   Target end date:  Indefinite   Send INR reminders to:  [facility]    Indications    S/P AVR (aortic valve replacement) [Z95.2]  Permanent atrial fibrillation (H) [I48.21]  Long term current use of anticoagulants with INR goal of 2.5-3.5 [Z79.01]  Abdominal wall hematoma  initial encounter [S30.1XXA]  Long-term (current) use of anticoagulants [Z79.01] [Z79.01]           Comments:  24mm ATS Valve         Anticoagulation Care Providers      Provider Role Specialty Phone number    Rehan Lorenzana MD Referring Family Medicine [phone]    Ramana Redd MD Referring Student in organized health care education/training program [phone]    Noy Cooley MD Referring Cardiovascular Disease [phone]    Noe Ramirez PADamianC Responsible Family Medicine [phone]

## 2023-05-10 ENCOUNTER — OFFICE VISIT (OUTPATIENT)
Dept: CARDIOLOGY | Facility: CLINIC | Age: 68
End: 2023-05-10
Payer: COMMERCIAL

## 2023-05-10 VITALS — OXYGEN SATURATION: 95 % | WEIGHT: 200.2 LBS | HEIGHT: 70 IN | BODY MASS INDEX: 28.66 KG/M2

## 2023-05-10 DIAGNOSIS — I25.10 CORONARY ARTERY DISEASE INVOLVING NATIVE CORONARY ARTERY OF NATIVE HEART WITHOUT ANGINA PECTORIS: ICD-10-CM

## 2023-05-10 PROCEDURE — 99214 OFFICE O/P EST MOD 30 MIN: CPT | Performed by: INTERNAL MEDICINE

## 2023-05-10 RX ORDER — DIAPER,BRIEF,INFANT-TODD,DISP
EACH MISCELLANEOUS 2 TIMES DAILY
COMMUNITY
End: 2023-08-15

## 2023-05-10 RX ORDER — AMOXICILLIN 500 MG/1
CAPSULE ORAL
COMMUNITY
Start: 2022-06-17

## 2023-05-10 NOTE — PATIENT INSTRUCTIONS
1) Decrease your carvedilol to 12.5 MG twice daily.    2) Decrease your lisinopril to 10 MG daily.    3) Please obtain a blood pressure cuff and check your BP daily for the next 2 weeks and send us a message with the readings.

## 2023-05-10 NOTE — LETTER
5/10/2023    Sneha Fabiolaalona, LILLI CNP  [address]  [facility] [address]    RE: Jovon TITO Mantilla       Dear Colleague,     I had the pleasure of seeing Jovon Mantilla in the [facility].  HPI and Plan:       I had the pleasure of seeing Mr. Mantilla in followup at the [facility].  He is a very pleasant 66-year-old gentleman who I last saw in 6/2022.     Mr. Mantilla has a history of severe aortic stenosis and is status post aortic valve replacement in 01/2011 with a 24 mm ATS supra-annular valve.  He also has a history of atrial fibrillation and underwent a Maze procedure at that time, but has remained in atrial fibrillation subsequently.  He has been on rate control and anticoagulation, which has worked well in that he has been essentially asymptomatic from his atrial fibrillation.  He also has a history of a 70% stenosis involving the LAD on coronary angiography prior to aortic valve replacement.  This, however, was not bypassed since it was found to be completely intramyocardial at the time of surgery.     In late 2021 he noted exertional dyspnea which was new compared to the prior year.  An echocardiogram subsequently demonstrated moderate to severe eccentric mitral regurgitation which had progressed compared to his prior echocardiogram from June 2020.  Given these findings, I recommended a CADE for further evaluation.    The CADE was performed on 12/28/2021 and demonstrated severe biatrial enlargement mildly reduced left ventricular systolic function.  There was mild thickening and prolapse of P2 with moderate mitral regurgitation both qualitatively and quantitatively by PISA analysis.  I suspect that the mitral regurgitation is secondary to both annular dilatation in the setting of chronic persistent atrial fibrillation as well as an element of P2 prolapse as reported. The mechanical aortic valve appeared to be functioning normally on both CADE and transthoracic  imaging.    He subsequently underwent an exercise stress echocardiogram on 1/17/2022 to evaluate for worsening mitral regurgitation postexercise.  This demonstrated moderately reduced left ventricular systolic function with trace to mild mitral regurgitation at baseline.  Left ventricular systolic function improved post exercise although mitral regurgitation only increased to the mild to moderate range.  A Holter monitor was also performed which demonstrated an average heart rate of 90 bpm with atrial fibrillation throughout.    He then underwent right and left heart catheterization on 1/31/2022.  His 70% proximal LAD stenosis appeared unchanged.  No significant coronary artery disease was noted elsewhere.  He was also found to have moderate pulmonary hypertension with moderately elevated left-sided filling pressures and mildly elevated right-sided filling pressures with a normal cardiac index.    Since then he has done well on medical therapy for his cardiomyopathy.  Today, however, he reports dizziness and lightheadedness that occurs post exertion in the upright position.  Fortunately he has not had a syncopal event.  In the past year, however, he has also been noted to have borderline blood pressures with systolics in the 90 to 100 mmHg range.    He denies any chest discomfort, palpitations, PND orthopnea.  He denies any lower extremity edema.  He spent his winter in [place] and is planning to travel during the summer.       PHYSICAL EXAMINATION:  Dictated below.       His cardiovascular MRI on 6/30/2022 demonstrated low normal left ventricular systolic function with an LVEF of 52%.  Left ventricular wall thickness is mildly increased.  Right ventricular systolic function was also low normal at 54%.  Severe biatrial enlargement was noted.    He was also noted to have moderate mitral regurgitation with a small focal area of late enhancement involving the basal inferior wall.     His last lipid panel on 8/9/2021  demonstrated total cholesterol 99, HDL 43, LDL 46 and triglycerides of 48.      IMPRESSION:   1.  Severe aortic stenosis, status post aortic valve replacement with a mechanical valve as described above.   2.  Persistent atrial fibrillation.  Remains on warfarin for anticoagulation and digoxin 125 mcg daily.  3.  Coronary artery disease as described above.    4.  Moderate to severe nonischemic cardiomyopathy post aortic valve replacement that was thought to be tachycardia related.  Left ventricular systolic function has improved to low normal on his most recent cardiovascular MRI from 2022.    5. Dyspnea on exertion with an extensive cardiac work-up as described above.  He was found to have mild to moderate mitral regurgitation and an unchanged proximal LAD stenosis that was borderline by IFR.  It was stable compared to the previous angiogram from 2010.    Mr. Mantilla is doing well overall from a cardiovascular standpoint.  There is no evidence of angina or congestive heart failure.  His recent extensive cardiac work-up was reassuring and that his coronary artery disease appears to be stable and his mitral regurgitation appeared to be mild to moderate post exercise.    I suspect that his dizziness and lightheadedness is related to symptomatic hypotension.  I have asked him to decrease his lisinopril to 10 mg daily and his carvedilol to 12.5 mg twice daily.  I have also asked him to obtain a pulm blood pressure cuff and to report his blood pressure readings at home to us in approximately 2 weeks.    Finally, I have ordered an echocardiogram for reassessment of his left ventricular systolic/valvular function as well as a 24-hour Holter monitor to rule symptomatic bradycardia.    It was a pleasure seeing him in follow-up today.                CURRENT MEDICATIONS:  Current Outpatient Medications   Medication Sig Dispense Refill    Ascorbic Acid (VITAMIN C PO) Take 500 mg by mouth At Bedtime      aspirin 81 MG tablet Take  81 mg by mouth every other day      calcium carbonate-vitamin D 600-200 MG-UNIT TABS Take 1 tablet by mouth 2 times daily       carvedilol (COREG) 25 MG tablet Take 1 tablet (25 mg) by mouth 2 times daily (with meals) 180 tablet 0    Cholecalciferol (VITAMIN D) 2000 UNIT CAPS Take 1 tablet by mouth daily       Cyanocobalamin (VITAMIN B-12) 500 MCG SUBL Place 500 mcg under the tongue daily       digoxin (LANOXIN) 125 MCG tablet Take 1 tablet (125 mcg) by mouth daily 90 tablet 0    Fexofenadine HCl (ALLERGY 24-HR PO) Take 180 mg by mouth daily       fluticasone (FLONASE) 50 MCG/ACT nasal spray Spray 1 spray into both nostrils daily      furosemide (LASIX) 20 MG tablet Take 1 tablet (20 mg) by mouth daily 90 tablet 0    levothyroxine (SYNTHROID/LEVOTHROID) 200 MCG tablet Take 1 tablet (200 mcg) by mouth daily 90 tablet 2    lisinopril (ZESTRIL) 20 MG tablet Take 1 tablet (20 mg) by mouth daily 90 tablet 0    Multiple Vitamins-Minerals (CENTRUM) CHEW Take 1 tablet by mouth daily       nitroGLYcerin (NITROSTAT) 0.3 MG sublingual tablet For chest pain place 1 tablet under the tongue every 5 minutes for 3 doses. If symptoms persist 5 minutes after 1st dose call 911. 25 tablet 1    Psyllium (FIBER) 0.52 GM CAPS Take 2 capsules by mouth At Bedtime       sertraline (ZOLOFT) 50 MG tablet TAKE 1 TABLET BY MOUTH EVERY DAY 90 tablet 0    simvastatin (ZOCOR) 20 MG tablet TAKE 1 TABLET BY MOUTH AT BEDTIME 30 tablet 0    spironolactone (ALDACTONE) 25 MG tablet Take 1 tablet (25 mg) by mouth daily 90 tablet 0    warfarin ANTICOAGULANT (COUMADIN) 4 MG tablet TAKE 1 TABLET ON SATURDAY, TAKE ONE AND ONE-HALF TABLETS (6 MG) ALL OTHER DAYS OR PER INR CLINIC 30 tablet 0       ALLERGIES     Allergies   Allergen Reactions    Seasonal Allergies        PAST MEDICAL HISTORY:  Past Medical History:   Diagnosis Date    Acne rosacea 04/22/2019    Acquired hypothyroidism 05/23/2003     Problem list name updated by automated process. Provider to  review    Acute prostatitis     Acute sinusitis, unspecified     Aortic valve disease     Biatrial enlargement     CAD (coronary artery disease)     known 70% LAD stenosis    Congestive heart failure (H)     Depression     GI bleeding 01/22/2016    History of bleeding peptic ulcer     Hyperlipidemia     Idiopathic cardiomyopathy (H)     Mitral regurgitation     Obesity     Palpitations     Permanent atrial fibrillation (H) 06/02/2017    Right inguinal hernia 07/22/2019    S/P AVR 01/07/2011    with Maze procedure    S/P gastric bypass     Tricuspid regurgitation     Unspecified essential hypertension        PAST SURGICAL HISTORY:  Past Surgical History:   Procedure Laterality Date    ANGIOGRAM  02/01/2002    Normal coronary,dilated LV,Sev.decr.global LVF,+1 MR    ANGIOGRAM  01/01/2008    Mild AS,Mod PHTN,mod prox.LAD disease,Triv.CFX disease    ANGIOGRAM  01/01/2009    Mod AS,Mod PHTN,mod prox.LAD disease    ANGIOGRAM  10/01/2010    Sev.AS,CM,global hypokinesis,Mild-mod LV dilated,Mild MR,70% prox.LAD lesion,PHTN    CARDIOVERSION  2/02, 4/02, 4/11    CHOLECYSTECTOMY  2006    COLONOSCOPY N/A 01/23/2016    Procedure: COLONOSCOPY;  Surgeon: Robyn Collins MD;  Location: [facility]    CV CORONARY ANGIOGRAM N/A 1/31/2022    Procedure: Coronary Angiogram;  Surgeon: Rome Mike MD;  Location:  HEART CARDIAC CATH LAB    CV INSTANTANEOUS WAVE-FREE RATIO N/A 1/31/2022    Procedure: Instantaneous Wave-Free Ratio;  Surgeon: Rome Mike MD;  Location:  HE[facility]ARD[facility] CATH LAB    CV RIGHT HEART CATH MEASUREMENTS RECORDED N/A 1/31/2022    Procedure: Right Heart Cath;  Surgeon: oRme Mike MD;  Location:  HEART CARDIAC CATH LAB    ESOPHAGOSCOPY, GASTROSCOPY, DUODENOSCOPY (EGD), COMBINED N/A 01/23/2016    Procedure: COMBINED ESOPHAGOSCOPY, GASTROSCOPY, DUODENOSCOPY (EGD);  Surgeon: Robyn Collins MD;  Location:  GI    EXCISE MASS GROIN Right 2/11/2021    Procedure: INCISION AND  DRAINAGE OF RIGHT GROIN HEMATOMA;  Surgeon: Norma Scanlon MD;  Location: [facility] OR    HERNIORRHAPHY INGUINAL Right 2/4/2021    Procedure: OPEN RIGHT INGUINAL HENRIA WITH MESH;  Surgeon: Jaden Alex MD;  Location: [facility] OR    LAPAROSCOPIC BYPASS GASTRIC  10/08/2011    Procedure:LAPAROSCOPIC BYPASS GASTRIC; Diagnostic laparoscopy, Lysis of Adhesions, Laparoscopic Revision of Jejunojejunostomy; Surgeon:RADHA MURO; Location:[facility] OR    LAPAROSCOPIC BYPASS GASTRIC  06/26/2006    Dr Jin    LAPAROSCOPY DIAGNOSTIC (GENERAL)  10/08/2011    Procedure:LAPAROSCOPY DIAGNOSTIC (GENERAL); Surgeon:RADHA MURO; Location:[facility] OR    REPLACE VALVE AORTIC  01/07/2011    ZZC NONSPECIFIC PROCEDURE  ~1985    vein stripping       FAMILY HISTORY:  Family History   Problem Relation Age of Onset    Cancer - colorectal Father     Colon Cancer Father     Cancer Father     Diabetes Brother        SOCIAL HISTORY:  Social History     Socioeconomic History    Marital status:      Spouse name: Tracey    Number of children: 0   Occupational History    Occupation:      Comment: [employer]   Tobacco Use    Smoking status: Never    Smokeless tobacco: Never   Substance and Sexual Activity    Alcohol use: No     Alcohol/week: 0.0 standard drinks of alcohol    Drug use: No    Sexual activity: Not Currently     Partners: Female   Other Topics Concern    Parent/sibling w/ CABG, MI or angioplasty before 65F 55M? Yes    Blood Transfusions No    Caffeine Concern Yes     Comment: 1 can daily    Occupational Exposure Yes    Hobby Hazards No    Sleep Concern No    Stress Concern No    Weight Concern No    Special Diet Yes     Comment: low sodium, low calories    Back Care No    Exercise Yes     Comment: walks daily    Seat Belt Yes    Self-Exams Yes       Review of Systems:  Skin:          Eyes:         ENT:         Respiratory:          Cardiovascular:         Gastroenterology:        Genitourinary:          Musculoskeletal:         Neurologic:         Psychiatric:         Heme/Lymph/Imm:         Endocrine:           Physical Exam:  Vitals: There were no vitals taken for this visit.    Constitutional:           Skin:             Head:           Eyes:           Lymph:      ENT:           Neck:           Respiratory:            Cardiac:                                                           GI:           Extremities and Muscular Skeletal:                 Neurological:           Psych:           CC  Noy Cooley MD  [address]  [place] [address]    Thank you for allowing me to participate in the care of your patient.      Sincerely,     Noy Cooley MD     [facility]

## 2023-05-10 NOTE — PROGRESS NOTES
HPI and Plan:       I had the pleasure of seeing Mr. Mantilla in followup at the [facility].  He is a very pleasant 66-year-old gentleman who I last saw in 6/2022.     Mr. Mantilla has a history of severe aortic stenosis and is status post aortic valve replacement in 01/2011 with a 24 mm ATS supra-annular valve.  He also has a history of atrial fibrillation and underwent a Maze procedure at that time, but has remained in atrial fibrillation subsequently.  He has been on rate control and anticoagulation, which has worked well in that he has been essentially asymptomatic from his atrial fibrillation.  He also has a history of a 70% stenosis involving the LAD on coronary angiography prior to aortic valve replacement.  This, however, was not bypassed since it was found to be completely intramyocardial at the time of surgery.     In late 2021 he noted exertional dyspnea which was new compared to the prior year.  An echocardiogram subsequently demonstrated moderate to severe eccentric mitral regurgitation which had progressed compared to his prior echocardiogram from June 2020.  Given these findings, I recommended a CADE for further evaluation.    The CADE was performed on 12/28/2021 and demonstrated severe biatrial enlargement mildly reduced left ventricular systolic function.  There was mild thickening and prolapse of P2 with moderate mitral regurgitation both qualitatively and quantitatively by PISA analysis.  I suspect that the mitral regurgitation is secondary to both annular dilatation in the setting of chronic persistent atrial fibrillation as well as an element of P2 prolapse as reported. The mechanical aortic valve appeared to be functioning normally on both CADE and transthoracic imaging.    He subsequently underwent an exercise stress echocardiogram on 1/17/2022 to evaluate for worsening mitral regurgitation postexercise.  This demonstrated moderately reduced left ventricular systolic function  with trace to mild mitral regurgitation at baseline.  Left ventricular systolic function improved post exercise although mitral regurgitation only increased to the mild to moderate range.  A Holter monitor was also performed which demonstrated an average heart rate of 90 bpm with atrial fibrillation throughout.    He then underwent right and left heart catheterization on 1/31/2022.  His 70% proximal LAD stenosis appeared unchanged.  No significant coronary artery disease was noted elsewhere.  He was also found to have moderate pulmonary hypertension with moderately elevated left-sided filling pressures and mildly elevated right-sided filling pressures with a normal cardiac index.    Since then he has done well on medical therapy for his cardiomyopathy.  Today, however, he reports dizziness and lightheadedness that occurs post exertion in the upright position.  Fortunately he has not had a syncopal event.  In the past year, however, he has also been noted to have borderline blood pressures with systolics in the 90 to 100 mmHg range.    He denies any chest discomfort, palpitations, PND orthopnea.  He denies any lower extremity edema.  He spent his winter in [place] and is planning to travel during the summer.       PHYSICAL EXAMINATION:  Dictated below.       His cardiovascular MRI on 6/30/2022 demonstrated low normal left ventricular systolic function with an LVEF of 52%.  Left ventricular wall thickness is mildly increased.  Right ventricular systolic function was also low normal at 54%.  Severe biatrial enlargement was noted.    He was also noted to have moderate mitral regurgitation with a small focal area of late enhancement involving the basal inferior wall.     His last lipid panel on 8/9/2021 demonstrated total cholesterol 99, HDL 43, LDL 46 and triglycerides of 48.      IMPRESSION:   1.  Severe aortic stenosis, status post aortic valve replacement with a mechanical valve as described above.   2.  Persistent  atrial fibrillation.  Remains on warfarin for anticoagulation and digoxin 125 mcg daily.  3.  Coronary artery disease as described above.    4.  Moderate to severe nonischemic cardiomyopathy post aortic valve replacement that was thought to be tachycardia related.  Left ventricular systolic function has improved to low normal on his most recent cardiovascular MRI from 2022.    5. Dyspnea on exertion with an extensive cardiac work-up as described above.  He was found to have mild to moderate mitral regurgitation and an unchanged proximal LAD stenosis that was borderline by IFR.  It was stable compared to the previous angiogram from 2010.    Mr. Mantilla is doing well overall from a cardiovascular standpoint.  There is no evidence of angina or congestive heart failure.  His recent extensive cardiac work-up was reassuring and that his coronary artery disease appears to be stable and his mitral regurgitation appeared to be mild to moderate post exercise.    I suspect that his dizziness and lightheadedness is related to symptomatic hypotension.  I have asked him to decrease his lisinopril to 10 mg daily and his carvedilol to 12.5 mg twice daily.  I have also asked him to obtain a pulm blood pressure cuff and to report his blood pressure readings at home to us in approximately 2 weeks.    Finally, I have ordered an echocardiogram for reassessment of his left ventricular systolic/valvular function as well as a 24-hour Holter monitor to rule symptomatic bradycardia.    It was a pleasure seeing him in follow-up today.                CURRENT MEDICATIONS:  Current Outpatient Medications   Medication Sig Dispense Refill     Ascorbic Acid (VITAMIN C PO) Take 500 mg by mouth At Bedtime       aspirin 81 MG tablet Take 81 mg by mouth every other day       calcium carbonate-vitamin D 600-200 MG-UNIT TABS Take 1 tablet by mouth 2 times daily        carvedilol (COREG) 25 MG tablet Take 1 tablet (25 mg) by mouth 2 times daily (with  meals) 180 tablet 0     Cholecalciferol (VITAMIN D) 2000 UNIT CAPS Take 1 tablet by mouth daily        Cyanocobalamin (VITAMIN B-12) 500 MCG SUBL Place 500 mcg under the tongue daily        digoxin (LANOXIN) 125 MCG tablet Take 1 tablet (125 mcg) by mouth daily 90 tablet 0     Fexofenadine HCl (ALLERGY 24-HR PO) Take 180 mg by mouth daily        fluticasone (FLONASE) 50 MCG/ACT nasal spray Spray 1 spray into both nostrils daily       furosemide (LASIX) 20 MG tablet Take 1 tablet (20 mg) by mouth daily 90 tablet 0     levothyroxine (SYNTHROID/LEVOTHROID) 200 MCG tablet Take 1 tablet (200 mcg) by mouth daily 90 tablet 2     lisinopril (ZESTRIL) 20 MG tablet Take 1 tablet (20 mg) by mouth daily 90 tablet 0     Multiple Vitamins-Minerals (CENTRUM) CHEW Take 1 tablet by mouth daily        nitroGLYcerin (NITROSTAT) 0.3 MG sublingual tablet For chest pain place 1 tablet under the tongue every 5 minutes for 3 doses. If symptoms persist 5 minutes after 1st dose call 911. 25 tablet 1     Psyllium (FIBER) 0.52 GM CAPS Take 2 capsules by mouth At Bedtime        sertraline (ZOLOFT) 50 MG tablet TAKE 1 TABLET BY MOUTH EVERY DAY 90 tablet 0     simvastatin (ZOCOR) 20 MG tablet TAKE 1 TABLET BY MOUTH AT BEDTIME 30 tablet 0     spironolactone (ALDACTONE) 25 MG tablet Take 1 tablet (25 mg) by mouth daily 90 tablet 0     warfarin ANTICOAGULANT (COUMADIN) 4 MG tablet TAKE 1 TABLET ON SATURDAY, TAKE ONE AND ONE-HALF TABLETS (6 MG) ALL OTHER DAYS OR PER INR CLINIC 30 tablet 0       ALLERGIES     Allergies   Allergen Reactions     Seasonal Allergies        PAST MEDICAL HISTORY:  Past Medical History:   Diagnosis Date     Acne rosacea 04/22/2019     Acquired hypothyroidism 05/23/2003     Problem list name updated by automated process. Provider to review     Acute prostatitis      Acute sinusitis, unspecified      Aortic valve disease      Biatrial enlargement      CAD (coronary artery disease)     known 70% LAD stenosis     Congestive  heart failure (H)      Depression      GI bleeding 01/22/2016     History of bleeding peptic ulcer      Hyperlipidemia      Idiopathic cardiomyopathy (H)      Mitral regurgitation      Obesity      Palpitations      Permanent atrial fibrillation (H) 06/02/2017     Right inguinal hernia 07/22/2019     S/P AVR 01/07/2011    with Maze procedure     S/P gastric bypass      Tricuspid regurgitation      Unspecified essential hypertension        PAST SURGICAL HISTORY:  Past Surgical History:   Procedure Laterality Date     ANGIOGRAM  02/01/2002    Normal coronary,dilated LV,Sev.decr.global LVF,+1 MR     ANGIOGRAM  01/01/2008    Mild AS,Mod PHTN,mod prox.LAD disease,Triv.CFX disease     ANGIOGRAM  01/01/2009    Mod AS,Mod PHTN,mod prox.LAD disease     ANGIOGRAM  10/01/2010    Sev.AS,CM,global hypokinesis,Mild-mod LV dilated,Mild MR,70% prox.LAD lesion,PHTN     CARDIOVERSION  2/02, 4/02, 4/11     CHOLECYSTECTOMY  2006     COLONOSCOPY N/A 01/23/2016    Procedure: COLONOSCOPY;  Surgeon: Robyn Collins MD;  Location:  GI     CV CORONARY ANGIOGRAM N/A 1/31/2022    Procedure: Coronary Angiogram;  Surgeon: Rome Mike MD;  Location:  HEART CARDIAC CATH LAB     CV INSTANTANEOUS WAVE-FREE RATIO N/A 1/31/2022    Procedure: Instantaneous Wave-Free Ratio;  Surgeon: Rome Mike MD;  Location:  HEART CARDIAC CATH LAB     CV RIGHT HEART CATH MEASUREMENTS RECORDED N/A 1/31/2022    Procedure: Right Heart Cath;  Surgeon: Rome Mike MD;  Location:  HEART CARDIAC CATH LAB     ESOPHAGOSCOPY, GASTROSCOPY, DUODENOSCOPY (EGD), COMBINED N/A 01/23/2016    Procedure: COMBINED ESOPHAGOSCOPY, GASTROSCOPY, DUODENOSCOPY (EGD);  Surgeon: Robyn Collins MD;  Location:  GI     EXCISE MASS GROIN Right 2/11/2021    Procedure: INCISION AND DRAINAGE OF RIGHT GROIN HEMATOMA;  Surgeon: Norma Scanlon MD;  Location: [facility] OR     HERNIORRHAPHY INGUINAL Right 2/4/2021    Procedure: OPEN  RIGHT INGUINAL HENRIA WITH MESH;  Surgeon: Jaden Alex MD;  Location: [facility] OR     LAPAROSCOPIC BYPASS GASTRIC  10/08/2011    Procedure:LAPAROSCOPIC BYPASS GASTRIC; Diagnostic laparoscopy, Lysis of Adhesions, Laparoscopic Revision of Jejunojejunostomy; Surgeon:RADHA MURO; Location:[facility] OR     LAPAROSCOPIC BYPASS GASTRIC  06/26/2006    Dr Jin     LAPAROSCOPY DIAGNOSTIC (GENERAL)  10/08/2011    Procedure:LAPAROSCOPY DIAGNOSTIC (GENERAL); Surgeon:RADHA MURO; Location:[facility] OR     REPLACE VALVE AORTIC  01/07/2011     ZZC NONSPECIFIC PROCEDURE  ~1985    vein stripping       FAMILY HISTORY:  Family History   Problem Relation Age of Onset     Cancer - colorectal Father      Colon Cancer Father      Cancer Father      Diabetes Brother        SOCIAL HISTORY:  Social History     Socioeconomic History     Marital status:      Spouse name: Tracey     Number of children: 0   Occupational History     Occupation:      Comment: [employer]   Tobacco Use     Smoking status: Never     Smokeless tobacco: Never   Substance and Sexual Activity     Alcohol use: No     Alcohol/week: 0.0 standard drinks of alcohol     Drug use: No     Sexual activity: Not Currently     Partners: Female   Other Topics Concern     Parent/sibling w/ CABG, MI or angioplasty before 65F 55M? Yes     Blood Transfusions No     Caffeine Concern Yes     Comment: 1 can daily     Occupational Exposure Yes     Hobby Hazards No     Sleep Concern No     Stress Concern No     Weight Concern No     Special Diet Yes     Comment: low sodium, low calories     Back Care No     Exercise Yes     Comment: walks daily     Seat Belt Yes     Self-Exams Yes       Review of Systems:  Skin:          Eyes:         ENT:         Respiratory:          Cardiovascular:         Gastroenterology:        Genitourinary:         Musculoskeletal:         Neurologic:         Psychiatric:         Heme/Lymph/Imm:         Endocrine:           Physical  Exam:  Vitals: There were no vitals taken for this visit.    Constitutional:           Skin:             Head:           Eyes:           Lymph:      ENT:           Neck:           Respiratory:            Cardiac:                                                           GI:           Extremities and Muscular Skeletal:                 Neurological:           Psych:           CC  Noy Cooley MD  [address]  [place] [address]

## 2023-05-16 ENCOUNTER — ANTICOAGULATION THERAPY VISIT (OUTPATIENT)
Dept: ANTICOAGULATION | Facility: CLINIC | Age: 68
End: 2023-05-16

## 2023-05-16 ENCOUNTER — LAB (OUTPATIENT)
Dept: LAB | Facility: CLINIC | Age: 68
End: 2023-05-16
Payer: COMMERCIAL

## 2023-05-16 ENCOUNTER — TELEPHONE (OUTPATIENT)
Dept: CARDIOLOGY | Facility: CLINIC | Age: 68
End: 2023-05-16

## 2023-05-16 DIAGNOSIS — Z95.2 H/O AORTIC VALVE REPLACEMENT: ICD-10-CM

## 2023-05-16 DIAGNOSIS — I48.21 PERMANENT ATRIAL FIBRILLATION (H): ICD-10-CM

## 2023-05-16 DIAGNOSIS — S30.1XXA ABDOMINAL WALL HEMATOMA, INITIAL ENCOUNTER: ICD-10-CM

## 2023-05-16 DIAGNOSIS — Z95.2 S/P AVR (AORTIC VALVE REPLACEMENT): ICD-10-CM

## 2023-05-16 DIAGNOSIS — Z79.01 LONG TERM CURRENT USE OF ANTICOAGULANT THERAPY: ICD-10-CM

## 2023-05-16 DIAGNOSIS — Z79.01 LONG TERM CURRENT USE OF ANTICOAGULANTS WITH INR GOAL OF 2.5-3.5: ICD-10-CM

## 2023-05-16 DIAGNOSIS — Z95.2 S/P AVR (AORTIC VALVE REPLACEMENT): Primary | ICD-10-CM

## 2023-05-16 DIAGNOSIS — I10 HYPERTENSION GOAL BP (BLOOD PRESSURE) < 140/90: Primary | ICD-10-CM

## 2023-05-16 DIAGNOSIS — I70.90 ASVD (ARTERIOSCLEROTIC VASCULAR DISEASE): ICD-10-CM

## 2023-05-16 LAB — INR BLD: 2.9 (ref 0.9–1.1)

## 2023-05-16 PROCEDURE — 36416 COLLJ CAPILLARY BLOOD SPEC: CPT

## 2023-05-16 PROCEDURE — 85610 PROTHROMBIN TIME: CPT

## 2023-05-16 RX ORDER — CARVEDILOL 25 MG/1
25 TABLET ORAL 2 TIMES DAILY WITH MEALS
Qty: 180 TABLET | Refills: 0 | Status: CANCELLED | OUTPATIENT
Start: 2023-05-16

## 2023-05-16 RX ORDER — LISINOPRIL 20 MG/1
20 TABLET ORAL DAILY
Qty: 90 TABLET | Refills: 0 | Status: CANCELLED | OUTPATIENT
Start: 2023-05-16

## 2023-05-16 RX ORDER — LISINOPRIL 20 MG/1
10 TABLET ORAL DAILY
Qty: 45 TABLET | Refills: 0 | COMMUNITY
Start: 2023-05-16 | End: 2023-06-08

## 2023-05-16 RX ORDER — CARVEDILOL 12.5 MG/1
12.5 TABLET ORAL 2 TIMES DAILY WITH MEALS
Qty: 60 TABLET | Refills: 0 | Status: ON HOLD | COMMUNITY
Start: 2023-05-16 | End: 2023-08-15

## 2023-05-16 NOTE — TELEPHONE ENCOUNTER
Patient requested new prescriptions  for Lisinopril 10 mg daily and carvedilol 12.5 mg 1 tablet twice daily =    ( currently with a pill cutter splitting the Carvedilol 25 mg tablet in half  so take 12.5 mg twice daily. )    - Currently has enough tablets of Lisinopril 20 mg and carvedilol 25 mg tablets. For another 2 weeks.     - states dizziness and light headedness is better. No BP's to report at this time.  Denies chest discomfort, or shortness of breath.     Patient purchased a BP cuff and checking pressure daily. Patient will call in  Next week with update.

## 2023-05-16 NOTE — TELEPHONE ENCOUNTER
"[facility] Cardiology Refill Guideline reviewed.  Medication does not meet criteria for refill due to Last visit 5/10/23 with Dr. Cooley \"I suspect that his dizziness and lightheadedness is related to symptomatic hypotension.  I have asked him to decrease his lisinopril to 10 mg daily and his carvedilol to 12.5 mg twice daily.  I have also asked him to obtain a pulm blood pressure cuff and to report his blood pressure readings at home to us in approximately 2 weeks..  Messaged to providers team for further review.   "

## 2023-05-16 NOTE — TELEPHONE ENCOUNTER
"Requested Renewals     lisinopril (ZESTRIL) 20 MG tablet         Sig: Take 1 tablet (20 mg) by mouth daily    Disp:  90 tablet    Refills:  0    Start: 5/16/2023    Class: E-Prescribe    For: ASVD (arteriosclerotic vascular disease)        Last Dr. Cooley OV 5-10-23 -   I suspect that his dizziness and lightheadedness is related to symptomatic hypotension.  I have asked him to decrease his lisinopril to 10 mg daily and his carvedilol to 12.5 mg twice daily.  I have also asked him to obtain a pulm blood pressure cuff and to report his blood pressure readings at home to us in approximately 2 weeks.        [facility] Cardiology Refill Guideline reviewed.  Medication does not meet criteria for refill due to Last visit 5/10/23 with Dr. Cooley \"I suspect that his dizziness and lightheadedness is related to symptomatic hypotension.  I have asked him to decrease his lisinopril to 10 mg daily and his carvedilol to 12.5 mg twice daily.  I have also asked him to obtain a pulm blood pressure cuff and to report his blood pressure readings at home to us in approximately 2 weeks..  Messaged to providers team for further review.   "

## 2023-05-16 NOTE — PROGRESS NOTES
ANTICOAGULATION MANAGEMENT     Jovon Mantilla 67 year old male is on warfarin with therapeutic INR result. (Goal INR 2.5-3.5)    Recent labs: (last 7 days)     05/16/23  1106   INR 2.9*       ASSESSMENT       Source(s): Chart Review and Patient/Caregiver Call       Warfarin doses taken: Warfarin taken as instructed    Diet: No new diet changes identified    Medication/supplement changes: carvedilol and lisinopril doses decreased per cardiology.  Per Up to Date, neither of these medications interact with warfarin.    New illness, injury, or hospitalization: Dizziness and lightheadedness recently.  Symptoms have improved since carvedilol and lisinopril doses changed.    Signs or symptoms of bleeding or clotting: No    Previous result: Subtherapeutic    Additional findings: None         PLAN     Recommended plan for no diet, medication or health factor changes affecting INR     Dosing Instructions: Continue your current warfarin dose with next INR in 3 weeks       Summary  As of 5/16/2023    Full warfarin instructions:  4 mg every day   Next INR check:  6/6/2023             Telephone call with Jovon who agrees to plan and repeated back plan correctly    Lab visit scheduled    Education provided:     Please call back if any changes to your diet, medications or how you've been taking warfarin    Plan made per [facility] anticoagulation protocol    Isabel Cheney RN  Anticoagulation Clinic  5/16/2023    _______________________________________________________________________     Anticoagulation Episode Summary     Current INR goal:  2.5-3.5   TTR:  56.7 % (1 y)   Target end date:  Indefinite   Send INR reminders to:  [facility]    Indications    S/P AVR (aortic valve replacement) [Z95.2]  Permanent atrial fibrillation (H) [I48.21]  Long term current use of anticoagulants with INR goal of 2.5-3.5 [Z79.01]  Abdominal wall hematoma  initial encounter [S30.1XXA]  Long-term (current) use of anticoagulants [Z79.01]  [Z79.01]           Comments:  24mm ATS Valve         Anticoagulation Care Providers     Provider Role Specialty Phone number    Rehna Lorenzana MD Referring Family Medicine [phone]    Ramana Redd MD Referring Student in organized health care education/training program [phone]    Noy Cooley MD Referring Cardiovascular Disease [phone]    Neo Ramirez PADamianC Responsible Family Medicine [phone]

## 2023-05-23 ENCOUNTER — TELEPHONE (OUTPATIENT)
Dept: CARDIOLOGY | Facility: CLINIC | Age: 68
End: 2023-05-23
Payer: COMMERCIAL

## 2023-05-23 NOTE — TELEPHONE ENCOUNTER
Spoke with patient, he will send his BP readings on my chart.    User details Gender : Male First name : Jovon Last name : Jose Rafael Birthday : 1955 Height : 5' 9'' Weight Blood pressure Date Time Sys. Karissa. Pulse MAP Cardiac arrhythmia   05/22/2023 07:04  81 78 89   05/21/2023 09:02  77 48 88   05/20/2023 10:04  89 68 98   05/19/2023 10:56  73 51 85   05/18/2023 02:12  77 88 85   05/17/2023 03:46  68 62 79   05/16/2023 12:02  62 67 84   05/15/2023 09:53  79 58 89   05/14/2023 02:06  91 91 99   05/13/2023 12:35  83 67 94   05/12/2023 12:55  82 73 95   05/11/2023 03:14  66 64 77   MAP = mean arterial pressure Activity Sleep Pulse Oximeter Temperature     Per Dr. Cooley's dictation 5/10/2023:  I suspect that his dizziness and lightheadedness is related to symptomatic hypotension.  I have asked him to decrease his lisinopril to 10 mg daily and his carvedilol to 12.5 mg twice daily.  I have also asked him to obtain a pulm blood pressure cuff and to report his blood pressure readings at home to us in approximately 2 weeks.    Will message Dr. Cooley to review

## 2023-05-23 NOTE — TELEPHONE ENCOUNTER
My chart message to patient:    Mr. Jonny Fan Dr. Ali is pleased with your BP readings. Please let us know if you have any further issues with feeling lightheaded or dizzy.    Thank you  Team 2 RNs  [phone]

## 2023-05-24 DIAGNOSIS — E78.5 HYPERLIPIDEMIA LDL GOAL <70: ICD-10-CM

## 2023-05-26 RX ORDER — SIMVASTATIN 20 MG
TABLET ORAL
Qty: 30 TABLET | Refills: 0 | Status: SHIPPED | OUTPATIENT
Start: 2023-05-26 | End: 2023-06-20

## 2023-05-31 ENCOUNTER — HOSPITAL ENCOUNTER (OUTPATIENT)
Dept: CARDIOLOGY | Facility: CLINIC | Age: 68
Discharge: HOME | End: 2023-05-31
Attending: INTERNAL MEDICINE
Payer: COMMERCIAL

## 2023-05-31 ENCOUNTER — TELEPHONE (OUTPATIENT)
Dept: CARDIOLOGY | Facility: CLINIC | Age: 68
End: 2023-05-31

## 2023-05-31 DIAGNOSIS — I25.10 CORONARY ARTERY DISEASE INVOLVING NATIVE CORONARY ARTERY OF NATIVE HEART WITHOUT ANGINA PECTORIS: ICD-10-CM

## 2023-05-31 DIAGNOSIS — I50.812 CHRONIC RIGHT-SIDED HEART FAILURE (H): Primary | ICD-10-CM

## 2023-05-31 DIAGNOSIS — I42.0 DILATED CARDIOMYOPATHY (H): ICD-10-CM

## 2023-05-31 LAB — LVEF ECHO: NORMAL

## 2023-05-31 PROCEDURE — 93306 TTE W/DOPPLER COMPLETE: CPT

## 2023-05-31 PROCEDURE — 93227 XTRNL ECG REC<48 HR R&I: CPT | Performed by: INTERNAL MEDICINE

## 2023-05-31 PROCEDURE — 93306 TTE W/DOPPLER COMPLETE: CPT | Mod: 26 | Performed by: INTERNAL MEDICINE

## 2023-05-31 PROCEDURE — 93226 XTRNL ECG REC<48 HR SCAN A/R: CPT

## 2023-05-31 NOTE — TELEPHONE ENCOUNTER
I reviewed the echocardiogram myself. I think the LVEF is better than what is reported. Let's get a stress cardiac MRI. Thanks.

## 2023-05-31 NOTE — TELEPHONE ENCOUNTER
Echo 5/31/2023 noted. Ordered for c/o dizziness.  holter is pending  Recent BP readings stabilized after med changes.    Echo:  The left ventricle is normal in size.  The visual ejection fraction is 35-40%.  anteroseptal and inferior hypokinesis   Moderately decreased right ventricular systolic function  The left atrium is severely dilated.  The right atrium is mildly dilated.  There is mild to moderate (1-2+) mitral regurgitation.  There is mild to moderate (1-2+) tricuspid regurgitation.  There is a mechanical aortic valve.  The rhythm was atrial fibrillation.  Compared to prior study, changes are noted.    Echo in 2022 showed EF 55-60%      Per Dr. Cooley's dictation 5/10/2023:  I suspect that his dizziness and lightheadedness is related to symptomatic hypotension.  I have asked him to decrease his lisinopril to 10 mg daily and his carvedilol to 12.5 mg twice daily.  I have also asked him to obtain a pulm blood pressure cuff and to report his blood pressure readings at home to us in approximately 2 weeks.  Finally, I have ordered an echocardiogram for reassessment of his left ventricular systolic/valvular function as well as a 24-hour Holter monitor to rule symptomatic bradycardia.    Will message Dr. Cooley to review

## 2023-06-01 NOTE — TELEPHONE ENCOUNTER
Spoke to patient, reviewed echo results and message from Dr Cooley. He was agreeable to plan. Order placed for stress MRI, scheduling messaged to arrange.

## 2023-06-02 NOTE — TELEPHONE ENCOUNTER
Holter monitor completed as below, ordered by Dr Cooley on 5/10/23 to assess for bradycardia contributing to his dizziness and lightheadedness. Coreg and lisinopril were decreased at that time. Pt is on warfarin. Echo showed decline in EF, stress MRI ordered and is scheduled for 7/6/23. Routed to Dr Cooley.       1. Jovon Mantilla was monitored for 24 hours. The quality of the tracing was good. The principle rhythm was Atrial Fibrillation with IVCD. The average HR was 97 bpm, maximum HR was 141 bpm, and minimum HR was 69 bpm. There were 4 pauses greater than 2.0 seconds. The QRS duration was 0.12 seconds, and QT interval was 0.31-0.43 seconds.   \2. There were 1381 ventricular ectopics. 1341 of these were isolated PVCs. There were 20 couplets.   3. Patient event button was pressed 0 times and marked no symptoms.

## 2023-06-06 ENCOUNTER — LAB (OUTPATIENT)
Dept: LAB | Facility: CLINIC | Age: 68
End: 2023-06-06
Payer: COMMERCIAL

## 2023-06-06 ENCOUNTER — ANTICOAGULATION THERAPY VISIT (OUTPATIENT)
Dept: ANTICOAGULATION | Facility: CLINIC | Age: 68
End: 2023-06-06

## 2023-06-06 DIAGNOSIS — S30.1XXA ABDOMINAL WALL HEMATOMA, INITIAL ENCOUNTER: ICD-10-CM

## 2023-06-06 DIAGNOSIS — Z95.2 S/P AVR (AORTIC VALVE REPLACEMENT): ICD-10-CM

## 2023-06-06 DIAGNOSIS — Z79.01 LONG TERM CURRENT USE OF ANTICOAGULANT THERAPY: ICD-10-CM

## 2023-06-06 DIAGNOSIS — Z95.2 S/P AVR (AORTIC VALVE REPLACEMENT): Primary | ICD-10-CM

## 2023-06-06 DIAGNOSIS — Z95.2 H/O AORTIC VALVE REPLACEMENT: ICD-10-CM

## 2023-06-06 DIAGNOSIS — I48.21 PERMANENT ATRIAL FIBRILLATION (H): ICD-10-CM

## 2023-06-06 DIAGNOSIS — Z79.01 LONG TERM CURRENT USE OF ANTICOAGULANTS WITH INR GOAL OF 2.5-3.5: ICD-10-CM

## 2023-06-06 LAB — INR BLD: 2.3 (ref 0.9–1.1)

## 2023-06-06 PROCEDURE — 85610 PROTHROMBIN TIME: CPT

## 2023-06-06 PROCEDURE — 36415 COLL VENOUS BLD VENIPUNCTURE: CPT

## 2023-06-06 NOTE — PROGRESS NOTES
ANTICOAGULATION MANAGEMENT     Jovon Mantilla 67 year old male is on warfarin with subtherapeutic INR result. (Goal INR 2.5-3.5)    Recent labs: (last 7 days)     06/06/23  1136   INR 2.3*       ASSESSMENT       Source(s): Chart Review and Patient/Caregiver Call       Warfarin doses taken: Warfarin taken as instructed    Diet: No new diet changes identified    Medication/supplement changes: None noted    New illness, injury, or hospitalization: No    Signs or symptoms of bleeding or clotting: No    Previous result: Therapeutic last visit; previously outside of goal range    Additional findings: Jovon reports increased activity as he is now spending more time outside doing yard work and playing with his dogs.          PLAN     Recommended plan for ongoing change(s) affecting INR     Dosing Instructions: Increase your warfarin dose (7.1% change) with next INR in 2 weeks       Summary  As of 6/6/2023    Full warfarin instructions:  6 mg every Tue; 4 mg all other days   Next INR check:  6/20/2023             Telephone call with Jovon who verbalizes understanding and agrees to plan    Check at provider office visit    Education provided:     Please call back if any changes to your diet, medications or how you've been taking warfarin    Healthy lifestyle considerations: impact of exercise/activity level on INR    Plan made per [facility] anticoagulation protocol    Teodora Redd RN  Anticoagulation Clinic  6/6/2023    _______________________________________________________________________     Anticoagulation Episode Summary     Current INR goal:  2.5-3.5   TTR:  59.6 % (1 y)   Target end date:  Indefinite   Send INR reminders to:  [facility]    Indications    S/P AVR (aortic valve replacement) [Z95.2]  Permanent atrial fibrillation (H) [I48.21]  Long term current use of anticoagulants with INR goal of 2.5-3.5 [Z79.01]  Abdominal wall hematoma  initial encounter [S30.1XXA]  Long-term (current) use of  anticoagulants [Z79.01] [Z79.01]           Comments:  24mm ATS Valve         Anticoagulation Care Providers     Provider Role Specialty Phone number    Rehan Lorenzana MD Referring Family Medicine [phone]    Ramana Redd MD Referring Student in organized health care education/training program [phone]    Noy Cooley MD Referring Cardiovascular Disease [phone]    Noe Ramirez PA-C Responsible Family Medicine [phone]

## 2023-06-08 ENCOUNTER — TELEPHONE (OUTPATIENT)
Dept: CARDIOLOGY | Facility: CLINIC | Age: 68
End: 2023-06-08

## 2023-06-08 DIAGNOSIS — E03.9 ACQUIRED HYPOTHYROIDISM: ICD-10-CM

## 2023-06-08 DIAGNOSIS — I70.90 ASVD (ARTERIOSCLEROTIC VASCULAR DISEASE): ICD-10-CM

## 2023-06-08 DIAGNOSIS — E78.5 HYPERLIPIDEMIA LDL GOAL <70: ICD-10-CM

## 2023-06-08 DIAGNOSIS — I48.20 CHRONIC ATRIAL FIBRILLATION (H): ICD-10-CM

## 2023-06-08 RX ORDER — LISINOPRIL 20 MG/1
10 TABLET ORAL DAILY
Qty: 45 TABLET | Refills: 3 | Status: SHIPPED | OUTPATIENT
Start: 2023-06-08 | End: 2023-07-27

## 2023-06-08 RX ORDER — LEVOTHYROXINE SODIUM 200 UG/1
TABLET ORAL
Qty: 30 TABLET | Refills: 0 | OUTPATIENT
Start: 2023-06-08

## 2023-06-08 RX ORDER — SIMVASTATIN 20 MG
TABLET ORAL
Qty: 30 TABLET | Refills: 0 | OUTPATIENT
Start: 2023-06-08

## 2023-06-08 RX ORDER — DIGOXIN 125 MCG
125 TABLET ORAL DAILY
Qty: 90 TABLET | Refills: 3 | Status: SHIPPED | OUTPATIENT
Start: 2023-06-08 | End: 2024-05-02

## 2023-06-08 NOTE — PROGRESS NOTES
REFILL REQUEST    [facility] Cardiology Refill Guideline reviewed.  Medication meets criteria for refill.    Tennille Woody RN 06/08/23 12:59 PM

## 2023-06-08 NOTE — TELEPHONE ENCOUNTER
Prescription sent 1 week ago. Further refills can be addressed at upcoming visit on 6/20/23.    Next 5 appointments (look out 90 days)    Jun 20, 2023 11:30 AM  (Arrive by 11:10 AM)  Annual Wellness Visit with LILLI Mcgregor CNP  [facility] [address] [place] [id number] [phone]        Graciela FARFAN RN, BSN

## 2023-06-08 NOTE — TELEPHONE ENCOUNTER
[facility] Call Center    Phone Message    May a detailed message be left on voicemail: yes     Reason for Call: Medication Refill Request    Has the patient contacted the pharmacy for the refill? Yes   Name of medication being requested: lisinopril (ZESTRIL) 20 MG tablet  Provider who prescribed the medication: Dr. Cooley   Pharmacy:    [facility][id number] - [place], [place] - [address]      Date medication is needed: routine        Action Taken: Other: Cardiology    Travel Screening: Not Applicable     Thank you!  [facility]

## 2023-06-20 ENCOUNTER — OFFICE VISIT (OUTPATIENT)
Dept: FAMILY MEDICINE | Facility: CLINIC | Age: 68
End: 2023-06-20
Payer: COMMERCIAL

## 2023-06-20 ENCOUNTER — ANTICOAGULATION THERAPY VISIT (OUTPATIENT)
Dept: ANTICOAGULATION | Facility: CLINIC | Age: 68
End: 2023-06-20

## 2023-06-20 ENCOUNTER — ANCILLARY PROCEDURE (OUTPATIENT)
Dept: GENERAL RADIOLOGY | Facility: CLINIC | Age: 68
End: 2023-06-20
Attending: NURSE PRACTITIONER
Payer: COMMERCIAL

## 2023-06-20 VITALS
HEIGHT: 70 IN | WEIGHT: 201.2 LBS | DIASTOLIC BLOOD PRESSURE: 61 MMHG | RESPIRATION RATE: 14 BRPM | SYSTOLIC BLOOD PRESSURE: 96 MMHG | TEMPERATURE: 97.9 F | OXYGEN SATURATION: 96 % | HEART RATE: 79 BPM | BODY MASS INDEX: 28.8 KG/M2

## 2023-06-20 DIAGNOSIS — R94.31 ABNORMAL ELECTROCARDIOGRAM (ECG) (EKG): ICD-10-CM

## 2023-06-20 DIAGNOSIS — Z95.2 S/P AVR (AORTIC VALVE REPLACEMENT): ICD-10-CM

## 2023-06-20 DIAGNOSIS — Z23 HIGH PRIORITY FOR 2019-NCOV VACCINE: ICD-10-CM

## 2023-06-20 DIAGNOSIS — I48.21 PERMANENT ATRIAL FIBRILLATION (H): ICD-10-CM

## 2023-06-20 DIAGNOSIS — I50.812 CHRONIC RIGHT-SIDED HEART FAILURE (H): ICD-10-CM

## 2023-06-20 DIAGNOSIS — E03.9 ACQUIRED HYPOTHYROIDISM: ICD-10-CM

## 2023-06-20 DIAGNOSIS — F41.9 ANXIETY: ICD-10-CM

## 2023-06-20 DIAGNOSIS — M70.61 GREATER TROCHANTERIC BURSITIS OF RIGHT HIP: ICD-10-CM

## 2023-06-20 DIAGNOSIS — Z95.2 H/O AORTIC VALVE REPLACEMENT: ICD-10-CM

## 2023-06-20 DIAGNOSIS — Z95.2 S/P AVR (AORTIC VALVE REPLACEMENT): Primary | ICD-10-CM

## 2023-06-20 DIAGNOSIS — L30.9 DERMATITIS: ICD-10-CM

## 2023-06-20 DIAGNOSIS — Z79.01 LONG TERM CURRENT USE OF ANTICOAGULANT THERAPY: ICD-10-CM

## 2023-06-20 DIAGNOSIS — Z00.00 ENCOUNTER FOR MEDICARE ANNUAL WELLNESS EXAM: Primary | ICD-10-CM

## 2023-06-20 DIAGNOSIS — Z79.01 LONG TERM CURRENT USE OF ANTICOAGULANTS WITH INR GOAL OF 2.5-3.5: ICD-10-CM

## 2023-06-20 DIAGNOSIS — I10 HYPERTENSION GOAL BP (BLOOD PRESSURE) < 140/90: ICD-10-CM

## 2023-06-20 DIAGNOSIS — E78.5 HYPERLIPIDEMIA LDL GOAL <70: ICD-10-CM

## 2023-06-20 DIAGNOSIS — S30.1XXA ABDOMINAL WALL HEMATOMA, INITIAL ENCOUNTER: ICD-10-CM

## 2023-06-20 LAB
ANION GAP SERPL CALCULATED.3IONS-SCNC: 10 MMOL/L (ref 7–15)
BUN SERPL-MCNC: 19.4 MG/DL (ref 8–23)
CALCIUM SERPL-MCNC: 9.3 MG/DL (ref 8.8–10.2)
CHLORIDE SERPL-SCNC: 101 MMOL/L (ref 98–107)
CHOLEST SERPL-MCNC: 113 MG/DL
CREAT SERPL-MCNC: 0.99 MG/DL (ref 0.67–1.17)
CREAT UR-MCNC: 105 MG/DL
DEPRECATED HCO3 PLAS-SCNC: 28 MMOL/L (ref 22–29)
DIGOXIN SERPL-MCNC: 0.6 NG/ML (ref 0.6–2)
GFR SERPL CREATININE-BSD FRML MDRD: 85 ML/MIN/1.73M2
GLUCOSE SERPL-MCNC: 88 MG/DL (ref 70–99)
HDLC SERPL-MCNC: 48 MG/DL
INR BLD: 2.9 (ref 0.9–1.1)
LDLC SERPL CALC-MCNC: 52 MG/DL
MICROALBUMIN UR-MCNC: 47.6 MG/L
MICROALBUMIN/CREAT UR: 45.33 MG/G CR (ref 0–17)
NONHDLC SERPL-MCNC: 65 MG/DL
POTASSIUM SERPL-SCNC: 4.6 MMOL/L (ref 3.4–5.3)
SODIUM SERPL-SCNC: 139 MMOL/L (ref 136–145)
TRIGL SERPL-MCNC: 67 MG/DL
TSH SERPL DL<=0.005 MIU/L-ACNC: 0.8 UIU/ML (ref 0.3–4.2)

## 2023-06-20 PROCEDURE — 85610 PROTHROMBIN TIME: CPT | Performed by: NURSE PRACTITIONER

## 2023-06-20 PROCEDURE — 0124A COVID-19 BIVALENT 12+ (PFIZER): CPT | Performed by: NURSE PRACTITIONER

## 2023-06-20 PROCEDURE — 73502 X-RAY EXAM HIP UNI 2-3 VIEWS: CPT | Mod: TC | Performed by: RADIOLOGY

## 2023-06-20 PROCEDURE — 80061 LIPID PANEL: CPT | Performed by: NURSE PRACTITIONER

## 2023-06-20 PROCEDURE — 82043 UR ALBUMIN QUANTITATIVE: CPT | Performed by: NURSE PRACTITIONER

## 2023-06-20 PROCEDURE — 82570 ASSAY OF URINE CREATININE: CPT | Performed by: NURSE PRACTITIONER

## 2023-06-20 PROCEDURE — 84443 ASSAY THYROID STIM HORMONE: CPT | Performed by: NURSE PRACTITIONER

## 2023-06-20 PROCEDURE — 80162 ASSAY OF DIGOXIN TOTAL: CPT | Performed by: NURSE PRACTITIONER

## 2023-06-20 PROCEDURE — 80048 BASIC METABOLIC PNL TOTAL CA: CPT | Performed by: NURSE PRACTITIONER

## 2023-06-20 PROCEDURE — 99214 OFFICE O/P EST MOD 30 MIN: CPT | Mod: 25 | Performed by: NURSE PRACTITIONER

## 2023-06-20 PROCEDURE — 99397 PER PM REEVAL EST PAT 65+ YR: CPT | Mod: 25 | Performed by: NURSE PRACTITIONER

## 2023-06-20 PROCEDURE — 91312 COVID-19 BIVALENT 12+ (PFIZER): CPT | Performed by: NURSE PRACTITIONER

## 2023-06-20 PROCEDURE — 36416 COLLJ CAPILLARY BLOOD SPEC: CPT | Performed by: NURSE PRACTITIONER

## 2023-06-20 PROCEDURE — 36415 COLL VENOUS BLD VENIPUNCTURE: CPT | Performed by: NURSE PRACTITIONER

## 2023-06-20 RX ORDER — TRIAMCINOLONE ACETONIDE 1 MG/G
CREAM TOPICAL 2 TIMES DAILY
Qty: 45 G | Refills: 0 | Status: SHIPPED | OUTPATIENT
Start: 2023-06-20 | End: 2023-08-15

## 2023-06-20 RX ORDER — SIMVASTATIN 20 MG
20 TABLET ORAL AT BEDTIME
Qty: 90 TABLET | Refills: 3 | Status: SHIPPED | OUTPATIENT
Start: 2023-06-20 | End: 2024-06-03

## 2023-06-20 SDOH — ECONOMIC STABILITY: TRANSPORTATION INSECURITY
IN THE PAST 12 MONTHS, HAS LACK OF TRANSPORTATION KEPT YOU FROM MEETINGS, WORK, OR FROM GETTING THINGS NEEDED FOR DAILY LIVING?: NO

## 2023-06-20 SDOH — HEALTH STABILITY: PHYSICAL HEALTH: ON AVERAGE, HOW MANY MINUTES DO YOU ENGAGE IN EXERCISE AT THIS LEVEL?: 20 MIN

## 2023-06-20 SDOH — ECONOMIC STABILITY: INCOME INSECURITY: HOW HARD IS IT FOR YOU TO PAY FOR THE VERY BASICS LIKE FOOD, HOUSING, MEDICAL CARE, AND HEATING?: NOT HARD AT ALL

## 2023-06-20 SDOH — ECONOMIC STABILITY: TRANSPORTATION INSECURITY
IN THE PAST 12 MONTHS, HAS THE LACK OF TRANSPORTATION KEPT YOU FROM MEDICAL APPOINTMENTS OR FROM GETTING MEDICATIONS?: NO

## 2023-06-20 SDOH — HEALTH STABILITY: PHYSICAL HEALTH: ON AVERAGE, HOW MANY DAYS PER WEEK DO YOU ENGAGE IN MODERATE TO STRENUOUS EXERCISE (LIKE A BRISK WALK)?: 3 DAYS

## 2023-06-20 SDOH — ECONOMIC STABILITY: INCOME INSECURITY: IN THE LAST 12 MONTHS, WAS THERE A TIME WHEN YOU WERE NOT ABLE TO PAY THE MORTGAGE OR RENT ON TIME?: NO

## 2023-06-20 SDOH — ECONOMIC STABILITY: FOOD INSECURITY: WITHIN THE PAST 12 MONTHS, YOU WORRIED THAT YOUR FOOD WOULD RUN OUT BEFORE YOU GOT MONEY TO BUY MORE.: NEVER TRUE

## 2023-06-20 SDOH — ECONOMIC STABILITY: FOOD INSECURITY: WITHIN THE PAST 12 MONTHS, THE FOOD YOU BOUGHT JUST DIDN'T LAST AND YOU DIDN'T HAVE MONEY TO GET MORE.: NEVER TRUE

## 2023-06-20 ASSESSMENT — ENCOUNTER SYMPTOMS
FEVER: 0
HEMATURIA: 0
CONSTIPATION: 0
SORE THROAT: 0
ABDOMINAL PAIN: 0
SHORTNESS OF BREATH: 0
WEAKNESS: 0
MYALGIAS: 0
HEADACHES: 0
ARTHRALGIAS: 0
HEARTBURN: 0
JOINT SWELLING: 0
PARESTHESIAS: 0
FREQUENCY: 0
COUGH: 0
NAUSEA: 0
DIZZINESS: 0
EYE PAIN: 0
NERVOUS/ANXIOUS: 0
CHILLS: 0
DIARRHEA: 0
PALPITATIONS: 0
DYSURIA: 0
HEMATOCHEZIA: 0

## 2023-06-20 ASSESSMENT — LIFESTYLE VARIABLES
SKIP TO QUESTIONS 9-10: 1
HOW OFTEN DO YOU HAVE A DRINK CONTAINING ALCOHOL: NEVER
HOW OFTEN DO YOU HAVE SIX OR MORE DRINKS ON ONE OCCASION: NEVER
AUDIT-C TOTAL SCORE: 0
HOW MANY STANDARD DRINKS CONTAINING ALCOHOL DO YOU HAVE ON A TYPICAL DAY: PATIENT DOES NOT DRINK

## 2023-06-20 ASSESSMENT — SOCIAL DETERMINANTS OF HEALTH (SDOH)
DO YOU BELONG TO ANY CLUBS OR ORGANIZATIONS SUCH AS CHURCH GROUPS UNIONS, FRATERNAL OR ATHLETIC GROUPS, OR SCHOOL GROUPS?: NO
HOW OFTEN DO YOU ATTEND CHURCH OR RELIGIOUS SERVICES?: 1 TO 4 TIMES PER YEAR
IN A TYPICAL WEEK, HOW MANY TIMES DO YOU TALK ON THE PHONE WITH FAMILY, FRIENDS, OR NEIGHBORS?: MORE THAN THREE TIMES A WEEK
HOW OFTEN DO YOU GET TOGETHER WITH FRIENDS OR RELATIVES?: TWICE A WEEK

## 2023-06-20 ASSESSMENT — ACTIVITIES OF DAILY LIVING (ADL): CURRENT_FUNCTION: NO ASSISTANCE NEEDED

## 2023-06-20 NOTE — PROGRESS NOTES
ANTICOAGULATION MANAGEMENT     Jovon Mantilla 67 year old male is on warfarin with therapeutic INR result. (Goal INR 2.5-3.5)    Recent labs: (last 7 days)     06/20/23  1215   INR 2.9*         ASSESSMENT       Source(s): Chart Review and Patient/Caregiver Call       Warfarin doses taken: Warfarin taken as instructed    Diet: No new diet changes identified    Medication/supplement changes: Triamcinolone and Voltaren topicals prescribed today.    New illness, injury, or hospitalization: No    Signs or symptoms of bleeding or clotting: No    Previous result: Subtherapeutic  Additional findings: Annual wellness today-notes not complete    Warfarin maintenance dose was increased 7% at last visit         PLAN     Recommended plan for no diet, medication or health factor changes affecting INR     Dosing Instructions: Continue your current warfarin dose with next INR in 3 weeks       Summary  As of 6/20/2023    Full warfarin instructions:  6 mg every Tue; 4 mg all other days   Next INR check:  7/11/2023             Telephone call with Jovon who agrees to plan and repeated back plan correctly    Lab visit scheduled    Education provided:     Interaction IS NOT anticipated between warfarin and the topicals that were prescribed today    Plan made per [facility] anticoagulation protocol    Madeline Wayne, RN  Anticoagulation Clinic  6/20/2023    _______________________________________________________________________     Anticoagulation Episode Summary     Current INR goal:  2.5-3.5   TTR:  60.3 % (1 y)   Target end date:  Indefinite   Send INR reminders to:  [facility]    Indications    S/P AVR (aortic valve replacement) [Z95.2]  Permanent atrial fibrillation (H) [I48.21]  Long term current use of anticoagulants with INR goal of 2.5-3.5 [Z79.01]  Abdominal wall hematoma  initial encounter [S30.1XXA]  Long-term (current) use of anticoagulants [Z79.01] [Z79.01]           Comments:  24mm ATS Valve         Anticoagulation Care  Providers     Provider Role Specialty Phone number    Rehan Lorenzana MD Referring Family Medicine [phone]    Ramana Redd MD Referring Student in organized health care education/training program [phone]    Noy Cooley MD Referring Cardiovascular Disease [phone]    Noe Ramirez PADamianC Responsible Family Medicine [phone]

## 2023-06-20 NOTE — PATIENT INSTRUCTIONS
Patient Education   Personalized Prevention Plan  You are due for the preventive services outlined below.  Your care team is available to assist you in scheduling these services.  If you have already completed any of these items, please share that information with your care team to update in your medical record.  Health Maintenance Due   Topic Date Due     Zoster (Shingles) Vaccine (1 of 2) Never done     Pneumococcal Vaccine (2 - PCV) 06/02/2018     Diptheria Tetanus Pertussis (DTAP/TDAP/TD) Vaccine (2 - Td or Tdap) 12/03/2020     Annual Wellness Visit  08/09/2022     ANNUAL REVIEW OF HM ORDERS  08/09/2022     Thyroid Function Lab  08/11/2022     Digoxin Lab  08/11/2022     Basic Metabolic Panel  12/09/2022     Kidney Microalbumin Urine Test  02/10/2023     COVID-19 Vaccine (5 - Pfizer series) 03/10/2023     Creatinine Lab  06/09/2023       Understanding USDA MyPlate  The USDA has guidelines to help you make healthy food choices. These are called MyPlate. MyPlate shows the food groups that make up healthy meals using the image of a place setting. Before you eat, think about the healthiest choices for what to put on your plate or in your cup or bowl. To learn more about building a healthy plate, visit www.choosemyplate.gov.     The food groups    Fruits. Any fruit or 100% fruit juice counts as part of the Fruit Group. Fruits may be fresh, canned, frozen, or dried, and may be whole, cut-up, or pureed. Make 1/2 of your plate fruits and vegetables.    Vegetables. Any vegetable or 100% vegetable juice counts as a member of the Vegetable Group. Vegetables may be fresh, frozen, canned, or dried. They can be served raw or cooked and may be whole, cut-up, or mashed. Make 1/2 of your plate fruits and vegetables.    Grains. All foods made from grains are part of the Grains Group. These include wheat, rice, oats, cornmeal, and barley. Grains are often used to make foods such as bread, pasta, oatmeal, cereal, tortillas, and  grits. Grains should be no more than 1/4 of your plate. At least half of your grains should be whole grains.    Protein. This group includes meat, poultry, seafood, beans and peas, eggs, processed soy products (such as tofu), nuts (including nut butters), and seeds. Make protein choices no more than 1/4 of your plate. Meat and poultry choices should be lean or low fat.    Dairy. The Dairy Group includes all fluid milk products and foods made from milk that contain calcium, such as yogurt and cheese. (Foods that have little calcium, such as cream, butter, and cream cheese, are not part of this group.) Most dairy choices should be low-fat or fat-free.    Oils. Oils aren't a food group, but they do contain essential nutrients. However it's important to watch your intake of oils. These are fats that are liquid at room temperature. They include canola, corn, olive, soybean, vegetable, and sunflower oil. Foods that are mainly oil include mayonnaise, certain salad dressings, and soft margarines. You likely already get your daily oil allowance from the foods you eat.  Things to limit  Eating healthy also means limiting these things in your diet:    Salt (sodium). Many processed foods have a lot of sodium. To keep sodium intake down, eat fresh vegetables, meats, poultry, and seafood when possible. Purchase low-sodium, reduced-sodium, or no-salt-added food products at the store. And don't add salt to your meals at home. Instead, season them with herbs and spices such as dill, oregano, cumin, and paprika. Or try adding flavor with lemon or lime zest and juice.    Saturated fat. Saturated fats are most often found in animal products such as beef, pork, and chicken. They are often solid at room temperature, such as butter. To reduce your saturated fat intake, choose leaner cuts of meat and poultry. And try healthier cooking methods such as grilling, broiling, roasting, or baking. For a simple lower-fat swap, use plain nonfat  yogurt instead of mayonnaise when making potato salad or macaroni salad.    Added sugars. These are sugars added to foods. They are in foods such as ice cream, candy, soda, fruit drinks, sports drinks, energy drinks, cookies, pastries, jams, and syrups. Cut down on added sugars by sharing sweet treats with a family member or friend. You can also choose fruit for dessert, and drink water or other unsweetened beverages.  [employer] last reviewed this educational content on 6/1/2020 2000-2022 [employer]. All rights reserved. This information is not intended as a substitute for professional medical care. Always follow your healthcare professional's instructions.

## 2023-06-20 NOTE — PROGRESS NOTES
"SUBJECTIVE:   Jovon is a 67 year old who presents for Preventive Visit.      6/20/2023    11:18 AM   Additional Questions   Roomed by Luz Elena Isbell     Are you in the first 12 months of your Medicare coverage?  No    Healthy Habits:     In general, how would you rate your overall health?  Good    Frequency of exercise:  2-3 days/week    Duration of exercise:  15-30 minutes    Do you usually eat at least 4 servings of fruit and vegetables a day, include whole grains    & fiber and avoid regularly eating high fat or \"junk\" foods?  No    Taking medications regularly:  Yes    Medication side effects:  Not applicable    Ability to successfully perform activities of daily living:  No assistance needed    Home Safety:  No safety concerns identified    Hearing Impairment:  No hearing concerns    In the past 6 months, have you been bothered by leaking of urine?  No    In general, how would you rate your overall mental or emotional health?  Good      PHQ-2 Total Score: 0    Additional concerns today:  Yes    Right hip pain with sleeping on right hip. Pain does not interfere with walking.   Improved with removing weight from side hip.   Using tylenol with some benefit.     Rash to right medial ankle x 6 months.         Have you ever done Advance Care Planning? (For example, a Health Directive, POLST, or a discussion with a medical provider or your loved ones about your wishes): No, advance care planning information given to patient to review.  Patient declined advance care planning discussion at this time.      Fall risk  Fallen 2 or more times in the past year?: No  Any fall with injury in the past year?: No    Cognitive Screening   1) Repeat 3 items (Leader, Season, Table)    2) Clock draw: NORMAL  3) 3 item recall: Recalls 3 objects  Results: 3 items recalled: COGNITIVE IMPAIRMENT LESS LIKELY    Mini-CogTM Copyright S [employer]. Licensed by the author for use in [facility]; reprinted with permission " (beth@[facility]). All rights reserved.      Reviewed and updated as needed this visit by clinical staff   Tobacco  Allergies  Meds  Problems  Med Hx  Surg Hx  Fam Hx          Reviewed and updated as needed this visit by Provider   Tobacco  Allergies  Meds  Problems  Med Hx  Surg Hx  Fam Hx         Social History     Tobacco Use     Smoking status: Never     Smokeless tobacco: Never   Vaping Use     Vaping status: Never Used   Substance Use Topics     Alcohol use: No     Alcohol/week: 0.0 standard drinks of alcohol             6/20/2023    11:15 AM   Alcohol Use   Prescreen: >3 drinks/day or >7 drinks/week? Not Applicable     Do you have a current opioid prescription? No  Do you use any other controlled substances or medications that are not prescribed by a provider? None    Current providers sharing in care for this patient include:   Patient Care Team:  Sneha Tran APRN CNP as PCP - General (Family Medicine)  Bri Olvera PA as Physician Assistant (Urology)  Khadijah Atkins, RN as Registered Nurse (Oncology)  Татьяна Srivastava PA as Physician Assistant (Urology)  Laura Traylor, RN as Personal Advocate & Liaison (PAL) (Family Medicine)  Sedrick Beltran MD as MD (Urology)  Sneha Tran APRN CNP as Assigned PCP  Ingrid Alas PA-C as Assigned Heart and Vascular Provider  Noy Cooley MD as MD (Cardiovascular Disease)  Sedrick Beltran MD as Assigned Surgical Provider    The following health maintenance items are reviewed in Epic and correct as of today:  Health Maintenance   Topic Date Due     ZOSTER IMMUNIZATION (1 of 2) Never done     Pneumococcal Vaccine: 65+ Years (2 - PCV) 06/02/2018     DTAP/TDAP/TD IMMUNIZATION (2 - Td or Tdap) 12/03/2020     MEDICARE ANNUAL WELLNESS VISIT  08/09/2022     TSH W/FREE T4 REFLEX  08/11/2022     DIGOXIN  08/11/2022     BMP  12/09/2022     MICROALBUMIN  02/10/2023     CREATININE  06/09/2023     INFLUENZA VACCINE (Season Ended)  "09/01/2023     ANNUAL REVIEW OF HM ORDERS  06/20/2024     FALL RISK ASSESSMENT  06/20/2024     COLORECTAL CANCER SCREENING  01/23/2026     LIPID  08/09/2026     ADVANCE CARE PLANNING  06/20/2028     HEPATITIS C SCREENING  Completed     HIV SCREENING  Completed     PHQ-2 (once per calendar year)  Completed     AORTIC ANEURYSM SCREENING (SYSTEM ASSIGNED)  Completed     COVID-19 Vaccine  Completed     IPV IMMUNIZATION  Aged Out     MENINGITIS IMMUNIZATION  Aged Out     Lab work is in process  Labs reviewed in EPIC          Review of Systems   Constitutional: Negative for chills and fever.   HENT: Positive for hearing loss. Negative for congestion, ear pain and sore throat.    Eyes: Negative for pain and visual disturbance.   Respiratory: Negative for cough and shortness of breath.    Cardiovascular: Negative for chest pain, palpitations and peripheral edema.   Gastrointestinal: Negative for abdominal pain, constipation, diarrhea, heartburn, hematochezia and nausea.   Genitourinary: Positive for impotence and penile discharge. Negative for dysuria, frequency, genital sores, hematuria and urgency.   Musculoskeletal: Negative for arthralgias, joint swelling and myalgias.   Skin: Positive for rash.   Neurological: Negative for dizziness, weakness, headaches and paresthesias.   Psychiatric/Behavioral: Negative for mood changes. The patient is not nervous/anxious.          OBJECTIVE:   BP 96/61 (BP Location: Right arm, Patient Position: Sitting, Cuff Size: Adult Regular)   Pulse 79   Temp 97.9  F (36.6  C) (Oral)   Resp 14   Ht 1.765 m (5' 9.5\")   Wt 91.3 kg (201 lb 3.2 oz)   SpO2 96%   BMI 29.29 kg/m   Estimated body mass index is 29.29 kg/m  as calculated from the following:    Height as of this encounter: 1.765 m (5' 9.5\").    Weight as of this encounter: 91.3 kg (201 lb 3.2 oz).  Physical Exam  GENERAL: healthy, alert and no distress  EYES: Eyes grossly normal to inspection, PERRL and conjunctivae and sclerae " normal  HENT: ear canals and TM's normal, nose and mouth without ulcers or lesions  NECK: no adenopathy, no asymmetry, masses, or scars and thyroid normal to palpation  RESP: lungs clear to auscultation - no rales, rhonchi or wheezes  CV: regular rate and rhythm, normal S1 S2, no S3 or S4, no murmur, click or rub, no peripheral edema and peripheral pulses strong  CV: mechanical valve click present  ABDOMEN: soft, nontender, no hepatosplenomegaly, no masses and bowel sounds normal  MS: no gross musculoskeletal defects noted, no edema  SKIN: no suspicious lesions or rashes  NEURO: Normal strength and tone, mentation intact and speech normal  PSYCH: mentation appears normal, affect normal/bright    Diagnostic Test Results:  Labs reviewed in Epic    ASSESSMENT / PLAN:   Jovon was seen today for wellness visit and imm/inj.    Diagnoses and all orders for this visit:    Encounter for Medicare annual wellness exam  Shingles and Tdap vaccine per Pharmacy.     Acquired hypothyroidism  -     TSH WITH FREE T4 REFLEX; Future  -     TSH WITH FREE T4 REFLEX  TSH pending. Medication compliant.     Hypertension goal BP (blood pressure) < 140/90  -     BASIC METABOLIC PANEL; Future  -     Albumin Random Urine Quantitative with Creat Ratio; Future  -     BASIC METABOLIC PANEL  -     Albumin Random Urine Quantitative with Creat Ratio  Blood pressure controlled. Follows with Cardiology.     Hyperlipidemia LDL goal <70  -     simvastatin (ZOCOR) 20 MG tablet; Take 1 tablet (20 mg) by mouth At Bedtime  -     Lipid panel reflex to direct LDL Non-fasting; Future  -     Lipid panel reflex to direct LDL Non-fasting  Statin compliant.     Anxiety  -     sertraline (ZOLOFT) 50 MG tablet; Take 1 tablet (50 mg) by mouth daily  Controlled. Continue medication.     Greater trochanteric bursitis of right hip  -     diclofenac (VOLTAREN) 1 % topical gel; Apply 4 g topically 4 times daily as needed for moderate pain  -     XR Hip Right 2-3 Views;  "Future  -     Physical Therapy Referral; Future  No acute fracture or dislocation on X-ray per my interpretation. Radiology read pending.  Discussed suspicion for bursitis and etiology. No NSAIDs due to gastric bypass and warfarin use. Will trial diclofenac gel and Physical Therapy.     Dermatitis  -     triamcinolone (KENALOG) 0.1 % external cream; Apply topically 2 times daily  Unknown etiology. Trial topical steroid. If not beneficial, consider Dermatology.     Abnormal electrocardiogram (ECG) (EKG)  -     Digoxin level; Future  -     Digoxin level  Per Cardiology.     High priority for 2019-nCoV vaccine  -     COVID-19 BIVALENT 12+ (PFIZER)    S/P AVR (aortic valve replacement)  -     INR point of care    Long term current use of anticoagulant therapy  -     INR point of care    Permanent atrial fibrillation (H)  -     INR point of care    H/O aortic valve replacement  -     INR point of care    Chronic right-sided heart failure (H)  Follows with Cardiology.     Other orders  -     REVIEW OF HEALTH MAINTENANCE PROTOCOL ORDERS  -     PRIMARY CARE FOLLOW-UP SCHEDULING; Future              COUNSELING:  Reviewed preventive health counseling, as reflected in patient instructions      BMI:   Estimated body mass index is 29.29 kg/m  as calculated from the following:    Height as of this encounter: 1.765 m (5' 9.5\").    Weight as of this encounter: 91.3 kg (201 lb 3.2 oz).   Weight management plan: Discussed healthy diet and exercise guidelines      He reports that he has never smoked. He has never used smokeless tobacco.      Appropriate preventive services were discussed with this patient, including applicable screening as appropriate for cardiovascular disease, diabetes, osteopenia/osteoporosis, and glaucoma.  As appropriate for age/gender, discussed screening for colorectal cancer, prostate cancer, breast cancer, and cervical cancer. Checklist reviewing preventive services available has been given to the " patient.    Reviewed patients plan of care and provided an AVS. The Intermediate Care Plan ( asthma action plan, low back pain action plan, and migraine action plan) for Jovon meets the Care Plan requirement. This Care Plan has been established and reviewed with the Patient.        LILLI Mcgregor [facility]    Identified Health Risks:      The patient was counseled and encouraged to consider modifying their diet and eating habits. He was provided with information on recommended healthy diet options.

## 2023-06-27 ENCOUNTER — THERAPY VISIT (OUTPATIENT)
Dept: PHYSICAL THERAPY | Facility: CLINIC | Age: 68
End: 2023-06-27
Attending: NURSE PRACTITIONER
Payer: COMMERCIAL

## 2023-06-27 DIAGNOSIS — M70.61 GREATER TROCHANTERIC BURSITIS OF RIGHT HIP: ICD-10-CM

## 2023-06-27 DIAGNOSIS — M25.551 HIP PAIN, RIGHT: ICD-10-CM

## 2023-06-27 PROCEDURE — 97530 THERAPEUTIC ACTIVITIES: CPT | Mod: GP | Performed by: PHYSICAL THERAPIST

## 2023-06-27 PROCEDURE — 97161 PT EVAL LOW COMPLEX 20 MIN: CPT | Mod: GP | Performed by: PHYSICAL THERAPIST

## 2023-06-27 PROCEDURE — 97110 THERAPEUTIC EXERCISES: CPT | Mod: GP | Performed by: PHYSICAL THERAPIST

## 2023-06-27 ASSESSMENT — ACTIVITIES OF DAILY LIVING (ADL)
SPORTS_TOTAL_ITEM_SCORE: 0
ADL_HIGHEST_POTENTIAL_SCORE: 68
SITTING FOR 15 MINUTES: NO DIFFICULTY AT ALL
GETTING_INTO_AND_OUT_OF_A_BATHTUB: NO DIFFICULTY AT ALL
GOING UP 1 FLIGHT OF STAIRS: NO DIFFICULTY AT ALL
GOING_DOWN_1_FLIGHT_OF_STAIRS: NO DIFFICULTY AT ALL
LOW_IMPACT_ACTIVITIES_LIKE_FAST_WALKING: SLIGHT DIFFICULTY
PLEASE_INDICATE_YOR_PRIMARY_REASON_FOR_REFERRAL_TO_THERAPY:: HIP
WALKING_INITIALLY: NO DIFFICULTY AT ALL
HEAVY_WORK: NO DIFFICULTY AT ALL
WALKING_FOR_APPROXIMATELY_10_MINUTES: NO DIFFICULTY AT ALL
GETTING_INTO_AND_OUT_OF_A_BATHTUB: NO DIFFICULTY AT ALL
ABILITY_TO_PARTICIPATE_IN_YOUR_DESIRED_SPORT_AS_LONG_AS_YOU_WOULD_LIKE: UNABLE TO DO
WALKING_INITIALLY: NO DIFFICULTY AT ALL
WALKING_UP_STEEP_HILLS: MODERATE DIFFICULTY
HEAVY_WORK: NO DIFFICULTY AT ALL
LIGHT_TO_MODERATE_WORK: NO DIFFICULTY AT ALL
HOW_WOULD_YOU_RATE_YOUR_CURRENT_LEVEL_OF_FUNCTION?: NEARLY NORMAL
SITTING_FOR_15_MINUTES: NO DIFFICULTY AT ALL
ADL_SCORE(%): 0
STEPPING_UP_AND_DOWN_CURBS: NO DIFFICULTY AT ALL
SPORTS_HIGHEST_POTENTIAL_SCORE: 36
WALKING_15_MINUTES_OR_GREATER: NO DIFFICULTY AT ALL
ADL_TOTAL_ITEM_SCORE: 0
WALKING_15_MINUTES_OR_GREATER: NO DIFFICULTY AT ALL
GOING_UP_1_FLIGHT_OF_STAIRS: NO DIFFICULTY AT ALL
STANDING FOR 15 MINUTES: NO DIFFICULTY AT ALL
ABILITY_TO_PERFORM_ACTIVITY_WITH_YOUR_NORMAL_TECHNIQUE: NO DIFFICULTY AT ALL
CUTTING/LATERAL_MOVEMENTS: NO DIFFICULTY AT ALL
STEPPING UP AND DOWN CURBS: NO DIFFICULTY AT ALL
RUNNING_ONE_MILE: UNABLE TO DO
WALKING_DOWN_STEEP_HILLS: NO DIFFICULTY AT ALL
RECREATIONAL ACTIVITIES: NO DIFFICULTY AT ALL
JUMPING: NO DIFFICULTY AT ALL
WALKING_UP_STEEP_HILLS: MODERATE DIFFICULTY
PUTTING_ON_SOCKS_AND_SHOES: NO DIFFICULTY AT ALL
RECREATIONAL_ACTIVITIES: NO DIFFICULTY AT ALL
ADL_COUNT: 17
GOING DOWN 1 FLIGHT OF STAIRS: NO DIFFICULTY AT ALL
WALKING_DOWN_STEEP_HILLS: NO DIFFICULTY AT ALL
LIGHT_TO_MODERATE_WORK: NO DIFFICULTY AT ALL
ROLLING OVER IN BED: NO DIFFICULTY AT ALL
HOS_ADL_ITEM_SCORE_TOTAL: 62
SPORTS_SCORE(%): 0
ROLLING_OVER_IN_BED: NO DIFFICULTY AT ALL
PUTTING ON SOCKS AND SHOES: NO DIFFICULTY AT ALL
TWISTING/PIVOTING_ON_INVOLVED_LEG: NO DIFFICULTY AT ALL
HOS_ADL_SCORE(%): 96.88
WALKING_APPROXIMATELY_10_MINUTES: NO DIFFICULTY AT ALL
GETTING_INTO_AND_OUT_OF_AN_AVERAGE_CAR: NO DIFFICULTY AT ALL
TWISTING/PIVOTING ON INVOLVED LEG: NO DIFFICULTY AT ALL
SPORTS_COUNT: 9
HOS_ADL_HIGHEST_POTENTIAL_SCORE: 64
STARTING_AND_STOPPING_QUICKLY: NO DIFFICULTY AT ALL
GETTING INTO AND OUT OF AN AVERAGE CAR: NO DIFFICULTY AT ALL
STANDING_FOR_15_MINUTES: NO DIFFICULTY AT ALL

## 2023-06-27 NOTE — PROGRESS NOTES
PHYSICAL THERAPY EVALUATION  Type of Visit: Evaluation    See electronic medical record for Abuse and Falls Screening details.    Subjective      Presenting condition or subjective complaint: Unable to lay on my right side in bed after 5 minute's  Date of onset: 23 (referral date)    Relevant medical history:     Dates & types of surgery: Gastric-by-pass-, open heart valve replacement-, hernia-     Prior diagnostic imaging/testing results:       Prior therapy history for the same diagnosis, illness or injury: No      Prior Level of Function   Transfers: Independent  Ambulation: Independent  ADL: Independent      Living Environment  Social support: With a significant other or spouse   Type of home: House; 1 level   Stairs to enter the home: No       Ramp: No   Stairs inside the home: Yes 11 Is there a railing: No   Help at home:    Equipment owned: Straight Cane; Grab Gazoob     Employment: Not Applicable    Hobbies/Interests:      Patient goals for therapy: Lay on my right side indefinitely    Pain assessment: Location: R greater trochanter/Ratin/10     Objective   HIP EVALUATION  PAIN: Pain Level at Rest: 4/10  Pain Level with Use: 0/10  Pain is Exacerbated By: sleeping on R side only    POSTURE: Standing Posture: Lordosis decreased  GAIT:   Weightbearing Status: WBAT  Assistive Device(s): None  Gait Deviations: WNL  BALANCE/PROPRIOCEPTION: WNL    NON-WEIGHTBEARING ALIGNMENT: pelvis symmetrical   ROM: PROM WNL    PELVIC/SI SCREEN: SI joint provocation tests (-)  STRENGTH: R hip flex 4-/5; abd 4-/5; ext 4-/5; ER 4-/5  LE FLEXIBILITY: tight hamstrings B  SPECIAL TESTS: FADIR (-); Kenny's (-) R    PALPATION: tender directly over R greater troch  JOINT MOBILITY: WNL    Assessment & Plan   CLINICAL IMPRESSIONS   Medical Diagnosis: R hip pain    Treatment Diagnosis: R hip pain   Impression/Assessment: Patient is a 67 year old male with R hip complaints.  The following significant findings have been  identified: Pain, Decreased strength, Impaired muscle performance and Decreased activity tolerance. These impairments interfere with their ability to perform self care tasks as compared to previous level of function.     Clinical Decision Making (Complexity):   Clinical Presentation: Stable/Uncomplicated  Clinical Presentation Rationale: based on medical and personal factors listed in PT evaluation  Clinical Decision Making (Complexity): Low complexity    PLAN OF CARE  Treatment Interventions:  Interventions: Therapeutic Activity, Therapeutic Exercise, Self-Care/Home Management    Long Term Goals     PT Goal 1  Goal Identifier: sleeping  Goal Description: ability to sleep through the night without waking from pain  Rationale: to maximize safety and independence within the home;to maximize safety and independence with self cares  Target Date: 08/22/23      Frequency of Treatment: 2 x month  Duration of Treatment: 2 months      Education Assessment:   Learner/Method: Patient;Listening;Demonstration    Risks and benefits of evaluation/treatment have been explained.   Patient/Family/caregiver agrees with Plan of Care.     Evaluation Time:     PT Eval, Low Complexity Minutes (68585): 15      Signing Clinician: Laurita Zaidi, PT      [facility]                                                                                   OUTPATIENT PHYSICAL THERAPY      PLAN OF TREATMENT FOR OUTPATIENT REHABILITATION   Patient's Last Name, First Name, Jovon Avila YOB: 1955   Provider's Name   [facility]   Medical Record No.  [id number]     Onset Date: 06/20/23 (referral date)  Start of Care Date: 06/27/23     Medical Diagnosis:  R hip pain      PT Treatment Diagnosis:  R hip pain Plan of Treatment  Frequency/Duration: 2 x month/ 2 months    Certification date from 06/27/23 to 09/24/23         See note for plan of treatment details and  functional goals     Laurita Zaidi, PT                         I CERTIFY THE NEED FOR THESE SERVICES FURNISHED UNDER        THIS PLAN OF TREATMENT AND WHILE UNDER MY CARE     (Physician attestation of this document indicates review and certification of the therapy plan).                  Referring Provider:  Sneha Tran      Initial Assessment  See Epic Evaluation- Start of Care Date: 06/27/23

## 2023-06-29 DIAGNOSIS — I50.812 CHRONIC RIGHT-SIDED HEART FAILURE (H): ICD-10-CM

## 2023-06-29 DIAGNOSIS — E03.9 ACQUIRED HYPOTHYROIDISM: ICD-10-CM

## 2023-06-29 DIAGNOSIS — I25.10 CORONARY ARTERY DISEASE INVOLVING NATIVE CORONARY ARTERY OF NATIVE HEART WITHOUT ANGINA PECTORIS: ICD-10-CM

## 2023-06-29 RX ORDER — LEVOTHYROXINE SODIUM 200 UG/1
TABLET ORAL
Qty: 90 TABLET | Refills: 3 | Status: SHIPPED | OUTPATIENT
Start: 2023-06-29 | End: 2024-07-02

## 2023-06-29 RX ORDER — FUROSEMIDE 20 MG
20 TABLET ORAL DAILY
Qty: 90 TABLET | Refills: 0 | Status: SHIPPED | OUTPATIENT
Start: 2023-06-29 | End: 2023-07-27

## 2023-06-29 NOTE — TELEPHONE ENCOUNTER
Prescription approved per [facility] Refill Protocol.  Madeline Chris RN

## 2023-07-06 ENCOUNTER — TELEPHONE (OUTPATIENT)
Dept: CARDIOLOGY | Facility: CLINIC | Age: 68
End: 2023-07-06

## 2023-07-06 ENCOUNTER — HOSPITAL ENCOUNTER (OUTPATIENT)
Dept: CARDIOLOGY | Facility: CLINIC | Age: 68
Discharge: HOME | End: 2023-07-06
Attending: INTERNAL MEDICINE | Admitting: INTERNAL MEDICINE
Payer: COMMERCIAL

## 2023-07-06 VITALS — DIASTOLIC BLOOD PRESSURE: 51 MMHG | HEART RATE: 68 BPM | SYSTOLIC BLOOD PRESSURE: 95 MMHG

## 2023-07-06 DIAGNOSIS — I25.10 CORONARY ARTERY DISEASE INVOLVING NATIVE CORONARY ARTERY OF NATIVE HEART WITHOUT ANGINA PECTORIS: ICD-10-CM

## 2023-07-06 DIAGNOSIS — I42.0 DILATED CARDIOMYOPATHY (H): Primary | ICD-10-CM

## 2023-07-06 DIAGNOSIS — I50.812 CHRONIC RIGHT-SIDED HEART FAILURE (H): ICD-10-CM

## 2023-07-06 DIAGNOSIS — I42.0 DILATED CARDIOMYOPATHY (H): ICD-10-CM

## 2023-07-06 PROCEDURE — 93018 CV STRESS TEST I&R ONLY: CPT | Performed by: INTERNAL MEDICINE

## 2023-07-06 PROCEDURE — 93016 CV STRESS TEST SUPVJ ONLY: CPT | Performed by: INTERNAL MEDICINE

## 2023-07-06 PROCEDURE — A9585 GADOBUTROL INJECTION: HCPCS | Performed by: INTERNAL MEDICINE

## 2023-07-06 PROCEDURE — 250N000011 HC RX IP 250 OP 636: Performed by: INTERNAL MEDICINE

## 2023-07-06 PROCEDURE — 75563 CARD MRI W/STRESS IMG & DYE: CPT | Mod: 26 | Performed by: INTERNAL MEDICINE

## 2023-07-06 PROCEDURE — 255N000002 HC RX 255 OP 636: Performed by: INTERNAL MEDICINE

## 2023-07-06 PROCEDURE — 93017 CV STRESS TEST TRACING ONLY: CPT

## 2023-07-06 RX ORDER — AMINOPHYLLINE 25 MG/ML
100 INJECTION, SOLUTION INTRAVENOUS ONCE
Status: DISCONTINUED | OUTPATIENT
Start: 2023-07-06 | End: 2023-07-07 | Stop reason: HOSPADM

## 2023-07-06 RX ORDER — DIAZEPAM 5 MG
5 TABLET ORAL EVERY 30 MIN PRN
Status: DISCONTINUED | OUTPATIENT
Start: 2023-07-06 | End: 2023-07-07 | Stop reason: HOSPADM

## 2023-07-06 RX ORDER — ALBUTEROL SULFATE 90 UG/1
2 AEROSOL, METERED RESPIRATORY (INHALATION) EVERY 5 MIN PRN
Status: DISCONTINUED | OUTPATIENT
Start: 2023-07-06 | End: 2023-07-07 | Stop reason: HOSPADM

## 2023-07-06 RX ORDER — ONDANSETRON 2 MG/ML
4 INJECTION INTRAMUSCULAR; INTRAVENOUS
Status: DISCONTINUED | OUTPATIENT
Start: 2023-07-06 | End: 2023-07-07 | Stop reason: HOSPADM

## 2023-07-06 RX ORDER — DIPHENHYDRAMINE HCL 25 MG
25 CAPSULE ORAL
Status: DISCONTINUED | OUTPATIENT
Start: 2023-07-06 | End: 2023-07-07 | Stop reason: HOSPADM

## 2023-07-06 RX ORDER — CAFFEINE CITRATE 20 MG/ML
60 SOLUTION INTRAVENOUS
Status: DISCONTINUED | OUTPATIENT
Start: 2023-07-06 | End: 2023-07-07 | Stop reason: HOSPADM

## 2023-07-06 RX ORDER — DIPHENHYDRAMINE HYDROCHLORIDE 50 MG/ML
25-50 INJECTION INTRAMUSCULAR; INTRAVENOUS
Status: DISCONTINUED | OUTPATIENT
Start: 2023-07-06 | End: 2023-07-07 | Stop reason: HOSPADM

## 2023-07-06 RX ORDER — REGADENOSON 0.08 MG/ML
0.4 INJECTION, SOLUTION INTRAVENOUS ONCE
Status: COMPLETED | OUTPATIENT
Start: 2023-07-06 | End: 2023-07-06

## 2023-07-06 RX ORDER — METHYLPREDNISOLONE SODIUM SUCCINATE 125 MG/2ML
125 INJECTION, POWDER, LYOPHILIZED, FOR SOLUTION INTRAMUSCULAR; INTRAVENOUS
Status: DISCONTINUED | OUTPATIENT
Start: 2023-07-06 | End: 2023-07-07 | Stop reason: HOSPADM

## 2023-07-06 RX ORDER — GADOBUTROL 604.72 MG/ML
26 INJECTION INTRAVENOUS ONCE
Status: COMPLETED | OUTPATIENT
Start: 2023-07-06 | End: 2023-07-06

## 2023-07-06 RX ORDER — ACYCLOVIR 200 MG/1
0-1 CAPSULE ORAL
Status: DISCONTINUED | OUTPATIENT
Start: 2023-07-06 | End: 2023-07-07 | Stop reason: HOSPADM

## 2023-07-06 RX ADMIN — REGADENOSON 0.4 MG: 0.08 INJECTION, SOLUTION INTRAVENOUS at 11:47

## 2023-07-06 RX ADMIN — GADOBUTROL 26 ML: 604.72 INJECTION INTRAVENOUS at 12:39

## 2023-07-06 NOTE — TELEPHONE ENCOUNTER
Stress MRI 7/6/2023 noted. Ordered for finding of lower EF on echo. Patient c/o dizziness and lightheadedness - medications adjusted.    Echo EF=35-40%  holter showed afib with isolated PVCs    Stress MRI:  Mildly dilated left and right ventricle with mildly reduced LV and RV systolic function.   Quantitative LVEF 53.0 %. Quantitative RVEF 48.9 %.   Regadenoson induced stress perfusion is negative for ischemia.  Delayed hyperenhancement reveals small, subendocardial scar in the basal inferior segment consistent with scar in RCA distribution. There is a small area of basal septal, mid myocardial non- CAD scar.     Per Dr. Cooley's dictation 5/10/2023:  I suspect that his dizziness and lightheadedness is related to symptomatic hypotension.  I have asked him to decrease his lisinopril to 10 mg daily and his carvedilol to 12.5 mg twice daily.  I have also asked him to obtain a pulm blood pressure cuff and to report his blood pressure readings at home to us in approximately 2 weeks.     Finally, I have ordered an echocardiogram for reassessment of his left ventricular systolic/valvular function as well as a 24-hour Holter monitor to rule symptomatic bradycardia.    Patient does not have return orders yet.    Will message Dr. Cooley to review

## 2023-07-07 NOTE — TELEPHONE ENCOUNTER
Spoke to patient, reviewed test results and message from Dr Cooley. Pt verbalized understanding, will call scheduling to arrange. Order placed.

## 2023-07-11 ENCOUNTER — ANTICOAGULATION THERAPY VISIT (OUTPATIENT)
Dept: ANTICOAGULATION | Facility: CLINIC | Age: 68
End: 2023-07-11

## 2023-07-11 ENCOUNTER — LAB (OUTPATIENT)
Dept: LAB | Facility: CLINIC | Age: 68
End: 2023-07-11
Payer: COMMERCIAL

## 2023-07-11 DIAGNOSIS — Z79.01 LONG TERM CURRENT USE OF ANTICOAGULANTS WITH INR GOAL OF 2.5-3.5: ICD-10-CM

## 2023-07-11 DIAGNOSIS — Z79.01 LONG TERM CURRENT USE OF ANTICOAGULANT THERAPY: ICD-10-CM

## 2023-07-11 DIAGNOSIS — Z95.2 S/P AVR (AORTIC VALVE REPLACEMENT): ICD-10-CM

## 2023-07-11 DIAGNOSIS — I48.21 PERMANENT ATRIAL FIBRILLATION (H): ICD-10-CM

## 2023-07-11 DIAGNOSIS — S30.1XXA ABDOMINAL WALL HEMATOMA, INITIAL ENCOUNTER: ICD-10-CM

## 2023-07-11 DIAGNOSIS — Z95.2 H/O AORTIC VALVE REPLACEMENT: ICD-10-CM

## 2023-07-11 DIAGNOSIS — Z95.2 S/P AVR (AORTIC VALVE REPLACEMENT): Primary | ICD-10-CM

## 2023-07-11 LAB — INR BLD: 2.9 (ref 0.9–1.1)

## 2023-07-11 PROCEDURE — 85610 PROTHROMBIN TIME: CPT

## 2023-07-11 PROCEDURE — 36416 COLLJ CAPILLARY BLOOD SPEC: CPT

## 2023-07-11 NOTE — PROGRESS NOTES
ANTICOAGULATION MANAGEMENT     Jovon Mantilla 67 year old male is on warfarin with therapeutic INR result. (Goal INR 2.5-3.5)    Recent labs: (last 7 days)     07/11/23  1141   INR 2.9*       ASSESSMENT       Source(s): Chart Review and Patient/Caregiver Call       Warfarin doses taken: Warfarin taken as instructed    Diet: No new diet changes identified    Medication/supplement changes: lisinopril and carvedilol dosage decreased.  Per Up to Date, neither medication interacts with warfarin.    New illness, injury, or hospitalization: No    Signs or symptoms of bleeding or clotting: No    Previous result: Therapeutic last visit; previously outside of goal range    Additional findings: None         PLAN     Recommended plan for no diet, medication or health factor changes affecting INR     Dosing Instructions: Continue your current warfarin dose with next INR in 4 weeks       Summary  As of 7/11/2023    Full warfarin instructions:  6 mg every Tue; 4 mg all other days   Next INR check:  8/8/2023             Telephone call with Jovon who agrees to plan and repeated back plan correctly    Lab visit scheduled    Education provided:     Please call back if any changes to your diet, medications or how you've been taking warfarin    Plan made per [facility] anticoagulation protocol    Isabel Cheney RN  Anticoagulation Clinic  7/11/2023    _______________________________________________________________________     Anticoagulation Episode Summary     Current INR goal:  2.5-3.5   TTR:  61.9 % (1 y)   Target end date:  Indefinite   Send INR reminders to:  [facility]    Indications    S/P AVR (aortic valve replacement) [Z95.2]  Permanent atrial fibrillation (H) [I48.21]  Long term current use of anticoagulants with INR goal of 2.5-3.5 [Z79.01]  Abdominal wall hematoma  initial encounter [S30.1XXA]  Long-term (current) use of anticoagulants [Z79.01] [Z79.01]           Comments:  24mm ATS Valve         Anticoagulation Care  Providers     Provider Role Specialty Phone number    Rehan Lorenzana MD Referring Family Medicine [phone]    Ramana Redd MD Referring Student in organized health care education/training program [phone]    Noy Cooley MD Referring Cardiovascular Disease [phone]    Noe Ramirez PADamianC Responsible Family Medicine [phone]

## 2023-07-13 ENCOUNTER — THERAPY VISIT (OUTPATIENT)
Dept: PHYSICAL THERAPY | Facility: CLINIC | Age: 68
End: 2023-07-13
Payer: COMMERCIAL

## 2023-07-13 DIAGNOSIS — M25.551 HIP PAIN, RIGHT: Primary | ICD-10-CM

## 2023-07-13 PROCEDURE — 97110 THERAPEUTIC EXERCISES: CPT | Mod: 59 | Performed by: PHYSICAL THERAPIST

## 2023-07-13 PROCEDURE — 97530 THERAPEUTIC ACTIVITIES: CPT | Mod: GP | Performed by: PHYSICAL THERAPIST

## 2023-07-27 ENCOUNTER — OFFICE VISIT (OUTPATIENT)
Dept: CARDIOLOGY | Facility: CLINIC | Age: 68
End: 2023-07-27
Attending: INTERNAL MEDICINE
Payer: COMMERCIAL

## 2023-07-27 VITALS
DIASTOLIC BLOOD PRESSURE: 64 MMHG | WEIGHT: 205.1 LBS | HEART RATE: 74 BPM | OXYGEN SATURATION: 94 % | BODY MASS INDEX: 29.36 KG/M2 | SYSTOLIC BLOOD PRESSURE: 102 MMHG | HEIGHT: 70 IN

## 2023-07-27 DIAGNOSIS — I42.0 DILATED CARDIOMYOPATHY (H): Primary | ICD-10-CM

## 2023-07-27 DIAGNOSIS — I70.90 ASVD (ARTERIOSCLEROTIC VASCULAR DISEASE): ICD-10-CM

## 2023-07-27 DIAGNOSIS — I25.10 CORONARY ARTERY DISEASE INVOLVING NATIVE CORONARY ARTERY OF NATIVE HEART WITHOUT ANGINA PECTORIS: ICD-10-CM

## 2023-07-27 DIAGNOSIS — I50.812 CHRONIC RIGHT-SIDED HEART FAILURE (H): ICD-10-CM

## 2023-07-27 DIAGNOSIS — I10 BENIGN ESSENTIAL HYPERTENSION: ICD-10-CM

## 2023-07-27 PROCEDURE — 99214 OFFICE O/P EST MOD 30 MIN: CPT | Performed by: NURSE PRACTITIONER

## 2023-07-27 RX ORDER — FUROSEMIDE 20 MG
10 TABLET ORAL DAILY
Qty: 90 TABLET | Refills: 1 | Status: ON HOLD | OUTPATIENT
Start: 2023-07-27 | End: 2023-08-15

## 2023-07-27 RX ORDER — LISINOPRIL 5 MG/1
10 TABLET ORAL DAILY
Qty: 90 TABLET | Refills: 3 | Status: ON HOLD | OUTPATIENT
Start: 2023-07-27 | End: 2023-08-15

## 2023-07-27 NOTE — PATIENT INSTRUCTIONS
Thank you for your visit with the [facility] today.    Today's Summary     Reduce Lisinopril to 5 mg daily.  Reduce Furosemide to 10 mg daily.  Continue monitoring BP at home and keep a log for review.  If you notice any high BP readings (top number in the 130-140s) then please call the clinic and we can discuss the medications again.  Follow up in about 6-months for routine follow up and to reassess BP.    If you have questions or concerns, please do not hesitate to call my nursing support team at [phone].    Scheduling phone number: [phone]  [facility] Number: [phone]    It was a pleasure seeing you today.     LILLI Park, CNP  Nurse Practitioner  [facility]  July 27, 2023  ________________________________________________________

## 2023-07-27 NOTE — PROGRESS NOTES
~Cardiology Clinic Visit~    Primary Cardiologist: Dr. Cooley  Reason for visit: 3-month follow up    History of Present Illness  Jovon Mantilla is a very pleasant 67 year old male with a past medical history notable for severe aortic stenosis and is status post aortic valve replacement in 01/2011 with a 24 mm ATS supra-annular valve.  He also has a history of atrial fibrillation and underwent a Maze procedure at that time, but has remained in atrial fibrillation subsequently.  He has been on rate control and anticoagulation, which has worked well in that he has been essentially asymptomatic from his atrial fibrillation.  He also has a history of a 70% stenosis involving the LAD on coronary angiography prior to aortic valve replacement.  This, however, was not bypassed since it was found to be completely intramyocardial at the time of surgery.      In late 2021 he noted exertional dyspnea which was new compared to the prior year.  An echocardiogram subsequently demonstrated moderate to severe eccentric mitral regurgitation which had progressed compared to his prior echocardiogram from June 2020.  Given these findings, I recommended a CADE for further evaluation.     The CADE was performed on 12/28/2021 and demonstrated severe biatrial enlargement mildly reduced left ventricular systolic function.  There was mild thickening and prolapse of P2 with moderate mitral regurgitation both qualitatively and quantitatively by PISA analysis.  I suspect that the mitral regurgitation is secondary to both annular dilatation in the setting of chronic persistent atrial fibrillation as well as an element of P2 prolapse as reported. The mechanical aortic valve appeared to be functioning normally on both CADE and transthoracic imaging.     Exercise stress echocardiogram was done on 1/17/2022 to evaluate for worsening mitral regurgitation postexercise.  This demonstrated moderately reduced left ventricular systolic function with  trace to mild mitral regurgitation at baseline.  Left ventricular systolic function improved post exercise although mitral regurgitation only increased to the mild to moderate range.     He then underwent right and left heart catheterization on 1/31/2022.  His 70% proximal LAD stenosis appeared unchanged.  No significant coronary artery disease was noted elsewhere.      Since then he has done well on medical therapy for his cardiomyopathy, though at his last visit reported dizziness and lightheadedness that occurs post exertion in the upright position.  Fortunately he has not had a syncopal event.  In the past year, however, he has also been noted to have borderline blood pressures with systolics in the 90 to 100 mmHg range.    For his symptoms, we completed a resting echocardiogram which showed decline EF.  We then completed a cMRI on 7/6/23 which demonstrated low normal left ventricular systolic function with an LVEF of 53%.  Left ventricular wall thickness is mildly increased.  Right ventricular systolic function was also low normal at 54%.  Severe biatrial enlargement was noted.  Holter monitor was negative for any significant bradycardias, pauses or heart block.    Lipid panel on 8/9/2021 demonstrated total cholesterol 99, HDL 43, LDL 46 and triglycerides of 48    Interval 07/27/23    He is overall feeling well today.  He has had some minimal dizziness since his last visit, this resolves with rest.  Ho other associated symptoms.  We reviewed the results of his testing including echocardiogram, CMR with stress and holter monitor.  He has sent in BP logs for review and data shows average SBP ~90-100s.  __________________________________________________________________         Assessment and Impression:     Dizziness and LH  -Improved, but intermittent recurrence  -Thought to be related to low Bps, on multi-drug regimen - pt notes low BP readings at home (below SBP 100s).  -Lower BP at prior offive visit with  systolic 90s.  -Lisinopril decreased to 10 mg/d, carvedilol decreased to 12.5 mg twice daily.    Persistent atrial fibrillation, on warfarin and digoxin 125 mcg daily.    Dyspnea on exertion  Coronary artery disease  -Asymptomatic and CP free.  No e/o CHF.   -Appears stable based on most recent testing and imaging (mild to moderate mitral regurgitation and an unchanged proximal LAD stenosis that was borderline by IFR.  It was stable compared to the previous angiogram from 2010.)    Moderate to severe nonischemic cardiomyopathy   Severe aortic stenosis  S/p aortic valve replacement with a mechanical valve, 2011  -CM was in the setting of AoV replacement and thought to be tachycardia related.    -LV systolic function improved to low normal on recent cMRI from 2022.           Recommendations and Plan:     Reduce furosemide to 10 mg daily.  Reduce lisinopril to 5 mg daily.  Medications sent to patient's preferred pharmacy.  Continue home BP log and notify clinic of any low BP, high BP, or new or worsening symptoms.  Follow up in about 6-months to reassess BP.  __________________________________________________________________    Thank you for the opportunity to participate in this pleasant patient's care.    We would be happy to see this patient sooner for any concerns in the meantime.    LILLI Park, CNP   Nurse Practitioner  [facility]    Today's clinic visit entailed:  Review of the result(s) of each unique test - stress CMP, echo, EKG, cath, labs  Prescription drug management  The level of medical decision making during this visit was of moderate complexity.    Orders this Visit:  Orders Placed This Encounter   Procedures    Follow-Up with Cardiology- [facility]     Orders Placed This Encounter   Medications    furosemide (LASIX) 20 MG tablet     Sig: Take 0.5 tablets (10 mg) by mouth daily     Dispense:  90 tablet     Refill:  1    lisinopril (ZESTRIL) 5 MG tablet     Sig: Take 2 tablets  (10 mg) by mouth daily     Dispense:  90 tablet     Refill:  3     Medications Discontinued During This Encounter   Medication Reason    lisinopril (ZESTRIL) 20 MG tablet Reorder (No AVS)    furosemide (LASIX) 20 MG tablet Reorder (No AVS)     Encounter Diagnoses   Name Primary?    Dilated cardiomyopathy (H) Yes    Coronary artery disease involving native coronary artery of native heart without angina pectoris     Chronic right-sided heart failure (H)     ASVD (arteriosclerotic vascular disease)     Benign essential hypertension      CURRENT MEDICATIONS:  Current Outpatient Medications   Medication Sig Dispense Refill    amoxicillin (AMOXIL) 500 MG capsule TAKE 4 CAPSULES 1 HOUR PRIOR TO APPOINTMENT      Ascorbic Acid (VITAMIN C PO) Take 500 mg by mouth At Bedtime      aspirin 81 MG tablet Take 81 mg by mouth every other day      calcium carbonate-vitamin D 600-200 MG-UNIT TABS Take 1 tablet by mouth 2 times daily       carvedilol (COREG) 12.5 MG tablet Take 1 tablet (12.5 mg) by mouth 2 times daily (with meals) 60 tablet 0    Cholecalciferol (VITAMIN D) 2000 UNIT CAPS Take 1 tablet by mouth daily       Cyanocobalamin (VITAMIN B-12) 500 MCG SUBL Place 500 mcg under the tongue daily       diclofenac (VOLTAREN) 1 % topical gel APPLY 4 G TOPICALLY 4 TIMES DAILY AS NEEDED FOR MODERATE PAIN 100 g 1    digoxin (LANOXIN) 125 MCG tablet Take 1 tablet (125 mcg) by mouth daily 90 tablet 3    Fexofenadine HCl (ALLERGY 24-HR PO) Take 180 mg by mouth daily       fluticasone (FLONASE) 50 MCG/ACT nasal spray Spray 1 spray into both nostrils daily      furosemide (LASIX) 20 MG tablet Take 0.5 tablets (10 mg) by mouth daily 90 tablet 1    levothyroxine (SYNTHROID/LEVOTHROID) 200 MCG tablet TAKE 1 TABLET BY MOUTH EVERY DAY 90 tablet 3    lisinopril (ZESTRIL) 5 MG tablet Take 2 tablets (10 mg) by mouth daily 90 tablet 3    Multiple Vitamins-Minerals (CENTRUM) CHEW Take 1 tablet by mouth daily       nitroGLYcerin (NITROSTAT) 0.3 MG  "sublingual tablet For chest pain place 1 tablet under the tongue every 5 minutes for 3 doses. If symptoms persist 5 minutes after 1st dose call 911. 25 tablet 1    Psyllium (FIBER) 0.52 GM CAPS Take 2 capsules by mouth At Bedtime       sertraline (ZOLOFT) 50 MG tablet Take 1 tablet (50 mg) by mouth daily 90 tablet 3    simvastatin (ZOCOR) 20 MG tablet Take 1 tablet (20 mg) by mouth At Bedtime 90 tablet 3    spironolactone (ALDACTONE) 25 MG tablet Take 1 tablet (25 mg) by mouth daily (Patient taking differently: Take 12.5 mg by mouth daily) 90 tablet 0    triamcinolone (KENALOG) 0.1 % external cream Apply topically 2 times daily 45 g 0    warfarin ANTICOAGULANT (COUMADIN) 4 MG tablet TAKE 1 TABLET ON SATURDAY, TAKE ONE AND ONE-HALF TABLETS (6 MG) ALL OTHER DAYS OR PER INR CLINIC (Patient taking differently: One tablet daily OR PER INR CLINIC) 30 tablet 0    hydrocortisone (CORTAID) 1 % external ointment Apply topically 2 times daily (Patient not taking: Reported on 7/27/2023)       ALLERGIES     Allergies   Allergen Reactions    Seasonal Allergies      PAST MEDICAL, SURGICAL, FAMILY, SOCIAL HISTORY:  History was reviewed and updated as needed, see medical record.    Review of Systems:  A 10-point Review Of Systems is otherwise normal except for that which is noted in the HPI and interval summary.    Physical Exam:    Vitals: /64 (BP Location: Left arm, Patient Position: Sitting)   Pulse 74   Ht 1.765 m (5' 9.5\")   Wt 93 kg (205 lb 1.6 oz)   SpO2 94%   BMI 29.85 kg/m    Constitutional: Appears stated age, well nourished, NAD.  Eyes: Pupils equal, round. Sclerae anicteric.   HEENT: Normocephalic, atraumatic.   Neck: Supple. Carotid pulses full and equal. No carotid bruit.  No JVD appreciated.  Respiratory: Non-labored. Lungs CTAB.  Cardiovascular: IRR, normal S1 and S2. No M/G/R.  No edema.  GI: Soft, non-distended, non-tender.  Skin: Warm and dry. No rashes, cyanosis, edema.  Musculoskeletal/Extremities: " Symmetrical movement to all extremities.  Neurologic: No gross focal deficits. Alert, awake, and oriented to all spheres.  Psychiatric: Affect appropriate. Mentation normal.    Recent Lab Results:  LIPID RESULTS:  Lab Results   Component Value Date    CHOL 113 06/20/2023    CHOL 109 03/13/2020    HDL 48 06/20/2023    HDL 51 03/13/2020    LDL 52 06/20/2023    LDL 49 03/13/2020    TRIG 67 06/20/2023    TRIG 45 03/13/2020    CHOLHDLRATIO 2.5 02/16/2015     LIVER ENZYME RESULTS:  Lab Results   Component Value Date    AST 23 08/09/2021    AST 28 02/10/2021    ALT 18 08/09/2021    ALT 23 02/10/2021     CBC RESULTS:  Lab Results   Component Value Date    WBC 4.0 01/31/2022    WBC 8.9 02/13/2021    RBC 4.12 (L) 01/31/2022    RBC 3.12 (L) 02/13/2021    HGB 11.5 (L) 01/31/2022    HGB 9.7 (L) 02/13/2021    HCT 38.3 (L) 01/31/2022    HCT 30.9 (L) 02/13/2021    MCV 93 01/31/2022    MCV 99 02/13/2021    MCH 27.9 01/31/2022    MCH 31.1 02/13/2021    MCHC 30.0 (L) 01/31/2022    MCHC 31.4 (L) 02/13/2021    RDW 17.6 (H) 01/31/2022    RDW 15.2 (H) 02/13/2021     01/31/2022     02/13/2021     BMP RESULTS:  Lab Results   Component Value Date     06/20/2023     02/12/2021    POTASSIUM 4.6 06/20/2023    POTASSIUM 3.8 06/09/2022    POTASSIUM 4.4 02/12/2021    CHLORIDE 101 06/20/2023    CHLORIDE 103 06/09/2022    CHLORIDE 107 02/12/2021    CO2 28 06/20/2023    CO2 33 (H) 06/09/2022    CO2 30 02/12/2021    ANIONGAP 10 06/20/2023    ANIONGAP 2 (L) 06/09/2022    ANIONGAP 2 (L) 02/12/2021    GLC 88 06/20/2023     (H) 06/09/2022     (H) 02/12/2021    BUN 19.4 06/20/2023    BUN 17 06/09/2022    BUN 31 (H) 02/12/2021    CR 0.99 06/20/2023    CR 0.76 02/12/2021    GFRESTIMATED 85 06/20/2023    GFRESTIMATED >90 02/12/2021    GFRESTBLACK >90 02/12/2021    REECE 9.3 06/20/2023    REECE 8.8 02/12/2021      A1C RESULTS:  Lab Results   Component Value Date    A1C 5.8 (H) 02/04/2019     INR RESULTS:  Lab Results    Component Value Date    INR 2.9 (H) 07/11/2023    INR 2.9 (H) 06/20/2023    INR 4.2 03/22/2023    INR 2.8 (A) 02/28/2023    INR 3.10 (H) 06/22/2021    INR 3.40 (H) 05/13/2021

## 2023-07-27 NOTE — LETTER
7/27/2023    Sneha Tran, APRN CNP  [address]  [facility] [address]    RE: Jovon Moraesley       Dear Colleague,     I had the pleasure of seeing Jovon Mantilla in the [facility].              ~Cardiology Clinic Visit~    Primary Cardiologist: Dr. Cooley  Reason for visit: 3-month follow up    History of Present Illness  Jovon Mantilla is a very pleasant 67 year old male with a past medical history notable for severe aortic stenosis and is status post aortic valve replacement in 01/2011 with a 24 mm ATS supra-annular valve.  He also has a history of atrial fibrillation and underwent a Maze procedure at that time, but has remained in atrial fibrillation subsequently.  He has been on rate control and anticoagulation, which has worked well in that he has been essentially asymptomatic from his atrial fibrillation.  He also has a history of a 70% stenosis involving the LAD on coronary angiography prior to aortic valve replacement.  This, however, was not bypassed since it was found to be completely intramyocardial at the time of surgery.      In late 2021 he noted exertional dyspnea which was new compared to the prior year.  An echocardiogram subsequently demonstrated moderate to severe eccentric mitral regurgitation which had progressed compared to his prior echocardiogram from June 2020.  Given these findings, I recommended a CADE for further evaluation.     The CADE was performed on 12/28/2021 and demonstrated severe biatrial enlargement mildly reduced left ventricular systolic function.  There was mild thickening and prolapse of P2 with moderate mitral regurgitation both qualitatively and quantitatively by PISA analysis.  I suspect that the mitral regurgitation is secondary to both annular dilatation in the setting of chronic persistent atrial fibrillation as well as an element of P2 prolapse as reported. The mechanical aortic valve appeared to be functioning normally on both CADE and  transthoracic imaging.     Exercise stress echocardiogram was done on 1/17/2022 to evaluate for worsening mitral regurgitation postexercise.  This demonstrated moderately reduced left ventricular systolic function with trace to mild mitral regurgitation at baseline.  Left ventricular systolic function improved post exercise although mitral regurgitation only increased to the mild to moderate range.     He then underwent right and left heart catheterization on 1/31/2022.  His 70% proximal LAD stenosis appeared unchanged.  No significant coronary artery disease was noted elsewhere.      Since then he has done well on medical therapy for his cardiomyopathy, though at his last visit reported dizziness and lightheadedness that occurs post exertion in the upright position.  Fortunately he has not had a syncopal event.  In the past year, however, he has also been noted to have borderline blood pressures with systolics in the 90 to 100 mmHg range.    For his symptoms, we completed a resting echocardiogram which showed decline EF.  We then completed a cMRI on 6/30/2022 which demonstrated low normal left ventricular systolic function with an LVEF of 52%.  Left ventricular wall thickness is mildly increased.  Right ventricular systolic function was also low normal at 54%.  Severe biatrial enlargement was noted.  Holter monitor was negative for any significant bradycardias, pauses or heart block.    Lipid panel on 8/9/2021 demonstrated total cholesterol 99, HDL 43, LDL 46 and triglycerides of 48    Interval 07/27/23    He is overall feeling well today.  He has had some minimal dizziness since his last visit, this resolves with rest.  Ho other associated symptoms.  We reviewed the results of his testing including echocardiogram, CMR with stress and holter monitor.  He has sent in BP logs for review and data shows average SBP ~90-100s.  __________________________________________________________________         Assessment and  Impression:     Dizziness and LH  -Improved, but intermittent recurrence  -Thought to be related to low Bps, on multi-drug regimen - pt notes low BP readings at home (below SBP 100s).  -Lower BP at prior offive visit with systolic 90s.  -Lisinopril decreased to 10 mg/d, carvedilol decreased to 12.5 mg twice daily.    Persistent atrial fibrillation, on warfarin and digoxin 125 mcg daily.    Dyspnea on exertion  Coronary artery disease  -Asymptomatic and CP free.  No e/o CHF.   -Appears stable based on most recent testing and imaging (mild to moderate mitral regurgitation and an unchanged proximal LAD stenosis that was borderline by IFR.  It was stable compared to the previous angiogram from 2010.)    Moderate to severe nonischemic cardiomyopathy   Severe aortic stenosis  S/p aortic valve replacement with a mechanical valve, 2011  -CM was in the setting of AoV replacement and thought to be tachycardia related.    -LV systolic function improved to low normal on recent cMRI from 2022.           Recommendations and Plan:     Reduce furosemide to 10 mg daily.  Reduce lisinopril to 5 mg daily.  Medications sent to patient's preferred pharmacy.  Continue home BP log and notify clinic of any low BP, high BP, or new or worsening symptoms.  Follow up in about 6-months to reassess BP.  __________________________________________________________________    Thank you for the opportunity to participate in this pleasant patient's care.    We would be happy to see this patient sooner for any concerns in the meantime.    LILLI Park, CNP   Nurse Practitioner  [facility]    Today's clinic visit entailed:  Review of the result(s) of each unique test - stress CMP, echo, EKG, cath, labs  Prescription drug management  The level of medical decision making during this visit was of moderate complexity.    Orders this Visit:  Orders Placed This Encounter   Procedures    Follow-Up with Cardiology- VALDO     Orders  Placed This Encounter   Medications    furosemide (LASIX) 20 MG tablet     Sig: Take 0.5 tablets (10 mg) by mouth daily     Dispense:  90 tablet     Refill:  1    lisinopril (ZESTRIL) 5 MG tablet     Sig: Take 2 tablets (10 mg) by mouth daily     Dispense:  90 tablet     Refill:  3     Medications Discontinued During This Encounter   Medication Reason    lisinopril (ZESTRIL) 20 MG tablet Reorder (No AVS)    furosemide (LASIX) 20 MG tablet Reorder (No AVS)     Encounter Diagnoses   Name Primary?    Dilated cardiomyopathy (H) Yes    Coronary artery disease involving native coronary artery of native heart without angina pectoris     Chronic right-sided heart failure (H)     ASVD (arteriosclerotic vascular disease)     Benign essential hypertension      CURRENT MEDICATIONS:  Current Outpatient Medications   Medication Sig Dispense Refill    amoxicillin (AMOXIL) 500 MG capsule TAKE 4 CAPSULES 1 HOUR PRIOR TO APPOINTMENT      Ascorbic Acid (VITAMIN C PO) Take 500 mg by mouth At Bedtime      aspirin 81 MG tablet Take 81 mg by mouth every other day      calcium carbonate-vitamin D 600-200 MG-UNIT TABS Take 1 tablet by mouth 2 times daily       carvedilol (COREG) 12.5 MG tablet Take 1 tablet (12.5 mg) by mouth 2 times daily (with meals) 60 tablet 0    Cholecalciferol (VITAMIN D) 2000 UNIT CAPS Take 1 tablet by mouth daily       Cyanocobalamin (VITAMIN B-12) 500 MCG SUBL Place 500 mcg under the tongue daily       diclofenac (VOLTAREN) 1 % topical gel APPLY 4 G TOPICALLY 4 TIMES DAILY AS NEEDED FOR MODERATE PAIN 100 g 1    digoxin (LANOXIN) 125 MCG tablet Take 1 tablet (125 mcg) by mouth daily 90 tablet 3    Fexofenadine HCl (ALLERGY 24-HR PO) Take 180 mg by mouth daily       fluticasone (FLONASE) 50 MCG/ACT nasal spray Spray 1 spray into both nostrils daily      furosemide (LASIX) 20 MG tablet Take 0.5 tablets (10 mg) by mouth daily 90 tablet 1    levothyroxine (SYNTHROID/LEVOTHROID) 200 MCG tablet TAKE 1 TABLET BY MOUTH  "EVERY DAY 90 tablet 3    lisinopril (ZESTRIL) 5 MG tablet Take 2 tablets (10 mg) by mouth daily 90 tablet 3    Multiple Vitamins-Minerals (CENTRUM) CHEW Take 1 tablet by mouth daily       nitroGLYcerin (NITROSTAT) 0.3 MG sublingual tablet For chest pain place 1 tablet under the tongue every 5 minutes for 3 doses. If symptoms persist 5 minutes after 1st dose call 911. 25 tablet 1    Psyllium (FIBER) 0.52 GM CAPS Take 2 capsules by mouth At Bedtime       sertraline (ZOLOFT) 50 MG tablet Take 1 tablet (50 mg) by mouth daily 90 tablet 3    simvastatin (ZOCOR) 20 MG tablet Take 1 tablet (20 mg) by mouth At Bedtime 90 tablet 3    spironolactone (ALDACTONE) 25 MG tablet Take 1 tablet (25 mg) by mouth daily (Patient taking differently: Take 12.5 mg by mouth daily) 90 tablet 0    triamcinolone (KENALOG) 0.1 % external cream Apply topically 2 times daily 45 g 0    warfarin ANTICOAGULANT (COUMADIN) 4 MG tablet TAKE 1 TABLET ON SATURDAY, TAKE ONE AND ONE-HALF TABLETS (6 MG) ALL OTHER DAYS OR PER INR CLINIC (Patient taking differently: One tablet daily OR PER INR CLINIC) 30 tablet 0    hydrocortisone (CORTAID) 1 % external ointment Apply topically 2 times daily (Patient not taking: Reported on 7/27/2023)       ALLERGIES     Allergies   Allergen Reactions    Seasonal Allergies      PAST MEDICAL, SURGICAL, FAMILY, SOCIAL HISTORY:  History was reviewed and updated as needed, see medical record.    Review of Systems:  A 10-point Review Of Systems is otherwise normal except for that which is noted in the HPI and interval summary.    Physical Exam:    Vitals: /64 (BP Location: Left arm, Patient Position: Sitting)   Pulse 74   Ht 1.765 m (5' 9.5\")   Wt 93 kg (205 lb 1.6 oz)   SpO2 94%   BMI 29.85 kg/m    Constitutional: Appears stated age, well nourished, NAD.  Eyes: Pupils equal, round. Sclerae anicteric.   HEENT: Normocephalic, atraumatic.   Neck: Supple. Carotid pulses full and equal. No carotid bruit.  No JVD " appreciated.  Respiratory: Non-labored. Lungs CTAB.  Cardiovascular: IRR, normal S1 and S2. No M/G/R.  No edema.  GI: Soft, non-distended, non-tender.  Skin: Warm and dry. No rashes, cyanosis, edema.  Musculoskeletal/Extremities: Symmetrical movement to all extremities.  Neurologic: No gross focal deficits. Alert, awake, and oriented to all spheres.  Psychiatric: Affect appropriate. Mentation normal.    Recent Lab Results:  LIPID RESULTS:  Lab Results   Component Value Date    CHOL 113 06/20/2023    CHOL 109 03/13/2020    HDL 48 06/20/2023    HDL 51 03/13/2020    LDL 52 06/20/2023    LDL 49 03/13/2020    TRIG 67 06/20/2023    TRIG 45 03/13/2020    CHOLHDLRATIO 2.5 02/16/2015     LIVER ENZYME RESULTS:  Lab Results   Component Value Date    AST 23 08/09/2021    AST 28 02/10/2021    ALT 18 08/09/2021    ALT 23 02/10/2021     CBC RESULTS:  Lab Results   Component Value Date    WBC 4.0 01/31/2022    WBC 8.9 02/13/2021    RBC 4.12 (L) 01/31/2022    RBC 3.12 (L) 02/13/2021    HGB 11.5 (L) 01/31/2022    HGB 9.7 (L) 02/13/2021    HCT 38.3 (L) 01/31/2022    HCT 30.9 (L) 02/13/2021    MCV 93 01/31/2022    MCV 99 02/13/2021    MCH 27.9 01/31/2022    MCH 31.1 02/13/2021    MCHC 30.0 (L) 01/31/2022    MCHC 31.4 (L) 02/13/2021    RDW 17.6 (H) 01/31/2022    RDW 15.2 (H) 02/13/2021     01/31/2022     02/13/2021     BMP RESULTS:  Lab Results   Component Value Date     06/20/2023     02/12/2021    POTASSIUM 4.6 06/20/2023    POTASSIUM 3.8 06/09/2022    POTASSIUM 4.4 02/12/2021    CHLORIDE 101 06/20/2023    CHLORIDE 103 06/09/2022    CHLORIDE 107 02/12/2021    CO2 28 06/20/2023    CO2 33 (H) 06/09/2022    CO2 30 02/12/2021    ANIONGAP 10 06/20/2023    ANIONGAP 2 (L) 06/09/2022    ANIONGAP 2 (L) 02/12/2021    GLC 88 06/20/2023     (H) 06/09/2022     (H) 02/12/2021    BUN 19.4 06/20/2023    BUN 17 06/09/2022    BUN 31 (H) 02/12/2021    CR 0.99 06/20/2023    CR 0.76 02/12/2021    GFRESTIMATED 85  06/20/2023    GFRESTIMATED >90 02/12/2021    GFRESTBLACK >90 02/12/2021    REECE 9.3 06/20/2023    REECE 8.8 02/12/2021      A1C RESULTS:  Lab Results   Component Value Date    A1C 5.8 (H) 02/04/2019     INR RESULTS:  Lab Results   Component Value Date    INR 2.9 (H) 07/11/2023    INR 2.9 (H) 06/20/2023    INR 4.2 03/22/2023    INR 2.8 (A) 02/28/2023    INR 3.10 (H) 06/22/2021    INR 3.40 (H) 05/13/2021       Thank you for allowing me to participate in the care of your patient.      Sincerely,     LILLI Park [facility]  cc:   Noy Cooley MD  [address]  [place] [address]

## 2023-08-14 ENCOUNTER — HOSPITAL ENCOUNTER (OUTPATIENT)
Facility: CLINIC | Age: 68
Setting detail: OBSERVATION
Discharge: HOME | End: 2023-08-15
Attending: EMERGENCY MEDICINE | Admitting: INTERNAL MEDICINE
Payer: COMMERCIAL

## 2023-08-14 DIAGNOSIS — I42.9 IDIOPATHIC CARDIOMYOPATHY (H): Primary | ICD-10-CM

## 2023-08-14 DIAGNOSIS — R55 SYNCOPE, UNSPECIFIED SYNCOPE TYPE: ICD-10-CM

## 2023-08-14 DIAGNOSIS — I70.90 ASVD (ARTERIOSCLEROTIC VASCULAR DISEASE): ICD-10-CM

## 2023-08-14 DIAGNOSIS — I25.10 CORONARY ARTERY DISEASE INVOLVING NATIVE CORONARY ARTERY OF NATIVE HEART WITHOUT ANGINA PECTORIS: ICD-10-CM

## 2023-08-14 DIAGNOSIS — I50.812 CHRONIC RIGHT-SIDED HEART FAILURE (H): ICD-10-CM

## 2023-08-14 DIAGNOSIS — I10 HYPERTENSION GOAL BP (BLOOD PRESSURE) < 140/90: ICD-10-CM

## 2023-08-14 LAB
ANION GAP SERPL CALCULATED.3IONS-SCNC: 10 MMOL/L (ref 7–15)
ATRIAL RATE - MUSE: NORMAL BPM
BASOPHILS # BLD AUTO: 0 10E3/UL (ref 0–0.2)
BASOPHILS NFR BLD AUTO: 0 %
BUN SERPL-MCNC: 15.6 MG/DL (ref 8–23)
CALCIUM SERPL-MCNC: 8.9 MG/DL (ref 8.8–10.2)
CHLORIDE SERPL-SCNC: 100 MMOL/L (ref 98–107)
CREAT SERPL-MCNC: 0.9 MG/DL (ref 0.67–1.17)
DEPRECATED HCO3 PLAS-SCNC: 26 MMOL/L (ref 22–29)
DIASTOLIC BLOOD PRESSURE - MUSE: NORMAL MMHG
EOSINOPHIL # BLD AUTO: 0.1 10E3/UL (ref 0–0.7)
EOSINOPHIL NFR BLD AUTO: 3 %
ERYTHROCYTE [DISTWIDTH] IN BLOOD BY AUTOMATED COUNT: 13.9 % (ref 10–15)
GFR SERPL CREATININE-BSD FRML MDRD: >90 ML/MIN/1.73M2
GLUCOSE SERPL-MCNC: 138 MG/DL (ref 70–99)
HCT VFR BLD AUTO: 37.9 % (ref 40–53)
HGB BLD-MCNC: 11.9 G/DL (ref 13.3–17.7)
IMM GRANULOCYTES # BLD: 0 10E3/UL
IMM GRANULOCYTES NFR BLD: 0 %
INR PPP: 2.81 (ref 0.85–1.15)
INTERPRETATION ECG - MUSE: NORMAL
LYMPHOCYTES # BLD AUTO: 0.7 10E3/UL (ref 0.8–5.3)
LYMPHOCYTES NFR BLD AUTO: 14 %
MCH RBC QN AUTO: 30.1 PG (ref 26.5–33)
MCHC RBC AUTO-ENTMCNC: 31.4 G/DL (ref 31.5–36.5)
MCV RBC AUTO: 96 FL (ref 78–100)
MONOCYTES # BLD AUTO: 0.6 10E3/UL (ref 0–1.3)
MONOCYTES NFR BLD AUTO: 12 %
NEUTROPHILS # BLD AUTO: 3.8 10E3/UL (ref 1.6–8.3)
NEUTROPHILS NFR BLD AUTO: 71 %
NRBC # BLD AUTO: 0 10E3/UL
NRBC BLD AUTO-RTO: 0 /100
P AXIS - MUSE: NORMAL DEGREES
PLATELET # BLD AUTO: 197 10E3/UL (ref 150–450)
POTASSIUM SERPL-SCNC: 5.6 MMOL/L (ref 3.4–5.3)
PR INTERVAL - MUSE: NORMAL MS
QRS DURATION - MUSE: 136 MS
QT - MUSE: 386 MS
QTC - MUSE: 519 MS
R AXIS - MUSE: -67 DEGREES
RBC # BLD AUTO: 3.96 10E6/UL (ref 4.4–5.9)
SODIUM SERPL-SCNC: 136 MMOL/L (ref 136–145)
SYSTOLIC BLOOD PRESSURE - MUSE: NORMAL MMHG
T AXIS - MUSE: 64 DEGREES
TROPONIN T SERPL HS-MCNC: 18 NG/L
VENTRICULAR RATE- MUSE: 109 BPM
WBC # BLD AUTO: 5.3 10E3/UL (ref 4–11)

## 2023-08-14 PROCEDURE — 85025 COMPLETE CBC W/AUTO DIFF WBC: CPT | Performed by: EMERGENCY MEDICINE

## 2023-08-14 PROCEDURE — 80048 BASIC METABOLIC PNL TOTAL CA: CPT | Performed by: EMERGENCY MEDICINE

## 2023-08-14 PROCEDURE — 84484 ASSAY OF TROPONIN QUANT: CPT | Performed by: EMERGENCY MEDICINE

## 2023-08-14 PROCEDURE — 85610 PROTHROMBIN TIME: CPT | Performed by: EMERGENCY MEDICINE

## 2023-08-14 PROCEDURE — 36415 COLL VENOUS BLD VENIPUNCTURE: CPT | Performed by: EMERGENCY MEDICINE

## 2023-08-14 PROCEDURE — 99285 EMERGENCY DEPT VISIT HI MDM: CPT | Mod: 25

## 2023-08-14 PROCEDURE — 93005 ELECTROCARDIOGRAM TRACING: CPT

## 2023-08-14 PROCEDURE — 99222 1ST HOSP IP/OBS MODERATE 55: CPT | Performed by: INTERNAL MEDICINE

## 2023-08-14 ASSESSMENT — ACTIVITIES OF DAILY LIVING (ADL): ADLS_ACUITY_SCORE: 35

## 2023-08-15 ENCOUNTER — APPOINTMENT (OUTPATIENT)
Dept: CARDIOLOGY | Facility: CLINIC | Age: 68
End: 2023-08-15
Attending: NURSE PRACTITIONER
Payer: COMMERCIAL

## 2023-08-15 ENCOUNTER — TELEPHONE (OUTPATIENT)
Dept: ANTICOAGULATION | Facility: CLINIC | Age: 68
End: 2023-08-15

## 2023-08-15 ENCOUNTER — DOCUMENTATION ONLY (OUTPATIENT)
Dept: FAMILY MEDICINE | Facility: CLINIC | Age: 68
End: 2023-08-15

## 2023-08-15 ENCOUNTER — APPOINTMENT (OUTPATIENT)
Dept: CARDIOLOGY | Facility: CLINIC | Age: 68
End: 2023-08-15
Attending: INTERNAL MEDICINE
Payer: COMMERCIAL

## 2023-08-15 VITALS
DIASTOLIC BLOOD PRESSURE: 66 MMHG | SYSTOLIC BLOOD PRESSURE: 107 MMHG | HEART RATE: 86 BPM | WEIGHT: 202.6 LBS | BODY MASS INDEX: 30.01 KG/M2 | TEMPERATURE: 97.5 F | OXYGEN SATURATION: 97 % | RESPIRATION RATE: 18 BRPM | HEIGHT: 69 IN

## 2023-08-15 LAB
DIGOXIN SERPL-MCNC: 0.4 NG/ML (ref 0.6–2)
INR PPP: 2.7 (ref 0.85–1.15)
LVEF ECHO: NORMAL
POTASSIUM SERPL-SCNC: 4.3 MMOL/L (ref 3.4–5.3)
TROPONIN T SERPL HS-MCNC: 19 NG/L
TROPONIN T SERPL HS-MCNC: 19 NG/L

## 2023-08-15 PROCEDURE — 36415 COLL VENOUS BLD VENIPUNCTURE: CPT | Performed by: INTERNAL MEDICINE

## 2023-08-15 PROCEDURE — 250N000013 HC RX MED GY IP 250 OP 250 PS 637: Performed by: HOSPITALIST

## 2023-08-15 PROCEDURE — 84132 ASSAY OF SERUM POTASSIUM: CPT | Performed by: HOSPITALIST

## 2023-08-15 PROCEDURE — G0378 HOSPITAL OBSERVATION PER HR: HCPCS

## 2023-08-15 PROCEDURE — 99417 PROLNG OP E/M EACH 15 MIN: CPT | Performed by: INTERNAL MEDICINE

## 2023-08-15 PROCEDURE — 93270 REMOTE 30 DAY ECG REV/REPORT: CPT

## 2023-08-15 PROCEDURE — 99215 OFFICE O/P EST HI 40 MIN: CPT | Mod: 25 | Performed by: NURSE PRACTITIONER

## 2023-08-15 PROCEDURE — 999N000208 ECHOCARDIOGRAM COMPLETE

## 2023-08-15 PROCEDURE — 255N000002 HC RX 255 OP 636: Performed by: INTERNAL MEDICINE

## 2023-08-15 PROCEDURE — 85610 PROTHROMBIN TIME: CPT | Performed by: INTERNAL MEDICINE

## 2023-08-15 PROCEDURE — 93306 TTE W/DOPPLER COMPLETE: CPT | Mod: 26 | Performed by: INTERNAL MEDICINE

## 2023-08-15 PROCEDURE — 93272 ECG/REVIEW INTERPRET ONLY: CPT | Performed by: NURSE PRACTITIONER

## 2023-08-15 PROCEDURE — 84484 ASSAY OF TROPONIN QUANT: CPT | Performed by: INTERNAL MEDICINE

## 2023-08-15 PROCEDURE — 80162 ASSAY OF DIGOXIN TOTAL: CPT | Performed by: NURSE PRACTITIONER

## 2023-08-15 PROCEDURE — 99239 HOSP IP/OBS DSCHRG MGMT >30: CPT | Performed by: HOSPITALIST

## 2023-08-15 RX ORDER — FLUTICASONE PROPIONATE 50 MCG
1 SPRAY, SUSPENSION (ML) NASAL DAILY
Status: DISCONTINUED | OUTPATIENT
Start: 2023-08-15 | End: 2023-08-15 | Stop reason: HOSPADM

## 2023-08-15 RX ORDER — LISINOPRIL 5 MG/1
5 TABLET ORAL DAILY
Qty: 90 TABLET | Refills: 3
Start: 2023-08-15 | End: 2023-12-01

## 2023-08-15 RX ORDER — ONDANSETRON 2 MG/ML
4 INJECTION INTRAMUSCULAR; INTRAVENOUS EVERY 6 HOURS PRN
Status: DISCONTINUED | OUTPATIENT
Start: 2023-08-15 | End: 2023-08-15 | Stop reason: HOSPADM

## 2023-08-15 RX ORDER — SPIRONOLACTONE 25 MG/1
12.5 TABLET ORAL DAILY
Status: ON HOLD | COMMUNITY
End: 2023-08-15

## 2023-08-15 RX ORDER — SERTRALINE HYDROCHLORIDE 25 MG/1
25 TABLET, FILM COATED ORAL DAILY
Status: DISCONTINUED | OUTPATIENT
Start: 2023-08-16 | End: 2023-08-15 | Stop reason: HOSPADM

## 2023-08-15 RX ORDER — ACETAMINOPHEN 325 MG/1
650 TABLET ORAL EVERY 6 HOURS PRN
Status: DISCONTINUED | OUTPATIENT
Start: 2023-08-15 | End: 2023-08-15 | Stop reason: HOSPADM

## 2023-08-15 RX ORDER — LEVOTHYROXINE SODIUM 100 UG/1
200 TABLET ORAL
Status: DISCONTINUED | OUTPATIENT
Start: 2023-08-15 | End: 2023-08-15 | Stop reason: HOSPADM

## 2023-08-15 RX ORDER — WARFARIN SODIUM 3 MG/1
6 TABLET ORAL
Status: DISCONTINUED | OUTPATIENT
Start: 2023-08-15 | End: 2023-08-15 | Stop reason: HOSPADM

## 2023-08-15 RX ORDER — ONDANSETRON 4 MG/1
4 TABLET, ORALLY DISINTEGRATING ORAL EVERY 6 HOURS PRN
Status: DISCONTINUED | OUTPATIENT
Start: 2023-08-15 | End: 2023-08-15 | Stop reason: HOSPADM

## 2023-08-15 RX ORDER — FUROSEMIDE 20 MG
10 TABLET ORAL DAILY PRN
Qty: 90 TABLET | Refills: 1
Start: 2023-08-15 | End: 2023-09-27

## 2023-08-15 RX ORDER — CARVEDILOL 12.5 MG/1
6.25 TABLET ORAL 2 TIMES DAILY WITH MEALS
Qty: 60 TABLET | Refills: 0
Start: 2023-08-15 | End: 2023-08-15

## 2023-08-15 RX ORDER — WARFARIN SODIUM 4 MG/1
TABLET ORAL EVERY EVENING
COMMUNITY
End: 2024-01-06

## 2023-08-15 RX ORDER — ACETAMINOPHEN 650 MG/1
650 SUPPOSITORY RECTAL EVERY 6 HOURS PRN
Status: DISCONTINUED | OUTPATIENT
Start: 2023-08-15 | End: 2023-08-15 | Stop reason: HOSPADM

## 2023-08-15 RX ORDER — CARVEDILOL 6.25 MG/1
6.25 TABLET ORAL 2 TIMES DAILY WITH MEALS
Status: DISCONTINUED | OUTPATIENT
Start: 2023-08-15 | End: 2023-08-15 | Stop reason: HOSPADM

## 2023-08-15 RX ORDER — DIGOXIN 125 MCG
125 TABLET ORAL DAILY
Status: DISCONTINUED | OUTPATIENT
Start: 2023-08-15 | End: 2023-08-15 | Stop reason: HOSPADM

## 2023-08-15 RX ORDER — CARVEDILOL 6.25 MG/1
6.25 TABLET ORAL 2 TIMES DAILY WITH MEALS
Qty: 60 TABLET | Refills: 0 | Status: SHIPPED | OUTPATIENT
Start: 2023-08-15 | End: 2023-09-05 | Stop reason: DRUGHIGH

## 2023-08-15 RX ADMIN — LEVOTHYROXINE SODIUM 200 MCG: 200 TABLET ORAL at 11:50

## 2023-08-15 RX ADMIN — CARVEDILOL 6.25 MG: 6.25 TABLET, FILM COATED ORAL at 11:50

## 2023-08-15 RX ADMIN — HUMAN ALBUMIN MICROSPHERES AND PERFLUTREN 9 ML: 10; .22 INJECTION, SOLUTION INTRAVENOUS at 11:23

## 2023-08-15 RX ADMIN — DIGOXIN 125 MCG: 125 TABLET ORAL at 11:49

## 2023-08-15 ASSESSMENT — ACTIVITIES OF DAILY LIVING (ADL)
ADLS_ACUITY_SCORE: 33
ADLS_ACUITY_SCORE: 35
ADLS_ACUITY_SCORE: 35
ADLS_ACUITY_SCORE: 31
ADLS_ACUITY_SCORE: 35
ADLS_ACUITY_SCORE: 33
ADLS_ACUITY_SCORE: 35

## 2023-08-15 NOTE — PHARMACY-ADMISSION MEDICATION HISTORY
Pharmacist Admission Medication History    Admission medication history is complete. The information provided in this note is only as accurate as the sources available at the time of the update.    Medication reconciliation/reorder completed by provider prior to medication history? No    Information Source(s): Patient and CareEverywhere/SureScripts via in-person    Changes made to PTA medication list:  Added: None  Deleted: Hydrocortisone and Triamcinolone creams  Changed: Sertraline, Spironolactone         Allergies reviewed with patient and updates made in EHR: yes    Medication History Completed By: Coral Luis RPH 8/15/2023 8:39 AM    Prior to Admission medications    Medication Sig Last Dose Taking? Auth Provider Long Term End Date   amoxicillin (AMOXIL) 500 MG capsule TAKE 4 CAPSULES 1 HOUR PRIOR TO APPOINTMENT prn Yes Reported, Patient     Ascorbic Acid (VITAMIN C PO) Take 500 mg by mouth daily 8/14/2023 Yes Unknown, Entered By History     aspirin 81 MG tablet Take 81 mg by mouth Every Mon, Wed, Fri Morning 8/14/2023 Yes Reported, Patient No    calcium carbonate-vitamin D 600-200 MG-UNIT TABS Take 1 tablet by mouth 2 times daily  8/14/2023 Yes Reported, Patient     carvedilol (COREG) 12.5 MG tablet Take 1 tablet (12.5 mg) by mouth 2 times daily (with meals) 8/14/2023 at am Yes Noy Cooley MD Yes    Cholecalciferol (VITAMIN D) 2000 UNIT CAPS Take 1 tablet by mouth daily  8/14/2023 Yes Reported, Patient     Cyanocobalamin (VITAMIN B-12) 500 MCG SUBL Place 500 mcg under the tongue daily  8/14/2023 Yes Reported, Patient     diclofenac (VOLTAREN) 1 % topical gel APPLY 4 G TOPICALLY 4 TIMES DAILY AS NEEDED FOR MODERATE PAIN prn Yes Sneha Tran APRN CNP     digoxin (LANOXIN) 125 MCG tablet Take 1 tablet (125 mcg) by mouth daily 8/14/2023 Yes Noy Cooley MD Yes    Fexofenadine HCl (ALLERGY 24-HR PO) Take 180 mg by mouth daily  8/14/2023 Yes Reported, Patient     fluticasone (FLONASE) 50  MCG/ACT nasal spray Spray 1 spray into both nostrils daily 8/14/2023 Yes Reported, Patient     furosemide (LASIX) 20 MG tablet Take 0.5 tablets (10 mg) by mouth daily 8/14/2023 Yes Mattie Olivarez APRN CNP Yes    levothyroxine (SYNTHROID/LEVOTHROID) 200 MCG tablet TAKE 1 TABLET BY MOUTH EVERY DAY 8/14/2023 Yes Sneha Tran APRN CNP Yes    lisinopril (ZESTRIL) 5 MG tablet Take 2 tablets (10 mg) by mouth daily 8/14/2023 Yes Mattie Olivarez APRN CNP Yes    Multiple Vitamins-Minerals (CENTRUM) CHEW Take 1 tablet by mouth daily  8/14/2023 Yes Unknown, Entered By History     nitroGLYcerin (NITROSTAT) 0.3 MG sublingual tablet For chest pain place 1 tablet under the tongue every 5 minutes for 3 doses. If symptoms persist 5 minutes after 1st dose call 911. prn Yes Rehan Lorenzana MD Yes    Psyllium (FIBER) 0.52 GM CAPS Take 2 capsules by mouth daily 8/14/2023 Yes Reported, Patient     sertraline (ZOLOFT) 50 MG tablet Take 1 tablet (50 mg) by mouth daily  Patient taking differently: Take 25 mg by mouth daily 8/14/2023 Yes Sneha Tran APRN CNP Yes    simvastatin (ZOCOR) 20 MG tablet Take 1 tablet (20 mg) by mouth At Bedtime 8/14/2023 Yes Sneha Tran APRN CNP Yes    spironolactone (ALDACTONE) 25 MG tablet Take 12.5 mg by mouth daily 8/14/2023 Yes Unknown, Entered By History No    warfarin ANTICOAGULANT (COUMADIN) 4 MG tablet Take by mouth every evening 6 mg on Tuesdays, 4 mg all other days 8/13/2023 Yes Unknown, Entered By History

## 2023-08-15 NOTE — TELEPHONE ENCOUNTER
ANTICOAGULATION     Jovon Mantilla is overdue for INR check.      Left message for patient to call and schedule lab appointment as soon as possible. If returning call, please schedule.     ADT will be coming through as patient being discharged from [facility] today.  Spironolactone was discontinued so patient will need INR follow up.    Madeline Wayne RN

## 2023-08-15 NOTE — PROGRESS NOTES
RECEIVING UNIT ED HANDOFF REVIEW    ED Nurse Handoff Report was reviewed by: Keith Balderas RN on August 15, 2023 at 12:04 PM

## 2023-08-15 NOTE — PHARMACY-ANTICOAGULATION SERVICE
Clinical Pharmacy - Warfarin Dosing Consult     Pharmacy has been consulted to manage this patient s warfarin therapy.  Indication: Mechanical Aortic Valve Replacement  Therapy Goal: INR 2.5-3.5  Warfarin Prior to Admission: Yes  Warfarin PTA Regimen: 6 mg T, 4 mg ROW    INR   Date Value Ref Range Status   08/15/2023 2.70 (H) 0.85 - 1.15 Final   08/14/2023 2.81 (H) 0.85 - 1.15 Final     Chromogenic Factor 10   Date Value Ref Range Status   01/21/2011 35 (L) 60 - 140 % Final     Comment:     Therapeutic Range: A Chromogenic Factor 10 level of 20-40% inversely   correlates   with an INR of 2-3 for patients receiving Warfarin. Chromogenic Factor 10   levels below 20% indicate an INR greater than 3, and levels above 40%   indicate   an INR lessthan 2.       Recommend warfarin 6 mg today.  Pharmacy will monitor Jovon Mantilla daily and order warfarin doses to achieve specified goal.      Please contact pharmacy as soon as possible if the warfarin needs to be held for a procedure or if the warfarin goals change.    6

## 2023-08-15 NOTE — CONSULTS
[facility]  Cardiology Consultation   Primary Cardiologist: Dr. Cooley    Date of Admission: 8/14/2023  Service Date: 08/15/23  Moderate complexity     Summary    Mr. Jovon Mantilla is a very pleasant 68 year old male with a past medical history of severe aortic stenosis, status post AVR in January 2011, atrial fibrillation and status post maze procedure with recurrent AF, on chronic anticoagulation with rate control, CAD, cardiomyopathy who was admitted on 8/14/2023 for syncope.    Interval August 15, 2023    Stable on bedside exam in ER and echocardiogram in process.  No recurrent pre-syncopal episodes since admission.      Assessment    Pre-syncopal episodes  Known CAD  Hx ischemic cardiomyopathy  -No symptoms on bedside exam.  -Patient story sounds classic of symptomatic hypotension.  -5/31/2023 Holter monitor without any significant bradycardias or pauses.  -5/31/2023 echocardiogram showing EF 35-40% with anteroseptal and inferior hypokinesis.  7/6/2023 CMR with stress showing LVEF 53%, RVEF 48.9%.  Stress perfusion negative for ischemia, and delayed enhancement shows a small area of basal septal, mid myocardial non-CAD scar.  -PTA lasix, lisinopril, padma.  Doses recently adjusted in clinic --> now on HOLD.  -On lower dose coreg; on digoxin.    Chronic atrial fibrillation, rate controlled, on anticoagulation  Mechanical AVR, on chronic coumadin, INR within goal  ____________________________________________________________    Plan:   Echocardiogram pending.  Hold lisinopril, spironolactone and furosemide 2/2 hypotension.  Pending BP response, we can slowly reintroduce GDMT in the outpatient setting to support LV function.  Agree with lowering carvedilol to 6.25 mg twice daily with hold parameters.  Outpatient follow up is arranged.  At discharge, send on 7-day heart monitor.  If echocardiogram stable, ok to discharge later today from cardiac  standpoint.  ____________________________________________________________    Thank you for the opportunity to participate in this pleasant patient's care.     LILLI Park, CNP   Nurse Practitioner  [facility]  Pager: [phone]  Phone: [phone]   Text Page (8am - 5pm, M-F)    35 Minutes spent in coordination of care, and discussion with the patient and/or family regarding diagnostic results, prognosis, symptom management, risks and benefits of management options, and development of the plan of care of above.    Code Status    Full Code    Reason for Consult   Reason for consult: pre-syncopal events    Primary Care Physician   Sneha Tran    Chief Complaint   Pre-syncope    History of Present Illness     I had the pleasure meeting this very kind gentleman a few weeks ago in cardiology clinic for follow-up.  At that point, he continued to monitor his blood pressure daily and was reporting readings of systolic 90s-100s.  At that point, his BP regimen was reduced --> furosemide was lowered to 10 mg daily, lisinopril lowered to 5 mg daily.  At that point in cardiology clinic despite his low BP readings, he really was largely asymptomatic and feeling well.    Last evening patient states he noticed a wave of warmth he describes as a hot flash.  He took his blood pressure at that time was systolic reading in the 80s.  On recheck systolic was 120s. He was sitting on the couch when this episode occurred.  He laid back and put his feet up, the episode resolved shortly.       At that point the day, he had already taken his morning doses of home medications.  He notes that these episodes happened a few times, but he was feeling well by dinnertime.    After dinner, he had more episodes of feeling hot flashes and felt as though he was zoned out.  He again he was sitting on the couch.  He notes to me that at that time he did not have his hearing aids in, which which could have contributed  to his inability to hear his wife calling for him.  He checked his blood pressure when these episodes happened again and systolic was in the 120s.  Given the frequency of these episodes, they did elect to come to the emergency room for further evaluation.    History is obtained from the patient    Past Medical History   I have reviewed this patient's medical history and updated it with pertinent information if needed.   Past Medical History:   Diagnosis Date    Acne rosacea 04/22/2019    Acquired hypothyroidism 05/23/2003     Problem list name updated by automated process. Provider to review    Acute prostatitis     Acute sinusitis, unspecified     Aortic valve disease     Biatrial enlargement     CAD (coronary artery disease)     known 70% LAD stenosis    Congestive heart failure (H)     Depression     GI bleeding 01/22/2016    History of bleeding peptic ulcer     Hyperlipidemia     Idiopathic cardiomyopathy (H)     Mitral regurgitation     Obesity     Palpitations     Permanent atrial fibrillation (H) 06/02/2017    Right inguinal hernia 07/22/2019    S/P AVR 01/07/2011    with Maze procedure    S/P gastric bypass     Tricuspid regurgitation     Unspecified essential hypertension        Past Surgical History   I have reviewed this patient's surgical history and updated it with pertinent information if needed.  Past Surgical History:   Procedure Laterality Date    ANGIOGRAM  02/01/2002    Normal coronary,dilated LV,Sev.decr.global LVF,+1 MR    ANGIOGRAM  01/01/2008    Mild AS,Mod PHTN,mod prox.LAD disease,Triv.CFX disease    ANGIOGRAM  01/01/2009    Mod AS,Mod PHTN,mod prox.LAD disease    ANGIOGRAM  10/01/2010    Sev.AS,CM,global hypokinesis,Mild-mod LV dilated,Mild MR,70% prox.LAD lesion,PHTN    CARDIOVERSION  2/02, 4/02, 4/11    CHOLECYSTECTOMY  2006    COLONOSCOPY N/A 01/23/2016    Procedure: COLONOSCOPY;  Surgeon: Robyn Collins MD;  Location:  GI    CV CORONARY ANGIOGRAM N/A 1/31/2022    Procedure:  Coronary Angiogram;  Surgeon: Rome Mike MD;  Location:  HEART CARDIAC CATH LAB    CV INSTANTANEOUS WAVE-FREE RATIO N/A 1/31/2022    Procedure: Instantaneous Wave-Free Ratio;  Surgeon: Rome Mike MD;  Location:  HEART CARDIAC CATH LAB    CV RIGHT HEART CATH MEASUREMENTS RECORDED N/A 1/31/2022    Procedure: Right Heart Cath;  Surgeon: Rome Mike MD;  Location:  HEART CARDIAC CATH LAB    ESOPHAGOSCOPY, GASTROSCOPY, DUODENOSCOPY (EGD), COMBINED N/A 01/23/2016    Procedure: COMBINED ESOPHAGOSCOPY, GASTROSCOPY, DUODENOSCOPY (EGD);  Surgeon: Robyn Collins MD;  Location:  GI    EXCISE MASS GROIN Right 2/11/2021    Procedure: INCISION AND DRAINAGE OF RIGHT GROIN HEMATOMA;  Surgeon: Norma Scanlon MD;  Location: [facility] OR    HERNIORRHAPHY INGUINAL Right 2/4/2021    Procedure: OPEN RIGHT INGUINAL HENRIA WITH MESH;  Surgeon: Jaden Alex MD;  Location: [facility] OR    LAPAROSCOPIC BYPASS GASTRIC  10/08/2011    Procedure:LAPAROSCOPIC BYPASS GASTRIC; Diagnostic laparoscopy, Lysis of Adhesions, Laparoscopic Revision of Jejunojejunostomy; Surgeon:RADHA MURO; Location: OR    LAPAROSCOPIC BYPASS GASTRIC  06/26/2006    Dr Jin    LAPAROSCOPY DIAGNOSTIC (GENERAL)  10/08/2011    Procedure:LAPAROSCOPY DIAGNOSTIC (GENERAL); Surgeon:RADHA MURO; Location: OR    REPLACE VALVE AORTIC  01/07/2011    ZZC NONSPECIFIC PROCEDURE  ~1985    vein stripping       Prior to Admission Medications   Prior to Admission Medications   Prescriptions Last Dose Informant Patient Reported? Taking?   Ascorbic Acid (VITAMIN C PO) 8/14/2023 Self Yes Yes   Sig: Take 500 mg by mouth daily   Cholecalciferol (VITAMIN D) 2000 UNIT CAPS 8/14/2023  Yes Yes   Sig: Take 1 tablet by mouth daily    Cyanocobalamin (VITAMIN B-12) 500 MCG SUBL 8/14/2023  Yes Yes   Sig: Place 500 mcg under the tongue daily    Fexofenadine HCl (ALLERGY 24-HR PO) 8/14/2023  Yes Yes   Sig: Take 180 mg  by mouth daily    Multiple Vitamins-Minerals (CENTRUM) CHEW 8/14/2023 Self Yes Yes   Sig: Take 1 tablet by mouth daily    Psyllium (FIBER) 0.52 GM CAPS 8/14/2023  Yes Yes   Sig: Take 2 capsules by mouth daily   amoxicillin (AMOXIL) 500 MG capsule prn  Yes Yes   Sig: TAKE 4 CAPSULES 1 HOUR PRIOR TO APPOINTMENT   aspirin 81 MG tablet 8/14/2023  Yes Yes   Sig: Take 81 mg by mouth Every Mon, Wed, Fri Morning   calcium carbonate-vitamin D 600-200 MG-UNIT TABS 8/14/2023  Yes Yes   Sig: Take 1 tablet by mouth 2 times daily    carvedilol (COREG) 12.5 MG tablet 8/14/2023 at am  Yes Yes   Sig: Take 1 tablet (12.5 mg) by mouth 2 times daily (with meals)   diclofenac (VOLTAREN) 1 % topical gel prn  No Yes   Sig: APPLY 4 G TOPICALLY 4 TIMES DAILY AS NEEDED FOR MODERATE PAIN   digoxin (LANOXIN) 125 MCG tablet 8/14/2023  No Yes   Sig: Take 1 tablet (125 mcg) by mouth daily   fluticasone (FLONASE) 50 MCG/ACT nasal spray 8/14/2023  Yes Yes   Sig: Spray 1 spray into both nostrils daily   furosemide (LASIX) 20 MG tablet 8/14/2023  No Yes   Sig: Take 0.5 tablets (10 mg) by mouth daily   levothyroxine (SYNTHROID/LEVOTHROID) 200 MCG tablet 8/14/2023  No Yes   Sig: TAKE 1 TABLET BY MOUTH EVERY DAY   lisinopril (ZESTRIL) 5 MG tablet 8/14/2023  No Yes   Sig: Take 2 tablets (10 mg) by mouth daily   nitroGLYcerin (NITROSTAT) 0.3 MG sublingual tablet prn  No Yes   Sig: For chest pain place 1 tablet under the tongue every 5 minutes for 3 doses. If symptoms persist 5 minutes after 1st dose call 911.   sertraline (ZOLOFT) 50 MG tablet 8/14/2023  No Yes   Sig: Take 1 tablet (50 mg) by mouth daily   Patient taking differently: Take 25 mg by mouth daily   simvastatin (ZOCOR) 20 MG tablet 8/14/2023  No Yes   Sig: Take 1 tablet (20 mg) by mouth At Bedtime   spironolactone (ALDACTONE) 25 MG tablet 8/14/2023  Yes Yes   Sig: Take 12.5 mg by mouth daily   warfarin ANTICOAGULANT (COUMADIN) 4 MG tablet 8/13/2023  Yes Yes   Sig: Take by mouth every evening 6  mg on Tuesdays, 4 mg all other days      Facility-Administered Medications: None     Allergies   Allergies   Allergen Reactions    Seasonal Allergies        Social History   I have reviewed this patient's social history and updated it with pertinent information if needed. Jovon Mantilla  reports that he has never smoked. He has never used smokeless tobacco. He reports that he does not drink alcohol and does not use drugs.    Family History   I have reviewed this patient's family history and updated it with pertinent information if needed.   Family History   Problem Relation Age of Onset    Cancer - colorectal Father     Colon Cancer Father     Cancer Father     Diabetes Brother        Review of Systems   The 10 point Review of Systems is negative other than noted in the HPI or here.     Physical Exam   Temp: 98  F (36.7  C) Temp src: Oral BP: 127/81 Pulse: 96   Resp: 28 SpO2: 97 % O2 Device: None (Room air)    Vital Signs with Ranges  Temp:  [96.4  F (35.8  C)-98.2  F (36.8  C)] 98  F (36.7  C)  Pulse:  [] 96  Resp:  [13-28] 28  BP: (107-151)/() 127/81  SpO2:  [93 %-98 %] 97 %  202 lbs 9.6 oz    Constitutional: Appears stated age, well nourished, NAD.  Neck: Supple. Carotid pulses full and equal.   Respiratory: Non-labored. Lungs CTAB. No crackles, wheezes, rhonchi, or rales.  Cardiovascular: RRR, normal S1 and S2. No M/G/R.  Vascular: Peripheral pulses intact and symmetric bilaterally  Skin: Warm and dry. No rashes, cyanosis, edema.  Musculoskeletal/Extremities: Symmetrical movement to all extremities. No edema.  Neurologic: No gross focal deficits. Alert, awake, and oriented to all spheres.  Psychiatric: Affect appropriate. Mentation normal.    Data     Recent Labs   Lab 08/14/23 2023   WBC 5.3   HGB 11.9*   HCT 37.9*   MCV 96        Recent Labs   Lab 08/14/23 2023      POTASSIUM 5.6*   CHLORIDE 100   CO2 26   ANIONGAP 10   *   BUN 15.6   CR 0.90   GFRESTIMATED >90   REECE 8.9     "    Clinically Significant Risk Factors Present on Admission   # Hyponatremia: Lowest Na = 135 mmol/L in last 2 days, will monitor as appropriate    # Hyperkalemia: Highest K = 5.6 mmol/L in last 2 days, will monitor as appropriate        # Drug Induced Coagulation Defect: home medication list includes an anticoagulant medication  # Drug Induced Platelet Defect: home medication list includes an antiplatelet medication   # Hypertension: Noted on problem list  # Chronic heart failure with reduced ejection fraction: last echo with EF <40%     # Overweight: Estimated body mass index is 29.85 kg/m  as calculated from the following:    Height as of 23: 1.765 m (5' 9.5\").    Weight as of 23: 93 kg (205 lb 1.6 oz).                   Data   Last 24 hours labs personally reviewed.  Echo:   Recent Results (from the past 4320 hour(s))   Echocardiogram Complete   Result Value    LVEF  35-40%    Narrative    [id number]  [id number]  [id number]  [id number]^FAY^BARBARA^DESHAWN     [facility]  Echocardiography Laboratory  [address] [place] [address]     Name: CHAS STAPLES  MRN: [id number]  : 1955  Study Date: 2023 01:54 PM  Age: 67 yrs  Gender: Male  Patient Location: [facility]  Reason For Study: Coronary artery disease involving native coronary artery of  daniel  Ordering Physician: BARBARA APONTE  Referring Physician: BARBARA APONTE  Performed By: Carlos Moe     BSA: 2.1 m2  Height: 68 in  Weight: 210 lb  HR: 101  ______________________________________________________________________________  Procedure  Complete Echo Adult.  ______________________________________________________________________________  Interpretation Summary     The left ventricle is normal in size.  The visual ejection fraction is 35-40%.  anteroseptal and inferior hypokinesis  Moderately decreased right ventricular systolic function  The left atrium is severely dilated.  The right atrium is mildly " dilated.  There is mild to moderate (1-2+) mitral regurgitation.  There is mild to moderate (1-2+) tricuspid regurgitation.  There is a mechanical aortic valve.  The rhythm was atrial fibrillation.  Compared to prior study, changes are noted.  ______________________________________________________________________________  Left Ventricle  The left ventricle is normal in size. There is normal left ventricular wall  thickness. The visual ejection fraction is 35-40%. Left ventricular diastolic  function is not assessable. anteroseptal and inferior hypokinesis.     Right Ventricle  The right ventricle is normal size. Moderately decreased right ventricular  systolic function.     Atria  The left atrium is severely dilated. The right atrium is mildly dilated. There  is no color Doppler evidence of an atrial shunt.     Mitral Valve  The mitral valve leaflets appear thickened, but open well. There is mild to  moderate (1-2+) mitral regurgitation. There is mild mitral stenosis.     Tricuspid Valve  The tricuspid valve is normal in structure and function. There is mild to  moderate (1-2+) tricuspid regurgitation. The right ventricular systolic  pressure is approximated at 28.6 mmHg plus the right atrial pressure.     Aortic Valve  The mean AoV pressure gradient is 11.0 mmHg. There is a mechanical aortic  valve. The gradient is normal for this prosthetic aortic valve.     Pulmonic Valve  The pulmonic valve is normal in structure and function. There is mild (1+)  pulmonic valvular regurgitation.     Vessels  The ascending aorta is Borderline dilated.     Pericardium  There is no pericardial effusion.     Rhythm  The rhythm was atrial fibrillation.  ______________________________________________________________________________  MMode/2D Measurements & Calculations     IVSd: 1.1 cm  LVIDd: 5.4 cm  LVIDs: 4.2 cm  LVPWd: 1.3 cm  FS: 21.6 %  LV mass(C)d: 262.8 grams  LV mass(C)dI: 125.9 grams/m2  asc Aorta Diam: 3.7 cm  LVOT diam:  2.3 cm  LVOT area: 4.2 cm2  LA Volume (BP): 323.0 ml  LA Volume Index (BP): 154.5 ml/m2  RV Base: 3.6 cm  RWT: 0.49     TAPSE: 0.85 cm     Doppler Measurements & Calculations  MV E max ethan: 118.0 cm/sec  MV max P.4 mmHg  MV mean PG: 3.5 mmHg  MV V2 VTI: 25.8 cm  MVA(VTI): 2.2 cm2  MV dec slope: 956.0 cm/sec2  MV dec time: 0.12 sec  Ao V2 max: 204.2 cm/sec  Ao max P.0 mmHg  Ao V2 mean: 155.5 cm/sec  Ao mean P.0 mmHg  Ao V2 VTI: 36.9 cm  PATEL(I,D): 1.5 cm2  PATEL(V,D): 1.7 cm2  LV V1 max P.9 mmHg  LV V1 max: 84.9 cm/sec  LV V1 VTI: 13.5 cm  SV(LVOT): 56.1 ml  SI(LVOT): 26.9 ml/m2  PA acc time: 0.10 sec  TR max ethan: 267.5 cm/sec  TR max P.6 mmHg  AV Ethan Ratio (DI): 0.42  PATEL Index (cm2/m2): 0.73  Lateral E/e': 5.4  RV S Ethan: 6.1 cm/sec     ______________________________________________________________________________  Report approved by: Leonarda Wang 2023 03:09 PM             This note was completed in part using Dragon voice recognition software. Although reviewed after completion, some word and grammatical errors may occur.

## 2023-08-15 NOTE — H&P
"[facility]    History and Physical - Hospitalist Service       Date of Admission:  8/14/2023    Assessment & Plan   Jovon Mantilla is a 68 year old male admitted on 8/14/2023. He has hx of severe Aortic stenosis and underwent mechanical valve replacement, stable CAD in prox LAD, moderate ischemic cardiomyopathy with mild to mod mitral regurgitation with borderline soft to low blood pressures is being admitted with multiple episodes of presyncope.    # Presyncopal episodes  -- more recently his coreg was decreased 25 to 12.5 and lisinopril from 20 to 10 mg  -- yet patient still every 3rd day had BP in 80s-100s  -- cardiology consultation  -- patient may yet need further down titration of blood pressure medications  -- check TTE for MR and serial troponin    # Mechanical AV  -- INR therapeutic  -- Rph to dose coumadin    # ASCVD  # Mod Ischemic cardiomyopathy  -- hold lasix and spironolactone    # Atrial Fibrillation  # Hx of Maze procedure  -- continue coumadin  -- continue coreg, may need decreased dose    Diet:  cardiac  DVT Prophylaxis: Warfarin  Del Rosario Catheter: Not present  Lines: None     Cardiac Monitoring: None  Code Status:  Full    Clinically Significant Risk Factors Present on Admission        # Hyperkalemia: Highest K = 5.6 mmol/L in last 2 days, will monitor as appropriate        # Drug Induced Coagulation Defect: home medication list includes an anticoagulant medication  # Drug Induced Platelet Defect: home medication list includes an antiplatelet medication   # Hypertension: Noted on problem list  # Chronic heart failure with reduced ejection fraction: last echo with EF <40%     # Overweight: Estimated body mass index is 29.85 kg/m  as calculated from the following:    Height as of 7/27/23: 1.765 m (5' 9.5\").    Weight as of 7/27/23: 93 kg (205 lb 1.6 oz).              Disposition Plan   -- anticipate home discharge in 24-48 hours      Sukhwinder Stout MD  Hospitalist " [facility]  Securely message with Rafal (more info)  Text page via [facility] Paging/Directory     ______________________________________________________________________    Chief Complaint   Syncope    History is obtained from the patient    History of Present Illness   68 male with complicated cardiac history including, history of severe aortic stenosis status post aortic valve replacement in 2011 and on chronic anticoagulation.  He has history of atrial fibrillation and underwent maze procedure but remains in atrial fibrillation and is currently rate controlled.  He has known 70% LAD stenosis at the time of his bypass but this was not bypassed due to intramyocardial location of the vessel.  Since 2021 the patient started having exertional dyspnea and worsening mitral regurgitation and underwent CADE which showed severe biatrial enlargement and mildly reduced left ventricle ejection fraction.  He also had exercise echocardiogram to check for worsening MR with exercise but this increased only to mild to moderate range.  In January 2022 he underwent right and left heart catheterization which showed unchanged 70% proximal LAD stenosis.      Over the last couple of months the patient has had symptoms of orthostasis and episodic hypotension.  His blood pressure medications including his Coreg and lisinopril have been decreased.  He was to keep blood pressure diary and he notes for the last few weeks every third day his blood pressure would drop into the 80s to 100s.  The patient comes in today because he felt lightheaded and that he was going to pass out.  He checked his blood pressure initially it was low.  He had a few more episodes that seem to get better after he laid down.  The patient felt better and will and got up and made supper but had 2 more symptoms of feeling lightheaded and dizzy and presyncopal.      In the ER, he had some mild symptoms x 2 but no documented low blood  pressure readings of less than 100.  BMP shows K of 5.6, glucose 138, Trop 18, normal wbc and hgb of 11.9, INR 2.81, ECG shows atrial fibrillation with HR low 100s.    The patient is being admitted under observation status for cardiology consultation for further adjustment of blood pressure medications, recheck TTE.      Past Medical History    Past Medical History:   Diagnosis Date    Acne rosacea 04/22/2019    Acquired hypothyroidism 05/23/2003     Problem list name updated by automated process. Provider to review    Acute prostatitis     Acute sinusitis, unspecified     Aortic valve disease     Biatrial enlargement     CAD (coronary artery disease)     known 70% LAD stenosis    Congestive heart failure (H)     Depression     GI bleeding 01/22/2016    History of bleeding peptic ulcer     Hyperlipidemia     Idiopathic cardiomyopathy (H)     Mitral regurgitation     Obesity     Palpitations     Permanent atrial fibrillation (H) 06/02/2017    Right inguinal hernia 07/22/2019    S/P AVR 01/07/2011    with Maze procedure    S/P gastric bypass     Tricuspid regurgitation     Unspecified essential hypertension        Past Surgical History   Past Surgical History:   Procedure Laterality Date    ANGIOGRAM  02/01/2002    Normal coronary,dilated LV,Sev.decr.global LVF,+1 MR    ANGIOGRAM  01/01/2008    Mild AS,Mod PHTN,mod prox.LAD disease,Triv.CFX disease    ANGIOGRAM  01/01/2009    Mod AS,Mod PHTN,mod prox.LAD disease    ANGIOGRAM  10/01/2010    Sev.AS,CM,global hypokinesis,Mild-mod LV dilated,Mild MR,70% prox.LAD lesion,PHTN    CARDIOVERSION  2/02, 4/02, 4/11    CHOLECYSTECTOMY  2006    COLONOSCOPY N/A 01/23/2016    Procedure: COLONOSCOPY;  Surgeon: Robyn Collins MD;  Location:  GI    CV CORONARY ANGIOGRAM N/A 1/31/2022    Procedure: Coronary Angiogram;  Surgeon: Rome Mike MD;  Location:  HEART CARDIAC CATH LAB    CV INSTANTANEOUS WAVE-FREE RATIO N/A 1/31/2022    Procedure: Instantaneous  Wave-Free Ratio;  Surgeon: Rome Mike MD;  Location:  HEART CARDIAC CATH LAB    CV RIGHT HEART CATH MEASUREMENTS RECORDED N/A 1/31/2022    Procedure: Right Heart Cath;  Surgeon: Rome Mike MD;  Location:  HEART CARDIAC CATH LAB    ESOPHAGOSCOPY, GASTROSCOPY, DUODENOSCOPY (EGD), COMBINED N/A 01/23/2016    Procedure: COMBINED ESOPHAGOSCOPY, GASTROSCOPY, DUODENOSCOPY (EGD);  Surgeon: Robyn Collins MD;  Location:  GI    EXCISE MASS GROIN Right 2/11/2021    Procedure: INCISION AND DRAINAGE OF RIGHT GROIN HEMATOMA;  Surgeon: Norma Scanlon MD;  Location: [facility]    HERNIORRHAPHY INGUINAL Right 2/4/2021    Procedure: OPEN RIGHT INGUINAL HENRIA WITH MESH;  Surgeon: Jaden Alex MD;  Location: [facility]    LAPAROSCOPIC BYPASS GASTRIC  10/08/2011    Procedure:LAPAROSCOPIC BYPASS GASTRIC; Diagnostic laparoscopy, Lysis of Adhesions, Laparoscopic Revision of Jejunojejunostomy; Surgeon:RADHA MURO; Location: OR    LAPAROSCOPIC BYPASS GASTRIC  06/26/2006    Dr Jin    LAPAROSCOPY DIAGNOSTIC (GENERAL)  10/08/2011    Procedure:LAPAROSCOPY DIAGNOSTIC (GENERAL); Surgeon:RADHA MURO; Location: OR    REPLACE VALVE AORTIC  01/07/2011    ZZC NONSPECIFIC PROCEDURE  ~1985    vein stripping       Prior to Admission Medications   Prior to Admission Medications   Prescriptions Last Dose Informant Patient Reported? Taking?   Ascorbic Acid (VITAMIN C PO)  Self Yes No   Sig: Take 500 mg by mouth At Bedtime   Cholecalciferol (VITAMIN D) 2000 UNIT CAPS   Yes No   Sig: Take 1 tablet by mouth daily    Cyanocobalamin (VITAMIN B-12) 500 MCG SUBL   Yes No   Sig: Place 500 mcg under the tongue daily    Fexofenadine HCl (ALLERGY 24-HR PO)   Yes No   Sig: Take 180 mg by mouth daily    Multiple Vitamins-Minerals (CENTRUM) CHEW  Self Yes No   Sig: Take 1 tablet by mouth daily    Psyllium (FIBER) 0.52 GM CAPS   Yes No   Sig: Take 2 capsules by mouth At Bedtime    amoxicillin  (AMOXIL) 500 MG capsule   Yes No   Sig: TAKE 4 CAPSULES 1 HOUR PRIOR TO APPOINTMENT   aspirin 81 MG tablet   Yes No   Sig: Take 81 mg by mouth every other day   calcium carbonate-vitamin D 600-200 MG-UNIT TABS   Yes No   Sig: Take 1 tablet by mouth 2 times daily    carvedilol (COREG) 12.5 MG tablet   Yes No   Sig: Take 1 tablet (12.5 mg) by mouth 2 times daily (with meals)   diclofenac (VOLTAREN) 1 % topical gel   No No   Sig: APPLY 4 G TOPICALLY 4 TIMES DAILY AS NEEDED FOR MODERATE PAIN   digoxin (LANOXIN) 125 MCG tablet   No No   Sig: Take 1 tablet (125 mcg) by mouth daily   fluticasone (FLONASE) 50 MCG/ACT nasal spray   Yes No   Sig: Spray 1 spray into both nostrils daily   furosemide (LASIX) 20 MG tablet   No No   Sig: Take 0.5 tablets (10 mg) by mouth daily   hydrocortisone (CORTAID) 1 % external ointment   Yes No   Sig: Apply topically 2 times daily   Patient not taking: Reported on 7/27/2023   levothyroxine (SYNTHROID/LEVOTHROID) 200 MCG tablet   No No   Sig: TAKE 1 TABLET BY MOUTH EVERY DAY   lisinopril (ZESTRIL) 5 MG tablet   No No   Sig: Take 2 tablets (10 mg) by mouth daily   nitroGLYcerin (NITROSTAT) 0.3 MG sublingual tablet   No No   Sig: For chest pain place 1 tablet under the tongue every 5 minutes for 3 doses. If symptoms persist 5 minutes after 1st dose call 911.   sertraline (ZOLOFT) 50 MG tablet   No No   Sig: Take 1 tablet (50 mg) by mouth daily   simvastatin (ZOCOR) 20 MG tablet   No No   Sig: Take 1 tablet (20 mg) by mouth At Bedtime   spironolactone (ALDACTONE) 25 MG tablet   No No   Sig: Take 1 tablet (25 mg) by mouth daily   Patient taking differently: Take 12.5 mg by mouth daily   triamcinolone (KENALOG) 0.1 % external cream   No No   Sig: Apply topically 2 times daily   warfarin ANTICOAGULANT (COUMADIN) 4 MG tablet   No No   Sig: TAKE 1 TABLET ON SATURDAY, TAKE ONE AND ONE-HALF TABLETS (6 MG) ALL OTHER DAYS OR PER INR CLINIC   Patient taking differently: One tablet daily OR PER INR CLINIC       Facility-Administered Medications: None          Physical Exam   Vital Signs: Temp: 98.2  F (36.8  C) Temp src: Oral BP: 110/86 Pulse: 94   Resp: 13 SpO2: 97 % O2 Device: None (Room air)    Weight: 0 lbs 0 oz    General Appearance: NAD  Respiratory: CTA  Cardiovascular: irregular  GI: soft +BS  Skin: warm and dry  Other: No edema    Medical Decision Making       70 MINUTES SPENT BY ME on the date of service doing chart review, history, exam, documentation & further activities per the note.      Data     I have personally reviewed the following data over the past 24 hrs:    5.3  \   11.9 (L)   / 197     136 100 15.6 /  138 (H)   5.6 (H) 26 0.90 \     Trop: 18 BNP: N/A     INR:  2.81 (H) PTT:  N/A   D-dimer:  N/A Fibrinogen:  N/A       Imaging results reviewed over the past 24 hrs:   No results found for this or any previous visit (from the past 24 hour(s)).

## 2023-08-15 NOTE — ED PROVIDER NOTES
History     Chief Complaint:  Syncope       HPI   Jovon Mantilla is a 68 year old male with history of atrial fibrillation and cardiomyopathy who presents to the emergency department after several episodes of syncope.  He estimates that he has had approximately 12 episodes of syncope this evening and tonight.  Most of them occurred at home.  No trauma.  Denies any headache, chest pain or palpitations associated with it.  He has not had any fevers, new leg swelling, black or bloody stools or abdominal pain.  His wife reports that he will have episodes of approximately 15 seconds when he does not respond to her questions.  He does not have foot appears to be a postictal period.  He has not had any shaking episodes.  Never had similar problems in the past.  He has noted that they recently titrated down on his blood pressure medications because of low blood pressures.  He continued to have low blood pressure at home.      Medications:    amoxicillin (AMOXIL) 500 MG capsule  Ascorbic Acid (VITAMIN C PO)  aspirin 81 MG tablet  calcium carbonate-vitamin D 600-200 MG-UNIT TABS  carvedilol (COREG) 12.5 MG tablet  Cholecalciferol (VITAMIN D) 2000 UNIT CAPS  Cyanocobalamin (VITAMIN B-12) 500 MCG SUBL  diclofenac (VOLTAREN) 1 % topical gel  digoxin (LANOXIN) 125 MCG tablet  Fexofenadine HCl (ALLERGY 24-HR PO)  fluticasone (FLONASE) 50 MCG/ACT nasal spray  furosemide (LASIX) 20 MG tablet  hydrocortisone (CORTAID) 1 % external ointment  levothyroxine (SYNTHROID/LEVOTHROID) 200 MCG tablet  lisinopril (ZESTRIL) 5 MG tablet  Multiple Vitamins-Minerals (CENTRUM) CHEW  nitroGLYcerin (NITROSTAT) 0.3 MG sublingual tablet  Psyllium (FIBER) 0.52 GM CAPS  sertraline (ZOLOFT) 50 MG tablet  simvastatin (ZOCOR) 20 MG tablet  spironolactone (ALDACTONE) 25 MG tablet  triamcinolone (KENALOG) 0.1 % external cream  warfarin ANTICOAGULANT (COUMADIN) 4 MG tablet        Past Medical History:    Past Medical History:   Diagnosis Date    Acne rosacea  04/22/2019    Acquired hypothyroidism 05/23/2003    Acute prostatitis     Acute sinusitis, unspecified     Aortic valve disease     Biatrial enlargement     CAD (coronary artery disease)     Congestive heart failure (H)     Depression     GI bleeding 01/22/2016    History of bleeding peptic ulcer     Hyperlipidemia     Idiopathic cardiomyopathy (H)     Mitral regurgitation     Obesity     Palpitations     Permanent atrial fibrillation (H) 06/02/2017    Right inguinal hernia 07/22/2019    S/P AVR 01/07/2011    S/P gastric bypass     Tricuspid regurgitation     Unspecified essential hypertension        Past Surgical History:    Past Surgical History:   Procedure Laterality Date    ANGIOGRAM  02/01/2002    Normal coronary,dilated LV,Sev.decr.global LVF,+1 MR    ANGIOGRAM  01/01/2008    Mild AS,Mod PHTN,mod prox.LAD disease,Triv.CFX disease    ANGIOGRAM  01/01/2009    Mod AS,Mod PHTN,mod prox.LAD disease    ANGIOGRAM  10/01/2010    Sev.AS,CM,global hypokinesis,Mild-mod LV dilated,Mild MR,70% prox.LAD lesion,PHTN    CARDIOVERSION  2/02, 4/02, 4/11    CHOLECYSTECTOMY  2006    COLONOSCOPY N/A 01/23/2016    Procedure: COLONOSCOPY;  Surgeon: Robyn Collins MD;  Location:  GI    CV CORONARY ANGIOGRAM N/A 1/31/2022    Procedure: Coronary Angiogram;  Surgeon: Rome Mike MD;  Location:  HEART CARDIAC CATH LAB    CV INSTANTANEOUS WAVE-FREE RATIO N/A 1/31/2022    Procedure: Instantaneous Wave-Free Ratio;  Surgeon: Rome Mike MD;  Location:  HEART CARDIAC CATH LAB    CV RIGHT HEART CATH MEASUREMENTS RECORDED N/A 1/31/2022    Procedure: Right Heart Cath;  Surgeon: Rome Mike MD;  Location:  HEART CARDIAC CATH LAB    ESOPHAGOSCOPY, GASTROSCOPY, DUODENOSCOPY (EGD), COMBINED N/A 01/23/2016    Procedure: COMBINED ESOPHAGOSCOPY, GASTROSCOPY, DUODENOSCOPY (EGD);  Surgeon: Robyn Collins MD;  Location:  GI    EXCISE MASS GROIN Right 2/11/2021    Procedure: INCISION AND  DRAINAGE OF RIGHT GROIN HEMATOMA;  Surgeon: Norma Scanlon MD;  Location: [facility]    HERNIORRHAPHY INGUINAL Right 2/4/2021    Procedure: OPEN RIGHT INGUINAL HENRIA WITH MESH;  Surgeon: Jaden Alex MD;  Location: [facility]    LAPAROSCOPIC BYPASS GASTRIC  10/08/2011    Procedure:LAPAROSCOPIC BYPASS GASTRIC; Diagnostic laparoscopy, Lysis of Adhesions, Laparoscopic Revision of Jejunojejunostomy; Surgeon:RADHA MURO; Location:[facility] OR    LAPAROSCOPIC BYPASS GASTRIC  06/26/2006    Dr Jin    LAPAROSCOPY DIAGNOSTIC (GENERAL)  10/08/2011    Procedure:LAPAROSCOPY DIAGNOSTIC (GENERAL); Surgeon:RADHA MURO; Location:[facility] OR    REPLACE VALVE AORTIC  01/07/2011    ZZC NONSPECIFIC PROCEDURE  ~1985    vein stripping        Physical Exam   Patient Vitals for the past 24 hrs:   BP Temp Temp src Pulse Resp SpO2   08/15/23 0600 116/82 -- -- 81 27 93 %   08/15/23 0230 107/82 -- -- 96 20 97 %   08/14/23 2323 -- -- -- 94 13 97 %   08/14/23 2308 110/86 -- -- 114 20 96 %   08/14/23 2245 134/66 98.2  F (36.8  C) Oral 97 20 98 %   08/14/23 2143 111/71 98  F (36.7  C) Oral 85 18 97 %   08/14/23 2012 (!) 151/68 (!) 96.4  F (35.8  C) Temporal 77 16 96 %        Physical Exam  Eyes:  The pupils are equal and round    Conjunctivae and sclerae are normal  ENT:    The nose is normal    Pinnae are normal  CV:  Irregularly irregular    No edema  Resp:  Lungs are clear    Non-labored    No rales    No wheezing   GI:  Abdomen is soft, there is no rigidity    No distension    No rebound tenderness   MS:  Normal muscular tone    No asymmetric leg swelling  Skin:  No rash or acute skin lesions noted  Neuro:   Awake, alert.      Speech is normal and fluent.    Face is symmetric.     Moves all extremities      Emergency Department Course   ECG  ECG results from 08/14/23   EKG 12 lead     Value    Systolic Blood Pressure     Diastolic Blood Pressure     Ventricular Rate 109    Atrial Rate     TN Interval     QRS Duration  136        QTc 519    P Axis     R AXIS -67    T Axis 64    Interpretation ECG      Atrial fibrillation with rapid ventricular response  Left axis deviation  Non-specific intra-ventricular conduction block  Minimal voltage criteria for LVH, may be normal variant ( [place] product )  Abnormal ECG  When compared with ECG of 06-JUL-2023 12:13,  QRS duration has increased  QT has lengthened  Confirmed by GENERATED REPORT, COMPUTER ([id number]),  Tod Hogue ([id number]) on 8/14/2023 8:37:52 PM       *Note: Due to a large number of results and/or encounters for the requested time period, some results have not been displayed. A complete set of results can be found in Results Review.          Laboratory:  Labs Ordered and Resulted from Time of ED Arrival to Time of ED Departure   BASIC METABOLIC PANEL - Abnormal       Result Value    Sodium 136      Potassium 5.6 (*)     Chloride 100      Carbon Dioxide (CO2) 26      Anion Gap 10      Urea Nitrogen 15.6      Creatinine 0.90      Calcium 8.9      Glucose 138 (*)     GFR Estimate >90     INR - Abnormal    INR 2.81 (*)    CBC WITH PLATELETS AND DIFFERENTIAL - Abnormal    WBC Count 5.3      RBC Count 3.96 (*)     Hemoglobin 11.9 (*)     Hematocrit 37.9 (*)     MCV 96      MCH 30.1      MCHC 31.4 (*)     RDW 13.9      Platelet Count 197      % Neutrophils 71      % Lymphocytes 14      % Monocytes 12      % Eosinophils 3      % Basophils 0      % Immature Granulocytes 0      NRBCs per 100 WBC 0      Absolute Neutrophils 3.8      Absolute Lymphocytes 0.7 (*)     Absolute Monocytes 0.6      Absolute Eosinophils 0.1      Absolute Basophils 0.0      Absolute Immature Granulocytes 0.0      Absolute NRBCs 0.0     TROPONIN T, HIGH SENSITIVITY - Normal    Troponin T, High Sensitivity 18          Emergency Department Course & Assessments:             Interventions:  Medications - No data to display       Disposition:  The patient was admitted to the hospital under the care of   Arg[facility].     Impression & Plan      Medical Decision Making:  Jovon Mantilla is a 68 year old male who presents with multiple episodes of syncope.  He estimates 12 episodes tonight.  Recently has had low blood pressure and had his blood pressure medications titrated downward.  He has atrial fibrillation and his rate here appears to change from 60 to the low 100s.  Troponin is negative.  Laboratory work-up otherwise does not show any clear cause of syncope.  Suspect it may be related to his low blood pressures at home as well as his rapidly fluctuating atrial fibrillation rate.  Given the number of episodes of syncope recommended that he be admitted to the hospital.  He was in agreement with plan.  He is admitted under observation for further treatment.      Diagnosis:    ICD-10-CM    1. Syncope, unspecified syncope type  R55            Discharge Medications:  New Prescriptions    No medications on file          Kasi Guy MD  8/15/2023   MD Malena Escamilla, Kasi Peter MD  08/15/23 0637

## 2023-08-15 NOTE — PROGRESS NOTES
Observation goals  PRIOR TO DISCHARGE       Comments:   Cardiology consultation: Met    TTE: Met

## 2023-08-15 NOTE — ED NOTES
[facility]  ED Nurse Handoff Report    ED Chief complaint: Syncope      ED Diagnosis:   Final diagnoses:   Syncope, unspecified syncope type       Code Status: Full Code    Allergies:   Allergies   Allergen Reactions    Seasonal Allergies        Patient Story: syncope  Focused Assessment:  Pt reports multiple fainting spells that he feels coming on like a hot flash wife states that he does not repsond and gets lows bp during these speels        Pt has hx of artifical heart valve  and hx of MI in 2000      Denies any CP or SOB      Pt also has hx of afib and does take blood thinners    Treatments and/or interventions provided:     Patient's response to treatments and/or interventions:       To be done/followed up on inpatient unit:        Does this patient have any cognitive concerns?:  none    Activity level - Baseline/Home:  Independent  Activity Level - Current:   Stand with Assist    Patient's Preferred language: English   Needed?: No    Isolation: None  Infection: Not Applicable  Patient tested for COVID 19 prior to admission: NO  Bariatric?: No    Vital Signs:   Vitals:    08/14/23 2143 08/14/23 2245 08/14/23 2308 08/14/23 2323   BP: 111/71 134/66 110/86    Pulse: 85 97 114 94   Resp: 18 20 20 13   Temp: 98  F (36.7  C) 98.2  F (36.8  C)     TempSrc: Oral Oral     SpO2: 97% 98% 96% 97%       Cardiac Rhythm:     Was the PSS-3 completed:   Yes  What interventions are required if any?               Family Comments:     OBS brochure/video discussed/provided to patient/family: N/A              Name of person given brochure if not patient:                 Relationship to patient:       For the majority of the shift this patient's behavior was Green.   Behavioral interventions performed were   .    ED NURSE PHONE NUMBER: [id number]

## 2023-08-15 NOTE — PLAN OF CARE
Pt is discharging home at this time w/ all pt's belongings. AVS and education given. Pt will pick Coreg from [facility].Discharging w/ event monitor. Questions answered. Pt's Family member will transport.       Plan of Care Reviewed With: patient

## 2023-08-15 NOTE — DISCHARGE SUMMARY
"[facility]  Hospitalist Discharge Summary      Date of Admission:  8/14/2023  Date of Discharge:  8/15/2023  Discharging Provider: Dev Nagy MD  Discharge Service: Hospitalist Service    Discharge Diagnoses   Presyncopal episodes  Hyperkalemia  Mechanica AV  Anticoagulated on warfarin  Moderate ischemic cardiomyopathy  Hx atrial fibrillation  Hx of Maze procedure    Clinically Significant Risk Factors     # Overweight: Estimated body mass index is 29.92 kg/m  as calculated from the following:    Height as of this encounter: 1.753 m (5' 9\").    Weight as of this encounter: 91.9 kg (202 lb 9.6 oz).       Follow-ups Needed After Discharge   Follow-up Appointments     Follow-up and recommended labs and tests       1. Cardiology clinic  2. PCP as needed or as previously planned            Unresulted Labs Ordered in the Past 30 Days of this Admission       No orders found for last 31 day(s).        These results will be followed up by NA    Discharge Disposition   Discharged to home  Condition at discharge: Stable    Hospital Course   Jovon Mantilla is a 68 year old male admitted on 8/14/2023. He has hx of severe Aortic stenosis and underwent mechanical valve replacement, stable CAD in prox LAD, moderate ischemic cardiomyopathy with mild to mod mitral regurgitation with borderline soft to low blood pressures is being admitted with multiple episodes of presyncope.     Presyncopal episodes  - more recently his coreg was decreased 25 to 12.5 and lisinopril from 20 to 10 mg  - yet patient still every 3rd day had BP in 80s-100s  - cardiology consultation  - patient may yet need further down titration of blood pressure medications  - TTE as below, troponin trended and flat  - lisinopril, spirono, lasix held in hospital, BP WNL  - Cardiology reviewed echo, ok with discharge, patient feeling well and wants to go home  - he is ambulating the floor independently and no longer having symptoms as he " was prior to admission  At discharge, cardiology/hospitalist rec:  - hold spironolactone and lasix  - if weight increases 2lbs or more in a day, take 10 mg of lasix  - on 8/16 resume lisinopril 5mg if SBP is >100 at home  - lower dose coreg in place and will continue  - cardiac event monitor ordered  - cardiology clinic follow up arranged     Hyperkalemia  K 5.6 on adm.  Recheck normal at 4.3.  - consider recheck with cardiology clinic follow up    Mechanical AV  - INR therapeutic - 2.7 8/15  - Rph to dose coumadin - appreciated     ASCVD  Mod Ischemic cardiomyopathy  - hold lasix, lisinopril, and spironolactone in setting of above     Atrial Fibrillation  Hx of Maze procedure  - continue coumadin  - continue coreg at decreased dose of 6.25 bid (home is 12.5 bid)  - continue PTA digoxin  - appreciate      Consultations This Hospital Stay   CARDIOLOGY IP CONSULT  PHARMACY TO DOSE WARFARIN    Code Status   Full Code    Time Spent on this Encounter   I, Dev Nagy MD, personally saw the patient today and spent greater than 30 minutes discharging this patient.       Dev Nagy MD  [facility] EXTENDED RECOVERY AND SHORT STAY  [address] [place] [id number]  Phone: [phone]  ______________________________________________________________________    Physical Exam   Vital Signs: Temp: 97.5  F (36.4  C) Temp src: Oral BP: 107/66 Pulse: 86   Resp: 18 SpO2: 97 % O2 Device: None (Room air)    Weight: 202 lbs 9.6 oz    Gen: NAD, very pleasant  HEENT: EOMI, MMM  Resp: no focal crackles,  no wheezes, no increased work of resp  CV: S1S2 heard, irre ireg rhythm, reg rate, mechanical click noted  Abdo: soft, nontender, nondistended, bowel sounds present  Ext: calves nontender, well perfused  Neuro: aa, conversant, moving ext, CN grossly intact, no facial asymmetry         Primary Care Physician   Sneha Tran    Discharge Orders      Follow-Up with Cardiology VALDO      Reason for your  hospital stay    Lightheadedness, syncope     Follow-up and recommended labs and tests     1. Cardiology clinic  2. PCP as needed or as previously planned     Activity    Your activity upon discharge: activity as tolerated, avoid driving or operating machinery until your BP is more consistent and your symptoms are consistently resolved     Discharge Instructions    - hold (stop) spironolactone and lasix  - if weight increases 2lbs or more in a day, take 10 mg of lasix and contact your cardiologist  - on  resume lisinopril 5mg as long as SBP is >100 at home  - lower dose coreg in place and will continue  - cardiac event monitor ordered - cardiology will follow up with results  - cardiology clinic follow up arranged     Diet    Follow this diet upon discharge: Orders Placed This Encounter      Regular Diet Adult       Significant Results and Procedures   Most Recent 3 CBC's:  Recent Labs   Lab Test 23  0702 21  1146   WBC 5.3 4.0 5.1   HGB 11.9* 11.5* 10.8*   MCV 96 93 85    163 219     Most Recent 3 BMP's:  Recent Labs   Lab Test 08/15/23  1433 23  1215 22  0947   NA  --  136 139 138   POTASSIUM 4.3 5.6* 4.6 3.8   CHLORIDE  --  100 101 103   CO2  --  26 28 33*   BUN  --  15.6 19.4 17   CR  --  0.90 0.99 0.92   ANIONGAP  --  10 10 2*   REECE  --  8.9 9.3 9.0   GLC  --  138* 88 118*     Most Recent 3 INR's:  Recent Labs   Lab Test 08/15/23  0737 23  1141   INR 2.70* 2.81* 2.9*     Most Recent 3 Troponin's:  Recent Labs   Lab Test 02/10/21  1720   TROPI <0.015   ,   Results for orders placed or performed during the hospital encounter of 23   Echocardiogram Complete     Value    LVEF  35-40%    Narrative    [id number]  [id number]  [id number]  [id number]^SAMIA^SHELTON^WILLIE     [facility]  Echocardiography Laboratory  [address] [address]     Name: CHAS STAPLES  MRN: [id number]  : 1955  Study  Date: 08/15/2023 10:53 AM  Age: 68 yrs  Gender: Male  Patient Location: [facility]  Reason For Study: Syncope  Ordering Physician: SHELTON GRACIA  Referring Physician: SHELTON GRACIA  Performed By: Po Cortes     BSA: 2.1 m2  Height: 70 in  Weight: 199 lb  HR: 77  BP: 116/82 mmHg  ______________________________________________________________________________  Procedure  Complete Echo Adult. Optison (NDC #[id number]) given intravenously.  ______________________________________________________________________________  Interpretation Summary     The left ventricle is borderline dilated.  The visual ejection fraction is 35-40%.  Diastolic Doppler findings (E/E' ratio and/or other parameters) suggest left  ventricular filling pressures are indeterminate.  There is mod-severe global hypokinesia of the left ventricle.  Regional wall motion abnormalities cannot be excluded due to limited  visualization.  There is a mechanical aortic valve.  The gradient is normal for this prosthetic aortic valve.  The rhythm was atrial fibrillation.  ______________________________________________________________________________  Left Ventricle  The left ventricle is borderline dilated. There is normal left ventricular  wall thickness. Diastolic Doppler findings (E/E' ratio and/or other  parameters) suggest left ventricular filling pressures are indeterminate. The  visual ejection fraction is 35-40%. There is mod-severe global hypokinesia of  the left ventricle. Regional wall motion abnormalities cannot be excluded due  to limited visualization.     Right Ventricle  The right ventricle is normal in size and function.     Atria  The left atrium is severely dilated. The right atrium is moderately dilated.  There is no color Doppler evidence of an atrial shunt.     Mitral Valve  The mitral valve leaflets are mildly thickened. There is mild to moderate (1-  2+) mitral regurgitation.     Tricuspid Valve  There is mild to moderate (1-2+)  tricuspid regurgitation. The right  ventricular systolic pressure is approximated at 28.1 mmHg plus the right  atrial pressure. IVC diameter and respiratory changes fall into an  intermediate range suggesting an RA pressure of 8 mmHg.     Aortic Valve  There is trace aortic regurgitation. The mean AoV pressure gradient is 7.3  mmHg. There is a mechanical aortic valve. The gradient is normal for this  prosthetic aortic valve.     Pulmonic Valve  There is trace pulmonic valvular regurgitation.     Vessels  The aortic root is normal size. The ascending aorta is Borderline dilated.     Pericardium  There is no pericardial effusion.     Rhythm  The rhythm was atrial fibrillation.  ______________________________________________________________________________  MMode/2D Measurements & Calculations  IVSd: 1.1 cm     LVIDd: 5.6 cm  LVIDs: 4.6 cm  LVPWd: 0.89 cm  FS: 17.9 %  LV mass(C)d: 211.5 grams  LV mass(C)dI: 101.5 grams/m2  Ao root diam: 3.9 cm  LA dimension: 5.6 cm  LA/Ao: 1.4  LVOT diam: 2.3 cm  LVOT area: 4.2 cm2  LA Volume (BP): 301.0 ml  LA Volume Index (BP): 144.7 ml/m2  RV Base: 3.6 cm  RWT: 0.32  TAPSE: 0.79 cm     Doppler Measurements & Calculations  MV E max ethan: 120.2 cm/sec  MV A max ethan: 41.4 cm/sec  MV E/A: 2.9  MV max PG: 10.6 mmHg  MV mean PG: 3.0 mmHg  MV V2 VTI: 35.8 cm  MVA(VTI): 1.8 cm2  MV dec slope: 1004 cm/sec2  MV dec time: 0.14 sec  Ao V2 max: 184.0 cm/sec  Ao max P.0 mmHg  Ao V2 mean: 126.7 cm/sec  Ao mean P.3 mmHg  Ao V2 VTI: 29.9 cm  PATEL(I,D): 2.2 cm2  PATEL(V,D): 2.0 cm2  Ao acc time: 0.10 sec  LV V1 max PG: 3.2 mmHg  LV V1 max: 89.6 cm/sec  LV V1 VTI: 15.9 cm  MR PISA: 2.3 cm2  MR ERO: 0.13 cm2  MR volume: 21.8 ml  SV(LVOT): 65.9 ml  SI(LVOT): 31.6 ml/m2  PA acc time: 0.15 sec  PI end-d ethan: 127.0 cm/sec  TR max ethan: 248.0 cm/sec  TR max P.1 mmHg  AV Ethan Ratio (DI): 0.49  PATEL Index (cm2/m2): 1.1  E/E' av.4     Lateral E/e': 9.3  Medial E/e': 17.5  RV S Ethan: 6.5 cm/sec      ______________________________________________________________________________  Report approved by: Leonarda Wright 08/15/2023 02:17 PM           *Note: Due to a large number of results and/or encounters for the requested time period, some results have not been displayed. A complete set of results can be found in Results Review.       Discharge Medications   Current Discharge Medication List        CONTINUE these medications which have CHANGED    Details   carvedilol (COREG) 12.5 MG tablet Take 0.5 tablets (6.25 mg) by mouth 2 times daily (with meals)  Qty: 60 tablet, Refills: 0    Associated Diagnoses: ASVD (arteriosclerotic vascular disease); Hypertension goal BP (blood pressure) < 140/90      furosemide (LASIX) 20 MG tablet Take 0.5 tablets (10 mg) by mouth daily as needed (for weight gain 2lbs or more in a day)  Qty: 90 tablet, Refills: 1    Associated Diagnoses: Chronic right-sided heart failure (H); Coronary artery disease involving native coronary artery of native heart without angina pectoris      lisinopril (ZESTRIL) 5 MG tablet Take 1 tablet (5 mg) by mouth daily  Qty: 90 tablet, Refills: 3    Associated Diagnoses: ASVD (arteriosclerotic vascular disease)           CONTINUE these medications which have NOT CHANGED    Details   amoxicillin (AMOXIL) 500 MG capsule TAKE 4 CAPSULES 1 HOUR PRIOR TO APPOINTMENT      Ascorbic Acid (VITAMIN C PO) Take 500 mg by mouth daily      aspirin 81 MG tablet Take 81 mg by mouth Every Mon, Wed, Fri Morning      calcium carbonate-vitamin D 600-200 MG-UNIT TABS Take 1 tablet by mouth 2 times daily       Cholecalciferol (VITAMIN D) 2000 UNIT CAPS Take 1 tablet by mouth daily       Cyanocobalamin (VITAMIN B-12) 500 MCG SUBL Place 500 mcg under the tongue daily       diclofenac (VOLTAREN) 1 % topical gel APPLY 4 G TOPICALLY 4 TIMES DAILY AS NEEDED FOR MODERATE PAIN  Qty: 100 g, Refills: 1    Associated Diagnoses: Greater trochanteric bursitis of right hip      digoxin  (LANOXIN) 125 MCG tablet Take 1 tablet (125 mcg) by mouth daily  Qty: 90 tablet, Refills: 3    Associated Diagnoses: Chronic atrial fibrillation (H)      Fexofenadine HCl (ALLERGY 24-HR PO) Take 180 mg by mouth daily       fluticasone (FLONASE) 50 MCG/ACT nasal spray Spray 1 spray into both nostrils daily      levothyroxine (SYNTHROID/LEVOTHROID) 200 MCG tablet TAKE 1 TABLET BY MOUTH EVERY DAY  Qty: 90 tablet, Refills: 3    Comments: DX Code Needed-Acquired hypothyroidism  E03.9  Associated Diagnoses: Acquired hypothyroidism      Multiple Vitamins-Minerals (CENTRUM) CHEW Take 1 tablet by mouth daily       nitroGLYcerin (NITROSTAT) 0.3 MG sublingual tablet For chest pain place 1 tablet under the tongue every 5 minutes for 3 doses. If symptoms persist 5 minutes after 1st dose call 911.  Qty: 25 tablet, Refills: 1    Associated Diagnoses: H/O aortic valve replacement; SOBOE (shortness of breath on exertion); ASVD (arteriosclerotic vascular disease)      Psyllium (FIBER) 0.52 GM CAPS Take 2 capsules by mouth daily      sertraline (ZOLOFT) 50 MG tablet Take 1 tablet (50 mg) by mouth daily  Qty: 90 tablet, Refills: 3    Associated Diagnoses: Anxiety      simvastatin (ZOCOR) 20 MG tablet Take 1 tablet (20 mg) by mouth At Bedtime  Qty: 90 tablet, Refills: 3    Associated Diagnoses: Hyperlipidemia LDL goal <70      warfarin ANTICOAGULANT (COUMADIN) 4 MG tablet Take by mouth every evening 6 mg on Tuesdays, 4 mg all other days           STOP taking these medications       spironolactone (ALDACTONE) 25 MG tablet Comments:   Reason for Stopping:             Allergies   Allergies   Allergen Reactions    Seasonal Allergies

## 2023-08-15 NOTE — ED TRIAGE NOTES
Pt reports multiple fainting spells that he feels coming on like a hot flash wife states that he does not repsond and gets lows bp during these speels      Pt has hx of artifical heart valve  and hx of MI in 2000     Denies any CP or SOB     Pt also has hx of afib and does take blood thinners

## 2023-08-15 NOTE — PROGRESS NOTES
ANTICOAGULATION  MANAGEMENT: Discharge Review    Jovon Mantilla chart reviewed for anticoagulation continuity of care    Hospital Admission on 8/14-8/15/23 for syncope.    Discharge disposition: Home    Results:    Recent labs: (last 7 days)     08/14/23  2023 08/15/23  0737   INR 2.81* 2.70*     Anticoagulation inpatient management:     not applicable     Anticoagulation discharge instructions:     Warfarin dosing: home regimen continued   Bridging: No   INR goal change: No      Medication changes affecting anticoagulation: No - per Uptodate, no interaction with spironolactone so discontinuation should not impact INR    Additional factors affecting anticoagulation: No     PLAN     No adjustment to anticoagulation plan needed    Patient not contacted - he was contacted earlier today and asked to schedule as soon as possible.     No adjustment to Anticoagulation Calendar was required    Teodora Redd RN

## 2023-08-16 ENCOUNTER — PATIENT OUTREACH (OUTPATIENT)
Dept: CARE COORDINATION | Facility: CLINIC | Age: 68
End: 2023-08-16
Payer: COMMERCIAL

## 2023-08-16 ENCOUNTER — TELEPHONE (OUTPATIENT)
Dept: CARDIOLOGY | Facility: CLINIC | Age: 68
End: 2023-08-16
Payer: COMMERCIAL

## 2023-08-16 NOTE — TELEPHONE ENCOUNTER
Patient was admitted to [facility] on 8/14/23 with near syncope. Cardiology consulted- Lisinopril, Spironolactone, Lasix were held in hospital and BP WNL.    PMH: severe aortic stenosis, status post AVR in January 2011, atrial fibrillation and status post maze procedure with recurrent AF, on chronic anticoagulation with rate control, CAD, cardiomyopathy.    8/15/23: Echo showed EF of 35-40%. There is mod-severe global hypokinesia of the left ventricle. Regional wall motion abnormalities cannot be excluded due to limited visualization. There is a mechanical aortic valve. The gradient is normal for this prosthetic aortic valve. The rhythm was atrial fibrillation.    Pt was started on lower dosages of PTA Coreg and Lisinopril (on 8/16/23 resume Lisinopril 5 mg if SBP is >100 at home). Lasix 10 mg if weight increases 2 lbs or more in a day. Spironolactone is held at time of discharge. 7 day Zio patch monitor placed at time of discharge.    Pt is scheduled for an OV on 10/2/23 at 1300 with VALDO Ling Pittman at our [facility].    Writer attempted to call pt for a cardiology post discharge phone call, but no answer. VM left to return my call. MIGUEL Hammond RN.

## 2023-08-16 NOTE — TELEPHONE ENCOUNTER
Writer attempted to return pt's phone message, but now, no answer. VM left to return my phone call. MIGUEL Hammond RN.

## 2023-08-16 NOTE — TELEPHONE ENCOUNTER
[employer] Event Monitor with Urgent Report from 8/15/23 with findings on new onset atrial fibrillation with IVCD and PVC(s).  Heart rate of 80.  Pt on chronic anti-coagulation (warfarin).  Routing to Team 6 as pt last saw Mattie Islas on 7/27/23 for review, and placed in Team 6 in-clinic in-box.

## 2023-08-16 NOTE — PROGRESS NOTES
[facility]    Background: Transitional Care Management program identified per system criteria and reviewed by [facility] team for possible outreach.    Assessment: Upon chart review, CCR Team member will not proceed with patient outreach related to this episode of Transitional Care Management program due to reason below:    Patient has active communication with a nurse, provider or care team for reason of post-hospital follow up plan.  Outreach call by CCR team not indicated to minimize duplicative efforts.     Plan: Transitional Care Management episode addressed appropriately per reason noted above.      Dee Lux RN  [facility]    *Connected Care Resource Team does NOT follow patient ongoing. Referrals are identified based on internal discharge reports and the outreach is to ensure patient has an understanding of their discharge instructions.

## 2023-08-16 NOTE — TELEPHONE ENCOUNTER
Known afib, rate controlled and patient anticoagulated.  Monitor done to assess for symptomatic bradycardia.  Will await monitor results.  Aye Tineo RN on 8/16/2023 at 3:24 PM

## 2023-08-22 ENCOUNTER — LAB (OUTPATIENT)
Dept: LAB | Facility: CLINIC | Age: 68
End: 2023-08-22
Payer: COMMERCIAL

## 2023-08-22 ENCOUNTER — ANTICOAGULATION THERAPY VISIT (OUTPATIENT)
Dept: ANTICOAGULATION | Facility: CLINIC | Age: 68
End: 2023-08-22

## 2023-08-22 DIAGNOSIS — Z95.2 S/P AVR (AORTIC VALVE REPLACEMENT): Primary | ICD-10-CM

## 2023-08-22 DIAGNOSIS — Z79.01 LONG TERM CURRENT USE OF ANTICOAGULANT THERAPY: ICD-10-CM

## 2023-08-22 DIAGNOSIS — I48.21 PERMANENT ATRIAL FIBRILLATION (H): ICD-10-CM

## 2023-08-22 DIAGNOSIS — Z95.2 H/O AORTIC VALVE REPLACEMENT: ICD-10-CM

## 2023-08-22 DIAGNOSIS — Z95.2 S/P AVR (AORTIC VALVE REPLACEMENT): ICD-10-CM

## 2023-08-22 DIAGNOSIS — S30.1XXA ABDOMINAL WALL HEMATOMA, INITIAL ENCOUNTER: ICD-10-CM

## 2023-08-22 DIAGNOSIS — Z79.01 LONG TERM CURRENT USE OF ANTICOAGULANTS WITH INR GOAL OF 2.5-3.5: ICD-10-CM

## 2023-08-22 LAB — INR BLD: 2.5 (ref 0.9–1.1)

## 2023-08-22 PROCEDURE — 36415 COLL VENOUS BLD VENIPUNCTURE: CPT

## 2023-08-22 PROCEDURE — 85610 PROTHROMBIN TIME: CPT

## 2023-08-22 NOTE — TELEPHONE ENCOUNTER
Third attempt at trying to contact pt for a cardiology post discharge phone call. Pt did answer.    Called patient to discuss any post hospital d/c questions, review discharge medication, and confirm f/u appts. Patient denied any questions regarding new or changes to PTA medications.     Patient denied any SOB, chest pain, edema, or light headedness. SBP's have been 120-140's since being home. Weight has been stable and he has not needed to take any Lasix. Resumed Lisinopril per parameters below.    RN confirmed with patient that he is scheduled for an OV on 10/2/23 at 1300 with VALDO Ling Pittman at our [facility]. States he has Dr. Cooley's Team RN phone number.    Instructed patient to continue weigh self every AM, after waking and using the restroom, but before breakfast and medications. Call clinic for a weight gain of 2 lbs overnight or 5 lbs in a week. Low Na+ diet encouraged. Pt instructed to bring daily wt/BP diary and medications with to f/u OV.     Patient advised to call clinic with any cardiac related questions or concerns prior to his appt. Patient verbalized understanding and agreed with plan. MIGUEL Hammond RN.

## 2023-08-22 NOTE — PROGRESS NOTES
ANTICOAGULATION MANAGEMENT     Jovon Mantilla 68 year old male is on warfarin with therapeutic INR result. (Goal INR 2.5-3.5)    Recent labs: (last 7 days)     08/22/23  1025   INR 2.5*       ASSESSMENT     Source(s): Chart Review and Patient/Caregiver Call     Warfarin doses taken: Warfarin taken as instructed  Diet: No new diet changes identified  Medication/supplement changes: Lasix and spironolactone has been stopped.  Per Up to Date, Lasix can lower the INR, so INR may increase with this change.  Coreg and lisinopril doses have been lowered.  Patient will also be starting doxycycline once daily for 3 weeks (starting either today or tomorrow).  Doxycycline can increase INR so closer monitoring is needed.  New illness, injury, or hospitalization: Yes: admitted on 8/14/23 for syncope.  Signs or symptoms of bleeding or clotting: No  Previous result: Therapeutic last 2(+) visits  Additional findings: Leaving for [place] on 9/13/23.  Planning to have cataract surgery mid October and again about 10 days later on the other eye.  No date set at this time.  Advised patient to notify INR clinic once he has a date.  Warfarin should not need to be held for cataract surgery.       PLAN     Recommended plan for ongoing change(s) affecting INR     Dosing Instructions: Continue your current warfarin dose with next INR in 1 week       Summary  As of 8/22/2023      Full warfarin instructions:  6 mg every Tue; 4 mg all other days   Next INR check:  8/29/2023               Telephone call with Jovon who agrees to plan and repeated back plan correctly    Lab visit scheduled    Education provided:   Please call back if any changes to your diet, medications or how you've been taking warfarin  Goal range and lab monitoring: goal range and significance of current result  Interaction IS anticipated between warfarin and doxycycline and Lasix.    Plan made per [facility] anticoagulation protocol    Isabel Cheney, RN  Anticoagulation  Clinic  8/22/2023    _______________________________________________________________________     Anticoagulation Episode Summary       Current INR goal:  2.5-3.5   TTR:  64.0 % (1 y)   Target end date:  Indefinite   Send INR reminders to:  [facility]    Indications    S/P AVR (aortic valve replacement) [Z95.2]  Permanent atrial fibrillation (H) [I48.21]  Long term current use of anticoagulants with INR goal of 2.5-3.5 [Z79.01]  Abdominal wall hematoma  initial encounter [S30.1XXA]  Long-term (current) use of anticoagulants [Z79.01] [Z79.01]             Comments:  24mm ATS Valve             Anticoagulation Care Providers       Provider Role Specialty Phone number    Rehan Lorenzana MD Referring Family Medicine [phone]    Ramana Redd MD Referring Student in organized health care education/training program [phone]    Noy Cooley MD Referring Cardiovascular Disease [phone]    Noe Ramirez, PA-C Responsible Family Medicine [phone]

## 2023-08-29 ENCOUNTER — ANTICOAGULATION THERAPY VISIT (OUTPATIENT)
Dept: ANTICOAGULATION | Facility: CLINIC | Age: 68
End: 2023-08-29

## 2023-08-29 ENCOUNTER — LAB (OUTPATIENT)
Dept: LAB | Facility: CLINIC | Age: 68
End: 2023-08-29
Payer: COMMERCIAL

## 2023-08-29 DIAGNOSIS — I48.21 PERMANENT ATRIAL FIBRILLATION (H): ICD-10-CM

## 2023-08-29 DIAGNOSIS — Z79.01 LONG TERM CURRENT USE OF ANTICOAGULANT THERAPY: ICD-10-CM

## 2023-08-29 DIAGNOSIS — Z95.2 S/P AVR (AORTIC VALVE REPLACEMENT): Primary | ICD-10-CM

## 2023-08-29 DIAGNOSIS — Z95.2 H/O AORTIC VALVE REPLACEMENT: ICD-10-CM

## 2023-08-29 DIAGNOSIS — Z79.01 LONG TERM CURRENT USE OF ANTICOAGULANTS WITH INR GOAL OF 2.5-3.5: ICD-10-CM

## 2023-08-29 DIAGNOSIS — S30.1XXA ABDOMINAL WALL HEMATOMA, INITIAL ENCOUNTER: ICD-10-CM

## 2023-08-29 DIAGNOSIS — Z95.2 S/P AVR (AORTIC VALVE REPLACEMENT): ICD-10-CM

## 2023-08-29 LAB — INR BLD: 3.2 (ref 0.9–1.1)

## 2023-08-29 PROCEDURE — 85610 PROTHROMBIN TIME: CPT

## 2023-08-29 PROCEDURE — 36416 COLLJ CAPILLARY BLOOD SPEC: CPT

## 2023-08-29 NOTE — PROGRESS NOTES
Left message to call [phone].     Did patient start antibiotic?    Stephania Redd RN, BSN  Anticoagulation Clinic

## 2023-08-29 NOTE — PROGRESS NOTES
ANTICOAGULATION MANAGEMENT     Jovon Mantilla 68 year old male is on warfarin with therapeutic INR result. (Goal INR 2.5-3.5)    Recent labs: (last 7 days)     08/29/23  1020   INR 3.2*       ASSESSMENT     Source(s): Chart Review and Patient/Caregiver Call     Warfarin doses taken: Warfarin taken as instructed  Diet: No new diet changes identified, patient does not eat many fruits or vegetables, patient will try to drink a little V8 while taking his antibiotic  Medication/supplement changes:  Doxycycline started on 8/23/23 will take for 3 weeks Per [employer]: Concurrent use of WARFARIN and DOXYCYCLINE may result in an increased risk of bleeding.  Closer monitoring of INR  New illness, injury, or hospitalization: No  Signs or symptoms of bleeding or clotting: No  Previous result: Therapeutic last 2(+) visits  Additional findings: Patient is leaving for [place] 9/13/23       PLAN     Recommended plan for temporary change(s) affecting INR     Dosing Instructions: Continue your current warfarin dose with next INR in 1 week       Summary  As of 8/29/2023      Full warfarin instructions:  6 mg every Tue; 4 mg all other days   Next INR check:  9/5/2023               Telephone call with Jovon who verbalizes understanding and agrees to plan    Lab visit scheduled    Education provided:   Please call back if any changes to your diet, medications or how you've been taking warfarin  Contact [phone]  with any changes, questions or concerns.     Plan made per [facility] anticoagulation protocol    Stephania Redd RN  Anticoagulation Clinic  8/29/2023    _______________________________________________________________________     Anticoagulation Episode Summary       Current INR goal:  2.5-3.5   TTR:  64.0 % (1 y)   Target end date:  Indefinite   Send INR reminders to:  [facility]    Indications    S/P AVR (aortic valve replacement) [Z95.2]  Permanent atrial fibrillation (H) [I48.21]  Long term current use of anticoagulants  with INR goal of 2.5-3.5 [Z79.01]  Abdominal wall hematoma  initial encounter [S30.1XXA]  Long-term (current) use of anticoagulants [Z79.01] [Z79.01]             Comments:  24mm ATS Valve             Anticoagulation Care Providers       Provider Role Specialty Phone number    Rehan Lorenzana MD Referring Family Medicine [phone]    Ramana Redd MD Referring Student in organized health care education/training program [phone]    Noy Cooley MD Referring Cardiovascular Disease [phone]    Noe Ramirez PA-C Responsible Family Medicine [phone]

## 2023-09-05 ENCOUNTER — MYC MEDICAL ADVICE (OUTPATIENT)
Dept: CARDIOLOGY | Facility: CLINIC | Age: 68
End: 2023-09-05

## 2023-09-05 ENCOUNTER — LAB (OUTPATIENT)
Dept: LAB | Facility: CLINIC | Age: 68
End: 2023-09-05
Payer: COMMERCIAL

## 2023-09-05 ENCOUNTER — ANTICOAGULATION THERAPY VISIT (OUTPATIENT)
Dept: ANTICOAGULATION | Facility: CLINIC | Age: 68
End: 2023-09-05

## 2023-09-05 DIAGNOSIS — S30.1XXA ABDOMINAL WALL HEMATOMA, INITIAL ENCOUNTER: ICD-10-CM

## 2023-09-05 DIAGNOSIS — I42.0 DILATED CARDIOMYOPATHY (H): ICD-10-CM

## 2023-09-05 DIAGNOSIS — I48.21 PERMANENT ATRIAL FIBRILLATION (H): ICD-10-CM

## 2023-09-05 DIAGNOSIS — Z95.2 H/O AORTIC VALVE REPLACEMENT: ICD-10-CM

## 2023-09-05 DIAGNOSIS — Z79.01 LONG TERM CURRENT USE OF ANTICOAGULANT THERAPY: ICD-10-CM

## 2023-09-05 DIAGNOSIS — Z95.2 S/P AVR (AORTIC VALVE REPLACEMENT): ICD-10-CM

## 2023-09-05 DIAGNOSIS — Z95.2 S/P AVR (AORTIC VALVE REPLACEMENT): Primary | ICD-10-CM

## 2023-09-05 DIAGNOSIS — Z79.01 LONG TERM CURRENT USE OF ANTICOAGULANTS WITH INR GOAL OF 2.5-3.5: ICD-10-CM

## 2023-09-05 DIAGNOSIS — I25.10 CORONARY ARTERY DISEASE INVOLVING NATIVE CORONARY ARTERY OF NATIVE HEART WITHOUT ANGINA PECTORIS: Primary | ICD-10-CM

## 2023-09-05 LAB — INR BLD: 3.2 (ref 0.9–1.1)

## 2023-09-05 PROCEDURE — 85610 PROTHROMBIN TIME: CPT

## 2023-09-05 PROCEDURE — 36416 COLLJ CAPILLARY BLOOD SPEC: CPT

## 2023-09-05 RX ORDER — CARVEDILOL 12.5 MG/1
12.5 TABLET ORAL 2 TIMES DAILY WITH MEALS
Qty: 180 TABLET | Refills: 3 | Status: SHIPPED | OUTPATIENT
Start: 2023-09-05 | End: 2024-06-03

## 2023-09-05 NOTE — TELEPHONE ENCOUNTER
"Reply sent to patient:    Mita, Mr. Mantilla,    Dr. Cooley replied, \"  Okay, lets increase it to 12.5 mg p.o. twice daily.  He will need to keep track of his blood pressures over the next few weeks.       We sent a new script for the 12.5mg size to your pharmacy.    Let us know if you BP starts to drift too low: <100 systolic (top number) or if you are dizzy or lightheaded.    Thank you for checking in  Team 2 R.N.s  [phone]  "

## 2023-09-05 NOTE — TELEPHONE ENCOUNTER
Okay, lets increase it to 12.5 mg p.o. twice daily.  He will need to keep track of his blood pressures over the next few weeks.  Thanks.

## 2023-09-05 NOTE — TELEPHONE ENCOUNTER
My chart message from patient:  Esteban Cooley,   Just a note that ever since my coreg had been reduced from 25mg twice a day down to 6.2mg twice a day,  I have noticed two changes in me.   1) palpitations are more often and louder   2) the 6.2mg isn't lasting 12 hours as my body is feels much slower and greater palpitations towards the end of the 12 hours dose.   Going from 25x a day down to 6.2x a day is a huge difference to say the least.   Perhaps raising it to 7.5 or whatever the next higher dose is would be helpful.   I have also attached a copy of my BP reading for your convenience.   If you feel a change is warranted,  I would need a new rx.   Thank you.   Jovon Mantilla   Data [facility] Details User details Gender : Male First name : Jovon Last name : Jose Rafael Birthday : 1955 Height : 5' 9''    Weight     Blood pressure Date Time Sys. Karissa. Pulse MAP Cardiac arrhythmia 09/03/2023 02:10  62 114 82 09/02/2023 10:47  78 68 92 09/01/2023 11:13  78 98 97 08/31/2023 11:49  83 73 97 08/30/2023 12:51  89 101 99 08/29/2023 06:09  66 91 89 08/29/2023 01:41  73 56 94 08/28/2023 01:29  68 48 79 08/27/2023 07:24  80 77 97 08/26/2023 11:59  79 122 93 08/25/2023 04:34  53 76 76 08/25/2023 12:38  87 70 103 08/25/2023 12:35  93 92 106 08/24/2023 09:34  68 66 80 08/23/2023 12:07  64 78 80 08/23/2023 09:05  66 82 84 08/22/2023 05:44  91 71 98 08/22/2023 09:17  77 86 90 08/22/2023 08:01  79 77 98   ==============================    Patient to see VALDO Ling Pittman on 10/2/2023    Per Discharge note 8/15/2023:  Presyncopal episodes  - more recently his coreg was decreased 25 to 12.5 and lisinopril from 20 to 10 mg  - yet patient still every 3rd day had BP in 80s-100s  - cardiology consultation  - patient may yet need further down titration of blood pressure medications  - TTE as below, troponin trended and flat  -  lisinopril, spirono, lasix held in hospital, BP WNL  - Cardiology reviewed echo, ok with discharge, patient feeling well and wants to go home  - he is ambulating the floor independently and no longer having symptoms as he was prior to admission  At discharge, cardiology/hospitalist rec:  - hold spironolactone and lasix  - if weight increases 2lbs or more in a day, take 10 mg of lasix  - on 8/16 resume lisinopril 5mg if SBP is >100 at home  - lower dose coreg in place and will continue  - cardiac event monitor ordered  - cardiology clinic follow up arranged   Atrial Fibrillation  Hx of Maze procedure  - continue coumadin  - continue coreg at decreased dose of 6.25 bid (home is 12.5 bid)  - continue PTA digoxin  - appreciate

## 2023-09-05 NOTE — PROGRESS NOTES
ANTICOAGULATION MANAGEMENT     Jovon Mantilla 68 year old male is on warfarin with therapeutic INR result. (Goal INR 2.5-3.5)    Recent labs: (last 7 days)     09/05/23  1331   INR 3.2*       ASSESSMENT     Source(s): Chart Review and Patient/Caregiver Call     Warfarin doses taken: Warfarin taken as instructed  Diet: No new diet changes identified  Medication/supplement changes:  doxycycline x3 weeks - 8/23-9/13  New illness, injury, or hospitalization: No  Signs or symptoms of bleeding or clotting: No  Previous result: Therapeutic last 2(+) visits  Additional findings:  standing order mailed to pt (going to [place] 9/13/23 - October)       PLAN     Recommended plan for no diet, medication or health factor changes affecting INR     Dosing Instructions: Continue your current warfarin dose with next INR in 1 week       Summary  As of 9/5/2023      Full warfarin instructions:  6 mg every Tue; 4 mg all other days   Next INR check:  9/12/2023               Telephone call with Jovon who verbalizes understanding and agrees to plan    Lab visit scheduled    Education provided:   Please call back if any changes to your diet, medications or how you've been taking warfarin    Plan made per [facility] anticoagulation protocol    Kristin Faria, RN  Anticoagulation Clinic  9/5/2023    _______________________________________________________________________     Anticoagulation Episode Summary       Current INR goal:  2.5-3.5   TTR:  64.0 % (1 y)   Target end date:  Indefinite   Send INR reminders to:  [facility]    Indications    S/P AVR (aortic valve replacement) [Z95.2]  Permanent atrial fibrillation (H) [I48.21]  Long term current use of anticoagulants with INR goal of 2.5-3.5 [Z79.01]  Abdominal wall hematoma  initial encounter [S30.1XXA]  Long-term (current) use of anticoagulants [Z79.01] [Z79.01]             Comments:  24mm ATS Valve             Anticoagulation Care Providers       Provider Role Specialty Phone number     Rehan Lorenzana MD Referring Family Medicine [phone]    Ramana Redd MD Referring Student in organized health care education/training program [phone]    Noy Cooley MD Referring Cardiovascular Disease [phone]    Noe Ramirez, PA-C Responsible Family Medicine [phone]

## 2023-09-12 ENCOUNTER — LAB (OUTPATIENT)
Dept: LAB | Facility: CLINIC | Age: 68
End: 2023-09-12
Payer: COMMERCIAL

## 2023-09-12 ENCOUNTER — ANTICOAGULATION THERAPY VISIT (OUTPATIENT)
Dept: ANTICOAGULATION | Facility: CLINIC | Age: 68
End: 2023-09-12

## 2023-09-12 DIAGNOSIS — Z79.01 LONG TERM CURRENT USE OF ANTICOAGULANT THERAPY: ICD-10-CM

## 2023-09-12 DIAGNOSIS — Z95.2 S/P AVR (AORTIC VALVE REPLACEMENT): ICD-10-CM

## 2023-09-12 DIAGNOSIS — Z79.01 LONG TERM CURRENT USE OF ANTICOAGULANTS WITH INR GOAL OF 2.5-3.5: ICD-10-CM

## 2023-09-12 DIAGNOSIS — Z95.2 H/O AORTIC VALVE REPLACEMENT: ICD-10-CM

## 2023-09-12 DIAGNOSIS — S30.1XXA ABDOMINAL WALL HEMATOMA, INITIAL ENCOUNTER: ICD-10-CM

## 2023-09-12 DIAGNOSIS — I48.21 PERMANENT ATRIAL FIBRILLATION (H): ICD-10-CM

## 2023-09-12 DIAGNOSIS — Z95.2 S/P AVR (AORTIC VALVE REPLACEMENT): Primary | ICD-10-CM

## 2023-09-12 LAB — INR BLD: 3.7 (ref 0.9–1.1)

## 2023-09-12 PROCEDURE — 85610 PROTHROMBIN TIME: CPT

## 2023-09-12 PROCEDURE — 36415 COLL VENOUS BLD VENIPUNCTURE: CPT

## 2023-09-12 NOTE — PROGRESS NOTES
ANTICOAGULATION MANAGEMENT     Jovon Mantilla 68 year old male is on warfarin with supratherapeutic INR result. (Goal INR 2.5-3.5)    Recent labs: (last 7 days)     09/12/23  1256   INR 3.7*       ASSESSMENT     Source(s): Chart Review  Previous INR was Therapeutic last 2(+) visits  Medication, diet, health changes since last INR chart reviewed; none identified         PLAN     Unable to reach Jovon today.    Left message to continue current dose of warfarin 6 mg tonight. Request call back for assessment. Left message to call [phone].     Follow up required to confirm warfarin dose taken and assess for changes    Stephania Redd RN  Anticoagulation Clinic  9/12/2023

## 2023-09-13 NOTE — PROGRESS NOTES
ANTICOAGULATION MANAGEMENT     Jovon Mantilla 68 year old male is on warfarin with supratherapeutic INR result. (Goal INR 2.5-3.5)    Recent labs: (last 7 days)     09/12/23  1256   INR 3.7*       ASSESSMENT     Source(s): Chart Review and Patient/Caregiver Call     Warfarin doses taken: Warfarin taken as instructed  Diet: No new diet changes identified  Medication/supplement changes:  Patient reports he stopped the doxycycline a week early  New illness, injury, or hospitalization: No  Signs or symptoms of bleeding or clotting: No  Previous result: Therapeutic last 2(+) visits  Additional findings: None       PLAN     Recommended plan for no diet, medication or health factor changes affecting INR     Dosing Instructions: Continue your current warfarin dose with next INR in 1-2 weeks       Summary  As of 9/12/2023      Full warfarin instructions:  6 mg every Tue; 4 mg all other days   Next INR check:  9/26/2023               Telephone call with Jovon who verbalizes understanding and agrees to plan    Patient using outside facility for labs, Jovon has an INR order with him to have his INR checked while he is in [place]    Education provided:   Please call back if any changes to your diet, medications or how you've been taking warfarin  Contact [phone]  with any changes, questions or concerns.     Plan made per [facility] anticoagulation protocol    Stephania Redd RN  Anticoagulation Clinic  9/13/2023    _______________________________________________________________________     Anticoagulation Episode Summary       Current INR goal:  2.5-3.5   TTR:  63.1 % (1 y)   Target end date:  Indefinite   Send INR reminders to:  [facility]    Indications    S/P AVR (aortic valve replacement) [Z95.2]  Permanent atrial fibrillation (H) [I48.21]  Long term current use of anticoagulants with INR goal of 2.5-3.5 [Z79.01]  Abdominal wall hematoma  initial encounter [S30.1XXA]  Long-term (current) use of anticoagulants  [Z79.01] [Z79.01]             Comments:  24mm ATS Valve             Anticoagulation Care Providers       Provider Role Specialty Phone number    Rehan Lorenzana MD Referring Family Medicine [phone]    Ramana Redd MD Referring Student in organized health care education/training program [phone]    Noy Cooley MD Referring Cardiovascular Disease [phone]    Noe Ramirez PA-C Responsible Family Medicine [phone]

## 2023-09-27 DIAGNOSIS — I25.10 CORONARY ARTERY DISEASE INVOLVING NATIVE CORONARY ARTERY OF NATIVE HEART WITHOUT ANGINA PECTORIS: ICD-10-CM

## 2023-09-27 DIAGNOSIS — I50.812 CHRONIC RIGHT-SIDED HEART FAILURE (H): ICD-10-CM

## 2023-09-27 RX ORDER — FUROSEMIDE 20 MG
10 TABLET ORAL DAILY PRN
Qty: 90 TABLET | Refills: 1 | Status: SHIPPED | OUTPATIENT
Start: 2023-09-27 | End: 2023-12-26

## 2023-09-27 NOTE — PROGRESS NOTES
[facility] Refill Guideline reviewed.  Medication meets criteria for refill. JASON Choudhury RN

## 2023-10-02 LAB — INR (EXTERNAL): 3.1 (ref 0.9–1.1)

## 2023-10-03 ENCOUNTER — ANTICOAGULATION THERAPY VISIT (OUTPATIENT)
Dept: ANTICOAGULATION | Facility: CLINIC | Age: 68
End: 2023-10-03
Payer: COMMERCIAL

## 2023-10-03 DIAGNOSIS — Z95.2 S/P AVR (AORTIC VALVE REPLACEMENT): Primary | ICD-10-CM

## 2023-10-03 DIAGNOSIS — Z79.01 LONG TERM CURRENT USE OF ANTICOAGULANT THERAPY: ICD-10-CM

## 2023-10-03 DIAGNOSIS — Z79.01 LONG TERM CURRENT USE OF ANTICOAGULANTS WITH INR GOAL OF 2.5-3.5: ICD-10-CM

## 2023-10-03 DIAGNOSIS — S30.1XXA ABDOMINAL WALL HEMATOMA, INITIAL ENCOUNTER: ICD-10-CM

## 2023-10-03 DIAGNOSIS — I48.21 PERMANENT ATRIAL FIBRILLATION (H): ICD-10-CM

## 2023-10-03 NOTE — PROGRESS NOTES
ANTICOAGULATION MANAGEMENT     Jovon Mantilla 68 year old male is on warfarin with therapeutic INR result. (Goal INR 2.5-3.5)    Recent labs: (last 7 days)     10/02/23  0800   INR 3.1*       ASSESSMENT     Source(s): Chart Review and Patient/Caregiver Call     Warfarin doses taken: Warfarin taken as instructed  Diet: No new diet changes identified  Medication/supplement changes: None noted  New illness, injury, or hospitalization: No  Signs or symptoms of bleeding or clotting: No  Previous result: Supratherapeutic  Additional findings:  Will be back in [place] 10/18 until early Jan 2024       PLAN     Recommended plan for no diet, medication or health factor changes affecting INR     Dosing Instructions: Continue your current warfarin dose with next INR in 4 weeks       Summary  As of 10/3/2023      Full warfarin instructions:  6 mg every Tue; 4 mg all other days   Next INR check:  10/31/2023               Telephone call with Jovon who verbalizes understanding and agrees to plan    Lab visit scheduled    Education provided:   Please call back if any changes to your diet, medications or how you've been taking warfarin    Plan made per [facility] anticoagulation protocol    Gabino Martinez RN  Anticoagulation Clinic  10/3/2023    _______________________________________________________________________     Anticoagulation Episode Summary       Current INR goal:  2.5-3.5   TTR:  61.3 % (1 y)   Target end date:  Indefinite   Send INR reminders to:  [facility]    Indications    S/P AVR (aortic valve replacement) [Z95.2]  Long-term (current) use of anticoagulants [Z79.01] [Z79.01]  Permanent atrial fibrillation (H) [I48.21]  Abdominal wall hematoma  initial encounter [S30.1XXA]             Comments:  24mm ATS Valve             Anticoagulation Care Providers       Provider Role Specialty Phone number    Noe Ramirez PA-C [facility] Medicine [phone]

## 2023-10-31 ENCOUNTER — ANTICOAGULATION THERAPY VISIT (OUTPATIENT)
Dept: ANTICOAGULATION | Facility: CLINIC | Age: 68
End: 2023-10-31

## 2023-10-31 ENCOUNTER — LAB (OUTPATIENT)
Dept: LAB | Facility: CLINIC | Age: 68
End: 2023-10-31
Payer: COMMERCIAL

## 2023-10-31 DIAGNOSIS — S30.1XXA ABDOMINAL WALL HEMATOMA, INITIAL ENCOUNTER: ICD-10-CM

## 2023-10-31 DIAGNOSIS — Z79.01 LONG TERM CURRENT USE OF ANTICOAGULANT THERAPY: ICD-10-CM

## 2023-10-31 DIAGNOSIS — Z95.2 S/P AVR (AORTIC VALVE REPLACEMENT): Primary | ICD-10-CM

## 2023-10-31 DIAGNOSIS — Z95.2 S/P AVR (AORTIC VALVE REPLACEMENT): ICD-10-CM

## 2023-10-31 DIAGNOSIS — Z95.2 H/O AORTIC VALVE REPLACEMENT: ICD-10-CM

## 2023-10-31 DIAGNOSIS — I48.21 PERMANENT ATRIAL FIBRILLATION (H): ICD-10-CM

## 2023-10-31 PROBLEM — M25.551 HIP PAIN, RIGHT: Status: RESOLVED | Noted: 2023-06-27 | Resolved: 2023-10-31

## 2023-10-31 LAB — INR BLD: 3.5 (ref 0.9–1.1)

## 2023-10-31 PROCEDURE — 85610 PROTHROMBIN TIME: CPT

## 2023-10-31 PROCEDURE — 36416 COLLJ CAPILLARY BLOOD SPEC: CPT

## 2023-10-31 NOTE — PROGRESS NOTES
Patient has not returned to PT since the visit on 7-13-23. Therefore we will discharge patient from PT att his time and resolve the episode.
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
## 2023-12-01 ENCOUNTER — TELEPHONE (OUTPATIENT)
Dept: CARDIOLOGY | Facility: CLINIC | Age: 68
End: 2023-12-01
Payer: COMMERCIAL

## 2023-12-01 DIAGNOSIS — I70.90 ASVD (ARTERIOSCLEROTIC VASCULAR DISEASE): ICD-10-CM

## 2023-12-01 RX ORDER — LISINOPRIL 5 MG/1
5 TABLET ORAL DAILY
Qty: 30 TABLET | Refills: 0 | Status: SHIPPED | OUTPATIENT
Start: 2023-12-01 | End: 2023-12-26

## 2023-12-01 NOTE — TELEPHONE ENCOUNTER
Health Call Center    Phone Message    May a detailed message be left on voicemail: yes     Reason for Call: Medication Refill Request    Has the patient contacted the pharmacy for the refill? Yes   Name of medication being requested: lisinopril (ZESTRIL) 10 mg/day   NOTE:  pharmacy contacted Dr. Cooley this week but they have not heard anythign from Dr. Cooley.  Pt used the very last pill this morning - he cuts the 20 mg tabs in half so needs a 90 day supply of meds.    Provider who prescribed the medication: Dr. Cooley or Mattie Olivarez  Pharmacy:   [facility] #[id number] - [place]     Date medication is needed: ASAP - Pt took last one this morning.      Action Taken: Message routed to:  [facility] ([facility] cardio    Travel Screening: Not Applicable    Thank you!  [facility]
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
## 2023-12-04 ENCOUNTER — DOCUMENTATION ONLY (OUTPATIENT)
Dept: ANTICOAGULATION | Facility: CLINIC | Age: 68
End: 2023-12-04

## 2023-12-04 ENCOUNTER — OFFICE VISIT (OUTPATIENT)
Dept: URGENT CARE | Facility: URGENT CARE | Age: 68
End: 2023-12-04
Payer: COMMERCIAL

## 2023-12-04 VITALS
TEMPERATURE: 98.6 F | SYSTOLIC BLOOD PRESSURE: 148 MMHG | DIASTOLIC BLOOD PRESSURE: 87 MMHG | OXYGEN SATURATION: 94 % | HEART RATE: 85 BPM

## 2023-12-04 DIAGNOSIS — J40 BRONCHITIS: Primary | ICD-10-CM

## 2023-12-04 PROCEDURE — 99214 OFFICE O/P EST MOD 30 MIN: CPT

## 2023-12-04 RX ORDER — ALBUTEROL SULFATE 90 UG/1
2 AEROSOL, METERED RESPIRATORY (INHALATION) EVERY 6 HOURS PRN
Qty: 18 G | Refills: 0 | Status: SHIPPED | OUTPATIENT
Start: 2023-12-04 | End: 2023-12-26

## 2023-12-04 RX ORDER — PREDNISONE 20 MG/1
40 TABLET ORAL DAILY
Qty: 10 TABLET | Refills: 0 | Status: SHIPPED | OUTPATIENT
Start: 2023-12-04 | End: 2023-12-09

## 2023-12-04 NOTE — PROGRESS NOTES
ANTICOAGULATION  MANAGEMENT     Interacting Medication Review    Interacting medication(s):  prednisone and augmentin  with warfarin.    Duration:  prednisone will be 5 day course and augmentin is a 10 day course    Indication:  bronchitis    New medication?: Yes, interaction may increase INR and risk of bleeding. Closer INR monitoring recommended.        PLAN     Continue your current warfarin dose with next INR in 3-5 days            Telephone call with Jovon who verbalizes understanding and agrees to plan    No adjustment to anticoagulation calendar was required, patient was busy when writer called and he will call back to set up an INR appointment on either 12/7 or 12/8/23.      Plan made per [facility] anticoagulation protocol    Lidia Vázquez, RN  Anticoagulation Clinic

## 2023-12-04 NOTE — PROGRESS NOTES
Assessment & Plan     Bronchitis  Augmentin as on blood thinner  Inhaler and steroids  - predniSONE (DELTASONE) 20 MG tablet  Dispense: 10 tablet; Refill: 0  - albuterol (PROAIR HFA/PROVENTIL HFA/VENTOLIN HFA) 108 (90 Base) MCG/ACT inhaler  Dispense: 18 g; Refill: 0  - amoxicillin-clavulanate (AUGMENTIN) 875-125 MG tablet  Dispense: 20 tablet; Refill: 0             No follow-ups on file.    [facility] Urgent Care Provider  [facility] AVI Sigala is a 68 year old male who presents to clinic today for the following health issues:  Chief Complaint   Patient presents with    Cough     X4 days. Negative at home covid test done Saturday.     HPI    Works as [employer]  Swelling glands  Phlegm  Coughing  Labored breathing.  Neg COVID  No aches and pain.  No tobacco.  No COPD  No fevers.        Review of Systems        Objective    BP (!) 148/87   Pulse 85   Temp 98.6  F (37  C) (Tympanic)   SpO2 94%   Physical Exam  Vitals and nursing note reviewed.   Constitutional:       Appearance: Normal appearance.   HENT:      Right Ear: Tympanic membrane and ear canal normal.      Left Ear: Tympanic membrane and ear canal normal.      Mouth/Throat:      Mouth: Mucous membranes are moist.   Eyes:      Pupils: Pupils are equal, round, and reactive to light.   Cardiovascular:      Rate and Rhythm: Normal rate and regular rhythm.      Pulses: Normal pulses.      Heart sounds: Normal heart sounds.   Pulmonary:      Effort: Pulmonary effort is normal.      Breath sounds: Wheezing and rhonchi present.      Comments: bilaterally  Musculoskeletal:      Cervical back: Neck supple.   Neurological:      Mental Status: He is alert.

## 2023-12-07 ENCOUNTER — HOSPITAL ENCOUNTER (EMERGENCY)
Facility: CLINIC | Age: 68
Discharge: HOME | End: 2023-12-08
Attending: EMERGENCY MEDICINE | Admitting: EMERGENCY MEDICINE
Payer: COMMERCIAL

## 2023-12-07 ENCOUNTER — ANTICOAGULATION THERAPY VISIT (OUTPATIENT)
Dept: ANTICOAGULATION | Facility: CLINIC | Age: 68
End: 2023-12-07

## 2023-12-07 ENCOUNTER — NURSE TRIAGE (OUTPATIENT)
Dept: NURSING | Facility: CLINIC | Age: 68
End: 2023-12-07

## 2023-12-07 ENCOUNTER — APPOINTMENT (OUTPATIENT)
Dept: GENERAL RADIOLOGY | Facility: CLINIC | Age: 68
End: 2023-12-07
Attending: EMERGENCY MEDICINE
Payer: COMMERCIAL

## 2023-12-07 ENCOUNTER — LAB (OUTPATIENT)
Dept: LAB | Facility: CLINIC | Age: 68
End: 2023-12-07
Payer: COMMERCIAL

## 2023-12-07 VITALS
WEIGHT: 205 LBS | RESPIRATION RATE: 16 BRPM | BODY MASS INDEX: 30.27 KG/M2 | HEART RATE: 111 BPM | OXYGEN SATURATION: 96 % | DIASTOLIC BLOOD PRESSURE: 115 MMHG | TEMPERATURE: 98.3 F | SYSTOLIC BLOOD PRESSURE: 140 MMHG

## 2023-12-07 DIAGNOSIS — Z95.2 S/P AVR (AORTIC VALVE REPLACEMENT): Primary | ICD-10-CM

## 2023-12-07 DIAGNOSIS — S30.1XXA ABDOMINAL WALL HEMATOMA, INITIAL ENCOUNTER: ICD-10-CM

## 2023-12-07 DIAGNOSIS — R79.1 SUPRATHERAPEUTIC INR: ICD-10-CM

## 2023-12-07 DIAGNOSIS — Z79.01 LONG TERM CURRENT USE OF ANTICOAGULANTS WITH INR GOAL OF 2.5-3.5: ICD-10-CM

## 2023-12-07 DIAGNOSIS — I48.21 PERMANENT ATRIAL FIBRILLATION (H): ICD-10-CM

## 2023-12-07 DIAGNOSIS — Z95.2 S/P AVR (AORTIC VALVE REPLACEMENT): ICD-10-CM

## 2023-12-07 DIAGNOSIS — Z79.01 LONG TERM CURRENT USE OF ANTICOAGULANT THERAPY: ICD-10-CM

## 2023-12-07 DIAGNOSIS — Z95.2 H/O AORTIC VALVE REPLACEMENT: ICD-10-CM

## 2023-12-07 DIAGNOSIS — J06.9 VIRAL URI WITH COUGH: ICD-10-CM

## 2023-12-07 LAB
ANION GAP SERPL CALCULATED.3IONS-SCNC: 8 MMOL/L (ref 7–15)
BASOPHILS # BLD AUTO: 0 10E3/UL (ref 0–0.2)
BASOPHILS NFR BLD AUTO: 0 %
BUN SERPL-MCNC: 23.2 MG/DL (ref 8–23)
CALCIUM SERPL-MCNC: 9.5 MG/DL (ref 8.8–10.2)
CHLORIDE SERPL-SCNC: 101 MMOL/L (ref 98–107)
CREAT SERPL-MCNC: 0.71 MG/DL (ref 0.67–1.17)
DEPRECATED HCO3 PLAS-SCNC: 30 MMOL/L (ref 22–29)
EGFRCR SERPLBLD CKD-EPI 2021: >90 ML/MIN/1.73M2
EOSINOPHIL # BLD AUTO: 0 10E3/UL (ref 0–0.7)
EOSINOPHIL NFR BLD AUTO: 0 %
ERYTHROCYTE [DISTWIDTH] IN BLOOD BY AUTOMATED COUNT: 15.6 % (ref 10–15)
GLUCOSE SERPL-MCNC: 125 MG/DL (ref 70–99)
HCT VFR BLD AUTO: 37.7 % (ref 40–53)
HGB BLD-MCNC: 11.5 G/DL (ref 13.3–17.7)
IMM GRANULOCYTES # BLD: 0.1 10E3/UL
IMM GRANULOCYTES NFR BLD: 1 %
INR BLD: >8 (ref 0.9–1.1)
INR PPP: 9.3 (ref 0.85–1.15)
INR PPP: 9.71 (ref 0.85–1.15)
LYMPHOCYTES # BLD AUTO: 0.6 10E3/UL (ref 0.8–5.3)
LYMPHOCYTES NFR BLD AUTO: 6 %
MCH RBC QN AUTO: 28.3 PG (ref 26.5–33)
MCHC RBC AUTO-ENTMCNC: 30.5 G/DL (ref 31.5–36.5)
MCV RBC AUTO: 93 FL (ref 78–100)
MONOCYTES # BLD AUTO: 0.9 10E3/UL (ref 0–1.3)
MONOCYTES NFR BLD AUTO: 8 %
NEUTROPHILS # BLD AUTO: 8.7 10E3/UL (ref 1.6–8.3)
NEUTROPHILS NFR BLD AUTO: 85 %
NRBC # BLD AUTO: 0 10E3/UL
NRBC BLD AUTO-RTO: 0 /100
NT-PROBNP SERPL-MCNC: 2132 PG/ML (ref 0–900)
PLATELET # BLD AUTO: 266 10E3/UL (ref 150–450)
POTASSIUM SERPL-SCNC: 4.7 MMOL/L (ref 3.4–5.3)
RBC # BLD AUTO: 4.06 10E6/UL (ref 4.4–5.9)
SODIUM SERPL-SCNC: 139 MMOL/L (ref 135–145)
WBC # BLD AUTO: 10.3 10E3/UL (ref 4–11)

## 2023-12-07 PROCEDURE — 83880 ASSAY OF NATRIURETIC PEPTIDE: CPT | Performed by: EMERGENCY MEDICINE

## 2023-12-07 PROCEDURE — 36415 COLL VENOUS BLD VENIPUNCTURE: CPT

## 2023-12-07 PROCEDURE — 85025 COMPLETE CBC W/AUTO DIFF WBC: CPT | Performed by: STUDENT IN AN ORGANIZED HEALTH CARE EDUCATION/TRAINING PROGRAM

## 2023-12-07 PROCEDURE — 80048 BASIC METABOLIC PNL TOTAL CA: CPT | Performed by: EMERGENCY MEDICINE

## 2023-12-07 PROCEDURE — 85025 COMPLETE CBC W/AUTO DIFF WBC: CPT | Performed by: EMERGENCY MEDICINE

## 2023-12-07 PROCEDURE — 36415 COLL VENOUS BLD VENIPUNCTURE: CPT | Performed by: STUDENT IN AN ORGANIZED HEALTH CARE EDUCATION/TRAINING PROGRAM

## 2023-12-07 PROCEDURE — 85610 PROTHROMBIN TIME: CPT | Performed by: EMERGENCY MEDICINE

## 2023-12-07 PROCEDURE — 99284 EMERGENCY DEPT VISIT MOD MDM: CPT | Mod: 25

## 2023-12-07 PROCEDURE — 71046 X-RAY EXAM CHEST 2 VIEWS: CPT

## 2023-12-07 PROCEDURE — 85610 PROTHROMBIN TIME: CPT

## 2023-12-07 ASSESSMENT — ACTIVITIES OF DAILY LIVING (ADL)
ADLS_ACUITY_SCORE: 35
ADLS_ACUITY_SCORE: 35

## 2023-12-07 NOTE — TELEPHONE ENCOUNTER
Renan with [facility] calling to report a critical lab ordered by Sneha REEDER with [employer].  Warm transferred caller to answering service per protocol.    Bettina Prince RN  [facility]

## 2023-12-07 NOTE — PROGRESS NOTES
ANTICOAGULATION MANAGEMENT     Jovon Mantilla 68 year old male is on warfarin with supratherapeutic INR result. (Goal INR 2.5-3.5)    Recent labs: (last 7 days)     12/07/23  1557   INR >8.0*       ASSESSMENT     Warfarin Lab Questionnaire    Warfarin Doses Last 7 Days      12/7/2023     3:37 PM   Dose in Tablet or Mg   TAB or MG? milligram (mg)     Pt Rptd Dose SUNDAY MONDAY TUESDAY WED THURS FRIDAY SATURDAY 12/7/2023   3:37 PM 4 4 4 4 4 4 4         12/7/2023   Warfarin Lab Questionnaire   Missed doses within past 14 days? No   Changes in diet or alcohol within past 14 days? No   Medication changes since last result? Yes   Please list: Steroids. Antibiotics - 5 day course Prednisone finishes tomorrow & 10 day course of Augmentin finishes 12/13/23 (both will tendencies to raise INR level) + NyQuil & DayQuil prn   Injuries or illness since last result? No   New shortness of breath, severe headaches or sudden changes in vision since last result? Yes   If yes, please explain:  From flu - UC on 12/4/23 - dx with bronchitis. Feeling much better, but has lingering cough.   Abnormal bleeding since last result? No   Upcoming surgery, procedure? No   Best number to call with results? [phone]     Previous result: Therapeutic last 2(+) visits  Additional findings:  Patient available for assessment tomorrow 12/8/23 ONLY from 330-530 pm       PLAN     Spoke with Jovon - Advised patient to hold warfarin tonight and tomorrow.     Follow up required to  review venous lab level and discuss further dosing plan.    Yesenia Perea RN  Anticoagulation Clinic  12/7/2023

## 2023-12-08 ENCOUNTER — TELEPHONE (OUTPATIENT)
Dept: FAMILY MEDICINE | Facility: CLINIC | Age: 68
End: 2023-12-08
Payer: COMMERCIAL

## 2023-12-08 ENCOUNTER — TELEPHONE (OUTPATIENT)
Dept: ANTICOAGULATION | Facility: CLINIC | Age: 68
End: 2023-12-08
Payer: COMMERCIAL

## 2023-12-08 NOTE — TELEPHONE ENCOUNTER
ANTICOAGULATION  MANAGEMENT: Discharge Review    Jovon FRANCIS Drake chart reviewed for anticoagulation continuity of care    Emergency room visit on 12/7/23 for supratherapeutic INR.    Discharge disposition: Home    Results:    Recent labs: (last 7 days)     12/07/23  1557 12/07/23  1603 12/07/23  2115   INR >8.0* 9.30* 9.71*     Anticoagulation inpatient management:     not applicable     Anticoagulation discharge instructions:     Warfarin dosing: home regimen continued. [facility] RN had already advised patient to hold warfarin 12/7/23 and 12/8/23. Patient was not able to complete full assessment but stated he would be available today between 3:30 -5:30 p.m. This [facility] RN will call during that time.   Bridging: No   INR goal change: No      Medication changes affecting anticoagulation: Yes: 5 day course Prednisone finishes today & 10 day course of Augmentin finishes 12/13/23 (both with tendencies to raise INR level) + NyQuil & DayQuil prn which contain acetaminophen     Additional factors affecting anticoagulation: Yes: bronchitis diagnosis 12/4/23, though patient states he is feeling much better, but cough is lingering.     PLAN     No adjustment to anticoagulation plan needed. He will continue to hold warfarin tonight (as advised yesterday), then take maintenance dose over the weekend and recheck INR on Monday.     Spoke with Jovon . Patient stated understanding and is in agreement with plan.    Anticoagulation Calendar updated    Talisha Aponte, RN

## 2023-12-08 NOTE — PROGRESS NOTES
ANTICOAGULATION MANAGEMENT     Jovon TITO Mantilla 68 year old male is on warfarin with supratherapeutic INR result. (Goal INR 2.5-3.5)    Recent labs: (last 7 days)     12/07/23 2115   INR 9.71*       ASSESSMENT     Warfarin Lab Questionnaire    Warfarin Doses Last 7 Days      12/7/2023     3:37 PM   Dose in Tablet or Mg   TAB or MG? milligram (mg)     Pt Rptd Dose SUNDAY MONDAY TUESDAY WED THURS FRIDAY SATURDAY 12/7/2023   3:37 PM 4 4 4 4 4 4 4         12/7/2023   Warfarin Lab Questionnaire   Missed doses within past 14 days? No   Changes in diet or alcohol within past 14 days? No   Medication changes since last result? Yes   Please list: Steroids. Antibiotics5 day course Prednisone finishes tomorrow & 10 day course of Augmentin finishes 12/13/23 (both will tendencies to raise INR level) + NyQuil & DayQuil prn    Injuries or illness since last result? No   New shortness of breath, severe headaches or sudden changes in vision since last result? Yes   If yes, please explain:  From fluUC on 12/4/23 - dx with bronchitis. Feeling much better, but has lingering cough.    Abnormal bleeding since last result? No   Upcoming surgery, procedure? No   Best number to call with results? [phone]     Previous result: Therapeutic last 2(+) visits  Additional findings: None. Nothing new to add after speaking with patient Friday afternoon. No noted bleeding/bruising       PLAN     Recommended plan for temporary change(s) affecting INR     Dosing Instructions:  He held warfarin 12/7/23 and will hold dose 12/8/23, then continue with maintenance dose through weekend  with next INR in 3 days       Summary  As of 12/7/2023      Full warfarin instructions:  12/7: Hold; 12/8: Hold; Otherwise 6 mg every Tue; 4 mg all other days   Next INR check:  12/11/2023               Telephone call with Jovon who agrees to plan and repeated back plan correctly    Lab visit scheduled    Education provided:   Please call back if any changes to your diet,  medications or how you've been taking warfarin  Interaction IS anticipated between warfarin and antibiotics and prednisone use  Symptom monitoring: monitoring for bleeding signs and symptoms, when to seek medical attention/emergency care, and if you hit your head or have a bad fall seek emergency care  Importance of notifying anticoagulation clinic for: changes in medications; a sooner lab recheck maybe needed and diarrhea, nausea/vomiting, reduced intake, cold/flu, and/or infections; a sooner lab recheck maybe needed  Contact [phone]  with any changes, questions or concerns.     Plan made per [facility] anticoagulation protocol    Talisha Aponte, RN  Anticoagulation Clinic  12/8/2023    _______________________________________________________________________     Anticoagulation Episode Summary       Current INR goal:  2.5-3.5   TTR:  58.2% (1 y)   Target end date:  Indefinite   Send INR reminders to:  [facility]    Indications    S/P AVR (aortic valve replacement) [Z95.2]  Long-term (current) use of anticoagulants [Z79.01] [Z79.01]  Permanent atrial fibrillation (H) [I48.21]  Abdominal wall hematoma  initial encounter [S30.1XXA]             Comments:  24mm ATS Valve             Anticoagulation Care Providers       Provider Role Specialty Phone number    Noe Ramirez PA-C [facility] [phone]

## 2023-12-08 NOTE — TELEPHONE ENCOUNTER
Patient was recently seen on 12/4, placed on augmentin, prednisone, INR returned at 9.3, has mechanical valve, advise ER now, will go to [facility]

## 2023-12-08 NOTE — DISCHARGE INSTRUCTIONS
Continue to hold your Coumadin until the anticoagulation clinic tells you it is okay.  Please stop taking the amoxicillin and prednisone.  Please return for any bleeding.    Discharge Instructions  Upper Respiratory Infection    The upper respiratory tract includes the sinuses, nasal passages, pharynx, and larynx. A URI, or upper respiratory infection, is an infection of any of the parts of the upper airway. Symptoms include runny nose, congestion, sneezing, sore throat, cough, and fever. URIs are almost always caused by a virus. Antibiotics do not help with viral infections, so are generally not prescribed. A URI is very contagious through coughing and nasal secretions; make sure you wash your hands often and clean surfaces after sneezing, coughing or touching them. While you should start to improve in 3 - 5 days, remember that sometimes a cough can linger for several weeks.    Generally, every Emergency Department visit should have a follow-up clinic visit with either a primary or a specialty clinic/provider. Please follow-up as instructed by your emergency provider today.    Return to the Emergency Department if:  Any of your symptoms get much worse.  You seem very sick, like being too weak to get up.  You have chest pain or shortness of breath.   You have a severe headache.  You are vomiting (throwing up) so much you cannot keep fluids or medicines down.  You have confusion or seem unusually drowsy.  You have a seizure.    What can I do to help myself?  Fill any prescriptions the provider gave you and take them right away  If you have a fever, get plenty of rest and drink lots of fluids, especially water.  Using a humidifier or saline nose spray will also help loosen mucous.   Clothes or blankets will not change your fever. Do what is comfortable for you.  Bathing or sponging in lukewarm water may help you feel better.  Acetaminophen (Tylenol ) or ibuprofen (Advil , Motrin ) will help bring fever down and may  help you feel more comfortable. Be sure to read and follow the package directions, and ask your provider if you have questions.  Do not drink alcohol.  Decongestants may help you feel better. You may use decongestant nose sprays Afrin  (oxymetazoline) or Cristo-Synephrine  (phenylephrine hydrochloride) for up to 3 days, or may use a decongestant tablet like Sudafed  (pseudoephedrine).  If you were given a prescription for medicine here today, be sure to read all of the information (including the package insert) that comes with your prescription.  This will include important information about the medicine, its side effects, and any warnings that you need to know about.  The pharmacist who fills the prescription can provide more information and answer questions you may have about the medicine.  If you have questions or concerns that the pharmacist cannot address, please call or return to the Emergency Department.   Remember that you can always come back to the Emergency Department if you are not able to see your regular provider in the amount of time listed above, if you get any new symptoms, or if there is anything that worries you.

## 2023-12-08 NOTE — TELEPHONE ENCOUNTER
Lab again calling regarding a critical lab. They had called earlier but were unsuccessful with reaching a provider. Explained the process to them and that we do not take those labs. They were transferred to the [facility] service.     Sneha Martell RN  [facility] Nurse Advisor   12/7/2023  7:12 PM

## 2023-12-08 NOTE — ED PROVIDER NOTES
History     Chief Complaint:  Abnormal Labs       The history is provided by the patient.      Jovon Mantilla is a 68 year old male with history of heart attack, hypertension, and atrial fibrillation who presents to the ED with abnormal INR results. Patient is currently anticoagulated, taking Coumadin. Patient was recommended to ED today due to having an INR of 9.3. Diagnosed with Bronchitis on Friday (12/1) with symptoms coughing with phlegm and a fever of 100.2 F at Urgent Care. Prescribed Augmentin, albuterol inhaler, and prednisone. Patient has not had a fever since Friday. Denies taking an extra dose of Coumadin. Denies smoking, asthma, and COPD. Denies chills, chest pain, hematuria, vomiting, and behaving irregularly.     Independent Historian:   None - Patient Only    Review of External Notes:   INR visit today with INR of 9.3      Medications:    Coumadin  Zocor  Zoloft  Deltasone  Nitrostat  Lasix  Lanoxin  Coreg  Aspirin 81 mg  Augmentin   Amoxicillin  Albuterol inhaler     Past Medical History:    Hypertension   Tricuspid regurgitation  Right inguinal hernia  Permanent atrial fibrillation   Obesity  Mitral regurgitation  Cardiomyopathy  Hyperlipidemia  Depression   Congestive heart failure   Coronary artery disease   Biatrial enlargement  Aortic valve disease  Acute sinusitis   Acute prostatitis   Hypothyroidism  Acne rosacea     Past Surgical History:    Vein stripping  Laparoscopic gastric bypass x2  Herniorrhaphy inguinal   Excise mass groin  Right heart cath  Coronary angiogram  Cholecystectomy   Cardioversion  Angiogram x4    Physical Exam   Patient Vitals for the past 24 hrs:   BP Temp Temp src Pulse Resp SpO2 Weight   12/07/23 2107 (!) 140/115 98.3  F (36.8  C) Oral 111 16 96 % 93 kg (205 lb)        Physical Exam  Constitutional: Well appearing.  HEENT: Atraumatic. Moist mucous membranes.  Neck: Soft.  Supple.  No JVD.  Cardiac: Regular rate and rhythm.  No murmur or rub.  Respiratory: Clear to  auscultation bilaterally.  No respiratory distress.  No wheezing, rhonchi, or rales.  Abdomen: Soft and nontender.  No guarding.  Nondistended.  Musculoskeletal: No edema.  Normal range of motion.  Neurologic: Alert and oriented x3.  Normal tone and bulk.  Normal gait.  Skin: No rashes.  No edema.  Psych: Normal affect.  Normal behavior.            Emergency Department Course       Imaging:  XR Chest 2 Views   Final Result   IMPRESSION: Enlarged cardiac silhouette. Cardiac valve prosthesis and sternotomy wires. Bibasilar opacities likely atelectasis and/or scarring. No focal consolidation. Prominent central pulmonary vascularity. No edema.         Laboratory:  Labs Ordered and Resulted from Time of ED Arrival to Time of ED Departure   BASIC METABOLIC PANEL - Abnormal       Result Value    Sodium 139      Potassium 4.7      Chloride 101      Carbon Dioxide (CO2) 30 (*)     Anion Gap 8      Urea Nitrogen 23.2 (*)     Creatinine 0.71      GFR Estimate >90      Calcium 9.5      Glucose 125 (*)    INR - Abnormal    INR 9.71 (*)    CBC WITH PLATELETS AND DIFFERENTIAL - Abnormal    WBC Count 10.3      RBC Count 4.06 (*)     Hemoglobin 11.5 (*)     Hematocrit 37.7 (*)     MCV 93      MCH 28.3      MCHC 30.5 (*)     RDW 15.6 (*)     Platelet Count 266      % Neutrophils 85      % Lymphocytes 6      % Monocytes 8      % Eosinophils 0      % Basophils 0      % Immature Granulocytes 1      NRBCs per 100 WBC 0      Absolute Neutrophils 8.7 (*)     Absolute Lymphocytes 0.6 (*)     Absolute Monocytes 0.9      Absolute Eosinophils 0.0      Absolute Basophils 0.0      Absolute Immature Granulocytes 0.1      Absolute NRBCs 0.0     NT PROBNP INPATIENT - Abnormal    N terminal Pro BNP Inpatient 2,132 (*)       Emergency Department Course & Assessments:    Interventions:  Medications - No data to display     Assessments:  2151 I obtained the history and examined the patient as noted above    Independent Interpretation (X-rays, CTs,  rhythm strip):  Chest xray without acute infiltrate or pneumothorax    Consultations/Discussion of Management or Tests:  None        Social Determinants of Health affecting care:   None    Disposition:  The patient was discharged to home.     Impression & Plan    Medical Decision Making:  Jovon Mantilla is a 68-year-old man who is afebrile and hemodynamically stable.  I reviewed his outpatient INR.  INR today 7.9.  He has no source of bleeding.  Through our warfarin reversal panel and discussion with pharmacy, he is high risk for thrombosis and given no bleeding, no intervention is recommended other than holding his Coumadin for 3 doses.  I recommend stopping the antibiotic and the steroids as there is no evidence of pneumonia on exam he has no leukocytosis or fever.  His significant other was positive for COVID just prior to her symptoms and I recommended supportive care for likely viral infection and despite home COVID test, this could likely be COVID.  Declined any testing here.  BNP is elevated and is enlarged cardiac silhouette which has been seen previously on chest x-rays and recent CT scan of the chest.  He will continue to follow-up with cardiology.  No evidence of volume overload at this time.  We discussed plan for home with supportive care and close primary care follow-up.  He is in agreement feels,'s plan.  His questions were answered and he was in no distress at time of discharge.  I gave him very strict return precautions such as bleeding and recommend he does not do anything that would be unsafe if he were to become injured and he is understanding.    Diagnosis:    ICD-10-CM    1. Supratherapeutic INR  R79.1       2. Viral URI with cough  J06.9            Discharge Medications:  New Prescriptions    No medications on file      Scribe Disclosure:  Joon ARRINGTON, am serving as a scribe at 10:10 PM on 12/7/2023 to document services personally performed by Hadley Shen MD based on my observations and  the provider's statements to me.     12/7/2023   Hadley Shen MD Salay, Nicholas J, MD  12/12/23 1049

## 2023-12-08 NOTE — ED TRIAGE NOTES
POT c/o of abnormal labs. Pt was just seen this week for bronchitis and was prescribed antibiotics. Pt states he had blood work done at his primary clinic and his INR level was 9.3. Wife at bedside.      Triage Assessment (Adult)       Row Name 12/07/23 8954          Triage Assessment    Airway WDL WDL        Respiratory WDL    Respiratory WDL X;cough     Cough Frequency infrequent     Cough Type congested        Skin Circulation/Temperature WDL    Skin Circulation/Temperature WDL WDL        Cardiac WDL    Cardiac WDL WDL        Peripheral/Neurovascular WDL    Peripheral Neurovascular WDL WDL        Cognitive/Neuro/Behavioral WDL    Cognitive/Neuro/Behavioral WDL WDL

## 2023-12-11 ENCOUNTER — TELEPHONE (OUTPATIENT)
Dept: FAMILY MEDICINE | Facility: CLINIC | Age: 68
End: 2023-12-11

## 2023-12-11 ENCOUNTER — ANTICOAGULATION THERAPY VISIT (OUTPATIENT)
Dept: ANTICOAGULATION | Facility: CLINIC | Age: 68
End: 2023-12-11

## 2023-12-11 ENCOUNTER — LAB (OUTPATIENT)
Dept: LAB | Facility: CLINIC | Age: 68
End: 2023-12-11
Payer: COMMERCIAL

## 2023-12-11 DIAGNOSIS — Z95.2 S/P AVR (AORTIC VALVE REPLACEMENT): ICD-10-CM

## 2023-12-11 DIAGNOSIS — I48.21 PERMANENT ATRIAL FIBRILLATION (H): ICD-10-CM

## 2023-12-11 DIAGNOSIS — Z95.2 S/P AVR (AORTIC VALVE REPLACEMENT): Primary | ICD-10-CM

## 2023-12-11 DIAGNOSIS — S30.1XXA ABDOMINAL WALL HEMATOMA, INITIAL ENCOUNTER: ICD-10-CM

## 2023-12-11 DIAGNOSIS — Z79.01 LONG TERM CURRENT USE OF ANTICOAGULANT THERAPY: ICD-10-CM

## 2023-12-11 DIAGNOSIS — Z79.01 LONG TERM CURRENT USE OF ANTICOAGULANTS WITH INR GOAL OF 2.5-3.5: ICD-10-CM

## 2023-12-11 DIAGNOSIS — L30.9 DERMATITIS: ICD-10-CM

## 2023-12-11 LAB — INR PPP: 2.1 (ref 0.85–1.15)

## 2023-12-11 PROCEDURE — 36415 COLL VENOUS BLD VENIPUNCTURE: CPT

## 2023-12-11 PROCEDURE — 85610 PROTHROMBIN TIME: CPT

## 2023-12-11 RX ORDER — TRIAMCINOLONE ACETONIDE 1 MG/G
CREAM TOPICAL 2 TIMES DAILY
Qty: 45 G | Refills: 0 | Status: SHIPPED | OUTPATIENT
Start: 2023-12-11 | End: 2024-04-18

## 2023-12-11 NOTE — PROGRESS NOTES
ANTICOAGULATION MANAGEMENT     Jovon TITO Mantilla 68 year old male is on warfarin with subtherapeutic INR result. (Goal INR 2.5-3.5)    Recent labs: (last 7 days)     12/11/23  0901   INR 2.10*       ASSESSMENT     Warfarin Lab Questionnaire    Warfarin Doses Last 7 Days      12/11/2023     9:00 AM   Dose in Tablet or Mg   TAB or MG? tablet (tab)     Pt Rptd Dose SUNDAY MONDAY TUESDAY WED THURS FRIDAY SATURDAY 12/11/2023   9:00 AM 0 / 4 4 4 / 6 4 4 / 0 4 / 0  0 / 4     Verified last 7 days of dosing         12/11/2023   Warfarin Lab Questionnaire   Missed doses within past 14 days? Yes   If yes; please list when: friday   Changes in diet or alcohol within past 14 days? No   Medication changes since last result? Yes   Injuries or illness since last result? Yes - Augmentin & prednisone course finished   If yes, please explain: Brocitis - Per assessment, patient is feeling much better since viral URI last week   New shortness of breath, severe headaches or sudden changes in vision since last result? Yes   If yes, please explain:  brocitis   Abnormal bleeding since last result? No - Per assessment, patient noted bruising at blood draws & IV sites   Upcoming surgery, procedure? No     Previous result: Supratherapeutic  Additional findings: None       PLAN     Recommended plan for temporary change(s) affecting INR     Dosing Instructions: booster dose today (plus flip-flop today and tomorrow's dose), then continue your current warfarin dose with next INR in 1 week       Summary  As of 12/11/2023      Full warfarin instructions:  12/11: 8 mg; 12/12: 4 mg; Otherwise 6 mg every Tue; 4 mg all other days   Next INR check:  12/18/2023               Telephone call with Jovon who verbalizes understanding and agrees to plan    Lab visit scheduled    Education provided:   Please call back if any changes to your diet, medications or how you've been taking warfarin  Symptom monitoring: monitoring for bleeding signs and symptoms and  monitoring for clotting signs and symptoms    Plan made per [facility] anticoagulation protocol    Yesenia Perea, RN  Anticoagulation Clinic  12/11/2023    _______________________________________________________________________     Anticoagulation Episode Summary       Current INR goal:  2.5-3.5   TTR:  57.4% (1 y)   Target end date:  Indefinite   Send INR reminders to:  [facility]    Indications    S/P AVR (aortic valve replacement) [Z95.2]  Long-term (current) use of anticoagulants [Z79.01] [Z79.01]  Permanent atrial fibrillation (H) [I48.21]  Abdominal wall hematoma  initial encounter [S30.1XXA]             Comments:  24mm ATS Valve             Anticoagulation Care Providers       Provider Role Specialty Phone number    Noe Ramirez, FEROZ [facility] [phone]

## 2023-12-11 NOTE — TELEPHONE ENCOUNTER
Patient Returning Call    Reason for call:  patient called looking for dosing instruction for INR draw today     Information relayed to patient:  left message for call back     Patient has additional questions:  No      Could we send this information to you in [employer] or would you prefer to receive a phone call?:   Patient would prefer a phone call   Okay to leave a detailed message?: Yes at Cell number on file:    Telephone Information:   Mobile [phone]

## 2023-12-11 NOTE — TELEPHONE ENCOUNTER
Writer returned call to patient and reviewed INR level and dosing plan. See [facility] encounter. GAGE RojasN, RN

## 2023-12-18 ENCOUNTER — LAB (OUTPATIENT)
Dept: LAB | Facility: CLINIC | Age: 68
End: 2023-12-18
Payer: COMMERCIAL

## 2023-12-18 ENCOUNTER — ANTICOAGULATION THERAPY VISIT (OUTPATIENT)
Dept: ANTICOAGULATION | Facility: CLINIC | Age: 68
End: 2023-12-18

## 2023-12-18 DIAGNOSIS — Z95.2 H/O AORTIC VALVE REPLACEMENT: ICD-10-CM

## 2023-12-18 DIAGNOSIS — Z79.01 LONG TERM CURRENT USE OF ANTICOAGULANT THERAPY: ICD-10-CM

## 2023-12-18 DIAGNOSIS — I48.21 PERMANENT ATRIAL FIBRILLATION (H): ICD-10-CM

## 2023-12-18 DIAGNOSIS — Z95.2 S/P AVR (AORTIC VALVE REPLACEMENT): ICD-10-CM

## 2023-12-18 DIAGNOSIS — S30.1XXA ABDOMINAL WALL HEMATOMA, INITIAL ENCOUNTER: ICD-10-CM

## 2023-12-18 DIAGNOSIS — Z95.2 S/P AVR (AORTIC VALVE REPLACEMENT): Primary | ICD-10-CM

## 2023-12-18 LAB — INR BLD: 4 (ref 0.9–1.1)

## 2023-12-18 PROCEDURE — 36416 COLLJ CAPILLARY BLOOD SPEC: CPT

## 2023-12-18 PROCEDURE — 85610 PROTHROMBIN TIME: CPT

## 2023-12-18 NOTE — PROGRESS NOTES
ANTICOAGULATION MANAGEMENT     Jovon Mantilla 68 year old male is on warfarin with supratherapeutic INR result. (Goal INR 2.5-3.5)    Recent labs: (last 7 days)     12/18/23  0905   INR 4.0*       ASSESSMENT     Warfarin Lab Questionnaire    Warfarin Doses Last 7 Days      12/18/2023     9:02 AM   Dose in Tablet or Mg   TAB or MG? milligram (mg)     Pt Rptd Dose SUNDAY MONDAY TUESDAY WED THURS FRIDAY SATURDAY 12/18/2023   9:02 AM 4 4 4 4 4 8 4         12/18/2023   Warfarin Lab Questionnaire   Missed doses within past 14 days? No   Changes in diet or alcohol within past 14 days? No   Medication changes since last result? No, Yes, taking 2 ES tylenol at night   Injuries or illness since last result? No, yes, still has cough, denies fever and otherwise is feeling well.   New shortness of breath, severe headaches or sudden changes in vision since last result? No   Abnormal bleeding since last result? No   Upcoming surgery, procedure? No   Best number to call with results? [phone]     Previous result: Subtherapeutic  Additional findings: booster dose may be affecting INR but with on-going cough, previous critical supratherapeutic and recent INR at top of range, I reduced warfarin maintenance dose.       PLAN     Recommended plan for ongoing change(s) affecting INR     Dosing Instructions: partial hold then decrease your warfarin dose (6.7% change) with next INR in 10 days       Summary  As of 12/18/2023      Full warfarin instructions:  12/18: 2 mg; Otherwise 4 mg every day   Next INR check:  12/28/2023               Telephone call with Jovon who verbalizes understanding and agrees to plan    Lab visit scheduled    Education provided:   How illness can affect INR    Plan made per [facility] anticoagulation protocol    Madeline Wayne, RN  Anticoagulation Clinic  12/18/2023    _______________________________________________________________________     Anticoagulation Episode Summary       Current INR goal:  2.5-3.5   TTR:   56.5% (1 y)   Target end date:  Indefinite   Send INR reminders to:  [facility]    Indications    S/P AVR (aortic valve replacement) [Z95.2]  Long-term (current) use of anticoagulants [Z79.01] [Z79.01]  Permanent atrial fibrillation (H) [I48.21]  Abdominal wall hematoma  initial encounter [S30.1XXA]             Comments:  24mm ATS Valve             Anticoagulation Care Providers       Provider Role Specialty Phone number    Noe Ramirez PA-C [facility] [phone]

## 2023-12-26 ENCOUNTER — OFFICE VISIT (OUTPATIENT)
Dept: CARDIOLOGY | Facility: CLINIC | Age: 68
End: 2023-12-26
Attending: NURSE PRACTITIONER
Payer: COMMERCIAL

## 2023-12-26 VITALS
HEART RATE: 62 BPM | BODY MASS INDEX: 31.92 KG/M2 | OXYGEN SATURATION: 96 % | WEIGHT: 223 LBS | HEIGHT: 70 IN | SYSTOLIC BLOOD PRESSURE: 125 MMHG | DIASTOLIC BLOOD PRESSURE: 96 MMHG

## 2023-12-26 DIAGNOSIS — I50.812 CHRONIC RIGHT-SIDED HEART FAILURE (H): ICD-10-CM

## 2023-12-26 DIAGNOSIS — J40 BRONCHITIS: ICD-10-CM

## 2023-12-26 DIAGNOSIS — I25.10 CORONARY ARTERY DISEASE INVOLVING NATIVE CORONARY ARTERY OF NATIVE HEART WITHOUT ANGINA PECTORIS: ICD-10-CM

## 2023-12-26 DIAGNOSIS — I70.90 ASVD (ARTERIOSCLEROTIC VASCULAR DISEASE): ICD-10-CM

## 2023-12-26 DIAGNOSIS — I42.0 DILATED CARDIOMYOPATHY (H): Primary | ICD-10-CM

## 2023-12-26 DIAGNOSIS — I10 BENIGN ESSENTIAL HYPERTENSION: ICD-10-CM

## 2023-12-26 PROCEDURE — 99214 OFFICE O/P EST MOD 30 MIN: CPT | Performed by: NURSE PRACTITIONER

## 2023-12-26 RX ORDER — ALBUTEROL SULFATE 90 UG/1
2 AEROSOL, METERED RESPIRATORY (INHALATION) EVERY 6 HOURS PRN
Qty: 18 G | Refills: 1 | Status: SHIPPED | OUTPATIENT
Start: 2023-12-26 | End: 2024-07-02

## 2023-12-26 RX ORDER — FUROSEMIDE 20 MG
10 TABLET ORAL DAILY PRN
Qty: 100 TABLET | Refills: 3 | Status: SHIPPED | OUTPATIENT
Start: 2023-12-26 | End: 2024-06-03

## 2023-12-26 RX ORDER — LISINOPRIL 5 MG/1
5 TABLET ORAL DAILY
Qty: 90 TABLET | Refills: 3 | Status: SHIPPED | OUTPATIENT
Start: 2023-12-26 | End: 2024-06-03

## 2023-12-26 NOTE — PROGRESS NOTES
~Cardiology Clinic Visit~    Primary Cardiologist: Dr. Cooley  Reason for visit: 6-month follow up     History of Present Illness     Jovon Mantilla is a very pleasant 67 year old male with a past medical history notable for severe aortic stenosis and is s/p AVR in 01/2011 with a 24 mm ATS supra-annular valve.  He also has a history of AF with prior Maze procedure - has remained in AF and is on rate control and anticoagulation.  He also has a history of a 70% stenosis involving the LAD on coronary angiography prior to aortic valve replacement.  This, however, was not bypassed since it was found to be completely intramyocardial at the time of surgery.      In late 2021 he noted exertional dyspnea which was new compared to the prior year.  An echocardiogram demonstrated moderate to severe eccentric mitral regurgitation which had progressed compared to his prior echocardiogram from June 2020.  Given these findings, he was recommended CADE for further evaluation.     CADE 12/28/2021 demonstrated severe MARC, and mildly reduced left ventricular systolic function.  There was mild thickening and prolapse of P2 with moderate MR, which is likely is secondary to both annular dilatation in the setting of chronic persistent atrial fibrillation as well as an element of P2 prolapse.  The mechanical aortic valve appeared to be functioning normally on both CADE and transthoracic imaging.     Exercise stress echocardiogram 1/17/2022 (done to evaluate for worsening mitral regurgitation postexercise) demonstrated moderately reduced left ventricular systolic function with trace to mild mitral regurgitation at baseline.  Left ventricular systolic function improved post exercise although mitral regurgitation only increased to the mild to moderate range.     Right and left heart catheterization 1/31/2022 showed the 70% proximal LAD stenosis appeared unchanged.  No significant coronary artery disease was noted elsewhere.       Since  then he has done well on medical therapy for his cardiomyopathy, though at his last visit reported dizziness and lightheadedness that occurs post exertion in the upright position.        Repeat echocardiogram showed decline EF.  Follow up cMRI on 7/6/23 showed low normal left ventricular systolic function with an LVEF of 53%.  Left ventricular wall thickness mildly increased.    Holter monitor was negative for any significant bradycardias, pauses or heart block.     In early fall 2023, patient called in with reports of worsening palpitations ever since his Coreg was decreased at last hospitalization.  His dose was increased to 12.5 mg twice daily, and he was advised to monitor BP carefully as he has had history of dizziness, lightheadedness and hypotension.     Interval 12/26/23    Today, he is doing well from a cardiac standpoint.  He is recovering from Brochitis and thinks he may be coming down with something else as he is coughing up colored phlegm and still has some SOB.  Jovon had an extremely busy glen season as [place] - he is looking forward to wintering in [place].  He is planning to head there next week. Currently denies CP or pressure, dizziness, LH, palpitations, pre-syncope or syncope.  __________________________________________________________________          Assessment and Impression:      Dizziness and LH  -Improved, but intermittent recurrence  -Thought to be related to low Bps, on multi-drug regimen .  -Lisinopril decreased to 10 mg/d, carvedilol at 12.5 mg twice daily.     Persistent atrial fibrillation, on warfarin and digoxin 125 mcg daily.     Dyspnea on exertion  Coronary artery disease  -Asymptomatic and CP free.  No e/o CHF.   -Stable based on most recent testing and imaging.     Moderate to severe nonischemic cardiomyopathy   Severe aortic stenosis  S/p aortic valve replacement with a mechanical valve, 2011  -CM was in the setting of AoV replacement and thought to be tachycardia  related.    -LV systolic function improved to low normal on recent cMRI from 2022.            Recommendations and Plan:      Continue current medications without changes today.  Prescriptions reviewed and refills sent to patient's preferred pharmacy.  Recommend PCP/UC visit for virology testing given ongoing symptoms.  Follow up with Dr. Cooley this Summer for routine visit.  __________________________________________________________________    Thank you for the opportunity to participate in this pleasant patient's care.    We would be happy to see this patient sooner for any concerns in the meantime.    LILLI Park, CNP   Nurse Practitioner  [facility] [place] [facility]    Today's clinic visit entailed:  Review of the result(s) of each unique test - cardiac testing, cardiac imaging, hospital records, lebs  Prescription drug management    The level of medical decision making during this visit was of moderate complexity.    Orders this Visit:  Orders Placed This Encounter   Procedures    Follow-Up with Cardiology     Orders Placed This Encounter   Medications    lisinopril (ZESTRIL) 5 MG tablet     Sig: Take 1 tablet (5 mg) by mouth daily     Dispense:  90 tablet     Refill:  3    albuterol (PROAIR HFA/PROVENTIL HFA/VENTOLIN HFA) 108 (90 Base) MCG/ACT inhaler     Sig: Inhale 2 puffs into the lungs every 6 hours as needed for shortness of breath, wheezing or cough     Dispense:  18 g     Refill:  1     Pharmacy may dispense brand covered by insurance (Proair, or proventil or ventolin or generic albuterol inhaler)    furosemide (LASIX) 20 MG tablet     Sig: Take 0.5 tablets (10 mg) by mouth daily as needed (for weight gain 2lbs or more in a day)     Dispense:  100 tablet     Refill:  3     Medications Discontinued During This Encounter   Medication Reason    furosemide (LASIX) 20 MG tablet Reorder (No AVS)    lisinopril (ZESTRIL) 5 MG tablet Reorder (No AVS)    albuterol (PROAIR HFA/PROVENTIL HFA/VENTOLIN  HFA) 108 (90 Base) MCG/ACT inhaler Reorder (No AVS)     Encounter Diagnoses   Name Primary?    Dilated cardiomyopathy (H) Yes    Benign essential hypertension     ASVD (arteriosclerotic vascular disease)     Bronchitis     Chronic right-sided heart failure (H)     Coronary artery disease involving native coronary artery of native heart without angina pectoris      CURRENT MEDICATIONS:  Current Outpatient Medications   Medication Sig Dispense Refill    albuterol (PROAIR HFA/PROVENTIL HFA/VENTOLIN HFA) 108 (90 Base) MCG/ACT inhaler Inhale 2 puffs into the lungs every 6 hours as needed for shortness of breath, wheezing or cough 18 g 1    Ascorbic Acid (VITAMIN C PO) Take 500 mg by mouth daily      aspirin 81 MG tablet Take 81 mg by mouth Every Mon, Wed, Fri Morning      calcium carbonate-vitamin D 600-200 MG-UNIT TABS Take 1 tablet by mouth 2 times daily       carvedilol (COREG) 12.5 MG tablet Take 1 tablet (12.5 mg) by mouth 2 times daily (with meals) 180 tablet 3    Cholecalciferol (VITAMIN D) 2000 UNIT CAPS Take 1 tablet by mouth daily       Cyanocobalamin (VITAMIN B-12) 500 MCG SUBL Place 500 mcg under the tongue daily       diclofenac (VOLTAREN) 1 % topical gel APPLY 4 G TOPICALLY 4 TIMES DAILY AS NEEDED FOR MODERATE PAIN 100 g 1    digoxin (LANOXIN) 125 MCG tablet Take 1 tablet (125 mcg) by mouth daily 90 tablet 3    Fexofenadine HCl (ALLERGY 24-HR PO) Take 180 mg by mouth daily       fluticasone (FLONASE) 50 MCG/ACT nasal spray Spray 1 spray into both nostrils daily      furosemide (LASIX) 20 MG tablet Take 0.5 tablets (10 mg) by mouth daily as needed (for weight gain 2lbs or more in a day) 100 tablet 3    levothyroxine (SYNTHROID/LEVOTHROID) 200 MCG tablet TAKE 1 TABLET BY MOUTH EVERY DAY 90 tablet 3    lisinopril (ZESTRIL) 5 MG tablet Take 1 tablet (5 mg) by mouth daily 90 tablet 3    Multiple Vitamins-Minerals (CENTRUM) CHEW Take 1 tablet by mouth daily       Psyllium (FIBER) 0.52 GM CAPS Take 2 capsules by  "mouth daily      sertraline (ZOLOFT) 50 MG tablet Take 1 tablet (50 mg) by mouth daily (Patient taking differently: Take 25 mg by mouth daily) 90 tablet 3    simvastatin (ZOCOR) 20 MG tablet Take 1 tablet (20 mg) by mouth At Bedtime 90 tablet 3    triamcinolone (KENALOG) 0.1 % external cream APPLY TO AFFECTED AREA TWICE A DAY 45 g 0    warfarin ANTICOAGULANT (COUMADIN) 4 MG tablet Take by mouth every evening 6 mg on Tuesdays, 4 mg all other days      amoxicillin (AMOXIL) 500 MG capsule TAKE 4 CAPSULES 1 HOUR PRIOR TO APPOINTMENT (Patient not taking: Reported on 12/4/2023)      nitroGLYcerin (NITROSTAT) 0.3 MG sublingual tablet For chest pain place 1 tablet under the tongue every 5 minutes for 3 doses. If symptoms persist 5 minutes after 1st dose call 911. (Patient not taking: Reported on 12/26/2023) 25 tablet 1     ALLERGIES     Allergies   Allergen Reactions    Seasonal Allergies      PAST MEDICAL, SURGICAL, FAMILY, SOCIAL HISTORY:  History was reviewed and updated as needed, see medical record.    Review of Systems:  A 10-point Review Of Systems is otherwise normal except for that which is noted in the HPI and interval summary.    Physical Exam:    Vitals: BP (!) 125/96   Pulse 62   Ht 1.765 m (5' 9.5\")   Wt 101.2 kg (223 lb)   SpO2 96%   BMI 32.46 kg/m    Constitutional: Appears stated age, well nourished, NAD.  Neck: Supple. Carotid pulses full and equal.   Respiratory: Non-labored. Lungs CTAB.  Cardiovascular: RRR, normal S1 and S2. No M/G/R.  Generalized LE edema.  GI: Soft, non-distended, non-tender.  Skin: Warm and dry.   Musculoskeletal/Extremities: Symmetrical movement.  Neurologic: No gross focal deficits. Alert, awake.  Psychiatric: Affect appropriate. Mentation normal.    Recent Lab Results:  LIPID RESULTS:  Lab Results   Component Value Date    CHOL 113 06/20/2023    CHOL 109 03/13/2020    HDL 48 06/20/2023    HDL 51 03/13/2020    LDL 52 06/20/2023    LDL 49 03/13/2020    TRIG 67 06/20/2023    " TRIG 45 03/13/2020    CHOLHDLRATIO 2.5 02/16/2015     LIVER ENZYME RESULTS:  Lab Results   Component Value Date    AST 23 08/09/2021    AST 28 02/10/2021    ALT 18 08/09/2021    ALT 23 02/10/2021     CBC RESULTS:  Lab Results   Component Value Date    WBC 10.3 12/07/2023    WBC 8.9 02/13/2021    RBC 4.06 (L) 12/07/2023    RBC 3.12 (L) 02/13/2021    HGB 11.5 (L) 12/07/2023    HGB 9.7 (L) 02/13/2021    HCT 37.7 (L) 12/07/2023    HCT 30.9 (L) 02/13/2021    MCV 93 12/07/2023    MCV 99 02/13/2021    MCH 28.3 12/07/2023    MCH 31.1 02/13/2021    MCHC 30.5 (L) 12/07/2023    MCHC 31.4 (L) 02/13/2021    RDW 15.6 (H) 12/07/2023    RDW 15.2 (H) 02/13/2021     12/07/2023     02/13/2021     BMP RESULTS:  Lab Results   Component Value Date     12/07/2023     02/12/2021    POTASSIUM 4.7 12/07/2023    POTASSIUM 3.8 06/09/2022    POTASSIUM 4.4 02/12/2021    CHLORIDE 101 12/07/2023    CHLORIDE 103 06/09/2022    CHLORIDE 107 02/12/2021    CO2 30 (H) 12/07/2023    CO2 33 (H) 06/09/2022    CO2 30 02/12/2021    ANIONGAP 8 12/07/2023    ANIONGAP 2 (L) 06/09/2022    ANIONGAP 2 (L) 02/12/2021     (H) 12/07/2023     (H) 06/09/2022     (H) 02/12/2021    BUN 23.2 (H) 12/07/2023    BUN 17 06/09/2022    BUN 31 (H) 02/12/2021    CR 0.71 12/07/2023    CR 0.76 02/12/2021    GFRESTIMATED >90 12/07/2023    GFRESTIMATED >90 02/12/2021    GFRESTBLACK >90 02/12/2021    REECE 9.5 12/07/2023    REECE 8.8 02/12/2021      A1C RESULTS:  Lab Results   Component Value Date    A1C 5.8 (H) 02/04/2019     INR RESULTS:  Lab Results   Component Value Date    INR 4.0 (H) 12/18/2023    INR 2.10 (H) 12/11/2023    INR 9.71 (HH) 12/07/2023    INR >8.0 (HH) 12/07/2023    INR 3.1 (A) 10/02/2023    INR 4.2 03/22/2023    INR 3.10 (H) 06/22/2021    INR 3.40 (H) 05/13/2021

## 2023-12-26 NOTE — PATIENT INSTRUCTIONS
Thank you for your visit with the [facility] today.    Today's Summary     Try taking an extra dose of Furosemide 10 mg (on top of your regularly scheduled dose).  Do this for the next 3 days to improve your weight and breathing.  Continue all your other medications without changes.  Weigh yourself daily and keep a log - if your weight goes up more than 3-pounds overnight, or 5-pounds in 1-week, then take an extra dose of Furosemide 10 mg on top of your regular dose.  Follow up with Dr. Cooley in early Summer when you are back in [place].  Please call 6-months in advance to schedule your appointment.    If you have questions or concerns, please do not hesitate to call my nursing support team at [phone].    Scheduling phone number: [phone]    It was a pleasure seeing you today.     LILLI Park, CNP  Nurse Practitioner  [facility]  December 26, 2023  ________________________________________________________

## 2023-12-26 NOTE — LETTER
12/26/2023    Sneha Tran, LILLI CNP  [address]  [facility] [address]    RE: Jovon Moraesley       Dear Colleague,     I had the pleasure of seeing Jovon Mantilla in the [facility].              ~Cardiology Clinic Visit~    Primary Cardiologist: Dr. Cooley  Reason for visit: 6-month follow up     History of Present Illness     Jovon Mantilla is a very pleasant 67 year old male with a past medical history notable for severe aortic stenosis and is s/p AVR in 01/2011 with a 24 mm ATS supra-annular valve.  He also has a history of AF with prior Maze procedure - has remained in AF and is on rate control and anticoagulation.  He also has a history of a 70% stenosis involving the LAD on coronary angiography prior to aortic valve replacement.  This, however, was not bypassed since it was found to be completely intramyocardial at the time of surgery.      In late 2021 he noted exertional dyspnea which was new compared to the prior year.  An echocardiogram demonstrated moderate to severe eccentric mitral regurgitation which had progressed compared to his prior echocardiogram from June 2020.  Given these findings, he was recommended CADE for further evaluation.     CADE 12/28/2021 demonstrated severe MARC, and mildly reduced left ventricular systolic function.  There was mild thickening and prolapse of P2 with moderate MR, which is likely is secondary to both annular dilatation in the setting of chronic persistent atrial fibrillation as well as an element of P2 prolapse.  The mechanical aortic valve appeared to be functioning normally on both CADE and transthoracic imaging.     Exercise stress echocardiogram 1/17/2022 (done to evaluate for worsening mitral regurgitation postexercise) demonstrated moderately reduced left ventricular systolic function with trace to mild mitral regurgitation at baseline.  Left ventricular systolic function improved post exercise although mitral regurgitation only increased to  the mild to moderate range.     Right and left heart catheterization 1/31/2022 showed the 70% proximal LAD stenosis appeared unchanged.  No significant coronary artery disease was noted elsewhere.       Since then he has done well on medical therapy for his cardiomyopathy, though at his last visit reported dizziness and lightheadedness that occurs post exertion in the upright position.        Repeat echocardiogram showed decline EF.  Follow up cMRI on 7/6/23 showed low normal left ventricular systolic function with an LVEF of 53%.  Left ventricular wall thickness mildly increased.    Holter monitor was negative for any significant bradycardias, pauses or heart block.     In early fall 2023, patient called in with reports of worsening palpitations ever since his Coreg was decreased at last hospitalization.  His dose was increased to 12.5 mg twice daily, and he was advised to monitor BP carefully as he has had history of dizziness, lightheadedness and hypotension.     Interval 12/26/23    Today, he is doing well from a cardiac standpoint.  He is recovering from Brochitis and thinks he may be coming down with something else as he is coughing up colored phlegm and still has some SOB.  Jovon had an extremely busy glne season as [place] - he is looking forward to wintering in [place].  He is planning to head there next week. Currently denies CP or pressure, dizziness, LH, palpitations, pre-syncope or syncope.  __________________________________________________________________          Assessment and Impression:      Dizziness and LH  -Improved, but intermittent recurrence  -Thought to be related to low Bps, on multi-drug regimen .  -Lisinopril decreased to 10 mg/d, carvedilol at 12.5 mg twice daily.     Persistent atrial fibrillation, on warfarin and digoxin 125 mcg daily.     Dyspnea on exertion  Coronary artery disease  -Asymptomatic and CP free.  No e/o CHF.   -Stable based on most recent testing and imaging.      Moderate to severe nonischemic cardiomyopathy   Severe aortic stenosis  S/p aortic valve replacement with a mechanical valve, 2011  -CM was in the setting of AoV replacement and thought to be tachycardia related.    -LV systolic function improved to low normal on recent cMRI from 2022.            Recommendations and Plan:      Continue current medications without changes today.  Prescriptions reviewed and refills sent to patient's preferred pharmacy.  Recommend PCP/UC visit for virology testing given ongoing symptoms.  Follow up with Dr. Cooley this Summer for routine visit.  __________________________________________________________________    Thank you for the opportunity to participate in this pleasant patient's care.    We would be happy to see this patient sooner for any concerns in the meantime.    LILLI Park, CNP   Nurse Practitioner  [facility]    Today's clinic visit entailed:  Review of the result(s) of each unique test - cardiac testing, cardiac imaging, hospital records, lebs  Prescription drug management    The level of medical decision making during this visit was of moderate complexity.    Orders this Visit:  Orders Placed This Encounter   Procedures    Follow-Up with Cardiology     Orders Placed This Encounter   Medications    lisinopril (ZESTRIL) 5 MG tablet     Sig: Take 1 tablet (5 mg) by mouth daily     Dispense:  90 tablet     Refill:  3    albuterol (PROAIR HFA/PROVENTIL HFA/VENTOLIN HFA) 108 (90 Base) MCG/ACT inhaler     Sig: Inhale 2 puffs into the lungs every 6 hours as needed for shortness of breath, wheezing or cough     Dispense:  18 g     Refill:  1     Pharmacy may dispense brand covered by insurance (Proair, or proventil or ventolin or generic albuterol inhaler)    furosemide (LASIX) 20 MG tablet     Sig: Take 0.5 tablets (10 mg) by mouth daily as needed (for weight gain 2lbs or more in a day)     Dispense:  100 tablet     Refill:  3     Medications  Discontinued During This Encounter   Medication Reason    furosemide (LASIX) 20 MG tablet Reorder (No AVS)    lisinopril (ZESTRIL) 5 MG tablet Reorder (No AVS)    albuterol (PROAIR HFA/PROVENTIL HFA/VENTOLIN HFA) 108 (90 Base) MCG/ACT inhaler Reorder (No AVS)     Encounter Diagnoses   Name Primary?    Dilated cardiomyopathy (H) Yes    Benign essential hypertension     ASVD (arteriosclerotic vascular disease)     Bronchitis     Chronic right-sided heart failure (H)     Coronary artery disease involving native coronary artery of native heart without angina pectoris      CURRENT MEDICATIONS:  Current Outpatient Medications   Medication Sig Dispense Refill    albuterol (PROAIR HFA/PROVENTIL HFA/VENTOLIN HFA) 108 (90 Base) MCG/ACT inhaler Inhale 2 puffs into the lungs every 6 hours as needed for shortness of breath, wheezing or cough 18 g 1    Ascorbic Acid (VITAMIN C PO) Take 500 mg by mouth daily      aspirin 81 MG tablet Take 81 mg by mouth Every Mon, Wed, Fri Morning      calcium carbonate-vitamin D 600-200 MG-UNIT TABS Take 1 tablet by mouth 2 times daily       carvedilol (COREG) 12.5 MG tablet Take 1 tablet (12.5 mg) by mouth 2 times daily (with meals) 180 tablet 3    Cholecalciferol (VITAMIN D) 2000 UNIT CAPS Take 1 tablet by mouth daily       Cyanocobalamin (VITAMIN B-12) 500 MCG SUBL Place 500 mcg under the tongue daily       diclofenac (VOLTAREN) 1 % topical gel APPLY 4 G TOPICALLY 4 TIMES DAILY AS NEEDED FOR MODERATE PAIN 100 g 1    digoxin (LANOXIN) 125 MCG tablet Take 1 tablet (125 mcg) by mouth daily 90 tablet 3    Fexofenadine HCl (ALLERGY 24-HR PO) Take 180 mg by mouth daily       fluticasone (FLONASE) 50 MCG/ACT nasal spray Spray 1 spray into both nostrils daily      furosemide (LASIX) 20 MG tablet Take 0.5 tablets (10 mg) by mouth daily as needed (for weight gain 2lbs or more in a day) 100 tablet 3    levothyroxine (SYNTHROID/LEVOTHROID) 200 MCG tablet TAKE 1 TABLET BY MOUTH EVERY DAY 90 tablet 3     "lisinopril (ZESTRIL) 5 MG tablet Take 1 tablet (5 mg) by mouth daily 90 tablet 3    Multiple Vitamins-Minerals (CENTRUM) CHEW Take 1 tablet by mouth daily       Psyllium (FIBER) 0.52 GM CAPS Take 2 capsules by mouth daily      sertraline (ZOLOFT) 50 MG tablet Take 1 tablet (50 mg) by mouth daily (Patient taking differently: Take 25 mg by mouth daily) 90 tablet 3    simvastatin (ZOCOR) 20 MG tablet Take 1 tablet (20 mg) by mouth At Bedtime 90 tablet 3    triamcinolone (KENALOG) 0.1 % external cream APPLY TO AFFECTED AREA TWICE A DAY 45 g 0    warfarin ANTICOAGULANT (COUMADIN) 4 MG tablet Take by mouth every evening 6 mg on Tuesdays, 4 mg all other days      amoxicillin (AMOXIL) 500 MG capsule TAKE 4 CAPSULES 1 HOUR PRIOR TO APPOINTMENT (Patient not taking: Reported on 12/4/2023)      nitroGLYcerin (NITROSTAT) 0.3 MG sublingual tablet For chest pain place 1 tablet under the tongue every 5 minutes for 3 doses. If symptoms persist 5 minutes after 1st dose call 911. (Patient not taking: Reported on 12/26/2023) 25 tablet 1     ALLERGIES     Allergies   Allergen Reactions    Seasonal Allergies      PAST MEDICAL, SURGICAL, FAMILY, SOCIAL HISTORY:  History was reviewed and updated as needed, see medical record.    Review of Systems:  A 10-point Review Of Systems is otherwise normal except for that which is noted in the HPI and interval summary.    Physical Exam:    Vitals: BP (!) 125/96   Pulse 62   Ht 1.765 m (5' 9.5\")   Wt 101.2 kg (223 lb)   SpO2 96%   BMI 32.46 kg/m    Constitutional: Appears stated age, well nourished, NAD.  Neck: Supple. Carotid pulses full and equal.   Respiratory: Non-labored. Lungs CTAB.  Cardiovascular: RRR, normal S1 and S2. No M/G/R.  Generalized LE edema.  GI: Soft, non-distended, non-tender.  Skin: Warm and dry.   Musculoskeletal/Extremities: Symmetrical movement.  Neurologic: No gross focal deficits. Alert, awake.  Psychiatric: Affect appropriate. Mentation normal.    Recent Lab " Results:  LIPID RESULTS:  Lab Results   Component Value Date    CHOL 113 06/20/2023    CHOL 109 03/13/2020    HDL 48 06/20/2023    HDL 51 03/13/2020    LDL 52 06/20/2023    LDL 49 03/13/2020    TRIG 67 06/20/2023    TRIG 45 03/13/2020    CHOLHDLRATIO 2.5 02/16/2015     LIVER ENZYME RESULTS:  Lab Results   Component Value Date    AST 23 08/09/2021    AST 28 02/10/2021    ALT 18 08/09/2021    ALT 23 02/10/2021     CBC RESULTS:  Lab Results   Component Value Date    WBC 10.3 12/07/2023    WBC 8.9 02/13/2021    RBC 4.06 (L) 12/07/2023    RBC 3.12 (L) 02/13/2021    HGB 11.5 (L) 12/07/2023    HGB 9.7 (L) 02/13/2021    HCT 37.7 (L) 12/07/2023    HCT 30.9 (L) 02/13/2021    MCV 93 12/07/2023    MCV 99 02/13/2021    MCH 28.3 12/07/2023    MCH 31.1 02/13/2021    MCHC 30.5 (L) 12/07/2023    MCHC 31.4 (L) 02/13/2021    RDW 15.6 (H) 12/07/2023    RDW 15.2 (H) 02/13/2021     12/07/2023     02/13/2021     BMP RESULTS:  Lab Results   Component Value Date     12/07/2023     02/12/2021    POTASSIUM 4.7 12/07/2023    POTASSIUM 3.8 06/09/2022    POTASSIUM 4.4 02/12/2021    CHLORIDE 101 12/07/2023    CHLORIDE 103 06/09/2022    CHLORIDE 107 02/12/2021    CO2 30 (H) 12/07/2023    CO2 33 (H) 06/09/2022    CO2 30 02/12/2021    ANIONGAP 8 12/07/2023    ANIONGAP 2 (L) 06/09/2022    ANIONGAP 2 (L) 02/12/2021     (H) 12/07/2023     (H) 06/09/2022     (H) 02/12/2021    BUN 23.2 (H) 12/07/2023    BUN 17 06/09/2022    BUN 31 (H) 02/12/2021    CR 0.71 12/07/2023    CR 0.76 02/12/2021    GFRESTIMATED >90 12/07/2023    GFRESTIMATED >90 02/12/2021    GFRESTBLACK >90 02/12/2021    REECE 9.5 12/07/2023    REECE 8.8 02/12/2021      A1C RESULTS:  Lab Results   Component Value Date    A1C 5.8 (H) 02/04/2019     INR RESULTS:  Lab Results   Component Value Date    INR 4.0 (H) 12/18/2023    INR 2.10 (H) 12/11/2023    INR 9.71 (HH) 12/07/2023    INR >8.0 (HH) 12/07/2023    INR 3.1 (A) 10/02/2023    INR 4.2 03/22/2023     INR 3.10 (H) 06/22/2021    INR 3.40 (H) 05/13/2021       Thank you for allowing me to participate in the care of your patient.      Sincerely,     LILLI Park CNP     [facility]  cc:   LILLI Park CNP  [address]  [place] [address]

## 2023-12-28 ENCOUNTER — ANTICOAGULATION THERAPY VISIT (OUTPATIENT)
Dept: ANTICOAGULATION | Facility: CLINIC | Age: 68
End: 2023-12-28

## 2023-12-28 ENCOUNTER — LAB (OUTPATIENT)
Dept: LAB | Facility: CLINIC | Age: 68
End: 2023-12-28
Payer: COMMERCIAL

## 2023-12-28 DIAGNOSIS — I48.21 PERMANENT ATRIAL FIBRILLATION (H): ICD-10-CM

## 2023-12-28 DIAGNOSIS — Z79.01 LONG TERM CURRENT USE OF ANTICOAGULANT THERAPY: ICD-10-CM

## 2023-12-28 DIAGNOSIS — Z95.2 H/O AORTIC VALVE REPLACEMENT: ICD-10-CM

## 2023-12-28 DIAGNOSIS — Z95.2 S/P AVR (AORTIC VALVE REPLACEMENT): Primary | ICD-10-CM

## 2023-12-28 DIAGNOSIS — Z95.2 S/P AVR (AORTIC VALVE REPLACEMENT): ICD-10-CM

## 2023-12-28 DIAGNOSIS — S30.1XXA ABDOMINAL WALL HEMATOMA, INITIAL ENCOUNTER: ICD-10-CM

## 2023-12-28 LAB — INR BLD: 3.6 (ref 0.9–1.1)

## 2023-12-28 PROCEDURE — 36415 COLL VENOUS BLD VENIPUNCTURE: CPT

## 2023-12-28 PROCEDURE — 85610 PROTHROMBIN TIME: CPT

## 2023-12-28 NOTE — PROGRESS NOTES
ANTICOAGULATION MANAGEMENT     Jovon Mantilla 68 year old male is on warfarin with supratherapeutic INR result. (Goal INR 2.5-3.5)    Recent labs: (last 7 days)     12/28/23  1313   INR 3.6*       ASSESSMENT     Source(s): Chart Review and Patient/Caregiver Call     Warfarin doses taken: Warfarin taken as instructed  Diet: No new diet changes identified  Medication/supplement changes: None noted  New illness, injury, or hospitalization: Yes: ongoing issues with cough and bronchitis symptoms  Signs or symptoms of bleeding or clotting: No  Previous result: Supratherapeutic  Additional findings: None       PLAN     Recommended plan for ongoing change(s) affecting INR     Dosing Instructions: decrease your warfarin dose (7.1% change) with next INR in 1 week       Summary  As of 12/28/2023      Full warfarin instructions:  2 mg every Thu; 4 mg all other days   Next INR check:  1/4/2024               Telephone call with Jovon who verbalizes understanding and agrees to plan    Lab visit scheduled    Education provided:   Please call back if any changes to your diet, medications or how you've been taking warfarin  Contact [phone]  with any changes, questions or concerns.     Plan made per [facility] anticoagulation protocol    Lia Harmon RN  Anticoagulation Clinic  12/28/2023    _______________________________________________________________________     Anticoagulation Episode Summary       Current INR goal:  2.5-3.5   TTR:  53.7% (1 y)   Target end date:  Indefinite   Send INR reminders to:  [facility]    Indications    S/P AVR (aortic valve replacement) [Z95.2]  Long-term (current) use of anticoagulants [Z79.01] [Z79.01]  Permanent atrial fibrillation (H) [I48.21]  Abdominal wall hematoma  initial encounter [S30.1XXA]             Comments:  24mm ATS Valve             Anticoagulation Care Providers       Provider Role Specialty Phone number    Noe Ramirez PA-C [facility] [phone]

## 2024-01-04 ENCOUNTER — LAB (OUTPATIENT)
Dept: LAB | Facility: CLINIC | Age: 69
End: 2024-01-04
Payer: COMMERCIAL

## 2024-01-04 ENCOUNTER — ANTICOAGULATION THERAPY VISIT (OUTPATIENT)
Dept: ANTICOAGULATION | Facility: CLINIC | Age: 69
End: 2024-01-04

## 2024-01-04 DIAGNOSIS — Z95.2 S/P AVR (AORTIC VALVE REPLACEMENT): Primary | ICD-10-CM

## 2024-01-04 DIAGNOSIS — S30.1XXA ABDOMINAL WALL HEMATOMA, INITIAL ENCOUNTER: ICD-10-CM

## 2024-01-04 DIAGNOSIS — Z79.01 LONG TERM CURRENT USE OF ANTICOAGULANT THERAPY: ICD-10-CM

## 2024-01-04 DIAGNOSIS — I48.21 PERMANENT ATRIAL FIBRILLATION (H): ICD-10-CM

## 2024-01-04 DIAGNOSIS — Z95.2 S/P AVR (AORTIC VALVE REPLACEMENT): ICD-10-CM

## 2024-01-04 DIAGNOSIS — Z95.2 H/O AORTIC VALVE REPLACEMENT: ICD-10-CM

## 2024-01-04 LAB — INR BLD: 3.1 (ref 0.9–1.1)

## 2024-01-04 PROCEDURE — 36416 COLLJ CAPILLARY BLOOD SPEC: CPT

## 2024-01-04 PROCEDURE — 85610 PROTHROMBIN TIME: CPT

## 2024-01-04 NOTE — PROGRESS NOTES
ANTICOAGULATION MANAGEMENT     Jovon Mantilla 68 year old male is on warfarin with therapeutic INR result. (Goal INR 2.5-3.5)    Recent labs: (last 7 days)     01/04/24  0936   INR 3.1*       ASSESSMENT     Source(s): Chart Review  Previous INR was Supratherapeutic  Medication, diet, health changes since last INR chart reviewed; none identified         PLAN     Unable to reach Jovon today.    Left message to continue current dose of warfarin 2 mg tonight. Request call back for assessment.    Follow up required to assess for changes     Gabino Martinez RN  Anticoagulation Clinic  1/4/2024

## 2024-01-05 ENCOUNTER — DOCUMENTATION ONLY (OUTPATIENT)
Dept: ANTICOAGULATION | Facility: CLINIC | Age: 69
End: 2024-01-05
Payer: COMMERCIAL

## 2024-01-05 ENCOUNTER — TELEPHONE (OUTPATIENT)
Dept: ANTICOAGULATION | Facility: CLINIC | Age: 69
End: 2024-01-05
Payer: COMMERCIAL

## 2024-01-05 DIAGNOSIS — Z95.2 S/P AVR (AORTIC VALVE REPLACEMENT): Primary | ICD-10-CM

## 2024-01-05 DIAGNOSIS — Z95.2 S/P AORTIC VALVE REPLACEMENT: ICD-10-CM

## 2024-01-05 DIAGNOSIS — Z79.01 LONG TERM CURRENT USE OF ANTICOAGULANT THERAPY: ICD-10-CM

## 2024-01-05 DIAGNOSIS — I48.21 PERMANENT ATRIAL FIBRILLATION (H): ICD-10-CM

## 2024-01-05 NOTE — PROGRESS NOTES
ANTICOAGULATION MANAGEMENT     Jovon Mantilla 68 year old male is on warfarin with supratherapeutic INR result. (Goal INR 2.5-3.5)    Recent labs: (last 7 days)     01/04/24  0936   INR 3.1*       ASSESSMENT     Source(s): Chart Review and Patient/Caregiver Call     Warfarin doses taken: Warfarin taken as instructed  Diet: No new diet changes identified  Medication/supplement changes: None noted  New illness, injury, or hospitalization: No  Signs or symptoms of bleeding or clotting: No  Previous result: Supratherapeutic  Additional findings: Patient will be leaving for [place] for the winter on 1/7/24.  [facility] will continue to manage warfarin.  Patient will call with fax number to sent order to.       PLAN     Recommended plan for no diet, medication or health factor changes affecting INR     Dosing Instructions: Continue your current warfarin dose with next INR in 2-3 weeks.,       Summary  As of 1/4/2024      Full warfarin instructions:  2 mg every Thu; 4 mg all other days   Next INR check:  1/25/2024               Telephone call with Jovno who agrees to plan and repeated back plan correctly    Lab visit scheduled    Education provided:   Please call back if any changes to your diet, medications or how you've been taking warfarin    Plan made per [facility] anticoagulation protocol    Isabel Cheney RN  Anticoagulation Clinic  1/5/2024    _______________________________________________________________________     Anticoagulation Episode Summary       Current INR goal:  2.5-3.5   TTR:  54.4% (1 y)   Target end date:  Indefinite   Send INR reminders to:  [facility]    Indications    S/P AVR (aortic valve replacement) [Z95.2]  Long-term (current) use of anticoagulants [Z79.01] [Z79.01]  Permanent atrial fibrillation (H) [I48.21]  Abdominal wall hematoma  initial encounter [S30.1XXA]             Comments:  24mm ATS Valve             Anticoagulation Care Providers       Provider Role Specialty Phone number     Noe Ramirez PA-C [facility] [phone]

## 2024-01-05 NOTE — PROGRESS NOTES
ANTICOAGULATION CLINIC REFERRAL RENEWAL REQUEST       An annual renewal order is required for all patients referred to [facility].?  Please review and sign the pended referral order for Jovon Mantilla.       ANTICOAGULATION SUMMARY      Warfarin indication(s)   Atrial Fibrillation and Mechanical AVR    Mechanical heart valve present?  Mechanical  AVR-Bileaflet       Current goal range   INR: 2.5-3.5     Goal appropriate for indication? Goal INR 2.5-3.5 standard for indication(s) above     Time in Therapeutic Range (TTR)  (Goal > 60%) 54.4%       Office visit with referring provider's group within last year yes on 6/20/23       Isabel Cheney RN  [facility]

## 2024-01-05 NOTE — TELEPHONE ENCOUNTER
ANTICOAGULATION  MANAGEMENT- Travel planning    Jovon Mantilla reports upcoming travel plans to [place].    Departure date: 1/7/24  Anticipated return date: April  Alternate contact information (if applicable): NA      INR [facility] plan:     [facility]. patient to follow up with [facility] with lab information to fax/mail standing order. Faxed results to be routed to  [employer] [place]     Anticoagulation calendar updated with date of next INR     Instructed to call the Anticoauglation Clinic for any changes, questions or concerns. [phone]   ?   Isabel Cheney RN

## 2024-01-06 ENCOUNTER — NURSE TRIAGE (OUTPATIENT)
Dept: NURSING | Facility: CLINIC | Age: 69
End: 2024-01-06
Payer: COMMERCIAL

## 2024-01-06 DIAGNOSIS — Z95.2 S/P AVR (AORTIC VALVE REPLACEMENT): Primary | ICD-10-CM

## 2024-01-06 DIAGNOSIS — I48.21 PERMANENT ATRIAL FIBRILLATION (H): ICD-10-CM

## 2024-01-06 RX ORDER — WARFARIN SODIUM 4 MG/1
TABLET ORAL
Qty: 30 TABLET | Refills: 0 | Status: SHIPPED | OUTPATIENT
Start: 2024-01-06 | End: 2024-01-19

## 2024-01-18 ENCOUNTER — MYC MEDICAL ADVICE (OUTPATIENT)
Dept: FAMILY MEDICINE | Facility: CLINIC | Age: 69
End: 2024-01-18
Payer: COMMERCIAL

## 2024-01-18 ENCOUNTER — TRANSFERRED RECORDS (OUTPATIENT)
Dept: HEALTH INFORMATION MANAGEMENT | Facility: CLINIC | Age: 69
End: 2024-01-18
Payer: COMMERCIAL

## 2024-01-18 DIAGNOSIS — I48.21 PERMANENT ATRIAL FIBRILLATION (H): ICD-10-CM

## 2024-01-18 DIAGNOSIS — Z95.2 S/P AVR (AORTIC VALVE REPLACEMENT): ICD-10-CM

## 2024-01-18 LAB — INR (EXTERNAL): 4.4 (ref 0.9–1.1)

## 2024-01-19 ENCOUNTER — ANTICOAGULATION THERAPY VISIT (OUTPATIENT)
Dept: ANTICOAGULATION | Facility: CLINIC | Age: 69
End: 2024-01-19
Payer: COMMERCIAL

## 2024-01-19 DIAGNOSIS — Z95.2 S/P AORTIC VALVE REPLACEMENT: ICD-10-CM

## 2024-01-19 DIAGNOSIS — Z95.2 S/P AVR (AORTIC VALVE REPLACEMENT): Primary | ICD-10-CM

## 2024-01-19 DIAGNOSIS — Z79.01 LONG TERM CURRENT USE OF ANTICOAGULANT THERAPY: ICD-10-CM

## 2024-01-19 DIAGNOSIS — I48.21 PERMANENT ATRIAL FIBRILLATION (H): ICD-10-CM

## 2024-01-19 DIAGNOSIS — S30.1XXA ABDOMINAL WALL HEMATOMA, INITIAL ENCOUNTER: ICD-10-CM

## 2024-01-19 RX ORDER — WARFARIN SODIUM 4 MG/1
TABLET ORAL
Qty: 90 TABLET | Refills: 0 | Status: SHIPPED | OUTPATIENT
Start: 2024-01-19 | End: 2024-03-14

## 2024-01-19 NOTE — TELEPHONE ENCOUNTER
ANTICOAGULATION MANAGEMENT:  Medication Refill    Anticoagulation Summary  As of 1/19/2024      Warfarin maintenance plan:  2 mg (4 mg x 0.5) every Sun, Thu; 4 mg (4 mg x 1) all other days   Next INR check:  1/30/2024   Target end date:  Indefinite    Indications    S/P AVR (aortic valve replacement) [Z95.2]  Long-term (current) use of anticoagulants [Z79.01] [Z79.01]  Permanent atrial fibrillation (H) [I48.21]  Abdominal wall hematoma  initial encounter [S30.1XXA]  S/P aortic valve replacement [Z95.2]                 Anticoagulation Care Providers       Provider Role Specialty Phone number    Noe Ramirez PA-C Referring Family Medicine [phone]            Refill Criteria    Visit with referring provider/group: Meets criteria: office visit within referring provider group in the last 1 year on 6/20/23  Previous PCP is no longer with [facility].  Advised patient to set up appointment with a new PCP to establish care.    [facility] referral signed last signed: 01/05/2024; within last year: Yes.  Signed by Noe Ramirez PA-C.    Lab monitoring not exceeding 2 weeks overdue: Yes    Jovon meets all criteria for refill. Rx instructions and quantity supplied updated to match patient's current dosing plan.  90 day supply with 0 refills granted per [facility] protocol     Isabel Cheney RN  Anticoagulation Clinic

## 2024-01-19 NOTE — PROGRESS NOTES
ANTICOAGULATION MANAGEMENT     Jovon Mantilla 68 year old male is on warfarin with supratherapeutic INR result. (Goal INR 2.5-3.5)    Recent labs: (last 7 days)     01/18/24  0000   INR 4.4*       ASSESSMENT     Source(s): Chart Review and Patient/Caregiver Call     Warfarin doses taken: Warfarin taken as instructed  Diet: No new diet changes identified  Medication/supplement changes: None noted  New illness, injury, or hospitalization: No  Signs or symptoms of bleeding or clotting: No  Previous result: Therapeutic last visit; previously outside of goal range  Additional findings: Leaving on a [place] Cruise 1/19-1/28/24.  Then will be returning to [place] until April.       PLAN     Recommended plan for no diet, medication or health factor changes affecting INR     Dosing Instructions: partial hold then decrease your warfarin dose (8% change) with next INR in 10 days (upon return from cruise)      Summary  As of 1/19/2024      Full warfarin instructions:  1/19: 2 mg; Otherwise 2 mg every Sun, Thu; 4 mg all other days   Next INR check:  1/30/2024               Sent [employer] message with dosing and follow up instructions    Patient using outside facility for labs    Education provided:   Please call back if any changes to your diet, medications or how you've been taking warfarin    Plan made per [facility] anticoagulation protocol    Isabel Cheney RN  Anticoagulation Clinic  1/19/2024    _______________________________________________________________________     Anticoagulation Episode Summary       Current INR goal:  2.5-3.5   TTR:  55.6% (1 y)   Target end date:  Indefinite   Send INR reminders to:  [facility]    Indications    S/P AVR (aortic valve replacement) [Z95.2]  Long-term (current) use of anticoagulants [Z79.01] [Z79.01]  Permanent atrial fibrillation (H) [I48.21]  Abdominal wall hematoma  initial encounter [S30.1XXA]  S/P aortic valve replacement [Z95.2]             Comments:  24mm ATS Valve              Anticoagulation Care Providers       Provider Role Specialty Phone number    Noe Ramirez PA-C Referring Family Medicine [phone]

## 2024-01-31 ENCOUNTER — TELEPHONE (OUTPATIENT)
Dept: ANTICOAGULATION | Facility: CLINIC | Age: 69
End: 2024-01-31
Payer: COMMERCIAL

## 2024-01-31 NOTE — TELEPHONE ENCOUNTER
ANTICOAGULATION     Jovon Mantilla is overdue for an INR check. Patient was due for INR check 1/30/24.    Spoke with Jovon and scheduled lab appointment on 2/9/24  Patient will be home 2/8/24 - 2/20/24.    Stephania Redd RN

## 2024-02-09 ENCOUNTER — LAB (OUTPATIENT)
Dept: LAB | Facility: CLINIC | Age: 69
End: 2024-02-09
Payer: COMMERCIAL

## 2024-02-09 ENCOUNTER — ANTICOAGULATION THERAPY VISIT (OUTPATIENT)
Dept: ANTICOAGULATION | Facility: CLINIC | Age: 69
End: 2024-02-09

## 2024-02-09 DIAGNOSIS — Z95.2 S/P AVR (AORTIC VALVE REPLACEMENT): Primary | ICD-10-CM

## 2024-02-09 DIAGNOSIS — I48.21 PERMANENT ATRIAL FIBRILLATION (H): ICD-10-CM

## 2024-02-09 DIAGNOSIS — S30.1XXA ABDOMINAL WALL HEMATOMA, INITIAL ENCOUNTER: ICD-10-CM

## 2024-02-09 DIAGNOSIS — Z95.2 S/P AVR (AORTIC VALVE REPLACEMENT): ICD-10-CM

## 2024-02-09 DIAGNOSIS — Z79.01 LONG TERM CURRENT USE OF ANTICOAGULANT THERAPY: ICD-10-CM

## 2024-02-09 DIAGNOSIS — Z95.2 S/P AORTIC VALVE REPLACEMENT: ICD-10-CM

## 2024-02-09 LAB — INR BLD: 2.3 (ref 0.9–1.1)

## 2024-02-09 PROCEDURE — 85610 PROTHROMBIN TIME: CPT

## 2024-02-09 PROCEDURE — 36416 COLLJ CAPILLARY BLOOD SPEC: CPT

## 2024-02-09 NOTE — PROGRESS NOTES
"ANTICOAGULATION MANAGEMENT     Jovon Mantilla 68 year old male is on warfarin with subtherapeutic INR result. (Goal INR 2.5-3.5)    Recent labs: (last 7 days)     02/09/24  1055   INR 2.3*     ASSESSMENT     Source(s): Chart Review and Patient/Caregiver Call     Warfarin doses taken: Warfarin taken as instructed  Diet: No new diet changes identified  Medication/supplement changes: None noted  New illness, injury, or hospitalization: No  Signs or symptoms of bleeding or clotting: No  Previous result: Supratherapeutic  Additional findings: Warfarin maintenance dose was decreased 8% at last visit.   Patient reports that he did \"a lot of walking\" while on the cruise and reports that he recently started exercising and plans to continue due to weight gain, so I increased his warfarin maintenance dose.       PLAN     Recommended plan for ongoing change(s) affecting INR     Dosing Instructions: booster dose then Increase your warfarin dose (8% change) with next INR in 10 days       Summary  As of 2/9/2024      Full warfarin instructions:  2/9: 6 mg; Otherwise 2 mg every Thu; 4 mg all other days   Next INR check:  2/19/2024               Telephone call with Jovon who verbalizes understanding and agrees to plan    Lab visit scheduled    Education provided:   How exercise affects INR    Plan made per [facility] anticoagulation protocol    Madeline Wayne, RN  Anticoagulation Clinic  2/9/2024    _______________________________________________________________________     Anticoagulation Episode Summary       Current INR goal:  2.5-3.5   TTR:  58.3% (1 y)   Target end date:  Indefinite   Send INR reminders to:  [facility]    Indications    S/P AVR (aortic valve replacement) [Z95.2]  Long-term (current) use of anticoagulants [Z79.01] [Z79.01]  Permanent atrial fibrillation (H) [I48.21]  Abdominal wall hematoma  initial encounter [S30.1XXA]  S/P aortic valve replacement [Z95.2]             Comments:  24mm ATS Valve         "     Anticoagulation Care Providers       Provider Role Specialty Phone number    Noe Ramirez PA-C Referring Family Medicine [phone]

## 2024-02-19 ENCOUNTER — ANTICOAGULATION THERAPY VISIT (OUTPATIENT)
Dept: ANTICOAGULATION | Facility: CLINIC | Age: 69
End: 2024-02-19

## 2024-02-19 ENCOUNTER — LAB (OUTPATIENT)
Dept: LAB | Facility: CLINIC | Age: 69
End: 2024-02-19
Payer: COMMERCIAL

## 2024-02-19 DIAGNOSIS — Z95.2 S/P AORTIC VALVE REPLACEMENT: ICD-10-CM

## 2024-02-19 DIAGNOSIS — S30.1XXA ABDOMINAL WALL HEMATOMA, INITIAL ENCOUNTER: ICD-10-CM

## 2024-02-19 DIAGNOSIS — I48.21 PERMANENT ATRIAL FIBRILLATION (H): ICD-10-CM

## 2024-02-19 DIAGNOSIS — Z95.2 S/P AVR (AORTIC VALVE REPLACEMENT): ICD-10-CM

## 2024-02-19 DIAGNOSIS — Z95.2 S/P AVR (AORTIC VALVE REPLACEMENT): Primary | ICD-10-CM

## 2024-02-19 DIAGNOSIS — Z79.01 LONG TERM CURRENT USE OF ANTICOAGULANT THERAPY: ICD-10-CM

## 2024-02-19 LAB — INR BLD: 2.8 (ref 0.9–1.1)

## 2024-02-19 PROCEDURE — 85610 PROTHROMBIN TIME: CPT

## 2024-02-19 PROCEDURE — 36416 COLLJ CAPILLARY BLOOD SPEC: CPT

## 2024-02-19 NOTE — PROGRESS NOTES
ANTICOAGULATION MANAGEMENT     Jovon Mantilla 68 year old male is on warfarin with therapeutic INR result. (Goal INR 2.5-3.5)    Recent labs: (last 7 days)     02/19/24  1056   INR 2.8*       ASSESSMENT     Source(s): Chart Review and Patient/Caregiver Call     Warfarin doses taken: More warfarin taken than planned which may be affecting INR  Diet: No new diet changes identified  Medication/supplement changes: None noted  New illness, injury, or hospitalization: No  Signs or symptoms of bleeding or clotting: No  Previous result: Subtherapeutic  Additional findings: Warfarin maintenance dose was increased 8% at last visit; however, patient took more warfarin than planned so I changed his maintenance dose to match since INR is in range and near bottom of range.  Patient reports he is going back to [place] on 2/20/24, plans to continue to walk and exercise.       PLAN     Recommended plan for temporary change(s) and ongoing change(s) affecting INR     Dosing Instructions: Increase your warfarin dose (7% change) with next INR in 2 weeks       Summary  As of 2/19/2024      Full warfarin instructions:  4 mg every day   Next INR check:  3/4/2024               Telephone call with Jovon who verbalizes understanding and agrees to plan and who agrees to plan and repeated back plan correctly    Patient using outside facility for labs    Education provided:   Taking warfarin: Importance of taking warfarin as instructed    Plan made per [facility] anticoagulation protocol    Madeline Wayne RN  Anticoagulation Clinic  2/19/2024    _______________________________________________________________________     Anticoagulation Episode Summary       Current INR goal:  2.5-3.5   TTR:  59.9% (1 y)   Target end date:  Indefinite   Send INR reminders to:  [facility]    Indications    S/P AVR (aortic valve replacement) [Z95.2]  Long-term (current) use of anticoagulants [Z79.01] [Z79.01]  Permanent atrial fibrillation (H) [I48.21]  Abdominal  wall hematoma  initial encounter [S30.1XXA]  S/P aortic valve replacement [Z95.2]             Comments:  24mm ATS Valve             Anticoagulation Care Providers       Provider Role Specialty Phone number    Noe Ramirez PA-C Referring [facility] [phone]

## 2024-02-28 ENCOUNTER — TRANSFERRED RECORDS (OUTPATIENT)
Dept: HEALTH INFORMATION MANAGEMENT | Facility: CLINIC | Age: 69
End: 2024-02-28
Payer: COMMERCIAL

## 2024-02-29 ENCOUNTER — ANTICOAGULATION THERAPY VISIT (OUTPATIENT)
Dept: ANTICOAGULATION | Facility: CLINIC | Age: 69
End: 2024-02-29
Payer: COMMERCIAL

## 2024-02-29 DIAGNOSIS — Z95.2 S/P AORTIC VALVE REPLACEMENT: ICD-10-CM

## 2024-02-29 DIAGNOSIS — I48.21 PERMANENT ATRIAL FIBRILLATION (H): ICD-10-CM

## 2024-02-29 DIAGNOSIS — Z95.2 S/P AVR (AORTIC VALVE REPLACEMENT): Primary | ICD-10-CM

## 2024-02-29 DIAGNOSIS — S30.1XXA ABDOMINAL WALL HEMATOMA, INITIAL ENCOUNTER: ICD-10-CM

## 2024-02-29 DIAGNOSIS — Z79.01 LONG TERM CURRENT USE OF ANTICOAGULANT THERAPY: ICD-10-CM

## 2024-02-29 LAB — INR (EXTERNAL): 2.4 (ref 0.9–1.1)

## 2024-02-29 NOTE — PROGRESS NOTES
ANTICOAGULATION MANAGEMENT     Jovon Mantilla 68 year old male is on warfarin with subtherapeutic INR result. (Goal INR 2.5-3.5)    Recent labs: (last 7 days)     02/28/24  0800   INR 2.4*       ASSESSMENT     Source(s): Chart Review and Patient/Caregiver Call     Warfarin doses taken: Warfarin taken as instructed  Diet: No new diet changes identified  Medication/supplement changes:  Amoxicillin 7 day course (dates: 2/29-3/6/24) subsequent INRs may be increased. Closer INR monitoring recommended.  Doxycycline 7 day course (dates: 2/29-3/6/24) subsequent INRs may be increased. Closer INR monitoring recommended.  New illness, injury, or hospitalization: Yes: was seen yesterday and diagnosed with pneumonia, cough and fatigue   Signs or symptoms of bleeding or clotting: No  Previous result: Therapeutic last visit; previously outside of goal range  Additional findings:  has been exercising more        PLAN     Recommended plan for temporary change(s) affecting INR     Dosing Instructions: Increase your warfarin dose (7% change) with next INR in 1 week       Summary  As of 2/29/2024      Full warfarin instructions:  6 mg every Sun; 4 mg all other days   Next INR check:  3/14/2024               Telephone call with Jovon who verbalizes understanding and agrees to plan    Patient is currently in [place] for the winter and he cannot get back into the lab until 3/6/24.      Education provided:   Goal range and lab monitoring: goal range and significance of current result  Contact [phone]  with any changes, questions or concerns.     Plan made with [employer] Pharmacist Dayan Vázquez RN  Anticoagulation Clinic  2/29/2024    _______________________________________________________________________     Anticoagulation Episode Summary       Current INR goal:  2.5-3.5   TTR:  59.4% (1 y)   Target end date:  Indefinite   Send INR reminders to:  [facility]    Indications    S/P AVR (aortic valve replacement)  [Z95.2]  Long-term (current) use of anticoagulants [Z79.01] [Z79.01]  Permanent atrial fibrillation (H) [I48.21]  Abdominal wall hematoma  initial encounter [S30.1XXA]  S/P aortic valve replacement [Z95.2]             Comments:  24mm ATS Valve             Anticoagulation Care Providers       Provider Role Specialty Phone number    Noe Ramirez PA-C Referring [facility] [phone]

## 2024-02-29 NOTE — PROGRESS NOTES
Incoming fax from  [employer], [place]    Date of result 02/28/2024 1:03 pm    INR result 2.4        Lia Horn RN    [facility] Anticoagulation [facility]            Routine safety standards initiated.  Routine nursing standards initiated.

## 2024-03-02 NOTE — PROGRESS NOTES
Chart reviewed and plan made with [facility] RN at time of encounter.    Dayan Briones, [facility]

## 2024-03-06 ENCOUNTER — TRANSFERRED RECORDS (OUTPATIENT)
Dept: HEALTH INFORMATION MANAGEMENT | Facility: CLINIC | Age: 69
End: 2024-03-06
Payer: COMMERCIAL

## 2024-03-06 LAB — INR (EXTERNAL): 4.2 (ref 0.9–1.1)

## 2024-03-07 ENCOUNTER — MYC MEDICAL ADVICE (OUTPATIENT)
Dept: ANTICOAGULATION | Facility: CLINIC | Age: 69
End: 2024-03-07
Payer: COMMERCIAL

## 2024-03-07 ENCOUNTER — MYC MEDICAL ADVICE (OUTPATIENT)
Dept: FAMILY MEDICINE | Facility: CLINIC | Age: 69
End: 2024-03-07
Payer: COMMERCIAL

## 2024-03-07 ENCOUNTER — ANTICOAGULATION THERAPY VISIT (OUTPATIENT)
Dept: ANTICOAGULATION | Facility: CLINIC | Age: 69
End: 2024-03-07
Payer: COMMERCIAL

## 2024-03-07 DIAGNOSIS — Z79.01 LONG TERM CURRENT USE OF ANTICOAGULANT THERAPY: ICD-10-CM

## 2024-03-07 DIAGNOSIS — Z95.2 S/P AORTIC VALVE REPLACEMENT: ICD-10-CM

## 2024-03-07 DIAGNOSIS — S30.1XXA ABDOMINAL WALL HEMATOMA, INITIAL ENCOUNTER: ICD-10-CM

## 2024-03-07 DIAGNOSIS — Z95.2 S/P AVR (AORTIC VALVE REPLACEMENT): Primary | ICD-10-CM

## 2024-03-07 DIAGNOSIS — I48.21 PERMANENT ATRIAL FIBRILLATION (H): ICD-10-CM

## 2024-03-07 NOTE — PROGRESS NOTES
ANTICOAGULATION MANAGEMENT     Jovon Mantilla 68 year old male is on warfarin with supratherapeutic INR result. (Goal INR 2.5-3.5)    Recent labs: (last 7 days)     03/06/24  0000   INR 4.2*     Patient reported on 3/7/24.    ASSESSMENT     Source(s): Chart Review and Patient/Caregiver Call     Medication/supplement changes: Currently taking Mucinex DM and benzonatate for cough.  Per chart review, patient was also taking amoxicillin and doxycycline and would have completed both on 3/6  New illness, injury, or hospitalization: Recovering from pneumonia  Signs or symptoms of bleeding or clotting: No  Previous result: Subtherapeutic.  Maintenance dose was increased 7% at last INR check.  Additional findings: None       PLAN     Unable to reach Jovon today.    Left message to hold warfarin tonight. Request call back for assessment. [employer] message also sent.    Follow up required to confirm warfarin dose taken and assess for changes    Isabel Cheney RN  Anticoagulation Clinic  3/7/2024

## 2024-03-07 NOTE — PROGRESS NOTES
ANTICOAGULATION MANAGEMENT     Jovon Mantilla 68 year old male is on warfarin with supratherapeutic INR result. (Goal INR 2.5-3.5)    Recent labs: (last 7 days)     03/06/24  0000   INR 4.2*       ASSESSMENT     Source(s): Chart Review and Patient/Caregiver Call     Warfarin doses taken: Warfarin taken as instructed  Diet: No new diet changes identified.  Appetite slowly coming back.  He normally does not eat many greens in his diet, so this should not have effected INR  Medication/supplement changes: as noted below. Confirms that doxycycline and amoxicillin were completed yesterday.  New illness, injury, or hospitalization: Patient feels that the pneumonia is slowly improving.  He is starting to become more active again.  Continues with cough.  Signs or symptoms of bleeding or clotting: No  Previous result: Subtherapeutic  Additional findings: Will need order faxed to lab in [place] again.  He will send [employer] message with fax number.       PLAN     Recommended plan for temporary change(s) affecting INR     Dosing Instructions: hold dose then continue your current warfarin dose with next INR in 1-1.5 weeks       Summary  As of 3/7/2024      Full warfarin instructions:  3/7: Hold; Otherwise 6 mg every Sun; 4 mg all other days   Next INR check:  3/15/2024               Telephone call with Jovon who agrees to plan and repeated back plan correctly    Patient using outside facility for labs    Education provided:   Please call back if any changes to your diet, medications or how you've been taking warfarin    Plan made per [facility] anticoagulation protocol    Isabel Cheney RN  Anticoagulation Clinic  3/7/2024    _______________________________________________________________________     Anticoagulation Episode Summary       Current INR goal:  2.5-3.5   TTR:  58.6% (1 y)   Target end date:  Indefinite   Send INR reminders to:  [facility]    Indications    S/P AVR (aortic valve replacement) [Z95.2]  Long-term (current)  use of anticoagulants [Z79.01] [Z79.01]  Permanent atrial fibrillation (H) [I48.21]  Abdominal wall hematoma  initial encounter [S30.1XXA]  S/P aortic valve replacement [Z95.2]             Comments:  24mm ATS Valve             Anticoagulation Care Providers       Provider Role Specialty Phone number    Noe Ramirez PA-C Referring [facility] [phone]

## 2024-03-13 DIAGNOSIS — I48.21 PERMANENT ATRIAL FIBRILLATION (H): ICD-10-CM

## 2024-03-13 DIAGNOSIS — Z95.2 S/P AVR (AORTIC VALVE REPLACEMENT): ICD-10-CM

## 2024-03-14 RX ORDER — WARFARIN SODIUM 4 MG/1
TABLET ORAL
Qty: 90 TABLET | Refills: 1 | Status: SHIPPED | OUTPATIENT
Start: 2024-03-14 | End: 2024-04-30

## 2024-03-14 NOTE — TELEPHONE ENCOUNTER
ANTICOAGULATION MANAGEMENT:  Medication Refill    Anticoagulation Summary  As of 3/7/2024      Warfarin maintenance plan:  6 mg (4 mg x 1.5) every Sun; 4 mg (4 mg x 1) all other days   Next INR check:  3/15/2024   Target end date:  Indefinite    Indications    S/P AVR (aortic valve replacement) [Z95.2]  Long-term (current) use of anticoagulants [Z79.01] [Z79.01]  Permanent atrial fibrillation (H) [I48.21]  Abdominal wall hematoma  initial encounter [S30.1XXA]  S/P aortic valve replacement [Z95.2]                 Anticoagulation Care Providers       Provider Role Specialty Phone number    Noe Ramirez PA-C Referring [facility] Medicine [phone]            Refill Criteria    Visit with referring provider/group: Meets criteria: office visit within referring provider group in the last 1 year on 6/20/24    [facility] referral last signed: 01/05/2024; within last year: Yes    Lab monitoring not exceeding 2 weeks overdue: Yes    Jovon meets all criteria for refill. Rx instructions and quantity supplied updated to match patient's current dosing plan. Warfarin 90 day supply with 1 refill granted per [facility] protocol     Kristin Faria, RN  Anticoagulation Clinic

## 2024-03-20 ENCOUNTER — TRANSFERRED RECORDS (OUTPATIENT)
Dept: HEALTH INFORMATION MANAGEMENT | Facility: CLINIC | Age: 69
End: 2024-03-20
Payer: COMMERCIAL

## 2024-03-20 LAB — INR (EXTERNAL): 3.7 (ref 0.9–1.1)

## 2024-03-21 ENCOUNTER — ANTICOAGULATION THERAPY VISIT (OUTPATIENT)
Dept: ANTICOAGULATION | Facility: CLINIC | Age: 69
End: 2024-03-21
Payer: COMMERCIAL

## 2024-03-21 DIAGNOSIS — Z79.01 LONG TERM CURRENT USE OF ANTICOAGULANT THERAPY: ICD-10-CM

## 2024-03-21 DIAGNOSIS — I48.21 PERMANENT ATRIAL FIBRILLATION (H): ICD-10-CM

## 2024-03-21 DIAGNOSIS — Z95.2 S/P AORTIC VALVE REPLACEMENT: ICD-10-CM

## 2024-03-21 DIAGNOSIS — Z95.2 S/P AVR (AORTIC VALVE REPLACEMENT): Primary | ICD-10-CM

## 2024-03-21 DIAGNOSIS — S30.1XXA ABDOMINAL WALL HEMATOMA, INITIAL ENCOUNTER: ICD-10-CM

## 2024-03-21 NOTE — PROGRESS NOTES
"ANTICOAGULATION MANAGEMENT     Jovon Mantilla 68 year old male is on warfarin with supratherapeutic INR result. (Goal INR 2.5-3.5)    No results for input(s): \"INR\" in the last 168 hours.    ASSESSMENT     Source(s): Chart Review and Patient/Caregiver Call     Warfarin doses taken: Warfarin taken as instructed  Diet: No new diet changes identified  Medication/supplement changes:  He has now stopped taking the OTC medication he was taking for the pneumonia. Lat day he took it was Tuesday.  New illness, injury, or hospitalization: No  Signs or symptoms of bleeding or clotting: No  Previous result: Supratherapeutic  Additional findings:  He prefers to continue his current dose and see what happens.       PLAN     Recommended plan for temporary change(s) affecting INR     Dosing Instructions: Continue your current warfarin dose with next INR in 1-2 weeks       Summary  As of 3/21/2024      Full warfarin instructions:  6 mg every Sun; 4 mg all other days   Next INR check:  4/3/2024               Telephone call with Jovon who agrees to plan and repeated back plan correctly    Patient using outside facility for labs    Education provided:   Please call back if any changes to your diet, medications or how you've been taking warfarin  Goal range and lab monitoring: goal range and significance of current result and Importance of therapeutic range    Plan made per [facility] anticoagulation protocol    Ludmila Friend RN  Anticoagulation Clinic  3/21/2024    _______________________________________________________________________     Anticoagulation Episode Summary       Current INR goal:  2.5-3.5   TTR:  57.7% (1 y)   Target end date:  Indefinite   Send INR reminders to:  [facility]    Indications    S/P AVR (aortic valve replacement) [Z95.2]  Long-term (current) use of anticoagulants [Z79.01] [Z79.01]  Permanent atrial fibrillation (H) [I48.21]  Abdominal wall hematoma  initial encounter [S30.1XXA]  S/P aortic valve " replacement [Z95.2]             Comments:  24mm ATS Valve             Anticoagulation Care Providers       Provider Role Specialty Phone number    Noe Ramirez PA-C Referring [facility] Medicine [phone]

## 2024-03-21 NOTE — PROGRESS NOTES
Incoming fax from  [employer] [place], [place]    Date of result 03/20/2024    INR result 3.7    Lia Horn RN    [facility] Anticoagulation Clinic

## 2024-04-04 ENCOUNTER — MYC MEDICAL ADVICE (OUTPATIENT)
Dept: ANTICOAGULATION | Facility: CLINIC | Age: 69
End: 2024-04-04
Payer: COMMERCIAL

## 2024-04-12 ENCOUNTER — DOCUMENTATION ONLY (OUTPATIENT)
Dept: ANTICOAGULATION | Facility: CLINIC | Age: 69
End: 2024-04-12
Payer: COMMERCIAL

## 2024-04-12 ENCOUNTER — MYC MEDICAL ADVICE (OUTPATIENT)
Dept: ANTICOAGULATION | Facility: CLINIC | Age: 69
End: 2024-04-12
Payer: COMMERCIAL

## 2024-04-12 DIAGNOSIS — Z95.2 S/P AVR (AORTIC VALVE REPLACEMENT): ICD-10-CM

## 2024-04-12 DIAGNOSIS — Z95.2 S/P AORTIC VALVE REPLACEMENT: ICD-10-CM

## 2024-04-12 DIAGNOSIS — I48.21 PERMANENT ATRIAL FIBRILLATION (H): ICD-10-CM

## 2024-04-12 DIAGNOSIS — Z79.01 LONG TERM CURRENT USE OF ANTICOAGULANT THERAPY: ICD-10-CM

## 2024-04-12 NOTE — TELEPHONE ENCOUNTER
ANTICOAGULATION     Jovon Mantilla is overdue for an INR check.     Responded to previous [employer] message from 4/4. Sent new order to [facility] in [place] and requested Jovon follow up as soon as possible.

## 2024-04-17 DIAGNOSIS — L30.9 DERMATITIS: ICD-10-CM

## 2024-04-18 RX ORDER — TRIAMCINOLONE ACETONIDE 1 MG/G
CREAM TOPICAL 2 TIMES DAILY
Qty: 45 G | Refills: 0 | Status: SHIPPED | OUTPATIENT
Start: 2024-04-18

## 2024-04-19 ENCOUNTER — TELEPHONE (OUTPATIENT)
Dept: ANTICOAGULATION | Facility: CLINIC | Age: 69
End: 2024-04-19
Payer: COMMERCIAL

## 2024-04-19 LAB — INR (EXTERNAL): 4.6 (ref 0.9–1.1)

## 2024-04-19 NOTE — TELEPHONE ENCOUNTER
ANTICOAGULATION     Joovn Mantilla is overdue for an INR check.     Writer spoke to Jovon, he reports he has an INR appointment today in [place] at 12:45.  Writer informed Jovon to continue same warfarin dose and we will most likely call him with results on Monday, 4/22/24, patient verbalized understanding.    Madeline Wayne RN

## 2024-04-20 ENCOUNTER — MYC MEDICAL ADVICE (OUTPATIENT)
Dept: UROLOGY | Facility: CLINIC | Age: 69
End: 2024-04-20
Payer: COMMERCIAL

## 2024-04-22 ENCOUNTER — ANTICOAGULATION THERAPY VISIT (OUTPATIENT)
Dept: ANTICOAGULATION | Facility: CLINIC | Age: 69
End: 2024-04-22
Payer: COMMERCIAL

## 2024-04-22 DIAGNOSIS — I48.21 PERMANENT ATRIAL FIBRILLATION (H): ICD-10-CM

## 2024-04-22 DIAGNOSIS — S30.1XXA ABDOMINAL WALL HEMATOMA, INITIAL ENCOUNTER: ICD-10-CM

## 2024-04-22 DIAGNOSIS — Z95.2 S/P AORTIC VALVE REPLACEMENT: ICD-10-CM

## 2024-04-22 DIAGNOSIS — Z79.01 LONG TERM CURRENT USE OF ANTICOAGULANT THERAPY: ICD-10-CM

## 2024-04-22 DIAGNOSIS — Z95.2 S/P AVR (AORTIC VALVE REPLACEMENT): Primary | ICD-10-CM

## 2024-04-22 NOTE — PROGRESS NOTES
ANTICOAGULATION MANAGEMENT     Jovon Mantilla 68 year old male is on warfarin with supratherapeutic INR result. (Goal INR 2.5-3.5)    Recent labs: (last 7 days)     04/19/24  0000   INR 4.6*       ASSESSMENT     Source(s): Chart Review and Patient/Caregiver Call     Warfarin doses taken: Warfarin taken as instructed  Diet: No new diet changes identified  Medication/supplement changes: None noted  New illness, injury, or hospitalization: No  Signs or symptoms of bleeding or clotting: No  Previous result: Supratherapeutic  Additional findings: None       PLAN     Recommended plan for no diet, medication or health factor changes affecting INR     Dosing Instructions: partial hold then decrease your warfarin dose (6.7% change) with next INR in 2 weeks       Summary  As of 4/22/2024      Full warfarin instructions:  4/22: 2 mg; Otherwise 4 mg every day   Next INR check:  5/6/2024               Telephone call with Jovon who verbalizes understanding and agrees to plan    Lab visit scheduled    Education provided:   Please call back if any changes to your diet, medications or how you've been taking warfarin    Plan made per [facility] anticoagulation protocol    Karina Pierre, RN  Anticoagulation Clinic  4/22/2024    _______________________________________________________________________     Anticoagulation Episode Summary       Current INR goal:  2.5-3.5   TTR:  54.1% (1 y)   Target end date:  Indefinite   Send INR reminders to:  [facility]    Indications    S/P AVR (aortic valve replacement) [Z95.2]  Long-term (current) use of anticoagulants [Z79.01] [Z79.01]  Permanent atrial fibrillation (H) [I48.21]  Abdominal wall hematoma  initial encounter [S30.1XXA]  S/P aortic valve replacement [Z95.2]             Comments:  24mm ATS Valve             Anticoagulation Care Providers       Provider Role Specialty Phone number    Noe Ramirez PA-C Referring Family Medicine [phone]

## 2024-04-30 ENCOUNTER — TELEPHONE (OUTPATIENT)
Dept: FAMILY MEDICINE | Facility: CLINIC | Age: 69
End: 2024-04-30
Payer: COMMERCIAL

## 2024-04-30 DIAGNOSIS — Z95.2 S/P AVR (AORTIC VALVE REPLACEMENT): Primary | ICD-10-CM

## 2024-04-30 DIAGNOSIS — Z95.2 S/P AORTIC VALVE REPLACEMENT: ICD-10-CM

## 2024-04-30 DIAGNOSIS — S30.1XXA ABDOMINAL WALL HEMATOMA, INITIAL ENCOUNTER: ICD-10-CM

## 2024-04-30 DIAGNOSIS — Z79.01 LONG TERM CURRENT USE OF ANTICOAGULANT THERAPY: ICD-10-CM

## 2024-04-30 DIAGNOSIS — I48.21 PERMANENT ATRIAL FIBRILLATION (H): ICD-10-CM

## 2024-04-30 RX ORDER — WARFARIN SODIUM 4 MG/1
TABLET ORAL
Qty: 90 TABLET | Refills: 1 | Status: SHIPPED | OUTPATIENT
Start: 2024-04-30 | End: 2024-07-22

## 2024-04-30 NOTE — TELEPHONE ENCOUNTER
Spoke with Jovon. He has cataract surgery scheduled for 5/14 and 5/28/24. Advised that no warfarin hold is advised for this procedure. Also requested a warfarin refill.

## 2024-04-30 NOTE — TELEPHONE ENCOUNTER
General Call    Contacts         Type Contact Phone/Fax    04/30/2024 08:15 AM CDT Phone (Incoming) Jovon Mantilla (Self) [phone] (M)          Reason for Call:  Upcoming surgery and refill    What are your questions or concerns:  Patient is requesting to have someone to call him back to discuss his upcoming procedure and needing a refill    Date of last appointment with provider: 04/19/24    Could we send this information to you in [facility] or would you prefer to receive a phone call?:   Patient would prefer a phone call   Okay to leave a detailed message?: Yes at Home number on file [phone] (home)

## 2024-04-30 NOTE — TELEPHONE ENCOUNTER
ANTICOAGULATION MANAGEMENT:  Medication Refill    Anticoagulation Summary  As of 4/22/2024      Warfarin maintenance plan:  4 mg (4 mg x 1) every day   Next INR check:  5/6/2024   Target end date:  Indefinite    Indications    S/P AVR (aortic valve replacement) [Z95.2]  Long-term (current) use of anticoagulants [Z79.01] [Z79.01]  Permanent atrial fibrillation (H) [I48.21]  Abdominal wall hematoma  initial encounter [S30.1XXA]  S/P aortic valve replacement [Z95.2]                 Anticoagulation Care Providers       Provider Role Specialty Phone number    Noe Ramirez PA-C Referring Family Medicine [phone]            Refill Criteria    Visit with referring provider/group: Meets criteria: office visit within referring provider group in the last 1 year on 6/20/23    [facility] referral last signed: 01/05/2024; within last year: Yes    Lab monitoring not exceeding 2 weeks overdue: Yes    Jovon meets all criteria for refill. Rx instructions and quantity supplied updated to match patient's current dosing plan. Warfarin 90 day supply with 1 refill granted per [facility] protocol     Teodora Redd RN  Anticoagulation Clinic

## 2024-05-02 DIAGNOSIS — I48.20 CHRONIC ATRIAL FIBRILLATION (H): ICD-10-CM

## 2024-05-02 RX ORDER — DIGOXIN 125 MCG
125 TABLET ORAL DAILY
Qty: 90 TABLET | Refills: 0 | Status: SHIPPED | OUTPATIENT
Start: 2024-05-02 | End: 2024-06-03

## 2024-05-06 ENCOUNTER — LAB (OUTPATIENT)
Dept: LAB | Facility: CLINIC | Age: 69
End: 2024-05-06
Payer: COMMERCIAL

## 2024-05-06 ENCOUNTER — ANTICOAGULATION THERAPY VISIT (OUTPATIENT)
Dept: ANTICOAGULATION | Facility: CLINIC | Age: 69
End: 2024-05-06

## 2024-05-06 DIAGNOSIS — S30.1XXA ABDOMINAL WALL HEMATOMA, INITIAL ENCOUNTER: ICD-10-CM

## 2024-05-06 DIAGNOSIS — Z79.01 LONG TERM CURRENT USE OF ANTICOAGULANT THERAPY: ICD-10-CM

## 2024-05-06 DIAGNOSIS — Z95.2 S/P AVR (AORTIC VALVE REPLACEMENT): ICD-10-CM

## 2024-05-06 DIAGNOSIS — I48.21 PERMANENT ATRIAL FIBRILLATION (H): ICD-10-CM

## 2024-05-06 DIAGNOSIS — Z95.2 S/P AVR (AORTIC VALVE REPLACEMENT): Primary | ICD-10-CM

## 2024-05-06 DIAGNOSIS — Z95.2 S/P AORTIC VALVE REPLACEMENT: ICD-10-CM

## 2024-05-06 LAB — INR BLD: 3.7 (ref 0.9–1.1)

## 2024-05-06 PROCEDURE — 85610 PROTHROMBIN TIME: CPT

## 2024-05-06 PROCEDURE — 36416 COLLJ CAPILLARY BLOOD SPEC: CPT

## 2024-05-06 NOTE — PROGRESS NOTES
ANTICOAGULATION MANAGEMENT     Jovon Mantilla 68 year old male is on warfarin with supratherapeutic INR result. (Goal INR 2.5-3.5)    Recent labs: (last 7 days)     05/06/24  0948   INR 3.7*       ASSESSMENT     Source(s): Chart Review and Patient/Caregiver Call     Warfarin doses taken: Warfarin taken as instructed  Diet: No new diet changes identified  Medication/supplement changes: None noted  New illness, injury, or hospitalization: No  Signs or symptoms of bleeding or clotting: No  Previous result: Supratherapeutic  Additional findings: Reports cataract surgery now scheduled for 5/13 and 5/20.   Jovon has now returned home after snowbirding in [place].        PLAN     Recommended plan for no diet, medication or health factor changes affecting INR     Dosing Instructions: decrease your warfarin dose (7.1% change) with next INR in 2 weeks       Summary  As of 5/6/2024      Full warfarin instructions:  2 mg every Mon; 4 mg all other days   Next INR check:  5/21/2024               Telephone call with Jovon who verbalizes understanding and agrees to plan    Lab visit scheduled    Education provided:   Please call back if any changes to your diet, medications or how you've been taking warfarin    Plan made per [facility] anticoagulation protocol    Teodora Redd RN  Anticoagulation Clinic  5/6/2024    _______________________________________________________________________     Anticoagulation Episode Summary       Current INR goal:  2.5-3.5   TTR:  53.6% (1 y)   Target end date:  Indefinite   Send INR reminders to:  [facility]    Indications    S/P AVR (aortic valve replacement) [Z95.2]  Long-term (current) use of anticoagulants [Z79.01] [Z79.01]  Permanent atrial fibrillation (H) [I48.21]  Abdominal wall hematoma  initial encounter [S30.1XXA]  S/P aortic valve replacement [Z95.2]             Comments:  24mm ATS Valve             Anticoagulation Care Providers       Provider Role Specialty Phone number     Noe Ramirez PA-C [facility] [phone]

## 2024-05-07 ENCOUNTER — OFFICE VISIT (OUTPATIENT)
Dept: FAMILY MEDICINE | Facility: CLINIC | Age: 69
End: 2024-05-07
Payer: COMMERCIAL

## 2024-05-07 VITALS
HEIGHT: 70 IN | HEART RATE: 75 BPM | OXYGEN SATURATION: 96 % | DIASTOLIC BLOOD PRESSURE: 74 MMHG | RESPIRATION RATE: 16 BRPM | BODY MASS INDEX: 30.55 KG/M2 | WEIGHT: 213.38 LBS | SYSTOLIC BLOOD PRESSURE: 136 MMHG | TEMPERATURE: 97.3 F

## 2024-05-07 DIAGNOSIS — H26.9 CATARACT OF BOTH EYES, UNSPECIFIED CATARACT TYPE: ICD-10-CM

## 2024-05-07 DIAGNOSIS — Z01.818 PREOP GENERAL PHYSICAL EXAM: Primary | ICD-10-CM

## 2024-05-07 DIAGNOSIS — I42.0 DILATED CARDIOMYOPATHY (H): ICD-10-CM

## 2024-05-07 DIAGNOSIS — Z95.2 S/P AORTIC VALVE REPLACEMENT: ICD-10-CM

## 2024-05-07 LAB
ANION GAP SERPL CALCULATED.3IONS-SCNC: 6 MMOL/L (ref 7–15)
BASOPHILS # BLD AUTO: 0 10E3/UL (ref 0–0.2)
BASOPHILS NFR BLD AUTO: 1 %
BUN SERPL-MCNC: 13.3 MG/DL (ref 8–23)
CALCIUM SERPL-MCNC: 9.5 MG/DL (ref 8.8–10.2)
CHLORIDE SERPL-SCNC: 104 MMOL/L (ref 98–107)
CREAT SERPL-MCNC: 0.83 MG/DL (ref 0.67–1.17)
DEPRECATED HCO3 PLAS-SCNC: 30 MMOL/L (ref 22–29)
EGFRCR SERPLBLD CKD-EPI 2021: >90 ML/MIN/1.73M2
EOSINOPHIL # BLD AUTO: 0.1 10E3/UL (ref 0–0.7)
EOSINOPHIL NFR BLD AUTO: 2 %
ERYTHROCYTE [DISTWIDTH] IN BLOOD BY AUTOMATED COUNT: 17 % (ref 10–15)
GLUCOSE SERPL-MCNC: 68 MG/DL (ref 70–99)
HCT VFR BLD AUTO: 36.7 % (ref 40–53)
HGB BLD-MCNC: 11.2 G/DL (ref 13.3–17.7)
IMM GRANULOCYTES # BLD: 0 10E3/UL
IMM GRANULOCYTES NFR BLD: 0 %
LYMPHOCYTES # BLD AUTO: 0.6 10E3/UL (ref 0.8–5.3)
LYMPHOCYTES NFR BLD AUTO: 13 %
MCH RBC QN AUTO: 27.5 PG (ref 26.5–33)
MCHC RBC AUTO-ENTMCNC: 30.5 G/DL (ref 31.5–36.5)
MCV RBC AUTO: 90 FL (ref 78–100)
MONOCYTES # BLD AUTO: 0.6 10E3/UL (ref 0–1.3)
MONOCYTES NFR BLD AUTO: 14 %
NEUTROPHILS # BLD AUTO: 2.9 10E3/UL (ref 1.6–8.3)
NEUTROPHILS NFR BLD AUTO: 70 %
NRBC # BLD AUTO: 0 10E3/UL
NRBC BLD AUTO-RTO: 0 /100
PLATELET # BLD AUTO: 193 10E3/UL (ref 150–450)
POTASSIUM SERPL-SCNC: 5.2 MMOL/L (ref 3.4–5.3)
RBC # BLD AUTO: 4.07 10E6/UL (ref 4.4–5.9)
SODIUM SERPL-SCNC: 140 MMOL/L (ref 135–145)
WBC # BLD AUTO: 4.2 10E3/UL (ref 4–11)

## 2024-05-07 PROCEDURE — 99214 OFFICE O/P EST MOD 30 MIN: CPT | Performed by: FAMILY MEDICINE

## 2024-05-07 PROCEDURE — 80048 BASIC METABOLIC PNL TOTAL CA: CPT | Performed by: FAMILY MEDICINE

## 2024-05-07 PROCEDURE — 36415 COLL VENOUS BLD VENIPUNCTURE: CPT | Performed by: FAMILY MEDICINE

## 2024-05-07 PROCEDURE — 85025 COMPLETE CBC W/AUTO DIFF WBC: CPT | Performed by: FAMILY MEDICINE

## 2024-05-07 RX ORDER — RESPIRATORY SYNCYTIAL VIRUS VACCINE 120MCG/0.5
0.5 KIT INTRAMUSCULAR ONCE
Qty: 1 EACH | Refills: 0 | Status: CANCELLED | OUTPATIENT
Start: 2024-05-07 | End: 2024-05-07

## 2024-05-07 ASSESSMENT — PAIN SCALES - GENERAL: PAINLEVEL: NO PAIN (0)

## 2024-05-07 NOTE — PROGRESS NOTES
Preoperative Evaluation  [facility]  [address]  [place] [address]  Phone: [phone]  Primary Provider: No Ref-Primary, Physician  Pre-op Performing Provider: KEMI PARRA  May 7, 2024        Jovon is a 68 year old, presenting for the following:  Pre-Op Exam        5/7/2024    10:39 AM   Additional Questions   Roomed by Kyleigh Valdez     Surgical Information  Surgery/Procedure: cataract  Surgery Location: [facility]  location    Surgeon: Lias  Surgery Date: 05/13/2024 and 05/28/2024  Time of Surgery: TBD  Where patient plans to recover: At home with family  Fax number for surgical facility: [phone]        Subjective       HPI related to upcoming procedure: 68 year old male with bilateral cataracts.          5/1/2024     1:32 PM   Preop Questions   1. Have you ever had a heart attack or stroke? YES - had aortic valve replaced    2. Have you ever had surgery on your heart or blood vessels, such as a stent placement, a coronary artery bypass, or surgery on an artery in your head, neck, heart, or legs? YES - as above    3. Do you have chest pain with activity? No   4. Do you have a history of  heart failure? YES - has well controlled heart failure   5. Do you currently have a cold, bronchitis or symptoms of other infection? No   6. Do you have a cough, shortness of breath, or wheezing? No   7. Do you or anyone in your family have previous history of blood clots? No   8. Do you or does anyone in your family have a serious bleeding problem such as prolonged bleeding following surgeries or cuts? No   9. Have you ever had problems with anemia or been told to take iron pills? No   10. Have you had any abnormal blood loss such as black, tarry or bloody stools? No   11. Have you ever had a blood transfusion? YES   11a. Have you ever had a transfusion reaction? No   12. Are you willing to have a blood transfusion if it is medically needed before, during, or  after your surgery? Yes   13. Have you or any of your relatives ever had problems with anesthesia? No   14. Do you have sleep apnea, excessive snoring or daytime drowsiness? No   15. Do you have any artifical heart valves or other implanted medical devices like a pacemaker, defibrillator, or continuous glucose monitor? YES - aortic valve 2011   15a. What type of device do you have? Artificial   15b. Name of the clinic that manages your device:  [facility]   16. Do you have artificial joints? No   17. Are you allergic to latex? No       Health Care Directive  Patient does have a Health Care Directive or Living Will:     Preoperative Review of   Patient not on any controlled substance             Patient Active Problem List    Diagnosis Date Noted    CAD (coronary artery disease) 03/25/2013     Priority: High     70% LAD - followed by Dr Lewis      Hypertension goal BP (blood pressure) < 140/90 10/14/2011     Priority: High    Hyperlipidemia LDL goal <70 10/31/2010     Priority: High    Acquired hypothyroidism 05/23/2003     Priority: High     Problem list name updated by automated process. Provider to review      Syncope, unspecified syncope type 08/14/2023     Priority: Medium    Status post coronary angiogram 01/31/2022     Priority: Medium    S/P bariatric surgery 08/09/2021     Priority: Medium    Abdominal wall hematoma, initial encounter 02/10/2021     Priority: Medium    Obesity      Priority: Medium    Long term current use of anticoagulants with INR goal of 2.5-3.5 08/28/2020     Priority: Medium    Right inguinal hernia 07/22/2019     Priority: Medium    Acne rosacea 04/22/2019     Priority: Medium    Permanent atrial fibrillation (H) 06/02/2017     Priority: Medium    Idiopathic cardiomyopathy (H) 01/27/2017     Priority: Medium    S/P aortic valve replacement 01/27/2017     Priority: Medium    Coronary artery disease involving native coronary artery of native heart without angina pectoris 01/27/2017      Priority: Medium    Hypotension, unspecified hypotension type 01/27/2017     Priority: Medium    Anxiety 06/23/2016     Priority: Medium    Adjustment disorder with mixed anxiety and depressed mood 05/11/2016     Priority: Medium    Long-term (current) use of anticoagulants [Z79.01] 03/21/2016     Priority: Medium    Gastrointestinal hemorrhage associated with anorectal source 02/02/2016     Priority: Medium    GI bleeding 01/22/2016     Priority: Medium    ASVD (arteriosclerotic vascular disease) 12/16/2015     Priority: Medium    S/P AVR (aortic valve replacement) 12/16/2015     Priority: Medium    Mitral regurgitation      Priority: Medium    Dilated cardiomyopathy (H) 03/25/2013     Priority: Medium    Aortic stenosis 03/08/2010     Priority: Medium     S/p aortic valve replacement      Chronic rhinitis 02/20/2009     Priority: Medium    Neck pain 11/12/2008     Priority: Medium      Past Medical History:   Diagnosis Date    Acne rosacea 04/22/2019    Acquired hypothyroidism 05/23/2003     Problem list name updated by automated process. Provider to review    Acute prostatitis     Acute sinusitis, unspecified     Aortic valve disease     Biatrial enlargement     CAD (coronary artery disease)     known 70% LAD stenosis    Congestive heart failure (H)     Depression     GI bleeding 01/22/2016    History of bleeding peptic ulcer     Hyperlipidemia     Idiopathic cardiomyopathy (H)     Mitral regurgitation     Obesity     Palpitations     Permanent atrial fibrillation (H) 06/02/2017    Right inguinal hernia 07/22/2019    S/P AVR 01/07/2011    with Maze procedure    S/P gastric bypass     Tricuspid regurgitation     Unspecified essential hypertension      Past Surgical History:   Procedure Laterality Date    ANGIOGRAM  02/01/2002    Normal coronary,dilated LV,Sev.decr.global LVF,+1 MR    ANGIOGRAM  01/01/2008    Mild AS,Mod PHTN,mod prox.LAD disease,Triv.CFX disease    ANGIOGRAM  01/01/2009    Mod AS,Mod PHTN,mod  prox.LAD disease    ANGIOGRAM  10/01/2010    Sev.AS,CM,global hypokinesis,Mild-mod LV dilated,Mild MR,70% prox.LAD lesion,PHTN    CARDIOVERSION  2/02, 4/02, 4/11    CHOLECYSTECTOMY  2006    COLONOSCOPY N/A 01/23/2016    Procedure: COLONOSCOPY;  Surgeon: Robyn Collins MD;  Location:  GI    CV CORONARY ANGIOGRAM N/A 1/31/2022    Procedure: Coronary Angiogram;  Surgeon: Rome Mike MD;  Location:  HEART CARDIAC CATH LAB    CV INSTANTANEOUS WAVE-FREE RATIO N/A 1/31/2022    Procedure: Instantaneous Wave-Free Ratio;  Surgeon: Rome Mike MD;  Location:  HEART CARDIAC CATH LAB    CV RIGHT HEART CATH MEASUREMENTS RECORDED N/A 1/31/2022    Procedure: Right Heart Cath;  Surgeon: Rome Mike MD;  Location:  HEART CARDIAC CATH LAB    ESOPHAGOSCOPY, GASTROSCOPY, DUODENOSCOPY (EGD), COMBINED N/A 01/23/2016    Procedure: COMBINED ESOPHAGOSCOPY, GASTROSCOPY, DUODENOSCOPY (EGD);  Surgeon: Robyn Collins MD;  Location:  GI    EXCISE MASS GROIN Right 2/11/2021    Procedure: INCISION AND DRAINAGE OF RIGHT GROIN HEMATOMA;  Surgeon: Norma Scanlon MD;  Location: [facility] OR    HERNIORRHAPHY INGUINAL Right 2/4/2021    Procedure: OPEN RIGHT INGUINAL HENRIA WITH MESH;  Surgeon: Jaden Alex MD;  Location: [facility] OR    LAPAROSCOPIC BYPASS GASTRIC  10/08/2011    Procedure:LAPAROSCOPIC BYPASS GASTRIC; Diagnostic laparoscopy, Lysis of Adhesions, Laparoscopic Revision of Jejunojejunostomy; Surgeon:RADHA MURO; Location: OR    LAPAROSCOPIC BYPASS GASTRIC  06/26/2006    Dr Jin    LAPAROSCOPY DIAGNOSTIC (GENERAL)  10/08/2011    Procedure:LAPAROSCOPY DIAGNOSTIC (GENERAL); Surgeon:RADHA MURO; Location: OR    REPLACE VALVE AORTIC  01/07/2011    ZZC NONSPECIFIC PROCEDURE  ~1985    vein stripping     Current Outpatient Medications   Medication Sig Dispense Refill    amoxicillin (AMOXIL) 500 MG capsule       Ascorbic Acid (VITAMIN C PO) Take 500 mg  by mouth daily      aspirin 81 MG tablet Take 81 mg by mouth Every Mon, Wed, Fri Morning      calcium carbonate-vitamin D 600-200 MG-UNIT TABS Take 1 tablet by mouth 2 times daily       carvedilol (COREG) 12.5 MG tablet Take 1 tablet (12.5 mg) by mouth 2 times daily (with meals) 180 tablet 3    Cholecalciferol (VITAMIN D) 2000 UNIT CAPS Take 1 tablet by mouth daily       Cyanocobalamin (VITAMIN B-12) 500 MCG SUBL Place 500 mcg under the tongue daily       digoxin (LANOXIN) 125 MCG tablet Take 1 tablet (125 mcg) by mouth daily 90 tablet 0    fluticasone (FLONASE) 50 MCG/ACT nasal spray Spray 1 spray into both nostrils daily      furosemide (LASIX) 20 MG tablet Take 0.5 tablets (10 mg) by mouth daily as needed (for weight gain 2lbs or more in a day) 100 tablet 3    levothyroxine (SYNTHROID/LEVOTHROID) 200 MCG tablet TAKE 1 TABLET BY MOUTH EVERY DAY 90 tablet 3    lisinopril (ZESTRIL) 5 MG tablet Take 1 tablet (5 mg) by mouth daily 90 tablet 3    Multiple Vitamins-Minerals (CENTRUM) CHEW Take 1 tablet by mouth daily       nitroGLYcerin (NITROSTAT) 0.3 MG sublingual tablet For chest pain place 1 tablet under the tongue every 5 minutes for 3 doses. If symptoms persist 5 minutes after 1st dose call 911. 25 tablet 1    Psyllium (FIBER) 0.52 GM CAPS Take 2 capsules by mouth daily      sertraline (ZOLOFT) 50 MG tablet Take 1 tablet (50 mg) by mouth daily (Patient taking differently: Take 25 mg by mouth daily) 90 tablet 3    simvastatin (ZOCOR) 20 MG tablet Take 1 tablet (20 mg) by mouth At Bedtime 90 tablet 3    triamcinolone (KENALOG) 0.1 % external cream APPLY TO AFFECTED AREA TWICE A DAY 45 g 0    warfarin ANTICOAGULANT (COUMADIN) 4 MG tablet Take 4 mg (1 tablet) daily or as directed by the [facility]mad[facility] Clinic 90 tablet 1    albuterol (PROAIR HFA/PROVENTIL HFA/VENTOLIN HFA) 108 (90 Base) MCG/ACT inhaler Inhale 2 puffs into the lungs every 6 hours as needed for shortness of breath, wheezing or cough 18 g 1    diclofenac  "(VOLTAREN) 1 % topical gel APPLY 4 G TOPICALLY 4 TIMES DAILY AS NEEDED FOR MODERATE PAIN 100 g 1    Fexofenadine HCl (ALLERGY 24-HR PO) Take 180 mg by mouth daily          Allergies   Allergen Reactions    Seasonal Allergies         Social History     Tobacco Use    Smoking status: Never     Passive exposure: Never    Smokeless tobacco: Never   Substance Use Topics    Alcohol use: No     Alcohol/week: 0.0 standard drinks of alcohol       History   Drug Use No         Review of Systems    Review of Systems  Constitutional, HEENT, cardiovascular, pulmonary, GI, , musculoskeletal, neuro, skin, endocrine and psych systems are negative, except as otherwise noted.    No recent illnesses since Feb, had pneumonia in [place]    Feeling good now    No  chest pain/ breathing problems    Last  echo  done last summer, ejection fraction 35-40 %    Per patient heart failure stable, well controlled        Objective    /74 (BP Location: Left arm, Patient Position: Chair, Cuff Size: Adult Regular)   Pulse 75   Temp 97.3  F (36.3  C) (Temporal)   Resp 16   Ht 1.765 m (5' 9.5\")   Wt 96.8 kg (213 lb 6 oz)   SpO2 96%   BMI 31.06 kg/m     Estimated body mass index is 31.06 kg/m  as calculated from the following:    Height as of this encounter: 1.765 m (5' 9.5\").    Weight as of this encounter: 96.8 kg (213 lb 6 oz).  Physical Exam  GENERAL: alert and no distress  EYES: Eyes grossly normal to inspection, PERRL and conjunctivae and sclerae normal  HENT: ear canals and TM's normal, nose and mouth without ulcers or lesions  NECK: no adenopathy, no asymmetry, masses, or scars  RESP: lungs clear to auscultation - no rales, rhonchi or wheezes  CV: irregularly irregular rhythm, normal S1 S2, no S3 or S4, peripheral pulses strong, only trace peripheral edema, and artificial valve sound  present    ABDOMEN: soft, nontender, no hepatosplenomegaly, no masses and bowel sounds normal  MS: no gross musculoskeletal defects noted, no " edema  SKIN: no suspicious lesions or rashes  NEURO: Normal strength and tone, mentation intact and speech normal  PSYCH: mentation appears normal, affect normal/bright    Recent Labs   Lab Test 05/06/24  0948 04/19/24  0000 12/11/23  0901 12/07/23  2115 08/22/23  1025 08/15/23  1433 08/15/23  0737 08/14/23  2023   HGB  --   --   --  11.5*  --   --   --  11.9*   PLT  --   --   --  266  --   --   --  197   INR 3.7* 4.6*   < > 9.71*   < >  --    < > 2.81*   NA  --   --   --  139  --   --   --  136   POTASSIUM  --   --   --  4.7  --  4.3  --  5.6*   CR  --   --   --  0.71  --   --   --  0.90    < > = values in this interval not displayed.        Diagnostics    See echo report from last August     Labs pending, cbc and bmp    Patient does not have diabetes    Had INR checked yesterday, 3.7 so small adjustment made in dose     ASSESSMENT / PLAN:  (Z01.818) Preop general physical exam  (primary encounter diagnosis)  Comment: patient stable.   Should be okay for procedure.  Labs pending.   Plan: Basic metabolic panel, CBC with Platelets &         Differential             (H26.9) Cataract of both eyes, unspecified cataract type  Comment: to have cataract procedure, two weeks between them.   Plan: as above.     (I42.0) Dilated cardiomyopathy (H)  Comment: stable.  Followed by cardiology.  Of note had small MI in 40s but none since then.  Never needed stents.   Plan: as above     (Z95.2) S/P aortic valve replacement  Comment: this was  done  2011  Plan: as above. He is on warfarin.      Patient will take his usual meds the am of procedure.  No need to hold meds given this is cataract.    He takes the furosemide about twice weekly based on weights but good to avoid taking that the  day of procedure.     Labs pending.      No history of anesthesia problems      I reviewed the patient's medications, allergies, medical history, family history, and social history.    Reji Fritz MD        Revised Cardiac Risk Index (RCRI)  The  patient has the following serious cardiovascular risks for perioperative complications:   - No serious cardiac risks = 0 points     RCRI Interpretation: 1 point: Class II (low risk - 0.9% complication rate)         Signed Electronically by: Reji Fritz MD  Copy of this evaluation report is provided to requesting physician.

## 2024-05-07 NOTE — PATIENT INSTRUCTIONS
We will send you lab results    Take your usual daily morning meds early am of procedure with sip of water    Advise not taking furosemide that day    Call with problems/ questions

## 2024-05-21 ENCOUNTER — ANTICOAGULATION THERAPY VISIT (OUTPATIENT)
Dept: ANTICOAGULATION | Facility: CLINIC | Age: 69
End: 2024-05-21

## 2024-05-21 ENCOUNTER — LAB (OUTPATIENT)
Dept: LAB | Facility: CLINIC | Age: 69
End: 2024-05-21
Payer: COMMERCIAL

## 2024-05-21 DIAGNOSIS — Z95.2 S/P AVR (AORTIC VALVE REPLACEMENT): Primary | ICD-10-CM

## 2024-05-21 DIAGNOSIS — Z95.2 S/P AORTIC VALVE REPLACEMENT: ICD-10-CM

## 2024-05-21 DIAGNOSIS — S30.1XXA ABDOMINAL WALL HEMATOMA, INITIAL ENCOUNTER: ICD-10-CM

## 2024-05-21 DIAGNOSIS — Z79.01 LONG TERM CURRENT USE OF ANTICOAGULANT THERAPY: ICD-10-CM

## 2024-05-21 DIAGNOSIS — I48.21 PERMANENT ATRIAL FIBRILLATION (H): ICD-10-CM

## 2024-05-21 DIAGNOSIS — Z95.2 S/P AVR (AORTIC VALVE REPLACEMENT): ICD-10-CM

## 2024-05-21 LAB — INR BLD: 2.3 (ref 0.9–1.1)

## 2024-05-21 PROCEDURE — 36415 COLL VENOUS BLD VENIPUNCTURE: CPT

## 2024-05-21 PROCEDURE — 85610 PROTHROMBIN TIME: CPT

## 2024-05-21 NOTE — PROGRESS NOTES
ANTICOAGULATION MANAGEMENT     Jovon Mantilla 68 year old male is on warfarin with subtherapeutic INR result. (Goal INR 2.5-3.5)    Recent labs: (last 7 days)     05/21/24  1318   INR 2.3*       ASSESSMENT     Source(s): Chart Review and Patient/Caregiver Call     Warfarin doses taken: Warfarin taken as instructed  Diet: No new diet changes identified  Medication/supplement changes: None noted  New illness, injury, or hospitalization: No  Signs or symptoms of bleeding or clotting: No  Previous result: Supratherapeutic  Additional findings: Scheduling at [facility] lab for next visit to coincide with cardiology visit.  Last INR was supra, smallest dosing decrease was made and now INR is sub. Discussed the potential to change tablet strength (may need 27 mg weekly rather than 26/28). Jovon would like to try increasing maintenance dose one more time and then will be in agreement with this plan.   Cataract surgery completed for right eye on 5/13. Left eye scheduled for 5/28.       PLAN     Recommended plan for no diet, medication or health factor changes affecting INR     Dosing Instructions: Increase your warfarin dose (7.7% change) with next INR in 2 weeks       Summary  As of 5/21/2024      Full warfarin instructions:  4 mg every day   Next INR check:  6/3/2024               Telephone call with Jovon who verbalizes understanding and agrees to plan    Lab visit scheduled    Education provided:   Please call back if any changes to your diet, medications or how you've been taking warfarin  Taking warfarin: prescribed tablet strength and color    Plan made per [facility] anticoagulation protocol    Teodora Redd RN  Anticoagulation Clinic  5/21/2024    _______________________________________________________________________     Anticoagulation Episode Summary       Current INR goal:  2.5-3.5   TTR:  52.8% (1 y)   Target end date:  Indefinite   Send INR reminders to:  [facility]    Indications    S/P AVR (aortic  valve replacement) [Z95.2]  Long-term (current) use of anticoagulants [Z79.01] [Z79.01]  Permanent atrial fibrillation (H) [I48.21]  Abdominal wall hematoma  initial encounter [S30.1XXA]  S/P aortic valve replacement [Z95.2]             Comments:  24mm ATS Valve             Anticoagulation Care Providers       Provider Role Specialty Phone number    Noe Ramirez PA-C Referring [facility] [phone]

## 2024-05-24 ENCOUNTER — ALLIED HEALTH/NURSE VISIT (OUTPATIENT)
Dept: RESEARCH | Facility: CLINIC | Age: 69
End: 2024-05-24
Payer: COMMERCIAL

## 2024-05-24 VITALS
HEART RATE: 89 BPM | HEIGHT: 69 IN | RESPIRATION RATE: 16 BRPM | OXYGEN SATURATION: 97 % | BODY MASS INDEX: 30.21 KG/M2 | TEMPERATURE: 97.7 F | DIASTOLIC BLOOD PRESSURE: 84 MMHG | SYSTOLIC BLOOD PRESSURE: 131 MMHG | WEIGHT: 204 LBS

## 2024-05-24 DIAGNOSIS — Z00.6 EXAMINATION OF PARTICIPANT OR CONTROL IN CLINICAL RESEARCH: Primary | ICD-10-CM

## 2024-05-24 PROCEDURE — 93000 ELECTROCARDIOGRAM COMPLETE: CPT | Mod: RTG | Performed by: STUDENT IN AN ORGANIZED HEALTH CARE EDUCATION/TRAINING PROGRAM

## 2024-05-24 PROCEDURE — 93005 ELECTROCARDIOGRAM TRACING: CPT

## 2024-05-24 PROCEDURE — 99207 PR NO CHARGE-RESEARCH SERVICE: CPT

## 2024-05-24 NOTE — PROGRESS NOTES
Whoop Study    Study Description: The PublikDemandop ECG feature is a software-only mobile medical application intended for use with the Whoop strap to create, record, store, transfer and display a single-channel electrocardiogram (ECG) qualitatively similar to a lead I ECG.    SCREENING       Demographic Info  Jovon Mantilla   1955          68 year old  male    Woman of Child Bearing Potential?  N/A (Male)   If no, Why? N/A    Race: White/  Ethnicity: Not  or      Time Seated/Time of Assessment: 9:15:00 HH:MM:SS    Heart Abnormalities:  History of Heart Rhythm Abnormalities? Yes Specify Below  AF permanent (Persistent AF plus joint decision to not control rhythm) Onset Year: 2017     Past Medical History:   Diagnosis Date    Acquired hypothyroidism 05/23/2003    Congestive heart failure (H) 1/7/2011    Depression Unknown    Hyperlipidemia Unknown/2007    Permanent atrial fibrillation (H) 06/02/2017    Unspecified essential hypertension 1/7/2011    Aortic Valve Replacement 12/16/2015       Current Outpatient Medications:     Medication Name (Generic) Indication Start Date Ongoing? Dose Unit Frequency Route   carvedilol  Atrial fibrillation 6/2/2017 Yes 12.5  mg BID Oral   digoxin Congestive heart failure 1/7/2011 Yes 125 ug QD Oral   furosemide Congestive heart failure 1/7/2011 Yes 10 mg QD Oral   levothyroxine Acquired hypothyroidism 5/23/2003 Yes 200 ug QD Oral   lisinopril hypertension 1/7/2011 Yes 5 mg QD Oral   sertraline depression unknown Yes 50 mg QD Oral   simvastatin hyperlipidemia 1/1/2007 yes 20 mg QD Oral   warfarin Atrial fibrillation 6/2/2017 yes 4 mg QD Oral       Lifestyle Habits  How often do you...  Exercise: Daily  Consume Caffeine:Daily (1 serving a day)  Use Tobacco/Nicotine: Never  Consume Alcohol: Never  Use Recreational Drugs: Never    Dominant Hand: Right  Preferred Band Hand: Left  Reason for Choosing Band Hand: Subject preference    Skin Fold Thickness: 2.8 mm  Band  "Wrist Circumference: 174 mm  Wrist Hairiness: Fine, low density    Does the subject have tattoos, moles, or scars on band wrist? No  Padron Skin Tone: Padron: 4    Vitals Assessment Time 9:28:00  /84 (BP Location: Left arm, Patient Position: Sitting, Cuff Size: Adult Regular)   Pulse 89   Temp 97.7  F (36.5  C) (Oral)   Resp 16   Ht 1.753 m (5' 9\")   Wt 92.5 kg (204 lb)   SpO2 97%   BMI 30.13 kg/m      Wait 1 minute between repeat measurements   Heart Rate (bpm) SpO2 (%) Blood Pressure     Assessment Time Result Result  Result    2nd Measurement 9:33:00 82 95 123/76   3rd Measurement 9:35:00 86 96 122/83       Please see Screening ECG for providers rhythm interpretation.    ECG Rhythm Strip Time 9:38:02    Physical Exam Time of Assessment is equivalent to ECG time as VALDO is present for the ECG recording and continues their assessment thereafter.     ENROLLMENT     Subject has met all requirements and is Enrolled in the Study?: Yes    Enrollment Number: [id number]   [employer] User ID: [id number]     DATA COLLECTION     Device Information    Study mobile device and laptop synchronized? Yes    Comparative (Holter) device recorder date and time set according to local clock? Yes    Flare Code Kit 1: Strap SN: [id number]  Study Phone: [phone]     Was the SideStripeOP Strap Applied? Yes  Time of Application: 10:17:00     Subject reviewed [employer] device instructions for use? Yes    Comparative (Holter) ECG device applied? Yes  Time of  Holter Application: 10:26:00    Comparative Holter Kit IDs (insert name Glendy)  Study Laptop Device  [id number]  Holter Device S/N  M12R - [id number]     Lead Placement QC Performed by:  Alexandro OLSEN    Practice Strap ECG Taken? Yes  Number of Practice Trials: 3     Data recording for ECG-Resting and ECG-Exercise sections- were directly entered into [facility] during study visit.       END OF STUDY     Was the wrist device removed after all the trials? Yes  Was the 12 lead ECG Holter detached after all " the trials? Yes    Any Adverse Events? No  Any Protocol Deviations? No    Any changes in medication since screening visit? No  Any device deficiencies? No If yes complete a device deficiency note    Is all study data for this visit collected? Yes      Date Subject Exited the Study: 24-MAY-2024  Time Subject Exited the Study: 11:20:00     Did the subject successfully complete the study? Yes   If no, Why? N/A        24-MAY-2024    Portia De Los Santos EP

## 2024-05-24 NOTE — PROGRESS NOTES
Study Description: The objective of this study is to evaluate the safety and performance of the WHO ECG feature in adults (22 years or older) with normal sinus rhythm or diagnosed with AF.    Post Exercise Assessment: Jovon Mantilla  denies symptoms post exercise a 12 lead ECG was completed and reviewed.     Electrocardiogram has returned to baseline and Jovon Mantilla was released from the clinical research unit and completed the study. (Patient's heart rate was 87 when patient left clinic)     24 -MAY- 2024    Lupe Hanna NP

## 2024-05-24 NOTE — PROGRESS NOTES
[facility] Inclusion/Exclusion Criteria:    Study Name: [facility]   : Jaja Marcos MD      Study Description: The [employer] ECG feature is a software-only mobile medical application intended for use with the Whoop strap to create, record, store, transfer and display a single-channel electrocardiogram (ECG) qualitatively similar to a lead I ECG.     Protocol Version: 3.0 (30-Apr-2024)  Consent Version: 2.0 ( 12-Apr-2024 )    Criteria #  Inclusion Criteria (ALL MUST BE YES)  YES/NO/N/A   1  Aged 22 years or older  Yes   2 Ability to provide informed consent Yes   3 Willing to participate and to follow the procedures per the Principle Investigator's instructions Yes   4  Resides in the [place]    Yes   5   Wrist circumference: 130 mm to 245 mm at band wear position Yes   6    Previous medical history of persistent, permanent, or chronic AF and being in AF at the time of enrollment (AF Cohort Only)    Yes   7     No known history of AF and being in normal sinus rhythm at the time of enrollment (SR Cohort Only) NA             Criteria # Exclusion Criteria (ALL MUST BE NO) YES/NO/N/A   1  Subjects with an implantable pacemaker device or implantable cardioverter-defibrillator device No   2 Medical history of a life-threatening cardiac arrhythmia eg., Ventricular tachycardia or fibrillation No   3  Any known allergies to medical adhesives, isopropyl alcohol, or ECG patch    No   4  Any known allergy or or sensitivity to thermoplastics, metals with PVD coatings or Elastane used in the wrist fitness device  No   5  Clinically significant body tremors that compromise study measurements    No   6  Pregnant at the time of enrollment   NA   7  Any physical disability that prevents safe and adequate testing    No   8  Physical or medical impairments that preclude exercise testing, including, but not limited to, back pain and leg claudication No   9  Mental impairment as determined by the Investigator or  designee    No   10  Subjects with any medical history, physical examination finding, vital sign or other finding or assessment that could compromise subject safety, study integrity or accurate that could compromise subject safety, study integrity or accurate assessment of study objectives. This includes, but is not limited to, known untreated medical conditions that may be considered clinically significant, such as significant anemia, electrolyte imbalance, untreated or uncontrolled thyroid disease, and open wound(s) in the areas of test band positioning   No   11    Vital sign measurements, medical history of physical examination finding that makes the subject inappropriate for participation according to the investigator * No   12    Scarring, tattoos, large, pigmented moles or other skin pathology in the area of sensor location  No   13    Severe skin conditions on either wrist, that would preclude wearing the sensor. Severe symptomatic skin injury, disorder, allergy or disease such as eczema, rosacea, impetigo, dermatomyositis, or contact dermatitis on wrists or other areas where sensors or electrodes will be placed  No   14    Clinically significant hand tremors as judged by the Investigator No   15  Participated in Phase 1 of the study (Only for Phase 2 cohort)  No   * If subject is a screen fail due to PE findings, choose whichever exclusion criteria matches that finding in addition to E10.     Patient does fulfill study inclusion criteria and no exclusion criteria are found.     Jaja Marcos MD    24-MAY-2024    DINAH Tatum

## 2024-05-24 NOTE — PROGRESS NOTES
"    Study Physical Exam      Medical History Reviewed? Yes    Physical Examination  For abnormal findings, please evaluate if the finding is Clinically Significant (by 'CS') or Not Clinically Significant (by 'NCS')  General Appearance   Normal  Head and Neck   Normal  Eyes     Normal  Ears, Nose Mouth and Throat  Normal  Cardiovascular   Abnormal; NCS due to history of atrial fibrillation  irregular with a artificial value click  Respiratory    Normal  Skin and Hair of Wrists  Normal    Physical disability that presents safe or adequate testing: Not present  Dermatological conditions that preclude wearing of sensor or satisfactory data acquisition: Not present  Tremor: Not present  Other: Normal    Vitals:    05/24/24 0928   BP: 131/84   BP Location: Left arm   Patient Position: Sitting   Cuff Size: Adult Regular   Pulse: 89   Resp: 16   Temp: 97.7  F (36.5  C)   TempSrc: Oral   SpO2: 97%   Weight: 92.5 kg (204 lb)   Height: 1.753 m (5' 9\")            Electrocardiogram Interpretation:   12 Lead Interpretation: Atrial Fibrillation       24-MAY-2024    Lupe Hanna NP,      "

## 2024-05-24 NOTE — PROGRESS NOTES
Whoop Study Consent    Study Description: The objective of this study is to evaluate the safety and performance of the WHOOP ECG feature in adults (22 years or older) with normal sinus rhythm or diagnosed with AF.      CONSENT     Jovon Mantilla a 68 year old male, was on-site today to discuss participation in the Whoop Study.       The consent form was reviewed with the patient.     The review of the study included:  Study Purpose    Participant Responsibilities    Study Data and Devices    Benefits and Risks of Participation    Compensation and Costs of Participation    Voluntary Participation    Confidentiality    Injury and Legal Rights      Protocol Version: 3.0 (Version Date 30-Apr-2024)    Screening Number: SCR - [id number]    The subject was queried in regards to his willingness to continue and his questions were answered to his satisfaction.   The patient has given his agreement to volunteer and participate in the above noted study.   The eConsent and HIPAA form version 2.0 (Version Date 12-Apr-2024) was signed  on  24-MAY-2024 with the Clinical [facility] of [facility].     A copy of the consent will be placed in subject's medical record. A copy of the consent form was given to the subject today.    Study data is directly entered into Epic and Bluetest per protocol.     No study procedures were done prior to Jovon Mantilla providing informed consent.     Has this subject had a previous screen failure in this study? No    If yes, previous Subject Number:     Study Phase: Phase 2    24-MAY-2024    Celestina Tello

## 2024-05-29 LAB
ATRIAL RATE - MUSE: 101 BPM
ATRIAL RATE - MUSE: 80 BPM
DIASTOLIC BLOOD PRESSURE - MUSE: NORMAL MMHG
DIASTOLIC BLOOD PRESSURE - MUSE: NORMAL MMHG
INTERPRETATION ECG - MUSE: NORMAL
INTERPRETATION ECG - MUSE: NORMAL
P AXIS - MUSE: NORMAL DEGREES
P AXIS - MUSE: NORMAL DEGREES
PR INTERVAL - MUSE: NORMAL MS
PR INTERVAL - MUSE: NORMAL MS
QRS DURATION - MUSE: 122 MS
QRS DURATION - MUSE: 128 MS
QT - MUSE: 392 MS
QT - MUSE: 412 MS
QTC - MUSE: 493 MS
QTC - MUSE: 515 MS
R AXIS - MUSE: -66 DEGREES
R AXIS - MUSE: 261 DEGREES
SYSTOLIC BLOOD PRESSURE - MUSE: NORMAL MMHG
SYSTOLIC BLOOD PRESSURE - MUSE: NORMAL MMHG
T AXIS - MUSE: 33 DEGREES
T AXIS - MUSE: 52 DEGREES
VENTRICULAR RATE- MUSE: 104 BPM
VENTRICULAR RATE- MUSE: 86 BPM

## 2024-05-29 PROCEDURE — 93010 ELECTROCARDIOGRAM REPORT: CPT | Performed by: STUDENT IN AN ORGANIZED HEALTH CARE EDUCATION/TRAINING PROGRAM

## 2024-05-30 NOTE — PROGRESS NOTES
HPI and Plan:     I had the pleasure of seeing Mr. Mantilla in followup at the [facility].  He is a very pleasant 68-year-old gentleman who I last saw in 05/2023.      Mr. Mantilla has a history of severe aortic stenosis and is status post aortic valve replacement in 01/2011 with a 24 mm ATS supra-annular valve.  He also has a history of atrial fibrillation and underwent a Maze procedure at that time, but has remained in atrial fibrillation subsequently.  He has been on rate control and anticoagulation, which has worked well in that he has been essentially asymptomatic from his atrial fibrillation.  He also has a history of a 70% stenosis involving the LAD on coronary angiography prior to aortic valve replacement.  This, however, was not bypassed since it was found to be completely intramyocardial at the time of surgery.     In late 2021 he noted exertional dyspnea which was new compared to the prior year.  An echocardiogram subsequently demonstrated moderate to severe eccentric mitral regurgitation which had progressed compared to his prior echocardiogram from June 2020.  Given these findings, I recommended a CADE for further evaluation.    The CADE was performed on 12/28/2021 and demonstrated severe biatrial enlargement mildly reduced left ventricular systolic function.  There was mild thickening and prolapse of P2 with moderate mitral regurgitation both qualitatively and quantitatively by PISA analysis.  I suspect that the mitral regurgitation is secondary to both annular dilatation in the setting of chronic persistent atrial fibrillation as well as an element of P2 prolapse as reported. The mechanical aortic valve appeared to be functioning normally on both CADE and transthoracic imaging.    He subsequently underwent an exercise stress echocardiogram on 1/17/2022 to evaluate for worsening mitral regurgitation postexercise.  This demonstrated moderately reduced left ventricular systolic function  with trace to mild mitral regurgitation at baseline.  Left ventricular systolic function improved post exercise although mitral regurgitation only increased to the mild to moderate range.  A Holter monitor was also performed which demonstrated an average heart rate of 90 bpm with atrial fibrillation throughout.    He then underwent right and left heart catheterization on 1/31/2022.  His 70% proximal LAD stenosis appeared unchanged.  No significant coronary artery disease was noted elsewhere.  He was also found to have moderate pulmonary hypertension with moderately elevated left-sided filling pressures and mildly elevated right-sided filling pressures with a normal cardiac index.    Today he presents feeling well overall from a cardiovascular standpoint.  He recently underwent cataract surgery and has some lifting restrictions but was in [place] during the winter and was extremely active.  He was exercising regularly there, swimming and hiking without any limitations.      Here he also remains very active working around his property.  He denies any exertional limitations while doing so.    He remains fully compliant with his medications including endocarditis prophylaxis.         PHYSICAL EXAMINATION:  Dictated below.       An echocardiogram on 8/15/2023 demonstrated moderately reduced left ventricular systolic function with mild to moderate mitral digitation and a normally functioning mechanical aortic valve.      A stress cardiovascular MRI in July 2023, however, demonstrated mildly reduced left ventricular systolic function with no ischemia on stress perfusion imaging and a small subendocardial scar in the basal inferior segment.    His last lipid panel on 6/20/2023 demonstrated total cholesterol of 113, HDL of 48, LDL of 52 and triglycerides of 67.      IMPRESSION:     1.  Severe aortic stenosis, status post aortic valve replacement with a mechanical valve as described above.   2.  Persistent atrial  fibrillation.  Remains on warfarin for anticoagulation and digoxin 125 mcg daily.  3.  Coronary artery disease as described above.    4.  Moderate to severe nonischemic cardiomyopathy post aortic valve replacement that was thought to be tachycardia related.  Left ventricular systolic function was mildly reduced on his cardiovascular MRI from July 2023.  5. Dyspnea on exertion with an extensive cardiac work-up as described above.  He was found to have mild to moderate mitral regurgitation and an unchanged proximal LAD stenosis that was borderline by IFR.  It was stable compared to the previous angiogram from 2010.    PLAN    Mr. Mantilla is doing well overall from a cardiovascular standpoint.  There is no evidence of angina or congestive heart failure.  His exertional capacity remains preserved.  His medications are appropriate and I will make no changes today.    Assuming his clinical status remains stable, I will plan on follow-up in approximately 1 year.  I would like to obtain a follow-up cardiovascular MRI with flows in 3 months to reassess left ventricular systolic function and the degree of mitral regurgitation.  This does not need to be a contrast-enhanced study.    It was a pleasure seeing him today.      CURRENT MEDICATIONS:  Current Outpatient Medications   Medication Sig Dispense Refill    albuterol (PROAIR HFA/PROVENTIL HFA/VENTOLIN HFA) 108 (90 Base) MCG/ACT inhaler Inhale 2 puffs into the lungs every 6 hours as needed for shortness of breath, wheezing or cough 18 g 1    amoxicillin (AMOXIL) 500 MG capsule       Ascorbic Acid (VITAMIN C PO) Take 500 mg by mouth daily      aspirin 81 MG tablet Take 81 mg by mouth Every Mon, Wed, Fri Morning      calcium carbonate-vitamin D 600-200 MG-UNIT TABS Take 1 tablet by mouth 2 times daily       carvedilol (COREG) 12.5 MG tablet Take 1 tablet (12.5 mg) by mouth 2 times daily (with meals) 180 tablet 3    Cholecalciferol (VITAMIN D) 2000 UNIT CAPS Take 1 tablet by  mouth daily       Cyanocobalamin (VITAMIN B-12) 500 MCG SUBL Place 500 mcg under the tongue daily       diclofenac (VOLTAREN) 1 % topical gel APPLY 4 G TOPICALLY 4 TIMES DAILY AS NEEDED FOR MODERATE PAIN 100 g 1    digoxin (LANOXIN) 125 MCG tablet Take 1 tablet (125 mcg) by mouth daily 90 tablet 0    Fexofenadine HCl (ALLERGY 24-HR PO) Take 180 mg by mouth daily       fluticasone (FLONASE) 50 MCG/ACT nasal spray Spray 1 spray into both nostrils daily      furosemide (LASIX) 20 MG tablet Take 0.5 tablets (10 mg) by mouth daily as needed (for weight gain 2lbs or more in a day) 100 tablet 3    levothyroxine (SYNTHROID/LEVOTHROID) 200 MCG tablet TAKE 1 TABLET BY MOUTH EVERY DAY 90 tablet 3    lisinopril (ZESTRIL) 5 MG tablet Take 1 tablet (5 mg) by mouth daily 90 tablet 3    Multiple Vitamins-Minerals (CENTRUM) CHEW Take 1 tablet by mouth daily       nitroGLYcerin (NITROSTAT) 0.3 MG sublingual tablet For chest pain place 1 tablet under the tongue every 5 minutes for 3 doses. If symptoms persist 5 minutes after 1st dose call 911. 25 tablet 1    Psyllium (FIBER) 0.52 GM CAPS Take 2 capsules by mouth daily      sertraline (ZOLOFT) 50 MG tablet Take 1 tablet (50 mg) by mouth daily (Patient taking differently: Take 25 mg by mouth daily) 90 tablet 3    simvastatin (ZOCOR) 20 MG tablet Take 1 tablet (20 mg) by mouth At Bedtime 90 tablet 3    triamcinolone (KENALOG) 0.1 % external cream APPLY TO AFFECTED AREA TWICE A DAY 45 g 0    warfarin ANTICOAGULANT (COUMADIN) 4 MG tablet Take 4 mg (1 tablet) daily or as directed by the Coumad[facility] Clinic 90 tablet 1       ALLERGIES     Allergies   Allergen Reactions    Seasonal Allergies        PAST MEDICAL HISTORY:  Past Medical History:   Diagnosis Date    Acne rosacea 04/22/2019    Acquired hypothyroidism 05/23/2003     Problem list name updated by automated process. Provider to review    Acute prostatitis     Acute sinusitis, unspecified     Aortic valve disease     Biatrial enlargement      CAD (coronary artery disease)     known 70% LAD stenosis    Congestive heart failure (H)     Depression     GI bleeding 01/22/2016    History of bleeding peptic ulcer     Hyperlipidemia     Idiopathic cardiomyopathy (H)     Mitral regurgitation     Obesity     Palpitations     Permanent atrial fibrillation (H) 06/02/2017    Right inguinal hernia 07/22/2019    S/P AVR 01/07/2011    with Maze procedure    S/P gastric bypass     Tricuspid regurgitation     Unspecified essential hypertension        PAST SURGICAL HISTORY:  Past Surgical History:   Procedure Laterality Date    ANGIOGRAM  02/01/2002    Normal coronary,dilated LV,Sev.decr.global LVF,+1 MR    ANGIOGRAM  01/01/2008    Mild AS,Mod PHTN,mod prox.LAD disease,Triv.CFX disease    ANGIOGRAM  01/01/2009    Mod AS,Mod PHTN,mod prox.LAD disease    ANGIOGRAM  10/01/2010    Sev.AS,CM,global hypokinesis,Mild-mod LV dilated,Mild MR,70% prox.LAD lesion,PHTN    CARDIOVERSION  2/02, 4/02, 4/11    CHOLECYSTECTOMY  2006    COLONOSCOPY N/A 01/23/2016    Procedure: COLONOSCOPY;  Surgeon: Robyn Collins MD;  Location: [facility]    CV CORONARY ANGIOGRAM N/A 1/31/2022    Procedure: Coronary Angiogram;  Surgeon: Rome Mike MD;  Location:  HEART CARDIAC CATH LAB    CV INSTANTANEOUS WAVE-FREE RATIO N/A 1/31/2022    Procedure: Instantaneous Wave-Free Ratio;  Surgeon: Rome Mike MD;  Location:  HEART CARD[facility] CATH LAB    CV RIGHT HEART CATH MEASUREMENTS RECORDED N/A 1/31/2022    Procedure: Right Heart Cath;  Surgeon: Rome Mike MD;  Location:  HEART [facility] CATH LAB    ESOPHAGOSCOPY, GASTROSCOPY, DUODENOSCOPY (EGD), COMBINED N/A 01/23/2016    Procedure: COMBINED ESOPHAGOSCOPY, GASTROSCOPY, DUODENOSCOPY (EGD);  Surgeon: Robyn Collins MD;  Location:  GI    EXCISE MASS GROIN Right 2/11/2021    Procedure: INCISION AND DRAINAGE OF RIGHT GROIN HEMATOMA;  Surgeon: Norma Scanlon MD;  Location: [facility] OR    HERNIORRHAPHY  INGUINAL Right 2/4/2021    Procedure: OPEN RIGHT INGUINAL HENRIA WITH MESH;  Surgeon: Jaden Alex MD;  Location: [facility] OR    LAPAROSCOPIC BYPASS GASTRIC  10/08/2011    Procedure:LAPAROSCOPIC BYPASS GASTRIC; Diagnostic laparoscopy, Lysis of Adhesions, Laparoscopic Revision of Jejunojejunostomy; Surgeon:RADHA MURO; Location:[facility] OR    LAPAROSCOPIC BYPASS GASTRIC  06/26/2006    Dr Jin    LAPAROSCOPY DIAGNOSTIC (GENERAL)  10/08/2011    Procedure:LAPAROSCOPY DIAGNOSTIC (GENERAL); Surgeon:RADHA MURO; Location:[facility] OR    REPLACE VALVE AORTIC  01/07/2011    ZZC NONSPECIFIC PROCEDURE  ~1985    vein stripping       FAMILY HISTORY:  Family History   Problem Relation Age of Onset    Cancer - colorectal Father     Colon Cancer Father     Cancer Father     Diabetes Brother        SOCIAL HISTORY:  Social History     Socioeconomic History    Marital status:      Spouse name: Tracey    Number of children: 0   Occupational History    Occupation:      Comment: [employer]   Tobacco Use    Smoking status: Never     Passive exposure: Never    Smokeless tobacco: Never   Vaping Use    Vaping status: Never Used   Substance and Sexual Activity    Alcohol use: No     Alcohol/week: 0.0 standard drinks of alcohol    Drug use: No    Sexual activity: Not Currently     Partners: Female   Other Topics Concern    Parent/sibling w/ CABG, MI or angioplasty before 65F 55M? Yes    Blood Transfusions No    Caffeine Concern Yes     Comment: 1 can daily    Occupational Exposure Yes    Hobby Hazards No    Sleep Concern No    Stress Concern No    Weight Concern No    Special Diet Yes     Comment: low sodium, low calories    Back Care No    Exercise Yes     Comment: walks daily    Seat Belt Yes    Self-Exams Yes     Social Determinants of Health     Financial Resource Strain: Low Risk  (6/20/2023)    Overall Financial Resource Strain (CARDIA)     Difficulty of Paying Living Expenses: Not hard at all   Food  Insecurity: No Food Insecurity (6/20/2023)    Hunger Vital Sign     Worried About Running Out of Food in the Last Year: Never true     Ran Out of Food in the Last Year: Never true   Transportation Needs: No Transportation Needs (6/20/2023)    PRAPARE - Transportation     Lack of Transportation (Medical): No     Lack of Transportation (Non-Medical): No   Physical Activity: Insufficiently Active (6/20/2023)    Exercise Vital Sign     Days of Exercise per Week: 3 days     Minutes of Exercise per Session: 20 min   Stress: No Stress Concern Present (6/20/2023)    [employer] [place] [employer] - Occupational Stress Questionnaire     Feeling of Stress : Not at all   Social Connections: Moderately Integrated (6/20/2023)    Social Connection and Isolation Panel [NHANES]     Frequency of Communication with Friends and Family: More than three times a week     Frequency of Social Gatherings with Friends and Family: Twice a week     Attends Alevism Services: 1 to 4 times per year     Active Member of Clubs or Organizations: No     Marital Status:    Interpersonal Safety: Low Risk  (5/7/2024)    Interpersonal Safety     Do you feel physically and emotionally safe where you currently live?: Yes     Within the past 12 months, have you been hit, slapped, kicked or otherwise physically hurt by someone?: No     Within the past 12 months, have you been humiliated or emotionally abused in other ways by your partner or ex-partner?: No   Housing Stability: Low Risk  (6/20/2023)    Housing Stability Vital Sign     Unable to Pay for Housing in the Last Year: No     Number of Places Lived in the Last Year: 2     Unstable Housing in the Last Year: No       Review of Systems:  Skin:          Eyes:         ENT:         Respiratory:          Cardiovascular:         Gastroenterology:        Genitourinary:         Musculoskeletal:         Neurologic:         Psychiatric:         Heme/Lymph/Imm:         Endocrine:            Physical Exam:  Vitals: There were no vitals taken for this visit.    Constitutional:  cooperative        Skin:  warm and dry to the touch          Head:  normocephalic        Eyes:  pupils equal and round        Lymph:      ENT:           Neck:           Respiratory:  clear to auscultation         Cardiac:   irregular rhythm                                                       GI:           Extremities and Muscular Skeletal:                 Neurological:           Psych:           CC  Mattie Olivarez, APRN CNP  [address]  [place] [address]

## 2024-06-03 ENCOUNTER — OFFICE VISIT (OUTPATIENT)
Dept: CARDIOLOGY | Facility: CLINIC | Age: 69
End: 2024-06-03
Payer: COMMERCIAL

## 2024-06-03 ENCOUNTER — LAB (OUTPATIENT)
Dept: LAB | Facility: CLINIC | Age: 69
End: 2024-06-03
Payer: COMMERCIAL

## 2024-06-03 ENCOUNTER — ANTICOAGULATION THERAPY VISIT (OUTPATIENT)
Dept: ANTICOAGULATION | Facility: CLINIC | Age: 69
End: 2024-06-03

## 2024-06-03 VITALS
HEART RATE: 82 BPM | BODY MASS INDEX: 31.1 KG/M2 | SYSTOLIC BLOOD PRESSURE: 96 MMHG | DIASTOLIC BLOOD PRESSURE: 58 MMHG | OXYGEN SATURATION: 97 % | HEIGHT: 69 IN | WEIGHT: 210 LBS

## 2024-06-03 DIAGNOSIS — Z95.2 S/P AORTIC VALVE REPLACEMENT: ICD-10-CM

## 2024-06-03 DIAGNOSIS — Z95.2 S/P AVR (AORTIC VALVE REPLACEMENT): Primary | ICD-10-CM

## 2024-06-03 DIAGNOSIS — Z79.01 LONG TERM CURRENT USE OF ANTICOAGULANT THERAPY: ICD-10-CM

## 2024-06-03 DIAGNOSIS — E78.5 HYPERLIPIDEMIA LDL GOAL <70: ICD-10-CM

## 2024-06-03 DIAGNOSIS — I50.812 CHRONIC RIGHT-SIDED HEART FAILURE (H): ICD-10-CM

## 2024-06-03 DIAGNOSIS — I48.21 PERMANENT ATRIAL FIBRILLATION (H): ICD-10-CM

## 2024-06-03 DIAGNOSIS — Z95.2 S/P AVR (AORTIC VALVE REPLACEMENT): ICD-10-CM

## 2024-06-03 DIAGNOSIS — I25.10 CORONARY ARTERY DISEASE INVOLVING NATIVE CORONARY ARTERY OF NATIVE HEART WITHOUT ANGINA PECTORIS: ICD-10-CM

## 2024-06-03 DIAGNOSIS — S30.1XXA ABDOMINAL WALL HEMATOMA, INITIAL ENCOUNTER: ICD-10-CM

## 2024-06-03 DIAGNOSIS — I42.0 DILATED CARDIOMYOPATHY (H): ICD-10-CM

## 2024-06-03 DIAGNOSIS — I70.90 ASVD (ARTERIOSCLEROTIC VASCULAR DISEASE): ICD-10-CM

## 2024-06-03 DIAGNOSIS — I48.20 CHRONIC ATRIAL FIBRILLATION (H): ICD-10-CM

## 2024-06-03 LAB — INR BLD: 3 (ref 0.9–1.1)

## 2024-06-03 PROCEDURE — 85610 PROTHROMBIN TIME: CPT

## 2024-06-03 PROCEDURE — 99214 OFFICE O/P EST MOD 30 MIN: CPT | Performed by: INTERNAL MEDICINE

## 2024-06-03 PROCEDURE — 36416 COLLJ CAPILLARY BLOOD SPEC: CPT

## 2024-06-03 RX ORDER — SIMVASTATIN 20 MG
20 TABLET ORAL AT BEDTIME
Qty: 90 TABLET | Refills: 3 | Status: SHIPPED | OUTPATIENT
Start: 2024-06-03

## 2024-06-03 RX ORDER — LISINOPRIL 5 MG/1
5 TABLET ORAL DAILY
Qty: 90 TABLET | Refills: 3 | Status: SHIPPED | OUTPATIENT
Start: 2024-06-03

## 2024-06-03 RX ORDER — DIGOXIN 125 MCG
125 TABLET ORAL DAILY
Qty: 90 TABLET | Refills: 3 | Status: SHIPPED | OUTPATIENT
Start: 2024-06-03

## 2024-06-03 RX ORDER — FUROSEMIDE 20 MG
10 TABLET ORAL DAILY PRN
Qty: 100 TABLET | Refills: 3 | Status: SHIPPED | OUTPATIENT
Start: 2024-06-03

## 2024-06-03 RX ORDER — CARVEDILOL 12.5 MG/1
12.5 TABLET ORAL 2 TIMES DAILY WITH MEALS
Qty: 180 TABLET | Refills: 3 | Status: SHIPPED | OUTPATIENT
Start: 2024-06-03

## 2024-06-03 NOTE — PROGRESS NOTES
ANTICOAGULATION MANAGEMENT     Jovon Mantilla 68 year old male is on warfarin with therapeutic INR result. (Goal INR 2.5-3.5)    Recent labs: (last 7 days)     06/03/24  1008   INR 3.0*       ASSESSMENT     Source(s): Chart Review and Patient/Caregiver Call     Warfarin doses taken: Warfarin taken as instructed  Diet: No new diet changes identified  Medication/supplement changes: None noted  New illness, injury, or hospitalization: Left eye cataract surgery was 5/28, eye drops until 6/26.  Signs or symptoms of bleeding or clotting: No  Previous result: Subtherapeutic  Additional findings: At last visit, we discussed the potential of needing to change tablet strength as INR has fluctuated between sub and supra INRs (between 26 and 28 mg). Will continue with current dosage with production of therapeutic INR today.        PLAN     Recommended plan for no diet, medication or health factor changes affecting INR     Dosing Instructions: Continue your current warfarin dose with next INR in 3 weeks       Summary  As of 6/3/2024      Full warfarin instructions:  4 mg every day   Next INR check:  6/24/2024               Telephone call with Jovon who verbalizes understanding and agrees to plan    Lab visit scheduled    Education provided:   Please call back if any changes to your diet, medications or how you've been taking warfarin    Plan made per [facility] anticoagulation protocol    Teodora Redd RN  Anticoagulation Clinic  6/3/2024    _______________________________________________________________________     Anticoagulation Episode Summary       Current INR goal:  2.5-3.5   TTR:  53.0% (1 y)   Target end date:  Indefinite   Send INR reminders to:  [facility]    Indications    S/P AVR (aortic valve replacement) [Z95.2]  Long-term (current) use of anticoagulants [Z79.01] [Z79.01]  Permanent atrial fibrillation (H) [I48.21]  Abdominal wall hematoma  initial encounter [S30.1XXA]  S/P aortic valve replacement  [Z95.2]             Comments:  24mm ATS Valve             Anticoagulation Care Providers       Provider Role Specialty Phone number    Noe Ramirez PA-C Referring [facility] Medicine [phone]

## 2024-06-03 NOTE — LETTER
6/3/2024    Kimber Prado PA-C  [address] [facility] [place] [address]    RE: Jovon FRANCIS Jose Rafael       Dear Colleague,     I had the pleasure of seeing Jovon Mantilla in the [facility].  HPI and Plan:     I had the pleasure of seeing Mr. Mantilla in followup at the [facility].  He is a very pleasant 68-year-old gentleman who I last saw in 05/2023.      Mr. Mantilla has a history of severe aortic stenosis and is status post aortic valve replacement in 01/2011 with a 24 mm ATS supra-annular valve.  He also has a history of atrial fibrillation and underwent a Maze procedure at that time, but has remained in atrial fibrillation subsequently.  He has been on rate control and anticoagulation, which has worked well in that he has been essentially asymptomatic from his atrial fibrillation.  He also has a history of a 70% stenosis involving the LAD on coronary angiography prior to aortic valve replacement.  This, however, was not bypassed since it was found to be completely intramyocardial at the time of surgery.     In late 2021 he noted exertional dyspnea which was new compared to the prior year.  An echocardiogram subsequently demonstrated moderate to severe eccentric mitral regurgitation which had progressed compared to his prior echocardiogram from June 2020.  Given these findings, I recommended a CADE for further evaluation.    The CADE was performed on 12/28/2021 and demonstrated severe biatrial enlargement mildly reduced left ventricular systolic function.  There was mild thickening and prolapse of P2 with moderate mitral regurgitation both qualitatively and quantitatively by PISA analysis.  I suspect that the mitral regurgitation is secondary to both annular dilatation in the setting of chronic persistent atrial fibrillation as well as an element of P2 prolapse as reported. The mechanical aortic valve appeared to be functioning normally on both CADE and transthoracic  imaging.    He subsequently underwent an exercise stress echocardiogram on 1/17/2022 to evaluate for worsening mitral regurgitation postexercise.  This demonstrated moderately reduced left ventricular systolic function with trace to mild mitral regurgitation at baseline.  Left ventricular systolic function improved post exercise although mitral regurgitation only increased to the mild to moderate range.  A Holter monitor was also performed which demonstrated an average heart rate of 90 bpm with atrial fibrillation throughout.    He then underwent right and left heart catheterization on 1/31/2022.  His 70% proximal LAD stenosis appeared unchanged.  No significant coronary artery disease was noted elsewhere.  He was also found to have moderate pulmonary hypertension with moderately elevated left-sided filling pressures and mildly elevated right-sided filling pressures with a normal cardiac index.    Today he presents feeling well overall from a cardiovascular standpoint.  He recently underwent cataract surgery and has some lifting restrictions but was in [place] during the winter and was extremely active.  He was exercising regularly there, swimming and hiking without any limitations.      Here he also remains very active working around his property.  He denies any exertional limitations while doing so.    He remains fully compliant with his medications including endocarditis prophylaxis.         PHYSICAL EXAMINATION:  Dictated below.       An echocardiogram on 8/15/2023 demonstrated moderately reduced left ventricular systolic function with mild to moderate mitral digitation and a normally functioning mechanical aortic valve.      A stress cardiovascular MRI in July 2023, however, demonstrated mildly reduced left ventricular systolic function with no ischemia on stress perfusion imaging and a small subendocardial scar in the basal inferior segment.    His last lipid panel on 6/20/2023 demonstrated total cholesterol  of 113, HDL of 48, LDL of 52 and triglycerides of 67.      IMPRESSION:     1.  Severe aortic stenosis, status post aortic valve replacement with a mechanical valve as described above.   2.  Persistent atrial fibrillation.  Remains on warfarin for anticoagulation and digoxin 125 mcg daily.  3.  Coronary artery disease as described above.    4.  Moderate to severe nonischemic cardiomyopathy post aortic valve replacement that was thought to be tachycardia related.  Left ventricular systolic function was mildly reduced on his cardiovascular MRI from July 2023.  5. Dyspnea on exertion with an extensive cardiac work-up as described above.  He was found to have mild to moderate mitral regurgitation and an unchanged proximal LAD stenosis that was borderline by IFR.  It was stable compared to the previous angiogram from 2010.    PLAN    Mr. Mantilla is doing well overall from a cardiovascular standpoint.  There is no evidence of angina or congestive heart failure.  His exertional capacity remains preserved.  His medications are appropriate and I will make no changes today.    Assuming his clinical status remains stable, I will plan on follow-up in approximately 1 year.  I would like to obtain a follow-up cardiovascular MRI with flows in 3 months to reassess left ventricular systolic function and the degree of mitral regurgitation.  This does not need to be a contrast-enhanced study.    It was a pleasure seeing him today.      CURRENT MEDICATIONS:  Current Outpatient Medications   Medication Sig Dispense Refill    albuterol (PROAIR HFA/PROVENTIL HFA/VENTOLIN HFA) 108 (90 Base) MCG/ACT inhaler Inhale 2 puffs into the lungs every 6 hours as needed for shortness of breath, wheezing or cough 18 g 1    amoxicillin (AMOXIL) 500 MG capsule       Ascorbic Acid (VITAMIN C PO) Take 500 mg by mouth daily      aspirin 81 MG tablet Take 81 mg by mouth Every Mon, Wed, Fri Morning      calcium carbonate-vitamin D 600-200 MG-UNIT TABS Take 1  tablet by mouth 2 times daily       carvedilol (COREG) 12.5 MG tablet Take 1 tablet (12.5 mg) by mouth 2 times daily (with meals) 180 tablet 3    Cholecalciferol (VITAMIN D) 2000 UNIT CAPS Take 1 tablet by mouth daily       Cyanocobalamin (VITAMIN B-12) 500 MCG SUBL Place 500 mcg under the tongue daily       diclofenac (VOLTAREN) 1 % topical gel APPLY 4 G TOPICALLY 4 TIMES DAILY AS NEEDED FOR MODERATE PAIN 100 g 1    digoxin (LANOXIN) 125 MCG tablet Take 1 tablet (125 mcg) by mouth daily 90 tablet 0    Fexofenadine HCl (ALLERGY 24-HR PO) Take 180 mg by mouth daily       fluticasone (FLONASE) 50 MCG/ACT nasal spray Spray 1 spray into both nostrils daily      furosemide (LASIX) 20 MG tablet Take 0.5 tablets (10 mg) by mouth daily as needed (for weight gain 2lbs or more in a day) 100 tablet 3    levothyroxine (SYNTHROID/LEVOTHROID) 200 MCG tablet TAKE 1 TABLET BY MOUTH EVERY DAY 90 tablet 3    lisinopril (ZESTRIL) 5 MG tablet Take 1 tablet (5 mg) by mouth daily 90 tablet 3    Multiple Vitamins-Minerals (CENTRUM) CHEW Take 1 tablet by mouth daily       nitroGLYcerin (NITROSTAT) 0.3 MG sublingual tablet For chest pain place 1 tablet under the tongue every 5 minutes for 3 doses. If symptoms persist 5 minutes after 1st dose call 911. 25 tablet 1    Psyllium (FIBER) 0.52 GM CAPS Take 2 capsules by mouth daily      sertraline (ZOLOFT) 50 MG tablet Take 1 tablet (50 mg) by mouth daily (Patient taking differently: Take 25 mg by mouth daily) 90 tablet 3    simvastatin (ZOCOR) 20 MG tablet Take 1 tablet (20 mg) by mouth At Bedtime 90 tablet 3    triamcinolone (KENALOG) 0.1 % external cream APPLY TO AFFECTED AREA TWICE A DAY 45 g 0    warfarin ANTICOAGULANT (COUMADIN) 4 MG tablet Take 4 mg (1 tablet) daily or as directed by the Coumadin Clinic 90 tablet 1       ALLERGIES     Allergies   Allergen Reactions    Seasonal Allergies        PAST MEDICAL HISTORY:  Past Medical History:   Diagnosis Date    Acne rosacea 04/22/2019     Acquired hypothyroidism 05/23/2003     Problem list name updated by automated process. Provider to review    Acute prostatitis     Acute sinusitis, unspecified     Aortic valve disease     Biatrial enlargement     CAD (coronary artery disease)     known 70% LAD stenosis    Congestive heart failure (H)     Depression     GI bleeding 01/22/2016    History of bleeding peptic ulcer     Hyperlipidemia     Idiopathic cardiomyopathy (H)     Mitral regurgitation     Obesity     Palpitations     Permanent atrial fibrillation (H) 06/02/2017    Right inguinal hernia 07/22/2019    S/P AVR 01/07/2011    with Maze procedure    S/P gastric bypass     Tricuspid regurgitation     Unspecified essential hypertension        PAST SURGICAL HISTORY:  Past Surgical History:   Procedure Laterality Date    ANGIOGRAM  02/01/2002    Normal coronary,dilated LV,Sev.decr.global LVF,+1 MR    ANGIOGRAM  01/01/2008    Mild AS,Mod PHTN,mod prox.LAD disease,Triv.CFX disease    ANGIOGRAM  01/01/2009    Mod AS,Mod PHTN,mod prox.LAD disease    ANGIOGRAM  10/01/2010    Sev.AS,CM,global hypokinesis,Mild-mod LV dilated,Mild MR,70% prox.LAD lesion,PHTN    CARDIOVERSION  2/02, 4/02, 4/11    CHOLECYSTECTOMY  2006    COLONOSCOPY N/A 01/23/2016    Procedure: COLONOSCOPY;  Surgeon: Robyn Collins MD;  Location:  GI    CV CORONARY ANGIOGRAM N/A 1/31/2022    Procedure: Coronary Angiogram;  Surgeon: Rome Mike MD;  Location:  HEART CARDIAC CATH LAB    CV INSTANTANEOUS WAVE-FREE RATIO N/A 1/31/2022    Procedure: Instantaneous Wave-Free Ratio;  Surgeon: Rome Mike MD;  Location:  HEART CARDIAC CATH LAB    CV RIGHT HEART CATH MEASUREMENTS RECORDED N/A 1/31/2022    Procedure: Right Heart Cath;  Surgeon: Rome Mike MD;  Location:  HEART CARDIAC CATH LAB    ESOPHAGOSCOPY, GASTROSCOPY, DUODENOSCOPY (EGD), COMBINED N/A 01/23/2016    Procedure: COMBINED ESOPHAGOSCOPY, GASTROSCOPY, DUODENOSCOPY (EGD);  Surgeon: Dennis  Robyn Montanez MD;  Location:  GI    EXCISE MASS GROIN Right 2/11/2021    Procedure: INCISION AND DRAINAGE OF RIGHT GROIN HEMATOMA;  Surgeon: Norma Scanlon MD;  Location: [facility] OR    HERNIORRHAPHY INGUINAL Right 2/4/2021    Procedure: OPEN RIGHT INGUINAL HENRIA WITH MESH;  Surgeon: Jaden Alex MD;  Location: [facility] OR    LAPAROSCOPIC BYPASS GASTRIC  10/08/2011    Procedure:LAPAROSCOPIC BYPASS GASTRIC; Diagnostic laparoscopy, Lysis of Adhesions, Laparoscopic Revision of Jejunojejunostomy; Surgeon:RADHA MURO; Location:[facility] OR    LAPAROSCOPIC BYPASS GASTRIC  06/26/2006    Dr Jin    LAPAROSCOPY DIAGNOSTIC (GENERAL)  10/08/2011    Procedure:LAPAROSCOPY DIAGNOSTIC (GENERAL); Surgeon:RADHA UMRO; Location: OR    REPLACE VALVE AORTIC  01/07/2011    ZZC NONSPECIFIC PROCEDURE  ~1985    vein stripping       FAMILY HISTORY:  Family History   Problem Relation Age of Onset    Cancer - colorectal Father     Colon Cancer Father     Cancer Father     Diabetes Brother        SOCIAL HISTORY:  Social History     Socioeconomic History    Marital status:      Spouse name: Tracey    Number of children: 0   Occupational History    Occupation:      Comment: [employer]   Tobacco Use    Smoking status: Never     Passive exposure: Never    Smokeless tobacco: Never   Vaping Use    Vaping status: Never Used   Substance and Sexual Activity    Alcohol use: No     Alcohol/week: 0.0 standard drinks of alcohol    Drug use: No    Sexual activity: Not Currently     Partners: Female   Other Topics Concern    Parent/sibling w/ CABG, MI or angioplasty before 65F 55M? Yes    Blood Transfusions No    Caffeine Concern Yes     Comment: 1 can daily    Occupational Exposure Yes    Hobby Hazards No    Sleep Concern No    Stress Concern No    Weight Concern No    Special Diet Yes     Comment: low sodium, low calories    Back Care No    Exercise Yes     Comment: walks daily    Seat Belt Yes    Self-Exams  Yes     Social Determinants of Health     Financial Resource Strain: Low Risk  (6/20/2023)    Overall Financial Resource Strain (CARDIA)     Difficulty of Paying Living Expenses: Not hard at all   Food Insecurity: No Food Insecurity (6/20/2023)    Hunger Vital Sign     Worried About Running Out of Food in the Last Year: Never true     Ran Out of Food in the Last Year: Never true   Transportation Needs: No Transportation Needs (6/20/2023)    PRAPARE - Transportation     Lack of Transportation (Medical): No     Lack of Transportation (Non-Medical): No   Physical Activity: Insufficiently Active (6/20/2023)    Exercise Vital Sign     Days of Exercise per Week: 3 days     Minutes of Exercise per Session: 20 min   Stress: No Stress Concern Present (6/20/2023)    [employer] [place] [employer] - Occupational Stress Questionnaire     Feeling of Stress : Not at all   Social Connections: Moderately Integrated (6/20/2023)    Social Connection and Isolation Panel [NHANES]     Frequency of Communication with Friends and Family: More than three times a week     Frequency of Social Gatherings with Friends and Family: Twice a week     Attends Islam Services: 1 to 4 times per year     Active Member of Clubs or Organizations: No     Marital Status:    Interpersonal Safety: Low Risk  (5/7/2024)    Interpersonal Safety     Do you feel physically and emotionally safe where you currently live?: Yes     Within the past 12 months, have you been hit, slapped, kicked or otherwise physically hurt by someone?: No     Within the past 12 months, have you been humiliated or emotionally abused in other ways by your partner or ex-partner?: No   Housing Stability: Low Risk  (6/20/2023)    Housing Stability Vital Sign     Unable to Pay for Housing in the Last Year: No     Number of Places Lived in the Last Year: 2     Unstable Housing in the Last Year: No       Review of Systems:  Skin:          Eyes:         ENT:          Respiratory:          Cardiovascular:         Gastroenterology:        Genitourinary:         Musculoskeletal:         Neurologic:         Psychiatric:         Heme/Lymph/Imm:         Endocrine:           Physical Exam:  Vitals: There were no vitals taken for this visit.    Constitutional:  cooperative        Skin:  warm and dry to the touch          Head:  normocephalic        Eyes:  pupils equal and round        Lymph:      ENT:           Neck:           Respiratory:  clear to auscultation         Cardiac:   irregular rhythm                                                       GI:           Extremities and Muscular Skeletal:                 Neurological:           Psych:           CC  Mattie Olivarez, APRN CNP  [address]  [place] [address]                  Thank you for allowing me to participate in the care of your patient.      Sincerely,     Noy Cooley MD     [facility]

## 2024-06-24 ENCOUNTER — LAB (OUTPATIENT)
Dept: LAB | Facility: CLINIC | Age: 69
End: 2024-06-24
Payer: COMMERCIAL

## 2024-06-24 ENCOUNTER — ANTICOAGULATION THERAPY VISIT (OUTPATIENT)
Dept: ANTICOAGULATION | Facility: CLINIC | Age: 69
End: 2024-06-24

## 2024-06-24 DIAGNOSIS — Z79.01 LONG TERM CURRENT USE OF ANTICOAGULANT THERAPY: ICD-10-CM

## 2024-06-24 DIAGNOSIS — Z95.2 S/P AVR (AORTIC VALVE REPLACEMENT): ICD-10-CM

## 2024-06-24 DIAGNOSIS — S30.1XXA ABDOMINAL WALL HEMATOMA, INITIAL ENCOUNTER: ICD-10-CM

## 2024-06-24 DIAGNOSIS — I48.21 PERMANENT ATRIAL FIBRILLATION (H): ICD-10-CM

## 2024-06-24 DIAGNOSIS — Z95.2 S/P AVR (AORTIC VALVE REPLACEMENT): Primary | ICD-10-CM

## 2024-06-24 DIAGNOSIS — Z95.2 S/P AORTIC VALVE REPLACEMENT: ICD-10-CM

## 2024-06-24 DIAGNOSIS — I25.10 CORONARY ARTERY DISEASE INVOLVING NATIVE CORONARY ARTERY OF NATIVE HEART WITHOUT ANGINA PECTORIS: Primary | ICD-10-CM

## 2024-06-24 LAB — INR BLD: 3.2 (ref 0.9–1.1)

## 2024-06-24 PROCEDURE — 85610 PROTHROMBIN TIME: CPT

## 2024-06-24 PROCEDURE — 36416 COLLJ CAPILLARY BLOOD SPEC: CPT

## 2024-06-24 NOTE — PROGRESS NOTES
ANTICOAGULATION MANAGEMENT     Jovon Mantilla 68 year old male is on warfarin with therapeutic INR result. (Goal INR 2.5-3.5)    Recent labs: (last 7 days)     06/24/24  1115   INR 3.2*       ASSESSMENT     Source(s): Chart Review and Patient/Caregiver Call     Warfarin doses taken: Warfarin taken as instructed  Diet: No new diet changes identified  Medication/supplement changes:  patient reports he will continue the post cataract eye drops through 6/26/24  New illness, injury, or hospitalization: No  Signs or symptoms of bleeding or clotting: No  Previous result: Therapeutic last visit; previously outside of goal range  Additional findings: patient reports the cataract surgery went well.  Patient reports he is establishing with Mary PCP on 7/2/24, this writer informed patient that his [facility] episode will be changed to Mary so he will talk to a different group of ACC RNs, patient verbalized understanding. New [facility] referral will need to be sent to new PCP once he has established with her.       PLAN     Recommended plan for no diet, medication or health factor changes affecting INR     Dosing Instructions: Continue your current warfarin dose with next INR in 4 weeks       Summary  As of 6/24/2024      Full warfarin instructions:  4 mg every day   Next INR check:  7/22/2024               Telephone call with Jovon who verbalizes understanding and agrees to plan    Lab visit scheduled    Education provided:   None required    Plan made per [facility] anticoagulation protocol    Madeline Wayne, RN  Anticoagulation Clinic  6/24/2024    _______________________________________________________________________     Anticoagulation Episode Summary       Current INR goal:  2.5-3.5   TTR:  55.0% (1 y)   Target end date:  Indefinite   Send INR reminders to:  CARYN ROSE    Indications    S/P AVR (aortic valve replacement) [Z95.2]  Long-term (current) use of anticoagulants [Z79.01] [Z79.01]  Permanent atrial fibrillation (H)  [I48.21]  Abdominal wall hematoma  initial encounter [S30.1XXA]  S/P aortic valve replacement [Z95.2]             Comments:  24mm ATS Valve             Anticoagulation Care Providers       Provider Role Specialty Phone number    Noe Ramirez PA-C Referring [facility] [phone]

## 2024-07-01 SDOH — HEALTH STABILITY: PHYSICAL HEALTH: ON AVERAGE, HOW MANY DAYS PER WEEK DO YOU ENGAGE IN MODERATE TO STRENUOUS EXERCISE (LIKE A BRISK WALK)?: 3 DAYS

## 2024-07-01 SDOH — HEALTH STABILITY: PHYSICAL HEALTH: ON AVERAGE, HOW MANY MINUTES DO YOU ENGAGE IN EXERCISE AT THIS LEVEL?: 20 MIN

## 2024-07-01 ASSESSMENT — SOCIAL DETERMINANTS OF HEALTH (SDOH): HOW OFTEN DO YOU GET TOGETHER WITH FRIENDS OR RELATIVES?: TWICE A WEEK

## 2024-07-02 ENCOUNTER — OFFICE VISIT (OUTPATIENT)
Dept: FAMILY MEDICINE | Facility: CLINIC | Age: 69
End: 2024-07-02
Payer: COMMERCIAL

## 2024-07-02 VITALS
HEIGHT: 69 IN | OXYGEN SATURATION: 99 % | WEIGHT: 211 LBS | RESPIRATION RATE: 18 BRPM | HEART RATE: 72 BPM | SYSTOLIC BLOOD PRESSURE: 108 MMHG | BODY MASS INDEX: 31.25 KG/M2 | DIASTOLIC BLOOD PRESSURE: 70 MMHG | TEMPERATURE: 98.6 F

## 2024-07-02 DIAGNOSIS — R45.86 MOOD CHANGES: ICD-10-CM

## 2024-07-02 DIAGNOSIS — E78.5 HYPERLIPIDEMIA LDL GOAL <70: ICD-10-CM

## 2024-07-02 DIAGNOSIS — E03.9 ACQUIRED HYPOTHYROIDISM: ICD-10-CM

## 2024-07-02 DIAGNOSIS — N32.81 OVERACTIVE BLADDER: ICD-10-CM

## 2024-07-02 DIAGNOSIS — Z23 ENCOUNTER FOR IMMUNIZATION: ICD-10-CM

## 2024-07-02 DIAGNOSIS — Z00.00 ENCOUNTER FOR MEDICARE ANNUAL WELLNESS EXAM: Primary | ICD-10-CM

## 2024-07-02 DIAGNOSIS — I25.10 CORONARY ARTERY DISEASE INVOLVING NATIVE CORONARY ARTERY OF NATIVE HEART WITHOUT ANGINA PECTORIS: ICD-10-CM

## 2024-07-02 DIAGNOSIS — Z23 NEED FOR SHINGLES VACCINE: ICD-10-CM

## 2024-07-02 DIAGNOSIS — L71.9 ACNE ROSACEA: ICD-10-CM

## 2024-07-02 DIAGNOSIS — I50.812 CHRONIC RIGHT-SIDED HEART FAILURE (H): ICD-10-CM

## 2024-07-02 DIAGNOSIS — Z23 NEED FOR TDAP VACCINATION: ICD-10-CM

## 2024-07-02 PROCEDURE — 80061 LIPID PANEL: CPT | Performed by: PHYSICIAN ASSISTANT

## 2024-07-02 PROCEDURE — 82570 ASSAY OF URINE CREATININE: CPT | Performed by: PHYSICIAN ASSISTANT

## 2024-07-02 PROCEDURE — 91320 SARSCV2 VAC 30MCG TRS-SUC IM: CPT | Performed by: PHYSICIAN ASSISTANT

## 2024-07-02 PROCEDURE — 99214 OFFICE O/P EST MOD 30 MIN: CPT | Mod: 25 | Performed by: PHYSICIAN ASSISTANT

## 2024-07-02 PROCEDURE — 84460 ALANINE AMINO (ALT) (SGPT): CPT | Performed by: PHYSICIAN ASSISTANT

## 2024-07-02 PROCEDURE — G0009 ADMIN PNEUMOCOCCAL VACCINE: HCPCS | Performed by: PHYSICIAN ASSISTANT

## 2024-07-02 PROCEDURE — 84443 ASSAY THYROID STIM HORMONE: CPT | Performed by: PHYSICIAN ASSISTANT

## 2024-07-02 PROCEDURE — 36415 COLL VENOUS BLD VENIPUNCTURE: CPT | Performed by: PHYSICIAN ASSISTANT

## 2024-07-02 PROCEDURE — 80162 ASSAY OF DIGOXIN TOTAL: CPT | Performed by: PHYSICIAN ASSISTANT

## 2024-07-02 PROCEDURE — 90677 PCV20 VACCINE IM: CPT | Performed by: PHYSICIAN ASSISTANT

## 2024-07-02 PROCEDURE — 90480 ADMN SARSCOV2 VAC 1/ONLY CMP: CPT | Performed by: PHYSICIAN ASSISTANT

## 2024-07-02 PROCEDURE — 82043 UR ALBUMIN QUANTITATIVE: CPT | Performed by: PHYSICIAN ASSISTANT

## 2024-07-02 PROCEDURE — G0438 PPPS, INITIAL VISIT: HCPCS | Performed by: PHYSICIAN ASSISTANT

## 2024-07-02 RX ORDER — SERTRALINE HYDROCHLORIDE 25 MG/1
25 TABLET, FILM COATED ORAL DAILY
Qty: 90 TABLET | Refills: 1 | Status: SHIPPED | OUTPATIENT
Start: 2024-07-02

## 2024-07-02 RX ORDER — LEVOTHYROXINE SODIUM 200 UG/1
200 TABLET ORAL DAILY
Qty: 90 TABLET | Refills: 3 | Status: SHIPPED | OUTPATIENT
Start: 2024-07-02

## 2024-07-02 RX ORDER — RESPIRATORY SYNCYTIAL VIRUS VACCINE 120MCG/0.5
0.5 KIT INTRAMUSCULAR ONCE
Qty: 1 EACH | Refills: 0 | Status: CANCELLED | OUTPATIENT
Start: 2024-07-02 | End: 2024-07-02

## 2024-07-02 RX ORDER — METRONIDAZOLE 7.5 MG/G
GEL TOPICAL 2 TIMES DAILY
Qty: 45 G | Refills: 1 | Status: SHIPPED | OUTPATIENT
Start: 2024-07-02 | End: 2024-08-26

## 2024-07-02 NOTE — PROGRESS NOTES
"Preventive Care Visit  [facility] ROSE Prado PA-C, Family Medicine  Jul 2, 2024      Assessment & Plan     Need for shingles vaccine    - zoster vaccine recombinant adjuvanted (SHINGRIX) injection; Inject 0.5 mLs into the muscle once for 1 dose Pharmacist administered    Need for Tdap vaccination    - Tdap, tetanus-diptheria-acell pertussis, (BOOSTRIX) 5-2.5-18.5 LF-MCG/0.5 JUAN injection; Inject 0.5 mLs into the muscle once for 1 dose      Encounter for Medicare annual wellness exam    - Pneumococcal 20 Valent Conjugate (PCV20)  - COVID-19 12+ (2023-24) (PFIZER)  - REVIEW OF HEALTH MAINTENANCE PROTOCOL ORDERS    Overactive bladder  He will monitor.  If becoming more bothersome consider medication for overactive bladder     Coronary artery disease involving native coronary artery of native heart without angina pectoris  Continue to see cardiology  - Albumin Random Urine Quantitative with Creat Ratio  - Lipid panel reflex to direct LDL Non-fasting  - Digoxin level; Future  - Digoxin level    Hyperlipidemia LDL goal <70  Follow up as needed  - ALT    Acquired hypothyroidism  As above  - TSH with free T4 reflex; Future  - levothyroxine (SYNTHROID/LEVOTHROID) 200 MCG tablet; Take 1 tablet (200 mcg) by mouth daily  - TSH with free T4 reflex    Mood changes  Doing well.  No changes   - sertraline (ZOLOFT) 25 MG tablet; Take 1 tablet (25 mg) by mouth daily    Encounter for immunization    - Pneumococcal 20 Valent Conjugate (PCV20)    Acne rosacea  If not improving with the gel he will let me know   - metroNIDAZOLE (METROGEL) 0.75 % external gel; Apply topically 2 times daily    Chronic right-sided heart failure (H)  As above            BMI  Estimated body mass index is 31.16 kg/m  as calculated from the following:    Height as of this encounter: 1.753 m (5' 9\").    Weight as of this encounter: 95.7 kg (211 lb).   Weight management plan: Discussed healthy diet and exercise " guidelines    Counseling  Appropriate preventive services were discussed with this patient, including applicable screening as appropriate for fall prevention, nutrition, physical activity, Tobacco-use cessation, weight loss and cognition.  Checklist reviewing preventive services available has been given to the patient.  Reviewed patient's diet, addressing concerns and/or questions.   He is at risk for lack of exercise and has been provided with information to increase physical activity for the benefit of his well-being.           Nikita Sigala is a 68 year old, presenting for the following:  Physical        5/7/2024    10:39 AM   Additional Questions   Roomed by Kyleighrosa Valdez         Health Care Directive  Patient does not have a Health Care Directive or Living Will: information given     HPI      Rash  Onset/Duration: since last week   Description  Location: nose/face   Character: round, raised, red  Itching:none   Intensity:  moderate   Progression of Symptoms:  same  Accompanying signs and symptoms:   Fever: No  Body aches or joint pain: No  Sore throat symptoms: No  Recent cold symptoms: No  History:           Previous episodes of similar rash: None  New exposures:  None  Recent travel: No  Exposure to similar rash: No  Precipitating or alleviating factors: none   Therapies tried and outcome: none  Only new med is eye drops.    No new face products  No fevers.    Had similar rash in 2019 that was dx as rosacea.  Pt doesn't remember.            7/1/2024   General Health   How would you rate your overall physical health? Good   Feel stress (tense, anxious, or unable to sleep) Not at all            7/1/2024   Nutrition   Diet: Low fat/cholesterol            7/1/2024   Exercise   Days per week of moderate/strenous exercise 3 days   Average minutes spent exercising at this level 20 min            7/1/2024   Social Factors   Frequency of gathering with friends or relatives Twice a week   Worry food won't last  until get money to buy more No   Food not last or not have enough money for food? No   Do you have housing? (Housing is defined as stable permanent housing and does not include staying ouside in a car, in a tent, in an abandoned building, in an overnight shelter, or couch-surfing.) Yes   Are you worried about losing your housing? No   Lack of transportation? No   Unable to get utilities (heat,electricity)? No            7/1/2024   Fall Risk   Fallen 2 or more times in the past year? No    No   Trouble with walking or balance? No    No       Multiple values from one day are sorted in reverse-chronological order          7/1/2024   Activities of Daily Living- Home Safety   Needs help with the following daily activites None of the above   Safety concerns in the home None of the above            7/1/2024   Dental   Dentist two times every year? Yes            7/1/2024   Hearing Screening   Hearing concerns? None of the above            7/1/2024   Driving Risk Screening   Patient/family members have concerns about driving No            7/1/2024   General Alertness/Fatigue Screening   Have you been more tired than usual lately? No            7/1/2024   Urinary Incontinence Screening   Bothered by leaking urine in past 6 months No            7/1/2024   TB Screening   Were you born outside of the [place]? No            Today's PHQ-2 Score:       7/1/2024    11:51 AM   PHQ-2 ( 1999 Pfizer)   Q1: Little interest or pleasure in doing things 0   Q2: Feeling down, depressed or hopeless 0   PHQ-2 Score 0   Q1: Little interest or pleasure in doing things Not at all   Q2: Feeling down, depressed or hopeless Not at all   PHQ-2 Score 0           7/1/2024   Substance Use   Alcohol more than 3/day or more than 7/wk Not Applicable   Do you have a current opioid prescription? No   How severe/bad is pain from 1 to 10? 0/10 (No Pain)   Do you use any other substances recreationally? No        Social History     Tobacco Use    Smoking status:  Never     Passive exposure: Never    Smokeless tobacco: Never   Vaping Use    Vaping status: Never Used   Substance Use Topics    Alcohol use: No     Alcohol/week: 0.0 standard drinks of alcohol    Drug use: No           7/1/2024   AAA Screening   Family history of Abdominal Aortic Aneurysm (AAA)? No      Last PSA:   PSA   Date Value Ref Range Status   08/28/2020 1.27 0 - 4 ug/L Final     Comment:     Assay Method:  Chemiluminescence using Siemens Vista analyzer     Prostate Specific Antigen Screen   Date Value Ref Range Status   08/09/2021 1.41 0.00 - 4.00 ug/L Final     ASCVD Risk   The ASCVD Risk score (Hollie SCHULZ, et al., 2019) failed to calculate for the following reasons:    The valid total cholesterol range is 130 to 320 mg/dL            Reviewed and updated as needed this visit by Provider    Allergies  Meds       Sexual Activity            Current providers sharing in care for this patient include:  Patient Care Team:  Kimber Prado PA-C as PCP - General (Family Medicine)  Bri Olvera PA as Physician Assistant (Urology)  Татьяна Srivastava PA as Physician Assistant (Urology)  Laura Traylor, RN as Personal Advocate & Liaison (PAL) (Family Medicine)  Sedrick Beltran MD as MD (Urology)  Noy Cooley MD as MD (Cardiovascular Disease)  Reji Fritz MD as Assigned PCP  Noy Cooley MD as Assigned Heart and Vascular Provider    The following health maintenance items are reviewed in Epic and correct as of today:  Health Maintenance   Topic Date Due    ZOSTER IMMUNIZATION (1 of 2) Never done    RSV VACCINE (Pregnancy & 60+) (1 - 1-dose 60+ series) Never done    Pneumococcal Vaccine: 65+ Years (2 of 2 - PCV) 06/02/2018    HF ACTION PLAN  06/02/2020    DTAP/TDAP/TD IMMUNIZATION (2 - Td or Tdap) 12/03/2020    ALT  08/09/2022    COVID-19 Vaccine (6 - 2023-24 season) 09/01/2023    LIPID  06/20/2024    MICROALBUMIN  06/20/2024    TSH W/FREE T4 REFLEX  06/20/2024     "DIGOXIN  08/15/2024    INFLUENZA VACCINE (1) 09/01/2024    BMP  11/07/2024    CREATININE  05/07/2025    CBC  05/07/2025    MEDICARE ANNUAL WELLNESS VISIT  07/02/2025    ANNUAL REVIEW OF HM ORDERS  07/02/2025    FALL RISK ASSESSMENT  07/02/2025    COLORECTAL CANCER SCREENING  01/23/2026    GLUCOSE  05/07/2027    ADVANCE CARE PLANNING  06/20/2028    HEPATITIS C SCREENING  Completed    PHQ-2 (once per calendar year)  Completed    IPV IMMUNIZATION  Aged Out    HPV IMMUNIZATION  Aged Out    MENINGITIS IMMUNIZATION  Aged Out    RSV MONOCLONAL ANTIBODY  Aged Out         Review of Systems  CONSTITUTIONAL: NEGATIVE for fever, chills, change in weight  INTEGUMENTARY/SKIN: as above  EYES: NEGATIVE for vision changes or irritation  ENT/MOUTH: NEGATIVE for ear, mouth and throat problems  RESP: NEGATIVE for significant cough or SOB  CV: NEGATIVE for chest pain, palpitations or peripheral edema  GI: NEGATIVE for nausea, abdominal pain, heartburn, or change in bowel habits   male :urinary frequency and nocturia   MUSCULOSKELETAL: NEGATIVE for significant arthralgias or myalgia  NEURO: NEGATIVE for weakness, dizziness or paresthesias  ENDOCRINE: NEGATIVE for temperature intolerance, skin/hair changes  HEME: NEGATIVE for bleeding problems  PSYCHIATRIC: NEGATIVE for changes in mood or affect     Objective    Exam  /70   Pulse 72   Temp 98.6  F (37  C) (Temporal)   Resp 18   Ht 1.753 m (5' 9\")   Wt 95.7 kg (211 lb)   SpO2 99%   BMI 31.16 kg/m     Estimated body mass index is 31.16 kg/m  as calculated from the following:    Height as of this encounter: 1.753 m (5' 9\").    Weight as of this encounter: 95.7 kg (211 lb).    Physical Exam  Constitutional:       General: He is not in acute distress.     Appearance: He is well-developed.   HENT:      Right Ear: Tympanic membrane, ear canal and external ear normal.      Left Ear: Tympanic membrane, ear canal and external ear normal.      Mouth/Throat:      Mouth: No oral " lesions.      Pharynx: No oropharyngeal exudate.   Eyes:      Conjunctiva/sclera: Conjunctivae normal.   Neck:      Thyroid: No thyromegaly.      Trachea: No tracheal deviation.   Cardiovascular:      Rate and Rhythm: Rhythm irregularly irregular.      Heart sounds: Normal heart sounds. No murmur heard.  Pulmonary:      Effort: Pulmonary effort is normal. No respiratory distress.      Breath sounds: Normal breath sounds. No wheezing or rales.   Abdominal:      General: Bowel sounds are normal.      Palpations: Abdomen is soft. There is no mass.      Tenderness: There is no abdominal tenderness.   Musculoskeletal:         General: No deformity. Normal range of motion.      Cervical back: Neck supple.      Right lower leg: No edema.      Left lower leg: No edema.   Lymphadenopathy:      Cervical: No cervical adenopathy.   Skin:     General: Skin is warm and dry.      Findings: Rash (red pappular rash on nose and right cheek, a few scattered papules on forhead and left cheek) present.   Neurological:      Mental Status: He is alert and oriented to person, place, and time.      Motor: No abnormal muscle tone.      Deep Tendon Reflexes: Reflexes are normal and symmetric.   Psychiatric:         Speech: Speech normal.         Thought Content: Thought content normal.         Judgment: Judgment normal.               7/2/2024   Mini Cog   Clock Draw Score 2 Normal   3 Item Recall 3 objects recalled   Mini Cog Total Score 5                 Signed Electronically by: Kimber Prado PA-C

## 2024-07-02 NOTE — PATIENT INSTRUCTIONS
"If the rash on your face is not improving in the next 2 weeks let me k now   Patient Education   Preventive Care Advice   This is general advice we often give to help people stay healthy. Your care team may have specific advice just for you. Please talk to your care team about your own preventive care needs.  Lifestyle  Exercise at least 150 minutes each week (30 minutes a day, 5 days a week).  Do muscle strengthening activities 2 days a week. These help control your weight and prevent disease.  No smoking.  Wear sunscreen to prevent skin cancer.  Have your home tested for radon every 2 to 5 years. Radon is a colorless, odorless gas that can harm your lungs. To learn more, go to www.health.[facility].[place].us and search for \"Radon in Homes.\"  Keep guns unloaded and locked up in a safe place like a safe or gun vault, or, use a gun lock and hide the keys. Always lock away bullets separately. To learn more, visit [place].gov and search for \"safe gun storage.\"  Nutrition  Eat 5 or more servings of fruits and vegetables each day.  Try wheat bread, brown rice and whole grain pasta (instead of white bread, rice, and pasta).  Get enough calcium and vitamin D. Check the label on foods and aim for 100% of the RDA (recommended daily allowance).  Regular exams  Have a dental exam and cleaning every 6 months.  See your health care team every year to talk about:  Any changes in your health.  Any medicines your care team has prescribed.  Preventive care, family planning, and ways to prevent chronic diseases.  Shots (vaccines)   HPV shots (up to age 26), if you've never had them before.  Hepatitis B shots (up to age 59), if you've never had them before.  COVID-19 shot: Get this shot when it's due.  Flu shot: Get a flu shot every year.  Tetanus shot: Get a tetanus shot every 10 years.  Pneumococcal, hepatitis A, and RSV shots: Ask your care team if you need these based on your risk.  Shingles shot (for age 50 and up).  General health " tests  Diabetes screening:  Starting at age 35, Get screened for diabetes at least every 3 years.  If you are younger than age 35, ask your care team if you should be screened for diabetes.  Cholesterol test: At age 39, start having a cholesterol test every 5 years, or more often if advised.  Bone density scan (DEXA): At age 50, ask your care team if you should have this scan for osteoporosis (brittle bones).  Hepatitis C: Get tested at least once in your life.  Abdominal aortic aneurysm screening: Talk to your doctor about having this screening if you:  Have ever smoked; and  Are biologically male; and  Are between the ages of 65 and 75.  STIs (sexually transmitted infections)  Before age 24: Ask your care team if you should be screened for STIs.  After age 24: Get screened for STIs if you're at risk. You are at risk for STIs (including HIV) if:  You are sexually active with more than one person.  You don't use condoms every time.  You or a partner was diagnosed with a sexually transmitted infection.  If you are at risk for HIV, ask about PrEP medicine to prevent HIV.  Get tested for HIV at least once in your life, whether you are at risk for HIV or not.  Cancer screening tests  Cervical cancer screening: If you have a cervix, begin getting regular cervical cancer screening tests at age 21. Most people who have regular screenings with normal results can stop after age 65. Talk about this with your provider.  Breast cancer scan (mammogram): If you've ever had breasts, begin having regular mammograms starting at age 40. This is a scan to check for breast cancer.  Colon cancer screening: It is important to start screening for colon cancer at age 45.  Have a colonoscopy test every 10 years (or more often if you're at risk) Or, ask your provider about stool tests like a FIT test every year or Cologuard test every 3 years.  To learn more about your testing options, visit: www.[place].UpNext/719058.pdf.  For help making a  decision, visit: ranjit/jg07491.  Prostate cancer screening test: If you have a prostate and are age 55 to 69, ask your provider if you would benefit from a yearly prostate cancer screening test.  Lung cancer screening: If you are a current or former smoker age 50 to 80, ask your care team if ongoing lung cancer screenings are right for you.  For informational purposes only. Not to replace the advice of your health care provider. Copyright   2023 [facility]. All rights reserved. Clinically reviewed by the [facility] Transitions Program. [employer] [id number] - REV 04/24.

## 2024-07-03 LAB
ALT SERPL W P-5'-P-CCNC: 16 U/L (ref 0–70)
CHOLEST SERPL-MCNC: 101 MG/DL
CREAT UR-MCNC: 147 MG/DL
DIGOXIN SERPL-MCNC: 0.6 NG/ML (ref 0.6–2)
FASTING STATUS PATIENT QL REPORTED: NO
HDLC SERPL-MCNC: 45 MG/DL
LDLC SERPL CALC-MCNC: 48 MG/DL
MICROALBUMIN UR-MCNC: 119 MG/L
MICROALBUMIN/CREAT UR: 80.95 MG/G CR (ref 0–17)
NONHDLC SERPL-MCNC: 56 MG/DL
TRIGL SERPL-MCNC: 41 MG/DL
TSH SERPL DL<=0.005 MIU/L-ACNC: 0.36 UIU/ML (ref 0.3–4.2)

## 2024-07-22 ENCOUNTER — LAB (OUTPATIENT)
Dept: LAB | Facility: CLINIC | Age: 69
End: 2024-07-22
Payer: COMMERCIAL

## 2024-07-22 ENCOUNTER — DOCUMENTATION ONLY (OUTPATIENT)
Dept: ANTICOAGULATION | Facility: CLINIC | Age: 69
End: 2024-07-22

## 2024-07-22 ENCOUNTER — ANTICOAGULATION THERAPY VISIT (OUTPATIENT)
Dept: ANTICOAGULATION | Facility: CLINIC | Age: 69
End: 2024-07-22

## 2024-07-22 DIAGNOSIS — Z95.2 S/P AVR (AORTIC VALVE REPLACEMENT): Primary | ICD-10-CM

## 2024-07-22 DIAGNOSIS — I48.21 PERMANENT ATRIAL FIBRILLATION (H): ICD-10-CM

## 2024-07-22 DIAGNOSIS — Z79.01 LONG TERM CURRENT USE OF ANTICOAGULANT THERAPY: ICD-10-CM

## 2024-07-22 DIAGNOSIS — Z95.2 S/P AVR (AORTIC VALVE REPLACEMENT): ICD-10-CM

## 2024-07-22 DIAGNOSIS — Z95.2 S/P AORTIC VALVE REPLACEMENT: ICD-10-CM

## 2024-07-22 DIAGNOSIS — S30.1XXA ABDOMINAL WALL HEMATOMA, INITIAL ENCOUNTER: ICD-10-CM

## 2024-07-22 LAB — INR BLD: 3 (ref 0.9–1.1)

## 2024-07-22 PROCEDURE — 36415 COLL VENOUS BLD VENIPUNCTURE: CPT

## 2024-07-22 PROCEDURE — 85610 PROTHROMBIN TIME: CPT

## 2024-07-22 RX ORDER — WARFARIN SODIUM 4 MG/1
TABLET ORAL
Qty: 90 TABLET | Refills: 1 | Status: SHIPPED | OUTPATIENT
Start: 2024-07-22

## 2024-07-22 NOTE — PROGRESS NOTES
ANTICOAGULATION CLINIC REFERRAL RENEWAL REQUEST       An annual renewal order is required for all patients referred to [facility].?  Please review and sign the pended referral order for Jovon Mantilla.       ANTICOAGULATION SUMMARY      Warfarin indication(s)   Atrial Fibrillation and Mechanical AVR    Mechanical heart valve present?  Mechanical  AVR-Bileaflet       Current goal range   INR: 2.5-3.5     Goal appropriate for indication? Goal INR 2.5-3.5 standard for indication(s) above     Time in Therapeutic Range (TTR)  (Goal > 60%) 55%       Office visit with referring provider's group within last year yes on 7/2/2024       Bri Matos, RN  [facility]

## 2024-07-22 NOTE — PROGRESS NOTES
ANTICOAGULATION MANAGEMENT     Jovon Mantilla 68 year old male is on warfarin with therapeutic INR result. (Goal INR 2.5-3.5)    Recent labs: (last 7 days)     07/22/24  1119   INR 3.0*       ASSESSMENT     Source(s): Chart Review and Patient/Caregiver Call     Warfarin doses taken: Warfarin taken as instructed  Diet: No new diet changes identified  Medication/supplement changes: None noted  New illness, injury, or hospitalization: No  Signs or symptoms of bleeding or clotting: No  Previous result: Therapeutic last 2(+) visits  Additional findings: Refill needed today. Jovon meets all criteria for refill (current [facility] referral, office visit with referring provider/group in last 1 year unless directed to return in 2 years in last referring provider visit note, lab monitoring up to date or not exceeding 2 weeks overdue). Rx instructions and quantity match patient's current dosing plan. Warfarin 90 day supply with 1 refill granted per [facility] protocol ([facility] referral is current, but sending new referral today as well since patient did switch providers recently.)       PLAN     Recommended plan for no diet, medication or health factor changes affecting INR     Dosing Instructions: Continue your current warfarin dose with next INR in 5 weeks       Summary  As of 7/22/2024      Full warfarin instructions:  4 mg every day   Next INR check:  8/26/2024               Telephone call with Jovon who verbalizes understanding and agrees to plan and who agrees to plan and repeated back plan correctly    Lab visit scheduled    Education provided: Contact [phone] with any changes, questions or concerns.     Plan made per [facility] anticoagulation protocol    Bri Matos, RN  Anticoagulation Clinic  7/22/2024    _______________________________________________________________________     Anticoagulation Episode Summary       Current INR goal:  2.5-3.5   TTR:  55.0% (1 y)   Target end date:  Indefinite   Send INR reminders to:  CARYN  FRIANTONINA    Indications    S/P AVR (aortic valve replacement) [Z95.2]  Long-term (current) use of anticoagulants [Z79.01] [Z79.01]  Permanent atrial fibrillation (H) [I48.21]  Abdominal wall hematoma  initial encounter [S30.1XXA]  S/P aortic valve replacement [Z95.2]             Comments:  24mm ATS Valve             Anticoagulation Care Providers       Provider Role Specialty Phone number    Noe Ramirez PA-C Referring [facility] [phone]

## 2024-08-26 ENCOUNTER — ANTICOAGULATION THERAPY VISIT (OUTPATIENT)
Dept: ANTICOAGULATION | Facility: CLINIC | Age: 69
End: 2024-08-26

## 2024-08-26 ENCOUNTER — LAB (OUTPATIENT)
Dept: LAB | Facility: CLINIC | Age: 69
End: 2024-08-26
Payer: COMMERCIAL

## 2024-08-26 ENCOUNTER — OFFICE VISIT (OUTPATIENT)
Dept: FAMILY MEDICINE | Facility: CLINIC | Age: 69
End: 2024-08-26
Attending: NURSE PRACTITIONER
Payer: COMMERCIAL

## 2024-08-26 VITALS
TEMPERATURE: 97.8 F | OXYGEN SATURATION: 96 % | SYSTOLIC BLOOD PRESSURE: 131 MMHG | HEIGHT: 69 IN | BODY MASS INDEX: 31.55 KG/M2 | RESPIRATION RATE: 18 BRPM | WEIGHT: 213 LBS | DIASTOLIC BLOOD PRESSURE: 77 MMHG | HEART RATE: 87 BPM

## 2024-08-26 DIAGNOSIS — Z00.00 ENCOUNTER FOR MEDICARE ANNUAL WELLNESS EXAM: Primary | ICD-10-CM

## 2024-08-26 DIAGNOSIS — Z23 NEED FOR TDAP VACCINATION: ICD-10-CM

## 2024-08-26 DIAGNOSIS — I48.21 PERMANENT ATRIAL FIBRILLATION (H): ICD-10-CM

## 2024-08-26 DIAGNOSIS — Z95.2 S/P AVR (AORTIC VALVE REPLACEMENT): Primary | ICD-10-CM

## 2024-08-26 DIAGNOSIS — S30.1XXA ABDOMINAL WALL HEMATOMA, INITIAL ENCOUNTER: ICD-10-CM

## 2024-08-26 DIAGNOSIS — Z95.2 S/P AORTIC VALVE REPLACEMENT: ICD-10-CM

## 2024-08-26 DIAGNOSIS — Z23 NEED FOR SHINGLES VACCINE: ICD-10-CM

## 2024-08-26 DIAGNOSIS — Z79.01 LONG TERM CURRENT USE OF ANTICOAGULANT THERAPY: ICD-10-CM

## 2024-08-26 DIAGNOSIS — Z95.2 S/P AVR (AORTIC VALVE REPLACEMENT): ICD-10-CM

## 2024-08-26 DIAGNOSIS — R21 RASH: ICD-10-CM

## 2024-08-26 LAB — INR BLD: 2.4 (ref 0.9–1.1)

## 2024-08-26 PROCEDURE — G0439 PPPS, SUBSEQ VISIT: HCPCS | Performed by: PHYSICIAN ASSISTANT

## 2024-08-26 PROCEDURE — 36415 COLL VENOUS BLD VENIPUNCTURE: CPT

## 2024-08-26 PROCEDURE — 85610 PROTHROMBIN TIME: CPT

## 2024-08-26 SDOH — HEALTH STABILITY: PHYSICAL HEALTH: ON AVERAGE, HOW MANY DAYS PER WEEK DO YOU ENGAGE IN MODERATE TO STRENUOUS EXERCISE (LIKE A BRISK WALK)?: 2 DAYS

## 2024-08-26 ASSESSMENT — SOCIAL DETERMINANTS OF HEALTH (SDOH): HOW OFTEN DO YOU GET TOGETHER WITH FRIENDS OR RELATIVES?: ONCE A WEEK

## 2024-08-26 NOTE — PROGRESS NOTES
ANTICOAGULATION MANAGEMENT     Jovon Mantilla 69 year old male is on warfarin with subtherapeutic INR result. (Goal INR 2.5-3.5)    Recent labs: (last 7 days)     08/26/24  1029   INR 2.4*       ASSESSMENT     Source(s): Chart Review and Patient/Caregiver Call     Warfarin doses taken: Warfarin taken as instructed  Diet: No new diet changes identified  Medication/supplement changes: None noted  New illness, injury, or hospitalization: No  Signs or symptoms of bleeding or clotting: No  Previous result: Therapeutic last 2(+) visits  Additional findings: None       PLAN     Recommended plan for no diet, medication or health factor changes affecting INR     Dosing Instructions: Continue your current warfarin dose with next INR in 2 weeks       Summary  As of 8/26/2024      Full warfarin instructions:  4 mg every day   Next INR check:  9/9/2024               Telephone call with Jovon who verbalizes understanding and agrees to plan    Lab visit scheduled    Education provided: Please call back if any changes to your diet, medications or how you've been taking warfarin  Contact [phone] with any changes, questions or concerns.     Plan made per [facility] anticoagulation protocol    Lia Harmon RN  Anticoagulation Clinic  8/26/2024    _______________________________________________________________________     Anticoagulation Episode Summary       Current INR goal:  2.5-3.5   TTR:  53.4% (1 y)   Target end date:  Indefinite   Send INR reminders to:  CARYN ROSE    Indications    S/P AVR (aortic valve replacement) [Z95.2]  Long-term (current) use of anticoagulants [Z79.01] [Z79.01]  Permanent atrial fibrillation (H) [I48.21]  Abdominal wall hematoma  initial encounter [S30.1XXA]  S/P aortic valve replacement [Z95.2]             Comments:  24mm ATS Valve             Anticoagulation Care Providers       Provider Role Specialty Phone number    Noe Ramirez PA-C Referring Family Medicine [phone]    Johan  Kimber Hall PA-C [facility] [phone]

## 2024-08-26 NOTE — PATIENT INSTRUCTIONS
Patient Education   Preventive Care Advice   This is general advice given by our system to help you stay healthy. However, your care team may have specific advice just for you. Please talk to your care team about your preventive care needs.  Nutrition  Eat 5 or more servings of fruits and vegetables each day.  Try wheat bread, brown rice and whole grain pasta (instead of white bread, rice, and pasta).  Get enough calcium and vitamin D. Check the label on foods and aim for 100% of the RDA (recommended daily allowance).  Lifestyle  Exercise at least 150 minutes each week  (30 minutes a day, 5 days a week).  Do muscle strengthening activities 2 days a week. These help control your weight and prevent disease.  No smoking.  Wear sunscreen to prevent skin cancer.  Have a dental exam and cleaning every 6 months.  Yearly exams  See your health care team every year to talk about:  Any changes in your health.  Any medicines your care team has prescribed.  Preventive care, family planning, and ways to prevent chronic diseases.  Shots (vaccines)   HPV shots (up to age 26), if you've never had them before.  Hepatitis B shots (up to age 59), if you've never had them before.  COVID-19 shot: Get this shot when it's due.  Flu shot: Get a flu shot every year.  Tetanus shot: Get a tetanus shot every 10 years.  Pneumococcal, hepatitis A, and RSV shots: Ask your care team if you need these based on your risk.  Shingles shot (for age 50 and up)  General health tests  Diabetes screening:  Starting at age 35, Get screened for diabetes at least every 3 years.  If you are younger than age 35, ask your care team if you should be screened for diabetes.  Cholesterol test: At age 39, start having a cholesterol test every 5 years, or more often if advised.  Bone density scan (DEXA): At age 50, ask your care team if you should have this scan for osteoporosis (brittle bones).  Hepatitis C: Get tested at least once in your life.  STIs (sexually  transmitted infections)  Before age 24: Ask your care team if you should be screened for STIs.  After age 24: Get screened for STIs if you're at risk. You are at risk for STIs (including HIV) if:  You are sexually active with more than one person.  You don't use condoms every time.  You or a partner was diagnosed with a sexually transmitted infection.  If you are at risk for HIV, ask about PrEP medicine to prevent HIV.  Get tested for HIV at least once in your life, whether you are at risk for HIV or not.  Cancer screening tests  Cervical cancer screening: If you have a cervix, begin getting regular cervical cancer screening tests starting at age 21.  Breast cancer scan (mammogram): If you've ever had breasts, begin having regular mammograms starting at age 40. This is a scan to check for breast cancer.  Colon cancer screening: It is important to start screening for colon cancer at age 45.  Have a colonoscopy test every 10 years (or more often if you're at risk) Or, ask your provider about stool tests like a FIT test every year or Cologuard test every 3 years.  To learn more about your testing options, visit:   .  For help making a decision, visit:   https://bit.ly/nm11421.  Prostate cancer screening test: If you have a prostate, ask your care team if a prostate cancer screening test (PSA) at age 55 is right for you.  Lung cancer screening: If you are a current or former smoker ages 50 to 80, ask your care team if ongoing lung cancer screenings are right for you.  For informational purposes only. Not to replace the advice of your health care provider. Copyright   2023 [employer]. All rights reserved. Clinically reviewed by the [facility] Transitions Program. [employer] [id number] - REV 01/24.

## 2024-08-26 NOTE — PROGRESS NOTES
Preventive Care Visit  [facility] ROSE Prado PA-C, Family Medicine  Aug 26, 2024      Assessment & Plan     Need for shingles vaccine    - zoster vaccine recombinant adjuvanted (SHINGRIX) injection; Inject 0.5 mLs into the muscle once for 1 dose. Pharmacist administered    Need for Tdap vaccination    - Tdap, tetanus-diptheria-acell pertussis, (BOOSTRIX) 5-2.5-18.5 LF-MCG/0.5 JUAN injection; Inject 0.5 mLs into the muscle once for 1 dose.    Rash  He will stop the cream and follow up when it returns for a punch biopsy.  Will bleed more due to anticoagulation.  Will do as small of a bx as possible.      Encounter for Medicare annual wellness exam  Overall doing well             Counseling  Appropriate preventive services were addressed with this patient via screening, questionnaire, or discussion as appropriate for fall prevention, nutrition, physical activity, Tobacco-use cessation, social engagement, weight loss and cognition.  Checklist reviewing preventive services available has been given to the patient.  Reviewed patient's diet, addressing concerns and/or questions.   He is at risk for lack of exercise and has been provided with information to increase physical activity for the benefit of his well-being.           Nikita Sigala is a 69 year old, presenting for the following:  Physical        8/26/2024    11:18 AM   Additional Questions   Roomed by Vonnie   Accompanied by self         Health Care Directive  Patient does not have a Health Care Directive or Living Will: Patient states has Advance Directive and will bring in a copy to clinic.    HPI      Rash x a few years.  Steroid cream helps but come back if stops using.  Can itch.  No bleeding.  Just on right ankle.      Weight stable.    No chest pain or shortness of breath               8/26/2024   General Health   How would you rate your overall physical health? Good   Feel stress (tense, anxious, or unable to sleep)  Not at all            8/26/2024   Nutrition   Diet: Low fat/cholesterol            8/26/2024   Exercise   Days per week of moderate/strenous exercise 2 days      (!) EXERCISE CONCERN      8/26/2024   Social Factors   Frequency of gathering with friends or relatives Once a week   Worry food won't last until get money to buy more No   Food not last or not have enough money for food? No   Do you have housing? (Housing is defined as stable permanent housing and does not include staying ouside in a car, in a tent, in an abandoned building, in an overnight shelter, or couch-surfing.) Yes   Are you worried about losing your housing? No   Lack of transportation? No   Unable to get utilities (heat,electricity)? No            8/26/2024   Fall Risk   Fallen 2 or more times in the past year? No   Trouble with walking or balance? No             8/26/2024   Activities of Daily Living- Home Safety   Needs help with the following daily activites None of the above   Safety concerns in the home None of the above            8/26/2024   Dental   Dentist two times every year? Yes            8/26/2024   Hearing Screening   Hearing concerns? None of the above            8/26/2024   Driving Risk Screening   Patient/family members have concerns about driving No            8/26/2024   General Alertness/Fatigue Screening   Have you been more tired than usual lately? No            8/26/2024   Urinary Incontinence Screening   Bothered by leaking urine in past 6 months No            7/1/2024   TB Screening   Were you born outside of the [place]? No            Today's PHQ-2 Score:       8/26/2024    10:33 AM   PHQ-2 ( 1999 [employer])   Q1: Little interest or pleasure in doing things 0   Q2: Feeling down, depressed or hopeless 0   PHQ-2 Score 0   Q1: Little interest or pleasure in doing things Not at all   Q2: Feeling down, depressed or hopeless Not at all   PHQ-2 Score 0           8/26/2024   Substance Use   Alcohol more than 3/day or more than 7/wk Not  Applicable   Do you have a current opioid prescription? No   How severe/bad is pain from 1 to 10? 0/10 (No Pain)   Do you use any other substances recreationally? No        Social History     Tobacco Use    Smoking status: Never     Passive exposure: Never    Smokeless tobacco: Never   Vaping Use    Vaping status: Never Used   Substance Use Topics    Alcohol use: No     Alcohol/week: 0.0 standard drinks of alcohol    Drug use: No           8/26/2024   AAA Screening   Family history of Abdominal Aortic Aneurysm (AAA)? No      Last PSA:   PSA   Date Value Ref Range Status   08/28/2020 1.27 0 - 4 ug/L Final     Comment:     Assay Method:  Chemiluminescence using Siemens Vista analyzer     Prostate Specific Antigen Screen   Date Value Ref Range Status   08/09/2021 1.41 0.00 - 4.00 ug/L Final     ASCVD Risk   The ASCVD Risk score (Hollie DK, et al., 2019) failed to calculate for the following reasons:    The valid total cholesterol range is 130 to 320 mg/dL            Reviewed and updated as needed this visit by Provider    Allergies  Meds                  Current providers sharing in care for this patient include:  Patient Care Team:  Kimber Prado PA-C as PCP - General (Family Medicine)  Bri Olvera PA as Physician Assistant (Urology)  Татьяна Srivastava PA as Physician Assistant (Urology)  Laura Traylor, RN as Personal Advocate & Liaison (PAL) (Family Medicine)  Sedrick Beltran MD as MD (Urology)  Noy Cooley MD as MD (Cardiovascular Disease)  Noy Cooley MD as Assigned Heart and Vascular Provider  Kimber Prado PA-C as Assigned PCP    The following health maintenance items are reviewed in Epic and correct as of today:  Health Maintenance   Topic Date Due    ZOSTER IMMUNIZATION (1 of 2) Never done    RSV VACCINE (Pregnancy & 60+) (1 - 1-dose 60+ series) Never done    HF ACTION PLAN  06/02/2020    DTAP/TDAP/TD IMMUNIZATION (2 - Td or Tdap) 12/03/2020     "INFLUENZA VACCINE (1) 09/01/2024    COVID-19 Vaccine (7 - 2023-24 season) 11/02/2024    BMP  11/07/2024    CREATININE  05/07/2025    CBC  05/07/2025    ALT  07/02/2025    LIPID  07/02/2025    MICROALBUMIN  07/02/2025    TSH W/FREE T4 REFLEX  07/02/2025    DIGOXIN  07/02/2025    ANNUAL REVIEW OF HM ORDERS  07/02/2025    MEDICARE ANNUAL WELLNESS VISIT  08/26/2025    FALL RISK ASSESSMENT  08/26/2025    COLORECTAL CANCER SCREENING  01/23/2026    GLUCOSE  05/07/2027    ADVANCE CARE PLANNING  08/26/2029    HEPATITIS C SCREENING  Completed    PHQ-2 (once per calendar year)  Completed    Pneumococcal Vaccine: 65+ Years  Completed    HPV IMMUNIZATION  Aged Out    MENINGITIS IMMUNIZATION  Aged Out    RSV MONOCLONAL ANTIBODY  Aged Out            Objective    Exam  /77   Pulse 87   Temp 97.8  F (36.6  C) (Temporal)   Resp 18   Ht 1.753 m (5' 9\")   Wt 96.6 kg (213 lb)   SpO2 96%   BMI 31.45 kg/m     Estimated body mass index is 31.45 kg/m  as calculated from the following:    Height as of this encounter: 1.753 m (5' 9\").    Weight as of this encounter: 96.6 kg (213 lb).    Physical Exam  Constitutional:       General: He is not in acute distress.     Appearance: He is well-developed.   HENT:      Right Ear: Tympanic membrane, ear canal and external ear normal.      Left Ear: Tympanic membrane, ear canal and external ear normal.      Mouth/Throat:      Mouth: No oral lesions.      Pharynx: No oropharyngeal exudate.   Eyes:      Conjunctiva/sclera: Conjunctivae normal.   Neck:      Thyroid: No thyromegaly.      Trachea: No tracheal deviation.   Cardiovascular:      Rate and Rhythm: Normal rate. Rhythm irregular.      Heart sounds: S1 normal and S2 normal.      No S3 or S4 sounds.   Pulmonary:      Effort: Pulmonary effort is normal.      Breath sounds: Normal breath sounds.   Abdominal:      General: Bowel sounds are normal.      Palpations: Abdomen is soft. There is no mass.      Tenderness: There is no abdominal " tenderness.   Musculoskeletal:         General: Normal range of motion.      Cervical back: Neck supple.      Right lower leg: No edema.      Left lower leg: No edema.   Lymphadenopathy:      Cervical: No cervical adenopathy.   Skin:     General: Skin is warm and dry.      Comments: Varicose veins both legs    Neurological:      Mental Status: He is alert and oriented to person, place, and time.      Motor: No abnormal muscle tone.      Deep Tendon Reflexes: Reflexes are normal and symmetric.   Psychiatric:         Speech: Speech normal.         Thought Content: Thought content normal.         Judgment: Judgment normal.               8/26/2024   Mini Cog   Clock Draw Score 2 Normal   3 Item Recall 3 objects recalled   Mini Cog Total Score 5                 Signed Electronically by: Kimber Prado PA-C

## 2024-09-03 ENCOUNTER — HOSPITAL ENCOUNTER (OUTPATIENT)
Dept: CARDIOLOGY | Facility: CLINIC | Age: 69
Discharge: HOME | End: 2024-09-03
Attending: INTERNAL MEDICINE | Admitting: INTERNAL MEDICINE
Payer: COMMERCIAL

## 2024-09-03 ENCOUNTER — TELEPHONE (OUTPATIENT)
Dept: CARDIOLOGY | Facility: CLINIC | Age: 69
End: 2024-09-03

## 2024-09-03 VITALS — HEART RATE: 70 BPM | SYSTOLIC BLOOD PRESSURE: 140 MMHG | DIASTOLIC BLOOD PRESSURE: 90 MMHG

## 2024-09-03 DIAGNOSIS — I25.10 CORONARY ARTERY DISEASE INVOLVING NATIVE CORONARY ARTERY OF NATIVE HEART WITHOUT ANGINA PECTORIS: Primary | ICD-10-CM

## 2024-09-03 DIAGNOSIS — I50.812 CHRONIC RIGHT-SIDED HEART FAILURE (H): ICD-10-CM

## 2024-09-03 DIAGNOSIS — I42.0 DILATED CARDIOMYOPATHY (H): ICD-10-CM

## 2024-09-03 DIAGNOSIS — I25.10 CORONARY ARTERY DISEASE INVOLVING NATIVE CORONARY ARTERY OF NATIVE HEART WITHOUT ANGINA PECTORIS: ICD-10-CM

## 2024-09-03 PROCEDURE — 75557 CARDIAC MRI FOR MORPH: CPT | Mod: 26 | Performed by: INTERNAL MEDICINE

## 2024-09-03 PROCEDURE — 75565 CARD MRI VELOC FLOW MAPPING: CPT | Mod: 26 | Performed by: INTERNAL MEDICINE

## 2024-09-03 PROCEDURE — 75557 CARDIAC MRI FOR MORPH: CPT

## 2024-09-03 RX ORDER — LIDOCAINE 40 MG/G
CREAM TOPICAL
OUTPATIENT
Start: 2024-09-03

## 2024-09-03 RX ORDER — DIAZEPAM 5 MG
5 TABLET ORAL EVERY 30 MIN PRN
OUTPATIENT
Start: 2024-09-03

## 2024-09-03 RX ORDER — LORAZEPAM 0.5 MG/1
0.5 TABLET ORAL EVERY 10 MIN PRN
OUTPATIENT
Start: 2024-09-03

## 2024-09-03 NOTE — TELEPHONE ENCOUNTER
MRI 9/3/2024 noted. Ordered to follow LVSF and mitral valve.    Per Dr. Cooley's dictation 6/3/2024: I would like to obtain a follow-up cardiovascular MRI with flows in 3 months to reassess left ventricular systolic function and the degree of mitral regurgitation.     MRI:  1. Moderately reduced LV systolic function with LVEF of 37%.  Mild to moderately reduced RV systolic function.  2. Moderate mitral regurgitation  Compared to prior CMR dated 07/06/2023 there is interval decline in LV and RV systolic functions.     Will message Dr. Cooley to review

## 2024-09-04 RX ORDER — SPIRONOLACTONE 25 MG/1
TABLET ORAL
Qty: 45 TABLET | Refills: 3 | Status: CANCELLED | OUTPATIENT
Start: 2024-09-04

## 2024-09-04 RX ORDER — SPIRONOLACTONE 25 MG/1
12.5 TABLET ORAL DAILY
Qty: 45 TABLET | Refills: 3 | Status: SHIPPED | OUTPATIENT
Start: 2024-09-04

## 2024-09-04 NOTE — TELEPHONE ENCOUNTER
It looks like his left ventricular systolic function has declined.  Lets get a Lexiscan stress.  Lets also add Jardiance 10 mg daily and Aldactone 12.5 mg daily.  Follow-up basic metabolic panel in 1 week.  VALDO follow-up in 1 to 2 months.  Thanks.

## 2024-09-04 NOTE — TELEPHONE ENCOUNTER
Attempted to contact patient to discuss Dr. Cooley's recommendations for testing, adding medications and scheduling labs and a visit. Left a message for patient with update.    Left message for patient to call back to Team 2 R.N.s @ [phone]     Order placed for lexiscan nuclear study  Order placed for bmp in 7-10 days (adjust with start date of new meds)  Order placed for VALDO follow up  Order placed for jardiance and aldactone - waiting for patient to call back to confirm.    1400 patient called back, discussed Dr. Cooley's recommendations. He will call the [facility] to set up the stress test, bmp and VALDO visit.  Rx escripted for jardiance and aldactone. He will call back if the jardiance is too expensive.

## 2024-09-10 ENCOUNTER — LAB (OUTPATIENT)
Dept: LAB | Facility: CLINIC | Age: 69
End: 2024-09-10
Payer: COMMERCIAL

## 2024-09-10 ENCOUNTER — ANTICOAGULATION THERAPY VISIT (OUTPATIENT)
Dept: ANTICOAGULATION | Facility: CLINIC | Age: 69
End: 2024-09-10

## 2024-09-10 ENCOUNTER — OFFICE VISIT (OUTPATIENT)
Dept: FAMILY MEDICINE | Facility: CLINIC | Age: 69
End: 2024-09-10
Payer: COMMERCIAL

## 2024-09-10 VITALS
OXYGEN SATURATION: 100 % | BODY MASS INDEX: 31.4 KG/M2 | RESPIRATION RATE: 18 BRPM | HEIGHT: 69 IN | WEIGHT: 212 LBS | DIASTOLIC BLOOD PRESSURE: 88 MMHG | HEART RATE: 55 BPM | SYSTOLIC BLOOD PRESSURE: 136 MMHG

## 2024-09-10 DIAGNOSIS — Z79.01 LONG TERM CURRENT USE OF ANTICOAGULANT THERAPY: ICD-10-CM

## 2024-09-10 DIAGNOSIS — R21 RASH: Primary | ICD-10-CM

## 2024-09-10 DIAGNOSIS — Z95.2 S/P AVR (AORTIC VALVE REPLACEMENT): Primary | ICD-10-CM

## 2024-09-10 DIAGNOSIS — I48.21 PERMANENT ATRIAL FIBRILLATION (H): ICD-10-CM

## 2024-09-10 DIAGNOSIS — Z95.2 S/P AVR (AORTIC VALVE REPLACEMENT): ICD-10-CM

## 2024-09-10 DIAGNOSIS — S30.1XXA ABDOMINAL WALL HEMATOMA, INITIAL ENCOUNTER: ICD-10-CM

## 2024-09-10 DIAGNOSIS — Z95.2 S/P AORTIC VALVE REPLACEMENT: ICD-10-CM

## 2024-09-10 LAB — INR BLD: 3 (ref 0.9–1.1)

## 2024-09-10 PROCEDURE — 11104 PUNCH BX SKIN SINGLE LESION: CPT | Performed by: PHYSICIAN ASSISTANT

## 2024-09-10 PROCEDURE — 85610 PROTHROMBIN TIME: CPT

## 2024-09-10 PROCEDURE — 36415 COLL VENOUS BLD VENIPUNCTURE: CPT

## 2024-09-10 NOTE — PROGRESS NOTES
{PROVIDER CHARTING PREFERENCE:006150}    Nikita Sigala is a 69 year old, presenting for the following health issues:  Follow Up  {(!) Visit Details have not yet been documented.  Please enter Visit Details and then use this list to pull in documentation. (Optional):219258}  History of Present Illness       Reason for visit:  Follow up    He eats 0-1 servings of fruits and vegetables daily.He consumes 2 sweetened beverage(s) daily.He exercises with enough effort to increase his heart rate 10 to 19 minutes per day.  He exercises with enough effort to increase his heart rate 3 or less days per week.   He is taking medications regularly.       {MA/LPN/RN Pre-Provider Visit Orders- hCG/UA/Strep (Optional):521917}  {SUPERLIST (Optional):613372}  {additonal problems for provider to add (Optional):611099}    {ROS Picklists (Optional):931529}      Objective    There were no vitals taken for this visit.  There is no height or weight on file to calculate BMI.  Physical Exam   {Exam List (Optional):100016}    {Diagnostic Test Results (Optional):283608}        Signed Electronically by: Kimber Prado PA-C  {Email feedback regarding this note to [email or web address]   :[id number]}

## 2024-09-10 NOTE — PROGRESS NOTES
"  Assessment & Plan     Rash  Follow up as needed  - Surgical Pathology Exam  - MN PUNCH BIOPSY OF SKIN, FIRST/SINGLE LESION                Nikita Sigala is a 69 year old, presenting for the following health issues:  Follow Up      9/10/2024     4:03 PM   Additional Questions   Roomed by Vonnie   Accompanied by self     History of Present Illness       Reason for visit:  Follow up    He eats 0-1 servings of fruits and vegetables daily.He consumes 2 sweetened beverage(s) daily.He exercises with enough effort to increase his heart rate 10 to 19 minutes per day.  He exercises with enough effort to increase his heart rate 3 or less days per week.   He is taking medications regularly.         Skin biopsy R ankle   X 2 years, cream helps but then it comes back                     Objective    /88   Pulse 55   Resp 18   Ht 1.753 m (5' 9\")   Wt 96.2 kg (212 lb)   SpO2 100%   BMI 31.31 kg/m    Body mass index is 31.31 kg/m .  Physical Exam     Procedure: Punch biopsy  Diagnosis: rash  Location: right medial ankle  The patient was informed of the nature of the procedure and the inherent risks of unattractive scarring, bleeding, and infection, and that further treatment may be required. He consents to the procedure.  The area surrounding the lesion was prepped with alcohol and anesthetized with 1% lidocain with epinephrine. A 2 mm punch biopsy was obtained. Hemostatis was achieved with aluminum chloride and pressure dressing.  The wound was dressed with a bandage. Wound care was explained Follow up with any wound problems, poor healing, or signs of infection.              Signed Electronically by: Kimber Prado PA-C    "

## 2024-09-10 NOTE — PATIENT INSTRUCTIONS
Procedure: Punch biopsy  Diagnosis: rash   Location: right medial ankle   The patient was informed of the nature of the procedure and the inherent risks of unattractive scarring, bleeding, and infection, and that further treatment may be required. He consents to the procedure.  The area surrounding the lesion was prepped with alcohol and anesthetized with 1% lidocaine with epinephrine. A 2 mm punch biopsy was obtained. Hemostatis was achieved with aluminum chloride., pressure.  The wound was dressed with  a bandage. Wound care was explained.   Follow up with any wound problems, poor healing, or signs of infection.    Procedure: Punch biopsy  Diagnosis: rash  Location: right medial ankle  The patient was informed of the nature of the procedure and the inherent risks of unattractive scarring, bleeding, and infection, and that further treatment may be required. He consents to the procedure.  The area surrounding the lesion was prepped with alcohol and anesthetized with 1% lidocain with epinephrine. A 2 mm punch biopsy was obtained. Hemostatis was achieved with aluminum chloride and pressure dressing.  The wound was dressed with a bandage. Wound care was explained Follow up with any wound problems, poor healing, or signs of infection.

## 2024-09-10 NOTE — PROGRESS NOTES
ANTICOAGULATION MANAGEMENT     Jovon Mantilla 69 year old male is on warfarin with therapeutic INR result. (Goal INR 2.5-3.5)    Recent labs: (last 7 days)     09/10/24  1520   INR 3.0*       ASSESSMENT     Source(s): Chart Review and Patient/Caregiver Call     Warfarin doses taken: Warfarin taken as instructed  Diet: No new diet changes identified  Medication/supplement changes:  Jardiance and spironolactone  New illness, injury, or hospitalization: No  Signs or symptoms of bleeding or clotting: No  Previous result: Subtherapeutic  Additional findings: None       PLAN     Recommended plan for no diet, medication or health factor changes affecting INR     Dosing Instructions: Continue your current warfarin dose with next INR in 3 weeks       Summary  As of 9/10/2024      Full warfarin instructions:  4 mg every day   Next INR check:  10/1/2024               Telephone call with Jovon who verbalizes understanding and agrees to plan    Lab visit scheduled    Education provided: Goal range and lab monitoring: goal range and significance of current result    Plan made per [facility] anticoagulation protocol    Samra Blanca RN  9/10/2024  Anticoagulation Clinic  [employer] for routing messages: rose ROSE  [facility] patient phone line: [phone]        _______________________________________________________________________     Anticoagulation Episode Summary       Current INR goal:  2.5-3.5   TTR:  53.1% (1 y)   Target end date:  Indefinite   Send INR reminders to:  CARYN ROSE    Indications    S/P AVR (aortic valve replacement) [Z95.2]  Long-term (current) use of anticoagulants [Z79.01] [Z79.01]  Permanent atrial fibrillation (H) [I48.21]  Abdominal wall hematoma  initial encounter [S30.1XXA]  S/P aortic valve replacement [Z95.2]             Comments:  24mm ATS Valve             Anticoagulation Care Providers       Provider Role Specialty Phone number    Noe Ramirez PA-C Referring Family Medicine [phone]     Kimber Prado PA-C [facility] [phone]

## 2024-09-18 ENCOUNTER — TELEPHONE (OUTPATIENT)
Dept: CARDIOLOGY | Facility: CLINIC | Age: 69
End: 2024-09-18

## 2024-09-18 ENCOUNTER — LAB (OUTPATIENT)
Dept: LAB | Facility: CLINIC | Age: 69
End: 2024-09-18
Payer: COMMERCIAL

## 2024-09-18 ENCOUNTER — HOSPITAL ENCOUNTER (OUTPATIENT)
Dept: CARDIOLOGY | Facility: CLINIC | Age: 69
Discharge: HOME | End: 2024-09-18
Attending: INTERNAL MEDICINE
Payer: COMMERCIAL

## 2024-09-18 VITALS
HEIGHT: 69 IN | OXYGEN SATURATION: 93 % | BODY MASS INDEX: 30.39 KG/M2 | SYSTOLIC BLOOD PRESSURE: 110 MMHG | WEIGHT: 205.2 LBS | DIASTOLIC BLOOD PRESSURE: 74 MMHG

## 2024-09-18 VITALS — SYSTOLIC BLOOD PRESSURE: 118 MMHG | DIASTOLIC BLOOD PRESSURE: 70 MMHG | HEART RATE: 77 BPM

## 2024-09-18 DIAGNOSIS — I25.10 CORONARY ARTERY DISEASE INVOLVING NATIVE CORONARY ARTERY OF NATIVE HEART WITHOUT ANGINA PECTORIS: ICD-10-CM

## 2024-09-18 DIAGNOSIS — I42.0 DILATED CARDIOMYOPATHY (H): ICD-10-CM

## 2024-09-18 LAB
ANION GAP SERPL CALCULATED.3IONS-SCNC: 7 MMOL/L (ref 7–15)
BUN SERPL-MCNC: 20.9 MG/DL (ref 8–23)
CALCIUM SERPL-MCNC: 9.5 MG/DL (ref 8.8–10.4)
CHLORIDE SERPL-SCNC: 102 MMOL/L (ref 98–107)
CREAT SERPL-MCNC: 0.86 MG/DL (ref 0.67–1.17)
CV BLOOD PRESSURE: 43 MMHG
CV STRESS MAX HR HE: 82
EGFRCR SERPLBLD CKD-EPI 2021: >90 ML/MIN/1.73M2
GLUCOSE SERPL-MCNC: 95 MG/DL (ref 70–99)
HCO3 SERPL-SCNC: 31 MMOL/L (ref 22–29)
NUC STRESS EJECTION FRACTION: 40 %
POTASSIUM SERPL-SCNC: 4.8 MMOL/L (ref 3.4–5.3)
RATE PRESSURE PRODUCT: 9184
SODIUM SERPL-SCNC: 140 MMOL/L (ref 135–145)
STRESS ECHO BASELINE DIASTOLIC HE: 70
STRESS ECHO BASELINE HR: 76 BPM
STRESS ECHO BASELINE SYSTOLIC BP: 118
STRESS ECHO CALCULATED PERCENT HR: 54 %
STRESS ECHO LAST STRESS DIASTOLIC BP: 74
STRESS ECHO LAST STRESS SYSTOLIC BP: 112
STRESS ECHO TARGET HR: 153

## 2024-09-18 PROCEDURE — 78452 HT MUSCLE IMAGE SPECT MULT: CPT

## 2024-09-18 PROCEDURE — 80048 BASIC METABOLIC PNL TOTAL CA: CPT | Performed by: INTERNAL MEDICINE

## 2024-09-18 PROCEDURE — 250N000011 HC RX IP 250 OP 636: Performed by: INTERNAL MEDICINE

## 2024-09-18 PROCEDURE — 36415 COLL VENOUS BLD VENIPUNCTURE: CPT | Performed by: INTERNAL MEDICINE

## 2024-09-18 PROCEDURE — 78452 HT MUSCLE IMAGE SPECT MULT: CPT | Mod: 26 | Performed by: INTERNAL MEDICINE

## 2024-09-18 PROCEDURE — 93016 CV STRESS TEST SUPVJ ONLY: CPT | Performed by: INTERNAL MEDICINE

## 2024-09-18 PROCEDURE — 258N000003 HC RX IP 258 OP 636: Performed by: INTERNAL MEDICINE

## 2024-09-18 PROCEDURE — A9502 TC99M TETROFOSMIN: HCPCS | Performed by: INTERNAL MEDICINE

## 2024-09-18 PROCEDURE — 93018 CV STRESS TEST I&R ONLY: CPT | Performed by: INTERNAL MEDICINE

## 2024-09-18 PROCEDURE — 343N000001 HC RX 343: Performed by: INTERNAL MEDICINE

## 2024-09-18 RX ORDER — REGADENOSON 0.08 MG/ML
0.4 INJECTION, SOLUTION INTRAVENOUS ONCE
Status: COMPLETED | OUTPATIENT
Start: 2024-09-18 | End: 2024-09-18

## 2024-09-18 RX ORDER — DIAZEPAM 5 MG
5 TABLET ORAL EVERY 30 MIN PRN
Status: DISCONTINUED | OUTPATIENT
Start: 2024-09-18 | End: 2024-09-19 | Stop reason: HOSPADM

## 2024-09-18 RX ORDER — ALBUTEROL SULFATE 90 UG/1
2 AEROSOL, METERED RESPIRATORY (INHALATION) EVERY 5 MIN PRN
Status: DISCONTINUED | OUTPATIENT
Start: 2024-09-18 | End: 2024-09-19 | Stop reason: HOSPADM

## 2024-09-18 RX ORDER — CAFFEINE 200 MG
200 TABLET ORAL
Status: SHIPPED | OUTPATIENT
Start: 2024-09-18 | End: 2024-09-18

## 2024-09-18 RX ORDER — NITROGLYCERIN 0.4 MG/1
0.4 TABLET SUBLINGUAL EVERY 5 MIN PRN
Status: ACTIVE | OUTPATIENT
Start: 2024-09-18 | End: 2024-09-18

## 2024-09-18 RX ORDER — DIPHENHYDRAMINE HYDROCHLORIDE 50 MG/ML
25-50 INJECTION INTRAMUSCULAR; INTRAVENOUS
Status: DISCONTINUED | OUTPATIENT
Start: 2024-09-18 | End: 2024-09-19 | Stop reason: HOSPADM

## 2024-09-18 RX ORDER — LIDOCAINE 40 MG/G
CREAM TOPICAL
Status: DISCONTINUED | OUTPATIENT
Start: 2024-09-18 | End: 2024-09-19 | Stop reason: HOSPADM

## 2024-09-18 RX ORDER — SODIUM CHLORIDE 9 MG/ML
INJECTION, SOLUTION INTRAVENOUS CONTINUOUS
Status: ACTIVE | OUTPATIENT
Start: 2024-09-18 | End: 2024-09-18

## 2024-09-18 RX ORDER — LORAZEPAM 0.5 MG/1
0.5 TABLET ORAL EVERY 30 MIN PRN
Status: DISCONTINUED | OUTPATIENT
Start: 2024-09-18 | End: 2024-09-19 | Stop reason: HOSPADM

## 2024-09-18 RX ORDER — DIPHENHYDRAMINE HCL 25 MG
25 CAPSULE ORAL
Status: DISCONTINUED | OUTPATIENT
Start: 2024-09-18 | End: 2024-09-19 | Stop reason: HOSPADM

## 2024-09-18 RX ORDER — AMINOPHYLLINE 25 MG/ML
50-100 INJECTION, SOLUTION INTRAVENOUS
Status: DISCONTINUED | OUTPATIENT
Start: 2024-09-18 | End: 2024-09-19 | Stop reason: HOSPADM

## 2024-09-18 RX ORDER — CAFFEINE CITRATE 20 MG/ML
60 SOLUTION INTRAVENOUS
Status: ACTIVE | OUTPATIENT
Start: 2024-09-18 | End: 2024-09-18

## 2024-09-18 RX ORDER — METHYLPREDNISOLONE SODIUM SUCCINATE 125 MG/2ML
125 INJECTION, POWDER, LYOPHILIZED, FOR SOLUTION INTRAMUSCULAR; INTRAVENOUS
Status: DISCONTINUED | OUTPATIENT
Start: 2024-09-18 | End: 2024-09-19 | Stop reason: HOSPADM

## 2024-09-18 RX ADMIN — TETROFOSMIN 3.95 MILLICURIE: 1.38 INJECTION, POWDER, LYOPHILIZED, FOR SOLUTION INTRAVENOUS at 08:35

## 2024-09-18 RX ADMIN — REGADENOSON 0.4 MG: 0.08 INJECTION, SOLUTION INTRAVENOUS at 09:57

## 2024-09-18 RX ADMIN — TETROFOSMIN 13.37 MILLICURIE: 1.38 INJECTION, POWDER, LYOPHILIZED, FOR SOLUTION INTRAVENOUS at 10:00

## 2024-09-18 RX ADMIN — SODIUM CHLORIDE, PRESERVATIVE FREE 10 ML: 5 INJECTION INTRAVENOUS at 09:58

## 2024-09-18 NOTE — TELEPHONE ENCOUNTER
Patient called and informed Dr. Cooley has reviewed lab results and of recommendation to continue current medication regimen.  Lexiscan stress test was done prior in the day. Patient informed we will be in contact when results are interpreted by provider. Patient verbalizes understanding.

## 2024-09-18 NOTE — TELEPHONE ENCOUNTER
BMP ordered on 9/4/24 after MRI showed left ventricular systolic function had declined. Lexiscan stress ordered. Patient was started on Jardiance 10 mg daily and Aldactone 12.5 mg daily with follow up BMP in 1 week.  Patient is scheduled to see Michelle Mckinney on 10/23/24.     Component      Latest Ref Rng 5/7/2024  11:21 AM 9/18/2024  8:00 AM   Sodium      135 - 145 mmol/L 140  140    Potassium      3.4 - 5.3 mmol/L 5.2  4.8    Chloride      98 - 107 mmol/L 104  102    Carbon Dioxide (CO2)      22 - 29 mmol/L 30 (H)  31 (H)    Anion Gap      7 - 15 mmol/L 6 (L)  7    Urea Nitrogen      8.0 - 23.0 mg/dL 13.3  20.9    Creatinine      0.67 - 1.17 mg/dL 0.83  0.86    GFR Estimate      >60 mL/min/1.73m2 >90  >90    Calcium      8.8 - 10.4 mg/dL 9.5  9.5    Glucose      70 - 99 mg/dL 68 (L)  95         Results routed to Dr. Cooley for review.

## 2024-09-26 ENCOUNTER — MYC MEDICAL ADVICE (OUTPATIENT)
Dept: FAMILY MEDICINE | Facility: CLINIC | Age: 69
End: 2024-09-26
Payer: COMMERCIAL

## 2024-09-26 ENCOUNTER — MYC MEDICAL ADVICE (OUTPATIENT)
Dept: CARDIOLOGY | Facility: CLINIC | Age: 69
End: 2024-09-26
Payer: COMMERCIAL

## 2024-09-26 NOTE — TELEPHONE ENCOUNTER
Lexiscan stress test ordered on 9/4/24 after MRI showed left ventricular systolic function had declined. Somehow I do not see we have reviewed results of his lexiscan stress test with him yet. Patient is scheduled to see Michelle Mckinney on 10/23/24. Results routed to Dr. Cooley for review.     Result test:       The nuclear stress test is probably negative for inducible myocardial ischemia or infarction.    There is a small perfusion defect of mild severity involving the basal inferior and inferoseptal walls which is essentially fixed and may be related to diaphragmatic attenuation.    Left ventricular function is mildly reduced.    The left ventricular ejection fraction at rest is 43%.  The left ventricular ejection fraction at stress is 40%.    TID is absent.    A prior study was conducted on 2/6/2018.  This study has no change when directly compared with the prior study.     ECG Summary:    ECG Baseline electrocardiogram demonstrates atrial fibrillation. There were occasional premature ventricular contractions, .   The stress electrocardiogram is negative for inducible ischemic EKG changes.  Arrhythmias during stress: Occasional PVCs.

## 2024-10-03 ENCOUNTER — ANTICOAGULATION THERAPY VISIT (OUTPATIENT)
Dept: ANTICOAGULATION | Facility: CLINIC | Age: 69
End: 2024-10-03

## 2024-10-03 ENCOUNTER — LAB (OUTPATIENT)
Dept: LAB | Facility: CLINIC | Age: 69
End: 2024-10-03
Payer: COMMERCIAL

## 2024-10-03 DIAGNOSIS — Z95.2 S/P AVR (AORTIC VALVE REPLACEMENT): ICD-10-CM

## 2024-10-03 DIAGNOSIS — Z95.2 S/P AORTIC VALVE REPLACEMENT: ICD-10-CM

## 2024-10-03 DIAGNOSIS — Z95.2 S/P AVR (AORTIC VALVE REPLACEMENT): Primary | ICD-10-CM

## 2024-10-03 DIAGNOSIS — I48.21 PERMANENT ATRIAL FIBRILLATION (H): ICD-10-CM

## 2024-10-03 DIAGNOSIS — S30.1XXA ABDOMINAL WALL HEMATOMA, INITIAL ENCOUNTER: ICD-10-CM

## 2024-10-03 DIAGNOSIS — Z79.01 LONG TERM CURRENT USE OF ANTICOAGULANT THERAPY: ICD-10-CM

## 2024-10-03 LAB — INR BLD: 3.2 (ref 0.9–1.1)

## 2024-10-03 PROCEDURE — 36415 COLL VENOUS BLD VENIPUNCTURE: CPT

## 2024-10-03 PROCEDURE — 85610 PROTHROMBIN TIME: CPT

## 2024-10-03 NOTE — PROGRESS NOTES
ANTICOAGULATION MANAGEMENT     Jovon Mantilla 69 year old male is on warfarin with therapeutic INR result. (Goal INR 2.5-3.5)    Recent labs: (last 7 days)     10/03/24  1134   INR 3.2*       ASSESSMENT     Source(s): Chart Review and Patient/Caregiver Call     Warfarin doses taken: Warfarin taken as instructed  Diet: No new diet changes identified  Medication/supplement changes: None noted  New illness, injury, or hospitalization: No  Signs or symptoms of bleeding or clotting: No  Previous result: Therapeutic last visit; previously outside of goal range  Additional findings: None       PLAN     Recommended plan for no diet, medication or health factor changes affecting INR     Dosing Instructions: Continue your current warfarin dose with next INR in 4 weeks       Summary  As of 10/3/2024      Full warfarin instructions:  4 mg every day   Next INR check:  10/31/2024               Telephone call with Jovon who verbalizes understanding and agrees to plan    Lab visit scheduled    Education provided: Goal range and lab monitoring: goal range and significance of current result    Plan made per [facility] anticoagulation protocol    Samra Blanca RN  10/3/2024  Anticoagulation Clinic  [employer] for routing messages: rose ROSE  [facility] patient phone line: [phone]        _______________________________________________________________________     Anticoagulation Episode Summary       Current INR goal:  2.5-3.5   TTR:  55.3% (1 y)   Target end date:  Indefinite   Send INR reminders to:  CARYN ROSE    Indications    S/P AVR (aortic valve replacement) [Z95.2]  Long-term (current) use of anticoagulants [Z79.01] [Z79.01]  Permanent atrial fibrillation (H) [I48.21]  Abdominal wall hematoma  initial encounter [S30.1XXA]  S/P aortic valve replacement [Z95.2]             Comments:  24mm ATS Valve             Anticoagulation Care Providers       Provider Role Specialty Phone number    Noe Ramirez PA-C Referring Family  Medicine [phone]    Kimber Prado PA-C [facility] Medicine [phone]

## 2024-10-09 LAB
PATH REPORT.COMMENTS IMP SPEC: NORMAL
PATH REPORT.FINAL DX SPEC: NORMAL
PATH REPORT.GROSS SPEC: NORMAL
PATH REPORT.MICROSCOPIC SPEC OTHER STN: NORMAL
PATH REPORT.RELEVANT HX SPEC: NORMAL
PHOTO IMAGE: NORMAL

## 2024-10-23 ENCOUNTER — OFFICE VISIT (OUTPATIENT)
Dept: CARDIOLOGY | Facility: CLINIC | Age: 69
End: 2024-10-23
Payer: COMMERCIAL

## 2024-10-23 VITALS
DIASTOLIC BLOOD PRESSURE: 68 MMHG | OXYGEN SATURATION: 93 % | HEART RATE: 75 BPM | SYSTOLIC BLOOD PRESSURE: 88 MMHG | BODY MASS INDEX: 28.49 KG/M2 | WEIGHT: 199 LBS | HEIGHT: 70 IN

## 2024-10-23 DIAGNOSIS — I34.0 NONRHEUMATIC MITRAL VALVE REGURGITATION: Primary | ICD-10-CM

## 2024-10-23 DIAGNOSIS — I70.90 ASVD (ARTERIOSCLEROTIC VASCULAR DISEASE): ICD-10-CM

## 2024-10-23 DIAGNOSIS — I25.10 CORONARY ARTERY DISEASE INVOLVING NATIVE CORONARY ARTERY OF NATIVE HEART WITHOUT ANGINA PECTORIS: ICD-10-CM

## 2024-10-23 DIAGNOSIS — I42.0 DILATED CARDIOMYOPATHY (H): ICD-10-CM

## 2024-10-23 PROCEDURE — 99215 OFFICE O/P EST HI 40 MIN: CPT

## 2024-10-23 RX ORDER — LISINOPRIL 5 MG/1
2.5 TABLET ORAL DAILY
COMMUNITY
Start: 2024-10-23

## 2024-10-23 NOTE — LETTER
10/23/2024    Kimber Prado PA-C  [address] [facility] [address] [place] [address]    RE: Jovon Mantilla       Dear Colleague,     I had the pleasure of seeing Jovon Mantilla in the [facility].          ~Cardiology Clinic Visit~    Primary Cardiologist: Dr. Cooley  Reason for visit: Cardiology follow up, review of recent diagnostic testing      Jovon Mantilla MRN# [id number]   YOB: 1955 Age: 69 year old       HPI:    Jovon Mantilla is a delightful 69 year old patient with past medical history significant for:    Severe aortic stenosis s/p aortic valve replacement in 1/2011 with 24 mm JASPAL supra-annular valve  Atrial fibrillation with history of MAZE procedure  History of 70% stenosis of the LAD on coronary angiogram prior to surgery. This was not bypassed since it was found to be completely intramyocardial  Non-ischemic cardiomyopathy, LVEF 37%    I had the opportunity to see Mr. Fuller for cariology follow up today. Mr. Fuller follows with Dr. Cooley and was last seen by him on 6/3/2024. He presents today for cardiology follow up to discuss results of recent diagnostic testing.    In review, in late 2021 he was noted  to have new exertional dyspnea.  An echocardiogram at that time showed moderate to severe eccentric mitral regurgitation which had progressed compared to his prior echocardiogram from June 2020. Given these findings, a CADE for further evaluation was recommended.     A CADE from 12/28/2021 demonstrated severe biatrial enlargement mildly reduced left ventricular systolic function. There was mild thickening and prolapse of P2 with moderate mitral regurgitation.. The mitral regurgitation was thought be secondary to both annular dilatation in the setting of chronic persistent atrial fibrillation as well as an element of P2 prolapse as reported. The mechanical aortic valve appeared to be functioning normally on both CADE and transthoracic imaging.    Mr. Fuller subsequently underwent  an exercise stress echocardiogram on 1/17/2022 to evaluate for worsening mitral regurgitation postexercise. This demonstrated moderately reduced left ventricular systolic function with trace to mild mitral regurgitation at baseline. LV systolic function improved post exercise although mitral regurgitation only increased to the mild to moderate range.     A Holter monitor was also performed which demonstrated an average heart rate of 90 bpm with atrial fibrillation throughout.    On 7/31/2022 he underwent right and left heart catheterization. His 70% LAD stenosis was unchanged from his angiogram in prior to his AVR. Remainder of coronary arteries without significant stenosis. His right heart catheterization revealed moderate pulmonary hypertension with moderately elevated left-sided filling pressures and mildly elevated right-sided filling pressures with a normal cardiac index.    Mr. Fuller underwent cardiac MRI on 9/3/2024 to reassess LV function and degree of mitral regurgitation. cMRI shows LVEF 37% and moderate mitral regurgitation. Following this he underwent NM Lexiscan on 9/18/2024 which shows not inducible myocardial ischemia or infarction.     Today, Jovon is doing very well from cardiovascular standpoint. He has no chest pain or pressure. No shortness of breath at rest, occasionally with exertion. Goes for walk around the block every day in addition to yard work every day- asymptomatic with this. Notice palpitations with overeating or over exerting himself- this has been going on since his valve replacement. He notes that since starting the Jardiance his weights have been coming down. Today he weighs 199 pounds, previously 212. He weighs himself every day at home.    Blood pressure today 88/68, no lightheadedness or dizziness. No syncope.       Diagnotic studies:  NM Lexiscan 9/18/2024:     The nuclear stress test is probably negative for inducible myocardial ischemia or infarction.     There is a small  perfusion defect of mild severity involving the basal inferior and inferoseptal walls which is essentially fixed and may be related to diaphragmatic attenuation.     Left ventricular function is mildly reduced.     The left ventricular ejection fraction at rest is 43%.  The left ventricular ejection fraction at stress is 40%.     TID is absent.     A prior study was conducted on 2/6/2018.  This study has no change when directly compared with the prior study.  Cardiac MRI 9/3/2024:  1. Moderately reduced LV systolic function with LVEF of 37%.  Mild to moderately reduced RV systolic function.  2. Moderate mitral regurgitation     Compared to prior CMR dated 07/06/2023 there is interval decline in LV and RV systolic functions.  Echocardiogram 8/15/2023:  The left ventricle is borderline dilated.  The visual ejection fraction is 35-40%.  Diastolic Doppler findings (E/E' ratio and/or other parameters) suggest left ventricular filling pressures are indeterminate.  There is mod-severe global hypokinesia of the left ventricle.  Regional wall motion abnormalities cannot be excluded due to limited visualization.  There is a mechanical aortic valve.  The gradient is normal for this prosthetic aortic valve.  The rhythm was atrial fibrillation.        Assessment:  Severe aortic stenosis s/p aortic valve replacement in 1/2011 with 24 mm JASPAL supra-annular valve  Echocardiogram 8/2023- normal aortic valve gradient  Atrial fibrillation with history of MAZE procedure  Rate controlled with carvedilol  Anticoagulated with warfarin  History of 70% stenosis of the LAD on coronary angiogram prior to surgery. This was not bypassed since it was found to be completely intramyocardial. Subsequent coronary angiogram 7/2022 shows unchanged stenosis in LAD  No anginal symptoms  Asprin 81 mg, simvastatin, carvedilol   Non-ischemic cardiomyopathy, LVEF 37%  NYHA class II, stage C  Fluid status: euvolemic; suspected dry weight: 199 lbs  Diuretic  regimen: Furosemide 20 mg PRN  Ischemic evaluation: NM Lexiscan 9/18/2024 without evidence of ischemia or infarction  Guideline directed medical therapy:  Beta blocker: carvedilol 12.5 mg BID  ACEI/ARB/ARNI: lisinopril 2.5 mg   Aldactone antagonist: spironolactone 12.5 mg  SGLT2 inhibitor: Jardiance 10 mg  Counseled patient on fluid and sodium restriction, monitoring weight    Plan:   Overall, Mr. Mantilla is doing well from a cardiovascular standpoint. He has no concerning anginal or heart failure symptoms. He has noticed weight loss after starting Jardiance. Weighs himself daily. Blood pressure today 88/68. Will decrease his lisinopril to 2.5 mg daily. I did reach out to the pharmacy team and pricing for Entresto is $47 per month. Mr. Mantilla would like to hold off on starting Entresto at this time. We reviewed the results of his recent diagnostic testing. I have placed an order for repeat echocardiogram in May for assessment of his mitral valve.   Follow up with Dr. Cooley in June for annual follow up.     Thank you for the opportunity to participate in this pleasant patient's care.    We would be happy to see this patient sooner for any concerns in the meantime.    LILLI Cannon, CNP   Nurse Practitioner  [facility]      Today's clinic visit entailed:  A total of 40 minutes was spent with patient, on chart review and documentation today.     The longitudinal plan of care for the diagnosis(es)/condition(s) as documented were addressed during this visit. Due to the added complexity in care, I will continue to support Jovon in the subsequent management and with ongoing continuity of care.        Orders Placed This Encounter   Procedures     Echocardiogram Complete     Orders Placed This Encounter   Medications     lisinopril (ZESTRIL) 5 MG tablet     Sig: Take 0.5 tablets (2.5 mg) by mouth daily.     Medications Discontinued During This Encounter   Medication Reason     lisinopril (ZESTRIL) 5  MG tablet          Encounter Diagnoses   Name Primary?     Coronary artery disease involving native coronary artery of native heart without angina pectoris      Dilated cardiomyopathy (H)      Nonrheumatic mitral valve regurgitation Yes     ASVD (arteriosclerotic vascular disease)        CURRENT MEDICATIONS:  Current Outpatient Medications   Medication Sig Dispense Refill     amoxicillin (AMOXIL) 500 MG capsule Before dental procedures       Ascorbic Acid (VITAMIN C PO) Take 500 mg by mouth daily       aspirin 81 MG tablet Take 81 mg by mouth Every Mon, Wed, Fri Morning       calcium carbonate-vitamin D 600-200 MG-UNIT TABS Take 1 tablet by mouth 2 times daily        carvedilol (COREG) 12.5 MG tablet Take 1 tablet (12.5 mg) by mouth 2 times daily (with meals) 180 tablet 3     Cholecalciferol (VITAMIN D) 2000 UNIT CAPS Take 1 tablet by mouth daily        Cyanocobalamin (VITAMIN B-12) 500 MCG SUBL Place 500 mcg under the tongue daily        digoxin (LANOXIN) 125 MCG tablet Take 1 tablet (125 mcg) by mouth daily 90 tablet 3     empagliflozin (JARDIANCE) 10 MG TABS tablet Take 1 tablet (10 mg) by mouth daily. 90 tablet 3     furosemide (LASIX) 20 MG tablet Take 0.5 tablets (10 mg) by mouth daily as needed (for weight gain 2lbs or more in a day) 100 tablet 3     levothyroxine (SYNTHROID/LEVOTHROID) 200 MCG tablet Take 1 tablet (200 mcg) by mouth daily 90 tablet 3     lisinopril (ZESTRIL) 5 MG tablet Take 0.5 tablets (2.5 mg) by mouth daily.       Multiple Vitamins-Minerals (CENTRUM) CHEW Take 1 tablet by mouth daily        nitroGLYcerin (NITROSTAT) 0.3 MG sublingual tablet For chest pain place 1 tablet under the tongue every 5 minutes for 3 doses. If symptoms persist 5 minutes after 1st dose call 911. 25 tablet 1     Psyllium (FIBER) 0.52 GM CAPS Take 2 capsules by mouth daily       sertraline (ZOLOFT) 25 MG tablet Take 1 tablet (25 mg) by mouth daily 90 tablet 1     simvastatin (ZOCOR) 20 MG tablet Take 1 tablet (20 mg)  by mouth at bedtime 90 tablet 3     spironolactone (ALDACTONE) 25 MG tablet Take 0.5 tablets (12.5 mg) by mouth daily. 45 tablet 3     triamcinolone (KENALOG) 0.1 % external cream APPLY TO AFFECTED AREA TWICE A DAY 45 g 0     warfarin ANTICOAGULANT (COUMADIN) 4 MG tablet Take 4 mg (1 tablet) daily or as directed by the Coumadin Clinic 90 tablet 1       ALLERGIES     Allergies   Allergen Reactions     Seasonal Allergies        PAST MEDICAL HISTORY:  Past Medical History:   Diagnosis Date     Acne rosacea 04/22/2019     Acquired hypothyroidism 05/23/2003     Problem list name updated by automated process. Provider to review     Acute prostatitis      Acute sinusitis, unspecified      Aortic valve disease      Biatrial enlargement      CAD (coronary artery disease)     known 70% LAD stenosis     Congestive heart failure (H)      Depression      GI bleeding 01/22/2016     History of bleeding peptic ulcer      Hyperlipidemia      Idiopathic cardiomyopathy (H)      Mitral regurgitation      Obesity      Palpitations      Permanent atrial fibrillation (H) 06/02/2017     Right inguinal hernia 07/22/2019     S/P AVR 01/07/2011    with Maze procedure     S/P gastric bypass      Tricuspid regurgitation      Unspecified essential hypertension        PAST SURGICAL HISTORY:  Past Surgical History:   Procedure Laterality Date     ANGIOGRAM  02/01/2002    Normal coronary,dilated LV,Sev.decr.global LVF,+1 MR     ANGIOGRAM  01/01/2008    Mild AS,Mod PHTN,mod prox.LAD disease,Triv.CFX disease     ANGIOGRAM  01/01/2009    Mod AS,Mod PHTN,mod prox.LAD disease     ANGIOGRAM  10/01/2010    Sev.AS,CM,global hypokinesis,Mild-mod LV dilated,Mild MR,70% prox.LAD lesion,PHTN     CARDIOVERSION  2/02, 4/02, 4/11     CHOLECYSTECTOMY  2006     COLONOSCOPY N/A 01/23/2016    Procedure: COLONOSCOPY;  Surgeon: Robyn Collins MD;  Location:  GI     CV CORONARY ANGIOGRAM N/A 1/31/2022    Procedure: Coronary Angiogram;  Surgeon: Rashid  Rome Muller MD;  Location:  HEART CARDIAC CATH LAB     CV INSTANTANEOUS WAVE-FREE RATIO N/A 1/31/2022    Procedure: Instantaneous Wave-Free Ratio;  Surgeon: Rome Mike MD;  Location:  HEART CARDIAC CATH LAB     CV RIGHT HEART CATH MEASUREMENTS RECORDED N/A 1/31/2022    Procedure: Right Heart Cath;  Surgeon: Rome Mike MD;  Location:  HEART CARDIAC CATH LAB     ESOPHAGOSCOPY, GASTROSCOPY, DUODENOSCOPY (EGD), COMBINED N/A 01/23/2016    Procedure: COMBINED ESOPHAGOSCOPY, GASTROSCOPY, DUODENOSCOPY (EGD);  Surgeon: Robyn Collins MD;  Location:  GI     EXCISE MASS GROIN Right 2/11/2021    Procedure: INCISION AND DRAINAGE OF RIGHT GROIN HEMATOMA;  Surgeon: Norma Scanlon MD;  Location: [facility] OR     HERNIORRHAPHY INGUINAL Right 2/4/2021    Procedure: OPEN RIGHT INGUINAL HENRIA WITH MESH;  Surgeon: Jaden Alex MD;  Location: [facility] OR     LAPAROSCOPIC BYPASS GASTRIC  10/08/2011    Procedure:LAPAROSCOPIC BYPASS GASTRIC; Diagnostic laparoscopy, Lysis of Adhesions, Laparoscopic Revision of Jejunojejunostomy; Surgeon:RADHA MURO; Location: OR     LAPAROSCOPIC BYPASS GASTRIC  06/26/2006    Dr Jin     LAPAROSCOPY DIAGNOSTIC (GENERAL)  10/08/2011    Procedure:LAPAROSCOPY DIAGNOSTIC (GENERAL); Surgeon:RADHA MURO; Location: OR     REPLACE VALVE AORTIC  01/07/2011     ZZC NONSPECIFIC PROCEDURE  ~1985    vein stripping       FAMILY HISTORY:  Family History   Problem Relation Age of Onset     Cancer - colorectal Father      Colon Cancer Father      Cancer Father      Diabetes Brother        SOCIAL HISTORY:  Social History     Socioeconomic History     Marital status:      Spouse name: Tracey     Number of children: 0     Years of education: None     Highest education level: None   Occupational History     Occupation:      Comment: [employer]   Tobacco Use     Smoking status: Never     Passive exposure: Never     Smokeless tobacco:  Never   Vaping Use     Vaping status: Never Used   Substance and Sexual Activity     Alcohol use: No     Alcohol/week: 0.0 standard drinks of alcohol     Drug use: No     Sexual activity: Not Currently     Partners: Female   Other Topics Concern     Parent/sibling w/ CABG, MI or angioplasty before 65F 55M? Yes     Blood Transfusions No     Caffeine Concern Yes     Comment: 1 can daily     Occupational Exposure Yes     Hobby Hazards No     Sleep Concern No     Stress Concern No     Weight Concern No     Special Diet Yes     Comment: low sodium, low calories     Back Care No     Exercise Yes     Comment: walks daily     Seat Belt Yes     Self-Exams Yes     Social Drivers of Health     Financial Resource Strain: Low Risk  (8/26/2024)    Financial Resource Strain      Within the past 12 months, have you or your family members you live with been unable to get utilities (heat, electricity) when it was really needed?: No   Food Insecurity: Low Risk  (8/26/2024)    Food Insecurity      Within the past 12 months, did you worry that your food would run out before you got money to buy more?: No      Within the past 12 months, did the food you bought just not last and you didn t have money to get more?: No   Transportation Needs: Low Risk  (8/26/2024)    Transportation Needs      Within the past 12 months, has lack of transportation kept you from medical appointments, getting your medicines, non-medical meetings or appointments, work, or from getting things that you need?: No   Physical Activity: Insufficiently Active (8/26/2024)    Exercise Vital Sign      Days of Exercise per Week: 2 days      Minutes of Exercise per Session: 20 min   Stress: No Stress Concern Present (8/26/2024)    [place] [employer] - Occupational Stress Questionnaire      Feeling of Stress : Not at all   Social Connections: Unknown (8/26/2024)    Social Connection and Isolation Panel [NHANES]      Frequency of Social Gatherings with  "Friends and Family: Once a week   Interpersonal Safety: Low Risk  (8/26/2024)    Interpersonal Safety      Do you feel physically and emotionally safe where you currently live?: Yes      Within the past 12 months, have you been hit, slapped, kicked or otherwise physically hurt by someone?: No      Within the past 12 months, have you been humiliated or emotionally abused in other ways by your partner or ex-partner?: No   Housing Stability: Low Risk  (8/26/2024)    Housing Stability      Do you have housing? : Yes      Are you worried about losing your housing?: No       Review of Systems:  Skin:        Eyes:       ENT:       Respiratory:  Negative    Cardiovascular:  Negative    Gastroenterology:      Genitourinary:       Musculoskeletal:       Neurologic:       Psychiatric:       Heme/Lymph/Imm:       Endocrine:  Negative thyroid disorder;diabetes    Physical Exam:    Vitals: BP (!) 88/68   Pulse 75   Ht 1.765 m (5' 9.5\")   Wt 90.3 kg (199 lb)   SpO2 93%   BMI 28.97 kg/m    Constitutional: Well nourished and in no apparent distress.  Eyes: Pupils equal, round. Sclerae anicteric.   HEENT: Normocephalic, atraumatic.   Neck: Supple. JVD not present  Respiratory: Breathing non-labored. Lungs clear to auscultation bilaterally. No crackles, wheezes, rhonchi, or rales.  Cardiovascular:  Irregular rate and rhythm, normal S1 and S2. +2 systolic murmur heard best at 5th intercostal space mid axillary. No rub, or gallop.  Skin: Warm, dry.   Extremities: No edema.  Neurologic: No gross motor deficits. Alert, awake, and oriented to person, place and time.  Psychiatric: Affect appropriate.        Recent Lab Results:  LIPID RESULTS:  Lab Results   Component Value Date    CHOL 101 07/02/2024    CHOL 109 03/13/2020    HDL 45 07/02/2024    HDL 51 03/13/2020    LDL 48 07/02/2024    LDL 49 03/13/2020    TRIG 41 07/02/2024    TRIG 45 03/13/2020    CHOLHDLRATIO 2.5 02/16/2015       LIVER ENZYME RESULTS:  Lab Results   Component " Value Date    AST 23 08/09/2021    AST 28 02/10/2021    ALT 16 07/02/2024    ALT 23 02/10/2021       CBC RESULTS:  Lab Results   Component Value Date    WBC 4.2 05/07/2024    WBC 8.9 02/13/2021    RBC 4.07 (L) 05/07/2024    RBC 3.12 (L) 02/13/2021    HGB 11.2 (L) 05/07/2024    HGB 9.7 (L) 02/13/2021    HCT 36.7 (L) 05/07/2024    HCT 30.9 (L) 02/13/2021    MCV 90 05/07/2024    MCV 99 02/13/2021    MCH 27.5 05/07/2024    MCH 31.1 02/13/2021    MCHC 30.5 (L) 05/07/2024    MCHC 31.4 (L) 02/13/2021    RDW 17.0 (H) 05/07/2024    RDW 15.2 (H) 02/13/2021     05/07/2024     02/13/2021       BMP RESULTS:  Lab Results   Component Value Date     09/18/2024     02/12/2021    POTASSIUM 4.8 09/18/2024    POTASSIUM 3.8 06/09/2022    POTASSIUM 4.4 02/12/2021    CHLORIDE 102 09/18/2024    CHLORIDE 103 06/09/2022    CHLORIDE 107 02/12/2021    CO2 31 (H) 09/18/2024    CO2 33 (H) 06/09/2022    CO2 30 02/12/2021    ANIONGAP 7 09/18/2024    ANIONGAP 2 (L) 06/09/2022    ANIONGAP 2 (L) 02/12/2021    GLC 95 09/18/2024     (H) 06/09/2022     (H) 02/12/2021    BUN 20.9 09/18/2024    BUN 17 06/09/2022    BUN 31 (H) 02/12/2021    CR 0.86 09/18/2024    CR 0.76 02/12/2021    GFRESTIMATED >90 09/18/2024    GFRESTIMATED >90 02/12/2021    GFRESTBLACK >90 02/12/2021    REECE 9.5 09/18/2024    REECE 8.8 02/12/2021        A1C RESULTS:  Lab Results   Component Value Date    A1C 5.8 (H) 02/04/2019       INR RESULTS:  Lab Results   Component Value Date    INR 3.2 (H) 10/03/2024    INR 3.0 (H) 09/10/2024    INR 4.6 (A) 04/19/2024    INR 3.7 (A) 03/20/2024    INR 3.10 (H) 06/22/2021    INR 3.40 (H) 05/13/2021           CC  Noy Cooley MD  [address]  [place] [address]                  Thank you for allowing me to participate in the care of your patient.      Sincerely,     LILLI Cannon [facility]  cc:   Noy Cooley MD  [address]  [place] [id number]

## 2024-10-23 NOTE — PATIENT INSTRUCTIONS
Thank you for your visit with the [facility] today.    Today's Summary:    We'll decrease your lisinopril to 2.5 mg since your blood pressure has come down with your recent weight loss.  Echocardiogram in May  No other changes to medications today. Continue to weigh yourself everyday and monitor your salt intake (and sugar) like you have.     Follow-up:  Cardiology follow up at [facility]: In June with Dr. Cooley, sooner if any new concerns.     Cardiology Scheduling ~[phone]  Cardiology Clinic RN ~ [phone]    It was a pleasure seeing you today.     LILLI Cannon, CNP  Certified Nurse Practitioner  [facility]  October 23, 2024  ________________________________________________________

## 2024-10-23 NOTE — PROGRESS NOTES
~Cardiology Clinic Visit~    Primary Cardiologist: Dr. Cooley  Reason for visit: Cardiology follow up, review of recent diagnostic testing      Jovon Mantilla MRN# [id number]   YOB: 1955 Age: 69 year old       HPI:    Jovon Mantilla is a delightful 69 year old patient with past medical history significant for:    Severe aortic stenosis s/p aortic valve replacement in 1/2011 with 24 mm JASPAL supra-annular valve  Atrial fibrillation with history of MAZE procedure  History of 70% stenosis of the LAD on coronary angiogram prior to surgery. This was not bypassed since it was found to be completely intramyocardial  Non-ischemic cardiomyopathy, LVEF 37%    I had the opportunity to see Mr. Fuller for cariology follow up today. Mr. Fuller follows with Dr. Cooley and was last seen by him on 6/3/2024. He presents today for cardiology follow up to discuss results of recent diagnostic testing.    In review, in late 2021 he was noted  to have new exertional dyspnea.  An echocardiogram at that time showed moderate to severe eccentric mitral regurgitation which had progressed compared to his prior echocardiogram from June 2020. Given these findings, a CADE for further evaluation was recommended.     A CADE from 12/28/2021 demonstrated severe biatrial enlargement mildly reduced left ventricular systolic function. There was mild thickening and prolapse of P2 with moderate mitral regurgitation.. The mitral regurgitation was thought be secondary to both annular dilatation in the setting of chronic persistent atrial fibrillation as well as an element of P2 prolapse as reported. The mechanical aortic valve appeared to be functioning normally on both CADE and transthoracic imaging.    Mr. Fuller subsequently underwent an exercise stress echocardiogram on 1/17/2022 to evaluate for worsening mitral regurgitation postexercise. This demonstrated moderately reduced left ventricular systolic function with trace to mild mitral  regurgitation at baseline. LV systolic function improved post exercise although mitral regurgitation only increased to the mild to moderate range.     A Holter monitor was also performed which demonstrated an average heart rate of 90 bpm with atrial fibrillation throughout.    On 7/31/2022 he underwent right and left heart catheterization. His 70% LAD stenosis was unchanged from his angiogram in prior to his AVR. Remainder of coronary arteries without significant stenosis. His right heart catheterization revealed moderate pulmonary hypertension with moderately elevated left-sided filling pressures and mildly elevated right-sided filling pressures with a normal cardiac index.    Mr. Fuller underwent cardiac MRI on 9/3/2024 to reassess LV function and degree of mitral regurgitation. cMRI shows LVEF 37% and moderate mitral regurgitation. Following this he underwent NM Lexiscan on 9/18/2024 which shows not inducible myocardial ischemia or infarction.     Today, Jovon is doing very well from cardiovascular standpoint. He has no chest pain or pressure. No shortness of breath at rest, occasionally with exertion. Goes for walk around the block every day in addition to yard work every day- asymptomatic with this. Notice palpitations with overeating or over exerting himself- this has been going on since his valve replacement. He notes that since starting the Jardiance his weights have been coming down. Today he weighs 199 pounds, previously 212. He weighs himself every day at home.    Blood pressure today 88/68, no lightheadedness or dizziness. No syncope.       Diagnotic studies:  NM Lexiscan 9/18/2024:    The nuclear stress test is probably negative for inducible myocardial ischemia or infarction.    There is a small perfusion defect of mild severity involving the basal inferior and inferoseptal walls which is essentially fixed and may be related to diaphragmatic attenuation.    Left ventricular function is mildly reduced.     The left ventricular ejection fraction at rest is 43%.  The left ventricular ejection fraction at stress is 40%.    TID is absent.    A prior study was conducted on 2/6/2018.  This study has no change when directly compared with the prior study.  Cardiac MRI 9/3/2024:  1. Moderately reduced LV systolic function with LVEF of 37%.  Mild to moderately reduced RV systolic function.  2. Moderate mitral regurgitation     Compared to prior CMR dated 07/06/2023 there is interval decline in LV and RV systolic functions.  Echocardiogram 8/15/2023:  The left ventricle is borderline dilated.  The visual ejection fraction is 35-40%.  Diastolic Doppler findings (E/E' ratio and/or other parameters) suggest left ventricular filling pressures are indeterminate.  There is mod-severe global hypokinesia of the left ventricle.  Regional wall motion abnormalities cannot be excluded due to limited visualization.  There is a mechanical aortic valve.  The gradient is normal for this prosthetic aortic valve.  The rhythm was atrial fibrillation.        Assessment:  Severe aortic stenosis s/p aortic valve replacement in 1/2011 with 24 mm JASPAL supra-annular valve  Echocardiogram 8/2023- normal aortic valve gradient  Atrial fibrillation with history of MAZE procedure  Rate controlled with carvedilol  Anticoagulated with warfarin  History of 70% stenosis of the LAD on coronary angiogram prior to surgery. This was not bypassed since it was found to be completely intramyocardial. Subsequent coronary angiogram 7/2022 shows unchanged stenosis in LAD  No anginal symptoms  Asprin 81 mg, simvastatin, carvedilol   Non-ischemic cardiomyopathy, LVEF 37%  NYHA class II, stage C  Fluid status: euvolemic; suspected dry weight: 199 lbs  Diuretic regimen: Furosemide 20 mg PRN  Ischemic evaluation: NM Lexiscan 9/18/2024 without evidence of ischemia or infarction  Guideline directed medical therapy:  Beta blocker: carvedilol 12.5 mg BID  ACEI/ARB/ARNI: lisinopril  2.5 mg   Aldactone antagonist: spironolactone 12.5 mg  SGLT2 inhibitor: Jardiance 10 mg  Counseled patient on fluid and sodium restriction, monitoring weight    Plan:   Overall, Mr. Mantilla is doing well from a cardiovascular standpoint. He has no concerning anginal or heart failure symptoms. He has noticed weight loss after starting Jardiance. Weighs himself daily. Blood pressure today 88/68. Will decrease his lisinopril to 2.5 mg daily. I did reach out to the pharmacy team and pricing for Entresto is $47 per month. Mr. Mantilla would like to hold off on starting Entresto at this time. We reviewed the results of his recent diagnostic testing. I have placed an order for repeat echocardiogram in May for assessment of his mitral valve.   Follow up with Dr. Cooley in June for annual follow up.     Thank you for the opportunity to participate in this pleasant patient's care.    We would be happy to see this patient sooner for any concerns in the meantime.    LILLI Cannon, CNP   Nurse Practitioner  [facility]      Today's clinic visit entailed:  A total of 40 minutes was spent with patient, on chart review and documentation today.     The longitudinal plan of care for the diagnosis(es)/condition(s) as documented were addressed during this visit. Due to the added complexity in care, I will continue to support Jovon in the subsequent management and with ongoing continuity of care.        Orders Placed This Encounter   Procedures    Echocardiogram Complete     Orders Placed This Encounter   Medications    lisinopril (ZESTRIL) 5 MG tablet     Sig: Take 0.5 tablets (2.5 mg) by mouth daily.     Medications Discontinued During This Encounter   Medication Reason    lisinopril (ZESTRIL) 5 MG tablet          Encounter Diagnoses   Name Primary?    Coronary artery disease involving native coronary artery of native heart without angina pectoris     Dilated cardiomyopathy (H)     Nonrheumatic mitral valve  regurgitation Yes    ASVD (arteriosclerotic vascular disease)        CURRENT MEDICATIONS:  Current Outpatient Medications   Medication Sig Dispense Refill    amoxicillin (AMOXIL) 500 MG capsule Before dental procedures      Ascorbic Acid (VITAMIN C PO) Take 500 mg by mouth daily      aspirin 81 MG tablet Take 81 mg by mouth Every Mon, Wed, Fri Morning      calcium carbonate-vitamin D 600-200 MG-UNIT TABS Take 1 tablet by mouth 2 times daily       carvedilol (COREG) 12.5 MG tablet Take 1 tablet (12.5 mg) by mouth 2 times daily (with meals) 180 tablet 3    Cholecalciferol (VITAMIN D) 2000 UNIT CAPS Take 1 tablet by mouth daily       Cyanocobalamin (VITAMIN B-12) 500 MCG SUBL Place 500 mcg under the tongue daily       digoxin (LANOXIN) 125 MCG tablet Take 1 tablet (125 mcg) by mouth daily 90 tablet 3    empagliflozin (JARDIANCE) 10 MG TABS tablet Take 1 tablet (10 mg) by mouth daily. 90 tablet 3    furosemide (LASIX) 20 MG tablet Take 0.5 tablets (10 mg) by mouth daily as needed (for weight gain 2lbs or more in a day) 100 tablet 3    levothyroxine (SYNTHROID/LEVOTHROID) 200 MCG tablet Take 1 tablet (200 mcg) by mouth daily 90 tablet 3    lisinopril (ZESTRIL) 5 MG tablet Take 0.5 tablets (2.5 mg) by mouth daily.      Multiple Vitamins-Minerals (CENTRUM) CHEW Take 1 tablet by mouth daily       nitroGLYcerin (NITROSTAT) 0.3 MG sublingual tablet For chest pain place 1 tablet under the tongue every 5 minutes for 3 doses. If symptoms persist 5 minutes after 1st dose call 911. 25 tablet 1    Psyllium (FIBER) 0.52 GM CAPS Take 2 capsules by mouth daily      sertraline (ZOLOFT) 25 MG tablet Take 1 tablet (25 mg) by mouth daily 90 tablet 1    simvastatin (ZOCOR) 20 MG tablet Take 1 tablet (20 mg) by mouth at bedtime 90 tablet 3    spironolactone (ALDACTONE) 25 MG tablet Take 0.5 tablets (12.5 mg) by mouth daily. 45 tablet 3    triamcinolone (KENALOG) 0.1 % external cream APPLY TO AFFECTED AREA TWICE A DAY 45 g 0    warfarin  ANTICOAGULANT (COUMADIN) 4 MG tablet Take 4 mg (1 tablet) daily or as directed by the [facility]mad[facility] Clinic 90 tablet 1       ALLERGIES     Allergies   Allergen Reactions    Seasonal Allergies        PAST MEDICAL HISTORY:  Past Medical History:   Diagnosis Date    Acne rosacea 04/22/2019    Acquired hypothyroidism 05/23/2003     Problem list name updated by automated process. Provider to review    Acute prostatitis     Acute sinusitis, unspecified     Aortic valve disease     Biatrial enlargement     CAD (coronary artery disease)     known 70% LAD stenosis    Congestive heart failure (H)     Depression     GI bleeding 01/22/2016    History of bleeding peptic ulcer     Hyperlipidemia     Idiopathic cardiomyopathy (H)     Mitral regurgitation     Obesity     Palpitations     Permanent atrial fibrillation (H) 06/02/2017    Right inguinal hernia 07/22/2019    S/P AVR 01/07/2011    with Maze procedure    S/P gastric bypass     Tricuspid regurgitation     Unspecified essential hypertension        PAST SURGICAL HISTORY:  Past Surgical History:   Procedure Laterality Date    ANGIOGRAM  02/01/2002    Normal coronary,dilated LV,Sev.decr.global LVF,+1 MR    ANGIOGRAM  01/01/2008    Mild AS,Mod PHTN,mod prox.LAD disease,Triv.CFX disease    ANGIOGRAM  01/01/2009    Mod AS,Mod PHTN,mod prox.LAD disease    ANGIOGRAM  10/01/2010    Sev.AS,CM,global hypokinesis,Mild-mod LV dilated,Mild MR,70% prox.LAD lesion,PHTN    CARDIOVERSION  2/02, 4/02, 4/11    CHOLECYSTECTOMY  2006    COLONOSCOPY N/A 01/23/2016    Procedure: COLONOSCOPY;  Surgeon: Robyn Collins MD;  Location:  GI    CV CORONARY ANGIOGRAM N/A 1/31/2022    Procedure: Coronary Angiogram;  Surgeon: Rome Mike MD;  Location:  HEART CARDIAC CATH LAB    CV INSTANTANEOUS WAVE-FREE RATIO N/A 1/31/2022    Procedure: Instantaneous Wave-Free Ratio;  Surgeon: Rome Mike MD;  Location:  HEART CARDIAC CATH LAB    CV RIGHT HEART CATH MEASUREMENTS RECORDED  N/A 1/31/2022    Procedure: Right Heart Cath;  Surgeon: Rome Mike MD;  Location:  HEART CARDIAC CATH LAB    ESOPHAGOSCOPY, GASTROSCOPY, DUODENOSCOPY (EGD), COMBINED N/A 01/23/2016    Procedure: COMBINED ESOPHAGOSCOPY, GASTROSCOPY, DUODENOSCOPY (EGD);  Surgeon: Robyn Collins MD;  Location:  GI    EXCISE MASS GROIN Right 2/11/2021    Procedure: INCISION AND DRAINAGE OF RIGHT GROIN HEMATOMA;  Surgeon: Norma Scanlon MD;  Location: [facility] OR    HERNIORRHAPHY INGUINAL Right 2/4/2021    Procedure: OPEN RIGHT INGUINAL HENRIA WITH MESH;  Surgeon: Jaden Alex MD;  Location: [facility] OR    LAPAROSCOPIC BYPASS GASTRIC  10/08/2011    Procedure:LAPAROSCOPIC BYPASS GASTRIC; Diagnostic laparoscopy, Lysis of Adhesions, Laparoscopic Revision of Jejunojejunostomy; Surgeon:RADHA MURO; Location: OR    LAPAROSCOPIC BYPASS GASTRIC  06/26/2006    Dr Jin    LAPAROSCOPY DIAGNOSTIC (GENERAL)  10/08/2011    Procedure:LAPAROSCOPY DIAGNOSTIC (GENERAL); Surgeon:RADHA MURO; Location: OR    REPLACE VALVE AORTIC  01/07/2011    ZZC NONSPECIFIC PROCEDURE  ~1985    vein stripping       FAMILY HISTORY:  Family History   Problem Relation Age of Onset    Cancer - colorectal Father     Colon Cancer Father     Cancer Father     Diabetes Brother        SOCIAL HISTORY:  Social History     Socioeconomic History    Marital status:      Spouse name: Tracey    Number of children: 0    Years of education: None    Highest education level: None   Occupational History    Occupation:      Comment: [employer]   Tobacco Use    Smoking status: Never     Passive exposure: Never    Smokeless tobacco: Never   Vaping Use    Vaping status: Never Used   Substance and Sexual Activity    Alcohol use: No     Alcohol/week: 0.0 standard drinks of alcohol    Drug use: No    Sexual activity: Not Currently     Partners: Female   Other Topics Concern    Parent/sibling w/ CABG, MI or angioplasty  before 65F 55M? Yes    Blood Transfusions No    Caffeine Concern Yes     Comment: 1 can daily    Occupational Exposure Yes    Hobby Hazards No    Sleep Concern No    Stress Concern No    Weight Concern No    Special Diet Yes     Comment: low sodium, low calories    Back Care No    Exercise Yes     Comment: walks daily    Seat Belt Yes    Self-Exams Yes     Social Drivers of Health     Financial Resource Strain: Low Risk  (8/26/2024)    Financial Resource Strain     Within the past 12 months, have you or your family members you live with been unable to get utilities (heat, electricity) when it was really needed?: No   Food Insecurity: Low Risk  (8/26/2024)    Food Insecurity     Within the past 12 months, did you worry that your food would run out before you got money to buy more?: No     Within the past 12 months, did the food you bought just not last and you didn t have money to get more?: No   Transportation Needs: Low Risk  (8/26/2024)    Transportation Needs     Within the past 12 months, has lack of transportation kept you from medical appointments, getting your medicines, non-medical meetings or appointments, work, or from getting things that you need?: No   Physical Activity: Insufficiently Active (8/26/2024)    Exercise Vital Sign     Days of Exercise per Week: 2 days     Minutes of Exercise per Session: 20 min   Stress: No Stress Concern Present (8/26/2024)    [employer] - Occupational Stress Questionnaire     Feeling of Stress : Not at all   Social Connections: Unknown (8/26/2024)    Social Connection and Isolation Panel [NHANES]     Frequency of Social Gatherings with Friends and Family: Once a week   Interpersonal Safety: Low Risk  (8/26/2024)    Interpersonal Safety     Do you feel physically and emotionally safe where you currently live?: Yes     Within the past 12 months, have you been hit, slapped, kicked or otherwise physically hurt by someone?: No     Within the past  "12 months, have you been humiliated or emotionally abused in other ways by your partner or ex-partner?: No   Housing Stability: Low Risk  (8/26/2024)    Housing Stability     Do you have housing? : Yes     Are you worried about losing your housing?: No       Review of Systems:  Skin:        Eyes:       ENT:       Respiratory:  Negative    Cardiovascular:  Negative    Gastroenterology:      Genitourinary:       Musculoskeletal:       Neurologic:       Psychiatric:       Heme/Lymph/Imm:       Endocrine:  Negative thyroid disorder;diabetes    Physical Exam:    Vitals: BP (!) 88/68   Pulse 75   Ht 1.765 m (5' 9.5\")   Wt 90.3 kg (199 lb)   SpO2 93%   BMI 28.97 kg/m    Constitutional: Well nourished and in no apparent distress.  Eyes: Pupils equal, round. Sclerae anicteric.   HEENT: Normocephalic, atraumatic.   Neck: Supple. JVD not present  Respiratory: Breathing non-labored. Lungs clear to auscultation bilaterally. No crackles, wheezes, rhonchi, or rales.  Cardiovascular:  Irregular rate and rhythm, normal S1 and S2. +2 systolic murmur heard best at 5th intercostal space mid axillary. No rub, or gallop.  Skin: Warm, dry.   Extremities: No edema.  Neurologic: No gross motor deficits. Alert, awake, and oriented to person, place and time.  Psychiatric: Affect appropriate.        Recent Lab Results:  LIPID RESULTS:  Lab Results   Component Value Date    CHOL 101 07/02/2024    CHOL 109 03/13/2020    HDL 45 07/02/2024    HDL 51 03/13/2020    LDL 48 07/02/2024    LDL 49 03/13/2020    TRIG 41 07/02/2024    TRIG 45 03/13/2020    CHOLHDLRATIO 2.5 02/16/2015       LIVER ENZYME RESULTS:  Lab Results   Component Value Date    AST 23 08/09/2021    AST 28 02/10/2021    ALT 16 07/02/2024    ALT 23 02/10/2021       CBC RESULTS:  Lab Results   Component Value Date    WBC 4.2 05/07/2024    WBC 8.9 02/13/2021    RBC 4.07 (L) 05/07/2024    RBC 3.12 (L) 02/13/2021    HGB 11.2 (L) 05/07/2024    HGB 9.7 (L) 02/13/2021    HCT 36.7 (L) " 05/07/2024    HCT 30.9 (L) 02/13/2021    MCV 90 05/07/2024    MCV 99 02/13/2021    MCH 27.5 05/07/2024    MCH 31.1 02/13/2021    MCHC 30.5 (L) 05/07/2024    MCHC 31.4 (L) 02/13/2021    RDW 17.0 (H) 05/07/2024    RDW 15.2 (H) 02/13/2021     05/07/2024     02/13/2021       BMP RESULTS:  Lab Results   Component Value Date     09/18/2024     02/12/2021    POTASSIUM 4.8 09/18/2024    POTASSIUM 3.8 06/09/2022    POTASSIUM 4.4 02/12/2021    CHLORIDE 102 09/18/2024    CHLORIDE 103 06/09/2022    CHLORIDE 107 02/12/2021    CO2 31 (H) 09/18/2024    CO2 33 (H) 06/09/2022    CO2 30 02/12/2021    ANIONGAP 7 09/18/2024    ANIONGAP 2 (L) 06/09/2022    ANIONGAP 2 (L) 02/12/2021    GLC 95 09/18/2024     (H) 06/09/2022     (H) 02/12/2021    BUN 20.9 09/18/2024    BUN 17 06/09/2022    BUN 31 (H) 02/12/2021    CR 0.86 09/18/2024    CR 0.76 02/12/2021    GFRESTIMATED >90 09/18/2024    GFRESTIMATED >90 02/12/2021    GFRESTBLACK >90 02/12/2021    REECE 9.5 09/18/2024    REECE 8.8 02/12/2021        A1C RESULTS:  Lab Results   Component Value Date    A1C 5.8 (H) 02/04/2019       INR RESULTS:  Lab Results   Component Value Date    INR 3.2 (H) 10/03/2024    INR 3.0 (H) 09/10/2024    INR 4.6 (A) 04/19/2024    INR 3.7 (A) 03/20/2024    INR 3.10 (H) 06/22/2021    INR 3.40 (H) 05/13/2021           CC  Noy Cooley MD  [id number]  [place] [id number]

## 2024-10-31 ENCOUNTER — ANTICOAGULATION THERAPY VISIT (OUTPATIENT)
Dept: ANTICOAGULATION | Facility: CLINIC | Age: 69
End: 2024-10-31

## 2024-10-31 ENCOUNTER — LAB (OUTPATIENT)
Dept: LAB | Facility: CLINIC | Age: 69
End: 2024-10-31
Payer: COMMERCIAL

## 2024-10-31 DIAGNOSIS — I48.21 PERMANENT ATRIAL FIBRILLATION (H): ICD-10-CM

## 2024-10-31 DIAGNOSIS — Z95.2 S/P AVR (AORTIC VALVE REPLACEMENT): Primary | ICD-10-CM

## 2024-10-31 DIAGNOSIS — Z95.2 S/P AORTIC VALVE REPLACEMENT: ICD-10-CM

## 2024-10-31 DIAGNOSIS — Z79.01 LONG TERM CURRENT USE OF ANTICOAGULANT THERAPY: ICD-10-CM

## 2024-10-31 DIAGNOSIS — S30.1XXA ABDOMINAL WALL HEMATOMA, INITIAL ENCOUNTER: ICD-10-CM

## 2024-10-31 DIAGNOSIS — Z95.2 S/P AVR (AORTIC VALVE REPLACEMENT): ICD-10-CM

## 2024-10-31 LAB — INR BLD: 2.7 (ref 0.9–1.1)

## 2024-10-31 PROCEDURE — 36416 COLLJ CAPILLARY BLOOD SPEC: CPT

## 2024-10-31 PROCEDURE — 85610 PROTHROMBIN TIME: CPT

## 2024-10-31 NOTE — PROGRESS NOTES
ANTICOAGULATION MANAGEMENT     Jovon Mantilla 69 year old male is on warfarin with therapeutic INR result. (Goal INR 2.5-3.5)    Recent labs: (last 7 days)     10/31/24  1141   INR 2.7*       ASSESSMENT     Source(s): Chart Review and Patient/Caregiver Call     Warfarin doses taken: Warfarin taken as instructed  Diet: No new diet changes identified  Medication/supplement changes: None noted  New illness, injury, or hospitalization: No  Signs or symptoms of bleeding or clotting: No  Previous result: Therapeutic last 2(+) visits  Additional findings: None       PLAN     Recommended plan for no diet, medication or health factor changes affecting INR     Dosing Instructions: Continue your current warfarin dose with next INR in 5 weeks       Summary  As of 10/31/2024      Full warfarin instructions:  4 mg every day   Next INR check:  12/5/2024               Telephone call with Jovon who verbalizes understanding and agrees to plan    Lab visit scheduled    Education provided: Goal range and lab monitoring: goal range and significance of current result    Plan made per [facility] anticoagulation protocol    Samra Blanca RN  10/31/2024  Anticoagulation Clinic  [employer] for routing messages: rose ROSE  [facility] patient phone line: [phone]        _______________________________________________________________________     Anticoagulation Episode Summary       Current INR goal:  2.5-3.5   TTR:  55.4% (1 y)   Target end date:  Indefinite   Send INR reminders to:  CARYN ROSE    Indications    S/P AVR (aortic valve replacement) [Z95.2]  Long-term (current) use of anticoagulants [Z79.01] [Z79.01]  Permanent atrial fibrillation (H) [I48.21]  Abdominal wall hematoma  initial encounter [S30.1XXA]  S/P aortic valve replacement [Z95.2]             Comments:  24mm ATS Valve             Anticoagulation Care Providers       Provider Role Specialty Phone number    Noe Ramirez PA-C Referring Family Medicine [phone]     Kimber Prado PA-C [facility] [phone]

## 2024-11-21 ENCOUNTER — IMMUNIZATION (OUTPATIENT)
Dept: FAMILY MEDICINE | Facility: CLINIC | Age: 69
End: 2024-11-21
Payer: COMMERCIAL

## 2024-11-21 DIAGNOSIS — Z23 HIGH PRIORITY FOR 2019-NCOV VACCINE: Primary | ICD-10-CM

## 2024-11-21 NOTE — ADDENDUM NOTE
Addended by: RAYMOND AGUILLON on: 11/21/2024 11:42 AM     Modules accepted: Orders, Level of Service

## 2024-12-03 ENCOUNTER — APPOINTMENT (OUTPATIENT)
Dept: ULTRASOUND IMAGING | Facility: CLINIC | Age: 69
DRG: 243 | End: 2024-12-03
Attending: STUDENT IN AN ORGANIZED HEALTH CARE EDUCATION/TRAINING PROGRAM
Payer: COMMERCIAL

## 2024-12-03 ENCOUNTER — APPOINTMENT (OUTPATIENT)
Dept: GENERAL RADIOLOGY | Facility: CLINIC | Age: 69
DRG: 243 | End: 2024-12-03
Attending: EMERGENCY MEDICINE
Payer: COMMERCIAL

## 2024-12-03 ENCOUNTER — HOSPITAL ENCOUNTER (INPATIENT)
Facility: CLINIC | Age: 69
DRG: 243 | End: 2024-12-03
Attending: EMERGENCY MEDICINE | Admitting: STUDENT IN AN ORGANIZED HEALTH CARE EDUCATION/TRAINING PROGRAM
Payer: COMMERCIAL

## 2024-12-03 DIAGNOSIS — I48.21 PERMANENT ATRIAL FIBRILLATION (H): ICD-10-CM

## 2024-12-03 DIAGNOSIS — K40.90 RIGHT INGUINAL HERNIA: ICD-10-CM

## 2024-12-03 DIAGNOSIS — Z95.2 S/P AVR (AORTIC VALVE REPLACEMENT): ICD-10-CM

## 2024-12-03 DIAGNOSIS — I42.9 IDIOPATHIC CARDIOMYOPATHY (H): Primary | ICD-10-CM

## 2024-12-03 DIAGNOSIS — I48.91 ATRIAL FIBRILLATION, UNSPECIFIED TYPE (H): ICD-10-CM

## 2024-12-03 DIAGNOSIS — R55 SYNCOPE, UNSPECIFIED SYNCOPE TYPE: ICD-10-CM

## 2024-12-03 LAB
ALBUMIN SERPL BCG-MCNC: 4 G/DL (ref 3.5–5.2)
ALP SERPL-CCNC: 110 U/L (ref 40–150)
ALT SERPL W P-5'-P-CCNC: 21 U/L (ref 0–70)
ANION GAP SERPL CALCULATED.3IONS-SCNC: 7 MMOL/L (ref 7–15)
AST SERPL W P-5'-P-CCNC: 34 U/L (ref 0–45)
ATRIAL RATE - MUSE: NORMAL BPM
BASOPHILS # BLD AUTO: 0 10E3/UL (ref 0–0.2)
BASOPHILS NFR BLD AUTO: 0 %
BILIRUB SERPL-MCNC: 0.6 MG/DL
BUN SERPL-MCNC: 19.8 MG/DL (ref 8–23)
CALCIUM SERPL-MCNC: 9.1 MG/DL (ref 8.8–10.4)
CHLORIDE SERPL-SCNC: 102 MMOL/L (ref 98–107)
CREAT SERPL-MCNC: 1.04 MG/DL (ref 0.67–1.17)
DIASTOLIC BLOOD PRESSURE - MUSE: NORMAL MMHG
DIGOXIN SERPL-MCNC: 0.4 NG/ML (ref 0.6–1.2)
EGFRCR SERPLBLD CKD-EPI 2021: 79 ML/MIN/1.73M2
EOSINOPHIL # BLD AUTO: 0.2 10E3/UL (ref 0–0.7)
EOSINOPHIL NFR BLD AUTO: 2 %
ERYTHROCYTE [DISTWIDTH] IN BLOOD BY AUTOMATED COUNT: 15.5 % (ref 10–15)
GLUCOSE SERPL-MCNC: 103 MG/DL (ref 70–99)
HCO3 SERPL-SCNC: 31 MMOL/L (ref 22–29)
HCT VFR BLD AUTO: 39.7 % (ref 40–53)
HGB BLD-MCNC: 12.6 G/DL (ref 13.3–17.7)
IMM GRANULOCYTES # BLD: 0 10E3/UL
IMM GRANULOCYTES NFR BLD: 0 %
INR PPP: 3.98 (ref 0.85–1.15)
INTERPRETATION ECG - MUSE: NORMAL
IRON BINDING CAPACITY (ROCHE): 362 UG/DL (ref 240–430)
IRON SATN MFR SERPL: 11 % (ref 15–46)
IRON SERPL-MCNC: 41 UG/DL (ref 61–157)
LYMPHOCYTES # BLD AUTO: 0.8 10E3/UL (ref 0.8–5.3)
LYMPHOCYTES NFR BLD AUTO: 11 %
MAGNESIUM SERPL-MCNC: 2.1 MG/DL (ref 1.7–2.3)
MCH RBC QN AUTO: 28.9 PG (ref 26.5–33)
MCHC RBC AUTO-ENTMCNC: 31.7 G/DL (ref 31.5–36.5)
MCV RBC AUTO: 91 FL (ref 78–100)
MONOCYTES # BLD AUTO: 0.9 10E3/UL (ref 0–1.3)
MONOCYTES NFR BLD AUTO: 12 %
NEUTROPHILS # BLD AUTO: 5.4 10E3/UL (ref 1.6–8.3)
NEUTROPHILS NFR BLD AUTO: 73 %
NRBC # BLD AUTO: 0 10E3/UL
NRBC BLD AUTO-RTO: 0 /100
P AXIS - MUSE: NORMAL DEGREES
PHOSPHATE SERPL-MCNC: 3.2 MG/DL (ref 2.5–4.5)
PLATELET # BLD AUTO: 222 10E3/UL (ref 150–450)
POTASSIUM SERPL-SCNC: 4.6 MMOL/L (ref 3.4–5.3)
PR INTERVAL - MUSE: NORMAL MS
PROT SERPL-MCNC: 6.8 G/DL (ref 6.4–8.3)
QRS DURATION - MUSE: 138 MS
QT - MUSE: 376 MS
QTC - MUSE: 419 MS
R AXIS - MUSE: -61 DEGREES
RBC # BLD AUTO: 4.36 10E6/UL (ref 4.4–5.9)
RETICS # AUTO: 0.04 10E6/UL (ref 0.03–0.1)
RETICS/RBC NFR AUTO: 1 % (ref 0.5–2)
SODIUM SERPL-SCNC: 140 MMOL/L (ref 135–145)
SYSTOLIC BLOOD PRESSURE - MUSE: NORMAL MMHG
T AXIS - MUSE: 70 DEGREES
TROPONIN T SERPL HS-MCNC: 18 NG/L
TROPONIN T SERPL HS-MCNC: 22 NG/L
VENTRICULAR RATE- MUSE: 75 BPM
WBC # BLD AUTO: 7.4 10E3/UL (ref 4–11)

## 2024-12-03 PROCEDURE — 80053 COMPREHEN METABOLIC PANEL: CPT | Performed by: EMERGENCY MEDICINE

## 2024-12-03 PROCEDURE — 85025 COMPLETE CBC W/AUTO DIFF WBC: CPT | Performed by: EMERGENCY MEDICINE

## 2024-12-03 PROCEDURE — 99223 1ST HOSP IP/OBS HIGH 75: CPT | Performed by: STUDENT IN AN ORGANIZED HEALTH CARE EDUCATION/TRAINING PROGRAM

## 2024-12-03 PROCEDURE — 83735 ASSAY OF MAGNESIUM: CPT | Performed by: STUDENT IN AN ORGANIZED HEALTH CARE EDUCATION/TRAINING PROGRAM

## 2024-12-03 PROCEDURE — 99285 EMERGENCY DEPT VISIT HI MDM: CPT | Mod: 25

## 2024-12-03 PROCEDURE — 93970 EXTREMITY STUDY: CPT

## 2024-12-03 PROCEDURE — 80162 ASSAY OF DIGOXIN TOTAL: CPT | Performed by: EMERGENCY MEDICINE

## 2024-12-03 PROCEDURE — 84484 ASSAY OF TROPONIN QUANT: CPT | Performed by: EMERGENCY MEDICINE

## 2024-12-03 PROCEDURE — 85045 AUTOMATED RETICULOCYTE COUNT: CPT | Performed by: STUDENT IN AN ORGANIZED HEALTH CARE EDUCATION/TRAINING PROGRAM

## 2024-12-03 PROCEDURE — 210N000002 HC R&B HEART CARE

## 2024-12-03 PROCEDURE — 82728 ASSAY OF FERRITIN: CPT | Performed by: STUDENT IN AN ORGANIZED HEALTH CARE EDUCATION/TRAINING PROGRAM

## 2024-12-03 PROCEDURE — 83550 IRON BINDING TEST: CPT | Performed by: STUDENT IN AN ORGANIZED HEALTH CARE EDUCATION/TRAINING PROGRAM

## 2024-12-03 PROCEDURE — 36415 COLL VENOUS BLD VENIPUNCTURE: CPT | Performed by: EMERGENCY MEDICINE

## 2024-12-03 PROCEDURE — 84100 ASSAY OF PHOSPHORUS: CPT | Performed by: STUDENT IN AN ORGANIZED HEALTH CARE EDUCATION/TRAINING PROGRAM

## 2024-12-03 PROCEDURE — 71046 X-RAY EXAM CHEST 2 VIEWS: CPT

## 2024-12-03 PROCEDURE — 93005 ELECTROCARDIOGRAM TRACING: CPT

## 2024-12-03 PROCEDURE — 85610 PROTHROMBIN TIME: CPT | Performed by: EMERGENCY MEDICINE

## 2024-12-03 PROCEDURE — 83540 ASSAY OF IRON: CPT | Performed by: STUDENT IN AN ORGANIZED HEALTH CARE EDUCATION/TRAINING PROGRAM

## 2024-12-03 RX ORDER — NITROGLYCERIN 0.4 MG/1
0.4 TABLET SUBLINGUAL EVERY 5 MIN PRN
Status: DISCONTINUED | OUTPATIENT
Start: 2024-12-03 | End: 2024-12-06 | Stop reason: HOSPADM

## 2024-12-03 RX ORDER — LISINOPRIL 2.5 MG/1
2.5 TABLET ORAL DAILY
Status: DISCONTINUED | OUTPATIENT
Start: 2024-12-04 | End: 2024-12-06

## 2024-12-03 RX ORDER — AMOXICILLIN 250 MG
1 CAPSULE ORAL 2 TIMES DAILY PRN
Status: DISCONTINUED | OUTPATIENT
Start: 2024-12-03 | End: 2024-12-06 | Stop reason: HOSPADM

## 2024-12-03 RX ORDER — SIMVASTATIN 20 MG
20 TABLET ORAL AT BEDTIME
Status: DISCONTINUED | OUTPATIENT
Start: 2024-12-04 | End: 2024-12-06 | Stop reason: HOSPADM

## 2024-12-03 RX ORDER — AMOXICILLIN 250 MG
2 CAPSULE ORAL 2 TIMES DAILY PRN
Status: DISCONTINUED | OUTPATIENT
Start: 2024-12-03 | End: 2024-12-06 | Stop reason: HOSPADM

## 2024-12-03 RX ORDER — ACETAMINOPHEN 325 MG/1
650 TABLET ORAL EVERY 4 HOURS PRN
Status: DISCONTINUED | OUTPATIENT
Start: 2024-12-03 | End: 2024-12-06 | Stop reason: HOSPADM

## 2024-12-03 RX ORDER — LEVOTHYROXINE SODIUM 100 UG/1
200 TABLET ORAL DAILY
Status: DISCONTINUED | OUTPATIENT
Start: 2024-12-04 | End: 2024-12-06 | Stop reason: HOSPADM

## 2024-12-03 RX ORDER — CALCIUM CARBONATE 500 MG/1
1000 TABLET, CHEWABLE ORAL 4 TIMES DAILY PRN
Status: DISCONTINUED | OUTPATIENT
Start: 2024-12-03 | End: 2024-12-06 | Stop reason: HOSPADM

## 2024-12-03 RX ORDER — SPIRONOLACTONE 25 MG
12.5 TABLET ORAL DAILY
Status: DISCONTINUED | OUTPATIENT
Start: 2024-12-04 | End: 2024-12-06 | Stop reason: HOSPADM

## 2024-12-03 RX ORDER — ASPIRIN 81 MG/1
81 TABLET, CHEWABLE ORAL DAILY
Status: DISCONTINUED | OUTPATIENT
Start: 2024-12-04 | End: 2024-12-06 | Stop reason: HOSPADM

## 2024-12-03 RX ORDER — HYDRALAZINE HYDROCHLORIDE 10 MG/1
10 TABLET, FILM COATED ORAL EVERY 4 HOURS PRN
Status: DISCONTINUED | OUTPATIENT
Start: 2024-12-03 | End: 2024-12-06 | Stop reason: HOSPADM

## 2024-12-03 RX ORDER — LIDOCAINE 40 MG/G
CREAM TOPICAL
Status: DISCONTINUED | OUTPATIENT
Start: 2024-12-03 | End: 2024-12-06 | Stop reason: HOSPADM

## 2024-12-03 RX ORDER — SERTRALINE HYDROCHLORIDE 25 MG/1
25 TABLET, FILM COATED ORAL DAILY
Status: DISCONTINUED | OUTPATIENT
Start: 2024-12-04 | End: 2024-12-06 | Stop reason: HOSPADM

## 2024-12-03 RX ORDER — HYDRALAZINE HYDROCHLORIDE 20 MG/ML
10 INJECTION INTRAMUSCULAR; INTRAVENOUS EVERY 4 HOURS PRN
Status: DISCONTINUED | OUTPATIENT
Start: 2024-12-03 | End: 2024-12-06 | Stop reason: HOSPADM

## 2024-12-03 RX ORDER — ACETAMINOPHEN 650 MG/1
650 SUPPOSITORY RECTAL EVERY 4 HOURS PRN
Status: DISCONTINUED | OUTPATIENT
Start: 2024-12-03 | End: 2024-12-06 | Stop reason: HOSPADM

## 2024-12-03 ASSESSMENT — COLUMBIA-SUICIDE SEVERITY RATING SCALE - C-SSRS
1. IN THE PAST MONTH, HAVE YOU WISHED YOU WERE DEAD OR WISHED YOU COULD GO TO SLEEP AND NOT WAKE UP?: NO
6. HAVE YOU EVER DONE ANYTHING, STARTED TO DO ANYTHING, OR PREPARED TO DO ANYTHING TO END YOUR LIFE?: NO
2. HAVE YOU ACTUALLY HAD ANY THOUGHTS OF KILLING YOURSELF IN THE PAST MONTH?: NO

## 2024-12-03 ASSESSMENT — ACTIVITIES OF DAILY LIVING (ADL)
ADLS_ACUITY_SCORE: 51

## 2024-12-03 NOTE — Clinical Note
Attempt x1, pt demonstrates increase lethargy and drowsiness upon arrival in room. Stated \"I can't do anything right now\", \"they just gave me morphine\".  Will continue to follow pt and resume plan of care later this pm. Procedure scheduled as Elective.

## 2024-12-03 NOTE — Clinical Note
Hemodynamic equipment used: 5 lead ECG, [employer] With 3 Leads, Machine BP Cuff and pulse oximeter probe.

## 2024-12-04 ENCOUNTER — APPOINTMENT (OUTPATIENT)
Dept: CARDIOLOGY | Facility: CLINIC | Age: 69
DRG: 243 | End: 2024-12-04
Attending: STUDENT IN AN ORGANIZED HEALTH CARE EDUCATION/TRAINING PROGRAM
Payer: COMMERCIAL

## 2024-12-04 LAB
ALBUMIN SERPL BCG-MCNC: 3.5 G/DL (ref 3.5–5.2)
ALP SERPL-CCNC: 93 U/L (ref 40–150)
ALT SERPL W P-5'-P-CCNC: 18 U/L (ref 0–70)
ANION GAP SERPL CALCULATED.3IONS-SCNC: 7 MMOL/L (ref 7–15)
AST SERPL W P-5'-P-CCNC: 28 U/L (ref 0–45)
BASE EXCESS BLDV CALC-SCNC: 5.4 MMOL/L (ref -3–3)
BILIRUB SERPL-MCNC: 0.7 MG/DL
BUN SERPL-MCNC: 18 MG/DL (ref 8–23)
CALCIUM SERPL-MCNC: 8.8 MG/DL (ref 8.8–10.4)
CHLORIDE SERPL-SCNC: 104 MMOL/L (ref 98–107)
CREAT SERPL-MCNC: 0.79 MG/DL (ref 0.67–1.17)
EGFRCR SERPLBLD CKD-EPI 2021: >90 ML/MIN/1.73M2
ERYTHROCYTE [DISTWIDTH] IN BLOOD BY AUTOMATED COUNT: 15.3 % (ref 10–15)
FERRITIN SERPL-MCNC: 20 NG/ML (ref 31–409)
GLUCOSE SERPL-MCNC: 85 MG/DL (ref 70–99)
HCO3 BLDV-SCNC: 31 MMOL/L (ref 21–28)
HCO3 SERPL-SCNC: 28 MMOL/L (ref 22–29)
HCT VFR BLD AUTO: 36.3 % (ref 40–53)
HGB BLD-MCNC: 11.9 G/DL (ref 13.3–17.7)
INR PPP: 2.25 (ref 0.85–1.15)
INR PPP: 3.49 (ref 0.85–1.15)
LVEF ECHO: NORMAL
MAGNESIUM SERPL-MCNC: 2.2 MG/DL (ref 1.7–2.3)
MCH RBC QN AUTO: 28.7 PG (ref 26.5–33)
MCHC RBC AUTO-ENTMCNC: 32.8 G/DL (ref 31.5–36.5)
MCV RBC AUTO: 88 FL (ref 78–100)
O2/TOTAL GAS SETTING VFR VENT: 21 %
OXYHGB MFR BLDV: 89 % (ref 70–75)
PCO2 BLDV: 47 MM HG (ref 40–50)
PH BLDV: 7.42 [PH] (ref 7.32–7.43)
PHOSPHATE SERPL-MCNC: 3 MG/DL (ref 2.5–4.5)
PLATELET # BLD AUTO: 183 10E3/UL (ref 150–450)
PO2 BLDV: 57 MM HG (ref 25–47)
POTASSIUM SERPL-SCNC: 4.1 MMOL/L (ref 3.4–5.3)
PROT SERPL-MCNC: 6.1 G/DL (ref 6.4–8.3)
RBC # BLD AUTO: 4.14 10E6/UL (ref 4.4–5.9)
SAO2 % BLDV: 90.9 % (ref 70–75)
SODIUM SERPL-SCNC: 139 MMOL/L (ref 135–145)
SODIUM UR-SCNC: 129 MMOL/L
TSH SERPL DL<=0.005 MIU/L-ACNC: 0.48 UIU/ML (ref 0.3–4.2)
WBC # BLD AUTO: 5.4 10E3/UL (ref 4–11)

## 2024-12-04 PROCEDURE — 258N000003 HC RX IP 258 OP 636: Performed by: PHYSICIAN ASSISTANT

## 2024-12-04 PROCEDURE — 99222 1ST HOSP IP/OBS MODERATE 55: CPT | Mod: FS | Performed by: PHYSICIAN ASSISTANT

## 2024-12-04 PROCEDURE — 84300 ASSAY OF URINE SODIUM: CPT | Performed by: STUDENT IN AN ORGANIZED HEALTH CARE EDUCATION/TRAINING PROGRAM

## 2024-12-04 PROCEDURE — 99232 SBSQ HOSP IP/OBS MODERATE 35: CPT | Performed by: INTERNAL MEDICINE

## 2024-12-04 PROCEDURE — 93306 TTE W/DOPPLER COMPLETE: CPT | Mod: 26 | Performed by: INTERNAL MEDICINE

## 2024-12-04 PROCEDURE — 85048 AUTOMATED LEUKOCYTE COUNT: CPT | Performed by: STUDENT IN AN ORGANIZED HEALTH CARE EDUCATION/TRAINING PROGRAM

## 2024-12-04 PROCEDURE — 80053 COMPREHEN METABOLIC PANEL: CPT | Performed by: STUDENT IN AN ORGANIZED HEALTH CARE EDUCATION/TRAINING PROGRAM

## 2024-12-04 PROCEDURE — 255N000002 HC RX 255 OP 636: Performed by: STUDENT IN AN ORGANIZED HEALTH CARE EDUCATION/TRAINING PROGRAM

## 2024-12-04 PROCEDURE — 83735 ASSAY OF MAGNESIUM: CPT | Performed by: STUDENT IN AN ORGANIZED HEALTH CARE EDUCATION/TRAINING PROGRAM

## 2024-12-04 PROCEDURE — 210N000002 HC R&B HEART CARE

## 2024-12-04 PROCEDURE — 82805 BLOOD GASES W/O2 SATURATION: CPT | Performed by: STUDENT IN AN ORGANIZED HEALTH CARE EDUCATION/TRAINING PROGRAM

## 2024-12-04 PROCEDURE — 999N000208 ECHOCARDIOGRAM COMPLETE

## 2024-12-04 PROCEDURE — 36415 COLL VENOUS BLD VENIPUNCTURE: CPT | Performed by: STUDENT IN AN ORGANIZED HEALTH CARE EDUCATION/TRAINING PROGRAM

## 2024-12-04 PROCEDURE — 84100 ASSAY OF PHOSPHORUS: CPT | Performed by: STUDENT IN AN ORGANIZED HEALTH CARE EDUCATION/TRAINING PROGRAM

## 2024-12-04 PROCEDURE — 250N000013 HC RX MED GY IP 250 OP 250 PS 637: Performed by: STUDENT IN AN ORGANIZED HEALTH CARE EDUCATION/TRAINING PROGRAM

## 2024-12-04 PROCEDURE — 84443 ASSAY THYROID STIM HORMONE: CPT | Performed by: PHYSICIAN ASSISTANT

## 2024-12-04 PROCEDURE — 250N000011 HC RX IP 250 OP 636: Performed by: PHYSICIAN ASSISTANT

## 2024-12-04 PROCEDURE — 85014 HEMATOCRIT: CPT | Performed by: STUDENT IN AN ORGANIZED HEALTH CARE EDUCATION/TRAINING PROGRAM

## 2024-12-04 PROCEDURE — 82040 ASSAY OF SERUM ALBUMIN: CPT | Performed by: STUDENT IN AN ORGANIZED HEALTH CARE EDUCATION/TRAINING PROGRAM

## 2024-12-04 PROCEDURE — C8929 TTE W OR WO FOL WCON,DOPPLER: HCPCS

## 2024-12-04 PROCEDURE — 85610 PROTHROMBIN TIME: CPT | Performed by: STUDENT IN AN ORGANIZED HEALTH CARE EDUCATION/TRAINING PROGRAM

## 2024-12-04 PROCEDURE — 84155 ASSAY OF PROTEIN SERUM: CPT | Performed by: STUDENT IN AN ORGANIZED HEALTH CARE EDUCATION/TRAINING PROGRAM

## 2024-12-04 RX ORDER — WARFARIN SODIUM 2 MG/1
2 TABLET ORAL
Status: DISCONTINUED | OUTPATIENT
Start: 2024-12-04 | End: 2024-12-04

## 2024-12-04 RX ADMIN — SERTRALINE HYDROCHLORIDE 25 MG: 25 TABLET ORAL at 08:25

## 2024-12-04 RX ADMIN — LISINOPRIL 2.5 MG: 2.5 TABLET ORAL at 08:25

## 2024-12-04 RX ADMIN — SPIRONOLACTONE 12.5 MG: 25 TABLET ORAL at 08:25

## 2024-12-04 RX ADMIN — PHYTONADIONE 2 MG: 2 INJECTION, EMULSION INTRAMUSCULAR; INTRAVENOUS; SUBCUTANEOUS at 13:35

## 2024-12-04 RX ADMIN — ASPIRIN 81 MG CHEWABLE TABLET 81 MG: 81 TABLET CHEWABLE at 08:25

## 2024-12-04 RX ADMIN — SIMVASTATIN 20 MG: 20 TABLET, FILM COATED ORAL at 21:55

## 2024-12-04 RX ADMIN — LEVOTHYROXINE SODIUM 200 MCG: 100 TABLET ORAL at 08:25

## 2024-12-04 RX ADMIN — HUMAN ALBUMIN MICROSPHERES AND PERFLUTREN 9 ML: 10; .22 INJECTION, SOLUTION INTRAVENOUS at 09:19

## 2024-12-04 ASSESSMENT — ACTIVITIES OF DAILY LIVING (ADL)
ADLS_ACUITY_SCORE: 52
ADLS_ACUITY_SCORE: 33
ADLS_ACUITY_SCORE: 51
ADLS_ACUITY_SCORE: 33
ADLS_ACUITY_SCORE: 32
ADLS_ACUITY_SCORE: 33
ADLS_ACUITY_SCORE: 51
ADLS_ACUITY_SCORE: 33

## 2024-12-04 NOTE — ED NOTES
[facility]  ED Nurse Handoff Report    ED Chief complaint: Syncope      ED Diagnosis:   Final diagnoses:   Syncope, unspecified syncope type       Code Status: to be addressed    Allergies:   Allergies   Allergen Reactions    Seasonal Allergies        Patient Story: presented to ed due to multiple occasions of syncope.on blood thinner(warfarin),did not hit his head.  Focused Assessment:  not distress,vitally stable,alert.erratic HR.    Treatments and/or interventions provided: IV access,labs,CXR  Patient's response to treatments and/or interventions: tolearted    To be done/followed up on inpatient unit:  as per admission and cardiac monitoring continously    Does this patient have any cognitive concerns?:  none    Activity level - Baseline/Home:  Independent  Activity Level - Current:   Independent    Patient's Preferred language: English   Needed?: No    Isolation: None  Infection: Not Applicable  Patient tested for COVID 19 prior to admission: NO  Bariatric?: No    Vital Signs:   Vitals:    12/03/24 1956 12/03/24 2007 12/03/24 2008 12/03/24 2011   BP: (!) 114/90      Pulse: 88 (!) 46 (!) 126 91   Resp: 12 10 11 13   Temp:       TempSrc:       SpO2: 97% 97% 97% 96%   Weight:       Height:           Cardiac Rhythm:Cardiac Rhythm: Atrial fibrillation (erratic HR noted)    Was the PSS-3 completed:   Yes  What interventions are required if any?               Family Comments: updated of plan  OBS brochure/video discussed/provided to patient/family: No              Name of person given brochure if not patient:               Relationship to patient:     For the majority of the shift this patient's behavior was Green.   Behavioral interventions performed were none.    ED NURSE PHONE NUMBER: [phone]

## 2024-12-04 NOTE — PROGRESS NOTES
"[facility]    Hospitalist Progress Note    Assessment & Plan     69M hx CAD, Afib, Aortic stenosis s/p replacement on coumadrin, Hypothyroidism presenting w/ recurrent syncope     --Hx: Patient presents with complaints of 2 episodes of pre-syncope in the AM while sitting down, 2x episodes of flushing+palpitations in the afternoon while having dinner, and 2 episodes of syncope when taking a few steps w/o fall/injury (one in the evening at home and the 2nd on arrival to [facility]). No CP/Dyspnea. No active palpitations. Feels that his HR is irregular. Compliant w/ all his medications. Only new changes in medications in the past 60 days are the initiation of jardiance and decrease in dose of lisinpril.   --Vitals/Exam: BP (!) 112/92   Pulse 103   Temp 99  F (37.2  C) (Temporal)   Resp 19   Ht 1.753 m (5' 9\")   Wt 87.5 kg (193 lb)   SpO2 95%   BMI 28.50 kg/m   RLE edema/brown discolouratoin(chronic per patient/wife),  irregular HR w/ S2 louder than S1.   --Imaging: CXR Mild interstitial pulmonary edema. Stable enlarged cardiac silhouette. Aortic valve replacement. 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 cMRI shows LVEF 37% and moderate mitral regurgitation. Following this he underwent NM Lexiscan on 9/18/2024 which did not reveal inducible myocardial ischemia or infarction   --Subtheraputic Dixogin and INR levels at 0.4 and 4 respectivelly. EKG showing Afib rate controlled, w/ bifascular block and evidence of LVH. At bedside patient had a 6 second pause.   -nl orthostatics  -Echo this admission;   1. Mild left ventricular enlargement " with moderately decreased systolic  function. Estimated ejection fraction is 40%.  2. Generalized left ventricular hypokinesis. Abnormal ventricular septal  motion due to abnormal conduction and postoperative status.  3. Mild right ventricular enlargement with mildly decreased systolic function.  4. Estimated right ventricular systolic pressure is 42 mmHg.  5. Severe left atrial enlargement.  6. Status post 24 mm ATS mechanical aortic prosthetic valve (2011). Aortic  prosthetic mean systolic gradient is 8 mmHg. No aortic prosthetic or  periprosthetic regurgitation.  7. Mild-moderate mitral regurgitation.  8. Mild-moderate tricuspid regurgitation.  9. No significant change compared to prior exam from 8/15/2023.    -seen by EP cardiology, PM recommended  - occasional asymptomatic 2-3 sec pauses  -euvolemic. No sxs.         Plan;   -probable pacemaker placement tomorrow with likely L bundle pacing   -npo midnight  - am INR  -vitamin K 2mg IV X 1 now per EP cardiology   --Pads on at all times w/ constant Telemetry  --Pharm consult for warfarin dosing- holding coumadin, reversing INR partially for procedure  --Hold Coreg/Digoxin for now per cardiology   --resumes lisinopril, aspirin, and spironlactone. Holding jardiance   --No clinical indication to give his PRN lasix dose at this time as asymptomatic and well below his dry weight of 199 lbs (note patient takes it as standing, despite cardiology requesting it be done PRN)       Hypothyroidism: resumed synthroid  HLD: resumed statin   Chronic Alkalosis: CO2 31, at baesline. VBG in AM---> nl PH, pco2 47, bicarb 31     Chronic anemia: Hb stable at 12.6. Hb 11.9  on follow up   Mood disorder: restart psychotropic meds once confirmed     Supportive Care  --DVT ppx: SCDs, warfarin- holding  --Diet: cardiac npo midnight  --Pain Control: tylenol  --Upper GI ppx: zofran  --Lower GI ppx: senna  -- ppx: none  --Lines/Catheters: IV  --TTE/Dopplers:  "TTE  --Contact/Seizure/Fall/Delerium Precautions: None  --PT/OT/Social/Wound/Palliative Consults: None    Code Status: Full Code    Disposition: to home   Medically Ready for Discharge: Anticipated in 2-4 Days pending EP cardiology plan and anticoagulation management, pace maker placement timing        Clinically Significant Risk Factors Present on Admission                # Drug Induced Coagulation Defect: home medication list includes an anticoagulant medication  # Drug Induced Platelet Defect: home medication list includes an antiplatelet medication   # Hypertension: Noted on problem list           # Overweight: Estimated body mass index is 28.4 kg/m  as calculated from the following:    Height as of this encounter: 1.753 m (5' 9\").    Weight as of this encounter: 87.2 kg (192 lb 4.8 oz).              Moderate complex medical decision making.  Greater than 35 minutes spent on patient care including exam, chart review, charting, , care coordination with family and nurse at the bedside    Stephan Wu MD, MD  Text Page  (7am to 6pm)  Interval History   Seen by EP cardiology, pacemaker in am. Getting Vitamin K IV X 1 now  No cp or sow  Occ 2-3 sec pauses on tele- asymptomatic     -Data reviewed today: I reviewed all new labs and imaging results over the last 24 hours. I personally reviewed imaging and labs since admission     Physical Exam   Temp: 98.1  F (36.7  C) Temp src: Oral BP: 125/80 Pulse: 89   Resp: 18 SpO2: 94 % O2 Device: None (Room air)    Vitals:    12/03/24 1931 12/04/24 0700   Weight: 87.5 kg (193 lb) 87.2 kg (192 lb 4.8 oz)     Vital Signs with Ranges  Temp:  [97.6  F (36.4  C)-99  F (37.2  C)] 98.1  F (36.7  C)  Pulse:  [] 89  Resp:  [10-23] 18  BP: ()/(60-92) 125/80  SpO2:  [92 %-98 %] 94 %  I/O last 3 completed shifts:  In: -   Out: 340 [Urine:340]    Constitutional: In bed, nad  Neck: nl jvp  Respiratory: CTAB, breathing easily   Chest- pacer pads in place "   Cardiovascular: RRR no r/g/ crisp prosthetic S2 on exam   GI: soft, nt, nd  Skin/Integumen:  no rash or edema  Neuro; nl speech and mentation  Psych: nl affect       Medications   Current Facility-Administered Medications   Medication Dose Route Frequency Provider Last Rate Last Admin     Current Facility-Administered Medications   Medication Dose Route Frequency Provider Last Rate Last Admin    aspirin (ASA) chewable tablet 81 mg  81 mg Oral Daily Jimmy Hand MD   81 mg at 12/04/24 0825    levothyroxine (SYNTHROID/LEVOTHROID) tablet 200 mcg  200 mcg Oral Daily Jimmy Hand MD   200 mcg at 12/04/24 0825    lisinopril (ZESTRIL) tablet 2.5 mg  2.5 mg Oral Daily Jimmy Hand MD   2.5 mg at 12/04/24 0825    phytonadione (AQUA-MEPHYTON, VITAMIN K) 2 mg in sodium chloride 0.9 % 50 mL intermittent infusion  2 mg Intravenous Once Celestina Hankins PA-C        sertraline (ZOLOFT) tablet 25 mg  25 mg Oral Daily Jimmy Hand MD   25 mg at 12/04/24 0825    simvastatin (ZOCOR) tablet 20 mg  20 mg Oral At Bedtime Jimmy Hand MD        sodium chloride (PF) 0.9% PF flush 3 mL  3 mL Intracatheter Q8H Jimmy Hand MD   3 mL at 12/04/24 0826    spironolactone (ALDACTONE) half-tab 12.5 mg  12.5 mg Oral Daily Jimmy Hand MD   12.5 mg at 12/04/24 0825    Warfarin Dose Required Daily - Pharmacist Managed  1 each Oral See Admin Instructions Jimmy Hand MD        warfarin-No DOSE today  1 each Does not apply no dose today (warfarin) Jimmy Hand MD           Data   Recent Labs   Lab 12/04/24  0603 12/03/24  1943   WBC 5.4 7.4   HGB 11.9* 12.6*   MCV 88 91    222   INR 3.49* 3.98*    140   POTASSIUM 4.1 4.6   CHLORIDE 104 102   CO2 28 31*   BUN 18.0 19.8   CR 0.79 1.04   ANIONGAP 7 7   REECE 8.8 9.1   GLC 85 103*   ALBUMIN 3.5 4.0   PROTTOTAL 6.1* 6.8   BILITOTAL 0.7 0.6   ALKPHOS 93 110   ALT 18 21   AST 28 34       Imaging:   Recent Results (from the past 24 hours)    XR Chest 2 Views    Narrative    EXAM: XR CHEST 2 VIEWS  LOCATION: [facility]  DATE: 12/3/2024    INDICATION: syncope  COMPARISON: 2023      Impression    IMPRESSION: Mild interstitial pulmonary edema. Stable enlarged cardiac silhouette. Aortic valve replacement. Intact sternotomy wires.   US Lower Extremity Venous Duplex Bilateral    Narrative    EXAM: US LOWER EXTREMITY VENOUS DUPLEX BILATERAL  LOCATION: [facility]  DATE: 12/3/2024    INDICATION: r o DVT, RLE swelling discolouration. Syncope.  COMPARISON: None.  TECHNIQUE: Venous Duplex ultrasound of bilateral lower extremities with and without compression, augmentation and duplex. Color flow and spectral Doppler with waveform analysis performed.    FINDINGS: Exam includes the common femoral, femoral, popliteal veins as well as segmentally visualized deep calf veins and greater saphenous vein.     RIGHT: No deep vein thrombosis. No superficial thrombophlebitis. No popliteal cyst.    LEFT: No deep vein thrombosis. No superficial thrombophlebitis. No popliteal cyst.      Impression    IMPRESSION:  1.  No deep venous thrombosis in the bilateral lower extremities.   Echocardiogram Complete   Result Value    LVEF  40%    Narrative    [id number]  [id number]  [id number]  [id number]     [facility]  Echocardiography Laboratory  [address] [place] [address]     Name: CHAS STAPLES  MRN: [id number]  : 1955  Study Date: 2024 08:32 AM  Age: 69 yrs  Gender: Male  Patient Location: [facility]  Reason For Study: Syncope  Ordering Physician: ROXANNE BECKMAN  Referring Physician: ROXANNE BECKMAN  Performed By: Reji Hanson     BSA: 2.0 m2  Height: 69 in  Weight: 193 lb  HR: 84  BP: 112/92 mmHg  ______________________________________________________________________________  Procedure  Complete Portable Echo Adult. Optison (NDC #6533-0922) given intravenously.  Technically  difficult study. Poor quality color and spectral Doppler were  performed and interpreted.  ______________________________________________________________________________  Interpretation Summary     1. Mild left ventricular enlargement with moderately decreased systolic  function. Estimated ejection fraction is 40%.  2. Generalized left ventricular hypokinesis. Abnormal ventricular septal  motion due to abnormal conduction and postoperative status.  3. Mild right ventricular enlargement with mildly decreased systolic function.  4. Estimated right ventricular systolic pressure is 42 mmHg.  5. Severe left atrial enlargement.  6. Status post 24 mm ATS mechanical aortic prosthetic valve (2011). Aortic  prosthetic mean systolic gradient is 8 mmHg. No aortic prosthetic or  periprosthetic regurgitation.  7. Mild-moderate mitral regurgitation.  8. Mild-moderate tricuspid regurgitation.  9. No significant change compared to prior exam from 8/15/2023.  ______________________________________________________________________________  Left Ventricle  Mild left ventricular enlargement. Mildly increased left ventricular wall  thickness. Diastolic Doppler findings (E/E' ratio and/or other parameters)  suggest left ventricular filling pressures are increased. The visual ejection  fraction is estimated at 40%. Generalized left ventricular hypokinesis.  Abnormal ventricular septal motion due to abnormal conduction and  postoperative status.     Right Ventricle  Mild right ventricular enlargement with mildly decreased systolic function.  Estimated right ventricular systolic pressure is 42 mmHg.     Atria  The left atrium is severely dilated. The right atrium is moderately dilated.  Atrial septum not well-visualized. There is no color Doppler evidence of an  atrial shunt.     Mitral Valve  The mitral valve leaflets are mildly thickened. There is mild to moderate (1-  2+) mitral regurgitation.     Tricuspid Valve  The tricuspid valve is  not well visualized. There is mild to moderate (1-2+)  tricuspid regurgitation.     Aortic Valve  Status post 24 mm ATS mechanical aortic prosthetic valve (2011). Aortic  prosthetic mean systolic gradient is 8 mmHg. No aortic prosthetic or  periprosthetic regurgitation.     Pulmonic Valve  The pulmonic valve is not well visualized. There is mild (1+) pulmonic  valvular regurgitation. There is no pulmonic valvular stenosis.     Vessels  The aortic root is normal size. Normal size ascending aorta. Dilation of the  inferior vena cava is present with normal respiratory variation in diameter.     Pericardium  There is no pericardial effusion.     Rhythm  The rhythm was atrial fibrillation.  ______________________________________________________________________________  MMode/2D Measurements & Calculations  IVSd: 1.2 cm     LVIDd: 5.7 cm  LVIDs: 4.3 cm  LVPWd: 1.3 cm  FS: 24.3 %  LV mass(C)d: 301.5 grams  LV mass(C)dI: 148.2 grams/m2  Ao root diam: 3.8 cm  asc Aorta Diam: 3.7 cm  LVOT diam: 2.4 cm  LVOT area: 4.6 cm2  Ao root diam index Ht(cm/m): 2.2  Ao root diam index BSA (cm/m2): 1.9  Asc Ao diam index BSA (cm/m2): 1.8  Asc Ao diam index Ht(cm/m): 2.1  LA Volume (BP): 308.0 ml     LA Volume Index (BP): 151.7 ml/m2  RV Base: 4.7 cm  RWT: 0.44  TAPSE: 1.1 cm     Doppler Measurements & Calculations  MV E max ethan: 89.1 cm/sec  MV dec slope: 393.7 cm/sec2  MV dec time: 0.24 sec  Ao V2 max: 174.4 cm/sec  Ao max PG: 10.0 mmHg  Ao V2 mean: 117.2 cm/sec  Ao mean P.6 mmHg  Ao V2 VTI: 32.5 cm  PATEL(I,D): 2.4 cm2  PATEL(V,D): 2.7 cm2  LV V1 max P.3 mmHg  LV V1 max: 102.8 cm/sec  LV V1 VTI: 16.8 cm  SV(LVOT): 77.7 ml  SI(LVOT): 38.2 ml/m2  PA acc time: 0.11 sec  TR max ethan: 293.2 cm/sec  TR max P.4 mmHg  RVSP(TR): 42.4 mmHg  AV Ethan Ratio (DI): 0.59  PATEL Index (cm2/m2): 1.2     E/E' av.5  Lateral E/e': 6.7  Medial E/e': 18.4  RV S Ethan: 7.3 cm/sec      ______________________________________________________________________________  Report approved by: Loreto Reynaga MD on 12/04/2024 10:04 AM

## 2024-12-04 NOTE — CONSULTS
[facility] EP Consultation     Date of Admission:  12/3/2024  Date of Consult: 12/04/24  Requesting Physician: Dr. Marquez  Primary care physician: Kimber Prado  Primary cardiologist: Dr. Cooley  Staff Cardiologist:  Dr. Ramírez     Reason for consult: Tachy-kamille syndrome, syncope    Assessment:   Jovon Mantilla is a 69 year old male with a past medical history of severe aortic stenosis s/p mechanical AVR in 2011 (24mm JASPAL supraannual valve), chronic atrial fibrillation (average HR 90 bpm per 2022 holter, maintained on digoxin 125 mcg daily and coreg 12.5 BID), anticoagulated with warfarin, CAD (70% LAD lesion noted prior to AV replacement managed medically d/t intramyocardial course, stable on 2022 angiogram, with no ischemia noted on recent stress testing), moderate mitral regurgitation, and a nonischemic cardiomyopathy (first noted post-AVR with a fluctuating EF over the years, felt in part tachy-mediated, most recently LVEF 37% on cardiac MRI with FC II sxs), who presents with syncope from pauses in atrial fibrillation on medium dose Coreg (+subtherapeutic digoxin) with no other clear reversible cause, and need for rate control medications for AF going forward.    Plan:  May eat today. NPO at midnight for probable pacemaker implantation tomorrow, likely left bundle pacing.   HOLD warfarin. May require dose of vitamin K tomorrow pending INR. Discussed with pharmacy.  Maintain on telemetry, off digoxin and carvedilol.     Thank you very much for this consultation.     Celestina Hankins PA-C  [facility]  Vocera page  ________________________________________________________________________  History of Present Illness   Jovon Mantilla is a 69 year old male with a pertinent history of severe aortic stenosis s/p mechanical AVR in 2011 (24mm JASPAL supraannual valve), chronic atrial fibrillation (average HR 90 bpm per 2022 holter, maintained on digoxin 125 mcg daily and  coreg 12.5 BID), anticoagulated with warfarin, CAD (70% LAD lesion noted prior to AV replacement managed medically d/t intramyocardial course, stable on 2022 angiogram, with no ischemia noted on recent stress testing), moderate mitral regurgitation, and a nonischemic cardiomyopathy (first noted post-AVR with a fluctuating EF over the years, felt in part tachy-mediated, most recently LVEF 37% on cardiac MRI with FC II sxs), who presents with syncope.     He states he was in his usual state of health, preparing for being [place] this season (which he does annually), when he developed multiple near-syncopal episodes throughout the day yesterday, ultimately with an episode of syncope at home witnessed by his wife, who brought him to the ED. He had another syncopal episode on arrival, and while in the ED had a six second pause with near-syncopal symptoms on telemetry. Dig level was checked and subtherapeutic. Coreg and dig held. Since then, he's felt well (in bed). He's had two pauses overnight and this  morning which were < 3 seconds in duration. HR in AF has primarily been in the 80's-100's. He has been normotensive. TSH WNL. K+ and mag WNL. INR 3.5, down from 3.9 yesterday.    He had a near-syncopal episode this past spring, but no prior syncope. Notes recent weight loss. No recent camping or concerns for tick bites.    ______________________________________________________    Code Status    Full Code    Past Medical History   I have reviewed this patient's medical history and updated it with pertinent information if needed.   Past Medical History:   Diagnosis Date    Acne rosacea 04/22/2019    Acquired hypothyroidism 05/23/2003     Problem list name updated by automated process. Provider to review    Acute prostatitis     Acute sinusitis, unspecified     Aortic valve disease     Biatrial enlargement     CAD (coronary artery disease)     known 70% LAD stenosis    Congestive heart failure (H)     Depression     GI  bleeding 01/22/2016    History of bleeding peptic ulcer     Hyperlipidemia     Idiopathic cardiomyopathy (H)     Mitral regurgitation     Obesity     Palpitations     Permanent atrial fibrillation (H) 06/02/2017    Right inguinal hernia 07/22/2019    S/P AVR 01/07/2011    with Maze procedure    S/P gastric bypass     Tricuspid regurgitation     Unspecified essential hypertension        Past Surgical History   I have reviewed this patient's surgical history and updated it with pertinent information if needed.  Past Surgical History:   Procedure Laterality Date    ANGIOGRAM  02/01/2002    Normal coronary,dilated LV,Sev.decr.global LVF,+1 MR    ANGIOGRAM  01/01/2008    Mild AS,Mod PHTN,mod prox.LAD disease,Triv.CFX disease    ANGIOGRAM  01/01/2009    Mod AS,Mod PHTN,mod prox.LAD disease    ANGIOGRAM  10/01/2010    Sev.AS,CM,global hypokinesis,Mild-mod LV dilated,Mild MR,70% prox.LAD lesion,PHTN    CARDIOVERSION  2/02, 4/02, 4/11    CHOLECYSTECTOMY  2006    COLONOSCOPY N/A 01/23/2016    Procedure: COLONOSCOPY;  Surgeon: Robyn Collins MD;  Location:  GI    CV CORONARY ANGIOGRAM N/A 1/31/2022    Procedure: Coronary Angiogram;  Surgeon: Rome Mike MD;  Location:  HEART CARDIAC CATH LAB    CV INSTANTANEOUS WAVE-FREE RATIO N/A 1/31/2022    Procedure: Instantaneous Wave-Free Ratio;  Surgeon: Rome Mike MD;  Location:  HEART CARDIAC CATH LAB    CV RIGHT HEART CATH MEASUREMENTS RECORDED N/A 1/31/2022    Procedure: Right Heart Cath;  Surgeon: Rome Mike MD;  Location:  HEART CARDIAC CATH LAB    ESOPHAGOSCOPY, GASTROSCOPY, DUODENOSCOPY (EGD), COMBINED N/A 01/23/2016    Procedure: COMBINED ESOPHAGOSCOPY, GASTROSCOPY, DUODENOSCOPY (EGD);  Surgeon: Robyn Collins MD;  Location:  GI    EXCISE MASS GROIN Right 2/11/2021    Procedure: INCISION AND DRAINAGE OF RIGHT GROIN HEMATOMA;  Surgeon: Norma Scanlon MD;  Location: [facility] OR    HERNIORRHAPHY INGUINAL  Right 2/4/2021    Procedure: OPEN RIGHT INGUINAL HENRIA WITH MESH;  Surgeon: Jaden Alex MD;  Location: [facility] OR    LAPAROSCOPIC BYPASS GASTRIC  10/08/2011    Procedure:LAPAROSCOPIC BYPASS GASTRIC; Diagnostic laparoscopy, Lysis of Adhesions, Laparoscopic Revision of Jejunojejunostomy; Surgeon:RADHA MURO; Location:[facility] OR    LAPAROSCOPIC BYPASS GASTRIC  06/26/2006    Dr Jin    LAPAROSCOPY DIAGNOSTIC (GENERAL)  10/08/2011    Procedure:LAPAROSCOPY DIAGNOSTIC (GENERAL); Surgeon:RADHA MURO; Location:[facility] OR    REPLACE VALVE AORTIC  01/07/2011    ZZC NONSPECIFIC PROCEDURE  ~1985    vein stripping       Prior to Admission Medications   Prior to Admission Medications   Prescriptions Last Dose Informant Patient Reported? Taking?   Ascorbic Acid (VITAMIN C PO) 12/3/2024 Self Yes Yes   Sig: Take 500 mg by mouth daily   Cholecalciferol (VITAMIN D) 2000 UNIT CAPS 12/3/2024 Self Yes Yes   Sig: Take 1 tablet by mouth daily    Cyanocobalamin (VITAMIN B-12) 500 MCG SUBL 12/3/2024 Self Yes Yes   Sig: Place 500 mcg under the tongue daily    Multiple Vitamins-Minerals (CENTRUM) CHEW 12/3/2024 Self Yes Yes   Sig: Take 1 tablet by mouth daily    Psyllium (FIBER) 0.52 GM CAPS  Self Yes Yes   Sig: Take 2 capsules by mouth daily   amoxicillin (AMOXIL) 500 MG capsule  Self Yes Yes   Sig: Take 2,000 mg by mouth See Admin Instructions. Before dental procedures   aspirin 81 MG tablet 12/2/2024 Self Yes Yes   Sig: Take 81 mg by mouth Every Mon, Wed, Fri Morning   calcium carbonate-vitamin D 600-200 MG-UNIT TABS 12/3/2024 Self Yes Yes   Sig: Take 1 tablet by mouth 2 times daily    carvedilol (COREG) 12.5 MG tablet 12/3/2024 Morning Self No Yes   Sig: Take 1 tablet (12.5 mg) by mouth 2 times daily (with meals)   digoxin (LANOXIN) 125 MCG tablet 12/3/2024 Self No Yes   Sig: Take 1 tablet (125 mcg) by mouth daily   empagliflozin (JARDIANCE) 10 MG TABS tablet 12/3/2024 Self No Yes   Sig: Take 1 tablet (10 mg) by mouth  daily.   furosemide (LASIX) 20 MG tablet 12/3/2024 Self No Yes   Sig: Take 0.5 tablets (10 mg) by mouth daily as needed (for weight gain 2lbs or more in a day)   Patient taking differently: Take 10 mg by mouth daily.   levothyroxine (SYNTHROID/LEVOTHROID) 200 MCG tablet 12/3/2024 Self No Yes   Sig: Take 1 tablet (200 mcg) by mouth daily   lisinopril (ZESTRIL) 5 MG tablet 12/3/2024 Self Yes Yes   Sig: Take 0.5 tablets (2.5 mg) by mouth daily.   nitroGLYcerin (NITROSTAT) 0.3 MG sublingual tablet  Self No Yes   Sig: For chest pain place 1 tablet under the tongue every 5 minutes for 3 doses. If symptoms persist 5 minutes after 1st dose call 911.   sertraline (ZOLOFT) 25 MG tablet 12/3/2024 Self No Yes   Sig: Take 1 tablet (25 mg) by mouth daily   simvastatin (ZOCOR) 20 MG tablet 12/2/2024 Self No Yes   Sig: Take 1 tablet (20 mg) by mouth at bedtime   spironolactone (ALDACTONE) 25 MG tablet 12/3/2024 Self No Yes   Sig: Take 0.5 tablets (12.5 mg) by mouth daily.   triamcinolone (KENALOG) 0.1 % external cream  Self No Yes   Sig: APPLY TO AFFECTED AREA TWICE A DAY   Patient taking differently: 2 times daily as needed for irritation. to affected area   warfarin ANTICOAGULANT (COUMADIN) 4 MG tablet 12/2/2024 Self No Yes   Sig: Take 4 mg (1 tablet) daily or as directed by the [facility]   Patient taking differently: Take 4 mg by mouth daily. Take 4 mg (1 tablet) daily or as directed by the [facility] Clinic      Facility-Administered Medications: None     Allergies   Allergies   Allergen Reactions    Seasonal Allergies        Social History   I have reviewed this patient's social history and updated it with pertinent information if needed. Jovon Mantilla  reports that he has never smoked. He has never been exposed to tobacco smoke. He has never used smokeless tobacco. He reports that he does not drink alcohol and does not use drugs.    Family History   I have reviewed this patient's family history and updated it with  pertinent information if needed.   Family History   Problem Relation Age of Onset    Cancer - colorectal Father     Colon Cancer Father     Cancer Father     Diabetes Brother        Review of Systems   A comprehensive review of systems is negative, except for as noted in the HPI.    Physical Exam   Temp: 98.1  F (36.7  C) Temp src: Oral BP: 115/86 Pulse: 75   Resp: 18 SpO2: 93 % O2 Device: None (Room air)    Vital Signs with Ranges  Temp:  [97.6  F (36.4  C)-99  F (37.2  C)] 98.1  F (36.7  C)  Pulse:  [] 75  Resp:  [10-23] 18  BP: ()/(60-92) 115/86  SpO2:  [92 %-98 %] 93 %  192 lbs 4.8 oz    General - Alert and oriented to time place and person in no acute distress  Eyes - No scleral icterus  HEENT - Neck supple, moist mucous membranes  Cardiovascular - RRR  Extremities - There is no edema  Respiratory - CTAB  Skin - No pallor or cyanosis  Gastrointestinal - Non tender and non distended without rebound or guarding  Psych - Appropriate affect   Neurological - No gross motor neurological focal deficits    Data   Most Recent 3 CBC's:  Recent Labs   Lab Test 12/04/24  0603 12/03/24  1943 05/07/24  1121   WBC 5.4 7.4 4.2   HGB 11.9* 12.6* 11.2*   MCV 88 91 90    222 193     Most Recent 3 BMP's:  Recent Labs   Lab Test 12/04/24  0603 12/03/24  1943 09/18/24  0800    140 140   POTASSIUM 4.1 4.6 4.8   CHLORIDE 104 102 102   CO2 28 31* 31*   BUN 18.0 19.8 20.9   CR 0.79 1.04 0.86   ANIONGAP 7 7 7   REECE 8.8 9.1 9.5   GLC 85 103* 95     Most Recent TSH and T4:  Recent Labs   Lab Test 12/04/24  0603 06/20/23  1215 08/11/21  1118   TSH 0.48   < > 4.62*   T4  --   --  1.02    < > = values in this interval not displayed.         Clinically Significant Risk Factors Present on Admission                # Drug Induced Coagulation Defect: home medication list includes an anticoagulant medication  # Drug Induced Platelet Defect: home medication list includes an antiplatelet medication   # Hypertension: Noted on  "problem list           # Overweight: Estimated body mass index is 28.4 kg/m  as calculated from the following:    Height as of this encounter: 1.753 m (5' 9\").    Weight as of this encounter: 87.2 kg (192 lb 4.8 oz).           "

## 2024-12-04 NOTE — ED TRIAGE NOTES
[facility]  ED Arrival Note    Arrives through triage. ABC's intact. A &O X4. . Pt arrives with c/o multiple syncopal events over the last a few hours. Including one upon entrance to the ED. Patient reports hx of valve replacement and taking Warfarin.       Visitors during triage: Spouse      Triage Interventions: Direct rooming     Ambulatory: Yes    Meets Stroke Criteria?: No    Meets Trauma Criteria?: No    Directed to: Main ED    Pronouns: he/him

## 2024-12-04 NOTE — PHARMACY-ADMISSION MEDICATION HISTORY
Pharmacist Admission Medication History    Admission medication history is complete. The information provided in this note is only as accurate as the sources available at the time of the update.    Information Source(s): Patient, Family member, and CareEverywhere/SureScripts via in-person    Pertinent Information:     Changes made to PTA medication list:  Added: None  Deleted: None  Changed: None    Allergies reviewed with patient and updates made in EHR: no    Medication History Completed By: Nevin Castro [facility] 12/3/2024 10:34 PM    PTA Med List   Medication Sig Last Dose/Taking    Ascorbic Acid (VITAMIN C PO) Take 500 mg by mouth daily 12/3/2024    aspirin 81 MG tablet Take 81 mg by mouth Every Mon, Wed, Fri Morning 12/2/2024    calcium carbonate-vitamin D 600-200 MG-UNIT TABS Take 1 tablet by mouth 2 times daily  12/3/2024    carvedilol (COREG) 12.5 MG tablet Take 1 tablet (12.5 mg) by mouth 2 times daily (with meals) 12/3/2024 Morning    Cholecalciferol (VITAMIN D) 2000 UNIT CAPS Take 1 tablet by mouth daily  12/3/2024    Cyanocobalamin (VITAMIN B-12) 500 MCG SUBL Place 500 mcg under the tongue daily  12/3/2024    digoxin (LANOXIN) 125 MCG tablet Take 1 tablet (125 mcg) by mouth daily 12/3/2024    empagliflozin (JARDIANCE) 10 MG TABS tablet Take 1 tablet (10 mg) by mouth daily. 12/3/2024    furosemide (LASIX) 20 MG tablet Take 0.5 tablets (10 mg) by mouth daily as needed (for weight gain 2lbs or more in a day) (Patient taking differently: Take 10 mg by mouth daily.) 12/3/2024    levothyroxine (SYNTHROID/LEVOTHROID) 200 MCG tablet Take 1 tablet (200 mcg) by mouth daily 12/3/2024    lisinopril (ZESTRIL) 5 MG tablet Take 0.5 tablets (2.5 mg) by mouth daily. 12/3/2024    Multiple Vitamins-Minerals (CENTRUM) CHEW Take 1 tablet by mouth daily  12/3/2024    nitroGLYcerin (NITROSTAT) 0.3 MG sublingual tablet For chest pain place 1 tablet under the tongue every 5 minutes for 3 doses. If symptoms persist 5 minutes after  1st dose call 911. Taking    Psyllium (FIBER) 0.52 GM CAPS Take 2 capsules by mouth daily Taking    sertraline (ZOLOFT) 25 MG tablet Take 1 tablet (25 mg) by mouth daily 12/3/2024    simvastatin (ZOCOR) 20 MG tablet Take 1 tablet (20 mg) by mouth at bedtime 12/2/2024    spironolactone (ALDACTONE) 25 MG tablet Take 0.5 tablets (12.5 mg) by mouth daily. 12/3/2024    triamcinolone (KENALOG) 0.1 % external cream APPLY TO AFFECTED AREA TWICE A DAY (Patient taking differently: 2 times daily as needed for irritation. to affected area) Taking Differently    warfarin ANTICOAGULANT (COUMADIN) 4 MG tablet Take 4 mg (1 tablet) daily or as directed by the [facility] (Patient taking differently: Take 4 mg by mouth daily. Take 4 mg (1 tablet) daily or as directed by the [facility] Clinic) 12/2/2024    amoxicillin (AMOXIL) 500 MG capsule Take 2,000 mg by mouth See Admin Instructions. Before dental procedures Taking     Nevin JamesonD

## 2024-12-04 NOTE — ED PROVIDER NOTES
Emergency Department Note      History of Present Illness     Chief Complaint   Syncope      HPI   Jovon Mantilla is a 69 year old male who came in with his wife for further evaluation of multiple episodes of either near syncope or actual syncope.  He states that today he has had multiple times where he felt like he was going to pass out, and he apparently did pass out on 2 occasions.  His wife caught him on both occasions and therefore he did not injure himself.  He denies any chest pressure, shortness of breath, headache, or any illness recently.  He does feel that his heart is beating quite irregularly, and he does have a history of atrial fibrillation.    Independent Historian   Wife as detailed above.    Review of External Notes   I reviewed the stress test results from 9/18/2024.  I also reviewed the cardiology clinic note from 10/23/2024.    Past Medical History     Medical History and Problem List   Past Medical History:   Diagnosis Date    Acne rosacea 04/22/2019    Acquired hypothyroidism 05/23/2003    Acute prostatitis     Acute sinusitis, unspecified     Aortic valve disease     Biatrial enlargement     CAD (coronary artery disease)     Congestive heart failure (H)     Depression     GI bleeding 01/22/2016    History of bleeding peptic ulcer     Hyperlipidemia     Idiopathic cardiomyopathy (H)     Mitral regurgitation     Obesity     Palpitations     Permanent atrial fibrillation (H) 06/02/2017    Right inguinal hernia 07/22/2019    S/P AVR 01/07/2011    S/P gastric bypass     Tricuspid regurgitation     Unspecified essential hypertension        Medications   No current outpatient medications on file.      Surgical History   Past Surgical History:   Procedure Laterality Date    ANGIOGRAM  02/01/2002    Normal coronary,dilated LV,Sev.decr.global LVF,+1 MR    ANGIOGRAM  01/01/2008    Mild AS,Mod PHTN,mod prox.LAD disease,Triv.CFX disease    ANGIOGRAM  01/01/2009    Mod AS,Mod PHTN,mod prox.LAD disease     ANGIOGRAM  10/01/2010    Sev.AS,CM,global hypokinesis,Mild-mod LV dilated,Mild MR,70% prox.LAD lesion,PHTN    CARDIOVERSION  2/02, 4/02, 4/11    CHOLECYSTECTOMY  2006    COLONOSCOPY N/A 01/23/2016    Procedure: COLONOSCOPY;  Surgeon: Robyn Collins MD;  Location:  GI    CV CORONARY ANGIOGRAM N/A 1/31/2022    Procedure: Coronary Angiogram;  Surgeon: Rome Mike MD;  Location:  HEART CARDIAC CATH LAB    CV INSTANTANEOUS WAVE-FREE RATIO N/A 1/31/2022    Procedure: Instantaneous Wave-Free Ratio;  Surgeon: Rome Mike MD;  Location:  HEART CARDIAC CATH LAB    CV RIGHT HEART CATH MEASUREMENTS RECORDED N/A 1/31/2022    Procedure: Right Heart Cath;  Surgeon: Rome Mike MD;  Location:  HEART CARDIAC CATH LAB    ESOPHAGOSCOPY, GASTROSCOPY, DUODENOSCOPY (EGD), COMBINED N/A 01/23/2016    Procedure: COMBINED ESOPHAGOSCOPY, GASTROSCOPY, DUODENOSCOPY (EGD);  Surgeon: Robyn Collins MD;  Location:  GI    EXCISE MASS GROIN Right 2/11/2021    Procedure: INCISION AND DRAINAGE OF RIGHT GROIN HEMATOMA;  Surgeon: Norma Scanlon MD;  Location: [facility] OR    HERNIORRHAPHY INGUINAL Right 2/4/2021    Procedure: OPEN RIGHT INGUINAL HENRIA WITH MESH;  Surgeon: Jaden Alex MD;  Location: [facility] OR    LAPAROSCOPIC BYPASS GASTRIC  10/08/2011    Procedure:LAPAROSCOPIC BYPASS GASTRIC; Diagnostic laparoscopy, Lysis of Adhesions, Laparoscopic Revision of Jejunojejunostomy; Surgeon:RADHA MURO; Location: OR    LAPAROSCOPIC BYPASS GASTRIC  06/26/2006    Dr Jin    LAPAROSCOPY DIAGNOSTIC (GENERAL)  10/08/2011    Procedure:LAPAROSCOPY DIAGNOSTIC (GENERAL); Surgeon:RADHA MURO; Location: OR    REPLACE VALVE AORTIC  01/07/2011    ZZC NONSPECIFIC PROCEDURE  ~1985    vein stripping       Physical Exam     Patient Vitals for the past 24 hrs:   BP Temp Temp src Pulse Resp SpO2 Height Weight   12/03/24 2216 119/73 -- -- 100 12 94 % -- --   12/03/24 2200  "124/85 -- -- (!) 123 12 95 % -- --   12/03/24 2144 -- -- -- (!) 123 12 94 % -- --   12/03/24 2127 124/74 -- -- 108 19 97 % -- --   12/03/24 2112 113/82 -- -- -- -- 96 % -- --   12/03/24 2100 (!) 112/92 -- -- 103 19 95 % -- --   12/03/24 2011 -- -- -- 91 13 96 % -- --   12/03/24 2008 -- -- -- (!) 126 11 97 % -- --   12/03/24 2007 -- -- -- (!) 46 10 97 % -- --   12/03/24 1956 (!) 114/90 -- -- 88 12 97 % -- --   12/03/24 1941 139/79 -- -- -- 23 95 % -- --   12/03/24 1931 125/84 99  F (37.2  C) Temporal 70 18 98 % 1.753 m (5' 9\") 87.5 kg (193 lb)     Physical Exam  Nursing note and vitals reviewed.  Constitutional:  Oriented to person, place, and time. Cooperative.   HENT:   Nose:    Nose normal.   Mouth/Throat:   Mucous membranes are normal.   Eyes:    Conjunctivae normal and EOM are normal.      Pupils are equal, round, and reactive to light.   Neck:    Trachea normal.   Cardiovascular:  Irregular rate, irregularly irregular rhythm, normal heart sounds and normal pulses. No murmur heard.  Mechanical click present.  Pulmonary/Chest:  Effort normal and breath sounds normal.   Abdominal:   Soft. Normal appearance and bowel sounds are normal.      There is no tenderness.      There is no rebound and no CVA tenderness.   Musculoskeletal:  Extremities atraumatic x 4.   Lymphadenopathy:  No cervical adenopathy.   Neurological:   Alert and oriented to person, place, and time. Normal strength.      No cranial nerve deficit or sensory deficit. GCS eye subscore is 4. GCS verbal subscore is 5. GCS motor subscore is 6.   Skin:    Skin is intact. No rash noted.   Psychiatric:   Normal mood and affect.      Diagnostics     Lab Results   Labs Ordered and Resulted from Time of ED Arrival to Time of ED Departure   COMPREHENSIVE METABOLIC PANEL - Abnormal       Result Value    Sodium 140      Potassium 4.6      Carbon Dioxide (CO2) 31 (*)     Anion Gap 7      Urea Nitrogen 19.8      Creatinine 1.04      GFR Estimate 79      Calcium 9.1  "     Chloride 102      Glucose 103 (*)     Alkaline Phosphatase 110      AST 34      ALT 21      Protein Total 6.8      Albumin 4.0      Bilirubin Total 0.6     INR - Abnormal    INR 3.98 (*)    CBC WITH PLATELETS AND DIFFERENTIAL - Abnormal    WBC Count 7.4      RBC Count 4.36 (*)     Hemoglobin 12.6 (*)     Hematocrit 39.7 (*)     MCV 91      MCH 28.9      MCHC 31.7      RDW 15.5 (*)     Platelet Count 222      % Neutrophils 73      % Lymphocytes 11      % Monocytes 12      % Eosinophils 2      % Basophils 0      % Immature Granulocytes 0      NRBCs per 100 WBC 0      Absolute Neutrophils 5.4      Absolute Lymphocytes 0.8      Absolute Monocytes 0.9      Absolute Eosinophils 0.2      Absolute Basophils 0.0      Absolute Immature Granulocytes 0.0      Absolute NRBCs 0.0     DIGOXIN LEVEL - Abnormal    Digoxin 0.4 (*)    IRON AND IRON BINDING CAPACITY - Abnormal    Iron 41 (*)     Iron Binding Capacity 362      Iron Sat Index 11 (*)    TROPONIN T, HIGH SENSITIVITY - Normal    Troponin T, High Sensitivity 22     TROPONIN T, HIGH SENSITIVITY - Normal    Troponin T, High Sensitivity 18     MAGNESIUM - Normal    Magnesium 2.1     PHOSPHORUS - Normal    Phosphorus 3.2     RETICULOCYTE COUNT - Normal    % Reticulocyte 1.0      Absolute Reticulocyte 0.045     SODIUM RANDOM URINE   FERRITIN       Imaging   XR Chest 2 Views   Final Result   IMPRESSION: Mild interstitial pulmonary edema. Stable enlarged cardiac silhouette. Aortic valve replacement. Intact sternotomy wires.      Echocardiogram Complete    (Results Pending)   US Lower Extremity Venous Duplex Bilateral    (Results Pending)       EKG   ECG results from 12/03/24   EKG 12 lead     Value    Systolic Blood Pressure     Diastolic Blood Pressure     Ventricular Rate 75    Atrial Rate     NE Interval     QRS Duration 138        QTc 419    P Axis     R AXIS -61    T Axis 70    Interpretation ECG      Atrial fibrillation  Left axis deviation  Non-specific  intra-ventricular conduction block  Minimal voltage criteria for LVH, may be normal variant ( [place] product )  Abnormal ECG  When compared with ECG of 24-May-2024 11:18,  QT has shortened  Confirmed by GENERATED REPORT, COMPUTER ([id number]),  Tanmay Sánchez ([id number]) on 12/3/2024 7:49:09 PM       *Note: Due to a large number of results and/or encounters for the requested time period, some results have not been displayed. A complete set of results can be found in Results Review.       Independent Interpretation   I independently interpreted the patient's chest x-ray and agree with the negative reading for pulmonary edema.    ED Course      Medications Administered   Medications   Warfarin Dose Required Daily - Pharmacist Managed (has no administration in time range)   warfarin-No DOSE today (has no administration in time range)       Procedures   Procedures     Discussion of Management   Admitting Hospitalist, Dr. Hand    ED Course        Additional Documentation  None    Medical Decision Making / Diagnosis     CMS Diagnoses: None    MIPS       None    MDM   Jovon Mantilla is a 69 year old male who came in with his wife for further evaluation of multiple near syncopal episodes as well as two actual syncopal episodes.  He appears to have a very irregular heart rate and rhythm, with at times having some lengthy pauses and bradycardia and then very shortly thereafter becoming tachycardic into the 120s and 130s.  He is anticoagulated and therefore I think it is unlikely that he would have a PE, especially since he is supratherapeutic on his Coumadin.  I proceeded with the above blood work and chest x-ray, all of which is essentially unremarkable.  He clearly needs to come into the hospital for further evaluation and management.  I subsequently spoke with Dr. Hand, who will be taking care of him.    Disposition   The patient was admitted to the hospital.     Diagnosis     ICD-10-CM    1. Syncope, unspecified  syncope type  R55       2. Atrial fibrillation with variable rate, unspecified type (H)  I48.91              MD Tucker Royal Seth H, MD  12/03/24 [id number]

## 2024-12-04 NOTE — PROGRESS NOTES
Admit from ED for tachybrady with syncope. Defib patches in place, Zoll outside of room. Bedrest. Pleasant and cooperative. RA. Denies pain. NPO pending cardiology. Plan for echocardiogram today.

## 2024-12-04 NOTE — PLAN OF CARE
Goal Outcome Evaluation:      Plan of Care Reviewed With: patient, spouse        A&O x4. VSS, O2 stable on RA. Denies pain. Cardiology consult. Tolerating current diet, good appetite. Echo done this shift. Plan for pacemaker placement on 12/5, Vit K given, recheck INR scheduled for 2000. NPO at midnight. Plan of care ongoing, wife at bedside.

## 2024-12-04 NOTE — H&P
"Assessment / Plan    69M hx CAD, Afib, Aortic stenosis s/p replacement on coumadrin, Hypothyroidism presenting w/ recurrent syncope    --Hx: Patient presents with complaints of 2 episodes of pre-syncope in the AM while sitting down, 2x episodes of flushing+palpitations in the afternoon while having dinner, and 2 episodes of syncope when taking a few steps w/o fall/injury (one in the evening at home and the 2nd on arrival to [facility]). No CP/Dyspnea. No active palpitations. Feels that his HR is irregular. Compliant w/ all his medications. Only new changes in medications in the past 60 days are the initiation of jardiance and decrease in dose of lisinpril.   --Vitals/Exam: BP (!) 112/92   Pulse 103   Temp 99  F (37.2  C) (Temporal)   Resp 19   Ht 1.753 m (5' 9\")   Wt 87.5 kg (193 lb)   SpO2 95%   BMI 28.50 kg/m   RLE edema/brown discolouratoin(chronic per patient/wife),  irregular HR w/ S2 louder than S1.   --Imaging: CXR Mild interstitial pulmonary edema. Stable enlarged cardiac silhouette. Aortic valve replacement. 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 cMRI shows LVEF 37% and moderate mitral regurgitation. Following this he underwent NM Lexiscan on 9/18/2024 which did not reveal inducible myocardial ischemia or infarction   --Subtheraputic Dixogin and INR levels at 0.4 and 4 respectivelly. EKG showing Afib rate controlled, w/ bifascular block and evidence of LVH. At bedside patient had a 6 second pause.   --TTE  --Pads on at all times w/ constant Telemetry  --Cardiology consult  --Add on Mg, Phos  --Pharm consult for warfarin dosing  --Hold Coreg/Digoxin " "until cleared by cardiology  --resume lisinopril, aspirin, and spironlactone. Holding jardiance   --No clinical indication to give his PRN lasix dose at this time as asymptomatic and well below his dry weight of 199 lbs (note patient takes it as standing, despite cardiology requesting it be done PRN)  --Orthostatics    Hypothyroidism: resumed synthroid  HLD: resumed statin   Chronic Alkalosis: CO2 31, at baesline. VBG in AM, urine chloride. Likely 2/2 diuretics  Chronic anemia: Hb stable at 12.6. Labs added on  Mood disorder: restart psychotropic meds once confirmed    Supportive Care  --DVT ppx: SCDs, warfarin  --Diet: cardiac  --Pain Control: tylenol  --Upper GI ppx: zofran  --Lower GI ppx: senna  -- ppx: none  --Lines/Catheters: IV  --TTE/Dopplers: TTE  --Contact/Seizure/Fall/Delerium Precautions: None  --PT/OT/Social/Wound/Palliative Consults: None  --Code Status: Full    History of Present Illness  --Hx: Patient presents with complaints of 2 episodes of pre-syncope in the AM while sitting down, 2x episodes of flushing+palpitations in the afternoon while having dinner, and 2 episodes of syncope when taking a few steps w/o fall/injury (one in the evening at home and the 2nd on arrival to [facility]). No CP/Dyspnea. No active palpitations. Feels that his HR is irregular. Compliant w/ all his medications. Only new changes in medications in the past 60 days are the initiation of jardiance and decrease in dose of lisinpril.     ROS: 10 point ROS neg other than the symptoms noted above in the HPI.     Objective History  BP (!) 112/92   Pulse 103   Temp 99  F (37.2  C) (Temporal)   Resp 19   Ht 1.753 m (5' 9\")   Wt 87.5 kg (193 lb)   SpO2 95%   BMI 28.50 kg/m      Constitutional: Alert and oriented to person, place and time; no apparent distress.   HEENT: Normocephalic, no scleral icterus  Abdomen: soft, no distention/tenderness/guarding  Lungs: lungs clear to auscultation bilaterally  Heart: irregular HR w/ S2 " louder than S1.   Neuro:No focal strength/sensation deficits  Skin/Extremities: No obvious signs of skin breakdown, RLE edema/brown discolouration (chronic per patient/wife),   Psychiatric: appropriate affect, insight and judgment  Back: No midline tenderness, no CVA tenderness    I have personally spent 80 minutes total time today in preparing to see the patient (eg, review of tests), obtaining and/or reviewing separately obtained history, performing a medically appropriate examination and/or evaluation, counseling and educating the patient/family/caregiver, ordering medications, tests, or procedures, referring and communicating with other health care professionals, documenting clinical information in the electronic or other health record, independently interpreting results and communicating results to the patient/ family/caregiver and care coordination.

## 2024-12-05 LAB
ERYTHROCYTE [DISTWIDTH] IN BLOOD BY AUTOMATED COUNT: 15.5 % (ref 10–15)
HCT VFR BLD AUTO: 38.3 % (ref 40–53)
HGB BLD-MCNC: 12.8 G/DL (ref 13.3–17.7)
INR PPP: 1.77 (ref 0.85–1.15)
MAGNESIUM SERPL-MCNC: 2 MG/DL (ref 1.7–2.3)
MCH RBC QN AUTO: 29.4 PG (ref 26.5–33)
MCHC RBC AUTO-ENTMCNC: 33.4 G/DL (ref 31.5–36.5)
MCV RBC AUTO: 88 FL (ref 78–100)
PHOSPHATE SERPL-MCNC: 2.9 MG/DL (ref 2.5–4.5)
PLATELET # BLD AUTO: 196 10E3/UL (ref 150–450)
RBC # BLD AUTO: 4.36 10E6/UL (ref 4.4–5.9)
WBC # BLD AUTO: 6.2 10E3/UL (ref 4–11)

## 2024-12-05 PROCEDURE — 272N000001 HC OR GENERAL SUPPLY STERILE: Performed by: INTERNAL MEDICINE

## 2024-12-05 PROCEDURE — 33207 INSERT HEART PM VENTRICULAR: CPT | Performed by: INTERNAL MEDICINE

## 2024-12-05 PROCEDURE — 99153 MOD SED SAME PHYS/QHP EA: CPT | Performed by: INTERNAL MEDICINE

## 2024-12-05 PROCEDURE — 250N000011 HC RX IP 250 OP 636: Performed by: INTERNAL MEDICINE

## 2024-12-05 PROCEDURE — 210N000002 HC R&B HEART CARE

## 2024-12-05 PROCEDURE — 84100 ASSAY OF PHOSPHORUS: CPT | Performed by: INTERNAL MEDICINE

## 2024-12-05 PROCEDURE — C1786 PMKR, SINGLE, RATE-RESP: HCPCS | Performed by: INTERNAL MEDICINE

## 2024-12-05 PROCEDURE — 250N000009 HC RX 250: Performed by: INTERNAL MEDICINE

## 2024-12-05 PROCEDURE — 0JH604Z INSERTION OF PACEMAKER, SINGLE CHAMBER INTO CHEST SUBCUTANEOUS TISSUE AND FASCIA, OPEN APPROACH: ICD-10-PCS | Performed by: INTERNAL MEDICINE

## 2024-12-05 PROCEDURE — 02HK3JZ INSERTION OF PACEMAKER LEAD INTO RIGHT VENTRICLE, PERCUTANEOUS APPROACH: ICD-10-PCS | Performed by: INTERNAL MEDICINE

## 2024-12-05 PROCEDURE — 99152 MOD SED SAME PHYS/QHP 5/>YRS: CPT | Performed by: INTERNAL MEDICINE

## 2024-12-05 PROCEDURE — 83735 ASSAY OF MAGNESIUM: CPT | Performed by: STUDENT IN AN ORGANIZED HEALTH CARE EDUCATION/TRAINING PROGRAM

## 2024-12-05 PROCEDURE — C1894 INTRO/SHEATH, NON-LASER: HCPCS | Performed by: INTERNAL MEDICINE

## 2024-12-05 PROCEDURE — 85610 PROTHROMBIN TIME: CPT | Performed by: INTERNAL MEDICINE

## 2024-12-05 PROCEDURE — 36415 COLL VENOUS BLD VENIPUNCTURE: CPT

## 2024-12-05 PROCEDURE — 250N000013 HC RX MED GY IP 250 OP 250 PS 637: Performed by: STUDENT IN AN ORGANIZED HEALTH CARE EDUCATION/TRAINING PROGRAM

## 2024-12-05 PROCEDURE — 250N000013 HC RX MED GY IP 250 OP 250 PS 637: Performed by: INTERNAL MEDICINE

## 2024-12-05 PROCEDURE — 99232 SBSQ HOSP IP/OBS MODERATE 35: CPT

## 2024-12-05 PROCEDURE — 250N000013 HC RX MED GY IP 250 OP 250 PS 637

## 2024-12-05 PROCEDURE — C1887 CATHETER, GUIDING: HCPCS | Performed by: INTERNAL MEDICINE

## 2024-12-05 PROCEDURE — 85014 HEMATOCRIT: CPT

## 2024-12-05 PROCEDURE — 250N000011 HC RX IP 250 OP 636: Mod: JZ | Performed by: PHYSICIAN ASSISTANT

## 2024-12-05 PROCEDURE — 258N000002 HC RX IP 258 OP 250: Performed by: PHYSICIAN ASSISTANT

## 2024-12-05 PROCEDURE — C1898 LEAD, PMKR, OTHER THAN TRANS: HCPCS | Performed by: INTERNAL MEDICINE

## 2024-12-05 PROCEDURE — 99232 SBSQ HOSP IP/OBS MODERATE 35: CPT | Mod: FS | Performed by: NURSE PRACTITIONER

## 2024-12-05 DEVICE — LEAD PACEMAKER SELECTSECURE 69CM MD  383069: Type: IMPLANTABLE DEVICE | Status: FUNCTIONAL

## 2024-12-05 DEVICE — PACEMAKER AZURE XT SR MRI SYS: Type: IMPLANTABLE DEVICE | Status: FUNCTIONAL

## 2024-12-05 RX ORDER — NALOXONE HYDROCHLORIDE 0.4 MG/ML
0.4 INJECTION, SOLUTION INTRAMUSCULAR; INTRAVENOUS; SUBCUTANEOUS
Status: DISCONTINUED | OUTPATIENT
Start: 2024-12-05 | End: 2024-12-06 | Stop reason: HOSPADM

## 2024-12-05 RX ORDER — FENTANYL CITRATE 50 UG/ML
INJECTION, SOLUTION INTRAMUSCULAR; INTRAVENOUS
Status: DISCONTINUED | OUTPATIENT
Start: 2024-12-05 | End: 2024-12-05 | Stop reason: HOSPADM

## 2024-12-05 RX ORDER — CARVEDILOL 12.5 MG/1
12.5 TABLET ORAL 2 TIMES DAILY WITH MEALS
Status: DISCONTINUED | OUTPATIENT
Start: 2024-12-05 | End: 2024-12-06

## 2024-12-05 RX ORDER — CEFAZOLIN SODIUM 2 G/100ML
2 INJECTION, SOLUTION INTRAVENOUS
Status: COMPLETED | OUTPATIENT
Start: 2024-12-05 | End: 2024-12-05

## 2024-12-05 RX ORDER — BUPIVACAINE HYDROCHLORIDE 2.5 MG/ML
INJECTION, SOLUTION EPIDURAL; INFILTRATION; INTRACAUDAL
Status: DISCONTINUED | OUTPATIENT
Start: 2024-12-05 | End: 2024-12-05 | Stop reason: HOSPADM

## 2024-12-05 RX ORDER — OXYCODONE AND ACETAMINOPHEN 5; 325 MG/1; MG/1
1 TABLET ORAL EVERY 4 HOURS PRN
Status: DISCONTINUED | OUTPATIENT
Start: 2024-12-05 | End: 2024-12-06 | Stop reason: HOSPADM

## 2024-12-05 RX ORDER — NALOXONE HYDROCHLORIDE 0.4 MG/ML
0.2 INJECTION, SOLUTION INTRAMUSCULAR; INTRAVENOUS; SUBCUTANEOUS
Status: DISCONTINUED | OUTPATIENT
Start: 2024-12-05 | End: 2024-12-06 | Stop reason: HOSPADM

## 2024-12-05 RX ORDER — LIDOCAINE 40 MG/G
CREAM TOPICAL
Status: DISCONTINUED | OUTPATIENT
Start: 2024-12-05 | End: 2024-12-05

## 2024-12-05 RX ORDER — SODIUM CHLORIDE 450 MG/100ML
INJECTION, SOLUTION INTRAVENOUS CONTINUOUS
Status: DISCONTINUED | OUTPATIENT
Start: 2024-12-05 | End: 2024-12-05

## 2024-12-05 RX ORDER — WARFARIN SODIUM 4 MG/1
4 TABLET ORAL
Status: COMPLETED | OUTPATIENT
Start: 2024-12-05 | End: 2024-12-05

## 2024-12-05 RX ADMIN — LEVOTHYROXINE SODIUM 200 MCG: 100 TABLET ORAL at 08:13

## 2024-12-05 RX ADMIN — CEFAZOLIN SODIUM 2 G: 2 INJECTION, SOLUTION INTRAVENOUS at 13:47

## 2024-12-05 RX ADMIN — SODIUM CHLORIDE: 4.5 INJECTION, SOLUTION INTRAVENOUS at 12:37

## 2024-12-05 RX ADMIN — SPIRONOLACTONE 12.5 MG: 25 TABLET ORAL at 08:14

## 2024-12-05 RX ADMIN — LISINOPRIL 2.5 MG: 2.5 TABLET ORAL at 08:12

## 2024-12-05 RX ADMIN — ACETAMINOPHEN 650 MG: 325 TABLET, FILM COATED ORAL at 21:30

## 2024-12-05 RX ADMIN — SIMVASTATIN 20 MG: 20 TABLET, FILM COATED ORAL at 21:30

## 2024-12-05 RX ADMIN — WARFARIN SODIUM 4 MG: 4 TABLET ORAL at 21:30

## 2024-12-05 RX ADMIN — SERTRALINE HYDROCHLORIDE 25 MG: 25 TABLET ORAL at 08:14

## 2024-12-05 RX ADMIN — ASPIRIN 81 MG CHEWABLE TABLET 81 MG: 81 TABLET CHEWABLE at 08:12

## 2024-12-05 RX ADMIN — CARVEDILOL 12.5 MG: 12.5 TABLET, FILM COATED ORAL at 22:34

## 2024-12-05 ASSESSMENT — ACTIVITIES OF DAILY LIVING (ADL)
ADLS_ACUITY_SCORE: 33

## 2024-12-05 NOTE — PROGRESS NOTES
EP Progress Note          Assessment and Plan:   Jovon Mantilla is a 69 year old male with a pertinent history of severe aortic stenosis s/p mechanical AVR in 2011 (24mm JASPAL supraannual valve), chronic atrial fibrillation (average HR 90 bpm per 2022 holter, maintained on digoxin 125 mcg daily and coreg 12.5 BID), anticoagulated with warfarin, CAD (70% LAD lesion noted prior to AV replacement managed medically d/t intramyocardial course, stable on 2022 angiogram, with no ischemia noted on recent stress testing), moderate mitral regurgitation, and a nonischemic cardiomyopathy (first noted post-AVR with a fluctuating EF over the years, felt in part tachy-mediated, most recently LVEF 37% on cardiac MRI with FC II sxs), who presents with syncope. EP was consulted to assist with care.    Echo 12/4/2024: EF 40%. Mild RV dysfunction.  24 mm ATS mechanical aortic prosthetic valve (2011). Aortic prosthetic mean systolic gradient is 8 mmHg. No aortic prosthetic or periprosthetic regurgitation. Mild MR and TR. Stable when compared to 2023 echo.    Tachy-kamille syndrome  Symptomatic 6 second pause noted in the ED and noted on telemetry  N.p.o. for pacemaker implantation today ([employer]) w/ left bundle pacing  Risks and benefits reviewed with the patient. Risks, benefits including but not limited to use of conscious sedation including need for a , discomfort, peripheral vessel injury, pneumothorax, cardiac puncture and/or tamponade, device malfunction and/or recall, and infection requiring explantation of the device and long term antibiotics. We also briefly discussed follow up expectations and restrictions following the procedure. The patient voiced understanding and is willing to proceed.  Consent signed.    2. Nonischemic cardiomyopathy EF 40%  Euvolemic on exam  PTA meds on hold: carvedilol,digoxin, and Jardiance  Currently on 2.5 mg every day     3. Hx mechanical AVR  Warfarin on hold. INR 1.77 today (admit was  "3.98)      Will resume Jardiance and Coreg after pacemaker is implanted.             Humera Benton, NP, APRN CNP  Beeper [phone]              Interval History:   Pt feeling ok this am. Continues to have intermittent pauses <3 seconds. VSSA. Tele afib         Medications:     Current Facility-Administered Medications   Medication Dose Route Frequency Provider Last Rate Last Admin    aspirin (ASA) chewable tablet 81 mg  81 mg Oral Daily Jimmy Hand MD   81 mg at 24 0812    ceFAZolin (ANCEF) 2 g in 100 mL D5W intermittent infusion  2 g Intravenous Pre-Op/Pre-procedure x 1 dose Celestina Hankins PA-C        levothyroxine (SYNTHROID/LEVOTHROID) tablet 200 mcg  200 mcg Oral Daily Jimmy Hand MD   200 mcg at 24 0813    lisinopril (ZESTRIL) tablet 2.5 mg  2.5 mg Oral Daily Jimmy Hand MD   2.5 mg at 24 0812    sertraline (ZOLOFT) tablet 25 mg  25 mg Oral Daily Jimmy Hand MD   25 mg at 24 0814    simvastatin (ZOCOR) tablet 20 mg  20 mg Oral At Bedtime Jimmy Hand MD   20 mg at 24 2155    sodium chloride (PF) 0.9% PF flush 3 mL  3 mL Intracatheter Q8H Celestina Hankins PA-C        sodium chloride (PF) 0.9% PF flush 3 mL  3 mL Intracatheter Q8H Jimmy Hand MD   3 mL at 24 0820    spironolactone (ALDACTONE) half-tab 12.5 mg  12.5 mg Oral Daily Jimmy Hand MD   12.5 mg at 24 0814    Warfarin Dose Required Daily - Pharmacist Managed  1 each Oral See Admin Instructions Jimmy Hand MD                 Review of Systems: If done, described below  The Review of Systems is negative other than noted in the HPI             Physical Exam:   Blood pressure 110/79, pulse 84, temperature 97.4  F (36.3  C), temperature source Axillary, resp. rate 16, height 1.753 m (5' 9\"), weight 85.8 kg (189 lb 1.6 oz), SpO2 93%.      Vital Sign Ranges  Temperature Temp  Av.1  F (36.7  C)  Min: 97.4  F (36.3  C)  Max: 99.1  F (37.3  C)   Blood " pressure Systolic (24hrs), Av , Min:98 , Max:125        Diastolic (24hrs), Av, Min:63, Max:80      Pulse Pulse  Av.5  Min: 61  Max: 103   Respirations Resp  Av.2  Min: 16  Max: 18   Pulse oximetry SpO2  Av.7 %  Min: 93 %  Max: 97 %         Intake/Output Summary (Last 24 hours) at 2024 0802  Last data filed at 2024 0630  Gross per 24 hour   Intake 900 ml   Output 1175 ml   Net -275 ml       Constitutional:   in no apparent distress     Lungs:   symmetric, clear to auscultation     Cardiovascular:   irregularly irregular rhythm, good click, no murmurs     Extremities and Back:   No edema              Data:     Lab Results   Component Value Date    WBC 6.2 2024    HGB 12.8 (L) 2024    HCT 38.3 (L) 2024     2024     2024    POTASSIUM 4.1 2024    CHLORIDE 104 2024    CO2 28 2024    BUN 18.0 2024    CR 0.79 2024    GLC 85 2024    SED 24 (H) 2005    NTBNPI 2,132 (H) 2023    NTBNP 1,082 (H) 2022    TROPONIN <0.07 2005    TROPI <0.015 02/10/2021    AST 28 2024    ALT 18 2024    ALKPHOS 93 2024    BILITOTAL 0.7 2024    INR 1.77 (H) 2024

## 2024-12-05 NOTE — PLAN OF CARE
Goal Outcome Evaluation:      Plan of Care Reviewed With: patient, spouse        A&O x4. VSS, O2 stable on RA. Denies pain. NPO for planned pacemaker this afternoon. Plan of care ongoing, wife at bedside. Chest xray and device interrogation in AM. Voiding spontaneously without difficulty, urinal at bedside.    PPM placed this afternoon. Dressing WDL, CMS intact. Resume Warfarin tonight per EP. Pharmacist to dose, scheduled for 2000.

## 2024-12-05 NOTE — DISCHARGE INSTRUCTIONS
Pacemaker Implant Discharge Instructions     After you go home:    Have an adult stay with you until tomorrow.  You may resume your normal diet.       For 24 hours - due to the sedation you received:  Relax and take it easy.  Do NOT make any important or legal decisions.  Do NOT drive or operate machines at home or at work.  Do NOT drink alcohol.    Care of Chest Incision:    Keep the Aquacel (tan rectangular) dressing on until your return appointment. The dressing will be removed by the device nurse.   If there is a pressure dressing (gauze & tape) - 24 hours after your procedure you may remove ONLY the top dressing. Leave the bottom Aquacel dressing on.  Leave the strips of tape on. They will fall off on their own.  Check your wound daily for signs of infection, such as increased redness, severe swelling or draining. Fever may also be a sign of infection. Call us if you see any of these signs.  You may shower with the Aquacel (waterproof) dressing in place the morning after your procedure.  Do not scrub on top of the dressing & avoid peeling the dressing off. If you take a tub bath, keep the dressing dry. If the edges of the dressing are peeling off, do NOT shower & contact the clinic for further instruction.  No soaking the incision (swimming pool, bathtub, hot tub) until you are cleared at your 6-8 week check.  Never put any creams, lotions, powders or peroxide on your incision area unless your doctor tells you to.  You may have mild to medium pain for 3 to 5 days. Take Acetaminophen (Tylenol) or Ibuprofen (Advil) for the pain. If the pain persists or is severe, call us.    Activity:    For at least 2 weeks: Do not raise your elbow above your shoulder. You can begin to use your arm as it feels comfortable to you.  Do not use arm on implant side to lift more than 10 pounds for the first week.  For 4 weeks: Refrain from strenuous sports - tennis, pickle ball, basketball, golf or weight lifting.  Ask your doctor  when you can expect to return to work.    Bleeding:    If you start bleeding from the incision site, sit down and press firmly on the site for 10 minutes.   Once bleeding stops, call [facility] as soon as you can.       Call 911 right away if you have heavy bleeding or bleeding that does not stop.      Medicines:    Take your medications, including blood thinners, unless your provider tells you not to.  If you have stopped any medicines, check with your provider about when to restart them.    Follow Up Appointments:    Follow up with Device Clinic at [facility] of patient preference as scheduled.    Call the clinic if:    You have a large or growing hard lump around the site.  The site is red, swollen, hot or tender.  Blood or fluid is draining from the site.  You have chills or a fever greater than 100.4 F (38 C).  You feel dizzy or light-headed.  Chest pain  Lack of energy  Rapid pulse or pounding heartbeat  Shortness of breath  Increased pain around your pacemaker  Questions or concerns    Telling others about your device:    Before you leave the hospital, you will receive a temporary ID card. A permanent card will be mailed to you about 6 to 8 weeks later. Always carry the ID card with you. It has important details about your device.  You may also get a Medical Alert bracelet or tag that says you have a pacemaker.  Go to [email or web address].   Always tell doctors, dentists and other care providers that you have a device implanted in you.  Let us know before you plan any surgeries. Your care team must take special steps to keep you safe during certain procedures. These steps will depend on the type of device you have. Your provider will need to see your ID card. They may need to call us for instructions.    Device Safety:    Please refer to device  s booklet for further information.  Keep your cell phone away from your pacemaker. Don't carry the phone in your shirt pocket, even when it is  turned off.  Avoid strong magnets - MRIs & hand-held security wands.  Avoid strong electrical fields - radio transmitting towers, ham radios, & heavy duty electrical equipment.  Avoid leaning over the open mora of a running car.      [facility] in [place]: [phone]   Device Clinic (Mon-Fri 8am-4pm) [phone]  Or you may contact your provider via My Chart    The device clinic is closed on weekends & holidays.  Any calls received during this time will be answered on the next business day.  For any urgent questions after hours, please call the main clinic number and you will be put in contact with the cardiologist on call.

## 2024-12-05 NOTE — PROGRESS NOTES
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      Recurrent syncope in the setting of tachy-kamille syndrome  Hx Afib w/ MAZE procedure, Bifascicular Block, on AC  Hx of nonischemic HFrEF 37%  Hx Severe aortic stenosis s/p aortic valve replacement in 1/2011   Hx CAD w/ chronic LAD 70% stenosis  Hx Moderate MR  Patient presents with complaints of 2 episodes of pre-syncope in the AM while sitting down, 2x episodes of flushing+palpitations in the afternoon while having dinner, and 2 episodes of syncope when taking a few steps w/o fall/injury (one in the evening at home and the 2nd on arrival to [facility]). No CP/Dyspnea. No active palpitations. Feels that his HR is irregular.   *TTE 12/4 EF 40%, generalized LV hypokinesis, mechanical aortic valve prosthetic valve without concern.  -Cardiology consulted, EP planning for pacemaker placement today  -Warfarin dosing per pharmacy, currently held with pacemaker placement defer to cardiology on restarting  -Hold Coreg/Digoxin until cleared by cardiology  -resume lisinopril, aspirin, and spironlactone. Holding jardiance periprocedurally  -No clinical indication to give his PRN lasix dose at this time as asymptomatic and well below his dry weight of 199 lbs (note patient takes it as standing, despite cardiology requesting it be done PRN)  -Orthostatics     Hypothyroidism: resumed synthroid, TSH 0.48  HLD: resumed statin   Chronic Alkalosis: CO2 31, at baesline. VBG in AM, urine chloride. Likely 2/2 diuretics  Chronic anemia: Hb stable at 12.6  Mood disorder: restart psychotropic meds once confirmed          Diet: NPO per Anesthesia Guidelines for Procedure/Surgery Except for: Meds    DVT Prophylaxis: Warfarin  [place] Rosario Catheter: Not present  Lines:  "None     Cardiac Monitoring: ACTIVE order. Indication: Tachyarrhythmias, acute (48 hours)  Code Status: Full Code      Clinically Significant Risk Factors                # Coagulation Defect: INR = 2.25 (Ref range: 0.85 - 1.15) and/or PTT = N/A, will monitor for bleeding    # Hypertension: Noted on problem list            # Overweight: Estimated body mass index is 28.4 kg/m  as calculated from the following:    Height as of this encounter: 1.753 m (5' 9\").    Weight as of this encounter: 87.2 kg (192 lb 4.8 oz)., PRESENT ON ADMISSION            Social Drivers of Health    Physical Activity: Insufficiently Active (8/26/2024)    Exercise Vital Sign     Days of Exercise per Week: 2 days     Minutes of Exercise per Session: 20 min   Social Connections: Unknown (8/26/2024)    Social Connection and Isolation Panel [NHANES]     Frequency of Social Gatherings with Friends and Family: Once a week          Disposition Plan     Medically Ready for Discharge: Anticipated Tomorrow post-pacemaker placement           Campos Dyer MD  Hospitalist Service  [facility]  Securely message with [employer] (more info)  Text page via [employer] Paging/Directory   ______________________________________________________________________    Interval History   No acute overnight events, patient has been lying in bed, has not gotten up or had any episodes of syncope or dizziness in bed.  Planning for pacemaker placement today.      Physical Exam   Vital Signs: Temp: 97.4  F (36.3  C) Temp src: Axillary BP: 116/74 Pulse: 84   Resp: 16 SpO2: 93 % O2 Device: None (Room air)    Weight: 192 lbs 4.8 oz    Constitutional: awake, alert, cooperative, NAD  HEENT: normocephalic, anicteric sclerae, MMM   Pulmonary: no increased work of breathing on room air, lungs clear to auscultation bilaterally without wheezes or rales   Cardiovascular:  RRR, normal S1 and S2, prosthetic valve click, no murmur appreciated   GI: BS+, soft, non-tender, " non-distended, without guarding or rigidity  Skin: warm, dry  MSK: no peripheral edema bilaterally   Neuro: AAOx3, no gross focal neurologic deficits noted      Medical Decision Making       40 MINUTES SPENT BY ME on the date of service doing chart review, history, exam, documentation & further activities per the note.      Data     I have personally reviewed the following data over the past 24 hrs:    6.2  \   12.8 (L)   / 196     N/A N/A N/A /  N/A   N/A N/A N/A \     INR:  1.77 (H) PTT:  N/A   D-dimer:  N/A Fibrinogen:  N/A       Imaging results reviewed over the past 24 hrs:   No results found for this or any previous visit (from the past 24 hours).

## 2024-12-05 NOTE — PLAN OF CARE
Goal Outcome Evaluation:    Neuro: AOx4  Cardiac: Afib CVR (80s-90s) , denies pain, BP soft (100s-110s  systolic)  Resp: 93% on room air, clear LS B/L, no SOB, RR: 16  GI/: continent, ambulates to bedside commode, began NPO at 0000  Skin: dusky RLE, skin otherwise warm and intact  Mobility: 1x assist, GB    Plan: pacemaker today, continue to hold coumadin

## 2024-12-06 ENCOUNTER — APPOINTMENT (OUTPATIENT)
Dept: GENERAL RADIOLOGY | Facility: CLINIC | Age: 69
DRG: 243 | End: 2024-12-06
Attending: INTERNAL MEDICINE
Payer: COMMERCIAL

## 2024-12-06 VITALS
SYSTOLIC BLOOD PRESSURE: 101 MMHG | RESPIRATION RATE: 18 BRPM | HEART RATE: 84 BPM | OXYGEN SATURATION: 95 % | HEIGHT: 69 IN | WEIGHT: 189.1 LBS | DIASTOLIC BLOOD PRESSURE: 75 MMHG | BODY MASS INDEX: 28.01 KG/M2 | TEMPERATURE: 98 F

## 2024-12-06 VITALS
BODY MASS INDEX: 27.96 KG/M2 | TEMPERATURE: 97.6 F | WEIGHT: 188.8 LBS | HEART RATE: 97 BPM | RESPIRATION RATE: 18 BRPM | DIASTOLIC BLOOD PRESSURE: 73 MMHG | HEIGHT: 69 IN | SYSTOLIC BLOOD PRESSURE: 100 MMHG | OXYGEN SATURATION: 94 %

## 2024-12-06 LAB
ANION GAP SERPL CALCULATED.3IONS-SCNC: 8 MMOL/L (ref 7–15)
BUN SERPL-MCNC: 17.7 MG/DL (ref 8–23)
CALCIUM SERPL-MCNC: 9.1 MG/DL (ref 8.8–10.4)
CHLORIDE SERPL-SCNC: 105 MMOL/L (ref 98–107)
CREAT SERPL-MCNC: 0.7 MG/DL (ref 0.67–1.17)
EGFRCR SERPLBLD CKD-EPI 2021: >90 ML/MIN/1.73M2
ERYTHROCYTE [DISTWIDTH] IN BLOOD BY AUTOMATED COUNT: 15.4 % (ref 10–15)
GLUCOSE SERPL-MCNC: 82 MG/DL (ref 70–99)
HCO3 SERPL-SCNC: 26 MMOL/L (ref 22–29)
HCT VFR BLD AUTO: 38.8 % (ref 40–53)
HGB BLD-MCNC: 12.5 G/DL (ref 13.3–17.7)
INR PPP: 1.55 (ref 0.85–1.15)
MAGNESIUM SERPL-MCNC: 1.9 MG/DL (ref 1.7–2.3)
MCH RBC QN AUTO: 28.5 PG (ref 26.5–33)
MCHC RBC AUTO-ENTMCNC: 32.2 G/DL (ref 31.5–36.5)
MCV RBC AUTO: 89 FL (ref 78–100)
PHOSPHATE SERPL-MCNC: 3.2 MG/DL (ref 2.5–4.5)
PLATELET # BLD AUTO: 197 10E3/UL (ref 150–450)
POTASSIUM SERPL-SCNC: 4.1 MMOL/L (ref 3.4–5.3)
RBC # BLD AUTO: 4.38 10E6/UL (ref 4.4–5.9)
SODIUM SERPL-SCNC: 139 MMOL/L (ref 135–145)
WBC # BLD AUTO: 6.6 10E3/UL (ref 4–11)

## 2024-12-06 PROCEDURE — 84100 ASSAY OF PHOSPHORUS: CPT

## 2024-12-06 PROCEDURE — 80048 BASIC METABOLIC PNL TOTAL CA: CPT

## 2024-12-06 PROCEDURE — 36415 COLL VENOUS BLD VENIPUNCTURE: CPT

## 2024-12-06 PROCEDURE — 85610 PROTHROMBIN TIME: CPT | Performed by: STUDENT IN AN ORGANIZED HEALTH CARE EDUCATION/TRAINING PROGRAM

## 2024-12-06 PROCEDURE — 250N000013 HC RX MED GY IP 250 OP 250 PS 637: Performed by: NURSE PRACTITIONER

## 2024-12-06 PROCEDURE — 83735 ASSAY OF MAGNESIUM: CPT

## 2024-12-06 PROCEDURE — 99239 HOSP IP/OBS DSCHRG MGMT >30: CPT

## 2024-12-06 PROCEDURE — 250N000013 HC RX MED GY IP 250 OP 250 PS 637: Performed by: STUDENT IN AN ORGANIZED HEALTH CARE EDUCATION/TRAINING PROGRAM

## 2024-12-06 PROCEDURE — 85048 AUTOMATED LEUKOCYTE COUNT: CPT

## 2024-12-06 PROCEDURE — 999N000065 XR CHEST 2 VIEWS

## 2024-12-06 PROCEDURE — 85018 HEMOGLOBIN: CPT

## 2024-12-06 RX ORDER — CARVEDILOL 6.25 MG/1
6.25 TABLET ORAL 2 TIMES DAILY WITH MEALS
Status: DISCONTINUED | OUTPATIENT
Start: 2024-12-06 | End: 2024-12-06 | Stop reason: HOSPADM

## 2024-12-06 RX ORDER — DIGOXIN 125 MCG
125 TABLET ORAL DAILY
Status: DISCONTINUED | OUTPATIENT
Start: 2024-12-06 | End: 2024-12-06 | Stop reason: HOSPADM

## 2024-12-06 RX ORDER — CARVEDILOL 6.25 MG/1
6.25 TABLET ORAL 2 TIMES DAILY WITH MEALS
Qty: 60 TABLET | Refills: 1 | Status: SHIPPED | OUTPATIENT
Start: 2024-12-06

## 2024-12-06 RX ADMIN — DIGOXIN 125 MCG: 125 TABLET ORAL at 08:53

## 2024-12-06 RX ADMIN — CARVEDILOL 6.25 MG: 6.25 TABLET, FILM COATED ORAL at 08:56

## 2024-12-06 RX ADMIN — SERTRALINE HYDROCHLORIDE 25 MG: 25 TABLET ORAL at 08:08

## 2024-12-06 RX ADMIN — ASPIRIN 81 MG CHEWABLE TABLET 81 MG: 81 TABLET CHEWABLE at 08:08

## 2024-12-06 RX ADMIN — LEVOTHYROXINE SODIUM 200 MCG: 100 TABLET ORAL at 06:34

## 2024-12-06 RX ADMIN — ACETAMINOPHEN 650 MG: 325 TABLET, FILM COATED ORAL at 04:16

## 2024-12-06 RX ADMIN — EMPAGLIFLOZIN 10 MG: 10 TABLET, FILM COATED ORAL at 08:09

## 2024-12-06 RX ADMIN — SPIRONOLACTONE 12.5 MG: 25 TABLET ORAL at 08:53

## 2024-12-06 RX ADMIN — ACETAMINOPHEN 650 MG: 325 TABLET, FILM COATED ORAL at 08:12

## 2024-12-06 ASSESSMENT — ACTIVITIES OF DAILY LIVING (ADL)
ADLS_ACUITY_SCORE: 33

## 2024-12-06 NOTE — PLAN OF CARE
Goal Outcome Evaluation:      Plan of Care Reviewed With: spouse, patient      Discharge home with wife, up in the room independent, tolerated food denies pain. Pacemaker site is stable no bleeding no hematoma, VSS. RA.

## 2024-12-06 NOTE — PROGRESS NOTES
MD Notification    Notified Person: MD    Notified Person Name: Leigh Atkins    Notification Date/Time: 12/5/24 @21:50pm    Notification Interaction: heidy    Purpose of Notification: Hi, pt has a hx of afib on warfarin. Got a pacemaker today, left bundle pacing. Has been tachycardic (110s-140s). Can he resume HR meds?      Orders Received: Resume any calcium channel blocker or beta blocker pt was on prior to admission: Carvedilol 12.5mg 2 times daily resumed    Comments:

## 2024-12-06 NOTE — PLAN OF CARE
Goal Outcome Evaluation:    Neuro: AOx4  Cardiac: Pt was tachycardic (110s-140s) around start of shift. Paged on- call cardiologist and resumed Carvedilol. Now afib CVR (80s-90s); coumadin resumed this evening; pacemaker site CDI complains of soreness at site, tylenol x2 for pain, BP soft (90s-110s  systolic)  Resp: 94% on room air, clear LS B/L, no SOB, RR: 18  GI/: continent, ambulates to bedside commode, urinal at bedside  Skin: dusky RLE, skin otherwise warm and intact  Mobility: 1x assist, GB    Plan: Interrogation and CXR today, possible discharge

## 2024-12-06 NOTE — DISCHARGE SUMMARY
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    Weight as of this encounter: 85.6 kg (188 lb 12.8 oz).       Follow-ups Needed After Discharge   Follow-up Appointments       Follow-up and recommended labs and tests       Please have your INR checked on Monday or Tuesday next week    Follow-up will be arranged by our device RN 7-10 days  Echocardiogram is ordered for April.  Would recommend follow-up with Dr. Cooley at that time.  Will have him see Mattie Olivarez in 3-4 weeks for post hospital follow up. Order placed                Discharge Disposition   Discharged to home  Condition at discharge: Stable    Hospital Course   69M hx CAD, Afib, Aortic stenosis s/p replacement on coumadrin, Hypothyroidism presenting w/ recurrent syncope found to have tachybrady syndrome.      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). No CP/Dyspnea. No active palpitations. Feels that his HR is irregular.   *TTE 12/4 EF 40%, generalized LV hypokinesis, mechanical aortic valve prosthetic valve without concern.  *EP consulted, s/p Implantation of single-chamber Medtronic pacemaker with left bundle pacing 12/5/2024  *INR reversed with IV vitamin K, warfarin restarted 12/5, does NOT need bridging with risk of bleeding, INR 1.55 at discharge  Warfarin- discussed with pharmacy increased to 6 mg daily from PTA for until INR repeat on Monday or Tuesday  Decrease carvedilol from 12.5 mg twice daily to 6.25 mg twice daily per cardiology  Stop lisinopril 2.5 mg with borderline low blood pressures  Continue digoxin 125 mcg daily  Continue Aldactone  Post pacemaker instructions given on AVS  EP device RN follow-up in 7 to 10 days  3-4-week follow-up with cardiology VALDO  TTE repeat 4/2025, follow-up with Dr. Cooley at that time     Hypothyroidism: PTA synthroid, TSH 0.48  HLD: PTA statin   Chronic Alkalosis: CO2 31, at baesline. VBG in AM, urine chloride. Likely 2/2 diuretics  Chronic anemia: Hb stable at 12.6  Mood disorder: PTA sertraline    Consultations This Hospital Stay   CARDIOLOGY IP CONSULT  PHARMACY TO DOSE WARFARIN  PHARMACY TO DOSE MEDICATION    Code Status   Prior    Time Spent on this Encounter   I, Campos Dyer MD, personally saw the patient today and spent greater than 30 minutes discharging this patient.       Campos Dyer MD  [facility]  [address] [place] [address] AVE, [address] [address]  Phone: [phone]  ______________________________________________________________________    Physical Exam   Vital Signs: Temp: 97.6  F (36.4  C) Temp src: Oral BP: 100/73 Pulse: 97   Resp: 18 SpO2: 94 % O2 Device: None (Room air)    Weight: 188 lbs 12.8 oz  Patient evaluated on day of discharge, pacemaker placement went well, has some soreness at the  insertion site, but no chest pain, shortness of breath, dizziness or other concerns.    Constitutional: awake, alert, cooperative, NAD  HEENT: normocephalic, anicteric sclerae, MMM   Pulmonary: no increased work of breathing on room air, lungs clear to auscultation bilaterally without wheezes or rales   Cardiovascular:  RRR, normal S1 and S2, prosthetic valve click, no murmur appreciated, left pectoral dressing in place C/D/I  GI: soft, non-tender, non-distended, without guarding or rigidity  Skin: warm, dry  MSK: no peripheral edema bilaterally   Neuro: AAOx3, no gross focal neurologic deficits noted       Primary Care Physician   Kimber Prado    Discharge Orders      Follow-Up with Cardiology VALDO      Follow-Up with Cardiology      Reason for your hospital stay    You were hospitalized for syncope, and had a pacemaker placed     Follow-up and recommended labs and tests     Please have your INR checked on Monday or Tuesday next week    Follow-up will be arranged by our device RN 7-10 days  Echocardiogram is ordered for April.  Would recommend follow-up with Dr. Cooley at that time.  Will have him see Mattie Olivarez in 3-4 weeks for post hospital follow up. Order placed     Activity    Your activity upon discharge: written instructions below     Discharge Instructions    Warfarin dosing: take 6mg (1.5 pills) until INR recheck early next week (either 12/9 or 12/10)     Diet    Follow this diet upon discharge: Regular diet       Significant Results and Procedures   Most Recent 3 CBC's:  Recent Labs   Lab Test 12/06/24  0553 12/05/24  0635 12/04/24  0603   WBC 6.6 6.2 5.4   HGB 12.5* 12.8* 11.9*   MCV 89 88 88    196 183     Most Recent 3 BMP's:  Recent Labs   Lab Test 12/06/24  0553 12/04/24  0603 12/03/24  1943    139 140   POTASSIUM 4.1 4.1 4.6   CHLORIDE 105 104 102   CO2 26 28 31*   BUN 17.7 18.0 19.8   CR 0.70 0.79 1.04   ANIONGAP 8 7 7   REECE 9.1 8.8 9.1   GLC 82 85 103*     Most Recent 3  Troponin's:  Recent Labs   Lab Test 02/10/21  1720   TROPI <0.015   ,   Results for orders placed or performed during the hospital encounter of 12/03/24   XR Chest 2 Views    Narrative    EXAM: XR CHEST 2 VIEWS  LOCATION: [facility]  DATE: 12/3/2024    INDICATION: syncope  COMPARISON: 12/7/2023      Impression    IMPRESSION: Mild interstitial pulmonary edema. Stable enlarged cardiac silhouette. Aortic valve replacement. Intact sternotomy wires.   US Lower Extremity Venous Duplex Bilateral    Narrative    EXAM: US LOWER EXTREMITY VENOUS DUPLEX BILATERAL  LOCATION: [facility]  DATE: 12/3/2024    INDICATION: r o DVT, RLE swelling discolouration. Syncope.  COMPARISON: None.  TECHNIQUE: Venous Duplex ultrasound of bilateral lower extremities with and without compression, augmentation and duplex. Color flow and spectral Doppler with waveform analysis performed.    FINDINGS: Exam includes the common femoral, femoral, popliteal veins as well as segmentally visualized deep calf veins and greater saphenous vein.     RIGHT: No deep vein thrombosis. No superficial thrombophlebitis. No popliteal cyst.    LEFT: No deep vein thrombosis. No superficial thrombophlebitis. No popliteal cyst.      Impression    IMPRESSION:  1.  No deep venous thrombosis in the bilateral lower extremities.   X-ray Chest 2 vws*    Narrative    CHEST TWO VIEWS  12/6/2024 8:44 AM       INDICATION: Status post pacer/ICD.    COMPARISON: 12/3/2024       Impression    IMPRESSION: New single lead cardiac pacemaker in the right ventricle.  Sternotomy wires and prosthetic valve are unchanged. Cardiomegaly. No  pneumothorax.    BETH LOAIZA MD         SYSTEM ID:  [id number]   Echocardiogram Complete     Value    LVEF  40%    Narrative    [id number]  [facility]  [id number]  [id number]^SAADEDKEVIN^[facility]  Echocardiography Laboratory  [address] [place] [address]     Name: BEL  CHAS FRANCIS  MRN: [id number]  : 1955  Study Date: 2024 08:32 AM  Age: 69 yrs  Gender: Male  Patient Location: [facility]  Reason For Study: Syncope  Ordering Physician: ROXANNE BECKMAN  Referring Physician: ROXANNE BECKMAN  Performed By: Reji Hanson     BSA: 2.0 m2  Height: 69 in  Weight: 193 lb  HR: 84  BP: 112/92 mmHg  ______________________________________________________________________________  Procedure  Complete Portable Echo Adult. Optison (NDC #[id number]) given intravenously.  Technically difficult study. Poor quality color and spectral Doppler were  performed and interpreted.  ______________________________________________________________________________  Interpretation Summary     1. Mild left ventricular enlargement with moderately decreased systolic  function. Estimated ejection fraction is 40%.  2. Generalized left ventricular hypokinesis. Abnormal ventricular septal  motion due to abnormal conduction and postoperative status.  3. Mild right ventricular enlargement with mildly decreased systolic function.  4. Estimated right ventricular systolic pressure is 42 mmHg.  5. Severe left atrial enlargement.  6. Status post 24 mm ATS mechanical aortic prosthetic valve (). Aortic  prosthetic mean systolic gradient is 8 mmHg. No aortic prosthetic or  periprosthetic regurgitation.  7. Mild-moderate mitral regurgitation.  8. Mild-moderate tricuspid regurgitation.  9. No significant change compared to prior exam from 8/15/2023.  ______________________________________________________________________________  Left Ventricle  Mild left ventricular enlargement. Mildly increased left ventricular wall  thickness. Diastolic Doppler findings (E/E' ratio and/or other parameters)  suggest left ventricular filling pressures are increased. The visual ejection  fraction is estimated at 40%. Generalized left ventricular hypokinesis.  Abnormal ventricular septal motion due to abnormal conduction and  postoperative  status.     Right Ventricle  Mild right ventricular enlargement with mildly decreased systolic function.  Estimated right ventricular systolic pressure is 42 mmHg.     Atria  The left atrium is severely dilated. The right atrium is moderately dilated.  Atrial septum not well-visualized. There is no color Doppler evidence of an  atrial shunt.     Mitral Valve  The mitral valve leaflets are mildly thickened. There is mild to moderate (1-  2+) mitral regurgitation.     Tricuspid Valve  The tricuspid valve is not well visualized. There is mild to moderate (1-2+)  tricuspid regurgitation.     Aortic Valve  Status post 24 mm ATS mechanical aortic prosthetic valve (2011). Aortic  prosthetic mean systolic gradient is 8 mmHg. No aortic prosthetic or  periprosthetic regurgitation.     Pulmonic Valve  The pulmonic valve is not well visualized. There is mild (1+) pulmonic  valvular regurgitation. There is no pulmonic valvular stenosis.     Vessels  The aortic root is normal size. Normal size ascending aorta. Dilation of the  inferior vena cava is present with normal respiratory variation in diameter.     Pericardium  There is no pericardial effusion.     Rhythm  The rhythm was atrial fibrillation.  ______________________________________________________________________________  MMode/2D Measurements & Calculations  IVSd: 1.2 cm     LVIDd: 5.7 cm  LVIDs: 4.3 cm  LVPWd: 1.3 cm  FS: 24.3 %  LV mass(C)d: 301.5 grams  LV mass(C)dI: 148.2 grams/m2  Ao root diam: 3.8 cm  asc Aorta Diam: 3.7 cm  LVOT diam: 2.4 cm  LVOT area: 4.6 cm2  Ao root diam index Ht(cm/m): 2.2  Ao root diam index BSA (cm/m2): 1.9  Asc Ao diam index BSA (cm/m2): 1.8  Asc Ao diam index Ht(cm/m): 2.1  LA Volume (BP): 308.0 ml     LA Volume Index (BP): 151.7 ml/m2  RV Base: 4.7 cm  RWT: 0.44  TAPSE: 1.1 cm     Doppler Measurements & Calculations  MV E max aaron: 89.1 cm/sec  MV dec slope: 393.7 cm/sec2  MV dec time: 0.24 sec  Ao V2 max: 174.4 cm/sec  Ao max PG: 10.0  mmHg  Ao V2 mean: 117.2 cm/sec  Ao mean P.6 mmHg  Ao V2 VTI: 32.5 cm  PATEL(I,D): 2.4 cm2  PATEL(V,D): 2.7 cm2  LV V1 max P.3 mmHg  LV V1 max: 102.8 cm/sec  LV V1 VTI: 16.8 cm  SV(LVOT): 77.7 ml  SI(LVOT): 38.2 ml/m2  PA acc time: 0.11 sec  TR max ethan: 293.2 cm/sec  TR max P.4 mmHg  RVSP(TR): 42.4 mmHg  AV Ethan Ratio (DI): 0.59  PATEL Index (cm2/m2): 1.2     E/E' av.5  Lateral E/e': 6.7  Medial E/e': 18.4  RV S Ethan: 7.3 cm/sec     ______________________________________________________________________________  Report approved by: Loreto Reynaga MD on 2024 10:04 AM           *Note: Due to a large number of results and/or encounters for the requested time period, some results have not been displayed. A complete set of results can be found in Results Review.       Discharge Medications   Discharge Medication List as of 2024 10:50 AM        CONTINUE these medications which have CHANGED    Details   carvedilol (COREG) 6.25 MG tablet Take 1 tablet (6.25 mg) by mouth 2 times daily (with meals). For heart rate, Disp-60 tablet, R-1, E-Prescribe           CONTINUE these medications which have NOT CHANGED    Details   amoxicillin (AMOXIL) 500 MG capsule Take 2,000 mg by mouth See Admin Instructions. Before dental procedures, Historical      Ascorbic Acid (VITAMIN C PO) Take 500 mg by mouth daily, Historical      aspirin 81 MG tablet Take 81 mg by mouth Every Mon, Wed, Fri Morning, Historical      calcium carbonate-vitamin D 600-200 MG-UNIT TABS Take 1 tablet by mouth 2 times daily , Historical      Cholecalciferol (VITAMIN D) 2000 UNIT CAPS Take 1 tablet by mouth daily , Historical      Cyanocobalamin (VITAMIN B-12) 500 MCG SUBL Place 500 mcg under the tongue daily , Historical      empagliflozin (JARDIANCE) 10 MG TABS tablet Take 1 tablet (10 mg) by mouth daily., Disp-90 tablet, R-3, E-Prescribe      furosemide (LASIX) 20 MG tablet Take 0.5 tablets (10 mg) by mouth daily as needed (for weight gain 2lbs or  more in a day), Disp-100 tablet, R-3, E-Prescribe      levothyroxine (SYNTHROID/LEVOTHROID) 200 MCG tablet Take 1 tablet (200 mcg) by mouth daily, Disp-90 tablet, R-3, E-Prescribe      Multiple Vitamins-Minerals (CENTRUM) CHEW Take 1 tablet by mouth daily , Historical      nitroGLYcerin (NITROSTAT) 0.3 MG sublingual tablet For chest pain place 1 tablet under the tongue every 5 minutes for 3 doses. If symptoms persist 5 minutes after 1st dose call 911., Disp-25 tablet, R-1, E-Prescribe      Psyllium (FIBER) 0.52 GM CAPS Take 2 capsules by mouth daily, Historical      sertraline (ZOLOFT) 25 MG tablet Take 1 tablet (25 mg) by mouth daily, Disp-90 tablet, R-1, E-Prescribe      simvastatin (ZOCOR) 20 MG tablet Take 1 tablet (20 mg) by mouth at bedtime, Disp-90 tablet, R-3, E-Prescribe      spironolactone (ALDACTONE) 25 MG tablet Take 0.5 tablets (12.5 mg) by mouth daily., Disp-45 tablet, R-3, E-Prescribe      triamcinolone (KENALOG) 0.1 % external cream APPLY TO AFFECTED AREA TWICE A DAYDisp-45 g, [id number]      warfarin ANTICOAGULANT (COUMADIN) 4 MG tablet Take 4 mg (1 tablet) daily or as directed by the [facility], Disp-90 tablet, R-1, E-Prescribe      digoxin (LANOXIN) 125 MCG tablet Take 1 tablet (125 mcg) by mouth daily, Disp-90 tablet, R-3, E-Prescribe           STOP taking these medications       lisinopril (ZESTRIL) 5 MG tablet Comments:   Reason for Stopping:             Allergies   Allergies   Allergen Reactions    Seasonal Allergies

## 2024-12-06 NOTE — PROGRESS NOTES
EP Progress Note          Assessment and Plan:   Jovon Mantilla is a 69 year old male with a pertinent history of severe aortic stenosis s/p mechanical AVR in 2011 (24mm JAPSAL supraannual valve), chronic atrial fibrillation (average HR 90 bpm per 2022 holter, maintained on digoxin 125 mcg daily and coreg 12.5 BID), anticoagulated with warfarin, CAD (70% LAD lesion noted prior to AV replacement managed medically d/t intramyocardial course, stable on 2022 angiogram, with no ischemia noted on recent stress testing), moderate mitral regurgitation, and a nonischemic cardiomyopathy (first noted post-AVR with a fluctuating EF over the years, felt in part tachy-mediated, most recently LVEF 37% on cardiac MRI with FC II sxs), who presents with syncope. EP was consulted to assist with care.     Tachy-kamille syndrome, atrial fibrillation  Implantation of single-chamber Medtronic pacemaker. Failed attempt of left bundle pacing due to severely dilated RA and RV  CXR Good lead placement and no pneumothorax   Device RN education to be completed prior to discharge home.  Threshold interrogation this morning stable  Noted to be tachycardic last night.  PTA carvedilol restarted at 12.5 mg twice daily last night.  Will also restart digoxin 125 mcg daily    IF rates are fast in the future can consider AV node ablation.    2. HTN  PTA carvedilol 12.5 mg twice daily. I will decrease 6.25 mg bid for soft b/p  Stop lisinopril. Can consider restarting as an outpt. Pressures are soft this am. Pt asymptomatic.  Orthostatic and Q2 hour b/p's d/c'd this am      3.  Nonischemic cardiomyopathy EF 40%  Euvolemic on exam  Patient restarted on PTA meds: Carvedilol 12.5 mg twice daily, digoxin 125 mcg daily, and Jardiance  On 6.25 mg bid of carvedilol for now.     4. Hx mechanical AVR   INR was on hold. 1.55 this am  Restarted last night and managed by pharmacy  NO heparin or Lovenox needed especially with new pacer pocket and hematoma risk    Follow-up  "will be arranged by our device RN 7-10 days  Echocardiogram is ordered for April.  Would recommend follow-up with Dr. Cooley at that time.  Will have him see Mattie Olivarez in 3-4 weeks for post hospital follow up. Order placed  See  in April with echo. Scheduling will call pt.    EP signing off. Please call with questions or concerns      Humera Benton NP, APRAVERY CNP  Beeper [phone]              Interval History:   Feeling well and offers no concerns at this time. B/P soft, but pt asymptomatic. Tele afib with intermittent pacing.       Medications:     Current Facility-Administered Medications   Medication Dose Route Frequency Provider Last Rate Last Admin    aspirin (ASA) chewable tablet 81 mg  81 mg Oral Daily Jimmy Hand MD   81 mg at 12/06/24 0808    carvedilol (COREG) tablet 6.25 mg  6.25 mg Oral BID w/meals Humera Benton APRN CNP   6.25 mg at 12/06/24 0856    digoxin (LANOXIN) tablet 125 mcg  125 mcg Oral Daily Humera Benton APRN CNP   125 mcg at 12/06/24 0853    empagliflozin (JARDIANCE) tablet 10 mg  10 mg Oral Daily Humera Benton APRN CNP   10 mg at 12/06/24 0809    levothyroxine (SYNTHROID/LEVOTHROID) tablet 200 mcg  200 mcg Oral Daily Jimmy Hand MD   200 mcg at 12/06/24 0634    sertraline (ZOLOFT) tablet 25 mg  25 mg Oral Daily Jimmy Hand MD   25 mg at 12/06/24 0808    simvastatin (ZOCOR) tablet 20 mg  20 mg Oral At Bedtime Jimmy Hand MD   20 mg at 12/05/24 2130    spironolactone (ALDACTONE) half-tab 12.5 mg  12.5 mg Oral Daily Jimmy Hand MD   12.5 mg at 12/06/24 0853    Warfarin Dose Required Daily - Pharmacist Managed  1 each Oral See Admin Instructions Jimmy Hand MD                 Review of Systems: If done, described below  The Review of Systems is negative other than noted in the HPI             Physical Exam:   Blood pressure 100/73, pulse 97, temperature 97.6  F (36.4  C), resp. rate 18, height 1.753 m (5' 9\"), weight 85.6 kg (188 lb " 12.8 oz), SpO2 94%.      Vital Sign Ranges  Temperature Temp  Av.7  F (36.5  C)  Min: 97.4  F (36.3  C)  Max: 98  F (36.7  C)   Blood pressure Systolic (24hrs), Av , Min:78 , Max:131        Diastolic (24hrs), Av, Min:51, Max:93      Pulse Pulse  Av.7  Min: 73  Max: 111   Respirations Resp  Av.7  Min: 15  Max: 18   Pulse oximetry SpO2  Av.9 %  Min: 80 %  Max: 95 %         Intake/Output Summary (Last 24 hours) at 2024 0739  Last data filed at 2024 1700  Gross per 24 hour   Intake 150 ml   Output 400 ml   Net -250 ml       Constitutional:   in no apparent distress     Chest:   Left pectoral pressure dressing on     Lungs:   symmetric, clear to auscultation     Cardiovascular:   irregularly irregular rhythm and no murmur noted. Good valvular click     Extremities and Back:   No edema              Data:     Lab Results   Component Value Date    WBC 6.6 2024    HGB 12.5 (L) 2024    HCT 38.8 (L) 2024     2024     2024    POTASSIUM 4.1 2024    CHLORIDE 105 2024    CO2 26 2024    BUN 17.7 2024    CR 0.70 2024    GLC 82 2024    SED 24 (H) 2005    NTBNPI 2,132 (H) 2023    NTBNP 1,082 (H) 2022    TROPONIN <0.07 2005    TROPI <0.015 02/10/2021    AST 28 2024    ALT 18 2024    ALKPHOS 93 2024    BILITOTAL 0.7 2024    INR 1.55 (H) 2024

## 2024-12-08 ENCOUNTER — PATIENT OUTREACH (OUTPATIENT)
Dept: CARE COORDINATION | Facility: CLINIC | Age: 69
End: 2024-12-08
Payer: COMMERCIAL

## 2024-12-08 NOTE — PROGRESS NOTES
"[facility]  Transitions of Care Outreach  Chief Complaint   Patient presents with    Clinic Care Coordination - Post Hospital       Most Recent Admission Date: 12/3/2024   Most Recent Admission Diagnosis: Syncope, unspecified syncope type - R55  Atrial fibrillation, unspecified type (H) - I48.91     Most Recent Discharge Date: 12/6/2024   Most Recent Discharge Diagnosis: Syncope, unspecified syncope type - R55  Atrial fibrillation, unspecified type (H) - I48.91  Atrial fibrillation, unspecified type (H) - I48.91  Idiopathic cardiomyopathy (H) - I42.9  Right inguinal hernia - K40.90  S/P AVR (aortic valve replacement) - Z95.2  Permanent atrial fibrillation (H) - I48.21     Transitions of Care Assessment    Discharge Assessment  How are you doing now that you are home?: \"I'm still a little sore but I've been taking it easy. I've been taking the tylenol every 6 hours but I seem to be okay. Otherwise I'm just down in my man case watching the football game\"  How are your symptoms? (Red Flag symptoms escalate to triage hotline per guidelines): Improved  Do you know how to contact your clinic care team if you have future questions or changes to your health status? : Yes  Does the patient have their discharge instructions? : Yes  Does the patient have questions regarding their discharge instructions? : Yes (see comment) (Pt is wondering if his heart care team is notified if his heart monitor detects a cardiac event. [facility] offered phone number for cardiac device department but patient declined as he already has this phone number as well as an upcoming appointment.)  Were you started on any new medications or were there changes to any of your previous medications? : Yes  Does the patient have all of their medications?: Yes  Do you have questions regarding any of your medications? : No  Do you have all of your needed medical supplies or equipment (DME)?  (i.e. oxygen tank, CPAP, cane, etc.): Yes    Post-op " ([facility] CTA Only)  If the patient had a surgery or procedure, do they have any questions for a nurse?: No      [facility] Explained and offered Care Coordination support to eligible patients: Yes    Patient accepted? No    Follow up Plan     Discharge Follow-Up  Discharge follow up appointment scheduled in alignment with recommended follow up timeframe or Transitions of Risk Category? (Low = within 30 days; Moderate= within 14 days; High= within 7 days): No  Patient's follow up appointment not scheduled: Patient declined scheduling support. Education on the importance of transitions of care follow up. Provided scheduling phone number. (Pt declined to schedule follow-up with PCP at this time but does have follow-up appointments schedule for cardiac device checks and with cardiologist in January.)    Future Appointments   Date Time Provider Department Center   12/9/2024  3:45 PM [facility] LAB [facility]   12/13/2024  8:15 AM THOMAS DCR2 [facility]   1/20/2025  8:15 AM THOMAS DCRN [facility]   1/23/2025  8:00 AM Mattie Olivarez APRN CNP [facility]   5/5/2025 10:00 AM [id number]       Outpatient Plan as outlined on AVS reviewed with patient.    For any urgent concerns, please contact our 24 hour nurse triage line: [phone] ([phone])         Kymberly Abel  Community Health Worker  Connected Care Resource [facility]    *Connected Care Resource Team does NOT follow patient ongoing. Referrals are identified based on internal discharge reports and the outreach is to ensure patient has an understanding of their discharge instructions.

## 2024-12-09 ENCOUNTER — LAB (OUTPATIENT)
Dept: LAB | Facility: CLINIC | Age: 69
End: 2024-12-09
Payer: COMMERCIAL

## 2024-12-09 ENCOUNTER — ANTICOAGULATION THERAPY VISIT (OUTPATIENT)
Dept: ANTICOAGULATION | Facility: CLINIC | Age: 69
End: 2024-12-09

## 2024-12-09 DIAGNOSIS — Z95.2 S/P AORTIC VALVE REPLACEMENT: ICD-10-CM

## 2024-12-09 DIAGNOSIS — Z79.01 LONG TERM CURRENT USE OF ANTICOAGULANT THERAPY: ICD-10-CM

## 2024-12-09 DIAGNOSIS — Z95.2 S/P AVR (AORTIC VALVE REPLACEMENT): ICD-10-CM

## 2024-12-09 DIAGNOSIS — S30.1XXA ABDOMINAL WALL HEMATOMA, INITIAL ENCOUNTER: ICD-10-CM

## 2024-12-09 DIAGNOSIS — Z95.2 S/P AVR (AORTIC VALVE REPLACEMENT): Primary | ICD-10-CM

## 2024-12-09 DIAGNOSIS — I48.21 PERMANENT ATRIAL FIBRILLATION (H): ICD-10-CM

## 2024-12-09 LAB — INR BLD: 2.8 (ref 0.9–1.1)

## 2024-12-09 PROCEDURE — 85610 PROTHROMBIN TIME: CPT

## 2024-12-09 PROCEDURE — 36416 COLLJ CAPILLARY BLOOD SPEC: CPT

## 2024-12-09 NOTE — PROGRESS NOTES
ANTICOAGULATION MANAGEMENT     Jovon Mantilla 69 year old male is on warfarin with therapeutic INR result. (Goal INR 2.5-3.5)    Recent labs: (last 7 days)     12/09/24  1541   INR 2.8*       ASSESSMENT     Warfarin Lab Questionnaire    Warfarin Doses Last 7 Days      12/9/2024     3:47 PM   Dose in Tablet or Mg   TAB or MG? milligram (mg)     Pt Rptd Dose SUNDAY MONDAY TUESDAY WED THURS FRIDAY SATURDAY 12/9/2024   3:47 PM 6 4 0 0 6 6 6         12/9/2024   Warfarin Lab Questionnaire   Missed doses within past 14 days? Yes   If yes; please list when: pacemaker surgery   Changes in diet or alcohol within past 14 days? No   Medication changes since last result? Yes   Please list: warfrin cavadol, lisipril,   Injuries or illness since last result? No   New shortness of breath, severe headaches or sudden changes in vision since last result? No   Abnormal bleeding since last result? No   Upcoming surgery, procedure? No        Previous result: Subtherapeutic  Additional findings:  recent hospitalization and Vit K administration, larger doses of warfarin taken over the weekend as advised by inpatient Provider.       PLAN     Recommended plan for temporary change(s) affecting INR     Dosing Instructions: Continue your current warfarin dose with next INR in 1 week   - requested patient either recheck INR end of this week with another appointment or schedule INR in one week    Summary  As of 12/9/2024      Full warfarin instructions:  4 mg every day   Next INR check:  12/13/2024               Detailed voice message left for Jovon with dosing instructions and follow up date.   Sent [employer] message with dosing and follow up instructions    Contact [phone] to schedule and with any changes, questions or concerns.     Education provided: Please call back if any changes to your diet, medications or how you've been taking warfarin    Plan made per [facility] anticoagulation protocol    Bri Matos,  RN  12/9/2024  Anticoagulation Clinic  [facility] for routing messages: rose ROSE  [facility] patient phone line: [phone]        _______________________________________________________________________     Anticoagulation Episode Summary       Current INR goal:  2.5-3.5   TTR:  62.1% (12 mo)   Target end date:  Indefinite   Send INR reminders to:  CARYN ROSE    Indications    S/P AVR (aortic valve replacement) [Z95.2]  Long-term (current) use of anticoagulants [Z79.01] [Z79.01]  Permanent atrial fibrillation (H) [I48.21]  Abdominal wall hematoma  initial encounter [S30.1XXA]  S/P aortic valve replacement [Z95.2]             Comments:  24mm ATS Valve             Anticoagulation Care Providers       Provider Role Specialty Phone number    Noe Ramirez PA-C Referring Family Medicine [phone]    Kimber Prado PA-C Referring Family Medicine [phone]

## 2024-12-13 ENCOUNTER — ANCILLARY PROCEDURE (OUTPATIENT)
Dept: CARDIOLOGY | Facility: CLINIC | Age: 69
End: 2024-12-13
Attending: INTERNAL MEDICINE
Payer: COMMERCIAL

## 2024-12-13 DIAGNOSIS — Z95.0 CARDIAC PACEMAKER IN SITU: ICD-10-CM

## 2024-12-13 LAB
MDC_IDC_EPISODE_DTM: NORMAL
MDC_IDC_EPISODE_DURATION: 0 S
MDC_IDC_EPISODE_DURATION: 1 S
MDC_IDC_EPISODE_DURATION: 4 S
MDC_IDC_EPISODE_ID: 122
MDC_IDC_EPISODE_ID: 123
MDC_IDC_EPISODE_ID: 124
MDC_IDC_EPISODE_ID: 125
MDC_IDC_EPISODE_ID: 126
MDC_IDC_EPISODE_ID: 127
MDC_IDC_EPISODE_ID: 128
MDC_IDC_EPISODE_ID: 129
MDC_IDC_EPISODE_ID: 130
MDC_IDC_EPISODE_ID: 131
MDC_IDC_EPISODE_ID: 132
MDC_IDC_EPISODE_ID: 133
MDC_IDC_EPISODE_ID: 134
MDC_IDC_EPISODE_ID: 135
MDC_IDC_EPISODE_ID: 136
MDC_IDC_EPISODE_TYPE: NORMAL
MDC_IDC_EPISODE_TYPE_INDUCED: NO
MDC_IDC_LEAD_CONNECTION_STATUS: NORMAL
MDC_IDC_LEAD_IMPLANT_DT: NORMAL
MDC_IDC_LEAD_LOCATION: NORMAL
MDC_IDC_LEAD_LOCATION_DETAIL_1: NORMAL
MDC_IDC_LEAD_MFG: NORMAL
MDC_IDC_LEAD_MODEL: NORMAL
MDC_IDC_LEAD_POLARITY_TYPE: NORMAL
MDC_IDC_LEAD_SERIAL: NORMAL
MDC_IDC_MSMT_BATTERY_DTM: NORMAL
MDC_IDC_MSMT_BATTERY_REMAINING_LONGEVITY: 193 MO
MDC_IDC_MSMT_BATTERY_RRT_TRIGGER: 2.62
MDC_IDC_MSMT_BATTERY_STATUS: NORMAL
MDC_IDC_MSMT_BATTERY_VOLTAGE: 3.23 V
MDC_IDC_MSMT_LEADCHNL_RV_IMPEDANCE_VALUE: 532 OHM
MDC_IDC_MSMT_LEADCHNL_RV_IMPEDANCE_VALUE: 855 OHM
MDC_IDC_MSMT_LEADCHNL_RV_PACING_THRESHOLD_AMPLITUDE: 0.62 V
MDC_IDC_MSMT_LEADCHNL_RV_PACING_THRESHOLD_PULSEWIDTH: 0.4 MS
MDC_IDC_MSMT_LEADCHNL_RV_SENSING_INTR_AMPL: 8.75 MV
MDC_IDC_MSMT_LEADCHNL_RV_SENSING_INTR_AMPL: 9.12 MV
MDC_IDC_PG_IMPLANT_DTM: NORMAL
MDC_IDC_PG_MFG: NORMAL
MDC_IDC_PG_MODEL: NORMAL
MDC_IDC_PG_SERIAL: NORMAL
MDC_IDC_PG_TYPE: NORMAL
MDC_IDC_SESS_CLINIC_NAME: NORMAL
MDC_IDC_SESS_DTM: NORMAL
MDC_IDC_SESS_TYPE: NORMAL
MDC_IDC_SET_BRADY_HYSTRATE: NORMAL
MDC_IDC_SET_BRADY_LOWRATE: 50 {BEATS}/MIN
MDC_IDC_SET_BRADY_MODE: NORMAL
MDC_IDC_SET_LEADCHNL_RV_PACING_AMPLITUDE: 2 V
MDC_IDC_SET_LEADCHNL_RV_PACING_ANODE_ELECTRODE_1: NORMAL
MDC_IDC_SET_LEADCHNL_RV_PACING_ANODE_LOCATION_1: NORMAL
MDC_IDC_SET_LEADCHNL_RV_PACING_CAPTURE_MODE: NORMAL
MDC_IDC_SET_LEADCHNL_RV_PACING_CATHODE_ELECTRODE_1: NORMAL
MDC_IDC_SET_LEADCHNL_RV_PACING_CATHODE_LOCATION_1: NORMAL
MDC_IDC_SET_LEADCHNL_RV_PACING_POLARITY: NORMAL
MDC_IDC_SET_LEADCHNL_RV_PACING_PULSEWIDTH: 0.4 MS
MDC_IDC_SET_LEADCHNL_RV_SENSING_ANODE_ELECTRODE_1: NORMAL
MDC_IDC_SET_LEADCHNL_RV_SENSING_ANODE_LOCATION_1: NORMAL
MDC_IDC_SET_LEADCHNL_RV_SENSING_CATHODE_ELECTRODE_1: NORMAL
MDC_IDC_SET_LEADCHNL_RV_SENSING_CATHODE_LOCATION_1: NORMAL
MDC_IDC_SET_LEADCHNL_RV_SENSING_POLARITY: NORMAL
MDC_IDC_SET_LEADCHNL_RV_SENSING_SENSITIVITY: 0.9 MV
MDC_IDC_SET_ZONE_DETECTION_INTERVAL: 400 MS
MDC_IDC_SET_ZONE_STATUS: NORMAL
MDC_IDC_SET_ZONE_TYPE: NORMAL
MDC_IDC_SET_ZONE_VENDOR_TYPE: NORMAL
MDC_IDC_STAT_BRADY_DTM_END: NORMAL
MDC_IDC_STAT_BRADY_DTM_START: NORMAL
MDC_IDC_STAT_BRADY_RV_PERCENT_PACED: 1.78 %
MDC_IDC_STAT_EPISODE_RECENT_COUNT: 0
MDC_IDC_STAT_EPISODE_RECENT_COUNT: 0
MDC_IDC_STAT_EPISODE_RECENT_COUNT: 83
MDC_IDC_STAT_EPISODE_RECENT_COUNT_DTM_END: NORMAL
MDC_IDC_STAT_EPISODE_RECENT_COUNT_DTM_START: NORMAL
MDC_IDC_STAT_EPISODE_TOTAL_COUNT: 0
MDC_IDC_STAT_EPISODE_TOTAL_COUNT: 0
MDC_IDC_STAT_EPISODE_TOTAL_COUNT: 136
MDC_IDC_STAT_EPISODE_TOTAL_COUNT_DTM_END: NORMAL
MDC_IDC_STAT_EPISODE_TOTAL_COUNT_DTM_START: NORMAL
MDC_IDC_STAT_EPISODE_TYPE: NORMAL

## 2024-12-13 PROCEDURE — 93279 PRGRMG DEV EVAL PM/LDLS PM: CPT | Performed by: INTERNAL MEDICINE

## 2024-12-16 ENCOUNTER — TELEPHONE (OUTPATIENT)
Dept: ANTICOAGULATION | Facility: CLINIC | Age: 69
End: 2024-12-16
Payer: COMMERCIAL

## 2024-12-16 NOTE — TELEPHONE ENCOUNTER
ANTICOAGULATION     Jovon Mantilla is overdue for an INR check.     Left message for patient to call and schedule lab appointment as soon as possible. If returning call, please schedule.     Soraida Cohen RN  12/16/2024  Anticoagulation Clinic  [facility] for routing messages: rose ROSE  [facility] patient phone line: [phone]

## 2024-12-26 ENCOUNTER — ANTICOAGULATION THERAPY VISIT (OUTPATIENT)
Dept: ANTICOAGULATION | Facility: CLINIC | Age: 69
End: 2024-12-26

## 2024-12-26 ENCOUNTER — LAB (OUTPATIENT)
Dept: LAB | Facility: CLINIC | Age: 69
End: 2024-12-26
Payer: COMMERCIAL

## 2024-12-26 DIAGNOSIS — Z79.01 LONG TERM CURRENT USE OF ANTICOAGULANT THERAPY: ICD-10-CM

## 2024-12-26 DIAGNOSIS — Z95.2 S/P AVR (AORTIC VALVE REPLACEMENT): Primary | ICD-10-CM

## 2024-12-26 DIAGNOSIS — I48.21 PERMANENT ATRIAL FIBRILLATION (H): ICD-10-CM

## 2024-12-26 DIAGNOSIS — Z95.2 S/P AORTIC VALVE REPLACEMENT: ICD-10-CM

## 2024-12-26 DIAGNOSIS — S30.1XXA ABDOMINAL WALL HEMATOMA, INITIAL ENCOUNTER: ICD-10-CM

## 2024-12-26 DIAGNOSIS — Z95.2 S/P AVR (AORTIC VALVE REPLACEMENT): ICD-10-CM

## 2024-12-26 LAB — INR BLD: 4.1 (ref 0.9–1.1)

## 2024-12-26 NOTE — PROGRESS NOTES
ANTICOAGULATION MANAGEMENT     Jovon Mantilla 69 year old male is on warfarin with supratherapeutic INR result. (Goal INR 2.5-3.5)    Recent labs: (last 7 days)     12/26/24  0741   INR 4.1*       ASSESSMENT     Source(s): Chart Review and Patient/Caregiver Call     Warfarin doses taken: Warfarin taken as instructed  Diet:  irregular diet due to working, does not eat greens  Medication/supplement changes:  tylenol 1,000 mg at bedtime  Carvedilol and lisinopril decreased   Started jardiance   New illness, injury, or hospitalization: Yes: pacemaker admission  Signs or symptoms of bleeding or clotting: No  Previous result: Therapeutic last visit; previously outside of goal range  Additional findings:  increase in activity  as working, activity limitations due to pacemaker   Patient leaves for [place] tomorrow     PLAN     Recommended plan for ongoing change(s) affecting INR     Dosing Instructions: decrease your warfarin dose (7.1% change) with next INR in 2 weeks       New lab order sent to [place] lab.    Summary  As of 12/26/2024      Full warfarin instructions:  2 mg every Thu; 4 mg all other days   Next INR check:  1/9/2025               Telephone call with Jovon who verbalizes understanding and agrees to plan    Patient using outside facility for labs    Education provided: Please call back if any changes to your diet, medications or how you've been taking warfarin  Symptom monitoring: monitoring for bleeding signs and symptoms, when to seek medical attention/emergency care, and if you hit your head or have a bad fall seek emergency care    Plan made per [facility] anticoagulation protocol    Soraida Cohen RN  12/26/2024  Anticoagulation Clinic  [facility] for routing messages: rose ROSE  [facility] patient phone line: [phone]        _______________________________________________________________________     Anticoagulation Episode Summary       Current INR goal:  2.5-3.5   TTR:  63.5% (12 mo)   Target end date:   Indefinite   Send INR reminders to:  CARYN ROSE    Indications    S/P AVR (aortic valve replacement) [Z95.2]  Long-term (current) use of anticoagulants [Z79.01] [Z79.01]  Permanent atrial fibrillation (H) [I48.21]  Abdominal wall hematoma  initial encounter [S30.1XXA]  S/P aortic valve replacement [Z95.2]             Comments:  24mm ATS Valve             Anticoagulation Care Providers       Provider Role Specialty Phone number    Noe Ramirez PA-C Referring Family Medicine [phone]    Kimber Prado PA-C Referring Family Medicine [phone]

## 2024-12-28 DIAGNOSIS — I10 HYPERTENSION GOAL BP (BLOOD PRESSURE) < 140/90: Primary | ICD-10-CM

## 2024-12-30 RX ORDER — LISINOPRIL 5 MG/1
5 TABLET ORAL DAILY
Qty: 90 TABLET | Refills: 0 | Status: SHIPPED | OUTPATIENT
Start: 2024-12-30

## 2025-01-06 ENCOUNTER — TRANSFERRED RECORDS (OUTPATIENT)
Dept: HEALTH INFORMATION MANAGEMENT | Facility: CLINIC | Age: 70
End: 2025-01-06
Payer: COMMERCIAL

## 2025-01-07 ENCOUNTER — ANTICOAGULATION THERAPY VISIT (OUTPATIENT)
Dept: ANTICOAGULATION | Facility: CLINIC | Age: 70
End: 2025-01-07
Payer: COMMERCIAL

## 2025-01-07 DIAGNOSIS — Z95.2 S/P AORTIC VALVE REPLACEMENT: ICD-10-CM

## 2025-01-07 DIAGNOSIS — I48.21 PERMANENT ATRIAL FIBRILLATION (H): ICD-10-CM

## 2025-01-07 DIAGNOSIS — S30.1XXA ABDOMINAL WALL HEMATOMA, INITIAL ENCOUNTER: ICD-10-CM

## 2025-01-07 DIAGNOSIS — Z95.2 S/P AVR (AORTIC VALVE REPLACEMENT): Primary | ICD-10-CM

## 2025-01-07 DIAGNOSIS — Z79.01 LONG TERM CURRENT USE OF ANTICOAGULANT THERAPY: ICD-10-CM

## 2025-01-07 LAB — INR (EXTERNAL): 3.8

## 2025-01-07 NOTE — PROGRESS NOTES
ANTICOAGULATION MANAGEMENT     Jovon FRANCIS Jose Rafael 69 year old male is on warfarin with supratherapeutic INR result. (Goal INR 2.5-3.5)    Recent labs: (last 7 days)     01/06/25  1249   INR 3.8       ASSESSMENT     Source(s): Chart Review and Patient/Caregiver Call     Warfarin doses taken: Warfarin taken as instructed  Diet: No new diet changes identified  Medication/supplement changes: None noted  New illness, injury, or hospitalization: No  Signs or symptoms of bleeding or clotting: No  Previous result: Supratherapeutic  Additional findings: None       PLAN     Recommended plan for no diet, medication or health factor changes affecting INR     Dosing Instructions: decrease your warfarin dose (7.7% change) with next INR in 2 weeks       Summary  As of 1/7/2025      Full warfarin instructions:  2 mg every Sun, Thu; 4 mg all other days   Next INR check:  1/20/2025               Telephone call with Jovon who verbalizes understanding and agrees to plan    Patient using outside facility for labs    Education provided: Please call back if any changes to your diet, medications or how you've been taking warfarin  Goal range and lab monitoring: goal range and significance of current result    Plan made per [facility] anticoagulation protocol    Samra Blanca, RN  1/7/2025  Anticoagulation Clinic  [employer] for routing messages: rose ROSE  [facility] patient phone line: [phone]        _______________________________________________________________________     Anticoagulation Episode Summary       Current INR goal:  2.5-3.5   TTR:  61.1% (11.9 mo)   Target end date:  Indefinite   Send INR reminders to:  CARYN ROSE    Indications    S/P AVR (aortic valve replacement) [Z95.2]  Long-term (current) use of anticoagulants [Z79.01] [Z79.01]  Permanent atrial fibrillation (H) [I48.21]  Abdominal wall hematoma  initial encounter [S30.1XXA]  S/P aortic valve replacement [Z95.2]             Comments:  24mm ATS Valve              Anticoagulation Care Providers       Provider Role Specialty Phone number    Noe Ramirez PA-C Referring Family Medicine [phone]    Kimber Prado PA-C Referring Family Medicine [phone]

## 2025-01-07 NOTE — PROGRESS NOTES
Incoming fax from  [facility]    Date of result 1/6/25    INR result 3.8    Route result to: rose ROSE

## 2025-01-15 DIAGNOSIS — I48.91 ATRIAL FIBRILLATION, UNSPECIFIED TYPE (H): ICD-10-CM

## 2025-01-15 RX ORDER — CARVEDILOL 6.25 MG/1
6.25 TABLET ORAL 2 TIMES DAILY WITH MEALS
Qty: 180 TABLET | Refills: 0 | Status: SHIPPED | OUTPATIENT
Start: 2025-01-15

## 2025-01-15 NOTE — TELEPHONE ENCOUNTER
[facility] Cardiology Refill Guideline reviewed.  Medication meets criteria for refill. Pharmacy request for carvedilol from [facility] in [place].  Patient has no refills left on this medication. Patient has an appointment scheduled with Mattie Olivarez on 1/23/25. Courtesy fill sent for convenience to get patient to his next appointment.

## 2025-01-20 ENCOUNTER — ANTICOAGULATION THERAPY VISIT (OUTPATIENT)
Dept: ANTICOAGULATION | Facility: CLINIC | Age: 70
End: 2025-01-20

## 2025-01-20 ENCOUNTER — ANCILLARY PROCEDURE (OUTPATIENT)
Dept: CARDIOLOGY | Facility: CLINIC | Age: 70
End: 2025-01-20
Attending: INTERNAL MEDICINE
Payer: COMMERCIAL

## 2025-01-20 ENCOUNTER — LAB (OUTPATIENT)
Dept: LAB | Facility: CLINIC | Age: 70
End: 2025-01-20
Payer: COMMERCIAL

## 2025-01-20 DIAGNOSIS — Z95.2 S/P AVR (AORTIC VALVE REPLACEMENT): ICD-10-CM

## 2025-01-20 DIAGNOSIS — Z95.2 S/P AVR (AORTIC VALVE REPLACEMENT): Primary | ICD-10-CM

## 2025-01-20 DIAGNOSIS — I48.21 PERMANENT ATRIAL FIBRILLATION (H): Primary | ICD-10-CM

## 2025-01-20 DIAGNOSIS — S30.1XXA ABDOMINAL WALL HEMATOMA, INITIAL ENCOUNTER: ICD-10-CM

## 2025-01-20 DIAGNOSIS — Z95.2 S/P AORTIC VALVE REPLACEMENT: ICD-10-CM

## 2025-01-20 DIAGNOSIS — Z95.0 CARDIAC PACEMAKER IN SITU: ICD-10-CM

## 2025-01-20 DIAGNOSIS — Z79.01 LONG TERM CURRENT USE OF ANTICOAGULANT THERAPY: ICD-10-CM

## 2025-01-20 DIAGNOSIS — I48.21 PERMANENT ATRIAL FIBRILLATION (H): ICD-10-CM

## 2025-01-20 DIAGNOSIS — I42.9 IDIOPATHIC CARDIOMYOPATHY (H): ICD-10-CM

## 2025-01-20 LAB
INR BLD: 3 (ref 0.9–1.1)
MDC_IDC_EPISODE_DTM: NORMAL
MDC_IDC_EPISODE_DURATION: 0 S
MDC_IDC_EPISODE_DURATION: 1 S
MDC_IDC_EPISODE_DURATION: 2 S
MDC_IDC_EPISODE_ID: 587
MDC_IDC_EPISODE_ID: 588
MDC_IDC_EPISODE_ID: 589
MDC_IDC_EPISODE_ID: 590
MDC_IDC_EPISODE_ID: 591
MDC_IDC_EPISODE_ID: 592
MDC_IDC_EPISODE_ID: 593
MDC_IDC_EPISODE_ID: 594
MDC_IDC_EPISODE_ID: 595
MDC_IDC_EPISODE_ID: 596
MDC_IDC_EPISODE_ID: 597
MDC_IDC_EPISODE_ID: 598
MDC_IDC_EPISODE_ID: 599
MDC_IDC_EPISODE_ID: 600
MDC_IDC_EPISODE_ID: 601
MDC_IDC_EPISODE_TYPE: NORMAL
MDC_IDC_EPISODE_TYPE_INDUCED: NO
MDC_IDC_LEAD_CONNECTION_STATUS: NORMAL
MDC_IDC_LEAD_IMPLANT_DT: NORMAL
MDC_IDC_LEAD_LOCATION: NORMAL
MDC_IDC_LEAD_LOCATION_DETAIL_1: NORMAL
MDC_IDC_LEAD_MFG: NORMAL
MDC_IDC_LEAD_MODEL: NORMAL
MDC_IDC_LEAD_POLARITY_TYPE: NORMAL
MDC_IDC_LEAD_SERIAL: NORMAL
MDC_IDC_MSMT_BATTERY_DTM: NORMAL
MDC_IDC_MSMT_BATTERY_REMAINING_LONGEVITY: 192 MO
MDC_IDC_MSMT_BATTERY_RRT_TRIGGER: 2.62
MDC_IDC_MSMT_BATTERY_STATUS: NORMAL
MDC_IDC_MSMT_BATTERY_VOLTAGE: 3.22 V
MDC_IDC_MSMT_LEADCHNL_RV_IMPEDANCE_VALUE: 456 OHM
MDC_IDC_MSMT_LEADCHNL_RV_IMPEDANCE_VALUE: 779 OHM
MDC_IDC_MSMT_LEADCHNL_RV_PACING_THRESHOLD_AMPLITUDE: 0.75 V
MDC_IDC_MSMT_LEADCHNL_RV_PACING_THRESHOLD_PULSEWIDTH: 0.4 MS
MDC_IDC_MSMT_LEADCHNL_RV_SENSING_INTR_AMPL: 9.25 MV
MDC_IDC_MSMT_LEADCHNL_RV_SENSING_INTR_AMPL: 9.4 MV
MDC_IDC_PG_IMPLANT_DTM: NORMAL
MDC_IDC_PG_MFG: NORMAL
MDC_IDC_PG_MODEL: NORMAL
MDC_IDC_PG_SERIAL: NORMAL
MDC_IDC_PG_TYPE: NORMAL
MDC_IDC_SESS_CLINIC_NAME: NORMAL
MDC_IDC_SESS_DTM: NORMAL
MDC_IDC_SESS_TYPE: NORMAL
MDC_IDC_SET_BRADY_HYSTRATE: NORMAL
MDC_IDC_SET_BRADY_LOWRATE: 50 {BEATS}/MIN
MDC_IDC_SET_BRADY_MODE: NORMAL
MDC_IDC_SET_LEADCHNL_RV_PACING_AMPLITUDE: 2 V
MDC_IDC_SET_LEADCHNL_RV_PACING_ANODE_ELECTRODE_1: NORMAL
MDC_IDC_SET_LEADCHNL_RV_PACING_ANODE_LOCATION_1: NORMAL
MDC_IDC_SET_LEADCHNL_RV_PACING_CAPTURE_MODE: NORMAL
MDC_IDC_SET_LEADCHNL_RV_PACING_CATHODE_ELECTRODE_1: NORMAL
MDC_IDC_SET_LEADCHNL_RV_PACING_CATHODE_LOCATION_1: NORMAL
MDC_IDC_SET_LEADCHNL_RV_PACING_POLARITY: NORMAL
MDC_IDC_SET_LEADCHNL_RV_PACING_PULSEWIDTH: 0.4 MS
MDC_IDC_SET_LEADCHNL_RV_SENSING_ANODE_ELECTRODE_1: NORMAL
MDC_IDC_SET_LEADCHNL_RV_SENSING_ANODE_LOCATION_1: NORMAL
MDC_IDC_SET_LEADCHNL_RV_SENSING_CATHODE_ELECTRODE_1: NORMAL
MDC_IDC_SET_LEADCHNL_RV_SENSING_CATHODE_LOCATION_1: NORMAL
MDC_IDC_SET_LEADCHNL_RV_SENSING_POLARITY: NORMAL
MDC_IDC_SET_LEADCHNL_RV_SENSING_SENSITIVITY: 0.9 MV
MDC_IDC_SET_ZONE_DETECTION_INTERVAL: 400 MS
MDC_IDC_SET_ZONE_STATUS: NORMAL
MDC_IDC_SET_ZONE_TYPE: NORMAL
MDC_IDC_SET_ZONE_VENDOR_TYPE: NORMAL
MDC_IDC_STAT_BRADY_DTM_END: NORMAL
MDC_IDC_STAT_BRADY_DTM_START: NORMAL
MDC_IDC_STAT_BRADY_RV_PERCENT_PACED: 2.65 %
MDC_IDC_STAT_EPISODE_RECENT_COUNT: 0
MDC_IDC_STAT_EPISODE_RECENT_COUNT: 0
MDC_IDC_STAT_EPISODE_RECENT_COUNT: 465
MDC_IDC_STAT_EPISODE_RECENT_COUNT_DTM_END: NORMAL
MDC_IDC_STAT_EPISODE_RECENT_COUNT_DTM_START: NORMAL
MDC_IDC_STAT_EPISODE_TOTAL_COUNT: 0
MDC_IDC_STAT_EPISODE_TOTAL_COUNT: 0
MDC_IDC_STAT_EPISODE_TOTAL_COUNT: 601
MDC_IDC_STAT_EPISODE_TOTAL_COUNT_DTM_END: NORMAL
MDC_IDC_STAT_EPISODE_TOTAL_COUNT_DTM_START: NORMAL
MDC_IDC_STAT_EPISODE_TYPE: NORMAL

## 2025-01-20 PROCEDURE — 36416 COLLJ CAPILLARY BLOOD SPEC: CPT

## 2025-01-20 PROCEDURE — 93279 PRGRMG DEV EVAL PM/LDLS PM: CPT | Performed by: INTERNAL MEDICINE

## 2025-01-20 PROCEDURE — 85610 PROTHROMBIN TIME: CPT

## 2025-01-20 NOTE — PROGRESS NOTES
ANTICOAGULATION MANAGEMENT     Jovon Mantilla 69 year old male is on warfarin with therapeutic INR result. (Goal INR 2.5-3.5)    Recent labs: (last 7 days)     01/20/25  1103   INR 3.0*       ASSESSMENT     Warfarin Lab Questionnaire    Warfarin Doses Last 7 Days      1/20/2025     8:11 AM   Dose in Tablet or Mg   TAB or MG? milligram (mg)     Pt Rptd Dose SUNDAY MONDAY TUESDAY WED THURS FRIDAY SATURDAY 1/20/2025   8:11 AM 2 4 4 4 2 4 4         1/20/2025   Warfarin Lab Questionnaire   Missed doses within past 14 days? No   Changes in diet or alcohol within past 14 days? No   Medication changes since last result? No   Injuries or illness since last result? No   New shortness of breath, severe headaches or sudden changes in vision since last result? No   Abnormal bleeding since last result? No   Upcoming surgery, procedure? No     Previous result: Supratherapeutic  Additional findings: None       PLAN     Recommended plan for no diet, medication or health factor changes affecting INR     Dosing Instructions: Continue your current warfarin dose with next INR in 3 weeks       Summary  As of 1/20/2025      Full warfarin instructions:  2 mg every Sun, Thu; 4 mg all other days   Next INR check:  2/3/2025               Detailed voice message left for Jovon with dosing instructions and follow up date.     Instructed to make next inr in 3 weeks    Education provided: Please call back if any changes to your diet, medications or how you've been taking warfarin    Plan made per [facility] anticoagulation protocol    Karina Pierre RN  1/20/2025  Anticoagulation Clinic  [facility] for routing messages: rose ROSE  [facility] patient phone line: [phone]        _______________________________________________________________________     Anticoagulation Episode Summary       Current INR goal:  2.5-3.5   TTR:  63.2% (12 mo)   Target end date:  Indefinite   Send INR reminders to:  CARYN ROSE    Indications    S/P AVR (aortic  valve replacement) [Z95.2]  Long-term (current) use of anticoagulants [Z79.01] [Z79.01]  Permanent atrial fibrillation (H) [I48.21]  Abdominal wall hematoma  initial encounter [S30.1XXA]  S/P aortic valve replacement [Z95.2]             Comments:  24mm ATS Valve             Anticoagulation Care Providers       Provider Role Specialty Phone number    Noe Ramirez PA-C Referring Family Medicine [phone]    Kimber Prado PA-C Referring Family Medicine [phone]

## 2025-01-20 NOTE — PROGRESS NOTES
~Cardiology Clinic Visit~    Primary Cardiologist: Dr. Cooley    History of Present Illness    Jovon Mantilla is a very pleasant 67 year old male with a past medical history notable for severe aortic stenosis and is s/p AVR in 01/2011 with a 24 mm ATS supra-annular valve.  He also has a history of AF with prior Maze procedure - has remained in AF and is on rate control and anticoagulation.  He also has a history of a 70% stenosis involving the LAD on coronary angiography prior to aortic valve replacement.  This, however, was not bypassed since it was found to be completely intramyocardial at the time of surgery.      In review, in late 2021 he was noted  to have new exertional dyspnea.  An echocardiogram at that time showed moderate to severe eccentric mitral regurgitation which had progressed compared to his prior echocardiogram from June 2020. Given these findings, a CADE for further evaluation was recommended.      A CADE from 12/28/2021 demonstrated severe biatrial enlargement mildly reduced left ventricular systolic function. There was mild thickening and prolapse of P2 with moderate mitral regurgitation.. The mitral regurgitation was thought be secondary to both annular dilatation in the setting of chronic persistent atrial fibrillation as well as an element of P2 prolapse as reported. The mechanical aortic valve appeared to be functioning normally on both CADE and transthoracic imaging.     Mr. Fuller subsequently underwent an exercise stress echocardiogram on 1/17/2022 to evaluate for worsening mitral regurgitation postexercise which showed moderately reduced left ventricular systolic function with trace to mild mitral regurgitation at baseline.     On 7/31/2022 he underwent right and left heart catheterization. His 70% LAD stenosis was unchanged from his angiogram in prior to his AVR.  He underwent cardiac MRI on 9/3/2024 which shows LVEF 37% and moderate mitral regurgitation. Following this he  underwent NM Lexiscan on 9/18/2024 which shows no inducible myocardial ischemia or infarction.     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  EP consulted inpatient and he is now s/p implantation of single-chamber [employer] pacemaker with left bundle pacing on 12/5/2024.  His carvedilol was decreased to 6.25 mg twice daily while hospitalized, he was continued on Digoxin 125 mcg/d and Aldactone 12.5 mg/d.  He is also on Jardiance 10.    Interval 01/23/25    He does have occasional palpitations - happens at random.  Device interrogation showing wide HR variability, ranging  > rates over 100 approximately 30% of the time.  No regi syncope.  He lays down when the palpitations occur and it passes.  No SOB.  He checks blood pressure at home, typically systolic 100s.  He has not had any readings less than 100 systolic.  He is no longer taking Lisinopril, he mentions he was instructed to stop this.  __________________________________________________________________         Assessment and Impression:     Severe aortic stenosis   S/p aortic valve replacement in 1/2011 with 24 mm JASPAL supra-annular valve  Echocardiogram 8/2023- normal aortic valve gradient  Tachy-kamille syndrome  Afib/RVR, history of MAZE procedure  S/p PPM implantation 2/2 #3  Tachycardic rates persist, on Carvedilol 6.25 mg BID.  Largely asymptomatic to tachycardia with only occasional palpitations.  No recurrent syncope.  Anticoagulated with warfarin  Coronary artery disease  History of 70% stenosis of the LAD on coronary angiogram prior to surgery. This was not bypassed since it was found to be completely intramyocardial. Subsequent coronary angiogram 7/2022 shows unchanged stenosis in LAD  No anginal symptoms  Asprin 81 mg, simvastatin,  Non-ischemic cardiomyopathy, LVEF 37%  NYHA class II, stage C  Fluid status: euvolemic; suspected dry weight: ~200 lbs  Diuretic regimen: Furosemide 20 mg PRN (pt is  taking it scheduled daily)  Ischemic evaluation: NM Lexiscan 9/18/2024 without evidence of ischemia or infarction  Guideline directed medical therapy:  Beta blocker: carvedilol 6.25 mg BID  ACEI/ARB/ARNI: ---  Aldactone antagonist: spironolactone 12.5 mg  SGLT2 inhibitor: Jardiance 10 mg         Recommendations and Plan:     Stop Carvedilol; Start Toprol  mg daily.  Pt counseled.  Run 2025 cost for Entresto, likely initiate at follow up visit.  Continue Digoxin, Jardiance and Spironolactone unchanged.  Device download in 1-month (discussed with team).  If unable to control tachycardia on current therapies, per EP, may need to consider AVNA in the future.  Not a CCB candidate due to cardiomyopathy.  VALDO visit in 1-month to reassess HR control on augmented BB therapy.  __________________________________________________________________    Thank you for the opportunity to participate in this pleasant patient's care.    We would be happy to see this patient sooner for any concerns in the meantime.    LILLI Park, CNP   Nurse Practitioner  [facility]    Today's clinic visit entailed:  The following tests were independently interpreted by me as noted in my documentation: device report  Prescription drug management  The level of medical decision making during this visit was of moderate complexity.  Review of the result(s) of each unique test - cardiac testing, cardiac imaging, labs, hospital reports, hospital procedures.  Care everywhere reviewed for additional records to facilitate comprehensive patient care.    Orders this Visit:  Orders Placed This Encounter   Procedures    Follow-Up with Cardiology- VALDO     Orders Placed This Encounter   Medications    metoprolol succinate ER (TOPROL XL) 100 MG 24 hr tablet     Sig: Take 1 tablet (100 mg) by mouth daily.     Dispense:  30 tablet     Refill:  4     Medications Discontinued During This Encounter   Medication Reason    lisinopril  (ZESTRIL) 5 MG tablet     carvedilol (COREG) 6.25 MG tablet      Encounter Diagnoses   Name Primary?    Idiopathic cardiomyopathy (H)     Atrial fibrillation with rapid ventricular response (H) Yes    Cardiac pacemaker in situ     Permanent atrial fibrillation (H)     S/P AVR (aortic valve replacement)     Coronary artery disease involving native coronary artery of native heart without angina pectoris      CURRENT MEDICATIONS:  Current Outpatient Medications   Medication Sig Dispense Refill    amoxicillin (AMOXIL) 500 MG capsule Take 2,000 mg by mouth See Admin Instructions. Before dental procedures      Ascorbic Acid (VITAMIN C PO) Take 500 mg by mouth daily      aspirin 81 MG tablet Take 81 mg by mouth Every Mon, Wed, Fri Morning      calcium carbonate-vitamin D 600-200 MG-UNIT TABS Take 1 tablet by mouth 2 times daily       Cholecalciferol (VITAMIN D) 2000 UNIT CAPS Take 1 tablet by mouth daily       Cyanocobalamin (VITAMIN B-12) 500 MCG SUBL Place 500 mcg under the tongue daily       digoxin (LANOXIN) 125 MCG tablet Take 1 tablet (125 mcg) by mouth daily 90 tablet 3    empagliflozin (JARDIANCE) 10 MG TABS tablet Take 1 tablet (10 mg) by mouth daily. 90 tablet 3    furosemide (LASIX) 20 MG tablet Take 0.5 tablets (10 mg) by mouth daily as needed (for weight gain 2lbs or more in a day) 100 tablet 3    levothyroxine (SYNTHROID/LEVOTHROID) 200 MCG tablet Take 1 tablet (200 mcg) by mouth daily 90 tablet 3    metoprolol succinate ER (TOPROL XL) 100 MG 24 hr tablet Take 1 tablet (100 mg) by mouth daily. 30 tablet 4    Multiple Vitamins-Minerals (CENTRUM) CHEW Take 1 tablet by mouth daily       nitroGLYcerin (NITROSTAT) 0.3 MG sublingual tablet For chest pain place 1 tablet under the tongue every 5 minutes for 3 doses. If symptoms persist 5 minutes after 1st dose call 911. 25 tablet 1    Psyllium (FIBER) 0.52 GM CAPS Take 2 capsules by mouth daily      sertraline (ZOLOFT) 25 MG tablet TAKE 1 TABLET BY MOUTH EVERY DAY 90  "tablet 0    simvastatin (ZOCOR) 20 MG tablet Take 1 tablet (20 mg) by mouth at bedtime 90 tablet 3    spironolactone (ALDACTONE) 25 MG tablet Take 0.5 tablets (12.5 mg) by mouth daily. 45 tablet 3    triamcinolone (KENALOG) 0.1 % external cream APPLY TO AFFECTED AREA TWICE A DAY 45 g 0    warfarin ANTICOAGULANT (COUMADIN) 4 MG tablet Take 2 mg (1/2 tablet) on Sun, Thurs and 4 mg (1 tablet)  all other days, or as directed by the Coumadin Clinic 80 tablet 1     ALLERGIES     Allergies   Allergen Reactions    Seasonal Allergies      PAST MEDICAL, SURGICAL, FAMILY, SOCIAL HISTORY:  History was reviewed and updated as needed, see medical record.    Review of Systems:  A 10-point Review Of Systems is otherwise normal except for that which is noted in the HPI and interval summary.    Physical Exam:    Vitals: /66   Pulse 66   Ht 1.765 m (5' 9.5\")   Wt 93 kg (205 lb)   BMI 29.84 kg/m    Constitutional: Appears stated age, well nourished, NAD.  Respiratory: Non-labored. Lungs clear throughout  Cardiovascular: IRR, normal S1 and S2. No M/G/R.  Peripheral edema, dependent.  GI: Soft, non-distended, non-tender.  Skin: Warm and dry. PPM incision well healed.  Musculoskeletal/Extremities: Symmetrical movement.  Neurologic: No gross focal deficits. Alert, awake.  Psychiatric: Affect appropriate. Mentation normal.    Recent Lab Results:  LIPID RESULTS:  Lab Results   Component Value Date    CHOL 101 07/02/2024    CHOL 109 03/13/2020    HDL 45 07/02/2024    HDL 51 03/13/2020    LDL 48 07/02/2024    LDL 49 03/13/2020    TRIG 41 07/02/2024    TRIG 45 03/13/2020    CHOLHDLRATIO 2.5 02/16/2015     LIVER ENZYME RESULTS:  Lab Results   Component Value Date    AST 28 12/04/2024    AST 28 02/10/2021    ALT 18 12/04/2024    ALT 23 02/10/2021     CBC RESULTS:  Lab Results   Component Value Date    WBC 6.6 12/06/2024    WBC 8.9 02/13/2021    RBC 4.38 (L) 12/06/2024    RBC 3.12 (L) 02/13/2021    HGB 12.5 (L) 12/06/2024    HGB 9.7 (L) " 02/13/2021    HCT 38.8 (L) 12/06/2024    HCT 30.9 (L) 02/13/2021    MCV 89 12/06/2024    MCV 99 02/13/2021    MCH 28.5 12/06/2024    MCH 31.1 02/13/2021    MCHC 32.2 12/06/2024    MCHC 31.4 (L) 02/13/2021    RDW 15.4 (H) 12/06/2024    RDW 15.2 (H) 02/13/2021     12/06/2024     02/13/2021     BMP RESULTS:  Lab Results   Component Value Date     12/06/2024     02/12/2021    POTASSIUM 4.1 12/06/2024    POTASSIUM 3.8 06/09/2022    POTASSIUM 4.4 02/12/2021    CHLORIDE 105 12/06/2024    CHLORIDE 103 06/09/2022    CHLORIDE 107 02/12/2021    CO2 26 12/06/2024    CO2 33 (H) 06/09/2022    CO2 30 02/12/2021    ANIONGAP 8 12/06/2024    ANIONGAP 2 (L) 06/09/2022    ANIONGAP 2 (L) 02/12/2021    GLC 82 12/06/2024     (H) 06/09/2022     (H) 02/12/2021    BUN 17.7 12/06/2024    BUN 17 06/09/2022    BUN 31 (H) 02/12/2021    CR 0.70 12/06/2024    CR 0.76 02/12/2021    GFRESTIMATED >90 12/06/2024    GFRESTIMATED >90 02/12/2021    GFRESTBLACK >90 02/12/2021    REECE 9.1 12/06/2024    REECE 8.8 02/12/2021      A1C RESULTS:  Lab Results   Component Value Date    A1C 5.8 (H) 02/04/2019     INR RESULTS:  Lab Results   Component Value Date    INR 3.0 (H) 01/20/2025    INR 3.8 01/06/2025    INR 4.1 (H) 12/26/2024    INR 1.55 (H) 12/06/2024    INR 1.77 (H) 12/05/2024    INR 4.6 (A) 04/19/2024    INR 3.10 (H) 06/22/2021    INR 3.40 (H) 05/13/2021

## 2025-01-23 ENCOUNTER — OFFICE VISIT (OUTPATIENT)
Dept: CARDIOLOGY | Facility: CLINIC | Age: 70
End: 2025-01-23
Attending: NURSE PRACTITIONER
Payer: COMMERCIAL

## 2025-01-23 VITALS
HEART RATE: 66 BPM | WEIGHT: 205 LBS | HEIGHT: 70 IN | DIASTOLIC BLOOD PRESSURE: 66 MMHG | BODY MASS INDEX: 29.35 KG/M2 | SYSTOLIC BLOOD PRESSURE: 109 MMHG

## 2025-01-23 DIAGNOSIS — Z95.0 CARDIAC PACEMAKER IN SITU: ICD-10-CM

## 2025-01-23 DIAGNOSIS — I42.9 IDIOPATHIC CARDIOMYOPATHY (H): ICD-10-CM

## 2025-01-23 DIAGNOSIS — I25.10 CORONARY ARTERY DISEASE INVOLVING NATIVE CORONARY ARTERY OF NATIVE HEART WITHOUT ANGINA PECTORIS: ICD-10-CM

## 2025-01-23 DIAGNOSIS — I48.91 ATRIAL FIBRILLATION WITH RAPID VENTRICULAR RESPONSE (H): Primary | ICD-10-CM

## 2025-01-23 DIAGNOSIS — Z95.2 S/P AVR (AORTIC VALVE REPLACEMENT): ICD-10-CM

## 2025-01-23 DIAGNOSIS — I48.21 PERMANENT ATRIAL FIBRILLATION (H): ICD-10-CM

## 2025-01-23 RX ORDER — METOPROLOL SUCCINATE 100 MG/1
100 TABLET, EXTENDED RELEASE ORAL DAILY
Qty: 30 TABLET | Refills: 4 | Status: SHIPPED | OUTPATIENT
Start: 2025-01-23

## 2025-01-23 NOTE — PATIENT INSTRUCTIONS
Thank you for your visit with the [facility] today.    Today's Summary:    REDUCE Furosemide to only daily AS NEEDED dosing for swelling or weight gain.  Continue taking Carvedilol twice daily until you  your new medication (Metoprol XL)  On Saturday, start Metoprolol  mg daily.  Continue with your Jardiance and Spironolactone without change.  I will coordinate a device download in 1-month.    Follow-up:  Cardiology follow up at [facility]: 1-month VIRTUAL with me.     Cardiology Scheduling ~[phone]  Cardiology Clinic RN ~ [phone]    It was a pleasure seeing you today.     LILLI Park, CNP  Certified Nurse Practitioner  [facility]  January 23, 2025  ________________________________________________________

## 2025-01-23 NOTE — LETTER
1/23/2025    Kimber Prado PA-C  [address] [facility] [place] [address]    RE: oJvon Moraesley       Dear Colleague,     I had the pleasure of seeing Jovon Mantilla in the [facility].              ~Cardiology Clinic Visit~    Primary Cardiologist: Dr. Cooley    History of Present Illness    Jovon Mantilla is a very pleasant 67 year old male with a past medical history notable for severe aortic stenosis and is s/p AVR in 01/2011 with a 24 mm ATS supra-annular valve.  He also has a history of AF with prior Maze procedure - has remained in AF and is on rate control and anticoagulation.  He also has a history of a 70% stenosis involving the LAD on coronary angiography prior to aortic valve replacement.  This, however, was not bypassed since it was found to be completely intramyocardial at the time of surgery.      In review, in late 2021 he was noted  to have new exertional dyspnea.  An echocardiogram at that time showed moderate to severe eccentric mitral regurgitation which had progressed compared to his prior echocardiogram from June 2020. Given these findings, a CADE for further evaluation was recommended.      A CADE from 12/28/2021 demonstrated severe biatrial enlargement mildly reduced left ventricular systolic function. There was mild thickening and prolapse of P2 with moderate mitral regurgitation.. The mitral regurgitation was thought be secondary to both annular dilatation in the setting of chronic persistent atrial fibrillation as well as an element of P2 prolapse as reported. The mechanical aortic valve appeared to be functioning normally on both CADE and transthoracic imaging.     Mr. Fuller subsequently underwent an exercise stress echocardiogram on 1/17/2022 to evaluate for worsening mitral regurgitation postexercise which showed moderately reduced left ventricular systolic function with trace to mild mitral regurgitation at baseline.     On 7/31/2022 he underwent right and left  heart catheterization. His 70% LAD stenosis was unchanged from his angiogram in prior to his AVR.  He underwent cardiac MRI on 9/3/2024 which shows LVEF 37% and moderate mitral regurgitation. Following this he underwent NM Lexiscan on 9/18/2024 which shows no inducible myocardial ischemia or infarction.     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  EP consulted inpatient and he is now s/p implantation of single-chamber [employer] pacemaker with left bundle pacing on 12/5/2024.  His carvedilol was decreased to 6.25 mg twice daily while hospitalized, he was continued on Digoxin 125 mcg/d and Aldactone 12.5 mg/d.  He is also on Jardiance 10.    Interval 01/23/25    He does have occasional palpitations - happens at random.  Device interrogation showing wide HR variability, ranging  > rates over 100 approximately 30% of the time.  No regi syncope.  He lays down when the palpitations occur and it passes.  No SOB.  He checks blood pressure at home, typically systolic 100s.  He has not had any readings less than 100 systolic.  He is no longer taking Lisinopril, he mentions he was instructed to stop this.  __________________________________________________________________         Assessment and Impression:     Severe aortic stenosis   S/p aortic valve replacement in 1/2011 with 24 mm JASPAL supra-annular valve  Echocardiogram 8/2023- normal aortic valve gradient  Tachy-kamille syndrome  Afib/RVR, history of MAZE procedure  S/p PPM implantation 2/2 #3  Tachycardic rates persist, on Carvedilol 6.25 mg BID.  Largely asymptomatic to tachycardia with only occasional palpitations.  No recurrent syncope.  Anticoagulated with warfarin  Coronary artery disease  History of 70% stenosis of the LAD on coronary angiogram prior to surgery. This was not bypassed since it was found to be completely intramyocardial. Subsequent coronary angiogram 7/2022 shows unchanged stenosis in LAD  No  anginal symptoms  Asprin 81 mg, simvastatin,  Non-ischemic cardiomyopathy, LVEF 37%  NYHA class II, stage C  Fluid status: euvolemic; suspected dry weight: ~200 lbs  Diuretic regimen: Furosemide 20 mg PRN (pt is taking it scheduled daily)  Ischemic evaluation: NM Lexiscan 9/18/2024 without evidence of ischemia or infarction  Guideline directed medical therapy:  Beta blocker: carvedilol 6.25 mg BID  ACEI/ARB/ARNI: ---  Aldactone antagonist: spironolactone 12.5 mg  SGLT2 inhibitor: Jardiance 10 mg         Recommendations and Plan:     Stop Carvedilol; Start Toprol  mg daily.  Pt counseled.  Run 2025 cost for Entresto, likely initiate at follow up visit.  Continue Digoxin, Jardiance and Spironolactone unchanged.  Device download in 1-month (discussed with team).  If unable to control tachycardia on current therapies, per EP, may need to consider AVNA in the future.  Not a CCB candidate due to cardiomyopathy.  VALDO visit in 1-month to reassess HR control on augmented BB therapy.  __________________________________________________________________    Thank you for the opportunity to participate in this pleasant patient's care.    We would be happy to see this patient sooner for any concerns in the meantime.    LILLI Park, CNP   Nurse Practitioner  [facility]    Today's clinic visit entailed:  The following tests were independently interpreted by me as noted in my documentation: device report  Prescription drug management  The level of medical decision making during this visit was of moderate complexity.  Review of the result(s) of each unique test - cardiac testing, cardiac imaging, labs, hospital reports, hospital procedures.  Care everywhere reviewed for additional records to facilitate comprehensive patient care.    Orders this Visit:  Orders Placed This Encounter   Procedures     Follow-Up with Cardiology- VALDO     Orders Placed This Encounter   Medications     metoprolol  succinate ER (TOPROL XL) 100 MG 24 hr tablet     Sig: Take 1 tablet (100 mg) by mouth daily.     Dispense:  30 tablet     Refill:  4     Medications Discontinued During This Encounter   Medication Reason     lisinopril (ZESTRIL) 5 MG tablet      carvedilol (COREG) 6.25 MG tablet      Encounter Diagnoses   Name Primary?     Idiopathic cardiomyopathy (H)      Atrial fibrillation with rapid ventricular response (H) Yes     Cardiac pacemaker in situ      Permanent atrial fibrillation (H)      S/P AVR (aortic valve replacement)      Coronary artery disease involving native coronary artery of native heart without angina pectoris      CURRENT MEDICATIONS:  Current Outpatient Medications   Medication Sig Dispense Refill     amoxicillin (AMOXIL) 500 MG capsule Take 2,000 mg by mouth See Admin Instructions. Before dental procedures       Ascorbic Acid (VITAMIN C PO) Take 500 mg by mouth daily       aspirin 81 MG tablet Take 81 mg by mouth Every Mon, Wed, Fri Morning       calcium carbonate-vitamin D 600-200 MG-UNIT TABS Take 1 tablet by mouth 2 times daily        Cholecalciferol (VITAMIN D) 2000 UNIT CAPS Take 1 tablet by mouth daily        Cyanocobalamin (VITAMIN B-12) 500 MCG SUBL Place 500 mcg under the tongue daily        digoxin (LANOXIN) 125 MCG tablet Take 1 tablet (125 mcg) by mouth daily 90 tablet 3     empagliflozin (JARDIANCE) 10 MG TABS tablet Take 1 tablet (10 mg) by mouth daily. 90 tablet 3     furosemide (LASIX) 20 MG tablet Take 0.5 tablets (10 mg) by mouth daily as needed (for weight gain 2lbs or more in a day) 100 tablet 3     levothyroxine (SYNTHROID/LEVOTHROID) 200 MCG tablet Take 1 tablet (200 mcg) by mouth daily 90 tablet 3     metoprolol succinate ER (TOPROL XL) 100 MG 24 hr tablet Take 1 tablet (100 mg) by mouth daily. 30 tablet 4     Multiple Vitamins-Minerals (CENTRUM) CHEW Take 1 tablet by mouth daily        nitroGLYcerin (NITROSTAT) 0.3 MG sublingual tablet For chest pain place 1 tablet under the  "tongue every 5 minutes for 3 doses. If symptoms persist 5 minutes after 1st dose call 911. 25 tablet 1     Psyllium (FIBER) 0.52 GM CAPS Take 2 capsules by mouth daily       sertraline (ZOLOFT) 25 MG tablet TAKE 1 TABLET BY MOUTH EVERY DAY 90 tablet 0     simvastatin (ZOCOR) 20 MG tablet Take 1 tablet (20 mg) by mouth at bedtime 90 tablet 3     spironolactone (ALDACTONE) 25 MG tablet Take 0.5 tablets (12.5 mg) by mouth daily. 45 tablet 3     triamcinolone (KENALOG) 0.1 % external cream APPLY TO AFFECTED AREA TWICE A DAY 45 g 0     warfarin ANTICOAGULANT (COUMADIN) 4 MG tablet Take 2 mg (1/2 tablet) on Sun, Thurs and 4 mg (1 tablet)  all other days, or as directed by the Coumadin Clinic 80 tablet 1     ALLERGIES     Allergies   Allergen Reactions     Seasonal Allergies      PAST MEDICAL, SURGICAL, FAMILY, SOCIAL HISTORY:  History was reviewed and updated as needed, see medical record.    Review of Systems:  A 10-point Review Of Systems is otherwise normal except for that which is noted in the HPI and interval summary.    Physical Exam:    Vitals: /66   Pulse 66   Ht 1.765 m (5' 9.5\")   Wt 93 kg (205 lb)   BMI 29.84 kg/m    Constitutional: Appears stated age, well nourished, NAD.  Respiratory: Non-labored. Lungs clear throughout  Cardiovascular: IRR, normal S1 and S2. No M/G/R.  Peripheral edema, dependent.  GI: Soft, non-distended, non-tender.  Skin: Warm and dry. PPM incision well healed.  Musculoskeletal/Extremities: Symmetrical movement.  Neurologic: No gross focal deficits. Alert, awake.  Psychiatric: Affect appropriate. Mentation normal.    Recent Lab Results:  LIPID RESULTS:  Lab Results   Component Value Date    CHOL 101 07/02/2024    CHOL 109 03/13/2020    HDL 45 07/02/2024    HDL 51 03/13/2020    LDL 48 07/02/2024    LDL 49 03/13/2020    TRIG 41 07/02/2024    TRIG 45 03/13/2020    CHOLHDLRATIO 2.5 02/16/2015     LIVER ENZYME RESULTS:  Lab Results   Component Value Date    AST 28 12/04/2024    AST " 28 02/10/2021    ALT 18 12/04/2024    ALT 23 02/10/2021     CBC RESULTS:  Lab Results   Component Value Date    WBC 6.6 12/06/2024    WBC 8.9 02/13/2021    RBC 4.38 (L) 12/06/2024    RBC 3.12 (L) 02/13/2021    HGB 12.5 (L) 12/06/2024    HGB 9.7 (L) 02/13/2021    HCT 38.8 (L) 12/06/2024    HCT 30.9 (L) 02/13/2021    MCV 89 12/06/2024    MCV 99 02/13/2021    MCH 28.5 12/06/2024    MCH 31.1 02/13/2021    MCHC 32.2 12/06/2024    MCHC 31.4 (L) 02/13/2021    RDW 15.4 (H) 12/06/2024    RDW 15.2 (H) 02/13/2021     12/06/2024     02/13/2021     BMP RESULTS:  Lab Results   Component Value Date     12/06/2024     02/12/2021    POTASSIUM 4.1 12/06/2024    POTASSIUM 3.8 06/09/2022    POTASSIUM 4.4 02/12/2021    CHLORIDE 105 12/06/2024    CHLORIDE 103 06/09/2022    CHLORIDE 107 02/12/2021    CO2 26 12/06/2024    CO2 33 (H) 06/09/2022    CO2 30 02/12/2021    ANIONGAP 8 12/06/2024    ANIONGAP 2 (L) 06/09/2022    ANIONGAP 2 (L) 02/12/2021    GLC 82 12/06/2024     (H) 06/09/2022     (H) 02/12/2021    BUN 17.7 12/06/2024    BUN 17 06/09/2022    BUN 31 (H) 02/12/2021    CR 0.70 12/06/2024    CR 0.76 02/12/2021    GFRESTIMATED >90 12/06/2024    GFRESTIMATED >90 02/12/2021    GFRESTBLACK >90 02/12/2021    REECE 9.1 12/06/2024    REECE 8.8 02/12/2021      A1C RESULTS:  Lab Results   Component Value Date    A1C 5.8 (H) 02/04/2019     INR RESULTS:  Lab Results   Component Value Date    INR 3.0 (H) 01/20/2025    INR 3.8 01/06/2025    INR 4.1 (H) 12/26/2024    INR 1.55 (H) 12/06/2024    INR 1.77 (H) 12/05/2024    INR 4.6 (A) 04/19/2024    INR 3.10 (H) 06/22/2021    INR 3.40 (H) 05/13/2021                     Thank you for allowing me to participate in the care of your patient.      Sincerely,     LILLI Park CNP     [facility]  cc:   LILLI Park CNP  [address]  [place] [address]

## 2025-01-24 LAB
MDC_IDC_EPISODE_DTM: NORMAL
MDC_IDC_EPISODE_DURATION: 0 S
MDC_IDC_EPISODE_DURATION: 1 S
MDC_IDC_EPISODE_DURATION: 2 S
MDC_IDC_EPISODE_ID: 587
MDC_IDC_EPISODE_ID: 588
MDC_IDC_EPISODE_ID: 589
MDC_IDC_EPISODE_ID: 590
MDC_IDC_EPISODE_ID: 591
MDC_IDC_EPISODE_ID: 592
MDC_IDC_EPISODE_ID: 593
MDC_IDC_EPISODE_ID: 594
MDC_IDC_EPISODE_ID: 595
MDC_IDC_EPISODE_ID: 596
MDC_IDC_EPISODE_ID: 597
MDC_IDC_EPISODE_ID: 598
MDC_IDC_EPISODE_ID: 599
MDC_IDC_EPISODE_ID: 600
MDC_IDC_EPISODE_ID: 601
MDC_IDC_EPISODE_TYPE: NORMAL
MDC_IDC_EPISODE_TYPE_INDUCED: NO
MDC_IDC_LEAD_CONNECTION_STATUS: NORMAL
MDC_IDC_LEAD_IMPLANT_DT: NORMAL
MDC_IDC_LEAD_LOCATION: NORMAL
MDC_IDC_LEAD_LOCATION_DETAIL_1: NORMAL
MDC_IDC_LEAD_MFG: NORMAL
MDC_IDC_LEAD_MODEL: NORMAL
MDC_IDC_LEAD_POLARITY_TYPE: NORMAL
MDC_IDC_LEAD_SERIAL: NORMAL
MDC_IDC_MSMT_BATTERY_DTM: NORMAL
MDC_IDC_MSMT_BATTERY_REMAINING_LONGEVITY: 192 MO
MDC_IDC_MSMT_BATTERY_RRT_TRIGGER: 2.62
MDC_IDC_MSMT_BATTERY_STATUS: NORMAL
MDC_IDC_MSMT_BATTERY_VOLTAGE: 3.22 V
MDC_IDC_MSMT_LEADCHNL_RV_IMPEDANCE_VALUE: 456 OHM
MDC_IDC_MSMT_LEADCHNL_RV_IMPEDANCE_VALUE: 779 OHM
MDC_IDC_MSMT_LEADCHNL_RV_PACING_THRESHOLD_AMPLITUDE: 0.75 V
MDC_IDC_MSMT_LEADCHNL_RV_PACING_THRESHOLD_PULSEWIDTH: 0.4 MS
MDC_IDC_MSMT_LEADCHNL_RV_SENSING_INTR_AMPL: 9.25 MV
MDC_IDC_MSMT_LEADCHNL_RV_SENSING_INTR_AMPL: 9.4 MV
MDC_IDC_PG_IMPLANT_DTM: NORMAL
MDC_IDC_PG_MFG: NORMAL
MDC_IDC_PG_MODEL: NORMAL
MDC_IDC_PG_SERIAL: NORMAL
MDC_IDC_PG_TYPE: NORMAL
MDC_IDC_SESS_CLINIC_NAME: NORMAL
MDC_IDC_SESS_DTM: NORMAL
MDC_IDC_SESS_TYPE: NORMAL
MDC_IDC_SET_BRADY_HYSTRATE: NORMAL
MDC_IDC_SET_BRADY_LOWRATE: 50 {BEATS}/MIN
MDC_IDC_SET_BRADY_MODE: NORMAL
MDC_IDC_SET_LEADCHNL_RV_PACING_AMPLITUDE: 2 V
MDC_IDC_SET_LEADCHNL_RV_PACING_ANODE_ELECTRODE_1: NORMAL
MDC_IDC_SET_LEADCHNL_RV_PACING_ANODE_LOCATION_1: NORMAL
MDC_IDC_SET_LEADCHNL_RV_PACING_CAPTURE_MODE: NORMAL
MDC_IDC_SET_LEADCHNL_RV_PACING_CATHODE_ELECTRODE_1: NORMAL
MDC_IDC_SET_LEADCHNL_RV_PACING_CATHODE_LOCATION_1: NORMAL
MDC_IDC_SET_LEADCHNL_RV_PACING_POLARITY: NORMAL
MDC_IDC_SET_LEADCHNL_RV_PACING_PULSEWIDTH: 0.4 MS
MDC_IDC_SET_LEADCHNL_RV_SENSING_ANODE_ELECTRODE_1: NORMAL
MDC_IDC_SET_LEADCHNL_RV_SENSING_ANODE_LOCATION_1: NORMAL
MDC_IDC_SET_LEADCHNL_RV_SENSING_CATHODE_ELECTRODE_1: NORMAL
MDC_IDC_SET_LEADCHNL_RV_SENSING_CATHODE_LOCATION_1: NORMAL
MDC_IDC_SET_LEADCHNL_RV_SENSING_POLARITY: NORMAL
MDC_IDC_SET_LEADCHNL_RV_SENSING_SENSITIVITY: 0.9 MV
MDC_IDC_SET_ZONE_DETECTION_INTERVAL: 400 MS
MDC_IDC_SET_ZONE_STATUS: NORMAL
MDC_IDC_SET_ZONE_TYPE: NORMAL
MDC_IDC_SET_ZONE_VENDOR_TYPE: NORMAL
MDC_IDC_STAT_BRADY_DTM_END: NORMAL
MDC_IDC_STAT_BRADY_DTM_START: NORMAL
MDC_IDC_STAT_BRADY_RV_PERCENT_PACED: 2.65 %
MDC_IDC_STAT_EPISODE_RECENT_COUNT: 0
MDC_IDC_STAT_EPISODE_RECENT_COUNT: 0
MDC_IDC_STAT_EPISODE_RECENT_COUNT: 465
MDC_IDC_STAT_EPISODE_RECENT_COUNT_DTM_END: NORMAL
MDC_IDC_STAT_EPISODE_RECENT_COUNT_DTM_START: NORMAL
MDC_IDC_STAT_EPISODE_TOTAL_COUNT: 0
MDC_IDC_STAT_EPISODE_TOTAL_COUNT: 0
MDC_IDC_STAT_EPISODE_TOTAL_COUNT: 601
MDC_IDC_STAT_EPISODE_TOTAL_COUNT_DTM_END: NORMAL
MDC_IDC_STAT_EPISODE_TOTAL_COUNT_DTM_START: NORMAL
MDC_IDC_STAT_EPISODE_TYPE: NORMAL

## 2025-02-11 ENCOUNTER — TELEPHONE (OUTPATIENT)
Dept: ANTICOAGULATION | Facility: CLINIC | Age: 70
End: 2025-02-11
Payer: COMMERCIAL

## 2025-02-11 NOTE — TELEPHONE ENCOUNTER
ANTICOAGULATION     Jovon Mantilla is overdue for an INR check.     Patient is in [place] and says he will call to schedule an appointment out there sometime this week     Samra Blanca, RN  2/11/2025  Anticoagulation Clinic  [facility] for routing messages: rose ROSE  [facility] patient phone line: [phone]

## 2025-02-12 ENCOUNTER — TRANSFERRED RECORDS (OUTPATIENT)
Dept: HEALTH INFORMATION MANAGEMENT | Facility: CLINIC | Age: 70
End: 2025-02-12
Payer: COMMERCIAL

## 2025-02-12 LAB — INR (EXTERNAL): 2.2 (ref 0.9–1.1)

## 2025-02-13 ENCOUNTER — ANTICOAGULATION THERAPY VISIT (OUTPATIENT)
Dept: ANTICOAGULATION | Facility: CLINIC | Age: 70
End: 2025-02-13
Payer: COMMERCIAL

## 2025-02-13 DIAGNOSIS — Z95.2 S/P AORTIC VALVE REPLACEMENT: ICD-10-CM

## 2025-02-13 DIAGNOSIS — Z79.01 LONG TERM CURRENT USE OF ANTICOAGULANT THERAPY: ICD-10-CM

## 2025-02-13 DIAGNOSIS — I48.21 PERMANENT ATRIAL FIBRILLATION (H): ICD-10-CM

## 2025-02-13 DIAGNOSIS — Z95.2 S/P AVR (AORTIC VALVE REPLACEMENT): Primary | ICD-10-CM

## 2025-02-13 DIAGNOSIS — S30.1XXA ABDOMINAL WALL HEMATOMA, INITIAL ENCOUNTER: ICD-10-CM

## 2025-02-13 NOTE — PROGRESS NOTES
2/13/25 - Incoming fax from [facility], [place]     2/12/25 - INR 2.2     Anticoagulation [place] pool

## 2025-02-13 NOTE — PROGRESS NOTES
ANTICOAGULATION MANAGEMENT     Jovon Mantilla 69 year old male is on warfarin with subtherapeutic INR result. (Goal INR 2.5-3.5)    Recent labs: (last 7 days)     02/12/25  1010   INR 2.2*       ASSESSMENT     Source(s): Chart Review and Patient/Caregiver Call     Warfarin doses taken: Warfarin taken as instructed  Diet: No new diet changes identified  Medication/supplement changes: None noted  New illness, injury, or hospitalization: No  Signs or symptoms of bleeding or clotting: No  Previous result: Therapeutic last visit; previously outside of goal range  Additional findings: None       PLAN     Recommended plan for no diet, medication or health factor changes affecting INR     Dosing Instructions: Increase your warfarin dose (8.3% change) with next INR in 2 weeks       Summary  As of 2/13/2025      Full warfarin instructions:  2 mg every Sun; 4 mg all other days   Next INR check:  2/26/2025               Telephone call with Jovon who verbalizes understanding and agrees to plan    Patient using outside facility for labs    Education provided: Please call back if any changes to your diet, medications or how you've been taking warfarin  Goal range and lab monitoring: goal range and significance of current result    Plan made per [facility] anticoagulation protocol    Samra Blanca, RN  2/13/2025  Anticoagulation Clinic  [employer] for routing messages: rose ROSE  [facility] patient phone line: [phone]        _______________________________________________________________________     Anticoagulation Episode Summary       Current INR goal:  2.5-3.5   TTR:  64.3% (11.9 mo)   Target end date:  Indefinite   Send INR reminders to:  CARYN ROSE    Indications    S/P AVR (aortic valve replacement) [Z95.2]  Long-term (current) use of anticoagulants [Z79.01] [Z79.01]  Permanent atrial fibrillation (H) [I48.21]  Abdominal wall hematoma  initial encounter [S30.1XXA]  S/P aortic valve replacement [Z95.2]              Comments:  24mm ATS Valve             Anticoagulation Care Providers       Provider Role Specialty Phone number    Noe Ramirez PA-C Referring Family Medicine [phone]    Kimber Prado PA-C Referring Family Medicine [phone]

## 2025-02-27 ENCOUNTER — ANTICOAGULATION THERAPY VISIT (OUTPATIENT)
Dept: ANTICOAGULATION | Facility: CLINIC | Age: 70
End: 2025-02-27
Payer: COMMERCIAL

## 2025-02-27 DIAGNOSIS — Z95.2 S/P AORTIC VALVE REPLACEMENT: ICD-10-CM

## 2025-02-27 DIAGNOSIS — Z95.2 S/P AVR (AORTIC VALVE REPLACEMENT): Primary | ICD-10-CM

## 2025-02-27 DIAGNOSIS — I48.21 PERMANENT ATRIAL FIBRILLATION (H): ICD-10-CM

## 2025-02-27 DIAGNOSIS — Z79.01 LONG TERM CURRENT USE OF ANTICOAGULANT THERAPY: ICD-10-CM

## 2025-02-27 DIAGNOSIS — S30.1XXA ABDOMINAL WALL HEMATOMA, INITIAL ENCOUNTER: ICD-10-CM

## 2025-02-27 LAB — INR (EXTERNAL): 2.7 (ref 0.9–1.1)

## 2025-02-27 NOTE — PROGRESS NOTES
Incoming fax from  [employer]    Date of result 2/26/25 at 1324    INR result 2.7    Route result to: rose ROSE

## 2025-02-27 NOTE — PROGRESS NOTES
ANTICOAGULATION MANAGEMENT     Jovon Mantilla 69 year old male is on warfarin with therapeutic INR result. (Goal INR 2.5-3.5)    Recent labs: (last 7 days)     02/26/25  1324   INR 2.7*       ASSESSMENT     Source(s): Chart Review and Patient/Caregiver Call     Warfarin doses taken: Warfarin taken as instructed  Diet: No new diet changes identified  Medication/supplement changes:  Yes   Weekly warfarin dose was increased by 8.3% on 2/13/25.  New illness, injury, or hospitalization: No  Signs or symptoms of bleeding or clotting: No  Previous result: subtherapeutic at 2.2 INR on 2/12/25.  Additional findings:  Current [employer] in [place]  - from 12/27 - May       PLAN     Recommended plan for no diet, medication or health factor changes affecting INR     Dosing Instructions: Continue your current warfarin dose with next INR in 2-3 weeks       Summary  As of 2/27/2025      Full warfarin instructions:  2 mg every Sun; 4 mg all other days   Next INR check:  3/13/2025               Telephone call with Jovon who verbalizes understanding and agrees to plan    Patient elected to schedule next visit - 2-3 weeks    Education provided: Taking warfarin: Importance of taking warfarin as instructed  Goal range and lab monitoring: goal range and significance of current result    Plan made per [facility] anticoagulation protocol    Priya Mcintyre, RN  2/27/2025  Anticoagulation Clinic  [employer] for routing messages: rose ROSE  [facility] patient phone line: [phone]        _______________________________________________________________________     Anticoagulation Episode Summary       Current INR goal:  2.5-3.5   TTR:  62.5% (11.9 mo)   Target end date:  Indefinite   Send INR reminders to:  CARYN ROSE    Indications    S/P AVR (aortic valve replacement) [Z95.2]  Long-term (current) use of anticoagulants [Z79.01] [Z79.01]  Permanent atrial fibrillation (H) [I48.21]  Abdominal wall hematoma  initial encounter [S30.1XXA]  S/P  aortic valve replacement [Z95.2]             Comments:  24mm ATS Valve             Anticoagulation Care Providers       Provider Role Specialty Phone number    Noe Ramirez PA-C Referring Family Medicine [phone]    Kimber Prado PA-C Referring Family Medicine [phone]

## 2025-02-28 DIAGNOSIS — R45.86 MOOD CHANGES: ICD-10-CM

## 2025-03-03 ENCOUNTER — TELEPHONE (OUTPATIENT)
Dept: CARDIOLOGY | Facility: CLINIC | Age: 70
End: 2025-03-03

## 2025-03-03 RX ORDER — SERTRALINE HYDROCHLORIDE 25 MG/1
25 TABLET, FILM COATED ORAL DAILY
Qty: 90 TABLET | Refills: 0 | Status: SHIPPED | OUTPATIENT
Start: 2025-03-03

## 2025-03-03 NOTE — PROGRESS NOTES
Cardiology Virtual Visit Progress Note    Service Date: March 3, 2025  Primary Cardiologist: Dr. Cooley    Mr. Jovon Mantilla is a 69 year old male who is being evaluated via a billable telephone visit.  This was changed to telephone from video as patient had issues with connecting via video mode.    Patient has given verbal consent for virtual visit?  Yes    History of Present Illness    Jovon Mantilla is a very pleasant 67 year old male with a past medical history notable for severe aortic stenosis and is s/p AVR in 01/2011 with a 24 mm ATS supra-annular valve.  He also has a history of AF with prior Maze procedure - has remained in AF and is on rate control and anticoagulation.  He also has a history of a 70% stenosis involving the LAD on coronary angiography prior to aortic valve replacement.  This, however, was not bypassed since it was found to be completely intramyocardial at the time of surgery.      In review, in late 2021 he was noted  to have new exertional dyspnea.  TTE showed moderate to severe eccentric mitral regurgitation which progressed compared to his prior imaging from June 2020.   CADE 12/28/2021 showed mild thickening and prolapse of P2 with moderate mitral regurgitation > thought be secondary to annular dilatation from chronic AF as well as an element of P2 prolapse.  The mechanical aortic valve appeared to be functioning normally on both CADE and transthoracic imaging.     Exercise stress echocardiogram 1/17/2022 evaluated for worsening mitral regurgitation postexercise which showed moderately reduced left ventricular systolic function with trace to mild mitral regurgitation at baseline.     On 7/31/2022 he underwent right and left heart catheterization. His 70% LAD stenosis was unchanged.  Cardiac MRI on 9/3/2024 showed LVEF 37% and moderate mitral regurgitation. Following this he underwent NM Lexiscan on 9/18/2024 which shows no inducible myocardial ischemia or infarction.       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  EP consulted inpatient and he is now s/p implantation of single-chamber Medtronic pacemaker with left bundle pacing on 12/5/2024.  His carvedilol was decreased to 6.25 mg twice daily while hospitalized, he was continued on Digoxin 125 mcg/d and Aldactone 12.5 mg/d.  He is also on Jardiance 10.    When I saw him in January 2025 he reported random, occasional palpitations.  His device interrogation showed wide heart rate variability ranging  with rates over 100 bpm approximately 30% of the time.  Blood pressure readings were averaging systolic 100s.  We stopped carvedilol in favor of Toprol- mg daily.  Digoxin, Jardiance and spironolactone were continued unchanged.    Repeat device interrogation after medication changes as above > wide variability with rates ranging from 50-180bpm, rates >100 about 15-20% of the time.  Slight improvement compared to previous report.    Interval 03/04/25    Overall patient feeling well, no acute complaints.  I have reviewed a list of his BP readings he sent via [employer].  No concerning low readings.  He has no side effects from the medication changes we made at the last visit.  He is completely asymptomatic to his AF and even at rapid rates does not have any symptoms.  __________________________________________________________________         Assessment and Impression:     Severe aortic stenosis   S/p aortic valve replacement in 1/2011 with 24 mm JASPAL supra-annular valve  Echocardiogram 8/2023- normal aortic valve gradient  Tachy-kamille syndrome  Afib/RVR, history of MAZE procedure  S/p PPM implantation 2/2 #3  Tachycardic rates persist, on Carvedilol 6.25 mg BID.  Largely asymptomatic to tachycardia with only occasional palpitations.  No recurrent syncope.  Anticoagulated with warfarin  Coronary artery disease  History of 70% stenosis of the LAD on coronary angiogram prior to surgery.  This was not bypassed since it was found to be completely intramyocardial. Subsequent coronary angiogram 7/2022 shows unchanged stenosis in LAD  No anginal symptoms  Asprin 81 mg, simvastatin,  Non-ischemic cardiomyopathy, LVEF 37%  NYHA class II, stage C  Fluid status: euvolemic; suspected dry weight: ~200 lbs  Diuretic regimen: Furosemide 20 mg PRN (pt is taking it scheduled daily)  Ischemic evaluation: NM Lexiscan 9/18/2024 without evidence of ischemia or infarction  Guideline directed medical therapy:  Beta blocker: Toprol XL  ACEI/ARB/ARNI: ---  Aldactone antagonist: spironolactone 12.5 mg  SGLT2 inhibitor: Jardiance 10 mg         Recommendations and Plan:     Increase Toprol XL to 150 mg total daily (100 mg AM + 50 mg PM).  Pt counseled.  I would like to focus efforts on HR control in the setting of NICM and reduced EF, and once we have improved his rapid AF, then we can consider adding the forth pillar of his GDMT (ARB or Entresto).  He does have borderline BP so we will do this in a stepwise fashion to avoid hypotension and significant side effect burden.  Device interrogation scheduled 4/21/25.  VALDO visit after next device check, virtual ok for medication review.  __________________________________________________________________    Thank you for the opportunity to participate in this pleasant patient's care.   We would be happy to see him sooner if needed for any concerns in the meantime.     I spent 18 minutes on the date of encouter of which 14 minutes were spent in medical discussion    LILLI Park, CNP   Nurse Practitioner  [facility]  Pager: [phone]   Text Page (8am - 5pm, M-F)    Provider location: office  Patient location: Home  Total time on phone call: 14 minutes    Today's clinic visit entailed:  Review of the result(s) of each unique test - cardiac testing, device records, labs, cardiac imaging  The following tests were independently interpreted by me as  noted in my documentation: device report, labs  Prescription drug management    The level of medical decision making during this visit was of moderate complexity.      Orders this Visit:  Orders Placed This Encounter   Procedures    Follow-Up with Cardiology- VALDO     Orders Placed This Encounter   Medications    metoprolol succinate ER (TOPROL XL) 100 MG 24 hr tablet     Sig: Take 1.5 tablets (150 mg) by mouth daily. Take 1 tablet (100 mg) daily in the morning + 0.5 tablet (50 mg) in the evening.  Total daily dose 150 mg.     Dispense:  45 tablet     Refill:  4     Medications Discontinued During This Encounter   Medication Reason    metoprolol succinate ER (TOPROL XL) 100 MG 24 hr tablet      Encounter Diagnoses   Name Primary?    Atrial fibrillation with rapid ventricular response (H) Yes    Cardiac pacemaker in situ     Permanent atrial fibrillation (H)     S/P AVR (aortic valve replacement)     Nonrheumatic mitral valve regurgitation     Idiopathic cardiomyopathy (H)        CURRENT MEDICATIONS:  Current Outpatient Medications   Medication Sig Dispense Refill    amoxicillin (AMOXIL) 500 MG capsule Take 2,000 mg by mouth See Admin Instructions. Before dental procedures      Ascorbic Acid (VITAMIN C PO) Take 500 mg by mouth daily      aspirin 81 MG tablet Take 81 mg by mouth Every Mon, Wed, Fri Morning      calcium carbonate-vitamin D 600-200 MG-UNIT TABS Take 1 tablet by mouth 2 times daily       Cholecalciferol (VITAMIN D) 2000 UNIT CAPS Take 1 tablet by mouth daily       Cyanocobalamin (VITAMIN B-12) 500 MCG SUBL Place 500 mcg under the tongue daily       digoxin (LANOXIN) 125 MCG tablet Take 1 tablet (125 mcg) by mouth daily 90 tablet 3    empagliflozin (JARDIANCE) 10 MG TABS tablet Take 1 tablet (10 mg) by mouth daily. 90 tablet 3    furosemide (LASIX) 20 MG tablet Take 0.5 tablets (10 mg) by mouth daily as needed (for weight gain 2lbs or more in a day) 100 tablet 3    levothyroxine (SYNTHROID/LEVOTHROID) 200  MCG tablet Take 1 tablet (200 mcg) by mouth daily 90 tablet 3    metoprolol succinate ER (TOPROL XL) 100 MG 24 hr tablet Take 1.5 tablets (150 mg) by mouth daily. Take 1 tablet (100 mg) daily in the morning + 0.5 tablet (50 mg) in the evening.  Total daily dose 150 mg. 45 tablet 4    Multiple Vitamins-Minerals (CENTRUM) CHEW Take 1 tablet by mouth daily       nitroGLYcerin (NITROSTAT) 0.3 MG sublingual tablet For chest pain place 1 tablet under the tongue every 5 minutes for 3 doses. If symptoms persist 5 minutes after 1st dose call 911. 25 tablet 1    Psyllium (FIBER) 0.52 GM CAPS Take 2 capsules by mouth daily      sertraline (ZOLOFT) 25 MG tablet TAKE 1 TABLET BY MOUTH EVERY DAY 90 tablet 0    simvastatin (ZOCOR) 20 MG tablet Take 1 tablet (20 mg) by mouth at bedtime 90 tablet 3    spironolactone (ALDACTONE) 25 MG tablet Take 0.5 tablets (12.5 mg) by mouth daily. 45 tablet 3    triamcinolone (KENALOG) 0.1 % external cream APPLY TO AFFECTED AREA TWICE A DAY 45 g 0    warfarin ANTICOAGULANT (COUMADIN) 4 MG tablet Take 2 mg (1/2 tablet) on Sun, Thurs and 4 mg (1 tablet)  all other days, or as directed by the [facility] (Patient taking differently: Take 2 mg (1/2 tablet) on Sun and 4 mg (1 tablet)  all other days, or as directed by the [facility]mad[facility] Clinic) 80 tablet 1       ALLERGIES  Allergies   Allergen Reactions    Seasonal Allergies        PAST MEDICAL, SURGICAL, FAMILY HISTORY:  History was reviewed and updated as needed, see medical record.    SOCIAL HISTORY:  Social History     Socioeconomic History    Marital status:      Spouse name: Tracey    Number of children: 0    Years of education: Not on file    Highest education level: Not on file   Occupational History    Occupation:      Comment: [employer]   Tobacco Use    Smoking status: Never     Passive exposure: Never    Smokeless tobacco: Never   Vaping Use    Vaping status: Never Used   Substance and Sexual Activity    Alcohol use: No      Alcohol/week: 0.0 standard drinks of alcohol    Drug use: No    Sexual activity: Not Currently     Partners: Female   Other Topics Concern    Parent/sibling w/ CABG, MI or angioplasty before 65F 55M? Yes     Service Not Asked    Blood Transfusions No    Caffeine Concern Yes     Comment: 1 can daily    Occupational Exposure Yes    Hobby Hazards No    Sleep Concern No    Stress Concern No    Weight Concern No    Special Diet Yes     Comment: low sodium, low calories    Back Care No    Exercise Yes     Comment: walks daily    Bike Helmet Not Asked     Comment: NA    Seat Belt Yes    Self-Exams Yes   Social History Narrative    Not on file     Social Drivers of Health     Financial Resource Strain: Low Risk  (12/4/2024)    Financial Resource Strain     Within the past 12 months, have you or your family members you live with been unable to get utilities (heat, electricity) when it was really needed?: No   Food Insecurity: Low Risk  (12/4/2024)    Food Insecurity     Within the past 12 months, did you worry that your food would run out before you got money to buy more?: No     Within the past 12 months, did the food you bought just not last and you didn t have money to get more?: No   Transportation Needs: Low Risk  (12/4/2024)    Transportation Needs     Within the past 12 months, has lack of transportation kept you from medical appointments, getting your medicines, non-medical meetings or appointments, work, or from getting things that you need?: No   Physical Activity: Insufficiently Active (8/26/2024)    Exercise Vital Sign     Days of Exercise per Week: 2 days     Minutes of Exercise per Session: 20 min   Stress: No Stress Concern Present (8/26/2024)    [employer] [employer] - Occupational Stress Questionnaire     Feeling of Stress : Not at all   Social Connections: Unknown (8/26/2024)    Social Connection and Isolation Panel [NHANES]     Frequency of Communication with Friends and Family:  Not on file     Frequency of Social Gatherings with Friends and Family: Once a week     Attends [employer] Services: Not on file     Active Member of Clubs or Organizations: Not on file     Attends Club or Organization Meetings: Not on file     Marital Status: Not on file   Interpersonal Safety: Low Risk  (12/4/2024)    Interpersonal Safety     Do you feel physically and emotionally safe where you currently live?: Yes     Within the past 12 months, have you been hit, slapped, kicked or otherwise physically hurt by someone?: No     Within the past 12 months, have you been humiliated or emotionally abused in other ways by your partner or ex-partner?: No   Housing Stability: Low Risk  (12/4/2024)    Housing Stability     Do you have housing? : Yes     Are you worried about losing your housing?: No       Review of Systems:  Skin: negative  Eyes: negative  Ears/Nose/Throat: negative  Respiratory: No shortness of breath, dyspnea on exertion, cough, or hemoptysis  Cardiovascular: negative  Gastrointestinal: negative  Genitourinary: negative  Musculoskeletal: negative  Neurologic: negative  Psychiatric: negative  Hematologic/Lymphatic/Immunologic: negative  Endocrine: negative    PSYCH: Alert and oriented times 3; coherent speech, normal   rate and volume, able to articulate logical thoughts, able   to abstract reason, no tangential thoughts, no hallucinations   or delusions. his affect is normal  RESP: No cough, no audible wheezing, able to talk in full sentences    Remainder of the comprehensive physical exam is unable to be completed due to today's visit being completed via telephone call.    There were no vitals taken for this visit.   Wt Readings from Last 4 Encounters:   01/23/25 93 kg (205 lb)   12/06/24 85.6 kg (188 lb 12.8 oz)   10/23/24 90.3 kg (199 lb)   09/18/24 93.1 kg (205 lb 3.2 oz)     Recent Lab Results:  LIPID RESULTS:  Lab Results   Component Value Date    CHOL 101 07/02/2024    CHOL 109 03/13/2020    HDL  45 07/02/2024    HDL 51 03/13/2020    LDL 48 07/02/2024    LDL 49 03/13/2020    TRIG 41 07/02/2024    TRIG 45 03/13/2020    CHOLHDLRATIO 2.5 02/16/2015     LIVER ENZYME RESULTS:  Lab Results   Component Value Date    AST 28 12/04/2024    AST 28 02/10/2021    ALT 18 12/04/2024    ALT 23 02/10/2021     CBC RESULTS:  Lab Results   Component Value Date    WBC 6.6 12/06/2024    WBC 8.9 02/13/2021    RBC 4.38 (L) 12/06/2024    RBC 3.12 (L) 02/13/2021    HGB 12.5 (L) 12/06/2024    HGB 9.7 (L) 02/13/2021    HCT 38.8 (L) 12/06/2024    HCT 30.9 (L) 02/13/2021    MCV 89 12/06/2024    MCV 99 02/13/2021    MCH 28.5 12/06/2024    MCH 31.1 02/13/2021    MCHC 32.2 12/06/2024    MCHC 31.4 (L) 02/13/2021    RDW 15.4 (H) 12/06/2024    RDW 15.2 (H) 02/13/2021     12/06/2024     02/13/2021     BMP RESULTS:  Lab Results   Component Value Date     12/06/2024     02/12/2021    POTASSIUM 4.1 12/06/2024    POTASSIUM 3.8 06/09/2022    POTASSIUM 4.4 02/12/2021    CHLORIDE 105 12/06/2024    CHLORIDE 103 06/09/2022    CHLORIDE 107 02/12/2021    CO2 26 12/06/2024    CO2 33 (H) 06/09/2022    CO2 30 02/12/2021    ANIONGAP 8 12/06/2024    ANIONGAP 2 (L) 06/09/2022    ANIONGAP 2 (L) 02/12/2021    GLC 82 12/06/2024     (H) 06/09/2022     (H) 02/12/2021    BUN 17.7 12/06/2024    BUN 17 06/09/2022    BUN 31 (H) 02/12/2021    CR 0.70 12/06/2024    CR 0.76 02/12/2021    GFRESTIMATED >90 12/06/2024    GFRESTIMATED >90 02/12/2021    GFRESTBLACK >90 02/12/2021    REECE 9.1 12/06/2024    REECE 8.8 02/12/2021      A1C RESULTS:  Lab Results   Component Value Date    A1C 5.8 (H) 02/04/2019     INR RESULTS:  Lab Results   Component Value Date    INR 2.7 (A) 02/26/2025    INR 2.7 (H) 02/26/2025    INR 2.2 (A) 02/12/2025    INR 3.10 (H) 06/22/2021    INR 3.40 (H) 05/13/2021       LILLI Platt CNP  [address]  [place] [address]    This note was completed in part using Dragon voice recognition  software. Although reviewed after completion, some word and grammatical errors may occur.

## 2025-03-03 NOTE — TELEPHONE ENCOUNTER
Esteban Phelps,    Here are the results from today's courtesy pacemaker check prior to office visit scheduled on 3/4. Carvedilol was stopped and started Toprol XL 100mg daily last office visit on 1/23/25.       HR histograms 12/13/24 - 1/20/2025 compared to 1/20/25 - Today        Medtronic [place] (S) Remote PPM Device Check - Courtesy check prior to OV on 3/4.     Mode: VVI 50  Presenting Rhythm: VS rates   Last In-Clinic Underlying Rhythm/Date: AF with V rates 50-100s as of 1/20/25    Pacing/Histogram Data since: 1/20/2025  AP: n/a  : 3.4%  Heart Rate: wide variability with rates ranging from 50-180bpm, rates >100 about 15-20% of the time.    Sensing: stable  Pacing Threshold: stable  Impedance: stable  Battery Status: 15.9 years    Arrhythmia Data Since: 1/20/2025  Atrial Arrhythmia: n/a  Ventricular Arrhythmia: 78 ventricular high rates. 7 EGMs show irregular Vs events suggestive of AF RVR, episode duration logged as <1seconds, average ventricular rates 160s. Pt started Toprol XL 100mg daily on 1/23/25.     Care Plan: Continue remote check scheduled 4/21/2025. Results routed to Mattie REEDER CNP. [email or web address]    I have reviewed and interpreted the device interrogation, settings, programming and nurse's summary. The device is functioning within normal device parameters, unless otherwise noted above. I agree with the current findings, assessment, and plan.

## 2025-03-04 ENCOUNTER — VIRTUAL VISIT (OUTPATIENT)
Dept: CARDIOLOGY | Facility: CLINIC | Age: 70
End: 2025-03-04
Attending: NURSE PRACTITIONER
Payer: COMMERCIAL

## 2025-03-04 DIAGNOSIS — I42.9 IDIOPATHIC CARDIOMYOPATHY (H): ICD-10-CM

## 2025-03-04 DIAGNOSIS — I48.91 ATRIAL FIBRILLATION WITH RAPID VENTRICULAR RESPONSE (H): Primary | ICD-10-CM

## 2025-03-04 DIAGNOSIS — Z95.0 CARDIAC PACEMAKER IN SITU: ICD-10-CM

## 2025-03-04 DIAGNOSIS — I34.0 NONRHEUMATIC MITRAL VALVE REGURGITATION: ICD-10-CM

## 2025-03-04 DIAGNOSIS — I48.21 PERMANENT ATRIAL FIBRILLATION (H): ICD-10-CM

## 2025-03-04 DIAGNOSIS — Z95.2 S/P AVR (AORTIC VALVE REPLACEMENT): ICD-10-CM

## 2025-03-04 RX ORDER — METOPROLOL SUCCINATE 100 MG/1
150 TABLET, EXTENDED RELEASE ORAL DAILY
Qty: 45 TABLET | Refills: 4 | Status: SHIPPED | OUTPATIENT
Start: 2025-03-04

## 2025-03-04 NOTE — LETTER
3/4/2025    Kimber Prado PA-C  [address] [facility] [place] [address]    RE: Jovon Mantilla       Dear Colleague,     I had the pleasure of seeing Jovon Mantilla in the [facility].      Cardiology Virtual Visit Progress Note    Service Date: March 3, 2025  Primary Cardiologist: Dr. Cooley    MrBritta Mantilla is a 69 year old male who is being evaluated via a billable telephone visit.  This was changed to telephone from video as patient had issues with connecting via video mode.    Patient has given verbal consent for virtual visit?  Yes    History of Present Illness    Jovon Mantilla is a very pleasant 67 year old male with a past medical history notable for severe aortic stenosis and is s/p AVR in 01/2011 with a 24 mm ATS supra-annular valve.  He also has a history of AF with prior Maze procedure - has remained in AF and is on rate control and anticoagulation.  He also has a history of a 70% stenosis involving the LAD on coronary angiography prior to aortic valve replacement.  This, however, was not bypassed since it was found to be completely intramyocardial at the time of surgery.      In review, in late 2021 he was noted  to have new exertional dyspnea.  TTE showed moderate to severe eccentric mitral regurgitation which progressed compared to his prior imaging from June 2020.   CADE 12/28/2021 showed mild thickening and prolapse of P2 with moderate mitral regurgitation > thought be secondary to annular dilatation from chronic AF as well as an element of P2 prolapse.  The mechanical aortic valve appeared to be functioning normally on both CADE and transthoracic imaging.     Exercise stress echocardiogram 1/17/2022 evaluated for worsening mitral regurgitation postexercise which showed moderately reduced left ventricular systolic function with trace to mild mitral regurgitation at baseline.     On 7/31/2022 he underwent right and left heart catheterization. His 70% LAD stenosis was  unchanged.  Cardiac MRI on 9/3/2024 showed LVEF 37% and moderate mitral regurgitation. Following this he underwent NM Lexiscan on 9/18/2024 which shows no inducible myocardial ischemia or infarction.      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  EP consulted inpatient and he is now s/p implantation of single-chamber Medtronic pacemaker with left bundle pacing on 12/5/2024.  His carvedilol was decreased to 6.25 mg twice daily while hospitalized, he was continued on Digoxin 125 mcg/d and Aldactone 12.5 mg/d.  He is also on Jardiance 10.    When I saw him in January 2025 he reported random, occasional palpitations.  His device interrogation showed wide heart rate variability ranging  with rates over 100 bpm approximately 30% of the time.  Blood pressure readings were averaging systolic 100s.  We stopped carvedilol in favor of Toprol- mg daily.  Digoxin, Jardiance and spironolactone were continued unchanged.    Repeat device interrogation after medication changes as above > wide variability with rates ranging from 50-180bpm, rates >100 about 15-20% of the time.  Slight improvement compared to previous report.    Interval 03/04/25    Overall patient feeling well, no acute complaints.  I have reviewed a list of his BP readings he sent via [facility].  No concerning low readings.  He has no side effects from the medication changes we made at the last visit.  He is completely asymptomatic to his AF and even at rapid rates does not have any symptoms.  __________________________________________________________________         Assessment and Impression:     Severe aortic stenosis   S/p aortic valve replacement in 1/2011 with 24 mm JASPAL supra-annular valve  Echocardiogram 8/2023- normal aortic valve gradient  Tachy-kamille syndrome  Afib/RVR, history of MAZE procedure  S/p PPM implantation 2/2 #3  Tachycardic rates persist, on Carvedilol 6.25 mg BID.  Largely asymptomatic  to tachycardia with only occasional palpitations.  No recurrent syncope.  Anticoagulated with warfarin  Coronary artery disease  History of 70% stenosis of the LAD on coronary angiogram prior to surgery. This was not bypassed since it was found to be completely intramyocardial. Subsequent coronary angiogram 7/2022 shows unchanged stenosis in LAD  No anginal symptoms  Asprin 81 mg, simvastatin,  Non-ischemic cardiomyopathy, LVEF 37%  NYHA class II, stage C  Fluid status: euvolemic; suspected dry weight: ~200 lbs  Diuretic regimen: Furosemide 20 mg PRN (pt is taking it scheduled daily)  Ischemic evaluation: NM Lexiscan 9/18/2024 without evidence of ischemia or infarction  Guideline directed medical therapy:  Beta blocker: Toprol XL  ACEI/ARB/ARNI: ---  Aldactone antagonist: spironolactone 12.5 mg  SGLT2 inhibitor: Jardiance 10 mg         Recommendations and Plan:     Increase Toprol XL to 150 mg total daily (100 mg AM + 50 mg PM).  Pt counseled.  I would like to focus efforts on HR control in the setting of NICM and reduced EF, and once we have improved his rapid AF, then we can consider adding the forth pillar of his GDMT (ARB or Entresto).  He does have borderline BP so we will do this in a stepwise fashion to avoid hypotension and significant side effect burden.  Device interrogation scheduled 4/21/25.  VALDO visit after next device check, virtual ok for medication review.  __________________________________________________________________    Thank you for the opportunity to participate in this pleasant patient's care.   We would be happy to see him sooner if needed for any concerns in the meantime.     I spent *** minutes on the date of encouter of which 14 minutes were spent in medical discussion    LILLI Park, CNP   Nurse Practitioner  [facility]  Pager: [phone]   Text Page (8am - 5pm, M-F)    Provider location: office  Patient location: Home  Total time on phone call: 14  minutes    Today's clinic visit entailed:  Review of the result(s) of each unique test - cardiac testing, device records, labs, cardiac imaging  The following tests were independently interpreted by me as noted in my documentation: device report, labs  Prescription drug management    The level of medical decision making during this visit was of moderate complexity.      Orders this Visit:  Orders Placed This Encounter   Procedures     Follow-Up with Cardiology- VALDO     Orders Placed This Encounter   Medications     metoprolol succinate ER (TOPROL XL) 100 MG 24 hr tablet     Sig: Take 1.5 tablets (150 mg) by mouth daily. Take 1 tablet (100 mg) daily in the morning + 0.5 tablet (50 mg) in the evening.  Total daily dose 150 mg.     Dispense:  45 tablet     Refill:  4     Medications Discontinued During This Encounter   Medication Reason     metoprolol succinate ER (TOPROL XL) 100 MG 24 hr tablet      Encounter Diagnoses   Name Primary?     Atrial fibrillation with rapid ventricular response (H) Yes     Cardiac pacemaker in situ      Permanent atrial fibrillation (H)      S/P AVR (aortic valve replacement)      Nonrheumatic mitral valve regurgitation      Idiopathic cardiomyopathy (H)        CURRENT MEDICATIONS:  Current Outpatient Medications   Medication Sig Dispense Refill     amoxicillin (AMOXIL) 500 MG capsule Take 2,000 mg by mouth See Admin Instructions. Before dental procedures       Ascorbic Acid (VITAMIN C PO) Take 500 mg by mouth daily       aspirin 81 MG tablet Take 81 mg by mouth Every Mon, Wed, Fri Morning       calcium carbonate-vitamin D 600-200 MG-UNIT TABS Take 1 tablet by mouth 2 times daily        Cholecalciferol (VITAMIN D) 2000 UNIT CAPS Take 1 tablet by mouth daily        Cyanocobalamin (VITAMIN B-12) 500 MCG SUBL Place 500 mcg under the tongue daily        digoxin (LANOXIN) 125 MCG tablet Take 1 tablet (125 mcg) by mouth daily 90 tablet 3     empagliflozin (JARDIANCE) 10 MG TABS tablet Take 1  tablet (10 mg) by mouth daily. 90 tablet 3     furosemide (LASIX) 20 MG tablet Take 0.5 tablets (10 mg) by mouth daily as needed (for weight gain 2lbs or more in a day) 100 tablet 3     levothyroxine (SYNTHROID/LEVOTHROID) 200 MCG tablet Take 1 tablet (200 mcg) by mouth daily 90 tablet 3     metoprolol succinate ER (TOPROL XL) 100 MG 24 hr tablet Take 1.5 tablets (150 mg) by mouth daily. Take 1 tablet (100 mg) daily in the morning + 0.5 tablet (50 mg) in the evening.  Total daily dose 150 mg. 45 tablet 4     Multiple Vitamins-Minerals (CENTRUM) CHEW Take 1 tablet by mouth daily        nitroGLYcerin (NITROSTAT) 0.3 MG sublingual tablet For chest pain place 1 tablet under the tongue every 5 minutes for 3 doses. If symptoms persist 5 minutes after 1st dose call 911. 25 tablet 1     Psyllium (FIBER) 0.52 GM CAPS Take 2 capsules by mouth daily       sertraline (ZOLOFT) 25 MG tablet TAKE 1 TABLET BY MOUTH EVERY DAY 90 tablet 0     simvastatin (ZOCOR) 20 MG tablet Take 1 tablet (20 mg) by mouth at bedtime 90 tablet 3     spironolactone (ALDACTONE) 25 MG tablet Take 0.5 tablets (12.5 mg) by mouth daily. 45 tablet 3     triamcinolone (KENALOG) 0.1 % external cream APPLY TO AFFECTED AREA TWICE A DAY 45 g 0     warfarin ANTICOAGULANT (COUMADIN) 4 MG tablet Take 2 mg (1/2 tablet) on Sun, Thurs and 4 mg (1 tablet)  all other days, or as directed by the [facility]mad[facility] Clinic (Patient taking differently: Take 2 mg (1/2 tablet) on Sun and 4 mg (1 tablet)  all other days, or as directed by the Coumadin Clinic) 80 tablet 1       ALLERGIES  Allergies   Allergen Reactions     Seasonal Allergies        PAST MEDICAL, SURGICAL, FAMILY HISTORY:  History was reviewed and updated as needed, see medical record.    SOCIAL HISTORY:  Social History     Socioeconomic History     Marital status:      Spouse name: Tracey     Number of children: 0     Years of education: Not on file     Highest education level: Not on file   Occupational History      Occupation:      Comment: [employer]   Tobacco Use     Smoking status: Never     Passive exposure: Never     Smokeless tobacco: Never   Vaping Use     Vaping status: Never Used   Substance and Sexual Activity     Alcohol use: No     Alcohol/week: 0.0 standard drinks of alcohol     Drug use: No     Sexual activity: Not Currently     Partners: Female   Other Topics Concern     Parent/sibling w/ CABG, MI or angioplasty before 65F 55M? Yes      Service Not Asked     Blood Transfusions No     Caffeine Concern Yes     Comment: 1 can daily     Occupational Exposure Yes     Hobby Hazards No     Sleep Concern No     Stress Concern No     Weight Concern No     Special Diet Yes     Comment: low sodium, low calories     Back Care No     Exercise Yes     Comment: walks daily     Bike Helmet Not Asked     Comment: NA     Seat Belt Yes     Self-Exams Yes   Social History Narrative     Not on file     Social Drivers of Health     Financial Resource Strain: Low Risk  (12/4/2024)    Financial Resource Strain      Within the past 12 months, have you or your family members you live with been unable to get utilities (heat, electricity) when it was really needed?: No   Food Insecurity: Low Risk  (12/4/2024)    Food Insecurity      Within the past 12 months, did you worry that your food would run out before you got money to buy more?: No      Within the past 12 months, did the food you bought just not last and you didn t have money to get more?: No   Transportation Needs: Low Risk  (12/4/2024)    Transportation Needs      Within the past 12 months, has lack of transportation kept you from medical appointments, getting your medicines, non-medical meetings or appointments, work, or from getting things that you need?: No   Physical Activity: Insufficiently Active (8/26/2024)    Exercise Vital Sign      Days of Exercise per Week: 2 days      Minutes of Exercise per Session: 20 min   Stress: No Stress Concern Present  (8/26/2024)    [place] [place] [employer] - Occupational Stress Questionnaire      Feeling of Stress : Not at all   Social Connections: Unknown (8/26/2024)    Social Connection and Isolation Panel [NHANES]      Frequency of Communication with Friends and Family: Not on file      Frequency of Social Gatherings with Friends and Family: Once a week      Attends Religion Services: Not on file      Active Member of Clubs or Organizations: Not on file      Attends Club or Organization Meetings: Not on file      Marital Status: Not on file   Interpersonal Safety: Low Risk  (12/4/2024)    Interpersonal Safety      Do you feel physically and emotionally safe where you currently live?: Yes      Within the past 12 months, have you been hit, slapped, kicked or otherwise physically hurt by someone?: No      Within the past 12 months, have you been humiliated or emotionally abused in other ways by your partner or ex-partner?: No   Housing Stability: Low Risk  (12/4/2024)    Housing Stability      Do you have housing? : Yes      Are you worried about losing your housing?: No       Review of Systems:  Skin: negative  Eyes: negative  Ears/Nose/Throat: negative  Respiratory: No shortness of breath, dyspnea on exertion, cough, or hemoptysis  Cardiovascular: negative  Gastrointestinal: negative  Genitourinary: negative  Musculoskeletal: negative  Neurologic: negative  Psychiatric: negative  Hematologic/Lymphatic/Immunologic: negative  Endocrine: negative    PSYCH: Alert and oriented times 3; coherent speech, normal   rate and volume, able to articulate logical thoughts, able   to abstract reason, no tangential thoughts, no hallucinations   or delusions. his affect is normal  RESP: No cough, no audible wheezing, able to talk in full sentences    Remainder of the comprehensive physical exam is unable to be completed due to today's visit being completed via telephone call.    There were no vitals taken for this visit.   Wt  Readings from Last 4 Encounters:   01/23/25 93 kg (205 lb)   12/06/24 85.6 kg (188 lb 12.8 oz)   10/23/24 90.3 kg (199 lb)   09/18/24 93.1 kg (205 lb 3.2 oz)     Recent Lab Results:  LIPID RESULTS:  Lab Results   Component Value Date    CHOL 101 07/02/2024    CHOL 109 03/13/2020    HDL 45 07/02/2024    HDL 51 03/13/2020    LDL 48 07/02/2024    LDL 49 03/13/2020    TRIG 41 07/02/2024    TRIG 45 03/13/2020    CHOLHDLRATIO 2.5 02/16/2015     LIVER ENZYME RESULTS:  Lab Results   Component Value Date    AST 28 12/04/2024    AST 28 02/10/2021    ALT 18 12/04/2024    ALT 23 02/10/2021     CBC RESULTS:  Lab Results   Component Value Date    WBC 6.6 12/06/2024    WBC 8.9 02/13/2021    RBC 4.38 (L) 12/06/2024    RBC 3.12 (L) 02/13/2021    HGB 12.5 (L) 12/06/2024    HGB 9.7 (L) 02/13/2021    HCT 38.8 (L) 12/06/2024    HCT 30.9 (L) 02/13/2021    MCV 89 12/06/2024    MCV 99 02/13/2021    MCH 28.5 12/06/2024    MCH 31.1 02/13/2021    MCHC 32.2 12/06/2024    MCHC 31.4 (L) 02/13/2021    RDW 15.4 (H) 12/06/2024    RDW 15.2 (H) 02/13/2021     12/06/2024     02/13/2021     BMP RESULTS:  Lab Results   Component Value Date     12/06/2024     02/12/2021    POTASSIUM 4.1 12/06/2024    POTASSIUM 3.8 06/09/2022    POTASSIUM 4.4 02/12/2021    CHLORIDE 105 12/06/2024    CHLORIDE 103 06/09/2022    CHLORIDE 107 02/12/2021    CO2 26 12/06/2024    CO2 33 (H) 06/09/2022    CO2 30 02/12/2021    ANIONGAP 8 12/06/2024    ANIONGAP 2 (L) 06/09/2022    ANIONGAP 2 (L) 02/12/2021    GLC 82 12/06/2024     (H) 06/09/2022     (H) 02/12/2021    BUN 17.7 12/06/2024    BUN 17 06/09/2022    BUN 31 (H) 02/12/2021    CR 0.70 12/06/2024    CR 0.76 02/12/2021    GFRESTIMATED >90 12/06/2024    GFRESTIMATED >90 02/12/2021    GFRESTBLACK >90 02/12/2021    REECE 9.1 12/06/2024    REECE 8.8 02/12/2021      A1C RESULTS:  Lab Results   Component Value Date    A1C 5.8 (H) 02/04/2019     INR RESULTS:  Lab Results   Component Value Date     INR 2.7 (A) 02/26/2025    INR 2.7 (H) 02/26/2025    INR 2.2 (A) 02/12/2025    INR 3.10 (H) 06/22/2021    INR 3.40 (H) 05/13/2021       CC  LILLI Park CNP  [address]  [place] [address]    This note was completed in part using Dragon voice recognition software. Although reviewed after completion, some word and grammatical errors may occur.       Thank you for allowing me to participate in the care of your patient.      Sincerely,     LILLI Park CNP     [facility]  cc:   LILLI Park CNP  [address]  [place] [address]

## 2025-03-04 NOTE — PROGRESS NOTES
"Jovon is a 69 year old who is being evaluated via a billable video visit.    How would you like to obtain your AVS? S[employer]Wave  If the video visit is dropped, the invitation should be resent by: Text to cell phone: [phone]  Will anyone else be joining your video visit? No  {If patient encounters technical issues they should call [phone] :[id number]}  Video-Visit Details    Type of service:  Video Visit   Video End Time:{video visit start/end time for provider to select:521022}  Originating Location (pt. Location): Home  {PROVIDER LOCATION On-site should be selected for visits conducted from your clinic location or adjoining [facility] hospital, academic office, or other nearby [facility] building. Off-site should be selected for all other provider locations, including home:[id number]}  Distant Location (provider location):  On-site  Platform used for Video Visit: [employer]    Review Of Systems  Respiratory: NEGATIVE  Cardiovascular:NEGATIVE  Endocrine:  Thyroid    BP readings sent via [employer] - 03/03/25 123/87 pulse 81  Vitals - Patient Reported  Weight (Patient Reported): 87.1 kg (192 lb)  Height (Patient Reported): 175.3 cm (5' 9\")  BMI (Based on Pt Reported Ht/Wt): 28.35    Telephone number of patient: [phone]    +++Patient having troubles logging into [employer] please send doximity link to patient+++    NANCY Ball     "

## 2025-03-04 NOTE — PATIENT INSTRUCTIONS
Thank you for your visit with the [facility] today.    Today's Summary:    Increase Toprol XL to 100 mg AM + 50 mg PM for total daily dose of 150 mg.  Continue other cardiac medications unchanged.  Device interrogation in April (scheduled)    Follow-up:  Cardiology follow up at [facility]: 1-month.     Cardiology Scheduling ~[phone]  Cardiology Clinic RN ~ [phone]    It was a pleasure seeing you today.     LILLI Park, CNP  Certified Nurse Practitioner  [facility]  March 4, 2025  ________________________________________________________

## 2025-03-20 ENCOUNTER — MYC MEDICAL ADVICE (OUTPATIENT)
Dept: ANTICOAGULATION | Facility: CLINIC | Age: 70
End: 2025-03-20
Payer: COMMERCIAL

## 2025-03-26 LAB — INR (EXTERNAL): 2.3

## 2025-03-27 ENCOUNTER — ANTICOAGULATION THERAPY VISIT (OUTPATIENT)
Dept: ANTICOAGULATION | Facility: CLINIC | Age: 70
End: 2025-03-27
Payer: COMMERCIAL

## 2025-03-27 DIAGNOSIS — S30.1XXA ABDOMINAL WALL HEMATOMA, INITIAL ENCOUNTER: ICD-10-CM

## 2025-03-27 DIAGNOSIS — Z95.2 S/P AVR (AORTIC VALVE REPLACEMENT): Primary | ICD-10-CM

## 2025-03-27 DIAGNOSIS — Z95.2 S/P AORTIC VALVE REPLACEMENT: ICD-10-CM

## 2025-03-27 DIAGNOSIS — Z79.01 LONG TERM CURRENT USE OF ANTICOAGULANT THERAPY: ICD-10-CM

## 2025-03-27 DIAGNOSIS — I48.21 PERMANENT ATRIAL FIBRILLATION (H): ICD-10-CM

## 2025-03-27 NOTE — PROGRESS NOTES
Incoming fax from  [facility]    Date of result 3/26/25    INR result 2.3    Route result to: rose ROSE

## 2025-03-27 NOTE — PROGRESS NOTES
ANTICOAGULATION MANAGEMENT     Jovon Mantilla 69 year old male is on warfarin with subtherapeutic INR result. (Goal INR 2.5-3.5)    Recent labs: (last 7 days)     03/26/25  0956   INR 2.3       ASSESSMENT     Source(s): Chart Review and Patient/Caregiver Call     Warfarin doses taken: Warfarin taken as instructed  Diet: No new diet changes identified  Medication/supplement changes: None noted  New illness, injury, or hospitalization: No  Signs or symptoms of bleeding or clotting: No  Previous result: Therapeutic last visit; previously outside of goal range  Additional findings: Advised dose increase as no temporary factors but patient preferred to do a booster dose and remain on same dose at this time. Agreed that if next INR remains low, will increase maintenance dose at that time.        PLAN     Recommended plan for no diet, medication or health factor changes affecting INR     Dosing Instructions: booster dose then continue your current warfarin dose (per pt request) with next INR in 2 weeks       Summary  As of 3/27/2025      Full warfarin instructions:  3/27: 6 mg; Otherwise 2 mg every Sun; 4 mg all other days   Next INR check:  4/9/2025               Telephone call with Jovon who verbalizes understanding and agrees to plan and who agrees to plan and repeated back plan correctly    Patient using outside facility for labs    Education provided: Taking warfarin: Importance of taking warfarin as instructed  Goal range and lab monitoring: goal range and significance of current result, Importance of therapeutic range, and Importance of following up at instructed interval  Symptom monitoring: monitoring for bleeding signs and symptoms, monitoring for clotting signs and symptoms, and when to seek medical attention/emergency care  Importance of notifying anticoagulation clinic for: changes in medications; a sooner lab recheck maybe needed, diarrhea, nausea/vomiting, reduced intake, cold/flu, and/or infections; a  sooner lab recheck maybe needed, and if you did not receive dosing instructions on the same day as your labs were checked  Contact [phone] with any changes, questions or concerns.     Plan made per [facility] anticoagulation protocol    Bri Matos, RN  3/27/2025  Anticoagulation Clinic  [facility] for routing messages: rose ROSE  [facility] patient phone line: [phone]        _______________________________________________________________________     Anticoagulation Episode Summary       Current INR goal:  2.5-3.5   TTR:  65.3% (11.9 mo)   Target end date:  Indefinite   Send INR reminders to:  CARYN ROSE    Indications    S/P AVR (aortic valve replacement) [Z95.2]  Long-term (current) use of anticoagulants [Z79.01] [Z79.01]  Permanent atrial fibrillation (H) [I48.21]  Abdominal wall hematoma  initial encounter [S30.1XXA]  S/P aortic valve replacement [Z95.2]             Comments:  24mm ATS Valve             Anticoagulation Care Providers       Provider Role Specialty Phone number    Noe Ramirez PA-C Referring Family Medicine [phone]    Kimber Prado PA-C Referring Family Medicine [phone]

## 2025-04-21 ENCOUNTER — ANCILLARY PROCEDURE (OUTPATIENT)
Dept: CARDIOLOGY | Facility: CLINIC | Age: 70
End: 2025-04-21
Attending: INTERNAL MEDICINE
Payer: COMMERCIAL

## 2025-04-21 DIAGNOSIS — I48.21 PERMANENT ATRIAL FIBRILLATION (H): ICD-10-CM

## 2025-04-21 DIAGNOSIS — I42.9 IDIOPATHIC CARDIOMYOPATHY (H): ICD-10-CM

## 2025-04-21 DIAGNOSIS — Z95.0 CARDIAC PACEMAKER IN SITU: ICD-10-CM

## 2025-04-21 LAB
MDC_IDC_EPISODE_DTM: NORMAL
MDC_IDC_EPISODE_DURATION: 0 S
MDC_IDC_EPISODE_DURATION: 1 S
MDC_IDC_EPISODE_DURATION: 2 S
MDC_IDC_EPISODE_DURATION: 2 S
MDC_IDC_EPISODE_ID: 680
MDC_IDC_EPISODE_ID: 681
MDC_IDC_EPISODE_ID: 682
MDC_IDC_EPISODE_ID: 683
MDC_IDC_EPISODE_ID: 684
MDC_IDC_EPISODE_ID: 685
MDC_IDC_EPISODE_ID: 686
MDC_IDC_EPISODE_ID: 687
MDC_IDC_EPISODE_ID: 688
MDC_IDC_EPISODE_TYPE: NORMAL
MDC_IDC_LEAD_CONNECTION_STATUS: NORMAL
MDC_IDC_LEAD_IMPLANT_DT: NORMAL
MDC_IDC_LEAD_LOCATION: NORMAL
MDC_IDC_LEAD_LOCATION_DETAIL_1: NORMAL
MDC_IDC_LEAD_MFG: NORMAL
MDC_IDC_LEAD_MODEL: NORMAL
MDC_IDC_LEAD_POLARITY_TYPE: NORMAL
MDC_IDC_LEAD_SERIAL: NORMAL
MDC_IDC_MSMT_BATTERY_DTM: NORMAL
MDC_IDC_MSMT_BATTERY_REMAINING_LONGEVITY: 189 MO
MDC_IDC_MSMT_BATTERY_RRT_TRIGGER: 2.62
MDC_IDC_MSMT_BATTERY_STATUS: NORMAL
MDC_IDC_MSMT_BATTERY_VOLTAGE: 3.21 V
MDC_IDC_MSMT_LEADCHNL_RV_IMPEDANCE_VALUE: 418 OHM
MDC_IDC_MSMT_LEADCHNL_RV_IMPEDANCE_VALUE: 760 OHM
MDC_IDC_MSMT_LEADCHNL_RV_PACING_THRESHOLD_AMPLITUDE: 0.62 V
MDC_IDC_MSMT_LEADCHNL_RV_PACING_THRESHOLD_PULSEWIDTH: 0.4 MS
MDC_IDC_MSMT_LEADCHNL_RV_SENSING_INTR_AMPL: 11 MV
MDC_IDC_MSMT_LEADCHNL_RV_SENSING_INTR_AMPL: 11 MV
MDC_IDC_PG_IMPLANT_DTM: NORMAL
MDC_IDC_PG_MFG: NORMAL
MDC_IDC_PG_MODEL: NORMAL
MDC_IDC_PG_SERIAL: NORMAL
MDC_IDC_PG_TYPE: NORMAL
MDC_IDC_SESS_CLINIC_NAME: NORMAL
MDC_IDC_SESS_DTM: NORMAL
MDC_IDC_SESS_TYPE: NORMAL
MDC_IDC_SET_BRADY_HYSTRATE: NORMAL
MDC_IDC_SET_BRADY_LOWRATE: 50 {BEATS}/MIN
MDC_IDC_SET_BRADY_MODE: NORMAL
MDC_IDC_SET_LEADCHNL_RV_PACING_AMPLITUDE: 2 V
MDC_IDC_SET_LEADCHNL_RV_PACING_ANODE_ELECTRODE_1: NORMAL
MDC_IDC_SET_LEADCHNL_RV_PACING_ANODE_LOCATION_1: NORMAL
MDC_IDC_SET_LEADCHNL_RV_PACING_CAPTURE_MODE: NORMAL
MDC_IDC_SET_LEADCHNL_RV_PACING_CATHODE_ELECTRODE_1: NORMAL
MDC_IDC_SET_LEADCHNL_RV_PACING_CATHODE_LOCATION_1: NORMAL
MDC_IDC_SET_LEADCHNL_RV_PACING_POLARITY: NORMAL
MDC_IDC_SET_LEADCHNL_RV_PACING_PULSEWIDTH: 0.4 MS
MDC_IDC_SET_LEADCHNL_RV_SENSING_ANODE_ELECTRODE_1: NORMAL
MDC_IDC_SET_LEADCHNL_RV_SENSING_ANODE_LOCATION_1: NORMAL
MDC_IDC_SET_LEADCHNL_RV_SENSING_CATHODE_ELECTRODE_1: NORMAL
MDC_IDC_SET_LEADCHNL_RV_SENSING_CATHODE_LOCATION_1: NORMAL
MDC_IDC_SET_LEADCHNL_RV_SENSING_POLARITY: NORMAL
MDC_IDC_SET_LEADCHNL_RV_SENSING_SENSITIVITY: 0.9 MV
MDC_IDC_SET_ZONE_DETECTION_INTERVAL: 400 MS
MDC_IDC_SET_ZONE_STATUS: NORMAL
MDC_IDC_SET_ZONE_TYPE: NORMAL
MDC_IDC_SET_ZONE_VENDOR_TYPE: NORMAL
MDC_IDC_STAT_BRADY_DTM_END: NORMAL
MDC_IDC_STAT_BRADY_DTM_START: NORMAL
MDC_IDC_STAT_BRADY_RV_PERCENT_PACED: 3.66 %
MDC_IDC_STAT_EPISODE_RECENT_COUNT: 0
MDC_IDC_STAT_EPISODE_RECENT_COUNT: 0
MDC_IDC_STAT_EPISODE_RECENT_COUNT: 9
MDC_IDC_STAT_EPISODE_RECENT_COUNT_DTM_END: NORMAL
MDC_IDC_STAT_EPISODE_RECENT_COUNT_DTM_START: NORMAL
MDC_IDC_STAT_EPISODE_TOTAL_COUNT: 0
MDC_IDC_STAT_EPISODE_TOTAL_COUNT: 0
MDC_IDC_STAT_EPISODE_TOTAL_COUNT: 688
MDC_IDC_STAT_EPISODE_TOTAL_COUNT_DTM_END: NORMAL
MDC_IDC_STAT_EPISODE_TOTAL_COUNT_DTM_START: NORMAL
MDC_IDC_STAT_EPISODE_TYPE: NORMAL

## 2025-04-21 PROCEDURE — 93296 REM INTERROG EVL PM/IDS: CPT | Performed by: INTERNAL MEDICINE

## 2025-04-21 PROCEDURE — 93294 REM INTERROG EVL PM/LDLS PM: CPT | Performed by: INTERNAL MEDICINE

## 2025-04-22 ENCOUNTER — TELEPHONE (OUTPATIENT)
Dept: FAMILY MEDICINE | Facility: CLINIC | Age: 70
End: 2025-04-22
Payer: COMMERCIAL

## 2025-04-22 LAB — INR (EXTERNAL): 2.4

## 2025-04-22 NOTE — TELEPHONE ENCOUNTER
Received the paperwork due to AFIB for a study. He is currently in [place] until 5/20/25. Scheduled office visit for 5/212/5 with Zaina Camacho CMA

## 2025-04-22 NOTE — TELEPHONE ENCOUNTER
Received a pharmacogenetic test request.  Put form back in team box.  I am not sure what this is for or what prompted this.  We have our own in house test if I am to order this but he needs follow up to discuss first.    Kimber Prado PA-C

## 2025-04-23 ENCOUNTER — ANTICOAGULATION THERAPY VISIT (OUTPATIENT)
Dept: ANTICOAGULATION | Facility: CLINIC | Age: 70
End: 2025-04-23

## 2025-04-23 ENCOUNTER — VIRTUAL VISIT (OUTPATIENT)
Dept: CARDIOLOGY | Facility: CLINIC | Age: 70
End: 2025-04-23
Attending: NURSE PRACTITIONER
Payer: COMMERCIAL

## 2025-04-23 DIAGNOSIS — I25.10 CORONARY ARTERY DISEASE INVOLVING NATIVE CORONARY ARTERY OF NATIVE HEART WITHOUT ANGINA PECTORIS: ICD-10-CM

## 2025-04-23 DIAGNOSIS — Z95.0 CARDIAC PACEMAKER IN SITU: ICD-10-CM

## 2025-04-23 DIAGNOSIS — I48.91 ATRIAL FIBRILLATION WITH RAPID VENTRICULAR RESPONSE (H): ICD-10-CM

## 2025-04-23 DIAGNOSIS — Z79.01 LONG TERM CURRENT USE OF ANTICOAGULANT THERAPY: ICD-10-CM

## 2025-04-23 DIAGNOSIS — I50.22 CHRONIC HEART FAILURE WITH MILDLY REDUCED EJECTION FRACTION (HFMREF, 41-49%) (H): Primary | ICD-10-CM

## 2025-04-23 DIAGNOSIS — Z95.2 S/P AORTIC VALVE REPLACEMENT: ICD-10-CM

## 2025-04-23 DIAGNOSIS — Z95.2 S/P AVR (AORTIC VALVE REPLACEMENT): Primary | ICD-10-CM

## 2025-04-23 DIAGNOSIS — S30.1XXA ABDOMINAL WALL HEMATOMA, INITIAL ENCOUNTER: ICD-10-CM

## 2025-04-23 DIAGNOSIS — I48.21 PERMANENT ATRIAL FIBRILLATION (H): ICD-10-CM

## 2025-04-23 RX ORDER — METOPROLOL SUCCINATE 100 MG/1
100 TABLET, EXTENDED RELEASE ORAL 2 TIMES DAILY
Qty: 180 TABLET | Refills: 4 | Status: SHIPPED | OUTPATIENT
Start: 2025-04-23

## 2025-04-23 NOTE — PROGRESS NOTES
Incoming fax from  [facility]    Date of result 4/22/25    INR result 2.4    Route result to: rose ROSE

## 2025-04-23 NOTE — LETTER
"4/23/2025    Kimber Prado PA-C  [address]  [place] [address]    RE: Jovon Mantilla       Dear Colleague,     I had the pleasure of seeing Jovon Mantilla in the [facility].  -Jovon is a 69 year old who is being evaluated via a billable telephone visit.    What phone number would you like to be contacted at? [phone]  How would you like to obtain your AVS? MyChart  Originating Location (pt. Location): -Home  {PROVIDER LOCATION On-site should be selected for visits conducted from your clinic location or adjoining [facility] hospital, academic office, or other nearby [facility] building. Off-site should be selected for all other provider locations, including home:603[address]}  Distant Location (provider location):  {virtual location provider:908151}  Telephone visit completed due to {audio only reason:159671}  Phone call duration: *** minutes    Review Of Systems  Skin: NEGATIVE  Eyes:Ears/Nose/Throat: NEGATIVE  Respiratory: NEGATIVE  Cardiovascular:NEGATIVE  Gastrointestinal: NEGATIVE  Genitourinary:NEGATIVE   Musculoskeletal: NEGATIVE  Neurologic: NEGATIVE  Psychiatric: NEGATIVE  Hematologic/Lymphatic/Immunologic: NEGATIVE  Endocrine:  Thyroid    Vitals - Patient Reported  Systolic (Patient Reported): 133  Diastolic (Patient Reported): 74  Weight (Patient Reported): 87.5 kg (193 lb)  Height (Patient Reported): 176.5 cm (5' 9.5\")  BMI (Based on Pt Reported Ht/Wt): 28.09  Pulse (Patient Reported): 76    Telephone number of patient: [phone]    Melva Hall LPN          Cardiology Virtual Visit Progress Note    Service Date: April 23, 2025  Primary Cardiologist: Dr. Cooley  Reason for visit: Medication change follow up     Mr. Jovon Mantilla is a 69 year old male who is being evaluated via a billable telephone visit.    Patient has given verbal consent for virtual visit?  Yes    History of Present Illness    Jovon Mantilla is a very pleasant 67 year old male with a past medical history " notable for severe aortic stenosis and is s/p AVR in 01/2011 with a 24 mm ATS supra-annular valve.  He also has a history of AF with prior Maze procedure - has remained in AF and is on rate control and anticoagulation.  He also has a history of a 70% stenosis involving the LAD on coronary angiography prior to aortic valve replacement.  This, however, was not bypassed since it was found to be completely intramyocardial at the time of surgery.      In review, in late 2021 he was noted  to have new exertional dyspnea.  An echocardiogram at that time showed moderate to severe eccentric mitral regurgitation which had progressed compared to his prior echocardiogram from June 2020. Given these findings, a CADE for further evaluation was recommended.      A CADE from 12/28/2021 demonstrated severe biatrial enlargement mildly reduced left ventricular systolic function. There was mild thickening and prolapse of P2 with moderate mitral regurgitation.. The mitral regurgitation was thought be secondary to both annular dilatation in the setting of chronic persistent atrial fibrillation as well as an element of P2 prolapse as reported. The mechanical aortic valve appeared to be functioning normally on both CADE and transthoracic imaging.     Mr. Fuller subsequently underwent an exercise stress echocardiogram on 1/17/2022 to evaluate for worsening mitral regurgitation postexercise which showed moderately reduced left ventricular systolic function with trace to mild mitral regurgitation at baseline.     On 7/31/2022 he underwent right and left heart catheterization. His 70% LAD stenosis was unchanged from his angiogram in prior to his AVR.  He underwent cardiac MRI on 9/3/2024 which shows LVEF 37% and moderate mitral regurgitation. Following this he underwent NM Lexiscan on 9/18/2024 which shows no inducible myocardial ischemia or infarction.      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   EP consulted inpatient and he is now s/p implantation of single-chamber [employer] pacemaker with left bundle pacing on 12/5/2024.  His carvedilol was decreased to 6.25 mg twice daily while hospitalized, he was continued on Digoxin 125 mcg/d and Aldactone 12.5 mg/d.  He is also on Jardiance 10.    We have switched him to Toprol XL for low blood pressures and he has tolerated this well.    Diagnostics:  Device check 4/21/25: : 4% (LBB pacing).  9 ventricular high rates logged. EGMs show irregular VS events suggestive of RVR lasting <1-2 seconds, rates 150-170s. Taking toprol XL. Not new finding.  Compared to prior monitors, high rate frequency has decreased.  __________________________________________________________________         Assessment and Impression:     Severe aortic stenosis   S/p aortic valve replacement in 1/2011 with 24 mm JASPAL supra-annular valve  Echocardiogram 8/2023- normal aortic valve gradient  Tachy-kamille syndrome  Afib/RVR, history of MAZE procedure  S/p PPM implantation 2/2 #3  Tachycardic rates persist > improved on device interrogation.  Largely asymptomatic to tachycardia with only occasional palpitations.  No recurrent syncope.  Anticoagulated with warfarin  Coronary artery disease  History of 70% stenosis of the LAD on coronary angiogram prior to surgery. This was not bypassed since it was found to be completely intramyocardial. Subsequent coronary angiogram 7/2022 shows unchanged stenosis in LAD  No anginal symptoms  Asprin 81 mg, simvastatin,  Non-ischemic cardiomyopathy, LVEF 37%  NYHA class II, stage C  Fluid status: euvolemic; suspected dry weight: ~200 lbs  Diuretic regimen: Furosemide 20 mg PRN (pt is taking it scheduled daily)  Ischemic evaluation: NM Lexiscan 9/18/2024 without evidence of ischemia or infarction  Guideline directed medical therapy:  Beta blocker: Toprol  mg AM + 50 mg evening.  ACEI/ARB/ARNI: ---  Aldactone antagonist: spironolactone 12.5 mg  SGLT2 inhibitor:  Jardiance 10 mg         Recommendations and Plan:     Increase Toprol XL to 100 mg BID.  Hold off on adding additional medications for now.  Shared decision conversation.  Repeat echocardiogram in June to reassess LV improvement.  Consider Entresto at follow up if LV remains low.  Discussed the cost of this medication today in clinic and patient is ok with it from a financial standpoint.  Routine device interrogations as scheduled.  Follow up with Dr. Cooley in June as scheduled.  __________________________________________________________________    Thank you for the opportunity to participate in this pleasant patient's care.   We would be happy to see him sooner if needed for any concerns in the meantime.     I spent 20 minutes on the date of encouter of which 17 minutes were spent in medical discussion    LILLI Park, CNP   Nurse Practitioner  [facility]  Pager: [phone]   Text Page (8am - 5pm, M-F)    Provider location: on-site  Patient location: Home  Total time on phone call: 17 minutes    Today's clinic visit entailed:  The following tests were independently interpreted by me as noted in my documentation: device report, echo, labs  Prescription drug management  The level of medical decision making during this visit was of moderate complexity.  Review of the result(s) of each unique test - cardiac testing, cardiac imaging, labs  Care everywhere reviewed for additional records to facilitate comprehensive patient care.  Recent hospitalization records and notes reviewed to facilitate comprehensive care coordination.    CURRENT MEDICATIONS:  Current Outpatient Medications   Medication Sig Dispense Refill     amoxicillin (AMOXIL) 500 MG capsule Take 2,000 mg by mouth See Admin Instructions. Before dental procedures       Ascorbic Acid (VITAMIN C PO) Take 500 mg by mouth daily       aspirin 81 MG tablet Take 81 mg by mouth Every Mon, Wed, Fri Morning       calcium carbonate-vitamin D  600-200 MG-UNIT TABS Take 1 tablet by mouth 2 times daily        Cholecalciferol (VITAMIN D) 2000 UNIT CAPS Take 1 tablet by mouth daily        Cyanocobalamin (VITAMIN B-12) 500 MCG SUBL Place 500 mcg under the tongue daily        digoxin (LANOXIN) 125 MCG tablet Take 1 tablet (125 mcg) by mouth daily 90 tablet 3     empagliflozin (JARDIANCE) 10 MG TABS tablet Take 1 tablet (10 mg) by mouth daily. 90 tablet 3     furosemide (LASIX) 20 MG tablet Take 0.5 tablets (10 mg) by mouth daily as needed (for weight gain 2lbs or more in a day) 100 tablet 3     levothyroxine (SYNTHROID/LEVOTHROID) 200 MCG tablet Take 1 tablet (200 mcg) by mouth daily 90 tablet 3     metoprolol succinate ER (TOPROL XL) 100 MG 24 hr tablet Take 1 tablet (100 mg) by mouth 2 times daily. Take 1 tablet (100 mg) daily in the morning + 0.5 tablet (50 mg) in the evening.  Total daily dose 150 mg. 180 tablet 4     Multiple Vitamins-Minerals (CENTRUM) CHEW Take 1 tablet by mouth daily        nitroGLYcerin (NITROSTAT) 0.3 MG sublingual tablet For chest pain place 1 tablet under the tongue every 5 minutes for 3 doses. If symptoms persist 5 minutes after 1st dose call 911. 25 tablet 1     Psyllium (FIBER) 0.52 GM CAPS Take 2 capsules by mouth daily       sertraline (ZOLOFT) 25 MG tablet TAKE 1 TABLET BY MOUTH EVERY DAY 90 tablet 0     simvastatin (ZOCOR) 20 MG tablet Take 1 tablet (20 mg) by mouth at bedtime 90 tablet 3     spironolactone (ALDACTONE) 25 MG tablet Take 0.5 tablets (12.5 mg) by mouth daily. 45 tablet 3     triamcinolone (KENALOG) 0.1 % external cream APPLY TO AFFECTED AREA TWICE A DAY 45 g 0     warfarin ANTICOAGULANT (COUMADIN) 4 MG tablet Take 2 mg (1/2 tablet) on Sun, Thurs and 4 mg (1 tablet)  all other days, or as directed by the [facility]mad[facility] (Patient taking differently: Take 2 mg (1/2 tablet) on Sun and 4 mg (1 tablet)  all other days, or as directed by the Coumadin Clinic) 80 tablet 1       ALLERGIES  Allergies   Allergen Reactions      Seasonal Allergies        PAST MEDICAL, SURGICAL, FAMILY HISTORY:  History was reviewed and updated as needed, see medical record.    SOCIAL HISTORY:  Social History     Socioeconomic History     Marital status:      Spouse name: Tracey     Number of children: 0     Years of education: Not on file     Highest education level: Not on file   Occupational History     Occupation:      Comment: [employer]   Tobacco Use     Smoking status: Never     Passive exposure: Never     Smokeless tobacco: Never   Vaping Use     Vaping status: Never Used   Substance and Sexual Activity     Alcohol use: No     Alcohol/week: 0.0 standard drinks of alcohol     Drug use: No     Sexual activity: Not Currently     Partners: Female   Other Topics Concern     Parent/sibling w/ CABG, MI or angioplasty before 65F 55M? Yes      Service Not Asked     Blood Transfusions No     Caffeine Concern Yes     Comment: 1 can daily     Occupational Exposure Yes     Hobby Hazards No     Sleep Concern No     Stress Concern No     Weight Concern No     Special Diet Yes     Comment: low sodium, low calories     Back Care No     Exercise Yes     Comment: walks daily     Bike Helmet Not Asked     Comment: NA     Seat Belt Yes     Self-Exams Yes   Social History Narrative     Not on file     Social Drivers of Health     Financial Resource Strain: Low Risk  (12/4/2024)    Financial Resource Strain      Within the past 12 months, have you or your family members you live with been unable to get utilities (heat, electricity) when it was really needed?: No   Food Insecurity: Low Risk  (12/4/2024)    Food Insecurity      Within the past 12 months, did you worry that your food would run out before you got money to buy more?: No      Within the past 12 months, did the food you bought just not last and you didn t have money to get more?: No   Transportation Needs: Low Risk  (12/4/2024)    Transportation Needs      Within the past 12 months, has  lack of transportation kept you from medical appointments, getting your medicines, non-medical meetings or appointments, work, or from getting things that you need?: No   Physical Activity: Insufficiently Active (8/26/2024)    Exercise Vital Sign      Days of Exercise per Week: 2 days      Minutes of Exercise per Session: 20 min   Stress: No Stress Concern Present (8/26/2024)    [employer] - Occupational Stress Questionnaire      Feeling of Stress : Not at all   Social Connections: Unknown (8/26/2024)    Social Connection and Isolation Panel [NHANES]      Frequency of Communication with Friends and Family: Not on file      Frequency of Social Gatherings with Friends and Family: Once a week      Attends Scientologist Services: Not on file      Active Member of Clubs or Organizations: Not on file      Attends Club or Organization Meetings: Not on file      Marital Status: Not on file   Interpersonal Safety: Low Risk  (12/4/2024)    Interpersonal Safety      Do you feel physically and emotionally safe where you currently live?: Yes      Within the past 12 months, have you been hit, slapped, kicked or otherwise physically hurt by someone?: No      Within the past 12 months, have you been humiliated or emotionally abused in other ways by your partner or ex-partner?: No   Housing Stability: Low Risk  (12/4/2024)    Housing Stability      Do you have housing? : Yes      Are you worried about losing your housing?: No       Review of Systems:  Skin: negative  Eyes: negative  Ears/Nose/Throat: negative  Respiratory: No shortness of breath, dyspnea on exertion, cough, or hemoptysis  Cardiovascular: negative  Gastrointestinal: negative  Genitourinary: negative  Musculoskeletal: negative  Neurologic: negative  Hematologic/Lymphatic/Immunologic: negative  Endocrine: negative  PSYCH: Alert and oriented times 3; coherent speech  RESP: No cough, no audible wheezing, able to talk in full  sentences    Remainder of the comprehensive physical exam is unable to be completed due to today's visit being completed via telephone call.    There were no vitals taken for this visit.   Wt Readings from Last 4 Encounters:   01/23/25 93 kg (205 lb)   12/06/24 85.6 kg (188 lb 12.8 oz)   10/23/24 90.3 kg (199 lb)   09/18/24 93.1 kg (205 lb 3.2 oz)     Recent Lab Results:  LIPID RESULTS:  Lab Results   Component Value Date    CHOL 101 07/02/2024    CHOL 109 03/13/2020    HDL 45 07/02/2024    HDL 51 03/13/2020    LDL 48 07/02/2024    LDL 49 03/13/2020    TRIG 41 07/02/2024    TRIG 45 03/13/2020    CHOLHDLRATIO 2.5 02/16/2015     LIVER ENZYME RESULTS:  Lab Results   Component Value Date    AST 28 12/04/2024    AST 28 02/10/2021    ALT 18 12/04/2024    ALT 23 02/10/2021     CBC RESULTS:  Lab Results   Component Value Date    WBC 6.6 12/06/2024    WBC 8.9 02/13/2021    RBC 4.38 (L) 12/06/2024    RBC 3.12 (L) 02/13/2021    HGB 12.5 (L) 12/06/2024    HGB 9.7 (L) 02/13/2021    HCT 38.8 (L) 12/06/2024    HCT 30.9 (L) 02/13/2021    MCV 89 12/06/2024    MCV 99 02/13/2021    MCH 28.5 12/06/2024    MCH 31.1 02/13/2021    MCHC 32.2 12/06/2024    MCHC 31.4 (L) 02/13/2021    RDW 15.4 (H) 12/06/2024    RDW 15.2 (H) 02/13/2021     12/06/2024     02/13/2021     BMP RESULTS:  Lab Results   Component Value Date     12/06/2024     02/12/2021    POTASSIUM 4.1 12/06/2024    POTASSIUM 3.8 06/09/2022    POTASSIUM 4.4 02/12/2021    CHLORIDE 105 12/06/2024    CHLORIDE 103 06/09/2022    CHLORIDE 107 02/12/2021    CO2 26 12/06/2024    CO2 33 (H) 06/09/2022    CO2 30 02/12/2021    ANIONGAP 8 12/06/2024    ANIONGAP 2 (L) 06/09/2022    ANIONGAP 2 (L) 02/12/2021    GLC 82 12/06/2024     (H) 06/09/2022     (H) 02/12/2021    BUN 17.7 12/06/2024    BUN 17 06/09/2022    BUN 31 (H) 02/12/2021    CR 0.70 12/06/2024    CR 0.76 02/12/2021    GFRESTIMATED >90 12/06/2024    GFRESTIMATED >90 02/12/2021    GFRESTBLACK >90  02/12/2021    REECE 9.1 12/06/2024    REECE 8.8 02/12/2021      A1C RESULTS:  Lab Results   Component Value Date    A1C 5.8 (H) 02/04/2019     INR RESULTS:  Lab Results   Component Value Date    INR 2.4 04/22/2025    INR 2.3 03/26/2025    INR 2.7 (H) 02/26/2025    INR 3.10 (H) 06/22/2021    INR 3.40 (H) 05/13/2021       CC  LILLI Park CNP  [address]  [place] [address]    This note was completed in part using Dragon voice recognition software. Although reviewed after completion, some word and grammatical errors may occur.       Thank you for allowing me to participate in the care of your patient.      Sincerely,     LILLI Park CNP     [facility]  cc:   LILLI Park CNP  [address]  [place] [address]

## 2025-04-23 NOTE — PROGRESS NOTES
Cardiology Virtual Visit Progress Note    Service Date: April 23, 2025  Primary Cardiologist: Dr. Cooley  Reason for visit: Medication change follow up     Mr. Jovon Mantilla is a 69 year old male who is being evaluated via a billable telephone visit.    Patient has given verbal consent for virtual visit?  Yes    History of Present Illness    Jovon Mantilla is a very pleasant 67 year old male with a past medical history notable for severe aortic stenosis and is s/p AVR in 01/2011 with a 24 mm ATS supra-annular valve.  He also has a history of AF with prior Maze procedure - has remained in AF and is on rate control and anticoagulation.  He also has a history of a 70% stenosis involving the LAD on coronary angiography prior to aortic valve replacement.  This, however, was not bypassed since it was found to be completely intramyocardial at the time of surgery.      In review, in late 2021 he was noted  to have new exertional dyspnea.  An echocardiogram at that time showed moderate to severe eccentric mitral regurgitation which had progressed compared to his prior echocardiogram from June 2020. Given these findings, a CADE for further evaluation was recommended.      A CADE from 12/28/2021 demonstrated severe biatrial enlargement mildly reduced left ventricular systolic function. There was mild thickening and prolapse of P2 with moderate mitral regurgitation.. The mitral regurgitation was thought be secondary to both annular dilatation in the setting of chronic persistent atrial fibrillation as well as an element of P2 prolapse as reported. The mechanical aortic valve appeared to be functioning normally on both CADE and transthoracic imaging.     Mr. Fuller subsequently underwent an exercise stress echocardiogram on 1/17/2022 to evaluate for worsening mitral regurgitation postexercise which showed moderately reduced left ventricular systolic function with trace to mild mitral regurgitation at baseline.     On 7/31/2022 he  underwent right and left heart catheterization. His 70% LAD stenosis was unchanged from his angiogram in prior to his AVR.  He underwent cardiac MRI on 9/3/2024 which shows LVEF 37% and moderate mitral regurgitation. Following this he underwent NM Lexiscan on 9/18/2024 which shows no inducible myocardial ischemia or infarction.      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  EP consulted inpatient and he is now s/p implantation of single-chamber [employer]tr[employer] pacemaker with left bundle pacing on 12/5/2024.  His carvedilol was decreased to 6.25 mg twice daily while hospitalized, he was continued on Digoxin 125 mcg/d and Aldactone 12.5 mg/d.  He is also on Jardiance 10.    We have switched him to Toprol XL for low blood pressures and he has tolerated this well.    Diagnostics:  Device check 4/21/25: : 4% (LBB pacing).  9 ventricular high rates logged. EGMs show irregular VS events suggestive of RVR lasting <1-2 seconds, rates 150-170s. Taking toprol XL. Not new finding.  Compared to prior monitors, high rate frequency has decreased.  __________________________________________________________________         Assessment and Impression:     Severe aortic stenosis   S/p aortic valve replacement in 1/2011 with 24 mm JASPAL supra-annular valve  Echocardiogram 8/2023- normal aortic valve gradient  Tachy-kamille syndrome  Afib/RVR, history of MAZE procedure  S/p PPM implantation 2/2 #3  Tachycardic rates persist > improved on device interrogation.  Largely asymptomatic to tachycardia with only occasional palpitations.  No recurrent syncope.  Anticoagulated with warfarin  Coronary artery disease  History of 70% stenosis of the LAD on coronary angiogram prior to surgery. This was not bypassed since it was found to be completely intramyocardial. Subsequent coronary angiogram 7/2022 shows unchanged stenosis in LAD  No anginal symptoms  Asprin 81 mg, simvastatin,  Non-ischemic  cardiomyopathy, LVEF 37%  NYHA class II, stage C  Fluid status: euvolemic; suspected dry weight: ~200 lbs  Diuretic regimen: Furosemide 20 mg PRN (pt is taking it scheduled daily)  Ischemic evaluation: NM Lexiscan 9/18/2024 without evidence of ischemia or infarction  Guideline directed medical therapy:  Beta blocker: Toprol  mg AM + 50 mg evening.  ACEI/ARB/ARNI: ---  Aldactone antagonist: spironolactone 12.5 mg  SGLT2 inhibitor: Jardiance 10 mg         Recommendations and Plan:     Increase Toprol XL to 100 mg BID.  Hold off on adding additional medications for now.  Shared decision conversation.  Repeat echocardiogram in June to reassess LV improvement.  Consider Entresto at follow up if LV remains low.  Discussed the cost of this medication today in clinic and patient is ok with it from a financial standpoint.  Routine device interrogations as scheduled.  Follow up with Dr. Cooley in June as scheduled.  __________________________________________________________________    Thank you for the opportunity to participate in this pleasant patient's care.   We would be happy to see him sooner if needed for any concerns in the meantime.     I spent 20 minutes on the date of encouter of which 17 minutes were spent in medical discussion    LILLI Park, CNP   Nurse Practitioner  [facility]  Pager: [phone]   Text Page (8am - 5pm, M-F)    Provider location: on-site  Patient location: Home  Total time on phone call: 17 minutes    Today's clinic visit entailed:  The following tests were independently interpreted by me as noted in my documentation: device report, echo, labs  Prescription drug management  The level of medical decision making during this visit was of moderate complexity.  Review of the result(s) of each unique test - cardiac testing, cardiac imaging, labs  Care everywhere reviewed for additional records to facilitate comprehensive patient care.  Recent hospitalization  records and notes reviewed to facilitate comprehensive care coordination.    CURRENT MEDICATIONS:  Current Outpatient Medications   Medication Sig Dispense Refill    amoxicillin (AMOXIL) 500 MG capsule Take 2,000 mg by mouth See Admin Instructions. Before dental procedures      Ascorbic Acid (VITAMIN C PO) Take 500 mg by mouth daily      aspirin 81 MG tablet Take 81 mg by mouth Every Mon, Wed, Fri Morning      calcium carbonate-vitamin D 600-200 MG-UNIT TABS Take 1 tablet by mouth 2 times daily       Cholecalciferol (VITAMIN D) 2000 UNIT CAPS Take 1 tablet by mouth daily       Cyanocobalamin (VITAMIN B-12) 500 MCG SUBL Place 500 mcg under the tongue daily       digoxin (LANOXIN) 125 MCG tablet Take 1 tablet (125 mcg) by mouth daily 90 tablet 3    empagliflozin (JARDIANCE) 10 MG TABS tablet Take 1 tablet (10 mg) by mouth daily. 90 tablet 3    furosemide (LASIX) 20 MG tablet Take 0.5 tablets (10 mg) by mouth daily as needed (for weight gain 2lbs or more in a day) 100 tablet 3    levothyroxine (SYNTHROID/LEVOTHROID) 200 MCG tablet Take 1 tablet (200 mcg) by mouth daily 90 tablet 3    metoprolol succinate ER (TOPROL XL) 100 MG 24 hr tablet Take 1 tablet (100 mg) by mouth 2 times daily. Take 1 tablet (100 mg) daily in the morning + 0.5 tablet (50 mg) in the evening.  Total daily dose 150 mg. 180 tablet 4    Multiple Vitamins-Minerals (CENTRUM) CHEW Take 1 tablet by mouth daily       nitroGLYcerin (NITROSTAT) 0.3 MG sublingual tablet For chest pain place 1 tablet under the tongue every 5 minutes for 3 doses. If symptoms persist 5 minutes after 1st dose call 911. 25 tablet 1    Psyllium (FIBER) 0.52 GM CAPS Take 2 capsules by mouth daily      sertraline (ZOLOFT) 25 MG tablet TAKE 1 TABLET BY MOUTH EVERY DAY 90 tablet 0    simvastatin (ZOCOR) 20 MG tablet Take 1 tablet (20 mg) by mouth at bedtime 90 tablet 3    spironolactone (ALDACTONE) 25 MG tablet Take 0.5 tablets (12.5 mg) by mouth daily. 45 tablet 3    triamcinolone  (KENALOG) 0.1 % external cream APPLY TO AFFECTED AREA TWICE A DAY 45 g 0    warfarin ANTICOAGULANT (COUMADIN) 4 MG tablet Take 2 mg (1/2 tablet) on Sun, Thurs and 4 mg (1 tablet)  all other days, or as directed by the [facility] (Patient taking differently: Take 2 mg (1/2 tablet) on Sun and 4 mg (1 tablet)  all other days, or as directed by the Coumadin Clinic) 80 tablet 1       ALLERGIES  Allergies   Allergen Reactions    Seasonal Allergies        PAST MEDICAL, SURGICAL, FAMILY HISTORY:  History was reviewed and updated as needed, see medical record.    SOCIAL HISTORY:  Social History     Socioeconomic History    Marital status:      Spouse name: Tracey    Number of children: 0    Years of education: Not on file    Highest education level: Not on file   Occupational History    Occupation:      Comment: [employer]   Tobacco Use    Smoking status: Never     Passive exposure: Never    Smokeless tobacco: Never   Vaping Use    Vaping status: Never Used   Substance and Sexual Activity    Alcohol use: No     Alcohol/week: 0.0 standard drinks of alcohol    Drug use: No    Sexual activity: Not Currently     Partners: Female   Other Topics Concern    Parent/sibling w/ CABG, MI or angioplasty before 65F 55M? Yes     Service Not Asked    Blood Transfusions No    Caffeine Concern Yes     Comment: 1 can daily    Occupational Exposure Yes    Hobby Hazards No    Sleep Concern No    Stress Concern No    Weight Concern No    Special Diet Yes     Comment: low sodium, low calories    Back Care No    Exercise Yes     Comment: walks daily    Bike Helmet Not Asked     Comment: NA    Seat Belt Yes    Self-Exams Yes   Social History Narrative    Not on file     Social Drivers of Health     Financial Resource Strain: Low Risk  (12/4/2024)    Financial Resource Strain     Within the past 12 months, have you or your family members you live with been unable to get utilities (heat, electricity) when it was really  needed?: No   Food Insecurity: Low Risk  (12/4/2024)    Food Insecurity     Within the past 12 months, did you worry that your food would run out before you got money to buy more?: No     Within the past 12 months, did the food you bought just not last and you didn t have money to get more?: No   Transportation Needs: Low Risk  (12/4/2024)    Transportation Needs     Within the past 12 months, has lack of transportation kept you from medical appointments, getting your medicines, non-medical meetings or appointments, work, or from getting things that you need?: No   Physical Activity: Insufficiently Active (8/26/2024)    Exercise Vital Sign     Days of Exercise per Week: 2 days     Minutes of Exercise per Session: 20 min   Stress: No Stress Concern Present (8/26/2024)    [employer] [place] [employer] - Occupational Stress Questionnaire     Feeling of Stress : Not at all   Social Connections: Unknown (8/26/2024)    Social Connection and Isolation Panel [NHANES]     Frequency of Communication with Friends and Family: Not on file     Frequency of Social Gatherings with Friends and Family: Once a week     Attends Mosque Services: Not on file     Active Member of Clubs or Organizations: Not on file     Attends Club or Organization Meetings: Not on file     Marital Status: Not on file   Interpersonal Safety: Low Risk  (12/4/2024)    Interpersonal Safety     Do you feel physically and emotionally safe where you currently live?: Yes     Within the past 12 months, have you been hit, slapped, kicked or otherwise physically hurt by someone?: No     Within the past 12 months, have you been humiliated or emotionally abused in other ways by your partner or ex-partner?: No   Housing Stability: Low Risk  (12/4/2024)    Housing Stability     Do you have housing? : Yes     Are you worried about losing your housing?: No       Review of Systems:  Skin: negative  Eyes: negative  Ears/Nose/Throat: negative  Respiratory: No  shortness of breath, dyspnea on exertion, cough, or hemoptysis  Cardiovascular: negative  Gastrointestinal: negative  Genitourinary: negative  Musculoskeletal: negative  Neurologic: negative  Hematologic/Lymphatic/Immunologic: negative  Endocrine: negative  PSYCH: Alert and oriented times 3; coherent speech  RESP: No cough, no audible wheezing, able to talk in full sentences    Remainder of the comprehensive physical exam is unable to be completed due to today's visit being completed via telephone call.    There were no vitals taken for this visit.   Wt Readings from Last 4 Encounters:   01/23/25 93 kg (205 lb)   12/06/24 85.6 kg (188 lb 12.8 oz)   10/23/24 90.3 kg (199 lb)   09/18/24 93.1 kg (205 lb 3.2 oz)     Recent Lab Results:  LIPID RESULTS:  Lab Results   Component Value Date    CHOL 101 07/02/2024    CHOL 109 03/13/2020    HDL 45 07/02/2024    HDL 51 03/13/2020    LDL 48 07/02/2024    LDL 49 03/13/2020    TRIG 41 07/02/2024    TRIG 45 03/13/2020    CHOLHDLRATIO 2.5 02/16/2015     LIVER ENZYME RESULTS:  Lab Results   Component Value Date    AST 28 12/04/2024    AST 28 02/10/2021    ALT 18 12/04/2024    ALT 23 02/10/2021     CBC RESULTS:  Lab Results   Component Value Date    WBC 6.6 12/06/2024    WBC 8.9 02/13/2021    RBC 4.38 (L) 12/06/2024    RBC 3.12 (L) 02/13/2021    HGB 12.5 (L) 12/06/2024    HGB 9.7 (L) 02/13/2021    HCT 38.8 (L) 12/06/2024    HCT 30.9 (L) 02/13/2021    MCV 89 12/06/2024    MCV 99 02/13/2021    MCH 28.5 12/06/2024    MCH 31.1 02/13/2021    MCHC 32.2 12/06/2024    MCHC 31.4 (L) 02/13/2021    RDW 15.4 (H) 12/06/2024    RDW 15.2 (H) 02/13/2021     12/06/2024     02/13/2021     BMP RESULTS:  Lab Results   Component Value Date     12/06/2024     02/12/2021    POTASSIUM 4.1 12/06/2024    POTASSIUM 3.8 06/09/2022    POTASSIUM 4.4 02/12/2021    CHLORIDE 105 12/06/2024    CHLORIDE 103 06/09/2022    CHLORIDE 107 02/12/2021    CO2 26 12/06/2024    CO2 33 (H) 06/09/2022     CO2 30 02/12/2021    ANIONGAP 8 12/06/2024    ANIONGAP 2 (L) 06/09/2022    ANIONGAP 2 (L) 02/12/2021    GLC 82 12/06/2024     (H) 06/09/2022     (H) 02/12/2021    BUN 17.7 12/06/2024    BUN 17 06/09/2022    BUN 31 (H) 02/12/2021    CR 0.70 12/06/2024    CR 0.76 02/12/2021    GFRESTIMATED >90 12/06/2024    GFRESTIMATED >90 02/12/2021    GFRESTBLACK >90 02/12/2021    REECE 9.1 12/06/2024    REECE 8.8 02/12/2021      A1C RESULTS:  Lab Results   Component Value Date    A1C 5.8 (H) 02/04/2019     INR RESULTS:  Lab Results   Component Value Date    INR 2.4 04/22/2025    INR 2.3 03/26/2025    INR 2.7 (H) 02/26/2025    INR 3.10 (H) 06/22/2021    INR 3.40 (H) 05/13/2021       CC  Mattie Olivarez, LILLI CNP  [address] [place] [address]    This note was completed in part using Dragon voice recognition software. Although reviewed after completion, some word and grammatical errors may occur.

## 2025-04-23 NOTE — PROGRESS NOTES
"-Jovon is a 69 year old who is being evaluated via a billable telephone visit.    What phone number would you like to be contacted at? [phone]  How would you like to obtain your AVS? Ashlynhart  Originating Location (pt. Location): -Home  {PROVIDER LOCATION On-site should be selected for visits conducted from your clinic location or adjoining [facility] hospital, academic office, or other nearby [facility] building. Off-site should be selected for all other provider locations, including home:[id number]}  Distant Location (provider location):  {virtual location provider:121796}  Telephone visit completed due to {audio only reason:423064}  Phone call duration: *** minutes    Review Of Systems  Skin: NEGATIVE  Eyes:Ears/Nose/Throat: NEGATIVE  Respiratory: NEGATIVE  Cardiovascular:NEGATIVE  Gastrointestinal: NEGATIVE  Genitourinary:NEGATIVE   Musculoskeletal: NEGATIVE  Neurologic: NEGATIVE  Psychiatric: NEGATIVE  Hematologic/Lymphatic/Immunologic: NEGATIVE  Endocrine:  Thyroid    Vitals - Patient Reported  Systolic (Patient Reported): 133  Diastolic (Patient Reported): 74  Weight (Patient Reported): 87.5 kg (193 lb)  Height (Patient Reported): 176.5 cm (5' 9.5\")  BMI (Based on Pt Reported Ht/Wt): 28.09  Pulse (Patient Reported): 76    Telephone number of patient: [phone]    Melva Hall LPN    "

## 2025-04-23 NOTE — PROGRESS NOTES
ANTICOAGULATION MANAGEMENT     Jovon FRANCIS Jose Rafale 69 year old male is on warfarin with subtherapeutic INR result. (Goal INR 2.5-3.5)    Recent labs: (last 7 days)     04/22/25  1000   INR 2.4       ASSESSMENT     Source(s): Chart Review and Patient/Caregiver Call     Warfarin doses taken: Warfarin taken as instructed  Diet: No new diet changes identified  Medication/supplement changes: None noted  New illness, injury, or hospitalization: No  Signs or symptoms of bleeding or clotting: No  Previous result: Subtherapeutic  Additional findings: None       PLAN     Recommended plan for no diet, medication or health factor changes affecting INR     Dosing Instructions: Increase your warfarin dose (7.7% change) with next INR in 2 weeks       Summary  As of 4/23/2025      Full warfarin instructions:  4 mg every day   Next INR check:  5/1/2025               Telephone call with Jovon who verbalizes understanding and agrees to plan    Patient using outside facility for labs    Education provided: Please call back if any changes to your diet, medications or how you've been taking warfarin  Goal range and lab monitoring: goal range and significance of current result    Plan made per [facility] anticoagulation protocol    Samra Blanca RN  4/23/2025  Anticoagulation Clinic  [employer] for routing messages: rose ROSE  [facility] patient phone line: [phone]        _______________________________________________________________________     Anticoagulation Episode Summary       Current INR goal:  2.5-3.5   TTR:  65.3% (11.9 mo)   Target end date:  Indefinite   Send INR reminders to:  CARYN ROSE    Indications    S/P AVR (aortic valve replacement) [Z95.2]  Long-term (current) use of anticoagulants [Z79.01] [Z79.01]  Permanent atrial fibrillation (H) [I48.21]  Abdominal wall hematoma  initial encounter [S30.1XXA]  S/P aortic valve replacement [Z95.2]             Comments:  24mm ATS Valve             Anticoagulation Care Providers        Provider Role Specialty Phone number    Noe Ramirez PA-C Referring Family Medicine [phone]    Kimber Prado PA-C Referring Family Medicine [phone]

## 2025-04-23 NOTE — TELEPHONE ENCOUNTER
I reviewed the H&P, I examined the patient, and there are no changes in the patient's condition.     See pt message

## 2025-04-23 NOTE — PATIENT INSTRUCTIONS
Thank you for your visit with the [facility] today.    Today's Summary:    Increase Toprol XL to 100 mg twice daily.  No other changes for now.  Repeat echocardiogram before your visit with Dr. Cooley.    Follow-up:  Cardiology follow up at [facility]: Dr. Cooley in June.     Cardiology Scheduling ~[phone]  Cardiology Clinic RN ~ [phone]    It was a pleasure seeing you today.     LILLI Park, CNP  Certified Nurse Practitioner  [facility] [place]  April 23, 2025  ________________________________________________________

## 2025-05-01 ENCOUNTER — TRANSFERRED RECORDS (OUTPATIENT)
Dept: HEALTH INFORMATION MANAGEMENT | Facility: CLINIC | Age: 70
End: 2025-05-01
Payer: COMMERCIAL

## 2025-05-21 ENCOUNTER — ANTICOAGULATION THERAPY VISIT (OUTPATIENT)
Dept: ANTICOAGULATION | Facility: CLINIC | Age: 70
End: 2025-05-21

## 2025-05-21 ENCOUNTER — LAB (OUTPATIENT)
Dept: LAB | Facility: CLINIC | Age: 70
End: 2025-05-21
Payer: COMMERCIAL

## 2025-05-21 ENCOUNTER — RESULTS FOLLOW-UP (OUTPATIENT)
Dept: NURSING | Facility: CLINIC | Age: 70
End: 2025-05-21

## 2025-05-21 DIAGNOSIS — Z95.2 S/P AORTIC VALVE REPLACEMENT: ICD-10-CM

## 2025-05-21 DIAGNOSIS — Z95.2 S/P AVR (AORTIC VALVE REPLACEMENT): ICD-10-CM

## 2025-05-21 DIAGNOSIS — I10 HYPERTENSION GOAL BP (BLOOD PRESSURE) < 140/90: Primary | ICD-10-CM

## 2025-05-21 DIAGNOSIS — S30.1XXA ABDOMINAL WALL HEMATOMA, INITIAL ENCOUNTER: ICD-10-CM

## 2025-05-21 DIAGNOSIS — Z95.2 S/P AVR (AORTIC VALVE REPLACEMENT): Primary | ICD-10-CM

## 2025-05-21 DIAGNOSIS — I48.21 PERMANENT ATRIAL FIBRILLATION (H): ICD-10-CM

## 2025-05-21 DIAGNOSIS — Z79.01 LONG TERM CURRENT USE OF ANTICOAGULANT THERAPY: ICD-10-CM

## 2025-05-21 LAB
ANION GAP SERPL CALCULATED.3IONS-SCNC: 7 MMOL/L (ref 7–15)
BUN SERPL-MCNC: 16.1 MG/DL (ref 8–23)
CALCIUM SERPL-MCNC: 9.6 MG/DL (ref 8.8–10.4)
CHLORIDE SERPL-SCNC: 105 MMOL/L (ref 98–107)
CREAT SERPL-MCNC: 0.86 MG/DL (ref 0.67–1.17)
EGFRCR SERPLBLD CKD-EPI 2021: >90 ML/MIN/1.73M2
GLUCOSE SERPL-MCNC: 84 MG/DL (ref 70–99)
HCO3 SERPL-SCNC: 29 MMOL/L (ref 22–29)
INR BLD: 3.5 (ref 0.9–1.1)
POTASSIUM SERPL-SCNC: 5.2 MMOL/L (ref 3.4–5.3)
SODIUM SERPL-SCNC: 141 MMOL/L (ref 135–145)

## 2025-05-21 PROCEDURE — 80048 BASIC METABOLIC PNL TOTAL CA: CPT

## 2025-05-21 PROCEDURE — 36415 COLL VENOUS BLD VENIPUNCTURE: CPT

## 2025-05-21 PROCEDURE — 85610 PROTHROMBIN TIME: CPT

## 2025-05-21 NOTE — PROGRESS NOTES
ANTICOAGULATION MANAGEMENT     Jovon Mantilla 69 year old male is on warfarin with therapeutic INR result. (Goal INR 2.5-3.5)    Recent labs: (last 7 days)     05/21/25  1117   INR 3.5*       ASSESSMENT     Warfarin Lab Questionnaire    Warfarin Doses Last 7 Days    Pt Rptd Dose SUNDAY MONDAY TUESDAY WED THURS FRIDAY SATURDAY 5/20/2025   3:25 PM 4 4 4 4 4 4 4         5/20/2025   Warfarin Lab Questionnaire   Missed doses within past 14 days? No   Changes in diet or alcohol within past 14 days? No   Medication changes since last result? No   Injuries or illness since last result? No   New shortness of breath, severe headaches or sudden changes in vision since last result? No   Abnormal bleeding since last result? No   Upcoming surgery, procedure? No   Best number to call with results? [phone]     Previous result: Therapeutic last visit; previously outside of goal range  Additional findings: None       PLAN     Recommended plan for no diet, medication or health factor changes affecting INR     Dosing Instructions: Continue your current warfarin dose with next INR in 3 weeks       Summary  As of 5/21/2025      Full warfarin instructions:  4 mg every day   Next INR check:  6/11/2025               Telephone call with Jovon who verbalizes understanding and agrees to plan    Lab visit scheduled    Education provided: Goal range and lab monitoring: goal range and significance of current result    Plan made per [facility] anticoagulation protocol    Samra Blanca RN  5/21/2025  Anticoagulation Clinic  [facility] for routing messages: rose ROSE  [facility] patient phone line: [phone]        _______________________________________________________________________     Anticoagulation Episode Summary       Current INR goal:  2.5-3.5   TTR:  70.3% (12 mo)   Target end date:  Indefinite   Send INR reminders to:  CARYN ROSE    Indications    S/P AVR (aortic valve replacement) [Z95.2]  Long-term (current) use of  anticoagulants [Z79.01] [Z79.01]  Permanent atrial fibrillation (H) [I48.21]  Abdominal wall hematoma  initial encounter [S30.1XXA]  S/P aortic valve replacement [Z95.2]             Comments:  24mm ATS Valve             Anticoagulation Care Providers       Provider Role Specialty Phone number    Noe Ramirez PA-C Referring Family Medicine [phone]    Kimber Prado PA-C Referring Family Medicine [phone]

## 2025-06-01 NOTE — PROGRESS NOTES
HPI and Plan:       I had the pleasure of seeing Mr. Mantilla in followup at the [facility].  He is a very pleasant 69-year-old gentleman who I last saw in June 2024.     Mr. Mantilla has a history of severe aortic stenosis and is status post aortic valve replacement in 01/2011 with a 24 mm ATS supra-annular valve.  He also has a history of atrial fibrillation and underwent a Maze procedure at that time, but has remained in atrial fibrillation subsequently which has been treated with a rate control and anticoagulation approach.  He also has a history of a 70% stenosis involving the LAD on coronary angiography prior to aortic valve replacement.  This, however, was not bypassed since it was found to be completely intramyocardial at the time of surgery.     In late 2021 he noted exertional dyspnea which was new compared to the prior year.  An echocardiogram subsequently demonstrated moderate to severe eccentric mitral regurgitation which had progressed compared to his prior echocardiogram from June 2020.  Given these findings, I recommended a CADE for further evaluation.    The CADE was performed on 12/28/2021 and demonstrated severe biatrial enlargement mildly reduced left ventricular systolic function.  There was mild thickening and prolapse of P2 with moderate mitral regurgitation both qualitatively and quantitatively by PISA analysis.  This was then evaluated on stress echocardiography in 2022 which again demonstrated mild to moderate mitral regurgitation post exercise.    He then underwent right and left heart catheterization on 1/31/2022.  His 70% proximal LAD stenosis appeared unchanged.  No significant coronary artery disease was noted elsewhere.  He was also found to have moderate pulmonary hypertension with moderately elevated left-sided filling pressures and mildly elevated right-sided filling pressures with a normal cardiac index.    In September 2024 he was noted to have an interval  deterioration in biventricular systolic function of unclear etiology.  A Lexiscan stress perfusion study the same month was negative for ischemia, although left ventricular systolic function has remained moderately reduced on echocardiography most recently in December 2024.      He is on guideline directed medical therapy with Jardiance, metoprolol XL, and spironolactone.     Unfortunately he was admitted in December 2024 with syncope secondary to bradycardia.  He underwent successful pacemaker implantation with left bundle pacing on 12/5/2024.      Since then he has done well and was last seen in our clinic in April 2025.  He spends his [place] in [place] and was quite active there, hiking regularly.  He does have exertional dyspnea which has remained stable.  He remains compliant with medical therapy.     PHYSICAL EXAMINATION:  Dictated below.       Lipid panel on 7/2/2024 demonstrated total cholesterol 101, HDL 45, LDL of 48 and triglycerides of 41.    Device interrogation on 4/21/2025 demonstrated 4% ventricular pacing.      IMPRESSION:     1.  Severe aortic stenosis, status post aortic valve replacement with a mechanical valve as described above.   2.  Persistent atrial fibrillation.  Remains on warfarin for anticoagulation and digoxin 125 mcg daily.  3.  Coronary artery disease as described above.    4.  Moderate nonischemic cardiomyopathy with no evidence of ischemia on stress perfusion imaging in 2023.  5.  Symptomatic bradycardia status post pacemaker implantation in December 2024.      PLAN    Mr. Mantilla appears to be doing well from a cardiovascular standpoint.  His dyspnea on exertion appears stable, although we are awaiting a repeat echocardiogram for reassessment of left ventricular systolic function.    If this demonstrates stable left ventricular dysfunction with no interval improvement I may proceed to repeat coronary angiography for reassessment of his LAD and revascularization if clinically  appropriate.  If, however, LV function has improved then we will continue medical therapy.    It was a pleasure seeing him in follow-up today.    The longitudinal plan of care for the diagnosis(es)/condition(s) as documented were addressed during this visit. Due to the added complexity in care, I will continue to support Jovon in the subsequent management and with ongoing continuity of care.      CURRENT MEDICATIONS:  Current Outpatient Medications   Medication Sig Dispense Refill    amoxicillin (AMOXIL) 500 MG capsule Take 2,000 mg by mouth See Admin Instructions. Before dental procedures      Ascorbic Acid (VITAMIN C PO) Take 500 mg by mouth daily      aspirin 81 MG tablet Take 81 mg by mouth Every Mon, Wed, Fri Morning      calcium carbonate-vitamin D 600-200 MG-UNIT TABS Take 1 tablet by mouth 2 times daily       Cholecalciferol (VITAMIN D) 2000 UNIT CAPS Take 1 tablet by mouth daily       Cyanocobalamin (VITAMIN B-12) 500 MCG SUBL Place 500 mcg under the tongue daily       digoxin (LANOXIN) 125 MCG tablet Take 1 tablet (125 mcg) by mouth daily 90 tablet 3    empagliflozin (JARDIANCE) 10 MG TABS tablet Take 1 tablet (10 mg) by mouth daily. 90 tablet 3    furosemide (LASIX) 20 MG tablet Take 0.5 tablets (10 mg) by mouth daily as needed (for weight gain 2lbs or more in a day) 100 tablet 3    levothyroxine (SYNTHROID/LEVOTHROID) 200 MCG tablet Take 1 tablet (200 mcg) by mouth daily 90 tablet 3    metoprolol succinate ER (TOPROL XL) 100 MG 24 hr tablet Take 1 tablet (100 mg) by mouth 2 times daily. Take 1 tablet (100 mg) daily in the morning + 0.5 tablet (50 mg) in the evening.  Total daily dose 150 mg. 180 tablet 4    Multiple Vitamins-Minerals (CENTRUM) CHEW Take 1 tablet by mouth daily       nitroGLYcerin (NITROSTAT) 0.3 MG sublingual tablet For chest pain place 1 tablet under the tongue every 5 minutes for 3 doses. If symptoms persist 5 minutes after 1st dose call 911. 25 tablet 1    Psyllium (FIBER) 0.52 GM  CAPS Take 2 capsules by mouth daily      sertraline (ZOLOFT) 25 MG tablet TAKE 1 TABLET BY MOUTH EVERY DAY 90 tablet 0    simvastatin (ZOCOR) 20 MG tablet Take 1 tablet (20 mg) by mouth at bedtime 90 tablet 3    spironolactone (ALDACTONE) 25 MG tablet Take 0.5 tablets (12.5 mg) by mouth daily. 45 tablet 3    triamcinolone (KENALOG) 0.1 % external cream APPLY TO AFFECTED AREA TWICE A DAY 45 g 0    warfarin ANTICOAGULANT (COUMADIN) 4 MG tablet Take 2 mg (1/2 tablet) on Sun, Thurs and 4 mg (1 tablet)  all other days, or as directed by the [facility] (Patient taking differently: Take 2 mg (1/2 tablet) on Sun and 4 mg (1 tablet)  all other days, or as directed by the [facility]madin Clinic) 80 tablet 1       ALLERGIES     Allergies   Allergen Reactions    Seasonal Allergies        PAST MEDICAL HISTORY:  Past Medical History:   Diagnosis Date    Acne rosacea 04/22/2019    Acquired hypothyroidism 05/23/2003     Problem list name updated by automated process. Provider to review    Acute prostatitis     Acute sinusitis, unspecified     Aortic valve disease     Biatrial enlargement     CAD (coronary artery disease)     known 70% LAD stenosis    Congestive heart failure (H)     Depression     GI bleeding 01/22/2016    History of bleeding peptic ulcer     Hyperlipidemia     Idiopathic cardiomyopathy (H)     Mitral regurgitation     Obesity     Palpitations     Permanent atrial fibrillation (H) 06/02/2017    Right inguinal hernia 07/22/2019    S/P AVR 01/07/2011    with Maze procedure    S/P gastric bypass     Tricuspid regurgitation     Unspecified essential hypertension        PAST SURGICAL HISTORY:  Past Surgical History:   Procedure Laterality Date    ANGIOGRAM  02/01/2002    Normal coronary,dilated LV,Sev.decr.global LVF,+1 MR    ANGIOGRAM  01/01/2008    Mild AS,Mod PHTN,mod prox.LAD disease,Triv.CFX disease    ANGIOGRAM  01/01/2009    Mod AS,Mod PHTN,mod prox.LAD disease    ANGIOGRAM  10/01/2010    Sev.AS,CM,global  hypokinesis,Mild-mod LV dilated,Mild MR,70% prox.LAD lesion,PHTN    CARDIOVERSION  2/02, 4/02, 4/11    CHOLECYSTECTOMY  2006    COLONOSCOPY N/A 01/23/2016    Procedure: COLONOSCOPY;  Surgeon: Robyn Collins MD;  Location:  GI    CV CORONARY ANGIOGRAM N/A 1/31/2022    Procedure: Coronary Angiogram;  Surgeon: Rome Mike MD;  Location:  HEART CARDIAC CATH LAB    CV INSTANTANEOUS WAVE-FREE RATIO N/A 1/31/2022    Procedure: Instantaneous Wave-Free Ratio;  Surgeon: Rome Mike MD;  Location:  HEART CARDIAC CATH LAB    CV RIGHT HEART CATH MEASUREMENTS RECORDED N/A 1/31/2022    Procedure: Right Heart Cath;  Surgeon: Rome Mike MD;  Location:  HEART CARDIAC CATH LAB    EP PACEMAKER DEVICE & LEAD IMPLANT- RIGHT VENTRICULAR N/A 12/5/2024    Procedure: Pacemaker Device & Lead Implant- Right ventricular;  Surgeon: Kwasi Ramírez MD;  Location:  HEART CARDIAC CATH LAB    ESOPHAGOSCOPY, GASTROSCOPY, DUODENOSCOPY (EGD), COMBINED N/A 01/23/2016    Procedure: COMBINED ESOPHAGOSCOPY, GASTROSCOPY, DUODENOSCOPY (EGD);  Surgeon: Robyn Collins MD;  Location:  GI    EXCISE MASS GROIN Right 2/11/2021    Procedure: INCISION AND DRAINAGE OF RIGHT GROIN HEMATOMA;  Surgeon: Norma Scanlon MD;  Location: [facility] OR    HERNIORRHAPHY INGUINAL Right 2/4/2021    Procedure: OPEN RIGHT INGUINAL HENRIA WITH MESH;  Surgeon: Jaden Alex MD;  Location: [facility] OR    LAPAROSCOPIC BYPASS GASTRIC  10/08/2011    Procedure:LAPAROSCOPIC BYPASS GASTRIC; Diagnostic laparoscopy, Lysis of Adhesions, Laparoscopic Revision of Jejunojejunostomy; Surgeon:RADHA MURO; Location: OR    LAPAROSCOPIC BYPASS GASTRIC  06/26/2006    Dr Jin    LAPAROSCOPY DIAGNOSTIC (GENERAL)  10/08/2011    Procedure:LAPAROSCOPY DIAGNOSTIC (GENERAL); Surgeon:RADHA MURO; Location: OR    REPLACE VALVE AORTIC  01/07/2011    ZZC NONSPECIFIC PROCEDURE  ~1985    vein stripping       FAMILY  HISTORY:  Family History   Problem Relation Age of Onset    Cancer - colorectal Father     Colon Cancer Father     Cancer Father     Diabetes Brother        SOCIAL HISTORY:  Social History     Socioeconomic History    Marital status:      Spouse name: Tracey    Number of children: 0   Occupational History    Occupation:      Comment: [employer]   Tobacco Use    Smoking status: Never     Passive exposure: Never    Smokeless tobacco: Never   Vaping Use    Vaping status: Never Used   Substance and Sexual Activity    Alcohol use: No     Alcohol/week: 0.0 standard drinks of alcohol    Drug use: No    Sexual activity: Not Currently     Partners: Female   Other Topics Concern    Parent/sibling w/ CABG, MI or angioplasty before 65F 55M? Yes    Blood Transfusions No    Caffeine Concern Yes     Comment: 1 can daily    Occupational Exposure Yes    Hobby Hazards No    Sleep Concern No    Stress Concern No    Weight Concern No    Special Diet Yes     Comment: low sodium, low calories    Back Care No    Exercise Yes     Comment: walks daily    Seat Belt Yes    Self-Exams Yes     Social Drivers of Health     Financial Resource Strain: Low Risk  (12/4/2024)    Financial Resource Strain     Within the past 12 months, have you or your family members you live with been unable to get utilities (heat, electricity) when it was really needed?: No   Food Insecurity: Low Risk  (12/4/2024)    Food Insecurity     Within the past 12 months, did you worry that your food would run out before you got money to buy more?: No     Within the past 12 months, did the food you bought just not last and you didn t have money to get more?: No   Transportation Needs: Low Risk  (12/4/2024)    Transportation Needs     Within the past 12 months, has lack of transportation kept you from medical appointments, getting your medicines, non-medical meetings or appointments, work, or from getting things that you need?: No   Physical Activity:  Insufficiently Active (8/26/2024)    Exercise Vital Sign     Days of Exercise per Week: 2 days     Minutes of Exercise per Session: 20 min   Stress: No Stress Concern Present (8/26/2024)    [employer] - Occupational Stress Questionnaire     Feeling of Stress : Not at all   Social Connections: Unknown (8/26/2024)    Social Connection and Isolation Panel [NHANES]     Frequency of Social Gatherings with Friends and Family: Once a week   Interpersonal Safety: Low Risk  (12/4/2024)    Interpersonal Safety     Do you feel physically and emotionally safe where you currently live?: Yes     Within the past 12 months, have you been hit, slapped, kicked or otherwise physically hurt by someone?: No     Within the past 12 months, have you been humiliated or emotionally abused in other ways by your partner or ex-partner?: No   Housing Stability: Low Risk  (12/4/2024)    Housing Stability     Do you have housing? : Yes     Are you worried about losing your housing?: No       Review of Systems:  Skin:          Eyes:         ENT:         Respiratory:          Cardiovascular:         Gastroenterology:        Genitourinary:         Musculoskeletal:         Neurologic:         Psychiatric:         Heme/Lymph/Imm:         Endocrine:           Physical Exam:  Vitals: There were no vitals taken for this visit.    Constitutional:           Skin:             Head:           Eyes:           Lymph:      ENT:           Neck:           Respiratory:            Cardiac:                                                           GI:           Extremities and Muscular Skeletal:                 Neurological:           Psych:           CC  Noy Cooley MD  [id number]  [place] [id number]

## 2025-06-03 ENCOUNTER — OFFICE VISIT (OUTPATIENT)
Dept: CARDIOLOGY | Facility: CLINIC | Age: 70
End: 2025-06-03
Attending: INTERNAL MEDICINE
Payer: COMMERCIAL

## 2025-06-03 VITALS
WEIGHT: 203.9 LBS | HEIGHT: 70 IN | BODY MASS INDEX: 29.19 KG/M2 | SYSTOLIC BLOOD PRESSURE: 128 MMHG | DIASTOLIC BLOOD PRESSURE: 84 MMHG | HEART RATE: 78 BPM | OXYGEN SATURATION: 97 %

## 2025-06-03 DIAGNOSIS — I48.20 CHRONIC ATRIAL FIBRILLATION (H): ICD-10-CM

## 2025-06-03 DIAGNOSIS — E78.5 HYPERLIPIDEMIA LDL GOAL <70: ICD-10-CM

## 2025-06-03 DIAGNOSIS — Z95.2 H/O AORTIC VALVE REPLACEMENT: ICD-10-CM

## 2025-06-03 DIAGNOSIS — R06.02 SOBOE (SHORTNESS OF BREATH ON EXERTION): ICD-10-CM

## 2025-06-03 DIAGNOSIS — I70.90 ASVD (ARTERIOSCLEROTIC VASCULAR DISEASE): ICD-10-CM

## 2025-06-03 DIAGNOSIS — I25.10 CORONARY ARTERY DISEASE INVOLVING NATIVE CORONARY ARTERY OF NATIVE HEART WITHOUT ANGINA PECTORIS: ICD-10-CM

## 2025-06-03 DIAGNOSIS — I50.812 CHRONIC RIGHT-SIDED HEART FAILURE (H): ICD-10-CM

## 2025-06-03 DIAGNOSIS — I42.0 DILATED CARDIOMYOPATHY (H): ICD-10-CM

## 2025-06-03 RX ORDER — SPIRONOLACTONE 25 MG/1
12.5 TABLET ORAL DAILY
Qty: 45 TABLET | Refills: 3 | Status: SHIPPED | OUTPATIENT
Start: 2025-06-03

## 2025-06-03 RX ORDER — FUROSEMIDE 20 MG/1
10 TABLET ORAL DAILY PRN
Qty: 100 TABLET | Refills: 3 | Status: SHIPPED | OUTPATIENT
Start: 2025-06-03

## 2025-06-03 RX ORDER — DIGOXIN 125 MCG
125 TABLET ORAL DAILY
Qty: 90 TABLET | Refills: 3 | Status: SHIPPED | OUTPATIENT
Start: 2025-06-03

## 2025-06-03 RX ORDER — NITROGLYCERIN 0.3 MG/1
TABLET SUBLINGUAL
Qty: 25 TABLET | Refills: 1 | Status: SHIPPED | OUTPATIENT
Start: 2025-06-03

## 2025-06-03 RX ORDER — SIMVASTATIN 20 MG
20 TABLET ORAL AT BEDTIME
Qty: 90 TABLET | Refills: 3 | Status: SHIPPED | OUTPATIENT
Start: 2025-06-03

## 2025-06-03 NOTE — LETTER
6/3/2025    Kimber Prado PA-C  [address] [facility] [place] [address]    RE: Jovon FRANCIS Jose Rafael       Dear Colleague,     I had the pleasure of seeing Jovon Mantilla in the [facility].  HPI and Plan:       I had the pleasure of seeing Mr. Mantilla in followup at the [facility] today.  He is a very pleasant 69-year-old gentleman who I last saw in June 2024.     Mr. Mantilla has a history of severe aortic stenosis and is status post aortic valve replacement in 01/2011 with a 24 mm ATS supra-annular valve.  He also has a history of atrial fibrillation and underwent a Maze procedure at that time, but has remained in atrial fibrillation subsequently which has been treated with a rate control and anticoagulation approach.  He also has a history of a 70% stenosis involving the LAD on coronary angiography prior to aortic valve replacement.  This, however, was not bypassed since it was found to be completely intramyocardial at the time of surgery.     In late 2021 he noted exertional dyspnea which was new compared to the prior year.  An echocardiogram subsequently demonstrated moderate to severe eccentric mitral regurgitation which had progressed compared to his prior echocardiogram from June 2020.  Given these findings, I recommended a CADE for further evaluation.    The CADE was performed on 12/28/2021 and demonstrated severe biatrial enlargement mildly reduced left ventricular systolic function.  There was mild thickening and prolapse of P2 with moderate mitral regurgitation both qualitatively and quantitatively by PISA analysis.  This was then evaluated on stress echocardiography in 2022 which again demonstrated mild to moderate mitral regurgitation post exercise.    He then underwent right and left heart catheterization on 1/31/2022.  His 70% proximal LAD stenosis appeared unchanged.  No significant coronary artery disease was noted elsewhere.  He was also found to have moderate  pulmonary hypertension with moderately elevated left-sided filling pressures and mildly elevated right-sided filling pressures with a normal cardiac index.    In September 2024 he was noted to have an interval deterioration in biventricular systolic function of unclear etiology.  A Lexiscan stress perfusion study the same month was negative for ischemia, although left ventricular systolic function has remained moderately reduced on echocardiography most recently in December 2024.      He is on guideline directed medical therapy with Jardiance, metoprolol XL, and spironolactone.     Unfortunately he was admitted in December 2024 with syncope secondary to bradycardia.  He underwent successful pacemaker implantation with left bundle pacing on 12/5/2024.      Since then he has done well and was last seen in our clinic in April 2025.  He spends his [place] in [place] and was quite active there, hiking regularly.  He does have exertional dyspnea which has remained stable.  He remains compliant with medical therapy.     PHYSICAL EXAMINATION:  Dictated below.       Lipid panel on 7/2/2024 demonstrated total cholesterol 101, HDL 45, LDL of 48 and triglycerides of 41.    Device interrogation on 4/21/2025 demonstrated 4% ventricular pacing.      IMPRESSION:     1.  Severe aortic stenosis, status post aortic valve replacement with a mechanical valve as described above.   2.  Persistent atrial fibrillation.  Remains on warfarin for anticoagulation and digoxin 125 mcg daily.  3.  Coronary artery disease as described above.    4.  Moderate nonischemic cardiomyopathy with no evidence of ischemia on stress perfusion imaging in 2023.  5.  Symptomatic bradycardia status post pacemaker implantation in December 2024.      PLAN    Mr. Mantilla appears to be doing well from a cardiovascular standpoint.  His dyspnea on exertion appears stable, although we are awaiting a repeat echocardiogram for reassessment of left ventricular systolic  function.    If this demonstrates stable left ventricular dysfunction with no interval improvement I may proceed to repeat coronary angiography for reassessment of his LAD and revascularization if clinically appropriate.  If, however, LV function has improved then we will continue medical therapy.    It was a pleasure seeing him in follow-up today.    The longitudinal plan of care for the diagnosis(es)/condition(s) as documented were addressed during this visit. Due to the added complexity in care, I will continue to support Jovon in the subsequent management and with ongoing continuity of care.      CURRENT MEDICATIONS:  Current Outpatient Medications   Medication Sig Dispense Refill     amoxicillin (AMOXIL) 500 MG capsule Take 2,000 mg by mouth See Admin Instructions. Before dental procedures       Ascorbic Acid (VITAMIN C PO) Take 500 mg by mouth daily       aspirin 81 MG tablet Take 81 mg by mouth Every Mon, Wed, Fri Morning       calcium carbonate-vitamin D 600-200 MG-UNIT TABS Take 1 tablet by mouth 2 times daily        Cholecalciferol (VITAMIN D) 2000 UNIT CAPS Take 1 tablet by mouth daily        Cyanocobalamin (VITAMIN B-12) 500 MCG SUBL Place 500 mcg under the tongue daily        digoxin (LANOXIN) 125 MCG tablet Take 1 tablet (125 mcg) by mouth daily 90 tablet 3     empagliflozin (JARDIANCE) 10 MG TABS tablet Take 1 tablet (10 mg) by mouth daily. 90 tablet 3     furosemide (LASIX) 20 MG tablet Take 0.5 tablets (10 mg) by mouth daily as needed (for weight gain 2lbs or more in a day) 100 tablet 3     levothyroxine (SYNTHROID/LEVOTHROID) 200 MCG tablet Take 1 tablet (200 mcg) by mouth daily 90 tablet 3     metoprolol succinate ER (TOPROL XL) 100 MG 24 hr tablet Take 1 tablet (100 mg) by mouth 2 times daily. Take 1 tablet (100 mg) daily in the morning + 0.5 tablet (50 mg) in the evening.  Total daily dose 150 mg. 180 tablet 4     Multiple Vitamins-Minerals (CENTRUM) CHEW Take 1 tablet by mouth daily         nitroGLYcerin (NITROSTAT) 0.3 MG sublingual tablet For chest pain place 1 tablet under the tongue every 5 minutes for 3 doses. If symptoms persist 5 minutes after 1st dose call 911. 25 tablet 1     Psyllium (FIBER) 0.52 GM CAPS Take 2 capsules by mouth daily       sertraline (ZOLOFT) 25 MG tablet TAKE 1 TABLET BY MOUTH EVERY DAY 90 tablet 0     simvastatin (ZOCOR) 20 MG tablet Take 1 tablet (20 mg) by mouth at bedtime 90 tablet 3     spironolactone (ALDACTONE) 25 MG tablet Take 0.5 tablets (12.5 mg) by mouth daily. 45 tablet 3     triamcinolone (KENALOG) 0.1 % external cream APPLY TO AFFECTED AREA TWICE A DAY 45 g 0     warfarin ANTICOAGULANT (COUMADIN) 4 MG tablet Take 2 mg (1/2 tablet) on Sun, Thurs and 4 mg (1 tablet)  all other days, or as directed by the [facility] (Patient taking differently: Take 2 mg (1/2 tablet) on Sun and 4 mg (1 tablet)  all other days, or as directed by the Coumadin Clinic) 80 tablet 1       ALLERGIES     Allergies   Allergen Reactions     Seasonal Allergies        PAST MEDICAL HISTORY:  Past Medical History:   Diagnosis Date     Acne rosacea 04/22/2019     Acquired hypothyroidism 05/23/2003     Problem list name updated by automated process. Provider to review     Acute prostatitis      Acute sinusitis, unspecified      Aortic valve disease      Biatrial enlargement      CAD (coronary artery disease)     known 70% LAD stenosis     Congestive heart failure (H)      Depression      GI bleeding 01/22/2016     History of bleeding peptic ulcer      Hyperlipidemia      Idiopathic cardiomyopathy (H)      Mitral regurgitation      Obesity      Palpitations      Permanent atrial fibrillation (H) 06/02/2017     Right inguinal hernia 07/22/2019     S/P AVR 01/07/2011    with Maze procedure     S/P gastric bypass      Tricuspid regurgitation      Unspecified essential hypertension        PAST SURGICAL HISTORY:  Past Surgical History:   Procedure Laterality Date     ANGIOGRAM  02/01/2002     Normal coronary,dilated LV,Sev.decr.global LVF,+1 MR     ANGIOGRAM  01/01/2008    Mild AS,Mod PHTN,mod prox.LAD disease,Triv.CFX disease     ANGIOGRAM  01/01/2009    Mod AS,Mod PHTN,mod prox.LAD disease     ANGIOGRAM  10/01/2010    Sev.AS,CM,global hypokinesis,Mild-mod LV dilated,Mild MR,70% prox.LAD lesion,PHTN     CARDIOVERSION  2/02, 4/02, 4/11     CHOLECYSTECTOMY  2006     COLONOSCOPY N/A 01/23/2016    Procedure: COLONOSCOPY;  Surgeon: Robyn Collins MD;  Location:  GI     CV CORONARY ANGIOGRAM N/A 1/31/2022    Procedure: Coronary Angiogram;  Surgeon: Rome Mike MD;  Location:  HEART CARDIAC CATH LAB     CV INSTANTANEOUS WAVE-FREE RATIO N/A 1/31/2022    Procedure: Instantaneous Wave-Free Ratio;  Surgeon: Rome Mike MD;  Location:  HEART CARDIAC CATH LAB     CV RIGHT HEART CATH MEASUREMENTS RECORDED N/A 1/31/2022    Procedure: Right Heart Cath;  Surgeon: Rome Mike MD;  Location:  HEART CARDIAC CATH LAB     EP PACEMAKER DEVICE & LEAD IMPLANT- RIGHT VENTRICULAR N/A 12/5/2024    Procedure: Pacemaker Device & Lead Implant- Right ventricular;  Surgeon: Kwasi Ramírez MD;  Location:  HEART CARDIAC CATH LAB     ESOPHAGOSCOPY, GASTROSCOPY, DUODENOSCOPY (EGD), COMBINED N/A 01/23/2016    Procedure: COMBINED ESOPHAGOSCOPY, GASTROSCOPY, DUODENOSCOPY (EGD);  Surgeon: Robyn Collins MD;  Location:  GI     EXCISE MASS GROIN Right 2/11/2021    Procedure: INCISION AND DRAINAGE OF RIGHT GROIN HEMATOMA;  Surgeon: Norma Scanlon MD;  Location: [facility]     HERNIORRHAPHY INGUINAL Right 2/4/2021    Procedure: OPEN RIGHT INGUINAL HENRIA WITH MESH;  Surgeon: Jaden Alex MD;  [facility]: [facility]     LAPAROSCOPIC BYPASS GASTRIC  10/08/2011    Procedure:LAPAROSCOPIC BYPASS GASTRIC; Diagnostic laparoscopy, Lysis of Adhesions, Laparoscopic Revision of Jejunojejunostomy; Surgeon:RADHA MURO; Location: OR     LAPAROSCOPIC BYPASS GASTRIC   06/26/2006    Dr Jin     LAPAROSCOPY DIAGNOSTIC (GENERAL)  10/08/2011    Procedure:LAPAROSCOPY DIAGNOSTIC (GENERAL); Surgeon:RADHA MURO; Location:[facility] OR     REPLACE VALVE AORTIC  01/07/2011     ZZC NONSPECIFIC PROCEDURE  ~1985    vein stripping       FAMILY HISTORY:  Family History   Problem Relation Age of Onset     Cancer - colorectal Father      Colon Cancer Father      Cancer Father      Diabetes Brother        SOCIAL HISTORY:  Social History     Socioeconomic History     Marital status:      Spouse name: Tracey     Number of children: 0   Occupational History     Occupation:      Comment: [employer]   Tobacco Use     Smoking status: Never     Passive exposure: Never     Smokeless tobacco: Never   Vaping Use     Vaping status: Never Used   Substance and Sexual Activity     Alcohol use: No     Alcohol/week: 0.0 standard drinks of alcohol     Drug use: No     Sexual activity: Not Currently     Partners: Female   Other Topics Concern     Parent/sibling w/ CABG, MI or angioplasty before 65F 55M? Yes     Blood Transfusions No     Caffeine Concern Yes     Comment: 1 can daily     Occupational Exposure Yes     Hobby Hazards No     Sleep Concern No     Stress Concern No     Weight Concern No     Special Diet Yes     Comment: low sodium, low calories     Back Care No     Exercise Yes     Comment: walks daily     Seat Belt Yes     Self-Exams Yes     Social Drivers of Health     Financial Resource Strain: Low Risk  (12/4/2024)    Financial Resource Strain      Within the past 12 months, have you or your family members you live with been unable to get utilities (heat, electricity) when it was really needed?: No   Food Insecurity: Low Risk  (12/4/2024)    Food Insecurity      Within the past 12 months, did you worry that your food would run out before you got money to buy more?: No      Within the past 12 months, did the food you bought just not last and you didn t have money to get more?: No    Transportation Needs: Low Risk  (12/4/2024)    Transportation Needs      Within the past 12 months, has lack of transportation kept you from medical appointments, getting your medicines, non-medical meetings or appointments, work, or from getting things that you need?: No   Physical Activity: Insufficiently Active (8/26/2024)    Exercise Vital Sign      Days of Exercise per Week: 2 days      Minutes of Exercise per Session: 20 min   Stress: No Stress Concern Present (8/26/2024)    Citizen of [place] of Occupational Health - Occupational Stress Questionnaire      Feeling of Stress : Not at all   Social Connections: Unknown (8/26/2024)    Social Connection and Isolation Panel [NHANES]      Frequency of Social Gatherings with Friends and Family: Once a week   Interpersonal Safety: Low Risk  (12/4/2024)    Interpersonal Safety      Do you feel physically and emotionally safe where you currently live?: Yes      Within the past 12 months, have you been hit, slapped, kicked or otherwise physically hurt by someone?: No      Within the past 12 months, have you been humiliated or emotionally abused in other ways by your partner or ex-partner?: No   Housing Stability: Low Risk  (12/4/2024)    Housing Stability      Do you have housing? : Yes      Are you worried about losing your housing?: No       Review of Systems:  Skin:          Eyes:         ENT:         Respiratory:          Cardiovascular:         Gastroenterology:        Genitourinary:         Musculoskeletal:         Neurologic:         Psychiatric:         Heme/Lymph/Imm:         Endocrine:           Physical Exam:  Vitals: There were no vitals taken for this visit.    Constitutional:           Skin:             Head:           Eyes:           Lymph:      ENT:           Neck:           Respiratory:            Cardiac:                                                           GI:           Extremities and Muscular Skeletal:                 Neurological:            Psych:           CC  Noy Cooley MD  [address]  [place] [address]                  Thank you for allowing me to participate in the care of your patient.      Sincerely,     Noy Cooley MD     [facility]  cc:   Noy Cooley MD  [address]  [place] [address]

## 2025-06-03 NOTE — PATIENT INSTRUCTIONS
It was a pleasure seeing you today and thank you for allowing me to be a part of your health care team.  Please be aware that your follow-up visit may be scheduled with one of the Advanced Practice Providers (Nurse Practitioner or Physician Assistant) on our care team. We are a team and work closely together to meet your health care needs.  Should you have any questions regarding your visit or future needs please feel free to reach out to my care team for assistance.      Thank you, Dr. Noy Cooley        **Nursing: [phone]       **Scheduling: [phone]

## 2025-06-09 ENCOUNTER — OFFICE VISIT (OUTPATIENT)
Dept: FAMILY MEDICINE | Facility: CLINIC | Age: 70
End: 2025-06-09
Payer: COMMERCIAL

## 2025-06-09 ENCOUNTER — ANCILLARY PROCEDURE (OUTPATIENT)
Dept: GENERAL RADIOLOGY | Facility: CLINIC | Age: 70
End: 2025-06-09
Attending: PHYSICIAN ASSISTANT
Payer: COMMERCIAL

## 2025-06-09 VITALS
TEMPERATURE: 97.9 F | OXYGEN SATURATION: 97 % | HEIGHT: 69 IN | SYSTOLIC BLOOD PRESSURE: 133 MMHG | HEART RATE: 71 BPM | BODY MASS INDEX: 30.33 KG/M2 | WEIGHT: 204.8 LBS | DIASTOLIC BLOOD PRESSURE: 88 MMHG | RESPIRATION RATE: 16 BRPM

## 2025-06-09 DIAGNOSIS — J18.9 PNEUMONIA OF LEFT LOWER LOBE DUE TO INFECTIOUS ORGANISM: ICD-10-CM

## 2025-06-09 DIAGNOSIS — R05.1 ACUTE COUGH: Primary | ICD-10-CM

## 2025-06-09 DIAGNOSIS — R05.1 ACUTE COUGH: ICD-10-CM

## 2025-06-09 PROBLEM — I48.91 ATRIAL FIBRILLATION, UNSPECIFIED TYPE (H): Status: RESOLVED | Noted: 2024-12-03 | Resolved: 2025-06-09

## 2025-06-09 PROBLEM — R55 SYNCOPE, UNSPECIFIED SYNCOPE TYPE: Status: RESOLVED | Noted: 2023-08-14 | Resolved: 2025-06-09

## 2025-06-09 PROBLEM — Z98.890 STATUS POST CORONARY ANGIOGRAM: Status: RESOLVED | Noted: 2022-01-31 | Resolved: 2025-06-09

## 2025-06-09 PROBLEM — I95.9 HYPOTENSION, UNSPECIFIED HYPOTENSION TYPE: Status: RESOLVED | Noted: 2017-01-27 | Resolved: 2025-06-09

## 2025-06-09 PROBLEM — Z95.2 S/P AORTIC VALVE REPLACEMENT: Status: RESOLVED | Noted: 2017-01-27 | Resolved: 2025-06-09

## 2025-06-09 PROBLEM — I25.10 CORONARY ARTERY DISEASE INVOLVING NATIVE CORONARY ARTERY OF NATIVE HEART WITHOUT ANGINA PECTORIS: Status: RESOLVED | Noted: 2017-01-27 | Resolved: 2025-06-09

## 2025-06-09 PROBLEM — I42.9 IDIOPATHIC CARDIOMYOPATHY (H): Status: RESOLVED | Noted: 2017-01-27 | Resolved: 2025-06-09

## 2025-06-09 PROBLEM — S30.1XXA ABDOMINAL WALL HEMATOMA, INITIAL ENCOUNTER: Status: RESOLVED | Noted: 2021-02-10 | Resolved: 2025-06-09

## 2025-06-09 PROCEDURE — 3079F DIAST BP 80-89 MM HG: CPT | Performed by: PHYSICIAN ASSISTANT

## 2025-06-09 PROCEDURE — 3075F SYST BP GE 130 - 139MM HG: CPT | Performed by: PHYSICIAN ASSISTANT

## 2025-06-09 PROCEDURE — G2211 COMPLEX E/M VISIT ADD ON: HCPCS | Performed by: PHYSICIAN ASSISTANT

## 2025-06-09 PROCEDURE — 99214 OFFICE O/P EST MOD 30 MIN: CPT | Performed by: PHYSICIAN ASSISTANT

## 2025-06-09 PROCEDURE — 71046 X-RAY EXAM CHEST 2 VIEWS: CPT | Mod: TC | Performed by: RADIOLOGY

## 2025-06-09 RX ORDER — DOXYCYCLINE 100 MG/1
100 CAPSULE ORAL 2 TIMES DAILY
Qty: 14 CAPSULE | Refills: 0 | Status: SHIPPED | OUTPATIENT
Start: 2025-06-09 | End: 2025-06-16

## 2025-06-09 ASSESSMENT — ENCOUNTER SYMPTOMS: COUGH: 1

## 2025-06-09 NOTE — PROGRESS NOTES
"  Assessment & Plan       Pneumonia of left lower lobe due to infectious organism  Acute cough  Suggest treating. Patient is agreeable. Follow up if not improving.   - amoxicillin-clavulanate (AUGMENTIN) 875-125 MG tablet; Take 1 tablet by mouth 2 times daily for 7 days.  - XR Chest 2 Views; Future  - doxycycline hyclate (VIBRAMYCIN) 100 MG capsule; Take 1 capsule (100 mg) by mouth 2 times daily for 7 days.    The longitudinal plan of care for the diagnosis(es)/condition(s) as documented were addressed during this visit. Due to the added complexity in care, I will continue to support Jovon in the subsequent management and with ongoing continuity of care.        BMI  Estimated body mass index is 29.82 kg/m  as calculated from the following:    Height as of this encounter: 1.765 m (5' 9.49\").    Weight as of this encounter: 92.9 kg (204 lb 12.8 oz).             Subjective   Jovon is a 69 year old, presenting for the following health issues:  Cough (X 6 days)      6/9/2025     1:36 PM   Additional Questions   Roomed by An V.         6/9/2025     1:36 PM   Patient Reported Additional Medications   Patient reports taking the following new medications none     Cough    History of Present Illness       Reason for visit:  Cough  Symptom onset:  1-2 weeks ago   He is taking medications regularly.          Acute Illness  Acute illness concerns: cough  Onset/Duration: 6 days  Symptoms:  Fever: No  Chills/Sweats: No  Headache (location?): No  Sinus Pressure: No  Conjunctivitis:  No  Ear Pain: no  Rhinorrhea: No  Congestion: No  Sore Throat: No  Cough: YES-productive of greenish/yellow sputum  Wheeze: YES  Decreased Appetite: No  Nausea: No  Vomiting: No  Diarrhea: No  Dysuria/Freq.: No  Dysuria or Hematuria: No  Fatigue/Achiness: No  Sick/Strep Exposure: No  Therapies tried and outcome: none      Went out for [place] and to a movie, cough started the following day.           Review of Systems  Constitutional, neuro, ENT, " "endocrine, pulmonary, cardiac, gastrointestinal, genitourinary, musculoskeletal, integument and psychiatric systems are negative, except as otherwise noted.      Objective    /88   Pulse 71   Temp 97.9  F (36.6  C) (Temporal)   Resp 16   Ht 1.765 m (5' 9.49\")   Wt 92.9 kg (204 lb 12.8 oz)   SpO2 97%   BMI 29.82 kg/m    Body mass index is 29.82 kg/m .  Physical Exam   GENERAL: alert and no distress  EYES: Eyes grossly normal to inspection, PERRL and conjunctivae and sclerae normal  HENT: ear canals and TM's normal, nose and mouth without ulcers or lesions  NECK: no adenopathy, no asymmetry, masses, or scars  RESP: expiratory wheezes throughout  CV: regular rate and rhythm, normal S1 S2, no S3 or S4, no murmur, click or rub, no peripheral edema    CXR - Reviewed and interpreted by me - poss infiltrates lower left lobe        Signed Electronically by: Yamileth Javier PA-C    "

## 2025-06-11 ENCOUNTER — MYC MEDICAL ADVICE (OUTPATIENT)
Dept: FAMILY MEDICINE | Facility: CLINIC | Age: 70
End: 2025-06-11

## 2025-06-11 ENCOUNTER — DOCUMENTATION ONLY (OUTPATIENT)
Dept: ANTICOAGULATION | Facility: CLINIC | Age: 70
End: 2025-06-11

## 2025-06-11 ENCOUNTER — LAB (OUTPATIENT)
Dept: LAB | Facility: CLINIC | Age: 70
End: 2025-06-11
Payer: COMMERCIAL

## 2025-06-11 ENCOUNTER — ANTICOAGULATION THERAPY VISIT (OUTPATIENT)
Dept: ANTICOAGULATION | Facility: CLINIC | Age: 70
End: 2025-06-11

## 2025-06-11 ENCOUNTER — RESULTS FOLLOW-UP (OUTPATIENT)
Dept: ANTICOAGULATION | Facility: CLINIC | Age: 70
End: 2025-06-11

## 2025-06-11 DIAGNOSIS — Z95.2 S/P AVR (AORTIC VALVE REPLACEMENT): Primary | ICD-10-CM

## 2025-06-11 DIAGNOSIS — I48.21 PERMANENT ATRIAL FIBRILLATION (H): ICD-10-CM

## 2025-06-11 DIAGNOSIS — Z13.6 SCREENING FOR CARDIOVASCULAR CONDITION: Primary | ICD-10-CM

## 2025-06-11 DIAGNOSIS — Z95.2 S/P AORTIC VALVE REPLACEMENT: ICD-10-CM

## 2025-06-11 DIAGNOSIS — I25.10 CAD (CORONARY ARTERY DISEASE): ICD-10-CM

## 2025-06-11 DIAGNOSIS — Z79.01 LONG TERM CURRENT USE OF ANTICOAGULANTS WITH INR GOAL OF 2.5-3.5: ICD-10-CM

## 2025-06-11 DIAGNOSIS — Z95.2 S/P AVR (AORTIC VALVE REPLACEMENT): ICD-10-CM

## 2025-06-11 DIAGNOSIS — S30.1XXA ABDOMINAL WALL HEMATOMA, INITIAL ENCOUNTER: ICD-10-CM

## 2025-06-11 DIAGNOSIS — Z79.01 LONG TERM CURRENT USE OF ANTICOAGULANT THERAPY: ICD-10-CM

## 2025-06-11 LAB
CHOLEST SERPL-MCNC: 135 MG/DL
CREAT UR-MCNC: 124 MG/DL
FASTING STATUS PATIENT QL REPORTED: NO
HDLC SERPL-MCNC: 46 MG/DL
INR BLD: 3.4 (ref 0.9–1.1)
LDLC SERPL CALC-MCNC: 73 MG/DL
MICROALBUMIN UR-MCNC: 61.7 MG/L
MICROALBUMIN/CREAT UR: 49.76 MG/G CR (ref 0–17)
NONHDLC SERPL-MCNC: 89 MG/DL
TRIGL SERPL-MCNC: 81 MG/DL

## 2025-06-11 PROCEDURE — 82570 ASSAY OF URINE CREATININE: CPT

## 2025-06-11 PROCEDURE — 85610 PROTHROMBIN TIME: CPT

## 2025-06-11 PROCEDURE — 36415 COLL VENOUS BLD VENIPUNCTURE: CPT

## 2025-06-11 PROCEDURE — 80061 LIPID PANEL: CPT

## 2025-06-11 PROCEDURE — 82043 UR ALBUMIN QUANTITATIVE: CPT

## 2025-06-11 NOTE — PROGRESS NOTES
ANTICOAGULATION MANAGEMENT     Jovon Mantilla 69 year old male is on warfarin with therapeutic INR result. (Goal INR 2.5-3.5)    Recent labs: (last 7 days)     06/11/25  1143   INR 3.4*       ASSESSMENT     Warfarin Lab Questionnaire    Warfarin Doses Last 7 Days      6/11/2025     9:29 AM   Dose in Tablet or Mg   TAB or MG? milligram (mg)     Pt Rptd Dose SUNDAY MONDAY TUESDAY WED THURS FRIDAY SATURDAY 6/11/2025   9:29 AM 2 4 2 4 4 4 4         6/11/2025   Warfarin Lab Questionnaire   Missed doses within past 14 days? No   Changes in diet or alcohol within past 14 days? No   Medication changes since last result? Yes   Please list: 2 Antibiotics   Injuries or illness since last result? Yes   If yes, please explain: Bronchial phomonia   New shortness of breath, severe headaches or sudden changes in vision since last result? No   Abnormal bleeding since last result? No   Upcoming surgery, procedure? No     Previous result: Therapeutic last 2(+) visits  Additional findings: OV 6/9/25 cough, pneumonia of left lower lobe due to infectious organism   Augmentin 6/9/25-6/16/25  Doxycycline 6/9/25-6/16/25  Diarrhea - one episode last night and two episodes today     PLAN     Recommended plan for temporary change(s) affecting INR     Dosing Instructions: Take 2 mg on 6/12 and 6/14, otherwise continue maintenance dose of 4 mg daily with next INR in 5 days       Summary  As of 6/11/2025      Full warfarin instructions:  6/12: 2 mg; 6/14: 2 mg; Otherwise 4 mg every day   Next INR check:  6/16/2025               Telephone call with Jovon who verbalizes understanding and agrees to plan    Lab visit scheduled    Education provided: Please call back if any changes to your diet, medications or how you've been taking warfarin    Plan made with [facility] Pharmacist Rebecca Cohen RN  6/11/2025  Anticoagulation Clinic  [facility] for routing messages: rose ROSE  [facility] patient phone line:  [phone]        _______________________________________________________________________     Anticoagulation Episode Summary       Current INR goal:  2.5-3.5   TTR:  71.2% (12 mo)   Target end date:  Indefinite   Send INR reminders to:  CARYN ROSE    Indications    S/P AVR (aortic valve replacement) [Z95.2]  Long-term (current) use of anticoagulants [Z79.01] (Resolved) [Z79.01]  Permanent atrial fibrillation (H) [I48.21]  Abdominal wall hematoma  initial encounter (Resolved) [S30.1XXA]  S/P aortic valve replacement (Resolved) [Z95.2]             Comments:  24mm ATS Valve             Anticoagulation Care Providers       Provider Role Specialty Phone number    Noe Ramirez PA-C Referring Family Medicine [phone]    Kimber Prado PA-C Referring Family Medicine [phone]

## 2025-06-11 NOTE — CONFIDENTIAL NOTE
ANTICOAGULATION CLINIC REFERRAL RENEWAL REQUEST       An annual renewal order is required for all patients referred to [facility].?  Please review and sign the pended referral order for Jovon Mantilla.       ANTICOAGULATION SUMMARY      Warfarin indication(s)   Atrial Fibrillation, Mechanical AVR, and abdominal wall hematoma     Mechanical heart valve present?  Mechanical  AVR-Bileaflet       Current goal range   INR: 2.5-3.5     Goal appropriate for indication? Goal INR 2.5-3.5 standard for indication(s) above     Time in Therapeutic Range (TTR)  (Goal > 60%) 71.2%       Office visit with referring provider's group within last year yes on 6/9/25       Soraida Cohen RN  [facility]

## 2025-06-12 PROBLEM — S30.1XXA ABDOMINAL WALL HEMATOMA, INITIAL ENCOUNTER: Status: ACTIVE | Noted: 2025-06-12

## 2025-06-12 PROBLEM — Z95.2 S/P AORTIC VALVE REPLACEMENT: Status: ACTIVE | Noted: 2025-06-12

## 2025-06-12 PROBLEM — Z79.01 LONG TERM CURRENT USE OF ANTICOAGULANT THERAPY: Status: ACTIVE | Noted: 2025-06-12

## 2025-06-16 ENCOUNTER — ANTICOAGULATION THERAPY VISIT (OUTPATIENT)
Dept: ANTICOAGULATION | Facility: CLINIC | Age: 70
End: 2025-06-16

## 2025-06-16 ENCOUNTER — LAB (OUTPATIENT)
Dept: LAB | Facility: CLINIC | Age: 70
End: 2025-06-16
Payer: COMMERCIAL

## 2025-06-16 ENCOUNTER — RESULTS FOLLOW-UP (OUTPATIENT)
Dept: ANTICOAGULATION | Facility: CLINIC | Age: 70
End: 2025-06-16

## 2025-06-16 DIAGNOSIS — Z95.2 S/P AVR (AORTIC VALVE REPLACEMENT): ICD-10-CM

## 2025-06-16 DIAGNOSIS — Z79.01 LONG TERM CURRENT USE OF ANTICOAGULANTS WITH INR GOAL OF 2.5-3.5: ICD-10-CM

## 2025-06-16 DIAGNOSIS — I48.21 PERMANENT ATRIAL FIBRILLATION (H): ICD-10-CM

## 2025-06-16 DIAGNOSIS — Z95.2 S/P AORTIC VALVE REPLACEMENT: ICD-10-CM

## 2025-06-16 DIAGNOSIS — Z95.2 S/P AVR (AORTIC VALVE REPLACEMENT): Primary | ICD-10-CM

## 2025-06-16 DIAGNOSIS — Z79.01 LONG TERM CURRENT USE OF ANTICOAGULANT THERAPY: ICD-10-CM

## 2025-06-16 DIAGNOSIS — S30.1XXA ABDOMINAL WALL HEMATOMA, INITIAL ENCOUNTER: ICD-10-CM

## 2025-06-16 LAB — INR BLD: 2.7 (ref 0.9–1.1)

## 2025-06-16 PROCEDURE — 85610 PROTHROMBIN TIME: CPT

## 2025-06-16 PROCEDURE — 36416 COLLJ CAPILLARY BLOOD SPEC: CPT

## 2025-06-16 NOTE — PROGRESS NOTES
ANTICOAGULATION MANAGEMENT     Jovon Mantilla 69 year old male is on warfarin with therapeutic INR result. (Goal INR 2.5-3.5)    Recent labs: (last 7 days)     06/16/25  1302   INR 2.7*       ASSESSMENT     Source(s): Chart Review and Patient/Caregiver Call     Warfarin doses taken: Warfarin taken as instructed- lower dose taken due to antibiotic interaction  Diet: No new diet changes identified  Medication/supplement changes: finished antibiotics today  New illness, injury, or hospitalization: Yes: finished antibiotics for pneumonia  Signs or symptoms of bleeding or clotting: No  Previous result: Therapeutic last 2(+) visits  Additional findings: None       PLAN     Recommended plan for no diet, medication or health factor changes affecting INR     Dosing Instructions: Continue your current warfarin dose with next INR in 3 weeks       Summary  As of 6/16/2025      Full warfarin instructions:  4 mg every day   Next INR check:  7/9/2025               Telephone call with Jovon who verbalizes understanding and agrees to plan    Check at provider office visit- lab visit scheduled.    Education provided: Contact [phone] with any changes, questions or concerns.     Plan made per [facility] anticoagulation protocol    Bri Maots RN  6/16/2025  Anticoagulation Clinic  [employer] for routing messages: rose ROSE  [facility] patient phone line: [phone]        _______________________________________________________________________     Anticoagulation Episode Summary       Current INR goal:  2.5-3.5   TTR:  71.2% (12 mo)   Target end date:  Indefinite   Send INR reminders to:  CARYN ROSE    Indications    S/P AVR (aortic valve replacement) [Z95.2]  Long-term (current) use of anticoagulants [Z79.01] (Resolved) [Z79.01]  Permanent atrial fibrillation (H) [I48.21]  Abdominal wall hematoma  initial encounter (Resolved) [S30.1XXA]  S/P aortic valve replacement (Resolved) [Z95.2]  S/P aortic valve replacement  [Z95.2]  Abdominal wall hematoma  initial encounter [S30.1XXA]  Long term current use of anticoagulants with INR goal of 2.5-3.5 [Z79.01]  Long term current use of anticoagulant therapy [Z79.01]             Comments:  24mm ATS Valve             Anticoagulation Care Providers       Provider Role Specialty Phone number    Noe Ramirez PA-C Referring Family Medicine [phone]    Kimber Prado PA-C Referring Family Medicine [phone]

## 2025-06-23 DIAGNOSIS — E03.9 ACQUIRED HYPOTHYROIDISM: ICD-10-CM

## 2025-06-23 DIAGNOSIS — R45.86 MOOD CHANGES: ICD-10-CM

## 2025-06-23 RX ORDER — LEVOTHYROXINE SODIUM 200 UG/1
200 TABLET ORAL DAILY
Qty: 90 TABLET | Refills: 0 | Status: SHIPPED | OUTPATIENT
Start: 2025-06-23

## 2025-06-23 RX ORDER — SERTRALINE HYDROCHLORIDE 25 MG/1
25 TABLET, FILM COATED ORAL DAILY
Qty: 90 TABLET | Refills: 0 | Status: SHIPPED | OUTPATIENT
Start: 2025-06-23

## 2025-07-02 ENCOUNTER — HOSPITAL ENCOUNTER (OUTPATIENT)
Dept: CARDIOLOGY | Facility: CLINIC | Age: 70
Discharge: HOME | End: 2025-07-02
Attending: NURSE PRACTITIONER
Payer: COMMERCIAL

## 2025-07-02 DIAGNOSIS — I48.91 ATRIAL FIBRILLATION WITH RAPID VENTRICULAR RESPONSE (H): ICD-10-CM

## 2025-07-02 DIAGNOSIS — I50.22 CHRONIC HEART FAILURE WITH MILDLY REDUCED EJECTION FRACTION (HFMREF, 41-49%) (H): ICD-10-CM

## 2025-07-02 LAB — LVEF ECHO: NORMAL

## 2025-07-02 PROCEDURE — 93306 TTE W/DOPPLER COMPLETE: CPT

## 2025-07-03 ENCOUNTER — RESULTS FOLLOW-UP (OUTPATIENT)
Dept: CARDIOLOGY | Facility: CLINIC | Age: 70
End: 2025-07-03

## 2025-07-04 DIAGNOSIS — E03.9 ACQUIRED HYPOTHYROIDISM: ICD-10-CM

## 2025-07-05 RX ORDER — LEVOTHYROXINE SODIUM 200 UG/1
200 TABLET ORAL DAILY
Qty: 90 TABLET | Refills: 0 | Status: SHIPPED | OUTPATIENT
Start: 2025-07-05 | End: 2025-07-09

## 2025-07-08 DIAGNOSIS — I48.21 PERMANENT ATRIAL FIBRILLATION (H): ICD-10-CM

## 2025-07-08 DIAGNOSIS — Z95.2 S/P AVR (AORTIC VALVE REPLACEMENT): ICD-10-CM

## 2025-07-08 RX ORDER — WARFARIN SODIUM 4 MG/1
4 TABLET ORAL EVERY EVENING
Qty: 90 TABLET | Refills: 1 | Status: SHIPPED | OUTPATIENT
Start: 2025-07-08

## 2025-07-08 NOTE — TELEPHONE ENCOUNTER
ANTICOAGULATION MANAGEMENT:  Medication Refill    Anticoagulation Summary  As of 6/16/2025      Warfarin maintenance plan:  4 mg (4 mg x 1) every day   Next INR check:  7/9/2025   Target end date:  Indefinite    Indications    S/P AVR (aortic valve replacement) [Z95.2]  Long-term (current) use of anticoagulants [Z79.01] (Resolved) [Z79.01]  Permanent atrial fibrillation (H) [I48.21]  Abdominal wall hematoma  initial encounter (Resolved) [S30.1XXA]  S/P aortic valve replacement (Resolved) [Z95.2]  S/P aortic valve replacement [Z95.2]  Abdominal wall hematoma  initial encounter [S30.1XXA]  Long term current use of anticoagulants with INR goal of 2.5-3.5 [Z79.01]  Long term current use of anticoagulant therapy [Z79.01]                 Anticoagulation Care Providers       Provider Role Specialty Phone number    Noe Ramirez PA-C Referring [facility] [phone]    Kimber Prado PA-C Referring [facility] [phone]            Refill Criteria    Visit with referring provider/group: Meets criteria: visit within referring provider group in the last 15 months on 6/9/25    [facility] referral last signed: 06/12/2025; within last year:  Yes    Lab monitoring is up to date (not exceeding 2 weeks overdue): Yes    Jovon meets all criteria for refill. Rx instructions and quantity supplied updated to match patient's current dosing plan. Warfarin 90 day supply with 1 refill granted per [facility] protocol     Bri Matos RN  Anticoagulation Clinic

## 2025-07-09 ENCOUNTER — LAB (OUTPATIENT)
Dept: LAB | Facility: CLINIC | Age: 70
End: 2025-07-09
Payer: COMMERCIAL

## 2025-07-09 ENCOUNTER — TELEPHONE (OUTPATIENT)
Dept: FAMILY MEDICINE | Facility: CLINIC | Age: 70
End: 2025-07-09

## 2025-07-09 ENCOUNTER — OFFICE VISIT (OUTPATIENT)
Dept: FAMILY MEDICINE | Facility: CLINIC | Age: 70
End: 2025-07-09
Attending: PHYSICIAN ASSISTANT
Payer: COMMERCIAL

## 2025-07-09 ENCOUNTER — ANTICOAGULATION THERAPY VISIT (OUTPATIENT)
Dept: ANTICOAGULATION | Facility: CLINIC | Age: 70
End: 2025-07-09

## 2025-07-09 VITALS
WEIGHT: 202 LBS | HEIGHT: 69 IN | DIASTOLIC BLOOD PRESSURE: 80 MMHG | SYSTOLIC BLOOD PRESSURE: 117 MMHG | OXYGEN SATURATION: 96 % | TEMPERATURE: 98.6 F | HEART RATE: 61 BPM | BODY MASS INDEX: 29.92 KG/M2 | RESPIRATION RATE: 17 BRPM

## 2025-07-09 DIAGNOSIS — Z79.01 LONG TERM CURRENT USE OF ANTICOAGULANTS WITH INR GOAL OF 2.5-3.5: ICD-10-CM

## 2025-07-09 DIAGNOSIS — Z23 NEED FOR VACCINATION: ICD-10-CM

## 2025-07-09 DIAGNOSIS — Z79.01 LONG TERM CURRENT USE OF ANTICOAGULANT THERAPY: ICD-10-CM

## 2025-07-09 DIAGNOSIS — R45.86 MOOD CHANGES: ICD-10-CM

## 2025-07-09 DIAGNOSIS — I83.93 ASYMPTOMATIC VARICOSE VEINS OF BOTH LOWER EXTREMITIES: ICD-10-CM

## 2025-07-09 DIAGNOSIS — Z95.2 S/P AORTIC VALVE REPLACEMENT: ICD-10-CM

## 2025-07-09 DIAGNOSIS — I48.21 PERMANENT ATRIAL FIBRILLATION (H): ICD-10-CM

## 2025-07-09 DIAGNOSIS — S30.1XXA ABDOMINAL WALL HEMATOMA, INITIAL ENCOUNTER: ICD-10-CM

## 2025-07-09 DIAGNOSIS — Z95.2 S/P AVR (AORTIC VALVE REPLACEMENT): ICD-10-CM

## 2025-07-09 DIAGNOSIS — Z95.2 S/P AVR (AORTIC VALVE REPLACEMENT): Primary | ICD-10-CM

## 2025-07-09 DIAGNOSIS — E03.9 ACQUIRED HYPOTHYROIDISM: ICD-10-CM

## 2025-07-09 DIAGNOSIS — Z00.00 ENCOUNTER FOR MEDICARE ANNUAL WELLNESS EXAM: Primary | ICD-10-CM

## 2025-07-09 LAB — INR BLD: 3.9 (ref 0.9–1.1)

## 2025-07-09 PROCEDURE — 36415 COLL VENOUS BLD VENIPUNCTURE: CPT

## 2025-07-09 PROCEDURE — G2211 COMPLEX E/M VISIT ADD ON: HCPCS | Performed by: PHYSICIAN ASSISTANT

## 2025-07-09 PROCEDURE — 90480 ADMN SARSCOV2 VAC 1/ONLY CMP: CPT | Performed by: PHYSICIAN ASSISTANT

## 2025-07-09 PROCEDURE — 91320 SARSCV2 VAC 30MCG TRS-SUC IM: CPT | Performed by: PHYSICIAN ASSISTANT

## 2025-07-09 PROCEDURE — 99214 OFFICE O/P EST MOD 30 MIN: CPT | Mod: 25 | Performed by: PHYSICIAN ASSISTANT

## 2025-07-09 PROCEDURE — 3074F SYST BP LT 130 MM HG: CPT | Performed by: PHYSICIAN ASSISTANT

## 2025-07-09 PROCEDURE — 3079F DIAST BP 80-89 MM HG: CPT | Performed by: PHYSICIAN ASSISTANT

## 2025-07-09 PROCEDURE — G0439 PPPS, SUBSEQ VISIT: HCPCS | Performed by: PHYSICIAN ASSISTANT

## 2025-07-09 PROCEDURE — 85610 PROTHROMBIN TIME: CPT

## 2025-07-09 RX ORDER — SERTRALINE HYDROCHLORIDE 25 MG/1
25 TABLET, FILM COATED ORAL DAILY
Qty: 90 TABLET | Refills: 0 | Status: SHIPPED | OUTPATIENT
Start: 2025-07-09

## 2025-07-09 RX ORDER — LEVOTHYROXINE SODIUM 200 UG/1
200 TABLET ORAL DAILY
Qty: 90 TABLET | Refills: 0 | Status: SHIPPED | OUTPATIENT
Start: 2025-07-09

## 2025-07-09 SDOH — HEALTH STABILITY: PHYSICAL HEALTH: ON AVERAGE, HOW MANY MINUTES DO YOU ENGAGE IN EXERCISE AT THIS LEVEL?: 60 MIN

## 2025-07-09 SDOH — HEALTH STABILITY: PHYSICAL HEALTH: ON AVERAGE, HOW MANY DAYS PER WEEK DO YOU ENGAGE IN MODERATE TO STRENUOUS EXERCISE (LIKE A BRISK WALK)?: 3 DAYS

## 2025-07-09 ASSESSMENT — SOCIAL DETERMINANTS OF HEALTH (SDOH): HOW OFTEN DO YOU GET TOGETHER WITH FRIENDS OR RELATIVES?: ONCE A WEEK

## 2025-07-09 NOTE — TELEPHONE ENCOUNTER
General Call    Contacts       Contact Date/Time Type Contact Phone/Fax    07/09/2025 01:48 PM CDT Phone (Incoming) Jovon Mantilla (Self) [phone] (M)          Reason for Call:  anticoag    What are your questions or concerns:  patient calling back for INR Nurse elmer contreras.    Date of last appointment with provider: n/a    Could we send this information to you in [facility] or would you prefer to receive a phone call?:   Patient would prefer a phone call   Okay to leave a detailed message?: Yes at Cell number on file:    Telephone Information:   Mobile [phone]

## 2025-07-09 NOTE — PATIENT INSTRUCTIONS
Patient Education   Preventive Care Advice   This is general advice given by our system to help you stay healthy. However, your care team may have specific advice just for you. Please talk to your care team about your preventive care needs.  Nutrition  Eat 5 or more servings of fruits and vegetables each day.  Try wheat bread, brown rice and whole grain pasta (instead of white bread, rice, and pasta).  Get enough calcium and vitamin D. Check the label on foods and aim for 100% of the RDA (recommended daily allowance).  Lifestyle  Exercise at least 150 minutes each week  (30 minutes a day, 5 days a week).  Do muscle strengthening activities 2 days a week. These help control your weight and prevent disease.  No smoking.  Wear sunscreen to prevent skin cancer.  Have a dental exam and cleaning every 6 months.  Yearly exams  See your health care team every year to talk about:  Any changes in your health.  Any medicines your care team has prescribed.  Preventive care, family planning, and ways to prevent chronic diseases.  Shots (vaccines)   HPV shots (up to age 26), if you've never had them before.  Hepatitis B shots (up to age 59), if you've never had them before.  COVID-19 shot: Get this shot when it's due.  Flu shot: Get a flu shot every year.  Tetanus shot: Get a tetanus shot every 10 years.  Pneumococcal, hepatitis A, and RSV shots: Ask your care team if you need these based on your risk.  Shingles shot (for age 50 and up)  General health tests  Diabetes screening:  Starting at age 35, Get screened for diabetes at least every 3 years.  If you are younger than age 35, ask your care team if you should be screened for diabetes.  Cholesterol test: At age 39, start having a cholesterol test every 5 years, or more often if advised.  Bone density scan (DEXA): At age 50, ask your care team if you should have this scan for osteoporosis (brittle bones).  Hepatitis C: Get tested at least once in your life.  STIs (sexually  transmitted infections)  Before age 24: Ask your care team if you should be screened for STIs.  After age 24: Get screened for STIs if you're at risk. You are at risk for STIs (including HIV) if:  You are sexually active with more than one person.  You don't use condoms every time.  You or a partner was diagnosed with a sexually transmitted infection.  If you are at risk for HIV, ask about PrEP medicine to prevent HIV.  Get tested for HIV at least once in your life, whether you are at risk for HIV or not.  Cancer screening tests  Cervical cancer screening: If you have a cervix, begin getting regular cervical cancer screening tests starting at age 21.  Breast cancer scan (mammogram): If you've ever had breasts, begin having regular mammograms starting at age 40. This is a scan to check for breast cancer.  Colon cancer screening: It is important to start screening for colon cancer at age 45.  Have a colonoscopy test every 10 years (or more often if you're at risk) Or, ask your provider about stool tests like a FIT test every year or Cologuard test every 3 years.  To learn more about your testing options, visit:   .  For help making a decision, visit:   https://bit.ly/zj45360.  Prostate cancer screening test: If you have a prostate, ask your care team if a prostate cancer screening test (PSA) at age 55 is right for you.  Lung cancer screening: If you are a current or former smoker ages 50 to 80, ask your care team if ongoing lung cancer screenings are right for you.  For informational purposes only. Not to replace the advice of your health care provider. Copyright   2023 [employer]. All rights reserved. Clinically reviewed by the [facility] Transitions Program. [employer] [id number] - REV 01/24.

## 2025-07-09 NOTE — PROGRESS NOTES
ANTICOAGULATION MANAGEMENT     Jovon TITO Mantilla 69 year old male is on warfarin with supratherapeutic INR result. (Goal INR 2.5-3.5)    Recent labs: (last 7 days)     07/09/25  0937   INR 3.9*       ASSESSMENT     Warfarin Lab Questionnaire    Warfarin Doses Last 7 Days    Pt Rptd Dose SUNDAY MONDAY TUESDAY WED THURS FRIDAY SATURDAY 7/9/2025   5:44 AM 4 4 4 4 4 4 4         7/9/2025   Warfarin Lab Questionnaire   Missed doses within past 14 days? No   Changes in diet or alcohol within past 14 days? No   Medication changes since last result? No   Injuries or illness since last result? No   New shortness of breath, severe headaches or sudden changes in vision since last result? No   Abnormal bleeding since last result? No   Upcoming surgery, procedure? No     Previous result: Therapeutic last visit; previously outside of goal range  Additional findings: shingles shot last week, patient's arm hurt for about 3 full days. Pt did increase tylenol due to pain.     Melatonin switch to Z quil. Patient's face was breaking out due to melatonin. Sx improved, no further breakouts.     PLAN     Recommended plan for temporary change(s) and ongoing change(s) affecting INR     Dosing Instructions: Continue your current warfarin dose with next INR in 2 weeks       Summary  As of 7/9/2025      Full warfarin instructions:  7/9: 2 mg; Otherwise 4 mg every day   Next INR check:  7/23/2025               Telephone call with Jovon who verbalizes understanding and agrees to plan    Lab visit scheduled    Education provided: Please call back if any changes to your diet, medications or how you've been taking warfarin  Symptom monitoring: monitoring for bleeding signs and symptoms, when to seek medical attention/emergency care, and if you hit your head or have a bad fall seek emergency care    Plan made per [facility] anticoagulation protocol    Soraida Cohen RN  7/9/2025  Anticoagulation Clinic  [facility] for routing messages: rose ROSE  [facility]  patient phone line: [phone]        _______________________________________________________________________     Anticoagulation Episode Summary       Current INR goal:  2.5-3.5   TTR:  69.0% (12 mo)   Target end date:  Indefinite   Send INR reminders to:  CARYN ROSE    Indications    S/P AVR (aortic valve replacement) [Z95.2]  Long-term (current) use of anticoagulants [Z79.01] (Resolved) [Z79.01]  Permanent atrial fibrillation (H) [I48.21]  Abdominal wall hematoma  initial encounter (Resolved) [S30.1XXA]  S/P aortic valve replacement (Resolved) [Z95.2]  S/P aortic valve replacement [Z95.2]  Abdominal wall hematoma  initial encounter [S30.1XXA]  Long term current use of anticoagulants with INR goal of 2.5-3.5 [Z79.01]  Long term current use of anticoagulant therapy [Z79.01]             Comments:  24mm ATS Valve             Anticoagulation Care Providers       Provider Role Specialty Phone number    Noe Ramirez PA-C Referring Family Medicine [phone]    Kimber Prado PA-C Referring Family Medicine [phone]

## 2025-07-09 NOTE — PROGRESS NOTES
"Preventive Care Visit  [facility] ROSE Prado PA-C, Family Medicine  Jul 9, 2025      Assessment & Plan     Need for vaccination    - Tdap, tetanus-diptheria-acell pertussis, (BOOSTRIX) 5-2.5-18.5 LF-MCG/0.5 JUAN injection; Inject 0.5 mLs into the muscle once for 1 dose.  - RSV vaccine, bivalent, ABRYSVO, injection; Inject 0.5 mLs into the muscle once for 1 dose. Pharmacist administered    Encounter for Medicare annual wellness exam      Acquired hypothyroidism  Refilled.  Labs stable   - levothyroxine (SYNTHROID/LEVOTHROID) 200 MCG tablet; Take 1 tablet (200 mcg) by mouth daily.    Mood changes  Doing well.  No changes   - sertraline (ZOLOFT) 25 MG tablet; Take 1 tablet (25 mg) by mouth daily.    Asymptomatic varicose veins of both lower extremities  Start with compression socks again. Consider referral back to vascular.    - Miscellaneous DME Order    The longitudinal plan of care for the diagnosis(es)/condition(s) as documented were addressed during this visit. Due to the added complexity in care, I will continue to support Jovon in the subsequent management and with ongoing continuity of care.      BMI  Estimated body mass index is 29.49 kg/m  as calculated from the following:    Height as of this encounter: 1.763 m (5' 9.4\").    Weight as of this encounter: 91.6 kg (202 lb).   Weight management plan: not discussed     Counseling  Appropriate preventive services were addressed with this patient via screening, questionnaire, or discussion as appropriate for fall prevention, nutrition, physical activity, Tobacco-use cessation, social engagement, weight loss and cognition.  Checklist reviewing preventive services available has been given to the patient.  Reviewed patient's diet, addressing concerns and/or questions.   He is at risk for lack of exercise and has been provided with information to increase physical activity for the benefit of his well-being.   Reviewed preventive " health counseling, as reflected in patient instructions        Subjective   Jovon is a 69 year old, presenting for the following:  Physical        7/9/2025    10:51 AM   Additional Questions   Roomed by Vonnie   Accompanied by self          HPI   69 year old male here for annual physical he has no concerns and feeling well today.     Urination at night not bothersome   Drinks one glass of tomato juice nightly -helps his allergies   No weight gain or swelling       Mood overall is good           Advance Care Planning    Document on file is a Health Care Directive or POLST.        7/9/2025   General Health   How would you rate your overall physical health? Good   Feel stress (tense, anxious, or unable to sleep) Not at all         7/9/2025   Nutrition   Diet: Regular (no restrictions)         7/9/2025   Exercise   Days per week of moderate/strenous exercise 3 days   Average minutes spent exercising at this level 60 min         7/9/2025   Social Factors   Frequency of gathering with friends or relatives Once a week   Worry food won't last until get money to buy more No   Food not last or not have enough money for food? No   Do you have housing? (Housing is defined as stable permanent housing and does not include staying outside in a car, in a tent, in an abandoned building, in an overnight shelter, or couch-surfing.) Yes   Are you worried about losing your housing? No   Lack of transportation? No   Unable to get utilities (heat,electricity)? No         7/9/2025   Fall Risk   Fallen 2 or more times in the past year? No   Trouble with walking or balance? No          7/9/2025   Activities of Daily Living- Home Safety   Needs help with the following daily activites None of the above   Safety concerns in the home None of the above         7/9/2025   Dental   Dentist two times every year? Yes         7/9/2025   Hearing Screening   Hearing concerns? None of the above         7/9/2025   Driving Risk Screening   Patient/family  members have concerns about driving No         7/9/2025   General Alertness/Fatigue Screening   Have you been more tired than usual lately? No         7/9/2025   Urinary Incontinence Screening   Bothered by leaking urine in past 6 months No         Today's PHQ-2 Score:       7/9/2025     5:53 AM   PHQ-2 ( 1999 Pf[employer])   Q1: Little interest or pleasure in doing things 0   Q2: Feeling down, depressed or hopeless 0   PHQ-2 Score 0    Q1: Little interest or pleasure in doing things Not at all   Q2: Feeling down, depressed or hopeless Not at all   PHQ-2 Score 0       Patient-reported           7/9/2025   Substance Use   Alcohol more than 3/day or more than 7/wk Not Applicable   Do you have a current opioid prescription? No   How severe/bad is pain from 1 to 10? 0/10 (No Pain)   Do you use any other substances recreationally? No     Social History     Tobacco Use    Smoking status: Never     Passive exposure: Never    Smokeless tobacco: Never   Vaping Use    Vaping status: Never Used   Substance Use Topics    Alcohol use: No     Alcohol/week: 0.0 standard drinks of alcohol    Drug use: No           7/9/2025   AAA Screening   Family history of Abdominal Aortic Aneurysm (AAA)? No   Last PSA:   PSA   Date Value Ref Range Status   08/28/2020 1.27 0 - 4 ug/L Final     Comment:     Assay Method:  Chemiluminescence using Siemens Vista analyzer     Prostate Specific Antigen Screen   Date Value Ref Range Status   08/09/2021 1.41 0.00 - 4.00 ug/L Final     ASCVD Risk   The 10-year ASCVD risk score (Hollie SCHULZ, et al., 2019) is: 12.1%    Values used to calculate the score:      Age: 69 years      Sex: Male      Is Non- : No      Diabetic: No      Tobacco smoker: No      Systolic Blood Pressure: 117 mmHg      Is BP treated: Yes      HDL Cholesterol: 46 mg/dL      Total Cholesterol: 135 mg/dL            Reviewed and updated as needed this visit by Provider     Meds                  Current providers  "sharing in care for this patient include:  Patient Care Team:  Kimber Prado PA-C as PCP - General (Family Medicine)  Bri Olvera PA as Physician Assistant (Urology)  Татьяна Srivastava PA as Physician Assistant (Urology)  Laura Traylor, RN as Personal Advocate & Liaison (PAL) (Family Medicine)  Sedrick Beltran MD as MD (Urology)  Noy Cooley MD as MD (Cardiovascular Disease)  Kimber Prado PA-C as Assigned PCP  Mattie Olivarez APRN CNP as Assigned Heart and Vascular Provider    The following health maintenance items are reviewed in Epic and correct as of today:  Health Maintenance   Topic Date Due    RSV VACCINE (1 - Risk 60-74 years 1-dose series) Never done    HF ACTION PLAN  06/02/2020    DTAP/TDAP/TD VACCINE (2 - Td or Tdap) 12/03/2020    COVID-19 VACCINE (8 - 2024-25 season) 05/21/2025    ZOSTER VACCINE (2 of 2) 08/27/2025    INFLUENZA VACCINE (1) 09/01/2025    BMP  11/21/2025    DIGOXIN  12/03/2025    ALT  12/04/2025    TSH W/FREE T4 REFLEX  12/04/2025    CBC  12/06/2025    COLORECTAL CANCER SCREENING  01/23/2026    CREATININE  05/21/2026    LIPID  06/11/2026    MICROALBUMIN  06/11/2026    MEDICARE ANNUAL WELLNESS VISIT  07/09/2026    ANNUAL REVIEW OF HM ORDERS  07/09/2026    FALL RISK ASSESSMENT  07/09/2026    DIABETES SCREENING  05/21/2028    ADVANCE CARE PLANNING  07/09/2030    HEPATITIS C SCREENING  Completed    PHQ-2 (once per calendar year)  Completed    PNEUMOCOCCAL VACCINE 50+ YEARS  Completed    HPV VACCINE  Aged Out    MENINGITIS VACCINE  Aged Out            Objective    Exam  /80   Pulse 61   Temp 98.6  F (37  C) (Temporal)   Resp 17   Ht 1.763 m (5' 9.4\")   Wt 91.6 kg (202 lb)   SpO2 96%   BMI 29.49 kg/m     Estimated body mass index is 29.49 kg/m  as calculated from the following:    Height as of this encounter: 1.763 m (5' 9.4\").    Weight as of this encounter: 91.6 kg (202 lb).    Physical Exam  HENT:      Head: Normocephalic.     "  Right Ear: Tympanic membrane, ear canal and external ear normal.      Left Ear: Tympanic membrane, ear canal and external ear normal.      Mouth/Throat:      Mouth: Mucous membranes are moist.      Pharynx: Oropharynx is clear.   Cardiovascular:      Rate and Rhythm: Normal rate. Rhythm irregular.      Heart sounds: Normal heart sounds, S1 normal and S2 normal.      No S3 or S4 sounds.   Pulmonary:      Effort: Pulmonary effort is normal.      Breath sounds: Normal breath sounds.   Abdominal:      General: Bowel sounds are normal.      Palpations: Abdomen is soft.      Tenderness: There is no abdominal tenderness.   Musculoskeletal:      Right lower leg: No edema.      Left lower leg: No edema.   Skin:     Comments: Large varicose veins in legs bilaterally    Neurological:      Mental Status: He is alert.   Psychiatric:         Behavior: Behavior is cooperative.               7/9/2025   Mini Cog   Clock Draw Score 2 Normal   3 Item Recall 3 objects recalled   Mini Cog Total Score 5              Signed Electronically by: Kimber Prado PA-C  Hx and physical done by [facility] and repeated and confirmed by me  DME (Durable Medical Equipment) Orders and Documentation  Orders Placed This Encounter   Procedures    Miscellaneous DME Order      The patient was assessed and it was determined the patient is in need of the following listed DME Supplies/Equipment. Please complete supporting documentation below to demonstrate medical necessity.

## 2025-07-23 ENCOUNTER — ANTICOAGULATION THERAPY VISIT (OUTPATIENT)
Dept: ANTICOAGULATION | Facility: CLINIC | Age: 70
End: 2025-07-23

## 2025-07-23 ENCOUNTER — LAB (OUTPATIENT)
Dept: LAB | Facility: CLINIC | Age: 70
End: 2025-07-23
Payer: COMMERCIAL

## 2025-07-23 DIAGNOSIS — I48.21 PERMANENT ATRIAL FIBRILLATION (H): ICD-10-CM

## 2025-07-23 DIAGNOSIS — Z79.01 LONG TERM CURRENT USE OF ANTICOAGULANTS WITH INR GOAL OF 2.5-3.5: ICD-10-CM

## 2025-07-23 DIAGNOSIS — S30.1XXA ABDOMINAL WALL HEMATOMA, INITIAL ENCOUNTER: ICD-10-CM

## 2025-07-23 DIAGNOSIS — Z95.2 S/P AORTIC VALVE REPLACEMENT: ICD-10-CM

## 2025-07-23 DIAGNOSIS — Z95.2 S/P AVR (AORTIC VALVE REPLACEMENT): Primary | ICD-10-CM

## 2025-07-23 DIAGNOSIS — Z95.2 S/P AVR (AORTIC VALVE REPLACEMENT): ICD-10-CM

## 2025-07-23 DIAGNOSIS — Z79.01 LONG TERM CURRENT USE OF ANTICOAGULANT THERAPY: ICD-10-CM

## 2025-07-23 LAB — INR BLD: 5 (ref 0.9–1.1)

## 2025-07-23 PROCEDURE — 36416 COLLJ CAPILLARY BLOOD SPEC: CPT

## 2025-07-23 PROCEDURE — 85610 PROTHROMBIN TIME: CPT

## 2025-07-23 NOTE — PROGRESS NOTES
ANTICOAGULATION MANAGEMENT     Jovon Mantilla 69 year old male is on warfarin with supratherapeutic INR result. (Goal INR 2.5-3.5)    Recent labs: (last 7 days)     07/23/25  0928   INR 5.0*       ASSESSMENT     Source(s): Chart Review and Patient/Caregiver Call     Warfarin doses taken: Warfarin taken as instructed  Diet: No new diet changes identified  Denies alcohol or tobacco   Medication/supplement changes: None noted  New illness, injury, or hospitalization: Yes: OV 7/9/25 patient has not been feeling well. Last [facility] note mentioned patient was breaking out due to melatonin and increase in tylenol   Pt feels the skin break out has improved - pt reports his skin is clear again  Pt is feeling better  Signs or symptoms of bleeding or clotting: No  Previous result: Supratherapeutic  Additional findings: None     PLAN     Recommended plan for no diet, medication or health factor changes affecting INR     Dosing Instructions: partial hold then decrease your warfarin dose (14.3% change) with next INR in 8 days      Summary  As of 7/23/2025      Full warfarin instructions:  7/23: 2 mg; Otherwise 2 mg every Mon, Fri; 4 mg all other days   Next INR check:  7/31/2025               Telephone call with Jovon who verbalizes understanding and agrees to plan    Lab visit scheduled    Education provided: Please call back if any changes to your diet, medications or how you've been taking warfarin  Symptom monitoring: monitoring for bleeding signs and symptoms, when to seek medical attention/emergency care, and if you hit your head or have a bad fall seek emergency care    Plan made per [facility] anticoagulation protocol    Soraida Cohen RN  7/23/2025  Anticoagulation Clinic  [employer] [facility] for routing messages: rose ROSE  [facility] patient phone line: [phone]        _______________________________________________________________________     Anticoagulation Episode Summary       Current INR goal:  2.5-3.5   TTR:  65.1% (12 mo)    Target end date:  Indefinite   Send INR reminders to:  CARYN ROSE    Indications    S/P AVR (aortic valve replacement) [Z95.2]  Long-term (current) use of anticoagulants [Z79.01] (Resolved) [Z79.01]  Permanent atrial fibrillation (H) [I48.21]  Abdominal wall hematoma  initial encounter (Resolved) [S30.1XXA]  S/P aortic valve replacement (Resolved) [Z95.2]  S/P aortic valve replacement [Z95.2]  Abdominal wall hematoma  initial encounter [S30.1XXA]  Long term current use of anticoagulants with INR goal of 2.5-3.5 [Z79.01]  Long term current use of anticoagulant therapy [Z79.01]             Comments:  24mm ATS Valve             Anticoagulation Care Providers       Provider Role Specialty Phone number    Noe Ramirez PA-C Referring Family Medicine [phone]    Kimber Prado PA-C Referring Family Medicine [phone]          Voicemail left for patient to return call to Anticoagulation Clinic.  Soraida Cohen RN on 7/23/2025 at 11:19 AM

## 2025-07-31 ENCOUNTER — RESULTS FOLLOW-UP (OUTPATIENT)
Dept: NURSING | Facility: CLINIC | Age: 70
End: 2025-07-31

## 2025-07-31 ENCOUNTER — ANTICOAGULATION THERAPY VISIT (OUTPATIENT)
Dept: ANTICOAGULATION | Facility: CLINIC | Age: 70
End: 2025-07-31

## 2025-07-31 ENCOUNTER — LAB (OUTPATIENT)
Dept: LAB | Facility: CLINIC | Age: 70
End: 2025-07-31
Payer: COMMERCIAL

## 2025-07-31 DIAGNOSIS — Z79.01 LONG TERM CURRENT USE OF ANTICOAGULANTS WITH INR GOAL OF 2.5-3.5: ICD-10-CM

## 2025-07-31 DIAGNOSIS — I48.21 PERMANENT ATRIAL FIBRILLATION (H): ICD-10-CM

## 2025-07-31 DIAGNOSIS — Z79.01 LONG TERM CURRENT USE OF ANTICOAGULANT THERAPY: ICD-10-CM

## 2025-07-31 DIAGNOSIS — Z95.2 S/P AORTIC VALVE REPLACEMENT: ICD-10-CM

## 2025-07-31 DIAGNOSIS — Z95.2 S/P AVR (AORTIC VALVE REPLACEMENT): Primary | ICD-10-CM

## 2025-07-31 DIAGNOSIS — S30.1XXA ABDOMINAL WALL HEMATOMA, INITIAL ENCOUNTER: ICD-10-CM

## 2025-07-31 DIAGNOSIS — Z95.2 S/P AVR (AORTIC VALVE REPLACEMENT): ICD-10-CM

## 2025-07-31 LAB — INR BLD: 2.6 (ref 0.9–1.1)

## 2025-07-31 NOTE — PROGRESS NOTES
ANTICOAGULATION MANAGEMENT     Jovon Mantilla 69 year old male is on warfarin with therapeutic INR result. (Goal INR 2.5-3.5)    Recent labs: (last 7 days)     07/31/25  1241   INR 2.6*       ASSESSMENT     Source(s): Chart Review and Patient/Caregiver Call     Warfarin doses taken: Warfarin taken as instructed  Diet: No new diet changes identified  Medication/supplement changes: None noted  New illness, injury, or hospitalization: No  Signs or symptoms of bleeding or clotting: No  Previous result: Supratherapeutic  Additional findings: None       PLAN     Recommended plan for no diet, medication or health factor changes affecting INR     Dosing Instructions: Continue your current warfarin dose with next INR in 2 weeks       Summary  As of 7/31/2025      Full warfarin instructions:  2 mg every Mon, Fri; 4 mg all other days   Next INR check:  8/14/2025               Telephone call with Jovon who verbalizes understanding and agrees to plan    Lab visit scheduled    Education provided: Goal range and lab monitoring: goal range and significance of current result    Plan made per [facility] anticoagulation protocol    Samra Blanca RN  7/31/2025  Anticoagulation Clinic  [employer] for routing messages: rose ROSE  [facility] patient phone line: [phone]        _______________________________________________________________________     Anticoagulation Episode Summary       Current INR goal:  2.5-3.5   TTR:  63.7% (12 mo)   Target end date:  Indefinite   Send INR reminders to:  CARYN ROSE    Indications    S/P AVR (aortic valve replacement) [Z95.2]  Long-term (current) use of anticoagulants [Z79.01] (Resolved) [Z79.01]  Permanent atrial fibrillation (H) [I48.21]  Abdominal wall hematoma  initial encounter (Resolved) [S30.1XXA]  S/P aortic valve replacement (Resolved) [Z95.2]  S/P aortic valve replacement [Z95.2]  Abdominal wall hematoma  initial encounter [S30.1XXA]  Long term current use of anticoagulants with INR  goal of 2.5-3.5 [Z79.01]  Long term current use of anticoagulant therapy [Z79.01]             Comments:  24mm ATS Valve             Anticoagulation Care Providers       Provider Role Specialty Phone number    Noe Ramirez PA-C Referring Family Medicine [phone]    Kimber Prado PA-C Referring Family Medicine [phone]

## 2025-08-14 ENCOUNTER — RESULTS FOLLOW-UP (OUTPATIENT)
Dept: ANTICOAGULATION | Facility: CLINIC | Age: 70
End: 2025-08-14

## 2025-08-14 ENCOUNTER — ANTICOAGULATION THERAPY VISIT (OUTPATIENT)
Dept: ANTICOAGULATION | Facility: CLINIC | Age: 70
End: 2025-08-14

## 2025-08-14 ENCOUNTER — LAB (OUTPATIENT)
Dept: LAB | Facility: CLINIC | Age: 70
End: 2025-08-14
Payer: COMMERCIAL

## 2025-08-14 DIAGNOSIS — Z95.2 S/P AVR (AORTIC VALVE REPLACEMENT): Primary | ICD-10-CM

## 2025-08-14 DIAGNOSIS — S30.1XXA ABDOMINAL WALL HEMATOMA, INITIAL ENCOUNTER: ICD-10-CM

## 2025-08-14 DIAGNOSIS — Z79.01 LONG TERM CURRENT USE OF ANTICOAGULANT THERAPY: ICD-10-CM

## 2025-08-14 DIAGNOSIS — I48.21 PERMANENT ATRIAL FIBRILLATION (H): ICD-10-CM

## 2025-08-14 DIAGNOSIS — Z79.01 LONG TERM CURRENT USE OF ANTICOAGULANTS WITH INR GOAL OF 2.5-3.5: ICD-10-CM

## 2025-08-14 DIAGNOSIS — Z95.2 S/P AVR (AORTIC VALVE REPLACEMENT): ICD-10-CM

## 2025-08-14 DIAGNOSIS — Z95.2 S/P AORTIC VALVE REPLACEMENT: ICD-10-CM

## 2025-08-14 LAB — INR BLD: 2.9 (ref 0.9–1.1)

## 2025-08-27 DIAGNOSIS — I48.91 ATRIAL FIBRILLATION WITH RAPID VENTRICULAR RESPONSE (H): ICD-10-CM

## 2025-08-27 RX ORDER — METOPROLOL SUCCINATE 100 MG/1
100 TABLET, EXTENDED RELEASE ORAL 2 TIMES DAILY
Qty: 180 TABLET | Refills: 3 | Status: SHIPPED | OUTPATIENT
Start: 2025-08-27

## (undated) DEVICE — CATH GD 5.4FR ID / 7FR OD X 43

## (undated) DEVICE — SLITTER ADJSTBL 6232ADJ

## (undated) DEVICE — SU VICRYL 2-0 CT-2 27" UND J269H

## (undated) DEVICE — SU VICRYL 0 CT-1 27" UND J260H

## (undated) DEVICE — SYR BULB IRRIG 50ML LATEX FREE 0035280

## (undated) DEVICE — SHEATH PRELUDE SNAP 13CM 9FR

## (undated) DEVICE — TOTE ANGIO CORP PC15AT SAN32CC83O

## (undated) DEVICE — INTRODUCER CATH VASC 5FRX10CM  MPIS-501-NT-U-SST

## (undated) DEVICE — SOL ADH LIQUID BENZOIN SWAB 0.6ML C1544

## (undated) DEVICE — SU VICRYL 3-0 RB-1 27" UND J215H

## (undated) DEVICE — DRAPE LAP W/ARMBOARD 29410

## (undated) DEVICE — SUCTION MANIFOLD NEPTUNE 2 SYS 4 PORT 0702-020-000

## (undated) DEVICE — 9CM X 15CM, AQUACEL AG ADVANTAGE SURGICAL COVER DRESSING

## (undated) DEVICE — ESU GROUND PAD ADULT W/CORD E7507

## (undated) DEVICE — SYR ANGIOGRAPHY MULTIUSE KIT ACIST 014612

## (undated) DEVICE — INTRO SHEATH 7FRX10CM PINNACLE RSS702

## (undated) DEVICE — LINEN GOWN XLG 5407

## (undated) DEVICE — CABLE PACING ALLIGATOR CLIP 12FT 5833SL

## (undated) DEVICE — LINEN TOWEL PACK X6 WHITE 5487

## (undated) DEVICE — SOL NACL 0.9% 10ML VIAL 0409-4888-02

## (undated) DEVICE — SOL NACL 0.9% IRRIG 1000ML BOTTLE 2F7124

## (undated) DEVICE — CATH DIAGNOSTIC RADIAL 5FR TIG 4.0

## (undated) DEVICE — SLEEVE TR BAND RADIAL COMPRESSION DEVICE 24CM TRB24-REG

## (undated) DEVICE — DEFIB PRO-PADZ LVP LQD GEL ADULT 8900-2105-01

## (undated) DEVICE — RAD INTRODUCER KIT MICRO 5FRX10CM .018 NITINOL G/W

## (undated) DEVICE — Device

## (undated) DEVICE — KIT HAND CONTROL ANGIOTOUCH ACIST 65CM AT-P65

## (undated) DEVICE — GLOVE PROTEXIS POWDER FREE SMT 7.5  2D72PT75X

## (undated) DEVICE — INTRO GLIDESHEATH SLENDER 6FR 10X45CM 60-1060

## (undated) DEVICE — PREP CHLORAPREP 26ML TINTED ORANGE  260815

## (undated) DEVICE — SPONGE LAP 12X12" X8425

## (undated) DEVICE — CATH GD 2.4MM DIA 50CML POLYETHER BLOCK AMIDE 6250V-MB2

## (undated) DEVICE — LINEN TOWEL PACK X30 5481

## (undated) DEVICE — TUBING SUCTION 10'X3/16" N510

## (undated) DEVICE — MANIFOLD KIT ANGIO AUTOMATED 014613

## (undated) DEVICE — BNDG ABDOMINAL BINDER 15X46-62"  08140562

## (undated) DEVICE — DRSG PRIMAPORE 03 1/8X6" 66000318

## (undated) DEVICE — SUCTION TIP YANKAUER W/O VENT K86

## (undated) DEVICE — DRSG STERI STRIP 1/2X4" R1547

## (undated) DEVICE — DRAPE SHEET REV FOLD 3/4 9349

## (undated) DEVICE — LINEN TOWEL PACK X5 5464

## (undated) DEVICE — KIT LG BORE TOUHY ACCESS PLUS MAP152

## (undated) DEVICE — VALVE HEMOSTASIS .096" COPILOT MECH 1003331

## (undated) DEVICE — GUIDEWIRE VASC 0.035INX150CM INQWIRE J TIP IQ35F150J3F/A

## (undated) DEVICE — GW VASC OMNIWIRE J L185CM PRESSURE 89185J

## (undated) DEVICE — SPONGE RAY-TEC 4X8" 7318

## (undated) DEVICE — SHEATH PRELUDE SNAP 7FRX13CM PLS-1007

## (undated) DEVICE — DRAIN PENROSE 0.25"X18" LATEX FREE GR201

## (undated) DEVICE — LINE MONITOR NASAL SMART CAPNOLINE ADULT LONG 12463

## (undated) DEVICE — PACK PCMKR PERM SRG PROC LF SAN32PC573

## (undated) DEVICE — SOL WATER IRRIG 1000ML BOTTLE 2F7114

## (undated) DEVICE — SU VICRYL 0 CT-2 27" J334H

## (undated) DEVICE — STPL SKIN 35W ROTATING HEAD PRW35

## (undated) DEVICE — SU MONOCRYL 4-0 PS-2 18" UND Y496G

## (undated) DEVICE — GLOVE PROTEXIS W/NEU-THERA 7.5  2D73TE75

## (undated) DEVICE — SU VICRYL 4-0 SH-1 27" J315H

## (undated) DEVICE — SUPPORTER ATHLETIC LG LATEX 202636

## (undated) RX ORDER — GLYCOPYRROLATE 0.2 MG/ML
INJECTION, SOLUTION INTRAMUSCULAR; INTRAVENOUS
Status: DISPENSED
Start: 2021-12-28

## (undated) RX ORDER — FENTANYL CITRATE 50 UG/ML
INJECTION, SOLUTION INTRAMUSCULAR; INTRAVENOUS
Status: DISPENSED
Start: 2021-12-28

## (undated) RX ORDER — LIDOCAINE HYDROCHLORIDE 20 MG/ML
INJECTION, SOLUTION EPIDURAL; INFILTRATION; INTRACAUDAL; PERINEURAL
Status: DISPENSED
Start: 2021-02-04

## (undated) RX ORDER — NITROGLYCERIN 5 MG/ML
VIAL (ML) INTRAVENOUS
Status: DISPENSED
Start: 2022-01-31

## (undated) RX ORDER — EPHEDRINE SULFATE 50 MG/ML
INJECTION, SOLUTION INTRAMUSCULAR; INTRAVENOUS; SUBCUTANEOUS
Status: DISPENSED
Start: 2021-02-11

## (undated) RX ORDER — NALOXONE HYDROCHLORIDE 0.4 MG/ML
INJECTION, SOLUTION INTRAMUSCULAR; INTRAVENOUS; SUBCUTANEOUS
Status: DISPENSED
Start: 2021-12-28

## (undated) RX ORDER — BUPIVACAINE HYDROCHLORIDE 5 MG/ML
INJECTION, SOLUTION EPIDURAL; INTRACAUDAL
Status: DISPENSED
Start: 2021-02-04

## (undated) RX ORDER — PROPOFOL 10 MG/ML
INJECTION, EMULSION INTRAVENOUS
Status: DISPENSED
Start: 2021-02-11

## (undated) RX ORDER — DEXAMETHASONE SODIUM PHOSPHATE 4 MG/ML
INJECTION, SOLUTION INTRA-ARTICULAR; INTRALESIONAL; INTRAMUSCULAR; INTRAVENOUS; SOFT TISSUE
Status: DISPENSED
Start: 2021-02-11

## (undated) RX ORDER — REGADENOSON 0.08 MG/ML
INJECTION, SOLUTION INTRAVENOUS
Status: DISPENSED
Start: 2023-07-06

## (undated) RX ORDER — ONDANSETRON 2 MG/ML
INJECTION INTRAMUSCULAR; INTRAVENOUS
Status: DISPENSED
Start: 2021-02-04

## (undated) RX ORDER — DEXAMETHASONE SODIUM PHOSPHATE 4 MG/ML
INJECTION, SOLUTION INTRA-ARTICULAR; INTRALESIONAL; INTRAMUSCULAR; INTRAVENOUS; SOFT TISSUE
Status: DISPENSED
Start: 2021-02-04

## (undated) RX ORDER — PROPOFOL 10 MG/ML
INJECTION, EMULSION INTRAVENOUS
Status: DISPENSED
Start: 2021-02-04

## (undated) RX ORDER — FENTANYL CITRATE-0.9 % NACL/PF 10 MCG/ML
PLASTIC BAG, INJECTION (ML) INTRAVENOUS
Status: DISPENSED
Start: 2021-02-11

## (undated) RX ORDER — LIDOCAINE HYDROCHLORIDE 10 MG/ML
INJECTION, SOLUTION EPIDURAL; INFILTRATION; INTRACAUDAL; PERINEURAL
Status: DISPENSED
Start: 2022-01-31

## (undated) RX ORDER — CELECOXIB 200 MG/1
CAPSULE ORAL
Status: DISPENSED
Start: 2021-02-04

## (undated) RX ORDER — FENTANYL CITRATE 50 UG/ML
INJECTION, SOLUTION INTRAMUSCULAR; INTRAVENOUS
Status: DISPENSED
Start: 2021-02-11

## (undated) RX ORDER — GABAPENTIN 300 MG/1
CAPSULE ORAL
Status: DISPENSED
Start: 2021-02-04

## (undated) RX ORDER — HEPARIN SODIUM 200 [USP'U]/100ML
INJECTION, SOLUTION INTRAVENOUS
Status: DISPENSED
Start: 2022-01-31

## (undated) RX ORDER — ACETAMINOPHEN 500 MG
TABLET ORAL
Status: DISPENSED
Start: 2021-02-04

## (undated) RX ORDER — BUPIVACAINE HYDROCHLORIDE 2.5 MG/ML
INJECTION, SOLUTION EPIDURAL; INFILTRATION; INTRACAUDAL
Status: DISPENSED
Start: 2024-12-05

## (undated) RX ORDER — LIDOCAINE HYDROCHLORIDE 40 MG/ML
SOLUTION TOPICAL
Status: DISPENSED
Start: 2021-12-28

## (undated) RX ORDER — LIDOCAINE HYDROCHLORIDE 20 MG/ML
INJECTION, SOLUTION EPIDURAL; INFILTRATION; INTRACAUDAL; PERINEURAL
Status: DISPENSED
Start: 2021-02-11

## (undated) RX ORDER — VERAPAMIL HYDROCHLORIDE 2.5 MG/ML
INJECTION, SOLUTION INTRAVENOUS
Status: DISPENSED
Start: 2022-01-31

## (undated) RX ORDER — ONDANSETRON 2 MG/ML
INJECTION INTRAMUSCULAR; INTRAVENOUS
Status: DISPENSED
Start: 2021-02-11

## (undated) RX ORDER — FENTANYL CITRATE 50 UG/ML
INJECTION, SOLUTION INTRAMUSCULAR; INTRAVENOUS
Status: DISPENSED
Start: 2024-12-05

## (undated) RX ORDER — FLUMAZENIL 0.1 MG/ML
INJECTION, SOLUTION INTRAVENOUS
Status: DISPENSED
Start: 2021-12-28

## (undated) RX ORDER — REGADENOSON 0.08 MG/ML
INJECTION, SOLUTION INTRAVENOUS
Status: DISPENSED
Start: 2024-09-18

## (undated) RX ORDER — HYDROCODONE BITARTRATE AND ACETAMINOPHEN 5; 325 MG/1; MG/1
TABLET ORAL
Status: DISPENSED
Start: 2021-02-04

## (undated) RX ORDER — DEXTROSE MONOHYDRATE 25 G/50ML
INJECTION, SOLUTION INTRAVENOUS
Status: DISPENSED
Start: 2021-02-04

## (undated) RX ORDER — CEFAZOLIN SODIUM 2 G/100ML
INJECTION, SOLUTION INTRAVENOUS
Status: DISPENSED
Start: 2021-02-04

## (undated) RX ORDER — HEPARIN SODIUM 1000 [USP'U]/ML
INJECTION, SOLUTION INTRAVENOUS; SUBCUTANEOUS
Status: DISPENSED
Start: 2022-01-31

## (undated) RX ORDER — FENTANYL CITRATE 50 UG/ML
INJECTION, SOLUTION INTRAMUSCULAR; INTRAVENOUS
Status: DISPENSED
Start: 2022-01-31

## (undated) RX ORDER — HYDROMORPHONE HCL IN WATER/PF 6 MG/30 ML
PATIENT CONTROLLED ANALGESIA SYRINGE INTRAVENOUS
Status: DISPENSED
Start: 2021-02-11

## (undated) RX ORDER — LIDOCAINE HYDROCHLORIDE 10 MG/ML
INJECTION, SOLUTION EPIDURAL; INFILTRATION; INTRACAUDAL; PERINEURAL
Status: DISPENSED
Start: 2024-12-05

## (undated) RX ORDER — FENTANYL CITRATE 50 UG/ML
INJECTION, SOLUTION INTRAMUSCULAR; INTRAVENOUS
Status: DISPENSED
Start: 2021-02-04